# Patient Record
Sex: FEMALE | Race: BLACK OR AFRICAN AMERICAN | Employment: UNEMPLOYED | URBAN - METROPOLITAN AREA
[De-identification: names, ages, dates, MRNs, and addresses within clinical notes are randomized per-mention and may not be internally consistent; named-entity substitution may affect disease eponyms.]

---

## 2017-04-11 ENCOUNTER — OFFICE VISIT (OUTPATIENT)
Dept: FAMILY MEDICINE CLINIC | Age: 52
End: 2017-04-11

## 2017-04-11 VITALS
WEIGHT: 160 LBS | OXYGEN SATURATION: 98 % | TEMPERATURE: 96.3 F | HEART RATE: 81 BPM | DIASTOLIC BLOOD PRESSURE: 70 MMHG | SYSTOLIC BLOOD PRESSURE: 129 MMHG | HEIGHT: 61 IN | BODY MASS INDEX: 30.21 KG/M2 | RESPIRATION RATE: 18 BRPM

## 2017-04-11 DIAGNOSIS — R30.9 URINARY PAIN: ICD-10-CM

## 2017-04-11 DIAGNOSIS — M25.512 CHRONIC LEFT SHOULDER PAIN: Primary | ICD-10-CM

## 2017-04-11 DIAGNOSIS — G89.29 CHRONIC LEFT SHOULDER PAIN: Primary | ICD-10-CM

## 2017-04-11 DIAGNOSIS — F29 PSYCHOSIS, UNSPECIFIED PSYCHOSIS TYPE (HCC): Chronic | ICD-10-CM

## 2017-04-11 DIAGNOSIS — F31.12 BIPOLAR 1 DISORDER WITH MODERATE MANIA (HCC): Chronic | ICD-10-CM

## 2017-04-11 LAB
BILIRUB UR QL STRIP: NEGATIVE
GLUCOSE UR-MCNC: NEGATIVE MG/DL
KETONES P FAST UR STRIP-MCNC: NEGATIVE MG/DL
PH UR STRIP: 7 [PH] (ref 4.6–8)
PROT UR QL STRIP: NEGATIVE MG/DL
SP GR UR STRIP: 1 (ref 1–1.03)
UA UROBILINOGEN AMB POC: NORMAL (ref 0.2–1)
URINALYSIS CLARITY POC: CLEAR
URINALYSIS COLOR POC: NORMAL
URINE BLOOD POC: NORMAL
URINE LEUKOCYTES POC: NEGATIVE
URINE NITRITES POC: NEGATIVE

## 2017-04-11 RX ORDER — IBUPROFEN 600 MG/1
TABLET ORAL
COMMUNITY
Start: 2017-03-23 | End: 2017-10-16 | Stop reason: ALTCHOICE

## 2017-04-11 RX ORDER — DICLOFENAC SODIUM 75 MG/1
TABLET, DELAYED RELEASE ORAL
Qty: 30 TAB | Refills: 2 | Status: SHIPPED | OUTPATIENT
Start: 2017-04-11 | End: 2017-10-16 | Stop reason: ALTCHOICE

## 2017-04-11 NOTE — PROGRESS NOTES
Chief Complaint   Patient presents with    Shoulder Pain    Elbow Pain    Bladder Infection    Allergic Reaction     Left shoulder & right elbow hurting, wants ref to ortho . Having reaction to artificial sweetener. Tongue swelling & hurting. Having burning when passes urine.

## 2017-04-11 NOTE — PROGRESS NOTES
HISTORY OF PRESENT ILLNESS  Brinda Vasquez is a 47 y.o. female here today for ortho referral for left shoulder pain that she has had for several years after a fall, no numbness or tingling but she does have pain and decreased ROM. Also having some urinary discomfort and wants to check UA. Continues to follow with psychiatry and feels well on her medications. Recently switched her toothpaste and it had an artificial sweetener in it and she reports allergic reaction to it with change in her taste and tingling of her tongue. Her sxs have resolved and she has discontinued the toothpaste. Shoulder Pain    The history is provided by the patient. The incident occurred more than 1 week ago. There was no injury mechanism. Bladder Infection    The history is provided by the patient. This is a new problem. The current episode started more than 2 days ago. The problem occurs intermittently. Associated symptoms include frequency and urgency. Pertinent negatives include no hematuria. Review of Systems   Constitutional: Negative for malaise/fatigue. Respiratory: Negative. Cardiovascular: Negative. Genitourinary: Positive for frequency and urgency. Negative for dysuria and hematuria. Musculoskeletal:        See HPI   Psychiatric/Behavioral:        Pt reports stable on current meds       Physical Exam   Constitutional: She is oriented to person, place, and time. She appears well-developed and well-nourished. /70 (BP 1 Location: Left arm, BP Patient Position: Sitting)  Pulse 81  Temp 96.3 °F (35.7 °C) (Oral)   Resp 18  Ht 5' 1\" (1.549 m)  Wt 160 lb (72.6 kg)  LMP 02/07/2017  SpO2 98%  BMI 30.23 kg/m2     HENT:   Head: Normocephalic and atraumatic. Eyes: No scleral icterus. Neck: No thyromegaly present. Cardiovascular: Normal heart sounds. Pulmonary/Chest: Breath sounds normal.   Abdominal: Soft. There is no tenderness.    Musculoskeletal:   Left shoulder no TTP but greatly reduced ROM, radial pulse 2+ hand  strong   Lymphadenopathy:     She has no cervical adenopathy. Neurological: She is alert and oriented to person, place, and time. Psychiatric: She has a normal mood and affect. Nursing note and vitals reviewed. Patient Active Problem List   Diagnosis Code    Psychotic disorder F29    Urge incontinence of urine N39.41    Bipolar 1 disorder with moderate robyn ([facility]) F31.12    Environmental allergies Z91.09    Chronic back pain M54.9, G89.29     Past Medical History:   Diagnosis Date    Arthritis     joints    Bipolar 1 disorder with moderate robyn ([facility] [address] 3/17/2014    Chronic pain     back with stress    Contact dermatitis and other eczema, due to unspecified cause     Depression     Headache     Hyperlipidemia 8/22/2016    Other ill-defined conditions     sinus infection,hay fever    Other ill-defined conditions     scoliosis    Psychiatric disorder     bipolar    Psychotic disorder     Stool color black     Tennis elbow syndrome 5/4/2015    Trauma      Social History     Social History    Marital status:      Spouse name: N/A    Number of children: N/A    Years of education: N/A     Social History Main Topics    Smoking status: Never Smoker    Smokeless tobacco: Never Used    Alcohol use Yes      Comment: occasional    Drug use: No    Sexual activity: Yes     Partners: Male     Other Topics Concern    None     Social History Narrative    , 0 children, not working at present. On disability for bipolar illness.       Family History   Problem Relation Age of Onset   [facility] Arthritis-rheumatoid Mother    [facility] Migraines Mother     Psychiatric Disorder Mother     Bipolar Disorder Mother     Alcohol abuse Father     Lung Disease Father     Heart Disease Father     Stroke Father     High Cholesterol Father     Psychiatric Disorder Sister     Alcohol abuse Sister     Bipolar Disorder Sister     Stroke Brother     Cancer Maternal Aunt     Breast Cancer Maternal Aunt      pt jose d she was under 50    Bipolar Disorder Sister      Current Outpatient Prescriptions   Medication Sig    ibuprofen (MOTRIN) 600 mg tablet     diclofenac EC (VOLTAREN) 75 mg EC tablet 1 po bid prn pain, take with food    methocarbamol (ROBAXIN) 500 mg tablet TAKE 1-2 TABLETS BY MOUTH TWICE A DAY AS NEEDED FOR MUSCLE SPASMS.  azelastine (ASTELIN) 137 mcg (0.1 %) nasal spray USE 1 SPRAY IN EACH NOSTRIL TWICE A DAY AS DIRECTED    SEROQUEL  mg sr tablet     famotidine (PEPCID) 40 mg tablet Take 1 tablet before breakfast, 1 before dinner. You may take one extra dose for severe heartburn if needed.  triamcinolone (ARISTOCORT) 0.5 % topical cream Apply  to affected area two (2) times a day. use thin layer    albuterol (PROVENTIL HFA, VENTOLIN HFA) 90 mcg/actuation inhaler Take 2 Puffs by inhalation every six (6) hours as needed for Wheezing.  fexofenadine (ALLEGRA) 180 mg tablet Take  by mouth daily.  flurazepam (DALMANE) 30 mg capsule Take 30 mg by mouth nightly as needed.  carbamazepine (TEGRETOL) 200 mg tablet Take 200 mg by mouth two (2) times a day.  EPINEPHrine (EPIPEN) 0.3 mg/0.3 mL (1:1,000) injection 0.3 mL by IntraMUSCular route once as needed for up to 1 dose. Allergies   Allergen Reactions    Claritin-D 12 Hour [Loratadine-Pseudoephedrine] Palpitations     palpatations and ringing in the ear    Other Medication Anaphylaxis     Artificial sweeteners cause anaphylaxis    Pcn [Penicillins] Anaphylaxis    Sunscreen Contact Dermatitis     Burns and opens the skin    Ultram [Tramadol] Hives and Other (comments)     Hives and ringing of the ears    Benzoyl Peroxide Hives    Cephalexin Itching    Maltodextrin-Glycerin Shortness of Breath       ASSESSMENT and PLAN  In office UA WNL. Ortho information provided, cont to follow w psychiatry. Care plan reviewed and pt understands. After visit summary printed and reviewed with patient.     Patience Lose was seen today for shoulder pain, elbow pain and bladder infection.     Diagnoses and all orders for this visit:    Chronic left shoulder pain  -     diclofenac EC (VOLTAREN) 75 mg EC tablet; 1 po bid prn pain, take with food    Urinary pain  -     AMB POC URINALYSIS DIP STICK AUTO W/O MICRO    Bipolar 1 disorder with moderate robyn (HCC)    Psychosis, unspecified psychosis type    Other orders  -     Cancel: AMB POC URINALYSIS DIP STICK AUTO W/O MICRO

## 2017-04-11 NOTE — MR AVS SNAPSHOT
Visit Information Date & Time Provider Department Dept. Phone Encounter #  
 4/11/2017  7:45 AM Hoang Gibson MD [facility] [phone] [id number] Upcoming Health Maintenance Date Due INFLUENZA AGE 9 TO ADULT 8/1/2016 PAP AKA CERVICAL CYTOLOGY 8/27/2016 BREAST CANCER SCRN MAMMOGRAM 9/14/2018 DTaP/Tdap/Td series (2 - Td) 7/6/2022 COLONOSCOPY 9/30/2023 Allergies as of 4/11/2017  Review Complete On: 4/11/2017 By: Jose Panchal LPN Severity Noted Reaction Type Reactions Claritin-d 12 Hour [Loratadine-pseudoephedrine] High 03/31/2011   Side Effect Palpitations  
 palpatations and ringing in the ear Other Medication High 03/31/2011   Systemic Anaphylaxis Artificial sweeteners cause anaphylaxis Pcn [Penicillins] High 03/31/2011   Systemic Anaphylaxis Sunscreen High 03/31/2011   Topical Contact Dermatitis Burns and opens the skin Ultram [Tramadol] Medium 03/31/2011   Side Effect Hives, Other (comments) Hives and ringing of the ears Benzoyl Peroxide  01/11/2013    Hives Cephalexin  04/20/2015    Itching Maltodextrin-glycerin  02/20/2014   Systemic Shortness of Breath Current Immunizations  Reviewed on 11/12/2015 Name Date Influenza Vaccine (Quad) PF 11/12/2015 TDAP Vaccine 7/6/2012 Not reviewed this visit You Were Diagnosed With   
  
 Codes Comments Chronic left shoulder pain    -  Primary ICD-10-CM: M25.512, B10.21 ICD-9-CM: 719.41, 338.29 Urinary pain     ICD-10-CM: R30.9 ICD-9-CM: 788.1 Bipolar 1 disorder with moderate robyn (HCC)     ICD-10-CM: F31.12 
ICD-9-CM: 296.42 Psychosis, unspecified psychosis type     ICD-10-CM: F29 
ICD-9-CM: 298.9 Vitals BP Pulse Temp Resp Height(growth percentile) Weight(growth percentile) 129/70 (BP 1 Location: Left arm, BP Patient Position: Sitting) 81 96.3 °F (35.7 °C) (Oral) 18 5' 1\" (1.549 m) 160 lb (72.6 kg) LMP SpO2 BMI OB Status Smoking Status 02/07/2017 98% 30.23 kg/m2 Perimenopausal Never Smoker BMI and BSA Data Body Mass Index Body Surface Area  
 30.23 kg/m 2 1.77 m 2 Preferred Pharmacy Pharmacy Name Phone [address], [place] Jose Vazquez [phone] Your Updated Medication List  
  
   
This list is accurate as of: 4/11/17  9:14 AM.  Always use your most recent med list.  
  
  
  
  
 albuterol 90 mcg/actuation inhaler Commonly known as:  PROVENTIL HFA, VENTOLIN HFA, PROAIR HFA Take 2 Puffs by inhalation every six (6) hours as needed for Wheezing. ALLEGRA 180 mg tablet Generic drug:  fexofenadine Take  by mouth daily. azelastine 137 mcg (0.1 %) nasal spray Commonly known as:  ASTELIN  
USE 1 SPRAY IN EACH NOSTRIL TWICE A DAY AS DIRECTED  
  
 diclofenac EC 75 mg EC tablet Commonly known as:  VOLTAREN  
1 po bid prn pain, take with food EPINEPHrine 0.3 mg/0.3 mL injection Commonly known as:  EPIPEN  
0.3 mL by IntraMUSCular route once as needed for up to 1 dose.  
  
 famotidine 40 mg tablet Commonly known as:  PEPCID Take 1 tablet before breakfast, 1 before dinner. You may take one extra dose for severe heartburn if needed. flurazepam 30 mg capsule Commonly known as:  [facility] Take 30 mg by mouth nightly as needed. ibuprofen 600 mg tablet Commonly known as:  MOTRIN  
  
 methocarbamol 500 mg tablet Commonly known as:  ROBAXIN  
TAKE 1-2 TABLETS BY MOUTH TWICE A DAY AS NEEDED FOR MUSCLE SPASMS. SEROquel  mg sr tablet Generic drug:  QUEtiapine SR  
  
 TEGretol 200 mg tablet Generic drug:  carBAMazepine Take 200 mg by mouth two (2) times a day. triamcinolone 0.5 % topical cream  
Commonly known as:  ARISTOCORT Apply  to affected area two (2) times a day. use thin layer Prescriptions Sent to Pharmacy Refills diclofenac EC (VOLTAREN) 75 mg EC tablet 2 Si po bid prn pain, take with food Class: Normal  
 Pharmacy: [address] [facility] Cost Ph #: [phone] We Performed the Following AMB POC URINALYSIS DIP STICK AUTO W/O MICRO [66645 CPT(R)] Introducing [facility] & HE[facility]! Dear Fatou Aguilar: 
Thank you for requesting a [employer] account. Our records indicate that you already have an active Stream[employer] account. You can access your account anytime at [email or web address] Did you know that you can access your hospital and ER discharge instructions at any time in [employer]? You can also review all of your test results from your hospital stay or ER visit. Additional Information If you have questions, please visit the Frequently Asked Questions section of the Stream[employer] website at [email or web address] Remember, [employer] is NOT to be used for urgent needs. For medical emergencies, dial 911. Now available from your iPhone and Android! Please provide this summary of care documentation to your next provider. Your primary care clinician is listed as Gurinder Anderson. If you have any questions after today's visit, please call [phone].

## 2017-05-31 DIAGNOSIS — M54.9 CHRONIC BACK PAIN, UNSPECIFIED BACK LOCATION, UNSPECIFIED BACK PAIN LATERALITY: ICD-10-CM

## 2017-05-31 DIAGNOSIS — G89.29 CHRONIC BACK PAIN, UNSPECIFIED BACK LOCATION, UNSPECIFIED BACK PAIN LATERALITY: ICD-10-CM

## 2017-05-31 RX ORDER — METHOCARBAMOL 500 MG/1
TABLET, FILM COATED ORAL
Qty: 30 TAB | Refills: 0 | Status: SHIPPED | OUTPATIENT
Start: 2017-05-31 | End: 2017-10-16 | Stop reason: SDUPTHER

## 2017-08-09 RX ORDER — METHOCARBAMOL 500 MG/1
TABLET, FILM COATED ORAL
Qty: 30 TAB | Refills: 0 | Status: SHIPPED | OUTPATIENT
Start: 2017-08-09 | End: 2017-10-16 | Stop reason: ALTCHOICE

## 2017-08-09 NOTE — TELEPHONE ENCOUNTER
Spoke with patient and gave her Dr. Sonia Montero and Dr. Sarah Connelly number [phone], she will make an appointment to be able to continue to get medications.

## 2017-09-15 ENCOUNTER — HOSPITAL ENCOUNTER (OUTPATIENT)
Dept: MAMMOGRAPHY | Age: 52
Discharge: HOME | End: 2017-09-15
Attending: INTERNAL MEDICINE
Payer: MEDICARE

## 2017-09-15 DIAGNOSIS — Z12.31 VISIT FOR SCREENING MAMMOGRAM: ICD-10-CM

## 2017-09-15 PROCEDURE — 77067 SCR MAMMO BI INCL CAD: CPT

## 2017-10-16 ENCOUNTER — OFFICE VISIT (OUTPATIENT)
Dept: INTERNAL MEDICINE CLINIC | Age: 52
End: 2017-10-16

## 2017-10-16 VITALS
BODY MASS INDEX: 30.78 KG/M2 | RESPIRATION RATE: 18 BRPM | SYSTOLIC BLOOD PRESSURE: 127 MMHG | OXYGEN SATURATION: 99 % | WEIGHT: 163 LBS | HEIGHT: 61 IN | HEART RATE: 100 BPM | TEMPERATURE: 97.9 F | DIASTOLIC BLOOD PRESSURE: 77 MMHG

## 2017-10-16 DIAGNOSIS — E78.00 HIGH CHOLESTEROL: Primary | ICD-10-CM

## 2017-10-16 DIAGNOSIS — M54.9 CHRONIC BACK PAIN, UNSPECIFIED BACK LOCATION, UNSPECIFIED BACK PAIN LATERALITY: ICD-10-CM

## 2017-10-16 DIAGNOSIS — G89.29 CHRONIC BACK PAIN, UNSPECIFIED BACK LOCATION, UNSPECIFIED BACK PAIN LATERALITY: ICD-10-CM

## 2017-10-16 DIAGNOSIS — Z13.1 SCREENING FOR DIABETES MELLITUS: ICD-10-CM

## 2017-10-16 DIAGNOSIS — F31.12 BIPOLAR 1 DISORDER WITH MODERATE MANIA (HCC): ICD-10-CM

## 2017-10-16 RX ORDER — METHOCARBAMOL 500 MG/1
TABLET, FILM COATED ORAL
Qty: 30 TAB | Refills: 0 | Status: SHIPPED | OUTPATIENT
Start: 2017-10-16 | End: 2018-01-04 | Stop reason: SDUPTHER

## 2017-10-16 NOTE — PROGRESS NOTES
Ms. Lucia Moise is a new patient who is here to establish care. CC:  Establish Care (new patient)       HPI:    Hx of Bipolar disorder with panic attacks: followed Dr Farr on tegretol, flurazepam and Seroquel. Patient states her mood is stable currently. Denies suicidal thoughts. Bipolar is well controlled.      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), stretches helps and lifting weights  Takes robaxin PRN and requesting a refill    On allergy shots, azelastine and allegra with symptoms of allergies controlled   Dr Abhilash Sharif is allergist       Last period in February 2017 - since then no periods, has hot sweats  Last pap smear cannot recall exact date     Review of systems:  Constitutional: negative for fever, chills, weight loss, night sweats   Eyes : negative for vision changes, eye pain and discharge  Nose and Throat: negative for tinnitus, sore throat   Cardiovascular: negative for chest pain, palpitations and shortness of breath  Respiratory: negative for shortness of breath, cough and wheezing   Gastroinstestinal: negative for abdominal pain, nausea, vomiting, diarrhea, constipation, and blood in the stool  Musculoskeletal: see HPI  Genitourinary: negative for dysuria, nocturia, polyuria and hematuria   Neurologic: Negative for focal weakness, numbness or incoordination  Skin: negative for rash, pruritus  Hematologic: negative for easy bruising      Past Medical History:   Diagnosis Date    Arthritis     joints    Bipolar 1 disorder with moderate robyn ([facility] [address] 3/17/2014    Chronic pain     back with stress    Contact dermatitis and other eczema, due to unspecified cause     Depression     Headache(784.0)     Hyperlipidemia 8/22/2016    Other ill-defined conditions(799.89)     sinus infection,hay fever    Other ill-defined conditions(799.89)     scoliosis    Psychiatric disorder     bipolar    Psychotic disorder     Stool color black     Tennis elbow syndrome 5/4/2015    Trauma         Past Surgical History:   Procedure Laterality Date    HX HEENT      wisdom teeth extraction    HX LIPOMA RESECTION      right gluteal    FREDY BIOPSY BREAST STEREOTACTIC Left 2011    negative    NV DENTAL SURGERY PROCEDURE      hx of dental surgery- wisdom teeth extracted       Allergies   Allergen Reactions    Claritin-D 12 Hour [Loratadine-Pseudoephedrine] Palpitations     palpatations and ringing in the ear    Other Medication Anaphylaxis     Artificial sweeteners cause anaphylaxis    Pcn [Penicillins] Anaphylaxis    Sunscreen Contact Dermatitis     Burns and opens the skin    Ultram [Tramadol] Hives and Other (comments)     Hives and ringing of the ears    Benzoyl Peroxide Hives    Cephalexin Itching    Maltodextrin-Glycerin Shortness of Breath       Current Outpatient Prescriptions on File Prior to Visit   Medication Sig Dispense Refill    azelastine (ASTELIN) 137 mcg (0.1 %) nasal spray USE 1 SPRAY IN EACH NOSTRIL TWICE A DAY AS DIRECTED 1 Bottle 2    SEROQUEL  mg sr tablet       EPINEPHrine (EPIPEN) 0.3 mg/0.3 mL (1:1,000) injection 0.3 mL by IntraMUSCular route once as needed for up to 1 dose. 1 Each 1    triamcinolone (ARISTOCORT) 0.5 % topical cream Apply  to affected area two (2) times a day. use thin layer 15 g 1    albuterol (PROVENTIL HFA, VENTOLIN HFA) 90 mcg/actuation inhaler Take 2 Puffs by inhalation every six (6) hours as needed for Wheezing. 1 Inhaler 3    fexofenadine (ALLEGRA) 180 mg tablet Take  by mouth daily.  flurazepam (DALMANE) 30 mg capsule Take 30 mg by mouth nightly as needed.  carbamazepine (TEGRETOL) 200 mg tablet Take 200 mg by mouth two (2) times a day. No current facility-administered medications on file prior to visit. family history includes Alcohol abuse in her father and sister;  Arthritis-rheumatoid in her mother; Bipolar Disorder in her mother, sister, and sister; Breast Cancer in her maternal aunt; Cancer in her maternal aunt; Heart Disease in her father; High Cholesterol in her father; Lung Disease in her father; Lupus in her mother; Migraines in her mother; Psychiatric Disorder in her mother and sister; Stroke in her brother and father. Social History     Social History    Marital status:      Spouse name: N/A    Number of children: N/A    Years of education: N/A     Occupational History    Not on file. Social History Main Topics    Smoking status: Never Smoker    Smokeless tobacco: Never Used    Alcohol use Yes      Comment: occasional    Drug use: No    Sexual activity: Yes     Partners: Male     Other Topics Concern    Not on file     Social History Narrative    , 0 children, not working at present. On disability for bipolar illness. Visit Vitals    /77 (BP 1 Location: Right arm, BP Patient Position: Sitting)    Pulse 100    Temp 97.9 °F (36.6 °C) (Oral)    Resp 18    Ht 5' 1\" (1.549 m)    Wt 163 lb (73.9 kg)    SpO2 99%    BMI 30.8 kg/m2     General:  Well appearing female no acute distress  HEENT:   PERRL,normal conjunctiva. External ear and canals normal, TMs normal.  Hearing normal to voice. Nose without edema or discharge, normal septum. Lips, teeth, gums normal.  Oropharynx: no erythema, no exudates, no lesions, normal tongue. Neck:  Supple. Thyroid normal size, nontender, without nodules. No carotid bruit. No masses or lymphadenopathy  Respiratory: no respiratory distress,  no wheezing, no rhonchi, no rales. No chest wall tenderness. Cardiovascular:  RRR, normal S1S2, no murmur. Gastrointestinal: normal bowel sounds, soft, nontender, without masses. No hepatosplenomegaly. Extremities +2 pulses, no edema, normal sensation   Musculoskeletal:  Normal gait. Normal digits and nails. Normal strength and tone, no atrophy, and no abnormal movement. Skin:  No rash, no lesions, no ulcers.   Skin warm, normal turgor, without induration or nodules. Neuro:  A and OX4, fluent speech, cranial nerves normal 2-12. Sensation normal to light touch. DTR symmetrical  Psych:  Normal affect      Lab Results   Component Value Date/Time    WBC 3.7 08/22/2016 09:26 AM    HGB 11.9 08/22/2016 09:26 AM    HCT 36.4 08/22/2016 09:26 AM    PLATELET 618 39/12/0850 09:26 AM    MCV 93 08/22/2016 09:26 AM     Lab Results   Component Value Date/Time    Sodium 140 08/22/2016 09:26 AM    Potassium 4.0 08/22/2016 09:26 AM    Chloride 97 08/22/2016 09:26 AM    CO2 23 08/22/2016 09:26 AM    Glucose 90 08/22/2016 09:26 AM    BUN 7 08/22/2016 09:26 AM    Creatinine 0.61 08/22/2016 09:26 AM    BUN/Creatinine ratio 11 08/22/2016 09:26 AM    GFR est  08/22/2016 09:26 AM    GFR est non- 08/22/2016 09:26 AM    Calcium 9.6 08/22/2016 09:26 AM     Lab Results   Component Value Date/Time    Cholesterol, total 197 08/22/2016 09:26 AM    HDL Cholesterol 80 08/22/2016 09:26 AM    LDL, calculated 106 08/22/2016 09:26 AM    VLDL, calculated 11 08/22/2016 09:26 AM    Triglyceride 57 08/22/2016 09:26 AM     Lab Results   Component Value Date/Time    TSH 0.813 11/12/2015 03:52 PM     Lab Results   Component Value Date/Time    Hemoglobin A1c 5.4 02/25/2014 12:10 PM    Hemoglobin A1c (POC) 5.4 04/20/2015 10:00 AM     Lab Results   Component Value Date/Time    Vitamin D 25-Hydroxy 12.7 11/10/2011 09:25 AM    VITAMIN D, 25-HYDROXY 69.0 11/12/2015 03:52 PM                   Assessment and Plan:     1. Chronic back pain, unspecified back location, unspecified back pain laterality  - uses muscle relaxant PRN   - methocarbamol (ROBAXIN) 500 mg tablet; TAKE 1-2 TABLETS BY MOUTH TWICE A DAY AS NEEDED FOR MUSCLE SPASMS. Indications: Muscle Spasm  Dispense: 30 Tab; Refill: 0    2. High cholesterol  - controlled with diet / has decreased fatty food intake   - CBC WITH AUTOMATED DIFF  - METABOLIC PANEL, COMPREHENSIVE  - LIPID PANEL    3.  Bipolar 1 disorder with moderate robyn (HCC)  - mood is stable on multiple psych meds  - followed by Dr Jennifer Medellin   - Papo Mitchell, COMPREHENSIVE  - LIPID PANEL  Patient will return for fasting labs     Follow-up Disposition:  Return in about 6 months (around 4/16/2018) for pap smear .      Dagoberto Gomez MD

## 2017-10-16 NOTE — MR AVS SNAPSHOT
Visit Information Date & Time Provider Department Dept. Phone Encounter #  
 10/16/2017 10:45 AM Castillo, [address] [phone] [id number] Follow-up Instructions Return in about 6 months (around 4/16/2018) for pap smear . Upcoming Health Maintenance Date Due  
 PAP AKA CERVICAL CYTOLOGY 8/27/2016 BREAST CANCER SCRN MAMMOGRAM 9/15/2019 DTaP/Tdap/Td series (2 - Td) 7/6/2022 COLONOSCOPY 9/30/2023 Allergies as of 10/16/2017  Review Complete On: 10/16/2017 By: Ayush Tobar Severity Noted Reaction Type Reactions Claritin-d 12 Hour [Loratadine-pseudoephedrine] High 03/31/2011   Side Effect Palpitations  
 palpatations and ringing in the ear Other Medication High 03/31/2011   Systemic Anaphylaxis Artificial sweeteners cause anaphylaxis Pcn [Penicillins] High 03/31/2011   Systemic Anaphylaxis Sunscreen High 03/31/2011   Topical Contact Dermatitis Burns and opens the skin Ultram [Tramadol] Medium 03/31/2011   Side Effect Hives, Other (comments) Hives and ringing of the ears Benzoyl Peroxide  01/11/2013    Hives Cephalexin  04/20/2015    Itching Maltodextrin-glycerin  02/20/2014   Systemic Shortness of Breath Current Immunizations  Reviewed on 11/12/2015 Name Date Influenza Vaccine (Quad) PF 11/12/2015 TDAP Vaccine 7/6/2012 Not reviewed this visit You Were Diagnosed With   
  
 Codes Comments High cholesterol    -  Primary ICD-10-CM: E78.00 ICD-9-CM: 272.0 Chronic back pain, unspecified back location, unspecified back pain laterality     ICD-10-CM: M54.9, G89.29 ICD-9-CM: 724.5, 338.29 Bipolar 1 disorder with moderate robyn (HCC)     ICD-10-CM: F31.12 
ICD-9-CM: 296.42 Screening for diabetes mellitus     ICD-10-CM: Z13.1 ICD-9-CM: V77.1 Vitals BP Pulse Temp Resp Height(growth percentile) Weight(growth percentile) 127/77 (BP 1 Location: Right arm, BP Patient Position: Sitting) 100 97.9 °F (36.6 °C) (Oral) 18 5' 1\" (1.549 m) 163 lb (73.9 kg) SpO2 BMI OB Status Smoking Status 99% 30.8 kg/m2 Perimenopausal Never Smoker BMI and BSA Data Body Mass Index Body Surface Area  
 30.8 kg/m 2 1.78 m 2 Preferred Pharmacy Pharmacy Name Phone [address], [address] [place] [address] [phone] Your Updated Medication List  
  
   
This list is accurate as of: 10/16/17 11:36 AM.  Always use your most recent med list.  
  
  
  
  
 albuterol 90 mcg/actuation inhaler Commonly known as:  PROVENTIL HFA, VENTOLIN HFA, PROAIR HFA Take 2 Puffs by inhalation every six (6) hours as needed for Wheezing. ALLEGRA 180 mg tablet Generic drug:  fexofenadine Take  by mouth daily. azelastine 137 mcg (0.1 %) nasal spray Commonly known as:  ASTELIN  
USE 1 SPRAY IN EACH NOSTRIL TWICE A DAY AS DIRECTED  
  
 EPINEPHrine 0.3 mg/0.3 mL injection Commonly known as:  EPIPEN  
0.3 mL by IntraMUSCular route once as needed for up to 1 dose. flurazepam 30 mg capsule Commonly known as:  [facility] Take 30 mg by mouth nightly as needed. methocarbamol 500 mg tablet Commonly known as:  ROBAXIN  
TAKE 1-2 TABLETS BY MOUTH TWICE A DAY AS NEEDED FOR MUSCLE SPASMS. Indications: Muscle Spasm SEROquel  mg sr tablet Generic drug:  QUEtiapine SR  
  
 TEGretol 200 mg tablet Generic drug:  carBAMazepine Take 200 mg by mouth two (2) times a day. triamcinolone 0.5 % topical cream  
Commonly known as:  ARISTOCORT Apply  to affected area two (2) times a day. use thin layer Prescriptions Sent to Pharmacy Refills  
 methocarbamol (ROBAXIN) 500 mg tablet 0 Sig: TAKE 1-2 TABLETS BY MOUTH TWICE A DAY AS NEEDED FOR MUSCLE SPASMS. Indications: Muscle Spasm  Class: Normal  
 Pharmacy: [id number]  #: [phone] We Performed the Following CBC WITH AUTOMATED DIFF [81920 CPT(R)] LIPID PANEL [12898 CPT(R)] METABOLIC PANEL, COMPREHENSIVE [46083 CPT(R)] Follow-up Instructions Return in about 6 months (around 4/16/2018) for pap smear . Patient Instructions Schedule pap smear Return for fasting cholesterol labs Introducing \[facility] [place] [facility]\"" & HEALTH SERVICES! Dear Pavan Pitts: 
Thank you for requesting a [employer] account. Our records indicate that you already have an active [employer] account. You can access your account anytime at [email or web address] Did you know that you can access your hospital and ER discharge instructions at any time in [employer]? You can also review all of your test results from your hospital stay or ER visit. Additional Information If you have questions, please visit the Frequently Asked Questions section of the [employer] website at [email or web address] Remember, [employer] is NOT to be used for urgent needs. For medical emergencies, dial 911. Now available from your iPhone and Android! Please provide this summary of care documentation to your next provider. Your primary care clinician is listed as WALLY Krueger. If you have any questions after today's visit, please call [phone].

## 2017-10-16 NOTE — PROGRESS NOTES
Chief Complaint   Patient presents with   [facility] Establish Care     new patient     1. Have you been to the ER, urgent care clinic since your last visit? Hospitalized since your last visit? No    2. Have you seen or consulted any other health care providers outside of the [address] [place] [address] since your last visit? Include any pap smears or colon screening.  No

## 2017-10-25 RX ORDER — AZELASTINE 1 MG/ML
SPRAY, METERED NASAL
Qty: 30 BOTTLE | Refills: 2 | Status: ON HOLD | OUTPATIENT
Start: 2017-10-25 | End: 2020-07-29

## 2017-12-30 ENCOUNTER — HOSPITAL ENCOUNTER (EMERGENCY)
Age: 52
Discharge: HOME | End: 2017-12-30
Attending: EMERGENCY MEDICINE
Payer: MEDICARE

## 2017-12-30 VITALS
DIASTOLIC BLOOD PRESSURE: 79 MMHG | WEIGHT: 159.83 LBS | HEIGHT: 61 IN | OXYGEN SATURATION: 98 % | BODY MASS INDEX: 30.18 KG/M2 | RESPIRATION RATE: 25 BRPM | HEART RATE: 95 BPM | SYSTOLIC BLOOD PRESSURE: 133 MMHG | TEMPERATURE: 97.9 F

## 2017-12-30 DIAGNOSIS — T78.40XA ALLERGIC REACTION, INITIAL ENCOUNTER: Primary | ICD-10-CM

## 2017-12-30 PROCEDURE — 96375 TX/PRO/DX INJ NEW DRUG ADDON: CPT

## 2017-12-30 PROCEDURE — 96376 TX/PRO/DX INJ SAME DRUG ADON: CPT

## 2017-12-30 PROCEDURE — 96374 THER/PROPH/DIAG INJ IV PUSH: CPT

## 2017-12-30 PROCEDURE — 74011000250 HC RX REV CODE- 250: Performed by: EMERGENCY MEDICINE

## 2017-12-30 PROCEDURE — 74011250636 HC RX REV CODE- 250/636: Performed by: EMERGENCY MEDICINE

## 2017-12-30 PROCEDURE — 99284 EMERGENCY DEPT VISIT MOD MDM: CPT

## 2017-12-30 RX ORDER — ONDANSETRON 2 MG/ML
4 INJECTION INTRAMUSCULAR; INTRAVENOUS
Status: COMPLETED | OUTPATIENT
Start: 2017-12-30 | End: 2017-12-30

## 2017-12-30 RX ORDER — DIPHENHYDRAMINE HYDROCHLORIDE 50 MG/ML
25 INJECTION, SOLUTION INTRAMUSCULAR; INTRAVENOUS
Status: COMPLETED | OUTPATIENT
Start: 2017-12-30 | End: 2017-12-30

## 2017-12-30 RX ORDER — FAMOTIDINE 10 MG/ML
20 INJECTION INTRAVENOUS
Status: COMPLETED | OUTPATIENT
Start: 2017-12-30 | End: 2017-12-30

## 2017-12-30 RX ORDER — PREDNISONE 10 MG/1
TABLET ORAL
Qty: 48 TAB | Refills: 0 | Status: SHIPPED | OUTPATIENT
Start: 2017-12-30 | End: 2018-01-07

## 2017-12-30 RX ORDER — MONTELUKAST SODIUM 10 MG/1
10 TABLET ORAL
Status: ON HOLD | COMMUNITY
End: 2022-05-09

## 2017-12-30 RX ORDER — DEXAMETHASONE SODIUM PHOSPHATE 100 MG/10ML
10 INJECTION INTRAMUSCULAR; INTRAVENOUS
Status: COMPLETED | OUTPATIENT
Start: 2017-12-30 | End: 2017-12-30

## 2017-12-30 RX ADMIN — FAMOTIDINE 20 MG: 10 INJECTION, SOLUTION INTRAVENOUS at 09:51

## 2017-12-30 RX ADMIN — DIPHENHYDRAMINE HYDROCHLORIDE 25 MG: 50 INJECTION, SOLUTION INTRAMUSCULAR; INTRAVENOUS at 09:51

## 2017-12-30 RX ADMIN — DEXAMETHASONE SODIUM PHOSPHATE 10 MG: 10 INJECTION INTRAMUSCULAR; INTRAVENOUS at 10:02

## 2017-12-30 RX ADMIN — ONDANSETRON 4 MG: 2 INJECTION INTRAMUSCULAR; INTRAVENOUS at 10:02

## 2017-12-30 RX ADMIN — DIPHENHYDRAMINE HYDROCHLORIDE 25 MG: 50 INJECTION, SOLUTION INTRAMUSCULAR; INTRAVENOUS at 11:23

## 2017-12-30 NOTE — ED TRIAGE NOTES
\"I'm having an allergic reaction to some oils. I put two drops of 'digestive jossue' Doterra oil in my soup last night. \"  Anice, peppermint, sydney, jonatan, coriander, tarragon, and fennel are main ingredients of oil.

## 2017-12-30 NOTE — ED NOTES
Assumed care from triage. Patient comes to the ED complaining of chest tightness and nausea that started this morning. Patient has been taking essential oils and accidentally poured some in her food last night. Patient started breaking out into hives and became itchy. Patient took Benadryl, Singulair, Allegra, and some hot tea. Patient states that she is allergic to artificial sweeteners and some natural sugars.

## 2017-12-30 NOTE — DISCHARGE INSTRUCTIONS

## 2017-12-30 NOTE — ED NOTES
Dr. Gissell Dennis reviewed discharge instructions with the patient. The patient verbalized understanding. All questions and concerns were addressed. The patient declined a wheelchair and is discharged ambulatory in the care of family members with instructions and prescriptions in hand. Pt is alert and oriented x 4. Respirations are clear and unlabored.

## 2017-12-30 NOTE — ED PROVIDER NOTES
EMERGENCY DEPARTMENT HISTORY AND PHYSICAL EXAM      Date: 12/30/2017  Patient Name: Mohinder Cuevas    History of Presenting Illness     Chief Complaint   Patient presents with    Allergic Reaction       History Provided By: Patient    HPI: Mohinder Cuevas, 47 y.o. female with PMHx significant for HLD, bipolar 1 disorder, and alleriges, presents ambulatory to the ED with cc of a diffuse, pruritic rash (described as hives) with onset last night. Pt states symptoms were gradual in onset and reports associated nausea and chest tightness which began this morning. She states that she has an extensive hx of allergies including allergies to artificial sweeteners, noting she recently began use of essential oils 1.5 weeks ago. She states that last night she used one called \"On Guard\" which contains a blend of orange peel, cloves, chamomile, eucalyptus, and rosemary, which she put in her food. She states that later that night she began to break out in \"hives\" and experienced diffuse body pruritis for which she believes the oil may have an unlisted artificial sweetener. She states that this morning the chest tightness and nausea began, prompting ED visit. She reports use of Benadryl, Singulair, Allegra, and hot honey tea without relief of symptoms other than noting the rash has improved. She is followed by Dr. Beena Grady (allergist). She denies any recent medication changes. She denies any CP, SOB, vomiting, diarrhea. PCP: Ayush Lees MD    There are no other complaints, changes, or physical findings at this time. Current Outpatient Prescriptions   Medication Sig Dispense Refill    montelukast (SINGULAIR) 10 mg tablet Take 10 mg by mouth daily.       predniSONE (STERAPRED DS) 10 mg dose pack Take as directed 48 Tab 0    azelastine (ASTELIN) 137 mcg (0.1 %) nasal spray USE 1 SPRAY IN EACH NOSTRIL TWICE A DAY AS DIRECTED 30 Bottle 2    methocarbamol (ROBAXIN) 500 mg tablet TAKE 1-2 TABLETS BY MOUTH TWICE A DAY AS NEEDED FOR MUSCLE SPASMS. Indications: Muscle Spasm 30 Tab 0    SEROQUEL  mg sr tablet       EPINEPHrine (EPIPEN) 0.3 mg/0.3 mL (1:1,000) injection 0.3 mL by IntraMUSCular route once as needed for up to 1 dose. 1 Each 1    triamcinolone (ARISTOCORT) 0.5 % topical cream Apply  to affected area two (2) times a day. use thin layer 15 g 1    albuterol (PROVENTIL HFA, VENTOLIN HFA) 90 mcg/actuation inhaler Take 2 Puffs by inhalation every six (6) hours as needed for Wheezing. 1 Inhaler 3    fexofenadine (ALLEGRA) 180 mg tablet Take  by mouth daily.  flurazepam (DALMANE) 30 mg capsule Take 30 mg by mouth nightly as needed.  carbamazepine (TEGRETOL) 200 mg tablet Take 200 mg by mouth two (2) times a day.          Past History     Past Medical History:  Past Medical History:   Diagnosis Date    Arthritis     joints    Bipolar 1 disorder with moderate robyn ([facility] [address] 3/17/2014    Chronic pain     back with stress    Contact dermatitis and other eczema, due to unspecified cause     Depression     Headache(784.0)     Hyperlipidemia 8/22/2016    Other ill-defined conditions(799.89)     sinus infection,hay fever    Other ill-defined conditions(799.89)     scoliosis    Psychiatric disorder     bipolar    Psychotic disorder     Stool color black     Tennis elbow syndrome 5/4/2015    Trauma        Past Surgical History:  Past Surgical History:   Procedure Laterality Date    HX HEENT      wisdom teeth extraction    HX LIPOMA RESECTION      right gluteal    FREDY BIOPSY BREAST STEREOTACTIC Left 2011    negative    [place] DENTAL SURGERY PROCEDURE      hx of dental surgery- wisdom teeth extracted       Family History:  Family History   Problem Relation Age of Onset   [address] [facility] [address] Arthritis-rheumatoid Mother    [facility] Migraines Mother     Psychiatric Disorder Mother     Bipolar Disorder Mother     Lupus Mother     Alcohol abuse Father     Lung Disease Father     Heart Disease Father     Stroke Father     High Cholesterol Father     Psychiatric Disorder Sister     Alcohol abuse Sister     Bipolar Disorder Sister     Stroke Brother     Cancer Maternal Aunt     Breast Cancer Maternal Aunt      pt thpillos she was under 48    Bipolar Disorder Sister        Social History:  Social History   Substance Use Topics    Smoking status: Never Smoker    Smokeless tobacco: Never Used    Alcohol use Yes      Comment: occasional       Allergies: Allergies   Allergen Reactions    Claritin-D 12 Hour [Loratadine-Pseudoephedrine] Palpitations     palpatations and ringing in the ear    Other Medication Anaphylaxis     Artificial sweeteners cause anaphylaxis    Pcn [Penicillins] Anaphylaxis    Sunscreen Contact Dermatitis     Burns and opens the skin    Ultram [Tramadol] Hives and Other (comments)     Hives and ringing of the ears    Benzoyl Peroxide Hives    Cephalexin Itching    Maltodextrin-Glycerin Shortness of Breath         Review of Systems   Review of Systems   Constitutional: Negative for activity change, chills and fever. HENT: Negative for congestion and sore throat. Eyes: Negative for pain and redness. Respiratory: Positive for chest tightness. Negative for cough and shortness of breath. Cardiovascular: Negative for chest pain and palpitations. Gastrointestinal: Positive for nausea. Negative for abdominal pain, diarrhea and vomiting. Genitourinary: Negative for dysuria, frequency and urgency. Musculoskeletal: Negative for back pain and neck pain. Skin: Positive for rash (pruritic). Neurological: Negative for syncope, light-headedness and headaches. Psychiatric/Behavioral: Negative for confusion. All other systems reviewed and are negative. Physical Exam   Physical Exam   Constitutional: She is oriented to person, place, and time. She appears well-developed and well-nourished. No distress. HENT:   Head: Normocephalic.    Nose: Nose normal.   Mouth/Throat: Oropharynx is clear and moist. No oropharyngeal exudate. Eyes: Conjunctivae are normal. Pupils are equal, round, and reactive to light. No scleral icterus. Neck: Normal range of motion. Neck supple. No JVD present. No tracheal deviation present. No thyromegaly present. Cardiovascular: Normal rate, regular rhythm and intact distal pulses. Exam reveals no gallop and no friction rub. No murmur heard. Pulmonary/Chest: Effort normal and breath sounds normal. No stridor. No respiratory distress. She has no wheezes. She has no rales. Abdominal: Soft. Bowel sounds are normal. She exhibits no distension. There is no tenderness. There is no rebound and no guarding. Musculoskeletal: Normal range of motion. She exhibits no edema. Lymphadenopathy:     She has no cervical adenopathy. Neurological: She is alert and oriented to person, place, and time. No cranial nerve deficit. She exhibits normal muscle tone. Coordination normal.   Skin: Skin is warm and dry. No rash noted. She is not diaphoretic. No erythema. No evidence of any rashes or hives. Psychiatric: She has a normal mood and affect. Her behavior is normal.   Nursing note and vitals reviewed. Medical Decision Making   I am the first provider for this patient. I reviewed the vital signs, available nursing notes, past medical history, past surgical history, family history and social history. Vital Signs-Reviewed the patient's vital signs. Patient Vitals for the past 12 hrs:   Temp Pulse Resp BP SpO2   12/30/17 1130 - 95 25 133/79 98 %   12/30/17 1030 - 86 21 (!) 135/91 90 %   12/30/17 0945 - 81 12 130/78 100 %   12/30/17 0925 97.9 °F (36.6 °C) 98 18 156/89 100 %       Pulse Oximetry Analysis - 100% on RA    Cardiac Monitor:   Rate: 89 bpm  Rhythm: Normal Sinus Rhythm     Records Reviewed: Nursing Notes and Old Medical Records    Provider Notes (Medical Decision Making):   DDx: Anxiety, allergic reaction    ED Course:   Initial assessment performed.  The patients presenting problems have been discussed, and they are in agreement with the care plan formulated and outlined with them. I have encouraged them to ask questions as they arise throughout their visit. 11:37 AM  I have reviewed discharge instructions with the patient and explained medications that she is being discharged with. The patient verbalized understanding and agrees with plan. She endorses that she is feeling better at time of discharge. She denies any SOB, further itching, or rash. Disposition:  Discharge Note:  11:37 AM  The patient has been re-evaluated and is ready for discharge. Reviewed available results with patient. Counseled patient on diagnosis and care plan. Patient has expressed understanding, and all questions have been answered. Patient agrees with plan and agrees to follow up as recommended, or return to the ED if their symptoms worsen. Discharge instructions have been provided and explained to the patient, along with reasons to return to the ED. PLAN:  1. Current Discharge Medication List      START taking these medications    Details   predniSONE (STERAPRED DS) 10 mg dose pack Take as directed  Qty: 48 Tab, Refills: 0           2. Follow-up Information     Follow up With Details Comments [id number] Research MD Tiff Schedule an appointment as soon as possible for a visit in 2 days  [address] [place] [address]  [phone]      [facility] EMERGENCY DEPT Go in 1 day  [address] [place] [address] Drive  [address] [facility]  [phone]        Return to ED if worse     Diagnosis     Clinical Impression:   1. Allergic reaction, initial encounter        Attestations: This note is prepared by Petra Antony, acting as Scribe for Geremias Mazariegos MD.    Geremias Mazariegos MD: The scribe's documentation has been prepared under my direction and personally reviewed by me in its entirety.  I confirm that the note above accurately reflects all work, treatment, procedures, and medical decision making performed by me.

## 2018-01-02 ENCOUNTER — TELEPHONE (OUTPATIENT)
Dept: INTERNAL MEDICINE CLINIC | Age: 53
End: 2018-01-02

## 2018-01-02 NOTE — TELEPHONE ENCOUNTER
----- Message from [address] sent at 1/2/2018 11:41 AM EST -----  Regarding: Dr. Duane Farr: [phone]  Jair kuo dc from the ED and needs a f.u. appt 1/2/2018 to  monitor abnormal blood pressure readings.        Message copied/pasted from [facility]

## 2018-01-03 NOTE — TELEPHONE ENCOUNTER
Called patient. Two patient identifiers verified. Patient states she started using essential oils about a month ago. Patient is allergic to artificial sugar. No family hx. Patient states she was using the oil topically and ingesting which contains artificial sugars. 15-20 days after starting patient was itching all over and had hives. Patient went to ED for allergic reaction after taking oral benadryl and singulair and no improvement. 25 mg of benadryl IV x2, Pepcid, and discharged with tapered prednisone x 12 days. /80 Saturday at ED. Advised BP normal, but needed to be seen for f/u since still itching. Scheduled with Dr. Mc Canela on 1/4/18 at [phone] AM.  Advised patient if symptoms worsen, should return to ED. Patient verbalized understanding and states no further questions at this time.

## 2018-01-04 ENCOUNTER — OFFICE VISIT (OUTPATIENT)
Dept: INTERNAL MEDICINE CLINIC | Age: 53
End: 2018-01-04

## 2018-01-04 ENCOUNTER — HOSPITAL ENCOUNTER (OUTPATIENT)
Dept: LAB | Age: 53
Discharge: HOME | End: 2018-01-04
Payer: MEDICARE

## 2018-01-04 VITALS
TEMPERATURE: 97.8 F | SYSTOLIC BLOOD PRESSURE: 136 MMHG | HEART RATE: 110 BPM | WEIGHT: 163 LBS | RESPIRATION RATE: 18 BRPM | HEIGHT: 61 IN | BODY MASS INDEX: 30.78 KG/M2 | OXYGEN SATURATION: 99 % | DIASTOLIC BLOOD PRESSURE: 85 MMHG

## 2018-01-04 DIAGNOSIS — M54.9 CHRONIC BACK PAIN, UNSPECIFIED BACK LOCATION, UNSPECIFIED BACK PAIN LATERALITY: ICD-10-CM

## 2018-01-04 DIAGNOSIS — R30.0 DYSURIA: ICD-10-CM

## 2018-01-04 DIAGNOSIS — L20.82 FLEXURAL ECZEMA: Primary | ICD-10-CM

## 2018-01-04 DIAGNOSIS — F31.9 BIPOLAR 1 DISORDER (HCC): ICD-10-CM

## 2018-01-04 DIAGNOSIS — G89.29 CHRONIC BACK PAIN, UNSPECIFIED BACK LOCATION, UNSPECIFIED BACK PAIN LATERALITY: ICD-10-CM

## 2018-01-04 LAB
BILIRUB UR QL STRIP: NEGATIVE
GLUCOSE UR-MCNC: NEGATIVE MG/DL
KETONES P FAST UR STRIP-MCNC: NEGATIVE MG/DL
PH UR STRIP: 6.5 [PH] (ref 4.6–8)
PROT UR QL STRIP: NEGATIVE
SP GR UR STRIP: 1.01 (ref 1–1.03)
UA UROBILINOGEN AMB POC: NORMAL (ref 0.2–1)
URINALYSIS CLARITY POC: CLEAR
URINALYSIS COLOR POC: YELLOW
URINE BLOOD POC: NORMAL
URINE LEUKOCYTES POC: NEGATIVE
URINE NITRITES POC: NEGATIVE

## 2018-01-04 PROCEDURE — 80156 ASSAY CARBAMAZEPINE TOTAL: CPT

## 2018-01-04 PROCEDURE — 80061 LIPID PANEL: CPT

## 2018-01-04 PROCEDURE — 85025 COMPLETE CBC W/AUTO DIFF WBC: CPT

## 2018-01-04 PROCEDURE — 80053 COMPREHEN METABOLIC PANEL: CPT

## 2018-01-04 PROCEDURE — 36415 COLL VENOUS BLD VENIPUNCTURE: CPT

## 2018-01-04 RX ORDER — METHOCARBAMOL 500 MG/1
TABLET, FILM COATED ORAL
Qty: 30 TAB | Refills: 0 | Status: SHIPPED | OUTPATIENT
Start: 2018-01-04 | End: 2018-04-25 | Stop reason: SDUPTHER

## 2018-01-04 RX ORDER — TRIAMCINOLONE ACETONIDE 5 MG/G
CREAM TOPICAL 2 TIMES DAILY
Qty: 15 G | Refills: 1 | Status: SHIPPED | OUTPATIENT
Start: 2018-01-04 | End: 2018-08-28

## 2018-01-04 NOTE — PROGRESS NOTES
CC: Hospital Follow Up (Allergic Reaction)      HPI:    She is a 47 y.o. female with a hx of bipolar disorder and allergic rhinitis  who presents for evaluation of   Seen in the ER on 12/30 for allergic reaction. Patient noted starting on essential oils used in her body for 1.4 weeks and then tried to ingest it 12/29 and woke up with hives with scratchy throat - took allegra without improvement and went to ER given decadron, benadryl and pepcic with improvement. She was given Prednisone to take at home and took prednisone the next day. Now patient doing better. This is second allergic reaction this month. Had a reaction after eating roseann's -having two sips of sprite from a fountain drink. Has an epi pen - did not use it     Having a flair up of eczema    Complains of postnasal drip - advised to use steroid nasal spray     Thinks may have a UTI - had some burning with pee but no increased frequency    Pressured speech and tangential - discussed with patient that I suspect steroids are leading to manic state, also she stopped taking tegretol two days ago  \" wants her level checked\" scared she may have toxic levels.  Her psychiatrist advised her to get all labs with PCP   Denies depressive thoughts    Requesting refill for muscle relaxants/ takes it PRN for back pain/muscle spasms  ROS:  10 systems reviewed and negative other than HPI  Past Medical History:   Diagnosis Date    Arthritis     joints    Bipolar 1 disorder with moderate robyn ([facility] [address] 3/17/2014    Chronic pain     back with stress    Contact dermatitis and other eczema, due to unspecified cause     Depression     Headache(784.0)     Hyperlipidemia 8/22/2016    Other ill-defined conditions(799.89)     sinus infection,hay fever    Other ill-defined conditions(799.89)     scoliosis    Psychiatric disorder     bipolar    Psychotic disorder     Stool color black     Tennis elbow syndrome 5/4/2015    Trauma        Current Outpatient Prescriptions on File Prior to Visit   Medication Sig Dispense Refill    montelukast (SINGULAIR) 10 mg tablet Take 10 mg by mouth daily.  predniSONE (STERAPRED DS) 10 mg dose pack Take as directed 48 Tab 0    azelastine (ASTELIN) 137 mcg (0.1 %) nasal spray USE 1 SPRAY IN EACH NOSTRIL TWICE A DAY AS DIRECTED 30 Bottle 2    SEROQUEL  mg sr tablet       EPINEPHrine (EPIPEN) 0.3 mg/0.3 mL (1:1,000) injection 0.3 mL by IntraMUSCular route once as needed for up to 1 dose. 1 Each 1    albuterol (PROVENTIL HFA, VENTOLIN HFA) 90 mcg/actuation inhaler Take 2 Puffs by inhalation every six (6) hours as needed for Wheezing. 1 Inhaler 3    fexofenadine (ALLEGRA) 180 mg tablet Take  by mouth daily.  flurazepam (DALMANE) 30 mg capsule Take 30 mg by mouth nightly as needed.  carbamazepine (TEGRETOL) 200 mg tablet Take 200 mg by mouth two (2) times a day. No current facility-administered medications on file prior to visit.         Past Surgical History:   Procedure Laterality Date    HX HEENT      wisdom teeth extraction    HX LIPOMA RESECTION      right gluteal    FREDY BIOPSY BREAST STEREOTACTIC Left 2011    negative    FL DENTAL SURGERY PROCEDURE      hx of dental surgery- wisdom teeth extracted       Family History   Problem Relation Age of Onset   Baeza Arthritis-rheumatoid Mother    Baeza Migraines Mother     Psychiatric Disorder Mother     Bipolar Disorder Mother     Lupus Mother     Alcohol abuse Father     Lung Disease Father     Heart Disease Father     Stroke Father     High Cholesterol Father     Psychiatric Disorder Sister     Alcohol abuse Sister     Bipolar Disorder Sister     Stroke Brother     Cancer Maternal Aunt     Breast Cancer Maternal Aunt      pt thniks she was under 48    Bipolar Disorder Sister      Reviewed and no changes     Social History     Social History    Marital status:      Spouse name: N/A    Number of children: N/A    Years of education: N/A Occupational History    Not on file. Social History Main Topics    Smoking status: Never Smoker    Smokeless tobacco: Never Used    Alcohol use Yes      Comment: occasional    Drug use: No    Sexual activity: Yes     Partners: Male     Other Topics Concern    Not on file     Social History Narrative    , 0 children, not working at present. On disability for bipolar illness.              Visit Vitals    /85 (BP 1 Location: Right arm, BP Patient Position: Sitting)    Pulse (!) 110    Temp 97.8 °F (36.6 °C) (Oral)    Resp 18    Ht 5' 1\" (1.549 m)    Wt 163 lb (73.9 kg)    SpO2 99%    BMI 30.8 kg/m2       Physical Examination:   General - Well appearing female  HEENT - PERRL, TM no erythema/opacification, normal nasal turbinates, oropharynx no erythema or exudate, MMM  Neck - supple, no bruits, no TMG, no LAD  Pulm - clear to auscultation bilaterally  Cardio - RRR, normal S1 S2, no murmur gallops or rubs  Abd - soft, nontender, no masses, no HSM  Extrem - no edema, +2 distal pulses  Eczema of hands - dry skin \" feels like sandpaper\" on dorsal hands B/L  Psych - pressured speech, not depressed    Lab Results   Component Value Date/Time    WBC 3.7 08/22/2016 09:26 AM    HGB 11.9 08/22/2016 09:26 AM    HCT 36.4 08/22/2016 09:26 AM    PLATELET 648 80/36/4769 09:26 AM    MCV 93 08/22/2016 09:26 AM     Lab Results   Component Value Date/Time    Sodium 140 08/22/2016 09:26 AM    Potassium 4.0 08/22/2016 09:26 AM    Chloride 97 08/22/2016 09:26 AM    CO2 23 08/22/2016 09:26 AM    Glucose 90 08/22/2016 09:26 AM    BUN 7 08/22/2016 09:26 AM    Creatinine 0.61 08/22/2016 09:26 AM    BUN/Creatinine ratio 11 08/22/2016 09:26 AM    GFR est  08/22/2016 09:26 AM    GFR est non- 08/22/2016 09:26 AM    Calcium 9.6 08/22/2016 09:26 AM     Lab Results   Component Value Date/Time    Cholesterol, total 197 08/22/2016 09:26 AM    HDL Cholesterol 80 08/22/2016 09:26 AM    LDL, calculated 106 08/22/2016 09:26 AM    VLDL, calculated 11 08/22/2016 09:26 AM    Triglyceride 57 08/22/2016 09:26 AM     Lab Results   Component Value Date/Time    TSH 0.813 11/12/2015 03:52 PM     No results found for: PSA, PSA2, PSAR1, Norpacheco Waldemar, PSAR3, [id number], [id number], PSALT  Lab Results   Component Value Date/Time    Hemoglobin A1c 5.4 02/25/2014 12:10 PM    Hemoglobin A1c (POC) 5.4 04/20/2015 10:00 AM     Lab Results   Component Value Date/Time    Vitamin D 25-Hydroxy 12.7 11/10/2011 09:25 AM    VITAMIN D, 25-HYDROXY 69.0 11/12/2015 03:52 PM       Lab Results   Component Value Date/Time    ALT (SGPT) 12 08/22/2016 09:26 AM    AST (SGOT) 15 08/22/2016 09:26 AM    Alk. phosphatase 72 08/22/2016 09:26 AM    Bilirubin, total <0.2 08/22/2016 09:26 AM           Assessment/Plan:    46 yo woman with a hx of bipolar disorder and multiple allergies presenting to follow up after allergic reaction   Allergic reaction: known allergies to artifical sweetners and ingested \" natural oils that had sweetener\"  - advised to avoid all artifical sweeteners, should do food prep at home  - has epi pen   - currently allergic symptoms resolved- advised to stop prednisone as patient appears to be manic ( pressured speech)      Chronic back pain, unspecified back location, unspecified back pain laterality  - methocarbamol (ROBAXIN) 500 mg tablet; TAKE 1-2 TABLETS BY MOUTH TWICE A DAY AS NEEDED FOR MUSCLE SPASMS. Indications: Muscle Spasm  Dispense: 30 Tab; Refill: 0     Flexural eczema of hands  - keep hands moisturized  - triamcinolone (ARISTOCORT) 0.5 % topical cream; Apply  to affected area two (2) times a day.  use thin layer  Dispense: 15 g; Refill: 1    Dysuria  - AMB POC URINALYSIS DIP STICK AUTO W/O MICRO - normal no evidence of UTI  - discussed she may try OTC monistat for possible yeast infection      Bipolar 1 disorder ([facility] [address]): appears manic today / non suicidal   Advised to stop prednisone and resume tegretol   - patient will follow up with psychiatrist this month  - on dalmane, and seroquel   - CARBAMAZEPINE    Needs to have pap smear done - advised to schedule that         Follow-up Disposition:  Return if symptoms worsen or fail to improve.     Bard Sharron MD

## 2018-01-04 NOTE — MR AVS SNAPSHOT
Visit Information Date & Time Provider Department Dept. Phone Encounter #  
 1/4/2018 10:45 AM Wan Portillo, [address] [place] [address],[address] [phone] [id number] Follow-up Instructions Return if symptoms worsen or fail to improve. Your Appointments 4/23/2018 10:00 AM  
ROUTINE CARE with Wan Portillo MD  
[facility] [address]) Appt Note: PAP and fasting labs [address] [id number]  
[address] [facility] [address] [facility] Upcoming Health Maintenance Date Due  
 PAP AKA CERVICAL CYTOLOGY 8/27/2016 BREAST CANCER SCRN MAMMOGRAM 9/15/2019 DTaP/Tdap/Td series (2 - Td) 7/6/2022 COLONOSCOPY 9/30/2023 Allergies as of 1/4/2018  Review Complete On: 1/4/2018 By: Kimber Vuong Severity Noted Reaction Type Reactions Other Food  01/04/2018    Hives Artifical sweetners Claritin-d 12 Hour [Loratadine-pseudoephedrine] High 03/31/2011   Side Effect Palpitations  
 palpatations and ringing in the ear Other Medication High 03/31/2011   Systemic Anaphylaxis Artificial sweeteners cause anaphylaxis Pcn [Penicillins] High 03/31/2011   Systemic Anaphylaxis Sunscreen High 03/31/2011   Topical Contact Dermatitis Burns and opens the skin Ultram [Tramadol] Medium 03/31/2011   Side Effect Hives, Other (comments) Hives and ringing of the ears Benzoyl Peroxide  01/11/2013    Hives Cephalexin  04/20/2015    Itching Maltodextrin-glycerin  02/20/2014   Systemic Shortness of Breath Current Immunizations  Reviewed on 11/12/2015 Name Date Influenza Vaccine (Quad) PF 11/12/2015 TDAP Vaccine 7/6/2012 Not reviewed this visit You Were Diagnosed With   
  
 Codes Comments Flexural eczema    -  Primary ICD-10-CM: P44.74 ICD-9-CM: 691.8  Chronic back pain, unspecified back location, unspecified back pain laterality     ICD-10-CM: M54.9, G89.29 ICD-9-CM: 724.5, 338.29 Dysuria     ICD-10-CM: R30.0 ICD-9-CM: 788.1 Bipolar 1 disorder (HCC)     ICD-10-CM: F31.9 ICD-9-CM: 296.7 Vitals BP Pulse Temp Resp Height(growth percentile) Weight(growth percentile) 136/85 (BP 1 Location: Right arm, BP Patient Position: Sitting) (!) 110 97.8 °F (36.6 °C) (Oral) 18 5' 1\" (1.549 m) 163 lb (73.9 kg) SpO2 BMI OB Status Smoking Status 99% 30.8 kg/m2 Perimenopausal Never Smoker BMI and BSA Data Body Mass Index Body Surface Area  
 30.8 kg/m 2 1.78 m 2 Preferred Pharmacy Pharmacy Name Phone [id number] [place] [phone] Your Updated Medication List  
  
   
This list is accurate as of: 1/4/18 11:20 AM.  Always use your most recent med list.  
  
  
  
  
 albuterol 90 mcg/actuation inhaler Commonly known as:  PROVENTIL HFA, VENTOLIN HFA, PROAIR HFA Take 2 Puffs by inhalation every six (6) hours as needed for Wheezing. ALLEGRA 180 mg tablet Generic drug:  fexofenadine Take  by mouth daily. azelastine 137 mcg (0.1 %) nasal spray Commonly known as:  ASTELIN  
USE 1 SPRAY IN EACH NOSTRIL TWICE A DAY AS DIRECTED  
  
 EPINEPHrine 0.3 mg/0.3 mL injection Commonly known as:  EPIPEN  
0.3 mL by IntraMUSCular route once as needed for up to 1 dose. flurazepam 30 mg capsule Commonly known as:  [facility] Take 30 mg by mouth nightly as needed. methocarbamol 500 mg tablet Commonly known as:  ROBAXIN  
TAKE 1-2 TABLETS BY MOUTH TWICE A DAY AS NEEDED FOR MUSCLE SPASMS. Indications: Muscle Spasm  
  
 predniSONE 10 mg dose pack Commonly known as:  STERAPRED DS Take as directed SEROquel  mg sr tablet Generic drug:  QUEtiapine SR  
  
 SINGULAIR 10 mg tablet Generic drug:  montelukast  
Take 10 mg by mouth daily. TEGretol 200 mg tablet Generic drug:  carBAMazepine Take 200 mg by mouth two (2) times a day. triamcinolone 0.5 % topical cream  
Commonly known as:  ARISTOCORT Apply  to affected area two (2) times a day. use thin layer Prescriptions Sent to Pharmacy Refills  
 triamcinolone (ARISTOCORT) 0.5 % topical cream 1 Sig: Apply  to affected area two (2) times a day. use thin layer Class: Normal  
 Pharmacy: [address] [place] [address] Ph #: [phone] Route: Topical  
 methocarbamol (ROBAXIN) 500 mg tablet 0 Sig: TAKE 1-2 TABLETS BY MOUTH TWICE A DAY AS NEEDED FOR MUSCLE SPASMS. Indications: Muscle Spasm Class: Normal  
 Pharmacy: [address] [place] [address] Ph #: [phone] We Performed the Following AMB POC URINALYSIS DIP STICK AUTO W/O MICRO [86131 CPT(R)] CARBAMAZEPINE [id number] CPT(R)] Follow-up Instructions Return if symptoms worsen or fail to improve. Patient Instructions Allergies: Care Instructions Your Care Instructions Allergies occur when your body's defense system (immune system) overreacts to certain substances. The immune system treats a harmless substance as if it were a harmful germ or virus. Many things can cause this overreaction, including pollens, medicine, food, dust, animal dander, and mold. Allergies can be mild or severe. Mild allergies can be managed with home treatment. But medicine may be needed to prevent problems. Managing your allergies is an important part of staying healthy. Your doctor may suggest that you have allergy testing to help find out what is causing your allergies. When you know what things trigger your symptoms, you can avoid them. This can prevent allergy symptoms and other health problems.  
For severe allergies that cause reactions that affect your whole body (anaphylactic reactions), your doctor may prescribe a shot of epinephrine to carry with you in case you have a severe reaction. Learn how to give yourself the shot and keep it with you at all times. Make sure it is not . Follow-up care is a key part of your treatment and safety. Be sure to make and go to all appointments, and call your doctor if you are having problems. It's also a good idea to know your test results and keep a list of the medicines you take. How can you care for yourself at home? · If you have been told by your doctor that dust or dust mites are causing your allergy, decrease the dust around your bed: 
[facility] sheets, pillowcases, and other bedding in hot water every week. ¨ Use dust-proof covers for pillows, duvets, and mattresses. Avoid plastic covers because they tear easily and do not \"breathe. \" Wash as instructed on the label. ¨ Do not use any blankets and pillows that you do not need. ¨ Use blankets that you can wash in your washing machine. ¨ Consider removing drapes and carpets, which attract and hold dust, from your bedroom. · If you are allergic to house dust and mites, do not use home humidifiers. Your doctor can suggest ways you can control dust and mites. · Look for signs of cockroaches. Cockroaches cause allergic reactions. Use cockroach baits to get rid of them. Then, clean your home well. Cockroaches like areas where grocery bags, newspapers, empty bottles, or cardboard boxes are stored. Do not keep these inside your home, and keep trash and food containers sealed. Seal off any spots where cockroaches might enter your home. · If you are allergic to mold, get rid of furniture, rugs, and drapes that smell musty. Check for mold in the bathroom. · If you are allergic to outdoor pollen or mold spores, use air-conditioning. Change or clean all filters every month. Keep windows closed. · If you are allergic to pollen, stay inside when pollen counts are high.  Use a vacuum  with a HEPA filter or a double-thickness filter at least two times each week. · Stay inside when air pollution is bad. Avoid paint fumes, perfumes, and other strong odors. · Avoid conditions that make your allergies worse. Stay away from smoke. Do not smoke or let anyone else smoke in your house. Do not use fireplaces or wood-burning stoves. · If you are allergic to your pets, change the air filter in your furnace every month. Use high-efficiency filters. · If you are allergic to pet dander, keep pets outside or out of your bedroom. Old carpet and cloth furniture can hold a lot of animal dander. You may need to replace them. When should you call for help? Give an epinephrine shot if: 
? · You think you are having a severe allergic reaction. ? · You have symptoms in more than one body area, such as mild nausea and an itchy mouth. ? After giving an epinephrine shot call 911, even if you feel better. ?Call 911 if: 
? · You have symptoms of a severe allergic reaction. These may include: 
¨ Sudden raised, red areas (hives) all over your body. ¨ Swelling of the throat, mouth, lips, or tongue. ¨ Trouble breathing. ¨ Passing out (losing consciousness). Or you may feel very lightheaded or suddenly feel weak, confused, or restless. ? · You have been given an epinephrine shot, even if you feel better. ?Call your doctor now or seek immediate medical care if: 
? · You have symptoms of an allergic reaction, such as: ¨ A rash or hives (raised, red areas on the skin). ¨ Itching. ¨ Swelling. ¨ Belly pain, nausea, or vomiting. ? Watch closely for changes in your health, and be sure to contact your doctor if: 
? · You do not get better as expected. Where can you learn more? Go to http://radha-my.info/. Enter B210 in the search box to learn more about \"Allergies: Care Instructions. \" Current as of: September 29, 2016 Content Version: 11.4 © 1395-5676 [employer]wise[employer].  Care instructions adapted under license by [employer] (which disclaims liability or warranty for this information). If you have questions about a medical condition or this instruction, always ask your healthcare professional. [address] any warranty or liability for your use of this information. Schedule pap smear Introducing [facility]! Dear Sahil Radhapuma: 
Thank you for requesting a [facility] account. Our records indicate that you already have an active [facility] account. You can access your account anytime at [email or web address]. [email or web address] Did you know that you can access your hospital and ER discharge instructions at any time in [facility]? You can also review all of your test results from your hospital stay or ER visit. Additional Information If you have questions, please visit the Frequently Asked Questions section of the [facility] website at [email or web address] Remember, [facility] is NOT to be used for urgent needs. For medical emergencies, dial 911. Now available from your iPhone and Android! Please provide this summary of care documentation to your next provider. Your primary care clinician is listed as WALLY Krueger. If you have any questions after today's visit, please call [phone].

## 2018-01-04 NOTE — PATIENT INSTRUCTIONS
Allergies: Care Instructions  Your Care Instructions    Allergies occur when your body's defense system (immune system) overreacts to certain substances. The immune system treats a harmless substance as if it were a harmful germ or virus. Many things can cause this overreaction, including pollens, medicine, food, dust, animal dander, and mold. Allergies can be mild or severe. Mild allergies can be managed with home treatment. But medicine may be needed to prevent problems. Managing your allergies is an important part of staying healthy. Your doctor may suggest that you have allergy testing to help find out what is causing your allergies. When you know what things trigger your symptoms, you can avoid them. This can prevent allergy symptoms and other health problems. For severe allergies that cause reactions that affect your whole body (anaphylactic reactions), your doctor may prescribe a shot of epinephrine to carry with you in case you have a severe reaction. Learn how to give yourself the shot and keep it with you at all times. Make sure it is not . Follow-up care is a key part of your treatment and safety. Be sure to make and go to all appointments, and call your doctor if you are having problems. It's also a good idea to know your test results and keep a list of the medicines you take. How can you care for yourself at home? · If you have been told by your doctor that dust or dust mites are causing your allergy, decrease the dust around your bed:  [facility] sheets, pillowcases, and other bedding in hot water every week. ¨ Use dust-proof covers for pillows, duvets, and mattresses. Avoid plastic covers because they tear easily and do not \"breathe. \" Wash as instructed on the label. ¨ Do not use any blankets and pillows that you do not need. ¨ Use blankets that you can wash in your washing machine. ¨ Consider removing drapes and carpets, which attract and hold dust, from your bedroom.   · If you are allergic to house dust and mites, do not use home humidifiers. Your doctor can suggest ways you can control dust and mites. · Look for signs of cockroaches. Cockroaches cause allergic reactions. Use cockroach baits to get rid of them. Then, clean your home well. Cockroaches like areas where grocery bags, newspapers, empty bottles, or cardboard boxes are stored. Do not keep these inside your home, and keep trash and food containers sealed. Seal off any spots where cockroaches might enter your home. · If you are allergic to mold, get rid of furniture, rugs, and drapes that smell musty. Check for mold in the bathroom. · If you are allergic to outdoor pollen or mold spores, use air-conditioning. Change or clean all filters every month. Keep windows closed. · If you are allergic to pollen, stay inside when pollen counts are high. Use a vacuum  with a HEPA filter or a double-thickness filter at least two times each week. · Stay inside when air pollution is bad. Avoid paint fumes, perfumes, and other strong odors. · Avoid conditions that make your allergies worse. Stay away from smoke. Do not smoke or let anyone else smoke in your house. Do not use fireplaces or wood-burning stoves. · If you are allergic to your pets, change the air filter in your furnace every month. Use high-efficiency filters. · If you are allergic to pet dander, keep pets outside or out of your bedroom. Old carpet and cloth furniture can hold a lot of animal dander. You may need to replace them. When should you call for help? Give an epinephrine shot if:  ? · You think you are having a severe allergic reaction. ? · You have symptoms in more than one body area, such as mild nausea and an itchy mouth. ? After giving an epinephrine shot call 911, even if you feel better. ?Call 911 if:  ? · You have symptoms of a severe allergic reaction. These may include:  ¨ Sudden raised, red areas (hives) all over your body.   ¨ Swelling of the throat, mouth, lips, or tongue. ¨ Trouble breathing. ¨ Passing out (losing consciousness). Or you may feel very lightheaded or suddenly feel weak, confused, or restless. ? · You have been given an epinephrine shot, even if you feel better. ?Call your doctor now or seek immediate medical care if:  ? · You have symptoms of an allergic reaction, such as:  ¨ A rash or hives (raised, red areas on the skin). ¨ Itching. ¨ Swelling. ¨ Belly pain, nausea, or vomiting. ? Watch closely for changes in your health, and be sure to contact your doctor if:  ? · You do not get better as expected. Where can you learn more? Go to http://radha-my.info/. Enter P010 in the search box to learn more about \"Allergies: Care Instructions. \"  Current as of: September 29, 2016  Content Version: 11.4  © 8163-1941 [employer]. Care instructions adapted under license by [employer] (which disclaims liability or warranty for this information). If you have questions about a medical condition or this instruction, always ask your healthcare professional. [place] [address] any warranty or liability for your use of this information.     Schedule pap smear

## 2018-01-04 NOTE — PROGRESS NOTES
Chief Complaint   Patient presents with   [facility] Follow Up     Allergic Reaction     1. Have you been to the ER, urgent care clinic since your last visit? Hospitalized since your last visit? Yes When: 12/30/17 [facility] Reason for visit: Allergic Reaction    2. Have you seen or consulted any other health care providers outside of the [address] [place] [address] since your last visit? Include any pap smears or colon screening.  No

## 2018-01-05 LAB
ALBUMIN SERPL-MCNC: 4.7 G/DL (ref 3.5–5.5)
ALBUMIN/GLOB SERPL: 1.3 {RATIO} (ref 1.2–2.2)
ALP SERPL-CCNC: 84 IU/L (ref 39–117)
ALT SERPL-CCNC: 16 IU/L (ref 0–32)
AST SERPL-CCNC: 13 IU/L (ref 0–40)
BASOPHILS # BLD AUTO: 0 X10E3/UL (ref 0–0.2)
BASOPHILS NFR BLD AUTO: 0 %
BILIRUB SERPL-MCNC: <0.2 MG/DL (ref 0–1.2)
BUN SERPL-MCNC: 7 MG/DL (ref 6–24)
BUN/CREAT SERPL: 10 (ref 9–23)
CALCIUM SERPL-MCNC: 9.9 MG/DL (ref 8.7–10.2)
CARBAMAZEPINE SERPL-MCNC: 1.4 UG/ML (ref 4–12)
CHLORIDE SERPL-SCNC: 97 MMOL/L (ref 96–106)
CHOLEST SERPL-MCNC: 237 MG/DL (ref 100–199)
CO2 SERPL-SCNC: 28 MMOL/L (ref 18–29)
CREAT SERPL-MCNC: 0.73 MG/DL (ref 0.57–1)
EOSINOPHIL # BLD AUTO: 0 X10E3/UL (ref 0–0.4)
EOSINOPHIL NFR BLD AUTO: 0 %
ERYTHROCYTE [DISTWIDTH] IN BLOOD BY AUTOMATED COUNT: 13.6 % (ref 12.3–15.4)
GLOBULIN SER CALC-MCNC: 3.7 G/DL (ref 1.5–4.5)
GLUCOSE SERPL-MCNC: 86 MG/DL (ref 65–99)
HCT VFR BLD AUTO: 38.6 % (ref 34–46.6)
HDLC SERPL-MCNC: 102 MG/DL
HGB BLD-MCNC: 12.9 G/DL (ref 11.1–15.9)
IMM GRANULOCYTES # BLD: 0.1 X10E3/UL (ref 0–0.1)
IMM GRANULOCYTES NFR BLD: 1 %
LDLC SERPL CALC-MCNC: 116 MG/DL (ref 0–99)
LYMPHOCYTES # BLD AUTO: 2 X10E3/UL (ref 0.7–3.1)
LYMPHOCYTES NFR BLD AUTO: 22 %
MCH RBC QN AUTO: 30.9 PG (ref 26.6–33)
MCHC RBC AUTO-ENTMCNC: 33.4 G/DL (ref 31.5–35.7)
MCV RBC AUTO: 93 FL (ref 79–97)
MONOCYTES # BLD AUTO: 1.1 X10E3/UL (ref 0.1–0.9)
MONOCYTES NFR BLD AUTO: 12 %
NEUTROPHILS # BLD AUTO: 5.7 X10E3/UL (ref 1.4–7)
NEUTROPHILS NFR BLD AUTO: 65 %
PLATELET # BLD AUTO: 367 X10E3/UL (ref 150–379)
POTASSIUM SERPL-SCNC: 3.7 MMOL/L (ref 3.5–5.2)
PROT SERPL-MCNC: 8.4 G/DL (ref 6–8.5)
RBC # BLD AUTO: 4.17 X10E6/UL (ref 3.77–5.28)
SODIUM SERPL-SCNC: 141 MMOL/L (ref 134–144)
TRIGL SERPL-MCNC: 95 MG/DL (ref 0–149)
VLDLC SERPL CALC-MCNC: 19 MG/DL (ref 5–40)
WBC # BLD AUTO: 8.8 X10E3/UL (ref 3.4–10.8)

## 2018-01-05 NOTE — PROGRESS NOTES
Carbamazepine level is low because you stopped medication 2 days prior to testing therefore level needs to be done when you are on the medication - discuss with psychiatrist to have this tested

## 2018-01-07 ENCOUNTER — APPOINTMENT (OUTPATIENT)
Dept: GENERAL RADIOLOGY | Age: 53
End: 2018-01-07
Attending: EMERGENCY MEDICINE
Payer: MEDICARE

## 2018-01-07 ENCOUNTER — HOSPITAL ENCOUNTER (EMERGENCY)
Age: 53
Discharge: HOME | End: 2018-01-07
Attending: EMERGENCY MEDICINE | Admitting: EMERGENCY MEDICINE
Payer: MEDICARE

## 2018-01-07 VITALS
WEIGHT: 160.27 LBS | SYSTOLIC BLOOD PRESSURE: 136 MMHG | TEMPERATURE: 98.3 F | DIASTOLIC BLOOD PRESSURE: 89 MMHG | HEART RATE: 89 BPM | RESPIRATION RATE: 18 BRPM | OXYGEN SATURATION: 100 % | HEIGHT: 61 IN | BODY MASS INDEX: 30.26 KG/M2

## 2018-01-07 DIAGNOSIS — J06.9 ACUTE URI: ICD-10-CM

## 2018-01-07 DIAGNOSIS — L25.9 CONTACT DERMATITIS, UNSPECIFIED CONTACT DERMATITIS TYPE, UNSPECIFIED TRIGGER: ICD-10-CM

## 2018-01-07 DIAGNOSIS — T78.40XA ALLERGIC REACTION, INITIAL ENCOUNTER: Primary | ICD-10-CM

## 2018-01-07 LAB
ALBUMIN SERPL-MCNC: 4 G/DL (ref 3.5–5)
ALBUMIN/GLOB SERPL: 1 {RATIO} (ref 1.1–2.2)
ALP SERPL-CCNC: 81 U/L (ref 45–117)
ALT SERPL-CCNC: 23 U/L (ref 12–78)
AMPHET UR QL SCN: NEGATIVE
ANION GAP SERPL CALC-SCNC: 5 MMOL/L (ref 5–15)
APAP SERPL-MCNC: <2 UG/ML (ref 10–30)
APPEARANCE UR: CLEAR
AST SERPL-CCNC: 13 U/L (ref 15–37)
BACTERIA URNS QL MICRO: NEGATIVE /HPF
BARBITURATES UR QL SCN: NEGATIVE
BASOPHILS # BLD: 0 K/UL (ref 0–0.1)
BASOPHILS NFR BLD: 0 % (ref 0–1)
BENZODIAZ UR QL: POSITIVE
BILIRUB SERPL-MCNC: 0.2 MG/DL (ref 0.2–1)
BILIRUB UR QL: NEGATIVE
BUN SERPL-MCNC: 8 MG/DL (ref 6–20)
BUN/CREAT SERPL: 13 (ref 12–20)
CALCIUM SERPL-MCNC: 8.9 MG/DL (ref 8.5–10.1)
CANNABINOIDS UR QL SCN: NEGATIVE
CHLORIDE SERPL-SCNC: 100 MMOL/L (ref 97–108)
CO2 SERPL-SCNC: 30 MMOL/L (ref 21–32)
COCAINE UR QL SCN: NEGATIVE
COLOR UR: NORMAL
CREAT SERPL-MCNC: 0.63 MG/DL (ref 0.55–1.02)
DEPRECATED S PYO AG THROAT QL EIA: NEGATIVE
DRUG SCRN COMMENT,DRGCM: ABNORMAL
EOSINOPHIL # BLD: 0.1 K/UL (ref 0–0.4)
EOSINOPHIL NFR BLD: 1 % (ref 0–7)
EPITH CASTS URNS QL MICRO: NORMAL /LPF
ERYTHROCYTE [DISTWIDTH] IN BLOOD BY AUTOMATED COUNT: 12.9 % (ref 11.5–14.5)
ETHANOL SERPL-MCNC: <10 MG/DL
GLOBULIN SER CALC-MCNC: 4 G/DL (ref 2–4)
GLUCOSE SERPL-MCNC: 99 MG/DL (ref 65–100)
GLUCOSE UR STRIP.AUTO-MCNC: NEGATIVE MG/DL
HCT VFR BLD AUTO: 34.2 % (ref 35–47)
HGB BLD-MCNC: 11.2 G/DL (ref 11.5–16)
HGB UR QL STRIP: NEGATIVE
HYALINE CASTS URNS QL MICRO: NORMAL /LPF (ref 0–5)
KETONES UR QL STRIP.AUTO: NEGATIVE MG/DL
LEUKOCYTE ESTERASE UR QL STRIP.AUTO: NEGATIVE
LYMPHOCYTES # BLD: 2.3 K/UL (ref 0.8–3.5)
LYMPHOCYTES NFR BLD: 31 % (ref 12–49)
MCH RBC QN AUTO: 30.2 PG (ref 26–34)
MCHC RBC AUTO-ENTMCNC: 32.7 G/DL (ref 30–36.5)
MCV RBC AUTO: 92.2 FL (ref 80–99)
METHADONE UR QL: NEGATIVE
MONOCYTES # BLD: 0.6 K/UL (ref 0–1)
MONOCYTES NFR BLD: 8 % (ref 5–13)
NEUTS SEG # BLD: 4.5 K/UL (ref 1.8–8)
NEUTS SEG NFR BLD: 60 % (ref 32–75)
NITRITE UR QL STRIP.AUTO: NEGATIVE
OPIATES UR QL: NEGATIVE
PCP UR QL: NEGATIVE
PH UR STRIP: 7.5 [PH] (ref 5–8)
PLATELET # BLD AUTO: 297 K/UL (ref 150–400)
POTASSIUM SERPL-SCNC: 4.2 MMOL/L (ref 3.5–5.1)
PROT SERPL-MCNC: 8 G/DL (ref 6.4–8.2)
PROT UR STRIP-MCNC: NEGATIVE MG/DL
RBC # BLD AUTO: 3.71 M/UL (ref 3.8–5.2)
RBC #/AREA URNS HPF: NORMAL /HPF (ref 0–5)
SALICYLATES SERPL-MCNC: <1.7 MG/DL (ref 2.8–20)
SODIUM SERPL-SCNC: 135 MMOL/L (ref 136–145)
SP GR UR REFRACTOMETRY: 1.01 (ref 1–1.03)
UA: UC IF INDICATED,UAUC: NORMAL
UROBILINOGEN UR QL STRIP.AUTO: 0.2 EU/DL (ref 0.2–1)
WBC # BLD AUTO: 7.5 K/UL (ref 3.6–11)
WBC URNS QL MICRO: NORMAL /HPF (ref 0–4)

## 2018-01-07 PROCEDURE — 74011000250 HC RX REV CODE- 250: Performed by: EMERGENCY MEDICINE

## 2018-01-07 PROCEDURE — 80053 COMPREHEN METABOLIC PANEL: CPT | Performed by: EMERGENCY MEDICINE

## 2018-01-07 PROCEDURE — 36415 COLL VENOUS BLD VENIPUNCTURE: CPT | Performed by: EMERGENCY MEDICINE

## 2018-01-07 PROCEDURE — 81001 URINALYSIS AUTO W/SCOPE: CPT | Performed by: EMERGENCY MEDICINE

## 2018-01-07 PROCEDURE — 80307 DRUG TEST PRSMV CHEM ANLYZR: CPT | Performed by: EMERGENCY MEDICINE

## 2018-01-07 PROCEDURE — 99283 EMERGENCY DEPT VISIT LOW MDM: CPT

## 2018-01-07 PROCEDURE — 74011250637 HC RX REV CODE- 250/637: Performed by: EMERGENCY MEDICINE

## 2018-01-07 PROCEDURE — 85025 COMPLETE CBC W/AUTO DIFF WBC: CPT | Performed by: EMERGENCY MEDICINE

## 2018-01-07 PROCEDURE — 96374 THER/PROPH/DIAG INJ IV PUSH: CPT

## 2018-01-07 PROCEDURE — 71045 X-RAY EXAM CHEST 1 VIEW: CPT

## 2018-01-07 PROCEDURE — 87880 STREP A ASSAY W/OPTIC: CPT | Performed by: EMERGENCY MEDICINE

## 2018-01-07 PROCEDURE — 96375 TX/PRO/DX INJ NEW DRUG ADDON: CPT

## 2018-01-07 PROCEDURE — 74011250636 HC RX REV CODE- 250/636: Performed by: EMERGENCY MEDICINE

## 2018-01-07 PROCEDURE — 87070 CULTURE OTHR SPECIMN AEROBIC: CPT | Performed by: EMERGENCY MEDICINE

## 2018-01-07 RX ORDER — HYDROXYZINE 25 MG/1
25 TABLET, FILM COATED ORAL
Status: COMPLETED | OUTPATIENT
Start: 2018-01-07 | End: 2018-01-07

## 2018-01-07 RX ORDER — HYDROXYZINE 50 MG/1
50 TABLET, FILM COATED ORAL
Qty: 20 TAB | Refills: 0 | Status: SHIPPED | OUTPATIENT
Start: 2018-01-07 | End: 2018-01-17

## 2018-01-07 RX ORDER — GLUCOSAMINE SULFATE 1500 MG
400 POWDER IN PACKET (EA) ORAL DAILY
COMMUNITY
End: 2018-06-18 | Stop reason: DRUGHIGH

## 2018-01-07 RX ORDER — FAMOTIDINE 40 MG/1
40 TABLET, FILM COATED ORAL
COMMUNITY
End: 2020-01-17

## 2018-01-07 RX ORDER — DICLOFENAC SODIUM 75 MG/1
75 TABLET, DELAYED RELEASE ORAL
COMMUNITY
End: 2018-11-07 | Stop reason: SDUPTHER

## 2018-01-07 RX ORDER — ALBUTEROL SULFATE 90 UG/1
2 AEROSOL, METERED RESPIRATORY (INHALATION)
Qty: 1 INHALER | Refills: 0 | Status: SHIPPED | OUTPATIENT
Start: 2018-01-07 | End: 2020-01-10

## 2018-01-07 RX ORDER — ALPRAZOLAM 0.25 MG/1
0.25 TABLET ORAL
Status: COMPLETED | OUTPATIENT
Start: 2018-01-07 | End: 2018-01-07

## 2018-01-07 RX ORDER — DIPHENHYDRAMINE HYDROCHLORIDE 50 MG/ML
25 INJECTION, SOLUTION INTRAMUSCULAR; INTRAVENOUS
Status: COMPLETED | OUTPATIENT
Start: 2018-01-07 | End: 2018-01-07

## 2018-01-07 RX ADMIN — DIPHENHYDRAMINE HYDROCHLORIDE 25 MG: 50 INJECTION, SOLUTION INTRAMUSCULAR; INTRAVENOUS at 20:01

## 2018-01-07 RX ADMIN — ALPRAZOLAM 0.25 MG: 0.25 TABLET ORAL at 20:00

## 2018-01-07 RX ADMIN — HYDROXYZINE HYDROCHLORIDE 25 MG: 25 TABLET ORAL at 20:00

## 2018-01-07 RX ADMIN — FAMOTIDINE 20 MG: 10 INJECTION, SOLUTION INTRAVENOUS at 20:01

## 2018-01-07 NOTE — ED PROVIDER NOTES
EMERGENCY DEPARTMENT HISTORY AND PHYSICAL EXAM      Date: 1/7/2018  Patient Name: Darlyn Martinez    History of Presenting Illness     Chief Complaint   Patient presents with    Allergic Reaction     patient states she is allergic to artifical sweetner; states she was seen in the ER on 1/2 and discharged on steriods. PCP instructed patient to stop taking steriods r/t robyn secondary to bipolar. c/o itchiness, productive cough, dry mouth. Denies SI/HI/hallucination       History Provided By: Patient    HPI: Darlyn Martinez, 47 y.o. female with PMHx significant for bipolar, arthritis, hyperlipidemia and depression, presents ambulatory to the ED with cc of constant, mild, allergic reaction x a few weeks. Pt notes associated generalized body itching, blurry vision, and scratchy, sore throat. Pt states she was evaluated in the ED on 12/30/17 for similar symptoms. She reports being discharged with prednisone, which her PCP advised her to stopped taking secondary to her psychiatric history. She states she takes Allegra, Singulair, and Benadryl daily but notes she did not take Benadryl last night. Pt denies using her epi pen today. Pt reports her allergic reaction started at the beginning of December 2017. She states that she was attempting to lose weight for her sisters upcoming wedding, so she was drinking a fit tea everyday. She reports onset of symptoms after a few days of drinking the tea. She states that she then realized she was allergic to artifical sweeteners. Pt also presents with an intermittent, mild to moderate productive cough with yellow sputum. She denies any alleviating or exacerbating factors. Pt states she is not driving home today. Pt denies smoking or alcohol use. Pt specifically denies any CP. PCP: MD Castillo    There are no other complaints, changes, or physical findings at this time.     Current Outpatient Prescriptions   Medication Sig Dispense Refill    diclofenac EC (VOLTAREN) 75 mg EC tablet Take 75 mg by mouth.  famotidine (PEPCID) 40 mg tablet Take 40 mg by mouth daily.  cholecalciferol (VITAMIN D3) 1,000 unit cap Take 400 Units by mouth daily.  hydrOXYzine HCl (ATARAX) 50 mg tablet Take 1 Tab by mouth every six (6) hours as needed for Itching for up to 10 days. 20 Tab 0    diphenhydrAMINE hcl (BENADRYL) 2 % topical gel Apply  to affected area every six (6) hours as needed for Allergies. 1 Tube 0    albuterol (PROVENTIL HFA, VENTOLIN HFA, PROAIR HFA) 90 mcg/actuation inhaler Take 2 Puffs by inhalation every four (4) hours as needed for Wheezing. 1 Inhaler 0    triamcinolone (ARISTOCORT) 0.5 % topical cream Apply  to affected area two (2) times a day. use thin layer 15 g 1    methocarbamol (ROBAXIN) 500 mg tablet TAKE 1-2 TABLETS BY MOUTH TWICE A DAY AS NEEDED FOR MUSCLE SPASMS. Indications: Muscle Spasm 30 Tab 0    montelukast (SINGULAIR) 10 mg tablet Take 10 mg by mouth daily.  azelastine (ASTELIN) 137 mcg (0.1 %) nasal spray USE 1 SPRAY IN EACH NOSTRIL TWICE A DAY AS DIRECTED 30 Bottle 2    SEROQUEL  mg sr tablet       EPINEPHrine (EPIPEN) 0.3 mg/0.3 mL (1:1,000) injection 0.3 mL by IntraMUSCular route once as needed for up to 1 dose. 1 Each 1    fexofenadine (ALLEGRA) 180 mg tablet Take  by mouth daily.  carbamazepine (TEGRETOL) 200 mg tablet Take 200 mg by mouth two (2) times a day.  flurazepam (DALMANE) 30 mg capsule Take 30 mg by mouth nightly as needed.          Past History     Past Medical History:  Past Medical History:   Diagnosis Date    Arthritis     joints    Bipolar 1 disorder with moderate robyn ([facility] [address] 3/17/2014    Chronic pain     back with stress    Contact dermatitis and other eczema, due to unspecified cause     Depression     Headache(784.0)     Hyperlipidemia 8/22/2016    Other ill-defined conditions(799.89)     sinus infection,hay fever    Other ill-defined conditions(799.89)     scoliosis    Psychiatric disorder     bipolar    Psychotic disorder     Stool color black     Tennis elbow syndrome 5/4/2015    Trauma        Past Surgical History:  Past Surgical History:   Procedure Laterality Date    HX HEENT      wisdom teeth extraction    HX LIPOMA RESECTION      right gluteal    FREDY BIOPSY BREAST STEREOTACTIC Left 2011    negative    CO DENTAL SURGERY PROCEDURE      hx of dental surgery- wisdom teeth extracted       Family History:  Family History   Problem Relation Age of Onset   [facility] Arthritis-rheumatoid Mother     Migraines Mother     Psychiatric Disorder Mother     Bipolar Disorder Mother     Lupus Mother     Alcohol abuse Father     Lung Disease Father     Heart Disease Father     Stroke Father     High Cholesterol Father     Psychiatric Disorder Sister     Alcohol abuse Sister     Bipolar Disorder Sister     Stroke Brother     Cancer Maternal Aunt     Breast Cancer Maternal Aunt      pt thniks she was under 48    Bipolar Disorder Sister        Social History:  Social History   Substance Use Topics    Smoking status: Never Smoker    Smokeless tobacco: Never Used    Alcohol use Yes      Comment: occasional       Allergies: Allergies   Allergen Reactions    Other Food Hives     Artifical sweetners    Claritin-D 12 Hour [Loratadine-Pseudoephedrine] Palpitations     palpatations and ringing in the ear    Other Medication Anaphylaxis     Artificial sweeteners cause anaphylaxis    Pcn [Penicillins] Anaphylaxis    Sunscreen Contact Dermatitis     Burns and opens the skin    Ultram [Tramadol] Hives and Other (comments)     Hives and ringing of the ears    Benzoyl Peroxide Hives    Cephalexin Itching    Maltodextrin-Glycerin Shortness of Breath         Review of Systems   Review of Systems   Constitutional: Negative for fever. HENT: Positive for sore throat. Eyes: Positive for visual disturbance. Respiratory: Positive for cough. Cardiovascular: Negative for chest pain. Gastrointestinal: Negative for abdominal pain. Endocrine: Negative for heat intolerance. Genitourinary: Negative. Musculoskeletal: Negative for back pain. Skin:        (+) generalized body itching   Allergic/Immunologic: Negative for immunocompromised state. Neurological: Negative for dizziness. Hematological: Does not bruise/bleed easily. Psychiatric/Behavioral: Negative. All other systems reviewed and are negative. Physical Exam   Physical Exam   Constitutional: She is oriented to person, place, and time. She appears well-developed and well-nourished. Anxious   HENT:   Head: Normocephalic and atraumatic. Eyes: EOM are normal. Pupils are equal, round, and reactive to light. Neck: Normal range of motion. Neck supple. Cardiovascular: Normal rate, regular rhythm and normal heart sounds. Pulmonary/Chest: Effort normal and breath sounds normal. No respiratory distress. Abdominal: Soft. Bowel sounds are normal. She exhibits no mass. There is no tenderness. Musculoskeletal: Normal range of motion. She exhibits no edema. Neurological: She is alert and oriented to person, place, and time. Coordination normal.   Skin: Skin is warm and dry. No rash noted. Psychiatric: She has a normal mood and affect. Her behavior is normal.   Nursing note and vitals reviewed.     Diagnostic Study Results     Labs -     Recent Results (from the past 12 hour(s))   METABOLIC PANEL, COMPREHENSIVE    Collection Time: 01/07/18  5:12 PM   Result Value Ref Range    Sodium 135 (L) 136 - 145 mmol/L    Potassium 4.2 3.5 - 5.1 mmol/L    Chloride 100 97 - 108 mmol/L    CO2 30 21 - 32 mmol/L    Anion gap 5 5 - 15 mmol/L    Glucose 99 65 - 100 mg/dL    BUN 8 6 - 20 MG/DL    Creatinine 0.63 0.55 - 1.02 MG/DL    BUN/Creatinine ratio 13 12 - 20      GFR est AA >60 >60 ml/min/1.73m2    GFR est non-AA >60 >60 ml/min/1.73m2    Calcium 8.9 8.5 - 10.1 MG/DL    Bilirubin, total 0.2 0.2 - 1.0 MG/DL    ALT (SGPT) 23 12 - 78 U/L    AST (SGOT) 13 (L) 15 - 37 U/L    Alk. phosphatase 81 45 - 117 U/L    Protein, total 8.0 6.4 - 8.2 g/dL    Albumin 4.0 3.5 - 5.0 g/dL    Globulin 4.0 2.0 - 4.0 g/dL    A-G Ratio 1.0 (L) 1.1 - 2.2     CBC WITH AUTOMATED DIFF    Collection Time: 01/07/18  5:12 PM   Result Value Ref Range    WBC 7.5 3.6 - 11.0 K/uL    RBC 3.71 (L) 3.80 - 5.20 M/uL    HGB 11.2 (L) 11.5 - 16.0 g/dL    HCT 34.2 (L) 35.0 - 47.0 %    MCV 92.2 80.0 - 99.0 FL    MCH 30.2 26.0 - 34.0 PG    MCHC 32.7 30.0 - 36.5 g/dL    RDW 12.9 11.5 - 14.5 %    PLATELET 665 591 - 405 K/uL    NEUTROPHILS 60 32 - 75 %    LYMPHOCYTES 31 12 - 49 %    MONOCYTES 8 5 - 13 %    EOSINOPHILS 1 0 - 7 %    BASOPHILS 0 0 - 1 %    ABS. NEUTROPHILS 4.5 1.8 - 8.0 K/UL    ABS. LYMPHOCYTES 2.3 0.8 - 3.5 K/UL    ABS. MONOCYTES 0.6 0.0 - 1.0 K/UL    ABS. EOSINOPHILS 0.1 0.0 - 0.4 K/UL    ABS.  BASOPHILS 0.0 0.0 - 0.1 K/UL   ACETAMINOPHEN    Collection Time: 01/07/18  5:12 PM   Result Value Ref Range    Acetaminophen level <2 (L) 10 - 30 ug/mL   SALICYLATE    Collection Time: 01/07/18  5:12 PM   Result Value Ref Range    Salicylate level <1.2 (L) 2.8 - 20.0 MG/DL   ETHYL ALCOHOL    Collection Time: 01/07/18  5:12 PM   Result Value Ref Range    ALCOHOL(ETHYL),SERUM <10 <10 MG/DL   URINALYSIS W/ REFLEX CULTURE    Collection Time: 01/07/18  7:47 PM   Result Value Ref Range    Color YELLOW/STRAW      Appearance CLEAR CLEAR      Specific gravity 1.007 1.003 - 1.030      pH (UA) 7.5 5.0 - 8.0      Protein NEGATIVE  NEG mg/dL    Glucose NEGATIVE  NEG mg/dL    Ketone NEGATIVE  NEG mg/dL    Bilirubin NEGATIVE  NEG      Blood NEGATIVE  NEG      Urobilinogen 0.2 0.2 - 1.0 EU/dL    Nitrites NEGATIVE  NEG      Leukocyte Esterase NEGATIVE  NEG      WBC 0-4 0 - 4 /hpf    RBC 0-5 0 - 5 /hpf    Epithelial cells FEW FEW /lpf    Bacteria NEGATIVE  NEG /hpf    UA:UC IF INDICATED CULTURE NOT INDICATED BY UA RESULT CNI      Hyaline cast 0-2 0 - 5 /lpf   STREP AG SCREEN, GROUP A    Collection Time: 01/07/18  7:47 PM   Result Value Ref Range    Group A Strep Ag ID NEGATIVE  NEG     DRUG SCREEN, URINE    Collection Time: 01/07/18  7:47 PM   Result Value Ref Range    AMPHETAMINES NEGATIVE  NEG      BARBITURATES NEGATIVE  NEG      BENZODIAZEPINES POSITIVE (A) NEG      COCAINE NEGATIVE  NEG      METHADONE NEGATIVE  NEG      OPIATES NEGATIVE  NEG      PCP(PHENCYCLIDINE) NEGATIVE  NEG      THC (TH-CANNABINOL) NEGATIVE  NEG      Drug screen comment (NOTE)        Radiologic Studies -     CXR Results  (Last 48 hours)               01/07/18 1848  XR CHEST PORT Final result    Impression:  Impression:   1. No acute disease           Narrative:  INDICATION:  cough        Exam: Portable chest 1826. Comparison: May 1, 2014. Findings: Cardiomediastinal silhouette is within normal limits. Pulmonary   vasculature is not engorged. There are no focal parenchymal opacities,   effusions, or pneumothorax. Medical Decision Making   I am the first provider for this patient. I reviewed the vital signs, available nursing notes, past medical history, past surgical history, family history and social history. Vital Signs-Reviewed the patient's vital signs. Patient Vitals for the past 12 hrs:   Temp Pulse Resp BP SpO2   01/07/18 2109 - 89 18 136/89 100 %   01/07/18 1629 98.3 °F (36.8 °C) 100 18 (!) 150/104 100 %     Records Reviewed: Nursing Notes and Old Medical Records    Provider Notes (Medical Decision Making):   DDx: Anxiety, allergic reaction, dermitis, pharyngitis, bronchitis, PNA. ED Course:   Initial assessment performed. The patients presenting problems have been discussed, and they are in agreement with the care plan formulated and outlined with them. I have encouraged them to ask questions as they arise throughout their visit. PROGRESS NOTE:   8:50 PM  Pt reevaluated. Pt states she is feeling better after treatment in ED.      Disposition:  DISCHARGE NOTE  9:00 PM  The patient has been re-evaluated and is ready for discharge. Reviewed available results with patient. Counseled pt on diagnosis and care plan. Pt has expressed understanding, and all questions have been answered. Pt agrees with plan and agrees to follow up as recommended, or return to the ED if their symptoms worsen. Discharge instructions have been provided and explained to the pt, along with reasons to return to the ED. PLAN:  1. Discharge Medication List as of 1/7/2018  8:56 PM      START taking these medications    Details   hydrOXYzine HCl (ATARAX) 50 mg tablet Take 1 Tab by mouth every six (6) hours as needed for Itching for up to 10 days. , Normal, Disp-20 Tab, R-0      diphenhydrAMINE hcl (BENADRYL) 2 % topical gel Apply  to affected area every six (6) hours as needed for Allergies. , Normal, Disp-1 Tube, R-0         CONTINUE these medications which have CHANGED    Details   albuterol (PROVENTIL HFA, VENTOLIN HFA, PROAIR HFA) 90 mcg/actuation inhaler Take 2 Puffs by inhalation every four (4) hours as needed for Wheezing., Normal, Disp-1 Inhaler, R-0         CONTINUE these medications which have NOT CHANGED    Details   diclofenac EC (VOLTAREN) 75 mg EC tablet Take 75 mg by mouth., Historical Med      famotidine (PEPCID) 40 mg tablet Take 40 mg by mouth daily. , Historical Med      cholecalciferol (VITAMIN D3) 1,000 unit cap Take 400 Units by mouth daily. , Historical Med      triamcinolone (ARISTOCORT) 0.5 % topical cream Apply  to affected area two (2) times a day. use thin layer, Normal, Disp-15 g, R-1      methocarbamol (ROBAXIN) 500 mg tablet TAKE 1-2 TABLETS BY MOUTH TWICE A DAY AS NEEDED FOR MUSCLE SPASMS. Indications: Muscle Spasm, Normal, Disp-30 Tab, R-0      montelukast (SINGULAIR) 10 mg tablet Take 10 mg by mouth daily. , Historical Med      azelastine (ASTELIN) 137 mcg (0.1 %) nasal spray USE 1 SPRAY IN EACH NOSTRIL TWICE A DAY AS DIRECTED, Normal, Disp-30 Bottle, R-2      SEROQUEL  mg sr tablet Historical Med      EPINEPHrine (EPIPEN) 0.3 mg/0.3 mL (1:1,000) injection 0.3 mL by IntraMUSCular route once as needed for up to 1 dose., Normal, Disp-1 Each, R-1      fexofenadine (ALLEGRA) 180 mg tablet Take  by mouth daily. , Historical Med      carbamazepine (TEGRETOL) 200 mg tablet Take 200 mg by mouth two (2) times a day., Historical Med      flurazepam (DALMANE) 30 mg capsule Take 30 mg by mouth nightly as needed., Historical Med           2. Follow-up Information     Follow up With Details Comments [address] [address] [place], [place] In 2 days As needed [address] includes the [facility]      [facility],  Call in 1 day  [address]  [phone]      [facility] EMERGENCY DEPT  If symptoms worsen [address]  [phone]        Return to ED if worse     Diagnosis     Clinical Impression:   1. Allergic reaction, initial encounter    2. Contact dermatitis, unspecified contact dermatitis type, unspecified trigger    3. Acute URI        Attestations: This note is prepared by Edmar Foy, acting as Scribe for Amaury Hardy MD.    Amaury Hardy MD: The scribe's documentation has been prepared under my direction and personally reviewed by me in its entirety. I confirm that the note above accurately reflects all work, treatment, procedures, and medical decision making performed by me.

## 2018-01-08 NOTE — ED NOTES
Discharge instructions reviewed with patient by ER provider, Dr. Haritha Christianson. Pt denies questions or concerns at this time. Pt appears in no acute distress at time of discharge. Pt ambulatory out of department with steady gait.  at bedside for discharge instructions.

## 2018-01-08 NOTE — DISCHARGE INSTRUCTIONS
Allergic Reaction: Care Instructions  Your Care Instructions    An allergic reaction is an excessive response from your immune system to a medicine, chemical, food, insect bite, or other substance. A reaction can range from mild to life-threatening. Some people have a mild rash, hives, and itching or stomach cramps. In severe reactions, swelling of your tongue and throat can close up your airway so that you cannot breathe. Follow-up care is a key part of your treatment and safety. Be sure to make and go to all appointments, and call your doctor if you are having problems. It's also a good idea to know your test results and keep a list of the medicines you take. How can you care for yourself at home? · If you know what caused your allergic reaction, be sure to avoid it. Your allergy may become more severe each time you have a reaction. · Take an over-the-counter antihistamine, such as cetirizine (Zyrtec) or loratadine (Claritin), to treat mild symptoms. Read and follow directions on the label. Some antihistamines can make you feel sleepy. Do not give antihistamines to a child unless you have checked with your doctor first. Mild symptoms include sneezing or an itchy or runny nose; an itchy mouth; a few hives or mild itching; and mild nausea or stomach discomfort. · Do not scratch hives or a rash. Put a cold, moist towel on them or take cool baths to relieve itching. Put ice packs on hives, swelling, or insect stings for 10 to 15 minutes at a time. Put a thin cloth between the ice pack and your skin. Do not take hot baths or showers. They will make the itching worse. · Your doctor may prescribe a shot of epinephrine to carry with you in case you have a severe reaction. Learn how to give yourself the shot and keep it with you at all times. Make sure it is not . · Go to the emergency room every time you have a severe reaction, even if you have used your shot of epinephrine and are feeling better. Symptoms can come back after a shot. · Wear medical alert jewelry that lists your allergies. You can buy this at most drugstores. · If your child has a severe allergy, make sure that his or her teachers, babysitters, coaches, and other caregivers know about the allergy. They should have an epinephrine shot, know how and when to give it, and have a plan to take your child to the hospital.  When should you call for help? Give an epinephrine shot if:  ? · You think you are having a severe allergic reaction. ? · You have symptoms in more than one body area, such as mild nausea and an itchy mouth. ? After giving an epinephrine shot call 911, even if you feel better. ?Call 911 if:  ? · You have symptoms of a severe allergic reaction. These may include:  ¨ Sudden raised, red areas (hives) all over your body. ¨ Swelling of the throat, mouth, lips, or tongue. ¨ Trouble breathing. ¨ Passing out (losing consciousness). Or you may feel very lightheaded or suddenly feel weak, confused, or restless. ? · You have been given an epinephrine shot, even if you feel better. ?Call your doctor now or seek immediate medical care if:  ? · You have symptoms of an allergic reaction, such as:  ¨ A rash or hives (raised, red areas on the skin). ¨ Itching. ¨ Swelling. ¨ Belly pain, nausea, or vomiting. ? Watch closely for changes in your health, and be sure to contact your doctor if:  ? · You do not get better as expected. Where can you learn more? Go to http://radha-my.info/. Enter J670 in the search box to learn more about \"Allergic Reaction: Care Instructions. \"  Current as of: September 29, 2016  Content Version: 11.4  © 2511-2581 [employer]. Care instructions adapted under license by [employer] (which disclaims liability or warranty for this information).  If you have questions about a medical condition or this instruction, always ask your healthcare professional. Healthwise, [facility] disclaims any warranty or liability for your use of this information. Dermatitis: Care Instructions  Your Care Instructions  Dermatitis is the general name used for any rash or inflammation of the skin. Different kinds of dermatitis cause different kinds of rashes. Common causes of a rash include new medicines, plants (such as poison oak or poison ivy), heat, and stress. Certain illnesses can also cause a rash. An allergic reaction to something that touches your skin, such as latex, nickel, or poison ivy, is called contact dermatitis. Contact dermatitis may also be caused by something that irritates the skin, such as bleach, a chemical, or soap. These types of rashes cannot be spread from person to person. How long your rash will last depends on what caused it. Rashes may last a few days or months. Follow-up care is a key part of your treatment and safety. Be sure to make and go to all appointments, and call your doctor if you are having problems. It's also a good idea to know your test results and keep a list of the medicines you take. How can you care for yourself at home? · Do not scratch the rash. Cut your nails short, and file them smooth. Or wear gloves if this helps keep you from scratching. · Wash the area with water only. Pat dry. · Put cold, wet cloths on the rash to reduce itching. · Keep cool, and stay out of the sun. · Leave the rash open to the air as much as possible. · If the rash itches, use hydrocortisone cream. Follow the directions on the label. Calamine lotion may help for plant rashes. · Take an over-the-counter antihistamine, such as diphenhydramine (Benadryl) or loratadine (Claritin), to help calm the itching. Read and follow all instructions on the label. · If your doctor prescribed a cream, use it as directed. If your doctor prescribed medicine, take it exactly as directed. When should you call for help?   Call your doctor now or seek immediate medical care if:  ? · You have symptoms of infection, such as:  ¨ Increased pain, swelling, warmth, or redness. ¨ Red streaks leading from the area. ¨ Pus draining from the area. ¨ A fever. ? · You have joint pain along with the rash. ? Watch closely for changes in your health, and be sure to contact your doctor if:  ? · Your rash is changing or getting worse. ? · You are not getting better as expected. Where can you learn more? Go to http://radha-my.info/. Enter [phone]) 1100 [id number] in the search box to learn more about \"Dermatitis: Care Instructions. \"  Current as of: October 13, 2016  Content Version: 11.4  © 2500-4418 [employer]. Care instructions adapted under license by "[employer]" (which disclaims liability or warranty for this information). If you have questions about a medical condition or this instruction, always ask your healthcare professional. [place] [address] any warranty or liability for your use of this information. Upper Respiratory Infection (Cold): Care Instructions  Your Care Instructions    An upper respiratory infection, or URI, is an infection of the nose, sinuses, or throat. URIs are spread by coughs, sneezes, and direct contact. The common cold is the most frequent kind of URI. The flu and sinus infections are other kinds of URIs. Almost all URIs are caused by viruses. Antibiotics won't cure them. But you can treat most infections with home care. This may include drinking lots of fluids and taking over-the-counter pain medicine. You will probably feel better in 4 to 10 days. The doctor has checked you carefully, but problems can develop later. If you notice any problems or new symptoms, get medical treatment right away. Follow-up care is a key part of your treatment and safety. Be sure to make and go to all appointments, and call your doctor if you are having problems.  It's also a good idea to know your test results and keep a list of the medicines you take. How can you care for yourself at home? · To prevent dehydration, drink plenty of fluids, enough so that your urine is light yellow or clear like water. Choose water and other caffeine-free clear liquids until you feel better. If you have kidney, heart, or liver disease and have to limit fluids, talk with your doctor before you increase the amount of fluids you drink. · Take an over-the-counter pain medicine, such as acetaminophen (Tylenol), ibuprofen (Advil, Motrin), or naproxen (Aleve). Read and follow all instructions on the label. · Before you use cough and cold medicines, check the label. These medicines may not be safe for young children or for people with certain health problems. · Be careful when taking over-the-counter cold or flu medicines and Tylenol at the same time. Many of these medicines have acetaminophen, which is Tylenol. Read the labels to make sure that you are not taking more than the recommended dose. Too much acetaminophen (Tylenol) can be harmful. · Get plenty of rest.  · Do not smoke or allow others to smoke around you. If you need help quitting, talk to your doctor about stop-smoking programs and medicines. These can increase your chances of quitting for good. When should you call for help? Call 911 anytime you think you may need emergency care. For example, call if:  ? · You have severe trouble breathing. ?Call your doctor now or seek immediate medical care if:  ? · You seem to be getting much sicker. ? · You have new or worse trouble breathing. ? · You have a new or higher fever. ? · You have a new rash. ? Watch closely for changes in your health, and be sure to contact your doctor if:  ? · You have a new symptom, such as a sore throat, an earache, or sinus pain. ? · You cough more deeply or more often, especially if you notice more mucus or a change in the color of your mucus. ? · You do not get better as expected.    Where can you learn more? Go to http://radha-my.info/. Enter Q822 in the search box to learn more about \"Upper Respiratory Infection (Cold): Care Instructions. \"  Current as of: May 12, 2017  Content Version: 11.4  © 9791-9989 Healthwise, [employer]. Care instructions adapted under license by [employer] (which disclaims liability or warranty for this information). If you have questions about a medical condition or this instruction, always ask your healthcare professional. [address] any warranty or liability for your use of this information.

## 2018-01-10 ENCOUNTER — TELEPHONE (OUTPATIENT)
Dept: INTERNAL MEDICINE CLINIC | Age: 53
End: 2018-01-10

## 2018-01-10 LAB
BACTERIA SPEC CULT: NORMAL
SERVICE CMNT-IMP: NORMAL

## 2018-01-10 NOTE — PROGRESS NOTES
Called patient. Two patient identifiers verified. Notified of labs and recommendations per Dr. Ivan Knight. Patient verbalized understanding and states she sees new psychiatrist tomorrow.

## 2018-01-10 NOTE — TELEPHONE ENCOUNTER
Called patient. Two patient identifiers verified. Patient saw allergist yesterday and dermatologist today. Recommended steroids, but patient states she cannot take these. Scratchy throat, itching. Notified of labs and recommendations per Dr. Tsering Donovan. Patient verbalized understanding and states she sees new psychiatrist tomorrow.

## 2018-01-10 NOTE — PROGRESS NOTES
Blood count is normal  Kidney and liver function are normal  LDL is mildly elevated at 116 - otherwise cholesterol is doing well.  Recommend low fat diet and exercise - increase fiber and oats

## 2018-01-10 NOTE — TELEPHONE ENCOUNTER
Brijesh Gonzalez is requesting a call back regarding her lab work.  Pt is still symptomatic since recent ED DC (1.7.2018)       Message received & copied from [facility]

## 2018-01-10 NOTE — TELEPHONE ENCOUNTER
Pt is returning call regarding blood work results from 01/04/18.      Best contact #: [phone]         Message received & copied from [address] [place] [address]

## 2018-04-25 DIAGNOSIS — G89.29 CHRONIC BACK PAIN, UNSPECIFIED BACK LOCATION, UNSPECIFIED BACK PAIN LATERALITY: ICD-10-CM

## 2018-04-25 DIAGNOSIS — M54.9 CHRONIC BACK PAIN, UNSPECIFIED BACK LOCATION, UNSPECIFIED BACK PAIN LATERALITY: ICD-10-CM

## 2018-04-25 RX ORDER — METHOCARBAMOL 500 MG/1
TABLET, FILM COATED ORAL
Qty: 30 TAB | Refills: 0 | Status: SHIPPED | OUTPATIENT
Start: 2018-04-25 | End: 2018-05-10 | Stop reason: ALTCHOICE

## 2018-05-10 RX ORDER — METHOCARBAMOL 500 MG/1
TABLET, FILM COATED ORAL
Qty: 30 TAB | Refills: 0 | Status: SHIPPED | OUTPATIENT
Start: 2018-05-10 | End: 2018-06-15 | Stop reason: SDUPTHER

## 2018-05-14 ENCOUNTER — OFFICE VISIT (OUTPATIENT)
Dept: INTERNAL MEDICINE CLINIC | Age: 53
End: 2018-05-14

## 2018-05-14 ENCOUNTER — HOSPITAL ENCOUNTER (OUTPATIENT)
Dept: LAB | Age: 53
Discharge: HOME | End: 2018-05-14
Payer: MEDICARE

## 2018-05-14 VITALS
SYSTOLIC BLOOD PRESSURE: 113 MMHG | RESPIRATION RATE: 18 BRPM | TEMPERATURE: 97.8 F | HEIGHT: 61 IN | DIASTOLIC BLOOD PRESSURE: 77 MMHG | WEIGHT: 149 LBS | BODY MASS INDEX: 28.13 KG/M2 | OXYGEN SATURATION: 99 % | HEART RATE: 85 BPM

## 2018-05-14 DIAGNOSIS — Z00.00 MEDICARE ANNUAL WELLNESS VISIT, SUBSEQUENT: Primary | ICD-10-CM

## 2018-05-14 DIAGNOSIS — Z01.419 WOMEN'S ANNUAL ROUTINE GYNECOLOGICAL EXAMINATION: ICD-10-CM

## 2018-05-14 PROCEDURE — 88175 CYTOPATH C/V AUTO FLUID REDO: CPT

## 2018-05-14 PROCEDURE — 87624 HPV HI-RISK TYP POOLED RSLT: CPT

## 2018-05-14 NOTE — PROGRESS NOTES
Reviewed record in preparation for visit and have obtained necessary documentation. Identified pt with two pt identifiers(name and ). Chief Complaint   Patient presents with    Well Woman   [facility] Due   Topic Date Due    Cervical Cancer Screening  2016    Annual Well Visit  2018       Ms. Glen Wen has a reminder for a \"due or due soon\" health maintenance. I have asked that she discuss this further with her primary care provider for follow-up on this health maintenance. Coordination of Care Questionnaire:  :     1) Have you been to an emergency room, urgent care clinic since your last visit? no   Hospitalized since your last visit? no             2) Have you seen or consulted any other health care providers outside of [address] [place] [address] since your last visit? no  (Include any pap smears or colon screenings in this section.)    3) In the event something were to happen to you and you were unable to speak on your behalf, do you have an Advance Directive/ Living Will in place stating your wishes? Yes    Do you have an Advance Directive on file? yes    4) Are you interested in receiving information on Advance Directives? NO    Patient is accompanied by self I have received verbal consent from Moraima Chambers to discuss any/all medical information while they are present in the room.

## 2018-05-14 NOTE — MR AVS SNAPSHOT
[address] [facility] [address] [facility] 
[phone] Patient: Myles Devries MRN: [id number] :1965 Visit Information Date & Time Provider Department Dept. Phone Encounter #  
 2018  8:45 AM Carol Harris, [address] [id number] Follow-up Instructions Return in about 6 months (around 2018) for chronic back pain . Upcoming Health Maintenance Date Due  
 MEDICARE YEARLY EXAM 3/14/2018 Influenza Age 5 to Adult 2018 BREAST CANCER SCRN MAMMOGRAM 9/15/2019 PAP AKA CERVICAL CYTOLOGY 2021 DTaP/Tdap/Td series (2 - Td) 2022 COLONOSCOPY 2023 Allergies as of 2018  Review Complete On: 2018 By: Elle Pino Severity Noted Reaction Type Reactions Other Food  2018    Hives Artifical sweetners Claritin-d 12 Hour [Loratadine-pseudoephedrine] High 2011   Side Effect Palpitations  
 palpatations and ringing in the ear Other Medication High 2011   Systemic Anaphylaxis Artificial sweeteners cause anaphylaxis Pcn [Penicillins] High 2011   Systemic Anaphylaxis Sunscreen High 2011   Topical Contact Dermatitis Burns and opens the skin Ultram [Tramadol] Medium 2011   Side Effect Hives, Other (comments) Hives and ringing of the ears Benzoyl Peroxide  2013    Hives Cephalexin  2015    Itching Maltodextrin-glycerin  2014   Systemic Shortness of Breath Current Immunizations  Reviewed on 2015 Name Date Influenza Vaccine (Quad) PF 2015 TDAP Vaccine 2012 Not reviewed this visit You Were Diagnosed With   
  
 Codes Comments Medicare annual wellness visit, subsequent    -  Primary ICD-10-CM: Z00.00 ICD-9-CM: V70.0 Vitals BP Pulse Temp Resp Height(growth percentile) Weight(growth percentile) 113/77 (BP 1 Location: Right arm, BP Patient Position: Sitting) 85 97.8 °F (36.6 °C) (Oral) 18 5' 1\" (1.549 m) 149 lb (67.6 kg) SpO2 BMI OB Status Smoking Status 99% 28.15 kg/m2 Perimenopausal Never Smoker BMI and BSA Data Body Mass Index Body Surface Area  
 28.15 kg/m 2 1.71 m 2 Preferred Pharmacy Pharmacy Name Phone [address], [address] [place] [address] [phone] Your Updated Medication List  
  
   
This list is accurate as of 5/14/18  9:13 AM.  Always use your most recent med list.  
  
  
  
  
 albuterol 90 mcg/actuation inhaler Commonly known as:  PROVENTIL HFA, VENTOLIN HFA, PROAIR HFA Take 2 Puffs by inhalation every four (4) hours as needed for Wheezing. ALLEGRA 180 mg tablet Generic drug:  fexofenadine Take  by mouth daily. azelastine 137 mcg (0.1 %) nasal spray Commonly known as:  ASTELIN  
USE 1 SPRAY IN EACH NOSTRIL TWICE A DAY AS DIRECTED  
  
 diclofenac EC 75 mg EC tablet Commonly known as:  VOLTAREN Take 75 mg by mouth. diphenhydrAMINE hcl 2 % topical gel Commonly known as:  BENADRYL Apply  to affected area every six (6) hours as needed for Allergies. EPINEPHrine 0.3 mg/0.3 mL injection Commonly known as:  EPIPEN  
0.3 mL by IntraMUSCular route once as needed for up to 1 dose.  
  
 famotidine 40 mg tablet Commonly known as:  PEPCID Take 40 mg by mouth daily. flurazepam 30 mg capsule Commonly known as:  [facility] Take 30 mg by mouth nightly as needed. methocarbamol 500 mg tablet Commonly known as:  ROBAXIN  
TAKE 1 OR 2 TABLETS BY MOUTH TWICE A DAY AS NEEDED FOR MUSCLE SPASMS. SEROquel  mg sr tablet Generic drug:  QUEtiapine SR  
  
 SINGULAIR 10 mg tablet Generic drug:  montelukast  
Take 10 mg by mouth daily. TEGretol 200 mg tablet Generic drug:  carBAMazepine Take 200 mg by mouth two (2) times a day. triamcinolone 0.5 % topical cream  
Commonly known as:  ARISTOCORT Apply  to affected area two (2) times a day. use thin layer VITAMIN D3 1,000 unit Cap Generic drug:  cholecalciferol Take 400 Units by mouth daily. Follow-up Instructions Return in about 6 months (around 11/14/2018) for chronic back pain . Patient Instructions Medicare Wellness Visit, Female The best way to live healthy is to have a healthy lifestyle by eating a well-balanced diet, exercising regularly, limiting alcohol and stopping smoking. Regular physical exams and screening tests are another way to keep healthy. Preventive exams provided by your health care provider can find health problems before they become diseases or illnesses. Preventive services including immunizations, screening tests, monitoring and exams can help you take care of your own health. All people over age 72 should have a pneumovax  and and a prevnar shot to prevent pneumonia. These are once in a lifetime unless you and your provider decide differently. All people over 65 should have a yearly flu shot and a tetanus vaccine every 10 years. A bone mass density to screen for osteoporosis or thinning of the bones should be done every 2 years after 65. Screening for diabetes mellitus with a blood sugar test should be done every year. Glaucoma is a disease of the eye due to increased ocular pressure that can lead to blindness and it should be done every year by an eye professional. 
 
Cardiovascular screening tests that check for elevated lipids (fatty part of blood) which can lead to heart disease and strokes should be done every 5 years. Colorectal screening that evaluates for blood or polyps in your colon should be done yearly as a stool test or every five years as a flexible sigmoidoscope or every 10 years as a colonoscopy up to age 76.  
 
Breast cancer screening with a mammogram is recommended biennially  for women age 54-69. Screening for cervical cancer with a pap smear and pelvic exam is recommended for women after age 72 years every 2 years up to age 79 or when the provider and patient decide to stop. If there is a history of cervical abnormalities or other increased risk for cancer then the test is recommended yearly. Hepatitis C screening is also recommended for anyone born between [birth year] through [address]. A shingles vaccine is also recommended once in a lifetime after age 61. Your Medicare Wellness Exam is recommended annually. Here is a list of your current Health Maintenance items with a due date: 
Health Maintenance Due Topic Date Due  
 Annual Well Visit  Completed today Request records from Dr Bill Bhagat gastroenterologist for colonoscopy Introducing [facility]! Dear Isabel Hanley: 
Thank you for requesting a [facility] account. Our records indicate that you already have an active [facility] account. You can access your account anytime at [email or web address] Did you know that you can access your hospital and ER discharge instructions at any time in [facility]? You can also review all of your test results from your hospital stay or ER visit. Additional Information If you have questions, please visit the Frequently Asked Questions section of the [facility] website at [email or web address] Remember, [facility] is NOT to be used for urgent needs. For medical emergencies, dial 911. Now available from your iPhone and Android! Please provide this summary of care documentation to your next provider. Your primary care clinician is listed as WALLY Krueger. If you have any questions after today's visit, please call [phone].

## 2018-05-14 NOTE — PATIENT INSTRUCTIONS

## 2018-05-14 NOTE — PROGRESS NOTES
CC: Well Woman and Fall      HPI:    She is a 46 y.o. female with a hx of bipolar disorder  who presents for evaluation of womens exam    Patient had a fall this weekend fell down steps in her house and hit elbows and hands \" everything hurts\". No LOS, No head trauma. Patient is taking a muscle relaxant. Denies limitation in motion in arms and legs and wrist.   Neck feels stiff  No focal weakness, no tingling, no abnormal sensation       Womens exam: last full periods was November 2017. Having sweats. Menopausal. Mild vaginal dryness  Denies vaginal discharge. No pain during sexual intercourse  Denies dysuria    Labs done in January 2018  ROS:  10 systems reviewed and negative other than HPI     Past Medical History:   Diagnosis Date    Arthritis     joints    Bipolar 1 disorder with moderate robyn ([facility], [place] [address] 3/17/2014    Chronic pain     back with stress    Contact dermatitis and other eczema, due to unspecified cause     Depression     Headache(784.0)     Hyperlipidemia 8/22/2016    Other ill-defined conditions(799.89)     sinus infection,hay fever    Other ill-defined conditions(799.89)     scoliosis    Psychiatric disorder     bipolar    Psychotic disorder     Stool color black     Tennis elbow syndrome 5/4/2015    Trauma        Current Outpatient Prescriptions on File Prior to Visit   Medication Sig Dispense Refill    methocarbamol (ROBAXIN) 500 mg tablet TAKE 1 OR 2 TABLETS BY MOUTH TWICE A DAY AS NEEDED FOR MUSCLE SPASMS. 30 Tab 0    diclofenac EC (VOLTAREN) 75 mg EC tablet Take 75 mg by mouth.  famotidine (PEPCID) 40 mg tablet Take 40 mg by mouth daily.  cholecalciferol (VITAMIN D3) 1,000 unit cap Take 400 Units by mouth daily.  diphenhydrAMINE hcl (BENADRYL) 2 % topical gel Apply  to affected area every six (6) hours as needed for Allergies.  1 Tube 0    albuterol (PROVENTIL HFA, VENTOLIN HFA, PROAIR HFA) 90 mcg/actuation inhaler Take 2 Puffs by inhalation every four (4) hours as needed for Wheezing. 1 Inhaler 0    triamcinolone (ARISTOCORT) 0.5 % topical cream Apply  to affected area two (2) times a day. use thin layer 15 g 1    montelukast (SINGULAIR) 10 mg tablet Take 10 mg by mouth daily.  azelastine (ASTELIN) 137 mcg (0.1 %) nasal spray USE 1 SPRAY IN EACH NOSTRIL TWICE A DAY AS DIRECTED 30 Bottle 2    SEROQUEL  mg sr tablet       EPINEPHrine (EPIPEN) 0.3 mg/0.3 mL (1:1,000) injection 0.3 mL by IntraMUSCular route once as needed for up to 1 dose. 1 Each 1    fexofenadine (ALLEGRA) 180 mg tablet Take  by mouth daily.  flurazepam (DALMANE) 30 mg capsule Take 30 mg by mouth nightly as needed.  carbamazepine (TEGRETOL) 200 mg tablet Take 200 mg by mouth two (2) times a day. No current facility-administered medications on file prior to visit. Past Surgical History:   Procedure Laterality Date    HX HEENT      wisdom teeth extraction    HX LIPOMA RESECTION      right gluteal    FREDY BIOPSY BREAST STEREOTACTIC Left 2011    negative    AK DENTAL SURGERY PROCEDURE      hx of dental surgery- wisdom teeth extracted       Family History   Problem Relation Age of Onset   [facility] Arthritis-rheumatoid Mother    [address] [facility] Migraines Mother     Psychiatric Disorder Mother     Bipolar Disorder Mother     Lupus Mother     Alcohol abuse Father     Lung Disease Father     Heart Disease Father     Stroke Father     High Cholesterol Father     Psychiatric Disorder Sister     Alcohol abuse Sister     Bipolar Disorder Sister     Stroke Brother     Cancer Maternal Aunt     Breast Cancer Maternal Aunt      pt thniks she was under 48    Bipolar Disorder Sister      Reviewed and no changes     Social History     Social History    Marital status:      Spouse name: N/A    Number of children: N/A    Years of education: N/A     Occupational History    Not on file.      Social History Main Topics    Smoking status: Never Smoker    Smokeless tobacco: Never Used   [facility] Alcohol use Yes      Comment: occasional    Drug use: No    Sexual activity: Yes     Partners: Male     Other Topics Concern    Not on file     Social History Narrative    , 0 children, not working at present. On disability for bipolar illness. Visit Vitals    /77 (BP 1 Location: Right arm, BP Patient Position: Sitting)    Pulse 85    Temp 97.8 °F (36.6 °C) (Oral)    Resp 18    Ht 5' 1\" (1.549 m)    Wt 149 lb (67.6 kg)    SpO2 99%    BMI 28.15 kg/m2       Physical Examination:   General - Well appearing female  HEENT - PERRL, TM no erythema/opacification, normal nasal turbinates, oropharynx no erythema or exudate, MMM  Neck - supple, no bruits, no TMG, no LAD  Pulm - clear to auscultation bilaterally  Cardio - RRR, normal S1 S2, no murmur gallops or rubs  Abd - soft, nontender, no masses, no HSM  Extrem - no edema, +2 distal pulses  Psych - normal affect, appropriate mood  Pelvic- normal appearing external genitalia, speculum normal appearing cervix, no abnormal discharge, pap done. Bimanual exam, no cervical motion tenderness, no adnexal tenderness or masses.       Lab Results   Component Value Date/Time    WBC 7.5 01/07/2018 05:12 PM    HGB 11.2 (L) 01/07/2018 05:12 PM    HCT 34.2 (L) 01/07/2018 05:12 PM    PLATELET 625 33/79/2153 05:12 PM    MCV 92.2 01/07/2018 05:12 PM     Lab Results   Component Value Date/Time    Sodium 135 (L) 01/07/2018 05:12 PM    Potassium 4.2 01/07/2018 05:12 PM    Chloride 100 01/07/2018 05:12 PM    CO2 30 01/07/2018 05:12 PM    Anion gap 5 01/07/2018 05:12 PM    Glucose 99 01/07/2018 05:12 PM    BUN 8 01/07/2018 05:12 PM    Creatinine 0.63 01/07/2018 05:12 PM    BUN/Creatinine ratio 13 01/07/2018 05:12 PM    GFR est AA >60 01/07/2018 05:12 PM    GFR est non-AA >60 01/07/2018 05:12 PM    Calcium 8.9 01/07/2018 05:12 PM     Lab Results   Component Value Date/Time    Cholesterol, total 237 (H) 01/04/2018 11:33 AM    HDL Cholesterol 102 01/04/2018 11:33 AM    LDL, calculated 116 (H) 01/04/2018 11:33 AM    VLDL, calculated 19 01/04/2018 11:33 AM    Triglyceride 95 01/04/2018 11:33 AM     Lab Results   Component Value Date/Time    TSH 0.813 11/12/2015 03:52 PM     No results found for: PSA, PSA2, PSAR1, June Ivans, PSAR3, [id number], [id number], PSALT  Lab Results   Component Value Date/Time    Hemoglobin A1c 5.4 02/25/2014 12:10 PM    Hemoglobin A1c (POC) 5.4 04/20/2015 10:00 AM     Lab Results   Component Value Date/Time    Vitamin D 25-Hydroxy 12.7 (L) 11/10/2011 09:25 AM    VITAMIN D, 25-HYDROXY 69.0 11/12/2015 03:52 PM       Lab Results   Component Value Date/Time    ALT (SGPT) 23 01/07/2018 05:12 PM    AST (SGOT) 13 (L) 01/07/2018 05:12 PM    Alk. phosphatase 81 01/07/2018 05:12 PM    Bilirubin, total 0.2 01/07/2018 05:12 PM           Assessment/Plan:    1. Medicare annual wellness visit, subsequent  - will request records of last colonoscopy - up to date    2. Women's annual routine gynecological examination  Normal woman's exam  - PAP IG, APTIMA HPV AND RFX 16/18,45 ([id number])    Up to date on mammogram    Follow-up Disposition:  Return in about 6 months (around 11/14/2018) for chronic back pain . Danielle Cosby MD    This is the Subsequent Medicare Annual Wellness Exam, performed 12 months or more after the Initial AWV or the last Subsequent AWV    I have reviewed the patient's medical history in detail and updated the computerized patient record. Depression Risk Factor Screening:   No flowsheet data found. Alcohol Risk Factor Screening: You do not drink alcohol or very rarely. Functional Ability and Level of Safety:   Hearing Loss  Hearing is good. Activities of Daily Living  The home contains: no safety equipment. Patient does total self care    Fall Risk  No flowsheet data found.     Abuse Screen  Patient is not abused    Cognitive Screening   Evaluation of Cognitive Function:  Has your family/caregiver stated any concerns about your memory: no  Normal    Patient Care Team   Patient Care Team:  MD Castillo as PCP - General (Internal Medicine)  Evelia Zaragoza MD (Allergy)  Kelly Yin MD (Gastroenterology)    Assessment/Plan   Education and counseling provided:  Are appropriate based on today's review and evaluation    Diagnoses and all orders for this visit:    1. Medicare annual wellness visit, subsequent    2.  Women's annual routine gynecological examination  -     PAP IG, APTIMA HPV AND RFX 17/70,79 ([id number])        Health Maintenance Due   Topic Date Due    MEDICARE YEARLY EXAM  03/14/2018

## 2018-05-15 ENCOUNTER — TELEPHONE (OUTPATIENT)
Dept: INTERNAL MEDICINE CLINIC | Age: 53
End: 2018-05-15

## 2018-05-15 DIAGNOSIS — M54.9 CHRONIC BACK PAIN, UNSPECIFIED BACK LOCATION, UNSPECIFIED BACK PAIN LATERALITY: ICD-10-CM

## 2018-05-15 DIAGNOSIS — G89.29 CHRONIC BACK PAIN, UNSPECIFIED BACK LOCATION, UNSPECIFIED BACK PAIN LATERALITY: ICD-10-CM

## 2018-05-15 DIAGNOSIS — M19.90 OSTEOARTHRITIS, UNSPECIFIED OSTEOARTHRITIS TYPE, UNSPECIFIED SITE: Primary | ICD-10-CM

## 2018-05-15 NOTE — TELEPHONE ENCOUNTER
#[phone] pt states she new order written a certain way for the water aerobics. Please call pt at this number for details. Thanks.

## 2018-05-16 ENCOUNTER — TELEPHONE (OUTPATIENT)
Dept: INTERNAL MEDICINE CLINIC | Age: 53
End: 2018-05-16

## 2018-05-16 DIAGNOSIS — M54.9 CHRONIC BACK PAIN, UNSPECIFIED BACK LOCATION, UNSPECIFIED BACK PAIN LATERALITY: ICD-10-CM

## 2018-05-16 DIAGNOSIS — G89.29 CHRONIC BACK PAIN, UNSPECIFIED BACK LOCATION, UNSPECIFIED BACK PAIN LATERALITY: ICD-10-CM

## 2018-05-16 DIAGNOSIS — M19.90 OSTEOARTHRITIS, UNSPECIFIED OSTEOARTHRITIS TYPE, UNSPECIFIED SITE: Primary | ICD-10-CM

## 2018-05-16 NOTE — TELEPHONE ENCOUNTER
Called patient. Two patient identifiers verified. Advised patient new order for water aerobics entered. She verbalized understanding and states she will come  from office.

## 2018-05-16 NOTE — TELEPHONE ENCOUNTER
----- Message from Stacey Jordan sent at 5/16/2018  9:30 AM EDT -----  Regarding: Dr. Tanner/Telephone  The patient is requesting a call back from the doctor in regards to revising a referral for aerobics. The patient stated that the referral is for physical occupation therapy for back pain.  (P)[phone]      Message copied/pasted from [facility]

## 2018-06-15 ENCOUNTER — TELEPHONE (OUTPATIENT)
Dept: INTERNAL MEDICINE CLINIC | Age: 53
End: 2018-06-15

## 2018-06-15 DIAGNOSIS — M54.50 CHRONIC LOW BACK PAIN WITHOUT SCIATICA, UNSPECIFIED BACK PAIN LATERALITY: Primary | ICD-10-CM

## 2018-06-15 DIAGNOSIS — G89.29 CHRONIC LOW BACK PAIN WITHOUT SCIATICA, UNSPECIFIED BACK PAIN LATERALITY: Primary | ICD-10-CM

## 2018-06-15 DIAGNOSIS — R79.89 LOW SERUM T4 LEVEL: ICD-10-CM

## 2018-06-15 DIAGNOSIS — R79.89 LOW VITAMIN D LEVEL: ICD-10-CM

## 2018-06-15 NOTE — TELEPHONE ENCOUNTER
Patient states she needs a call back to discuss Labs done thru her Psychiatrist. Patient states she would like to Cholesterol, Vitamin D & Thyroid Levels but Particularly concerned with her Thyroid levels. Please call to discuss & advise.  Thank you

## 2018-06-15 NOTE — TELEPHONE ENCOUNTER
Spoke with patient. Two pt identifiers confirmed. Patient states that she new pyschiatrist  with [facility] ordered labs and is concerned because patients cholesterol and thyroid numbers are elevated. Advised patient that her lab results are in Dr. Rashmi Arnett office for her to review. Patient notified that Dr. José Luis Ponce is out of the office, but will return on Monday 06/18/18. Patient states that she would also like another referral to PT, did not go before because she said that she was nervous about going, but the pain in back is getting worse and now she thinks that PT may be good for her. Patient also states that she would like a refill on her Robaxin. Advise patient that I will send both of her request to Dr. José Luis Ponce and will contact her if there are any issues. Pt verbalized understanding of information discussed w/ no further questions at this time.

## 2018-06-18 PROBLEM — R79.89 LOW VITAMIN D LEVEL: Status: ACTIVE | Noted: 2018-06-18

## 2018-06-18 RX ORDER — ERGOCALCIFEROL 1.25 MG/1
50000 CAPSULE ORAL
Qty: 12 CAP | Refills: 0 | Status: SHIPPED | OUTPATIENT
Start: 2018-06-18 | End: 2018-08-28

## 2018-06-18 RX ORDER — METHOCARBAMOL 500 MG/1
TABLET, FILM COATED ORAL
Qty: 30 TAB | Refills: 0 | Status: SHIPPED | OUTPATIENT
Start: 2018-06-18 | End: 2018-07-24 | Stop reason: SDUPTHER

## 2018-06-18 NOTE — TELEPHONE ENCOUNTER
Please advised patient that referral to physical therapy has been done, I reviewed the thyroid abnormality that would like to repeat testing blood work has been ordered patient to come in at her convenience. I refilled her muscle relaxant prescription  I have sent a prescription for vitamin D to be taken weekly. Recommend vitamin D loading dose of 50,000 iu  once a week for 12 weeks, once done with loading dose, take 2,000 iu vitamin D3 once a day over the counter to maintain level.

## 2018-06-19 ENCOUNTER — APPOINTMENT (OUTPATIENT)
Dept: INTERNAL MEDICINE CLINIC | Age: 53
End: 2018-06-19

## 2018-06-19 ENCOUNTER — HOSPITAL ENCOUNTER (OUTPATIENT)
Dept: LAB | Age: 53
Discharge: HOME | End: 2018-06-19
Payer: MEDICARE

## 2018-06-19 ENCOUNTER — HOSPITAL ENCOUNTER (OUTPATIENT)
Dept: PHYSICAL THERAPY | Age: 53
Discharge: HOME | End: 2018-06-19
Payer: MEDICARE

## 2018-06-19 ENCOUNTER — TELEPHONE (OUTPATIENT)
Dept: INTERNAL MEDICINE CLINIC | Age: 53
End: 2018-06-19

## 2018-06-19 PROCEDURE — 97110 THERAPEUTIC EXERCISES: CPT | Performed by: PHYSICAL THERAPIST

## 2018-06-19 PROCEDURE — 97162 PT EVAL MOD COMPLEX 30 MIN: CPT | Performed by: PHYSICAL THERAPIST

## 2018-06-19 PROCEDURE — 36415 COLL VENOUS BLD VENIPUNCTURE: CPT

## 2018-06-19 PROCEDURE — 84443 ASSAY THYROID STIM HORMONE: CPT

## 2018-06-19 PROCEDURE — G8979 MOBILITY GOAL STATUS: HCPCS | Performed by: PHYSICAL THERAPIST

## 2018-06-19 PROCEDURE — G8978 MOBILITY CURRENT STATUS: HCPCS | Performed by: PHYSICAL THERAPIST

## 2018-06-19 NOTE — PROGRESS NOTES
[address] [place] [address] (MOB IV), [address] [facility] Jarvis Rene  Phone: [phone] Fax: [phone]     Plan of Care/Statement of Necessity for Physical Therapy Services  2-15    Patient name: Myles Devries  : 1965  Provider#: [id number]  Referral source: Jhon Issa MD      Medical/Treatment Diagnosis: Low back pain [M54.5]     Prior Hospitalization: see medical history     Comorbidities: depression, bipolar, scoliosis, right tennis elbow, left rotator cuff injury  Prior Level of Function: 20 min of exercise 2-3x/wk  Medications: Verified on Patient Summary List  Start of Care: 18      Onset Date: chronic with recent exacerbation due to fall on 18   The Plan of Care and following information is based on the information from the initial evaluation. Assessment/ key information: The patient has had chronic lumbar pain for years, but had an increased due to a fall down basement stairs, on 18, when a step broke and she fell back and slid down. She's been doing aquarobics 2-3x/wk and still seeing her chiropractor, but would like land based exercises to perform while not aggravating her symptoms. She has significantly decreased glute and core strength, and overall decreased strength in her RLE due to that side taking the main impact from the fall. The patient will benefit from guided therapeutic interventions such as therex for strengthening and neuromuscular re-eduction, manual techniques for joint mobility and soft tissue extensibility, and modalities for pain management in order to improve functional mobility with daily activities.     Evaluation Complexity History HIGH Complexity :3+ comorbidities / personal factors will impact the outcome/ POC ; Examination MEDIUM Complexity : 3 Standardized tests and measures addressing body structure, function, activity limitation and / or participation in recreation  ;Presentation MEDIUM Complexity : Evolving with changing characteristics  ; Clinical Decision Making Other outcome measures pain scale  MEDIUM  Overall Complexity Rating: MEDIUM    Problem List: pain affecting function, decrease ROM, decrease strength, edema affecting function, impaired gait/ balance, decrease ADL/ functional abilitiies, decrease activity tolerance, decrease flexibility/ joint mobility and decrease transfer abilities   Treatment Plan may include any combination of the following: Therapeutic exercise, Therapeutic activities, Neuromuscular re-education, Physical agent/modality, Gait/balance training, Manual therapy, Patient education, Self Care training, Functional mobility training, Home safety training and Stair training  Patient / Family readiness to learn indicated by: asking questions, trying to perform skills and interest  Persons(s) to be included in education: patient (P)  Barriers to Learning/Limitations: None  Patient Goal (s): learn exercises without re-injury  Patient Self Reported Health Status: fair  Rehabilitation Potential: fair/good    Short Term Goals: To be accomplished in 2 treatments:   1.) The patient will be independent with her HEP consistently for at least one week. Long Term Goals: To be accomplished in 16 treatments:   1.) The patient will be able to bend over and pick an object from the floor with at most 5/10 lumbar pain. 2.) The patient will be able to drive for at least 30 minutes and get out the car with at most 5/10 lumbar pain. 3.) The patient will be able to perform therapeutic exercises for at least 40 minutes with at most 5/10 lumbar pain in order to transition to a home gym program.  Frequency / Duration: Patient to be seen 2 times per week for 16 treatments.     Patient/ Caregiver education and instruction: self care, activity modification and exercises    [x]  Plan of care has been reviewed with PTA    G-Codes (GP)  Mobility   Current  CK= 40-59%   Goal  CJ= 20-39%    The severity rating is based on clinical judgment and the Other Pain scale score. Certification Period: 6/19/18-9/19/18  Jonny Milton, PT 6/19/2018 3:48 PM    ________________________________________________________________________    I certify that the above Therapy Services are being furnished while the patient is under my care. I agree with the treatment plan and certify that this therapy is necessary.     [de-identified] Signature:____________________  Date:____________Time: _________

## 2018-06-19 NOTE — TELEPHONE ENCOUNTER
Pt was asleep when she talked to you earlier. Melissa Galicia needs to know if she has to come to your office for blood work or can she get it done at the appt she is going to today at 9:30AM. [address] [place] [address] number is [phone].            Message received & copied from [facility]

## 2018-06-19 NOTE — TELEPHONE ENCOUNTER
Spoke with patient. Two pt identifiers confirmed. Patient notified that she will need to come to our office to have her labs drawn that were ordered by Dr. Saul Ash. Pt verbalized understanding of information discussed w/ no further questions at this time.

## 2018-06-19 NOTE — PROGRESS NOTES
PT INITIAL EVALUATION NOTE - [facility] 2-15    Patient Name: Koko Garcia  Date:2018  : 1965  [x]  Patient  Verified  Payor: Nahed Briseno / Plan: [place] / Product Type: Medicare /    In time: 1:18pm  Out time: 2:05pm  Total Treatment Time (min): 47  Total Timed Codes (min): 25  1:1 Treatment Time ( only): 52   Visit #: 1     Treatment Area: Low back pain [M54.5]    SUBJECTIVE  Pain Level (0-10 scale): 7/10 (6-10/10)  Any medication changes, allergies to medications, adverse drug reactions, diagnosis change, or new procedure performed?: [] No    [x] Yes (see summary sheet for update)  Subjective: The patient reports that she fell down basement steps on 18 at around 11:40am after she returned from her chiropractor. The step broke in half and she fell straight down on her back and slid down. She's had right tennis elbow, left rotator cuff, left SI joint pain, chronic back pain, chronic neck pain, etc. She wants to know what she can do on the land without hurting herself, as she also likes to do water aerobics (2-3x/wk). She has done a lot of rehab since the s and will do anything that's given to her. Her lowest pain of her back prior to the injury can get below a 5/10. She likes to garden but can't since the injury, she used to be able to walk for 15-20min on a treadmill prior to her injury, and repetitive sitting/standing and household chores (wiping and cleaning). Her neck will sometimes lock up. She does own a TENS unit which will help her neck and back. OBJECTIVE    Posture: Forward head, scapular protraction bilaterally  Other Observations:  Pt. Has decreased lumbar lordosis  Gait and Functional Mobility:  WFL  Palpation: tender to palpate bilateral lumbar paraspinals        Lumbar AROM:          R  L    Flexion    50%, p! Extension   Limited, p!       Side Bending   Limited bilaterally with pain contralaterally      Rotation   Very limited bilaterally with p! LOWER QUARTER   MUSCLE STRENGTH  KEY       R  L  0 - No Contraction  L1, L2 Psoas  3+  4  1 - Trace   L3 Quads  3+  4  2 - Poor   L4 Tib Ant  3+  3+  3 - Fair    L5 EHL  nt  nt  4 - Good   S1 Peroneals  3+  4  5 - Normal   S2 Hams  4-  4    Flexibility: tight hip flexors  Mobility Assessment: hypomobile lumbar      MMT:               HIP Abd: 3-/5 on right; 3+/5 on left  Neurological: Reflexes / Sensations: WFL  Special Tests:    H.S. SLR: neg    25 min Therapeutic Exercise:  [x] See flow sheet :   Rationale: increase ROM, increase strength and improve coordination to improve the patients ability to perform daily activities.           With   [x] TE   [] TA   [] neuro   [] other: Patient Education: [x] Review HEP    [x] Progressed/Changed HEP based on:   [x] positioning   [x] body mechanics   [] transfers   [] heat/ice application    [] other:      Other Objective/Functional Measures: FOTO= no intake taken    30s STS: 6x  5x STS: 25s    Pain Level (0-10 scale) post treatment: 8    ASSESSMENT/Changes in Function:     [x]  See Plan of Care      Clara Chau, HAYLIE 6/19/2018  1:17 PM

## 2018-06-19 NOTE — TELEPHONE ENCOUNTER
Spoke with patient. Two pt identifiers confirmed. Patient notified per Dr. José Luis Ponce that a referral to physical therapy has been done. Patient notified that Dr. José Luis Ponce has reviewed the thyroid abnormality that would like to repeat testing blood work has been ordered patient to come in at her convenience. Patient notified that Dr. José Luis Ponce has refilled her muscle relaxant prescription. Patient notified that Dr. José Luis Ponce recommends vitamin D loading dose of 50,000 iu  once a week for 12 weeks, once done with loading dose, take 2,000 iu vitamin D3 once a day over the counter to maintain level. Patient notified that Dr. José Luis Ponce has sent a prescription for vitamin D to her pharmacy. Pt verbalized understanding of information discussed w/ no further questions at this time.

## 2018-06-20 LAB
T4 FREE SERPL-MCNC: 0.7 NG/DL (ref 0.82–1.77)
TSH SERPL DL<=0.005 MIU/L-ACNC: 0.98 UIU/ML (ref 0.45–4.5)

## 2018-06-21 ENCOUNTER — HOSPITAL ENCOUNTER (OUTPATIENT)
Dept: PHYSICAL THERAPY | Age: 53
Discharge: HOME | End: 2018-06-21
Payer: MEDICARE

## 2018-06-21 PROCEDURE — 97110 THERAPEUTIC EXERCISES: CPT | Performed by: PHYSICAL THERAPIST

## 2018-06-21 NOTE — PROGRESS NOTES
PT DAILY TREATMENT NOTE - [facility] 2-15    Patient Name: Florencio Sheriff  Date:2018  : 1965  [x]  Patient  Verified  Payor: [place] MEDICARE / Plan: [place] MEDICARE PART A & B / Product Type: Medicare /    In time: 9:10am  Out time: 9:58am  Total Treatment Time (min): 48  Total Timed Codes (min): 40  1:1 Treatment Time ( only): 40   Visit #: 2     Treatment Area: Low back pain [M54.5]    SUBJECTIVE  Pain Level (0-10 scale): 0  Any medication changes, allergies to medications, adverse drug reactions, diagnosis change, or new procedure performed?: [x] No    [] Yes (see summary sheet for update)  Subjective functional status/changes:   [] No changes reported  The patient reports that she slept with two pillows in a 90/90 position and woke up without any pain. She is diligent with her HEP. OBJECTIVE    40 min Therapeutic Exercise:  [x] See flow sheet :   Rationale: increase ROM, increase strength and improve coordination to improve the patients ability to perform daily activities. With   [x] TE   [] TA   [] neuro   [] other: Patient Education: [x] Review HEP    [x] Progressed/Changed HEP based on:   [x] positioning   [x] body mechanics   [] transfers   [] heat/ice application    [] other:      Other Objective/Functional Measures: Pt. Had only slight discomfort with bridges today     Pain Level (0-10 scale) post treatment: 4    ASSESSMENT/Changes in Function:     The patient is progressing with increased resistance today as tolerated. Patient will continue to benefit from skilled PT services to modify and progress therapeutic interventions, address functional mobility deficits, address ROM deficits, address strength deficits, analyze and address soft tissue restrictions, analyze and cue movement patterns, analyze and modify body mechanics/ergonomics, assess and modify postural abnormalities, address imbalance/dizziness and instruct in home and community integration to attain remaining goals.      [x]  See Plan of Care  []  See progress note/recertification  []  See Discharge Summary         Progress towards goals / Updated goals: The patient is progressing towards goals.     PLAN  [x]  Upgrade activities as tolerated     [x]  Continue plan of care  [x]  Update interventions per flow sheet       []  Discharge due to:_  []  Other:_      Radu Stewart, PT 6/21/2018  8:32 AM

## 2018-06-25 ENCOUNTER — HOSPITAL ENCOUNTER (OUTPATIENT)
Dept: PHYSICAL THERAPY | Age: 53
Discharge: HOME | End: 2018-06-25
Payer: MEDICARE

## 2018-06-25 ENCOUNTER — APPOINTMENT (OUTPATIENT)
Dept: INTERNAL MEDICINE CLINIC | Age: 53
End: 2018-06-25

## 2018-06-25 ENCOUNTER — TELEPHONE (OUTPATIENT)
Dept: INTERNAL MEDICINE CLINIC | Age: 53
End: 2018-06-25

## 2018-06-25 ENCOUNTER — HOSPITAL ENCOUNTER (OUTPATIENT)
Dept: LAB | Age: 53
Discharge: HOME | End: 2018-06-25
Payer: MEDICARE

## 2018-06-25 PROCEDURE — 36415 COLL VENOUS BLD VENIPUNCTURE: CPT

## 2018-06-25 PROCEDURE — 97110 THERAPEUTIC EXERCISES: CPT

## 2018-06-25 PROCEDURE — 84305 ASSAY OF SOMATOMEDIN: CPT

## 2018-06-25 PROCEDURE — 83002 ASSAY OF GONADOTROPIN (LH): CPT

## 2018-06-25 PROCEDURE — 84146 ASSAY OF PROLACTIN: CPT

## 2018-06-25 NOTE — PROGRESS NOTES
PT DAILY TREATMENT NOTE - [facility] 2-15    Patient Name: Cheryl Berry  Date:2018  : 1965  [x]  Patient  Verified  Payor: [place] MED[place] / Plan: [place] MEDICARE PART A & B / Product Type: Medicare /    In time:235  Out time:345  Total Treatment Time (min): 70  Total Timed Codes (min): 70  1:1 Treatment Time ([address] only): 54   Visit #: 3     Treatment Area: Low back pain [M54.5]    SUBJECTIVE  Pain Level (0-10 scale): 1/10  Any medication changes, allergies to medications, adverse drug reactions, diagnosis change, or new procedure performed?: [x] No    [] Yes (see summary sheet for update)  Subjective functional status/changes:   [] No changes reported  Patient reports she was so fatigued after last visit she was unable to participate in her swimming class. OBJECTIVE    70 min Therapeutic Exercise:  [x] See flow sheet :   Rationale: increase ROM and increase strength to improve the patients ability to perform ADLs and reduce pain levels    With   [x] TE   [] TA   [] neuro   [] other: Patient Education: [x] Review HEP    [] Progressed/Changed HEP based on:   [x] positioning   [x] body mechanics   [] transfers   [] heat/ice application    [] other:      Other Objective/Functional Measures: None noted     Pain Level (0-10 scale) post treatment: 0/10    ASSESSMENT/Changes in Function:   Patient needs multiple rest breaks during hip abduction exercises due to fatigue. Will require minimal verbal cues but is able to understand technique quickly. Tolerated all new and progressed interventions well. Patient will continue to benefit from skilled PT services to modify and progress therapeutic interventions, address functional mobility deficits, address ROM deficits, address strength deficits, analyze and address soft tissue restrictions and analyze and cue movement patterns to attain remaining goals.      [x]  See Plan of Care  []  See progress note/recertification  []  See Discharge Summary         Progress towards goals / Updated goals:  Patient is progressing towards goals, will continue to strengthen the hip stabilizers in order to reduce stress applied to low back.     PLAN  [x]  Upgrade activities as tolerated     [x]  Continue plan of care  [x]  Update interventions per flow sheet       []  Discharge due to:_  []  Other:_      Ever Fuentes 6/25/2018  3:12 PM

## 2018-06-25 NOTE — TELEPHONE ENCOUNTER
Patient has hypothyroidism, I am ordering further labs and would like to refer patient to endocrinologist Terri Morelos.     Labs to be done at her earliest convenience

## 2018-06-25 NOTE — TELEPHONE ENCOUNTER
Spoke with patient. Two pt identifiers confirmed. Patient notified that her lab results showed that she has hypothyroidism. Patient advised that Dr. Lance Samano has ordered additional labs and is referring her to see Dr. Joss Green, endocrinologist.  Patient given contact information for Dr. Joss Green and advised that I will also mail her the information. Pt verbalized understanding of information discussed w/ no further questions at this time.

## 2018-06-26 LAB
FSH SERPL-ACNC: 60 MIU/ML
IGF-I SERPL-MCNC: 193 NG/ML (ref 53–190)
LH SERPL-ACNC: 29.7 MIU/ML
PROLACTIN SERPL-MCNC: 6.3 NG/ML (ref 4.8–23.3)

## 2018-06-27 ENCOUNTER — HOSPITAL ENCOUNTER (OUTPATIENT)
Dept: PHYSICAL THERAPY | Age: 53
Discharge: HOME | End: 2018-06-27
Payer: MEDICARE

## 2018-06-27 PROCEDURE — 97110 THERAPEUTIC EXERCISES: CPT

## 2018-06-27 NOTE — PROGRESS NOTES
PT DAILY TREATMENT NOTE - [facility] 2-15    Patient Name: Cheri Jackson  Date:2018  : 1965  [x]  Patient  Verified  Payor: Celina Linker / Plan: [place] MEDICARE PART A & B / Product Type: Medicare /    In time:203  Out time:300  Total Treatment Time (min): 57  Total Timed Codes (min): 57  1:1 Treatment Time ( [address] only): 50   Visit #: 4     Treatment Area: Low back pain [M54.5]    SUBJECTIVE  Pain Level (0-10 scale): 3/10  Any medication changes, allergies to medications, adverse drug reactions, diagnosis change, or new procedure performed?: [x] No    [] Yes (see summary sheet for update)  Subjective functional status/changes:   [] No changes reported  Patient reports she has been running around going to doctors appointments the past 2 days and hasn't been able to do her HEP. OBJECTIVE    57 min Therapeutic Exercise:  [x] See flow sheet :   Rationale: increase ROM and increase strength to improve the patients ability to perform ADLs and reduce pain levels          With   [x] TE   [] TA   [] neuro   [] other: Patient Education: [x] Review HEP    [] Progressed/Changed HEP based on:   [] positioning   [x] body mechanics   [] transfers   [] heat/ice application    [] other:      Other Objective/Functional Measures: None noted     Pain Level (0-10 scale) post treatment: 2/10     ASSESSMENT/Changes in Function:   Pain in the low back with bridges. Able to correct compensation patterns. Patient experienced slight LOB while performing monster walks. Limited R lumbar rotation. Will continue to progress therex as tolerated. Patient will continue to benefit from skilled PT services to modify and progress therapeutic interventions, address functional mobility deficits, address ROM deficits, address strength deficits, analyze and address soft tissue restrictions, analyze and cue movement patterns and analyze and modify body mechanics/ergonomics to attain remaining goals.      [x]  See Plan of Care  []  See progress note/recertification  []  See Discharge Summary         Progress towards goals / Updated goals:  Patient is progressing towards goals, will continue to improve hip stabilizers in order to reduce pain levels.     PLAN  [x]  Upgrade activities as tolerated     [x]  Continue plan of care  [x]  Update interventions per flow sheet       []  Discharge due to:_  []  Other:_      Daniel Mon 6/27/2018  2:30 PM

## 2018-07-02 NOTE — TELEPHONE ENCOUNTER
Hormones secreted by pituitary are in normal range.  Patient should keep appointment with Dr Easton Garcia endocrinologist

## 2018-07-03 ENCOUNTER — HOSPITAL ENCOUNTER (OUTPATIENT)
Dept: PHYSICAL THERAPY | Age: 53
Discharge: HOME | End: 2018-07-03
Payer: MEDICARE

## 2018-07-03 PROCEDURE — 97110 THERAPEUTIC EXERCISES: CPT

## 2018-07-03 NOTE — PROGRESS NOTES
PT DAILY TREATMENT NOTE - [facility] 2-15    Patient Name: Bev Gallardo  Date:7/3/2018  : 1965  [x]  Patient  Verified  Payor: Dwayne Castillo / Plan: [place] MEDICARE PART A & B / Product Type: Medicare /    In time:302  Out time:400  Total Treatment Time (min): 58  Total Timed Codes (min): 58  1:1 Treatment Time ([address] only): 40  Visit #: 5     Treatment Area: Low back pain [M54.5]    SUBJECTIVE  Pain Level (0-10 scale): 5/10  Any medication changes, allergies to medications, adverse drug reactions, diagnosis change, or new procedure performed?: [x] No    [] Yes (see summary sheet for update)  Subjective functional status/changes:   [] No changes reported  Patient reports she is going on a road trip to [address] [place] [address] and is worried about her back. She has been compliant with her HEP. OBJECTIVE    58 min Therapeutic Exercise:  [x] See flow sheet :   Rationale: increase ROM and increase strength to improve the patients ability to perform ADLs and reduce pain levels    With   [x] TE   [] TA   [] neuro   [] other: Patient Education: [x] Review HEP    [] Progressed/Changed HEP based on:   [] positioning   [] body mechanics   [] transfers   [] heat/ice application    [] other:      Other Objective/Functional Measures: None noted     Pain Level (0-10 scale) post treatment: 4-5/10    ASSESSMENT/Changes in Function:   Patient continues to experience slight pain with bridges. Continues to fatigue quickly with hip ABD exercises. Will continue to progress therex as tolerated  Patient will continue to benefit from skilled PT services to modify and progress therapeutic interventions, address functional mobility deficits, address ROM deficits, address strength deficits, analyze and address soft tissue restrictions, analyze and cue movement patterns and analyze and modify body mechanics/ergonomics to attain remaining goals.      [x]  See Plan of Care  []  See progress note/recertification  []  See Discharge Summary         Progress towards goals / Updated goals:  Patient is progressing towards goals, will continue to strengthen the hip stabilizers in order to reduce pain levels    PLAN  [x]  Upgrade activities as tolerated     [x]  Continue plan of care  [x]  Update interventions per flow sheet       []  Discharge due to:_  []  Other:_      Ron Nuñez 7/3/2018  3:18 PM

## 2018-07-10 ENCOUNTER — HOSPITAL ENCOUNTER (OUTPATIENT)
Dept: PHYSICAL THERAPY | Age: 53
Discharge: HOME | End: 2018-07-10
Payer: MEDICARE

## 2018-07-10 PROCEDURE — 97110 THERAPEUTIC EXERCISES: CPT | Performed by: PHYSICAL THERAPIST

## 2018-07-10 NOTE — PROGRESS NOTES
PT DAILY TREATMENT NOTE - [facility] 2-15    Patient Name: Reid Both  Date:7/10/2018  : 1965  [x]  Patient  Verified  Payor: Joshua Perish / Plan: [place] MEDICARE PART A & B / Product Type: Medicare /    In time: 8:45am  Out time: 9:23am  Total Treatment Time (min): 38  Total Timed Codes (min): 25  1:1 Treatment Time ( [address] only): 25   Visit #: 6     Treatment Area: Low back pain [M54.5]    SUBJECTIVE  Pain Level (0-10 scale): 5  Any medication changes, allergies to medications, adverse drug reactions, diagnosis change, or new procedure performed?: [x] No    [] Yes (see summary sheet for update)  Subjective functional status/changes:   [] No changes reported  The patient reports that she was in [place] and was hurting, didn't have pain meds, but did stretches in the car which helped. OBJECTIVE    25 min Therapeutic Exercise:  [x] See flow sheet :   Rationale: increase ROM, increase strength and improve coordination to improve the patients ability to perform daily activities. With   [x] TE   [] TA   [] neuro   [] other: Patient Education: [x] Review HEP    [x] Progressed/Changed HEP based on:   [x] positioning   [x] body mechanics   [] transfers   [] heat/ice application    [] other:      Other Objective/Functional Measures: Pt. Tolerated increased resistance well     Pain Level (0-10 scale) post treatment: 5    ASSESSMENT/Changes in Function:     The patient progressed with tolerance to therex today with increased resistance. Patient will continue to benefit from skilled PT services to modify and progress therapeutic interventions, address functional mobility deficits, address ROM deficits, address strength deficits, analyze and address soft tissue restrictions, analyze and cue movement patterns, analyze and modify body mechanics/ergonomics, assess and modify postural abnormalities, address imbalance/dizziness and instruct in home and community integration to attain remaining goals.      [x]  See Plan of Care  []  See progress note/recertification  []  See Discharge Summary         Progress towards goals / Updated goals: The patient is progressing towards goals.     PLAN  [x]  Upgrade activities as tolerated     [x]  Continue plan of care  [x]  Update interventions per flow sheet       []  Discharge due to:_  []  Other:_      Mary Kate Ruby, HAYLIE 7/10/2018  7:38 AM

## 2018-07-12 ENCOUNTER — HOSPITAL ENCOUNTER (OUTPATIENT)
Dept: PHYSICAL THERAPY | Age: 53
Discharge: HOME | End: 2018-07-12
Payer: MEDICARE

## 2018-07-12 PROCEDURE — 97110 THERAPEUTIC EXERCISES: CPT

## 2018-07-12 NOTE — PROGRESS NOTES
PT DAILY TREATMENT NOTE - [facility] 2-15    Patient Name: Veronika Rivera  Date:2018  : 1965  [x]  Patient  Verified  Payor: Eduar Stover / Plan: [place] MEDICARE PART A & B / Product Type: Medicare /    In time:529  Out time:620  Total Treatment Time (min): 51  Total Timed Codes (min): 51  1:1 Treatment Time ([address] only): 46   Visit #: 7     Treatment Area: Low back pain [M54.5]    SUBJECTIVE  Pain Level (0-10 scale): 7/10  Any medication changes, allergies to medications, adverse drug reactions, diagnosis change, or new procedure performed?: [x] No    [] Yes (see summary sheet for update)  Subjective functional status/changes:   [] No changes reported  Patient reports she was in swimming class earlier today and is sore from that. OBJECTIVE    51 min Therapeutic Exercise:  [x] See flow sheet :   Rationale: increase ROM and increase strength to improve the patients ability to perform ADLs and reduce pain levels    With   [x] TE   [] TA   [] neuro   [] other: Patient Education: [x] Review HEP    [x] Progressed/Changed HEP based on:   [] positioning   [] body mechanics   [] transfers   [] heat/ice application    [] other:      Other Objective/Functional Measures:  None noted     Pain Level (0-10 scale) post treatment: 7/10    ASSESSMENT/Changes in Function:   Patient is limited in lumbar ROM due to soreness and stiffness. Requires multiple rest breaks with abduction therex due to fatigue. Tolerated all progressed therex well. Patient will continue to benefit from skilled PT services to modify and progress therapeutic interventions, address functional mobility deficits, address ROM deficits, address strength deficits, analyze and address soft tissue restrictions, analyze and cue movement patterns and analyze and modify body mechanics/ergonomics to attain remaining goals.      [x]  See Plan of Care  []  See progress note/recertification  []  See Discharge Summary         Progress towards goals / Updated goals:  Patient is progressing towards goals, will continue to strengthen the hip stabilizers in order to reduce stress applied the the low back.     PLAN  [x]  Upgrade activities as tolerated     [x]  Continue plan of care  [x]  Update interventions per flow sheet       []  Discharge due to:_  []  Other:_      Carol Clause 7/12/2018  5:37 PM

## 2018-07-16 ENCOUNTER — APPOINTMENT (OUTPATIENT)
Dept: PHYSICAL THERAPY | Age: 53
End: 2018-07-16
Payer: MEDICARE

## 2018-07-16 PROCEDURE — 96374 THER/PROPH/DIAG INJ IV PUSH: CPT

## 2018-07-16 PROCEDURE — 96375 TX/PRO/DX INJ NEW DRUG ADDON: CPT

## 2018-07-16 PROCEDURE — 96361 HYDRATE IV INFUSION ADD-ON: CPT

## 2018-07-16 PROCEDURE — 99285 EMERGENCY DEPT VISIT HI MDM: CPT

## 2018-07-16 PROCEDURE — 96376 TX/PRO/DX INJ SAME DRUG ADON: CPT

## 2018-07-17 ENCOUNTER — HOSPITAL ENCOUNTER (EMERGENCY)
Age: 53
Discharge: HOME | End: 2018-07-17
Attending: EMERGENCY MEDICINE | Admitting: EMERGENCY MEDICINE
Payer: MEDICARE

## 2018-07-17 VITALS
SYSTOLIC BLOOD PRESSURE: 111 MMHG | BODY MASS INDEX: 26.05 KG/M2 | DIASTOLIC BLOOD PRESSURE: 69 MMHG | HEART RATE: 99 BPM | WEIGHT: 147 LBS | HEIGHT: 63 IN | OXYGEN SATURATION: 100 % | TEMPERATURE: 97.6 F | RESPIRATION RATE: 17 BRPM

## 2018-07-17 DIAGNOSIS — F31.10 BIPOLAR AFFECTIVE DISORDER, CURRENT EPISODE MANIC, CURRENT EPISODE SEVERITY UNSPECIFIED (HCC): ICD-10-CM

## 2018-07-17 DIAGNOSIS — E87.1 HYPONATREMIA: ICD-10-CM

## 2018-07-17 DIAGNOSIS — E87.6 HYPOKALEMIA: ICD-10-CM

## 2018-07-17 DIAGNOSIS — F41.1 ANXIETY STATE: Primary | ICD-10-CM

## 2018-07-17 LAB
ALBUMIN SERPL-MCNC: 3.8 G/DL (ref 3.5–5)
ALBUMIN/GLOB SERPL: 1 {RATIO} (ref 1.1–2.2)
ALP SERPL-CCNC: 85 U/L (ref 45–117)
ALT SERPL-CCNC: 17 U/L (ref 12–78)
AMPHET UR QL SCN: NEGATIVE
ANION GAP BLD CALC-SCNC: 16 MMOL/L (ref 10–20)
ANION GAP SERPL CALC-SCNC: 10 MMOL/L (ref 5–15)
ANION GAP SERPL CALC-SCNC: 7 MMOL/L (ref 5–15)
APAP SERPL-MCNC: <2 UG/ML (ref 10–30)
APPEARANCE UR: CLEAR
AST SERPL-CCNC: 17 U/L (ref 15–37)
BACTERIA URNS QL MICRO: NEGATIVE /HPF
BARBITURATES UR QL SCN: NEGATIVE
BASOPHILS # BLD: 0 K/UL (ref 0–0.1)
BASOPHILS NFR BLD: 1 % (ref 0–1)
BENZODIAZ UR QL: NEGATIVE
BILIRUB SERPL-MCNC: 0.2 MG/DL (ref 0.2–1)
BILIRUB UR QL: NEGATIVE
BUN BLD-MCNC: <3 MG/DL (ref 9–20)
BUN SERPL-MCNC: 4 MG/DL (ref 6–20)
BUN SERPL-MCNC: 4 MG/DL (ref 6–20)
BUN/CREAT SERPL: 6 (ref 12–20)
BUN/CREAT SERPL: 9 (ref 12–20)
CA-I BLD-MCNC: 1.03 MMOL/L (ref 1.12–1.32)
CALCIUM SERPL-MCNC: 7.4 MG/DL (ref 8.5–10.1)
CALCIUM SERPL-MCNC: 8.3 MG/DL (ref 8.5–10.1)
CANNABINOIDS UR QL SCN: NEGATIVE
CARBAMAZEPINE SERPL-MCNC: 6.4 UG/ML (ref 4–12)
CHLORIDE BLD-SCNC: 91 MMOL/L (ref 98–107)
CHLORIDE SERPL-SCNC: 100 MMOL/L (ref 97–108)
CHLORIDE SERPL-SCNC: 91 MMOL/L (ref 97–108)
CO2 BLD-SCNC: 24 MMOL/L (ref 21–32)
CO2 SERPL-SCNC: 23 MMOL/L (ref 21–32)
CO2 SERPL-SCNC: 25 MMOL/L (ref 21–32)
COCAINE UR QL SCN: NEGATIVE
COLOR UR: ABNORMAL
CREAT BLD-MCNC: 0.4 MG/DL (ref 0.6–1.3)
CREAT SERPL-MCNC: 0.43 MG/DL (ref 0.55–1.02)
CREAT SERPL-MCNC: 0.64 MG/DL (ref 0.55–1.02)
DIFFERENTIAL METHOD BLD: ABNORMAL
DRUG SCRN COMMENT,DRGCM: NORMAL
EOSINOPHIL # BLD: 0 K/UL (ref 0–0.4)
EOSINOPHIL NFR BLD: 0 % (ref 0–7)
EPITH CASTS URNS QL MICRO: ABNORMAL /LPF
ERYTHROCYTE [DISTWIDTH] IN BLOOD BY AUTOMATED COUNT: 11.9 % (ref 11.5–14.5)
ETHANOL SERPL-MCNC: <10 MG/DL
GLOBULIN SER CALC-MCNC: 3.7 G/DL (ref 2–4)
GLUCOSE BLD-MCNC: 119 MG/DL (ref 65–100)
GLUCOSE SERPL-MCNC: 120 MG/DL (ref 65–100)
GLUCOSE SERPL-MCNC: 132 MG/DL (ref 65–100)
GLUCOSE UR STRIP.AUTO-MCNC: NEGATIVE MG/DL
HCT VFR BLD AUTO: 30.3 % (ref 35–47)
HCT VFR BLD CALC: 34 % (ref 35–47)
HGB BLD-MCNC: 10.3 G/DL (ref 11.5–16)
HGB UR QL STRIP: ABNORMAL
IMM GRANULOCYTES # BLD: 0 K/UL (ref 0–0.04)
IMM GRANULOCYTES NFR BLD AUTO: 0 % (ref 0–0.5)
KETONES UR QL STRIP.AUTO: NEGATIVE MG/DL
LEUKOCYTE ESTERASE UR QL STRIP.AUTO: NEGATIVE
LYMPHOCYTES # BLD: 1.4 K/UL (ref 0.8–3.5)
LYMPHOCYTES NFR BLD: 23 % (ref 12–49)
MCH RBC QN AUTO: 31.1 PG (ref 26–34)
MCHC RBC AUTO-ENTMCNC: 34 G/DL (ref 30–36.5)
MCV RBC AUTO: 91.5 FL (ref 80–99)
METHADONE UR QL: NEGATIVE
MONOCYTES # BLD: 0.6 K/UL (ref 0–1)
MONOCYTES NFR BLD: 10 % (ref 5–13)
NEUTS SEG # BLD: 4 K/UL (ref 1.8–8)
NEUTS SEG NFR BLD: 66 % (ref 32–75)
NITRITE UR QL STRIP.AUTO: NEGATIVE
NRBC # BLD: 0 K/UL (ref 0–0.01)
NRBC BLD-RTO: 0 PER 100 WBC
OPIATES UR QL: NEGATIVE
PCP UR QL: NEGATIVE
PH UR STRIP: 7 [PH] (ref 5–8)
PLATELET # BLD AUTO: 242 K/UL (ref 150–400)
PMV BLD AUTO: 9.1 FL (ref 8.9–12.9)
POTASSIUM BLD-SCNC: 3.7 MMOL/L (ref 3.5–5.1)
POTASSIUM SERPL-SCNC: 3.1 MMOL/L (ref 3.5–5.1)
POTASSIUM SERPL-SCNC: 3.3 MMOL/L (ref 3.5–5.1)
PROT SERPL-MCNC: 7.5 G/DL (ref 6.4–8.2)
PROT UR STRIP-MCNC: NEGATIVE MG/DL
RBC # BLD AUTO: 3.31 M/UL (ref 3.8–5.2)
RBC #/AREA URNS HPF: ABNORMAL /HPF (ref 0–5)
SALICYLATES SERPL-MCNC: <1.7 MG/DL (ref 2.8–20)
SERVICE CMNT-IMP: ABNORMAL
SODIUM BLD-SCNC: 126 MMOL/L (ref 136–145)
SODIUM SERPL-SCNC: 126 MMOL/L (ref 136–145)
SODIUM SERPL-SCNC: 130 MMOL/L (ref 136–145)
SP GR UR REFRACTOMETRY: <1.005 (ref 1–1.03)
TSH SERPL DL<=0.05 MIU/L-ACNC: 2.05 UIU/ML (ref 0.36–3.74)
UA: UC IF INDICATED,UAUC: ABNORMAL
UROBILINOGEN UR QL STRIP.AUTO: 0.2 EU/DL (ref 0.2–1)
WBC # BLD AUTO: 6 K/UL (ref 3.6–11)
WBC URNS QL MICRO: ABNORMAL /HPF (ref 0–4)

## 2018-07-17 PROCEDURE — 80156 ASSAY CARBAMAZEPINE TOTAL: CPT | Performed by: EMERGENCY MEDICINE

## 2018-07-17 PROCEDURE — 84443 ASSAY THYROID STIM HORMONE: CPT | Performed by: EMERGENCY MEDICINE

## 2018-07-17 PROCEDURE — 36415 COLL VENOUS BLD VENIPUNCTURE: CPT | Performed by: EMERGENCY MEDICINE

## 2018-07-17 PROCEDURE — 80047 BASIC METABLC PNL IONIZED CA: CPT

## 2018-07-17 PROCEDURE — 80307 DRUG TEST PRSMV CHEM ANLYZR: CPT | Performed by: EMERGENCY MEDICINE

## 2018-07-17 PROCEDURE — 80053 COMPREHEN METABOLIC PANEL: CPT | Performed by: EMERGENCY MEDICINE

## 2018-07-17 PROCEDURE — 74011250636 HC RX REV CODE- 250/636: Performed by: EMERGENCY MEDICINE

## 2018-07-17 PROCEDURE — 74011250637 HC RX REV CODE- 250/637: Performed by: EMERGENCY MEDICINE

## 2018-07-17 PROCEDURE — 85025 COMPLETE CBC W/AUTO DIFF WBC: CPT | Performed by: EMERGENCY MEDICINE

## 2018-07-17 PROCEDURE — 81001 URINALYSIS AUTO W/SCOPE: CPT | Performed by: EMERGENCY MEDICINE

## 2018-07-17 RX ORDER — LORAZEPAM 2 MG/ML
1 INJECTION INTRAMUSCULAR
Status: COMPLETED | OUTPATIENT
Start: 2018-07-17 | End: 2018-07-17

## 2018-07-17 RX ORDER — ONDANSETRON 2 MG/ML
4 INJECTION INTRAMUSCULAR; INTRAVENOUS
Status: COMPLETED | OUTPATIENT
Start: 2018-07-17 | End: 2018-07-17

## 2018-07-17 RX ORDER — FLURAZEPAM HYDROCHLORIDE 30 MG/1
30 CAPSULE ORAL
Qty: 5 CAP | Refills: 0 | Status: SHIPPED | OUTPATIENT
Start: 2018-07-17 | End: 2020-01-10

## 2018-07-17 RX ORDER — SODIUM CHLORIDE 0.9 % (FLUSH) 0.9 %
5-10 SYRINGE (ML) INJECTION EVERY 8 HOURS
Status: DISCONTINUED | OUTPATIENT
Start: 2018-07-17 | End: 2018-07-17 | Stop reason: HOSPADM

## 2018-07-17 RX ORDER — ONDANSETRON 2 MG/ML
INJECTION INTRAMUSCULAR; INTRAVENOUS
Status: DISCONTINUED
Start: 2018-07-17 | End: 2018-07-17 | Stop reason: HOSPADM

## 2018-07-17 RX ORDER — POTASSIUM CHLORIDE 750 MG/1
40 TABLET, FILM COATED, EXTENDED RELEASE ORAL
Status: COMPLETED | OUTPATIENT
Start: 2018-07-17 | End: 2018-07-17

## 2018-07-17 RX ORDER — SODIUM CHLORIDE 0.9 % (FLUSH) 0.9 %
5-10 SYRINGE (ML) INJECTION AS NEEDED
Status: DISCONTINUED | OUTPATIENT
Start: 2018-07-17 | End: 2018-07-17 | Stop reason: HOSPADM

## 2018-07-17 RX ORDER — POTASSIUM CHLORIDE 750 MG/1
10 TABLET, FILM COATED, EXTENDED RELEASE ORAL DAILY
Qty: 7 TAB | Refills: 0 | Status: SHIPPED | OUTPATIENT
Start: 2018-07-17 | End: 2018-08-28

## 2018-07-17 RX ADMIN — ONDANSETRON 4 MG: 2 INJECTION, SOLUTION INTRAMUSCULAR; INTRAVENOUS at 01:13

## 2018-07-17 RX ADMIN — SODIUM CHLORIDE 1000 ML: 900 INJECTION, SOLUTION INTRAVENOUS at 00:58

## 2018-07-17 RX ADMIN — POTASSIUM CHLORIDE 40 MEQ: 750 TABLET, EXTENDED RELEASE ORAL at 07:06

## 2018-07-17 RX ADMIN — LORAZEPAM 1 MG: 2 INJECTION INTRAMUSCULAR; INTRAVENOUS at 06:38

## 2018-07-17 RX ADMIN — LORAZEPAM 1 MG: 2 INJECTION INTRAMUSCULAR; INTRAVENOUS at 00:59

## 2018-07-17 RX ADMIN — SODIUM CHLORIDE 1000 ML: 900 INJECTION, SOLUTION INTRAVENOUS at 05:13

## 2018-07-17 NOTE — ED TRIAGE NOTES
Initially pt refusing to answer triage questions for RN. Pt flailing arms to staff and aggressive with staff. Pt states, \"I've broken out of handcuffs and I'm aggressive with people. \" Pt pretends to pass out, and is easily arousable by ammonia capsule. Pt denies SI/HI; however states \"I am done and don't want to be here. I want to be a blank slate and shut down\" Pt reports ETOH use this evening. Pt reports taking additional Seroquel (800 mg PO) tonight. Pt reports hx of bipolar disorder, and is acting in manic behavior and excessively talking to herself about nonsensical material. SAD score 6. Charge RN, [id number] notified of need for 1:1 with ED tech. Pt taken to ED room 15 via WC.

## 2018-07-17 NOTE — ED NOTES
Pt states she no longer wants to wear a diaper and use the bedside commode. Commode made available for patient.

## 2018-07-17 NOTE — ED NOTES
S/L removed from lt A/C. Pt discharged home with written and verbal instructions given by Dr. Corby Moreno.

## 2018-07-17 NOTE — ED NOTES
Pt sleeping but easily aroused and states she feels a lot better. Pt no longer appears manic but still has fleeting thoughts.
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
## 2018-07-17 NOTE — DISCHARGE INSTRUCTIONS
Hyponatremia: Care Instructions  Your Care Instructions    Hyponatremia (say \"ux-pf-anb-TREE-daron-uh\") means that you don't have enough sodium in your blood. It can cause nausea, vomiting, and headaches. Or you may not feel hungry. In serious cases, it can cause seizures, a coma, or even death. Hyponatremia is not a disease. It is a problem caused by something else, such as medicines or exercising for a long time in hot weather. You can get hyponatremia if you lose a lot of fluids and then you drink a lot of water or other liquids that don't have much sodium. You can also get it if you have kidney, liver, heart, or other health problems. Treatment is focused on getting your sodium levels back to normal.  Follow-up care is a key part of your treatment and safety. Be sure to make and go to all appointments, and call your doctor if you are having problems. It's also a good idea to know your test results and keep a list of the medicines you take. How can you care for yourself at home? · If your doctor recommends it, drink fluids that have sodium. Sports drinks are a good choice. Or you can eat salty foods. · If your doctor recommends it, limit the amount of water you drink. And limit fluids that are mostly water. These include tea, coffee, and juice. · Take your medicines exactly as prescribed. Call your doctor if you have any problems with your medicine. · Get your sodium levels tested when your doctor tells you to. When should you call for help? Call 911 anytime you think you may need emergency care.  For example, call if:    · You have a seizure.     · You passed out (lost consciousness).    Call your doctor now or seek immediate medical care if:    · You are confused or it is hard to focus.     · You have little or no appetite.     · You feel sick to your stomach or you vomit.     · You have a headache.     · You have mood changes.     · You feel more tired than usual.    Watch closely for changes in your health, and be sure to contact your doctor if:    · You do not get better as expected. Where can you learn more? Go to http://radha-my.info/. Enter G534 in the search box to learn more about \"Hyponatremia: Care Instructions. \"  Current as of: October 9, 2017  Content Version: 11.7  © 3294-6653 [employer]. Care instructions adapted under license by [employer] (which disclaims liability or warranty for this information). If you have questions about a medical condition or this instruction, always ask your healthcare professional. [address] any warranty or liability for your use of this information. Hypokalemia: Care Instructions  Your Care Instructions    Hypokalemia (say \"re-bp-mze-CHRIS-daron-uh\") is a low level of potassium. The heart, muscles, kidneys, and nervous system all need potassium to work well. This problem has many different causes. Kidney problems, diet, and medicines like diuretics and laxatives can cause it. So can vomiting or diarrhea. In some cases, cancer is the cause. Your doctor may do tests to find the cause of your low potassium levels. You may need medicines to bring your potassium levels back to normal. You may also need regular blood tests to check your potassium. If you have very low potassium, you may need intravenous (IV) medicines. You also may need tests to check the electrical activity of your heart. Heart problems caused by low potassium levels can be very serious. Follow-up care is a key part of your treatment and safety. Be sure to make and go to all appointments, and call your doctor if you are having problems. It's also a good idea to know your test results and keep a list of the medicines you take. How can you care for yourself at home? · If your doctor recommends it, eat foods that have a lot of potassium. These include fresh fruits, juices, and vegetables.  They also include nuts, beans, and milk. · Be safe with medicines. If your doctor prescribes medicines or potassium supplements, take them exactly as directed. Call your doctor if you have any problems with your medicines. · Get your potassium levels tested as often as your doctor tells you. When should you call for help? Call 911 anytime you think you may need emergency care. For example, call if:    · You feel like your heart is missing beats. Heart problems caused by low potassium can cause death.     · You passed out (lost consciousness).     · You have a seizure.    Call your doctor now or seek immediate medical care if:    · You feel weak or unusually tired.     · You have severe arm or leg cramps.     · You have tingling or numbness.     · You feel sick to your stomach, or you vomit.     · You have belly cramps.     · You feel bloated or constipated.     · You have to urinate a lot.     · You feel very thirsty most of the time.     · You are dizzy or lightheaded, or you feel like you may faint.     · You feel depressed, or you lose touch with reality.    Watch closely for changes in your health, and be sure to contact your doctor if:    · You do not get better as expected. Where can you learn more? Go to http://radha-my.info/. Enter G358 in the search box to learn more about \"Hypokalemia: Care Instructions. \"  Current as of: May 12, 2017  Content Version: 11.7  © 2833-9691 [employer]. Care instructions adapted under license by [employer] (which disclaims liability or warranty for this information). If you have questions about a medical condition or this instruction, always ask your healthcare professional. [address] any warranty or liability for your use of this information. Anxiety Disorder: Care Instructions  Your Care Instructions    Anxiety is a normal reaction to stress.  Difficult situations can cause you to have symptoms such as sweaty palms and a nervous feeling. In an anxiety disorder, the symptoms are far more severe. Constant worry, muscle tension, trouble sleeping, nausea and diarrhea, and other symptoms can make normal daily activities difficult or impossible. These symptoms may occur for no reason, and they can affect your work, school, or social life. Medicines, counseling, and self-care can all help. Follow-up care is a key part of your treatment and safety. Be sure to make and go to all appointments, and call your doctor if you are having problems. It's also a good idea to know your test results and keep a list of the medicines you take. How can you care for yourself at home? · Take medicines exactly as directed. Call your doctor if you think you are having a problem with your medicine. · Go to your counseling sessions and follow-up appointments. · Recognize and accept your anxiety. Then, when you are in a situation that makes you anxious, say to yourself, \"This is not an emergency. I feel uncomfortable, but I am not in danger. I can keep going even if I feel anxious. \"  · Be kind to your body:  ¨ Relieve tension with exercise or a massage. ¨ Get enough rest.  ¨ Avoid alcohol, caffeine, nicotine, and illegal drugs. They can increase your anxiety level and cause sleep problems. ¨ Learn and do relaxation techniques. See below for more about these techniques. · Engage your mind. Get out and do something you enjoy. Go to a funny movie, or take a walk or hike. Plan your day. Having too much or too little to do can make you anxious. · Keep a record of your symptoms. Discuss your fears with a good friend or family member, or join a support group for people with similar problems. Talking to others sometimes relieves stress. · Get involved in social groups, or volunteer to help others. Being alone sometimes makes things seem worse than they are. · Get at least 30 minutes of exercise on most days of the week to relieve stress.  Walking is a good choice. You also may want to do other activities, such as running, swimming, cycling, or playing tennis or team sports. Relaxation techniques  Do relaxation exercises 10 to 20 minutes a day. You can play soothing, relaxing music while you do them, if you wish. · Tell others in your house that you are going to do your relaxation exercises. Ask them not to disturb you. · Find a comfortable place, away from all distractions and noise. · Lie down on your back, or sit with your back straight. · Focus on your breathing. Make it slow and steady. · Breathe in through your nose. Breathe out through either your nose or mouth. · Breathe deeply, filling up the area between your navel and your rib cage. Breathe so that your belly goes up and down. · Do not hold your breath. · Breathe like this for 5 to 10 minutes. Notice the feeling of calmness throughout your whole body. As you continue to breathe slowly and deeply, relax by doing the following for another 5 to 10 minutes:  · Tighten and relax each muscle group in your body. You can begin at your toes and work your way up to your head. · Imagine your muscle groups relaxing and becoming heavy. · Empty your mind of all thoughts. · Let yourself relax more and more deeply. · Become aware of the state of calmness that surrounds you. · When your relaxation time is over, you can bring yourself back to alertness by moving your fingers and toes and then your hands and feet and then stretching and moving your entire body. Sometimes people fall asleep during relaxation, but they usually wake up shortly afterward. · Always give yourself time to return to full alertness before you drive a car or do anything that might cause an accident if you are not fully alert. Never play a relaxation tape while you drive a car. When should you call for help? Call 911 anytime you think you may need emergency care.  For example, call if:    · You feel you cannot stop from hurting yourself or someone else.   Kehinde Garcia the numbers for these national suicide hotlines: 9-676-856-TALK (3-755.258.8162) and 4-022-GIYXHQX (1-833.272.8260). If you or someone you know talks about suicide or feeling hopeless, get help right away.   Watch closely for changes in your health, and be sure to contact your doctor if:    · You have anxiety or fear that affects your life.     · You have symptoms of anxiety that are new or different from those you had before. Where can you learn more? Go to http://radha-my.info/. Enter P754 in the search box to learn more about \"Anxiety Disorder: Care Instructions. \"  Current as of: December 7, 2017  Content Version: 11.7  © 0563-5591 [employer]. Care instructions adapted under license by [employer] (which disclaims liability or warranty for this information). If you have questions about a medical condition or this instruction, always ask your healthcare professional. [place] [address] any warranty or liability for your use of this information. Bipolar Disorder: Care Instructions  Your Care Instructions    Bipolar disorder is an illness that causes extreme mood changes, from times of very high energy (manic episodes) to times of depression. But many people with bipolar disorder show only the symptoms of depression. These moods may cause problems with your work, school, family life, friendships, and how well you function. This disease is also called manic-depression. There is no cure for bipolar disorder, but it can be helped with medicines. Counseling may also help. It is important to take your medicines exactly as prescribed, even when you feel well. You may need lifelong treatment. Follow-up care is a key part of your treatment and safety. Be sure to make and go to all appointments, and call your doctor if you are having problems.  It's also a good idea to know your test results and keep a list of the medicines you take. How can you care for yourself at home? · Be safe with medicines. Take your medicines exactly as prescribed. Do not stop or change a medicine without talking to your doctor first. Joan Whaley and your doctor may need to try different combinations of medicines to find what works for you. · Take your medicines on schedule to keep your moods even. When you feel good, you may think that you do not need your medicines. But it is important to keep taking them. · Go to your counseling sessions. Call and talk with your counselor if you can't go to a session or if you don't think the sessions are helping. Do not just stop going. · Get at least 30 minutes of activity on most days of the week. Walking is a good choice. You also may want to do other things, such as running, swimming, or cycling. · Get enough sleep. Keep your room dark and quiet. Try to go to bed at the same time every night. · Eat a healthy diet. This includes whole grains, dairy, fruits, vegetables, and protein. Eat foods from each of these groups. · Try to lower your stress. Manage your time, build a strong support system, and lead a healthy lifestyle. To lower your stress, try physical activity, slow deep breathing, or getting a massage. · Do not use alcohol or illegal drugs. · Learn the early signs of your mood changes. You can then take steps to help yourself feel better. · Ask for help from friends and family when you need it. You may need help with daily chores when you are depressed. When you are manic, you may need support to control your high energy levels. What should you do if someone in your family has bipolar disorder? · Learn about the disease and the signs that it is getting worse. · Remind your family member that you love him or her. · Make a plan with all family members about how to take care of your loved one when his or her symptoms are bad. · Talk about your fears and concerns and those of other family members.  Seek counseling if needed. · Do not focus attention only on the person who is in treatment. · Remind yourself that it will take time for changes to occur. · Do not blame yourself for the disease. · Know your legal rights and the legal rights of your family member. Support groups or counselors can help you with this information. · Take care of yourself. Keep up with your own interests, such as your career, hobbies, and friends. Use exercise, positive self-talk, deep breathing, and other relaxing exercises to help lower your stress. · Give yourself time to grieve. You may need to deal with emotions such as anger, fear, and frustration. After you work through your feelings, you will be better able to care for yourself and your family. · If you are having a hard time with your feelings or with your relationship with your family member, talk with a counselor. When should you call for help? Call 911 anytime you think you may need emergency care. For example, call if:    · You feel like hurting yourself or someone else.     · Someone who has bipolar disorder displays dangerous behavior, and you think the person might hurt himself or herself or someone else.   [facility] your doctor now or seek immediate medical care if:    · You hear voices.     · Someone you know has bipolar disorder and talks about suicide. Keep the numbers for these national suicide hotlines: 2-699-149-TALK (0-274-974-460.748.5691) and 7-764-NIZOHDF (4-647.923.9871). If a suicide threat seems real, with a specific plan and a way to carry it out, stay with the person, or ask someone you trust to stay with the person, until you can get help.     · Someone you know has bipolar disorder and:  ¨ Starts to give away possessions. ¨ Is using illegal drugs or drinking alcohol heavily. ¨ Talks or writes about death, including writing suicide notes or talking about guns, knives, or pills. ¨ Talks or writes about hurting someone else.   ¨ Starts to spend a lot of time alone.  ¨ Acts very aggressively or suddenly appears calm. ¨ Talks about beliefs that are not based in reality (delusions).    Watch closely for changes in your health, and be sure to contact your doctor if:    · You cannot go to your counseling sessions. Where can you learn more? Go to http://radha-my.info/. Enter K052 in the search box to learn more about \"Bipolar Disorder: Care Instructions. \"  Current as of: December 7, 2017  Content Version: 11.7  © 5772-0460 [employer]. Care instructions adapted under license by [employer] (which disclaims liability or warranty for this information). If you have questions about a medical condition or this instruction, always ask your healthcare professional. [address] any warranty or liability for your use of this information.
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
## 2018-07-23 ENCOUNTER — HOSPITAL ENCOUNTER (OUTPATIENT)
Dept: PHYSICAL THERAPY | Age: 53
Discharge: HOME | End: 2018-07-23
Payer: MEDICARE

## 2018-07-23 PROCEDURE — 97110 THERAPEUTIC EXERCISES: CPT

## 2018-07-23 NOTE — PROGRESS NOTES
PT DAILY TREATMENT NOTE - [facility] 2-15    Patient Name: Rodney Heart  Date:2018  : 1965  [x]  Patient  Verified  Payor: [place] MED[place] / Plan: [place] MEDICARE PART A & B / Product Type: Medicare /    In time:1229  Out time:123  Total Treatment Time (min): 52  Total Timed Codes (min): 52  1:1 Treatment Time ([address] only): 46   Visit #: 8     Treatment Area: Low back pain [M54.5]    SUBJECTIVE  Pain Level (0-10 scale): 8/10  Any medication changes, allergies to medications, adverse drug reactions, diagnosis change, or new procedure performed?: [x] No    [] Yes (see summary sheet for update)  Subjective functional status/changes:   [] No changes reported  Patient reports she went to the hospital where something happened to her back     OBJECTIVE    52 min Therapeutic Exercise:  [] See flow sheet :   Rationale: increase ROM and increase strength to improve the patients ability to perform ADLs and reduce pain levels. With   [x] TE   [] TA   [] neuro   [] other: Patient Education: [x] Review HEP    [] Progressed/Changed HEP based on:   [] positioning   [] body mechanics   [] transfers   [] heat/ice application    [] other:      Other Objective/Functional Measures: none noted     Pain Level (0-10 scale) post treatment:  8/10    ASSESSMENT/Changes in Function:   Slight pain in the sacrum while performing bridges. Limited lumbar ROM with right sided rotation. Tolerated all new and progressed therex well. Patient will continue to benefit from skilled PT services to modify and progress therapeutic interventions, address functional mobility deficits, address ROM deficits, address strength deficits, analyze and address soft tissue restrictions, analyze and cue movement patterns and analyze and modify body mechanics/ergonomics to attain remaining goals.      [x]  See Plan of Care  []  See progress note/recertification  []  See Discharge Summary         Progress towards goals / Updated goals:  Patient is progressing towards goals, will continue to strengthen the hip stabilizers in order to reduce stress applied to the low back.     PLAN  [x]  Upgrade activities as tolerated     [x]  Continue plan of care  [x]  Update interventions per flow sheet       []  Discharge due to:_  []  Other:_      Ty Chandler 7/23/2018  12:34 PM

## 2018-07-24 DIAGNOSIS — M54.50 CHRONIC LOW BACK PAIN WITHOUT SCIATICA, UNSPECIFIED BACK PAIN LATERALITY: ICD-10-CM

## 2018-07-24 DIAGNOSIS — G89.29 CHRONIC LOW BACK PAIN WITHOUT SCIATICA, UNSPECIFIED BACK PAIN LATERALITY: ICD-10-CM

## 2018-07-24 RX ORDER — METHOCARBAMOL 500 MG/1
TABLET, FILM COATED ORAL
Qty: 30 TAB | Refills: 0 | Status: SHIPPED | OUTPATIENT
Start: 2018-07-24 | End: 2018-08-31 | Stop reason: SDUPTHER

## 2018-07-26 ENCOUNTER — APPOINTMENT (OUTPATIENT)
Dept: PHYSICAL THERAPY | Age: 53
End: 2018-07-26
Payer: MEDICARE

## 2018-08-20 ENCOUNTER — TELEPHONE (OUTPATIENT)
Dept: INTERNAL MEDICINE CLINIC | Age: 53
End: 2018-08-20

## 2018-08-20 DIAGNOSIS — M54.9 OTHER CHRONIC BACK PAIN: Primary | ICD-10-CM

## 2018-08-20 DIAGNOSIS — G89.29 OTHER CHRONIC BACK PAIN: Primary | ICD-10-CM

## 2018-08-20 NOTE — TELEPHONE ENCOUNTER
#[phone] pt needs a script/order to go back to [facility] PT. Please call pt as she states she still wakes up with lower back pain.

## 2018-08-20 NOTE — TELEPHONE ENCOUNTER
MD Yamileth Smyth LPN        Caller: Unspecified (Today,  9:06 AM)                     Ok to do PT and if no improvement make appointment      Spoke with patient. Two pt identifiers confirmed. Patient notified that her order for PT is in the system, she should be able to call and schedule an appointment. Advised patient that I will also mail her a copy of the order for her records. Pt verbalized understanding of information discussed w/ no further questions at this time.

## 2018-08-24 ENCOUNTER — HOSPITAL ENCOUNTER (OUTPATIENT)
Dept: PHYSICAL THERAPY | Age: 53
Discharge: HOME | End: 2018-08-24
Payer: MEDICARE

## 2018-08-24 PROCEDURE — 97110 THERAPEUTIC EXERCISES: CPT | Performed by: PHYSICAL THERAPIST

## 2018-08-24 PROCEDURE — G8979 MOBILITY GOAL STATUS: HCPCS | Performed by: PHYSICAL THERAPIST

## 2018-08-24 PROCEDURE — G8978 MOBILITY CURRENT STATUS: HCPCS | Performed by: PHYSICAL THERAPIST

## 2018-08-24 PROCEDURE — 97162 PT EVAL MOD COMPLEX 30 MIN: CPT | Performed by: PHYSICAL THERAPIST

## 2018-08-24 NOTE — PROGRESS NOTES
[place] [place] [address] (MOB IV), [address] [facility]  [place],  [address]  Phone: [phone] Fax: [phone]     Plan of Care/Statement of Necessity for Physical Therapy Services  2-15    Patient name: Yamilex Ramos  : 1965  Provider#: [id number]  Referral source: Megan Dc MD      Medical/Treatment Diagnosis: Low back pain [M54.5]     Prior Hospitalization: see medical history     Comorbidities: depression, bipolar, scoliosis, right tennis elbow, left rotator cuff injury  Prior Level of Function: 20 min of exercise 2-3x/wk  Medications: Verified on Patient Summary List  Start of Care: 18      Onset Date: chronic with recent exacerbation due to fall on 18 and ER visit on 18   The Plan of Care and following information is based on the information from the initial evaluation. Assessment/ key information: The patient was treated one month ago for the same issues, and was going to be discharged due to plateauing with symptoms, but did not return for her last appointment and was subsequently discharged. Since then, she reports being \"roughed up\" at the ER on 18 and has had increased back pain since (ER note states the patient had a manic/psychotic episode, was aggressive, pretended to pass out and was easily woken up with smelling salts, then was admitted until manic episode subsided). However, she was initially seen months ago for chronic lumbar pain with an exacerbation due to a fall down basement stairs, on 18, when a step broke and she fell back and slid down. It is difficult to assess the patient subjectively, due to her medical history (bipolar/psychotic disorder), and it is uncertain how well she will improve during this episode of care. However, she has significantly decreased glute and core strength, and overall decreased strength in her RLE due to that side taking the main impact from the fall in May.  The patient will benefit from guided therapeutic interventions such as therex for strengthening and neuromuscular re-eduction, manual techniques for joint mobility and soft tissue extensibility, and modalities for pain management in order to improve functional mobility with daily activities. Evaluation Complexity History HIGH Complexity :3+ comorbidities / personal factors will impact the outcome/ POC ; Examination MEDIUM Complexity : 3 Standardized tests and measures addressing body structure, function, activity limitation and / or participation in recreation  ;Presentation MEDIUM Complexity : Evolving with changing characteristics  ; Clinical Decision Making MEDIUM Complexity : FOTO score of 26-74  Overall Complexity Rating: MEDIUM    Problem List: pain affecting function, decrease ROM, decrease strength, edema affecting function, impaired gait/ balance, decrease ADL/ functional abilitiies, decrease activity tolerance, decrease flexibility/ joint mobility and decrease transfer abilities   Treatment Plan may include any combination of the following: Therapeutic exercise, Therapeutic activities, Neuromuscular re-education, Physical agent/modality, Gait/balance training, Manual therapy, Patient education, Self Care training, Functional mobility training, Home safety training and Stair training  Patient / Family readiness to learn indicated by: asking questions, trying to perform skills and interest  Persons(s) to be included in education: patient (P)  Barriers to Learning/Limitations: None  Patient Goal (s): learn exercises without re-injury  Patient Self Reported Health Status: fair  Rehabilitation Potential: fair/good    Short Term Goals: To be accomplished in 2 treatments:   1.) The patient will be independent with her HEP consistently for at least one week. Long Term Goals: To be accomplished in 16 treatments:   1.) The patient will be able to sit or walk for at least 15 minutes without having to change position due to pain.    2.) The patient will improve her FOTO score from 49 to at least 54 to show improvement in functional mobility. 3.) The patient will be able to perform therapeutic exercises for at least 40 minutes with at most 6/10 lumbar pain in order to transition to a home gym program.  Frequency / Duration: Patient to be seen 2 times per week for 16 treatments. Patient/ Caregiver education and instruction: self care, activity modification and exercises    [x]  Plan of care has been reviewed with PTA    G-Codes (GP)  Mobility   Current  CK= 40-59%   Goal  CJ= 20-39%    The severity rating is based on clinical judgment and the FOTO Score score. Certification Period: 8/24/18-11/24/18  Jose Nam, PT 8/24/2018 12:55 PM    ________________________________________________________________________    I certify that the above Therapy Services are being furnished while the patient is under my care. I agree with the treatment plan and certify that this therapy is necessary.     [de-identified] Signature:____________________  Date:____________Time: _________

## 2018-08-24 NOTE — PROGRESS NOTES
PT INITIAL EVALUATION NOTE - [facility] 2-15    Patient Name: Georges Mis  Date:2018  : 1965  [x]  Patient  Verified  Payor: Samreen Matias / Plan: [place] MEDICARE PART A & B / Product Type: Medicare /    In time: 9:40am  Out time: 10:30am  Total Treatment Time (min): 50  Total Timed Codes (min): 50  1:1 Treatment Time ([facility] only): 50  Visit #: 1     Treatment Area: Low back pain [M54.5]    SUBJECTIVE  Pain Level (0-10 scale): 7/10 (6-10/10)  Any medication changes, allergies to medications, adverse drug reactions, diagnosis change, or new procedure performed?: [] No    [x] Yes (see summary sheet for update)  Subjective: The patient had been evaluated on 18 for this same issue, but did not return for her last sessions and was discharged. The following subjective report was from the previous evaluation: \"The patient reports that she fell down basement steps on 18 at around 11:40am after she returned from her chiropractor. The step broke in half and she fell straight down on her back and slid down. She's had right tennis elbow, left rotator cuff, left SI joint pain, chronic back pain, chronic neck pain, etc. She wants to know what she can do on the land without hurting herself, as she also likes to do water aerobics (2-3x/wk). She has done a lot of rehab since the 90s and will do anything that's given to her. Her lowest pain of her back prior to the injury can get below a 5/10. She likes to garden but can't since the injury, she used to be able to walk for 15-20min on a treadmill prior to her injury, and repetitive sitting/standing and household chores (wiping and cleaning). Her neck will sometimes lock up. She does own a TENS unit which will help her neck and back. \"     Since then, she went to the ER on 18 due to dehydration and increased anxiety, and says she was tossed around and felt worse afterwards (ER note stated that she was acting aggressively and manic, pretending to pass out, and was awoken easily with smelling salts; she was then admitted until her manic episode subsided). She reports that she was a 10/10 in her back after that and could barely walk. She has difficulty with carrying groceries and grocery shopping. She would like to be able to walk for 15 minutes and sit for 15 minutes without having to change positions due to discomfort. OBJECTIVE    Posture: Forward head, scapular protraction bilaterally  Other Observations:  Pt. Has decreased lumbar lordosis  Gait and Functional Mobility:  WFL  Palpation: tender to palpate bilateral lumbar paraspinals        Lumbar AROM:          R  L    Flexion    70%, p! Extension   Limited, p! Side Bending   Limited bilaterally with pain contralaterally      Rotation   Very limited bilaterally with p! LOWER QUARTER   MUSCLE STRENGTH  KEY       R  L  0 - No Contraction  L1, L2 Psoas  3+  4  1 - Trace   L3 Quads  4-  4  2 - Poor   L4 Tib Ant  4  4  3 - Fair    L5 EHL  nt  nt  4 - Good   S1 Peroneals  4  4  5 - Normal   S2 Hams  4-  4    Flexibility: tight hip flexors  Mobility Assessment: hypomobile lumbar      MMT:               HIP Abd: 3-/5 on right; 3+/5 on left  Neurological: Reflexes / Sensations: WFL  Special Tests:    H.S. SLR: neg    25 min Therapeutic Exercise:  [x] See flow sheet :   Rationale: increase ROM, increase strength and improve coordination to improve the patients ability to perform daily activities.           With   [x] TE   [] TA   [] neuro   [] other: Patient Education: [x] Review HEP    [x] Progressed/Changed HEP based on:   [x] positioning   [x] body mechanics   [] transfers   [] heat/ice application    [] other:      Other Objective/Functional Measures: FOTO= 49    30s STS: 7x (6x in June 2018)  5x STS: 22s (25s in June 2018  SLS: R=9s; L=11s    Pain Level (0-10 scale) post treatment: 8    ASSESSMENT/Changes in Function:     [x]  See Plan of [address] Ozuna, PT 8/24/2018  1:17 PM

## 2018-08-24 NOTE — PROGRESS NOTES
PT INITIAL EVALUATION NOTE - [facility] 2-15    Patient Name: Rocco Garcia  Date:2018  : 1965  [x]  Patient  Verified  Payor: Aubrey Esqueda / Plan: [place] MEDICARE PART A & B / Product Type: Medicare /    In time: 9:40am  Out time: 10:30am  Total Treatment Time (min): 50  Total Timed Codes (min): 50  1:1 Treatment Time ( only): 50  Visit #: 1     Treatment Area: Low back pain [M54.5]    SUBJECTIVE  Pain Level (0-10 scale): 7/10 (6-10/10)  Any medication changes, allergies to medications, adverse drug reactions, diagnosis change, or new procedure performed?: [] No    [x] Yes (see summary sheet for update)  Subjective: The patient had been evaluated on 18 for this same issue, but did not return for her last sessions and was discharged. The following subjective report was from the previous evaluation: \"The patient reports that she fell down basement steps on 18 at around 11:40am after she returned from her chiropractor. The step broke in half and she fell straight down on her back and slid down. She's had right tennis elbow, left rotator cuff, left SI joint pain, chronic back pain, chronic neck pain, etc. She wants to know what she can do on the land without hurting herself, as she also likes to do water aerobics (2-3x/wk). She has done a lot of rehab since the 90s and will do anything that's given to her. Her lowest pain of her back prior to the injury can get below a 5/10. She likes to garden but can't since the injury, she used to be able to walk for 15-20min on a treadmill prior to her injury, and repetitive sitting/standing and household chores (wiping and cleaning). Her neck will sometimes lock up. She does own a TENS unit which will help her neck and back. \"     Since then, she went to the ER on 18 due to dehydration and increased anxiety, and says she was tossed around and felt worse afterwards (ER note stated that she was acting aggressively and manic, pretending to pass out, and was awoken easily with smelling salts; she was then admitted until her manic episode subsided). She reports that she was a 10/10 in her back after that and could barely walk. She has difficulty with carrying groceries and grocery shopping. She would like to be able to walk for 15 minutes and sit for 15 minutes without having to change positions due to discomfort. OBJECTIVE    Posture: Forward head, scapular protraction bilaterally  Other Observations:  Pt. Has decreased lumbar lordosis  Gait and Functional Mobility:  WFL  Palpation: tender to palpate bilateral lumbar paraspinals        Lumbar AROM:          R  L    Flexion    70%, p! Extension   Limited, p! Side Bending   Limited bilaterally with pain contralaterally      Rotation   Very limited bilaterally with p! LOWER QUARTER   MUSCLE STRENGTH  KEY       R  L  0 - No Contraction  L1, L2 Psoas  3+  4  1 - Trace   L3 Quads  4-  4  2 - Poor   L4 Tib Ant  4  4  3 - Fair    L5 EHL  nt  nt  4 - Good   S1 Peroneals  4  4  5 - Normal   S2 Hams  4-  4    Flexibility: tight hip flexors  Mobility Assessment: hypomobile lumbar      MMT:               HIP Abd: 3-/5 on right; 3+/5 on left  Neurological: Reflexes / Sensations: WFL  Special Tests:    H.S. SLR: neg    25 min Therapeutic Exercise:  [x] See flow sheet :   Rationale: increase ROM, increase strength and improve coordination to improve the patients ability to perform daily activities.           With   [x] TE   [] TA   [] neuro   [] other: Patient Education: [x] Review HEP    [x] Progressed/Changed HEP based on:   [x] positioning   [x] body mechanics   [] transfers   [] heat/ice application    [] other:      Other Objective/Functional Measures: FOTO= 49    30s STS: 7x (6x in June 2018)  5x STS: 22s (25s in June 2018  SLS: R=9s; L=11s    Pain Level (0-10 scale) post treatment: 8    ASSESSMENT/Changes in Function:     [x]  See Plan of [address]una, PT 8/24/2018  1:17 PM

## 2018-08-24 NOTE — PROGRESS NOTES
[place] [place] [id number] ([place]B [place]), [address] [facility] [place]  Phone: [phone] Fax: [phone]     Plan of Care/Statement of Necessity for Physical Therapy Services  2-15    Patient name: Nata Anderson  : 1965  Provider#: [id number]  Referral source: Syeda Whitaker MD      Medical/Treatment Diagnosis: Low back pain [M54.5]     Prior Hospitalization: see medical history     Comorbidities: depression, bipolar, scoliosis, right tennis elbow, left rotator cuff injury  Prior Level of Function: 20 min of exercise 2-3x/wk  Medications: Verified on Patient Summary List  Start of Care: 18      Onset Date: chronic with recent exacerbation due to fall on 18 and ER visit on 18   The Plan of Care and following information is based on the information from the initial evaluation. Assessment/ key information: The patient was treated one month ago for the same issues, and was going to be discharged due to plateauing with symptoms, but did not return for her last appointment and was subsequently discharged. Since then, she reports being \"roughed up\" at the ER on 18 and has had increased back pain since (ER note states the patient had a manic/psychotic episode, was aggressive, pretended to pass out and was easily woken up with smelling salts, then was admitted until manic episode subsided). However, she was initially seen months ago for chronic lumbar pain with an exacerbation due to a fall down basement stairs, on 18, when a step broke and she fell back and slid down. It is difficult to assess the patient subjectively, due to her medical history (bipolar/psychotic disorder), and it is uncertain how well she will improve during this episode of care. However, she has significantly decreased glute and core strength, and overall decreased strength in her RLE due to that side taking the main impact from the fall in May.  The patient will benefit from guided therapeutic interventions such as therex for strengthening and neuromuscular re-eduction, manual techniques for joint mobility and soft tissue extensibility, and modalities for pain management in order to improve functional mobility with daily activities. Evaluation Complexity History HIGH Complexity :3+ comorbidities / personal factors will impact the outcome/ POC ; Examination MEDIUM Complexity : 3 Standardized tests and measures addressing body structure, function, activity limitation and / or participation in recreation  ;Presentation MEDIUM Complexity : Evolving with changing characteristics  ; Clinical Decision Making MEDIUM Complexity : FOTO score of 26-74  Overall Complexity Rating: MEDIUM    Problem List: pain affecting function, decrease ROM, decrease strength, edema affecting function, impaired gait/ balance, decrease ADL/ functional abilitiies, decrease activity tolerance, decrease flexibility/ joint mobility and decrease transfer abilities   Treatment Plan may include any combination of the following: Therapeutic exercise, Therapeutic activities, Neuromuscular re-education, Physical agent/modality, Gait/balance training, Manual therapy, Patient education, Self Care training, Functional mobility training, Home safety training and Stair training  Patient / Family readiness to learn indicated by: asking questions, trying to perform skills and interest  Persons(s) to be included in education: patient (P)  Barriers to Learning/Limitations: None  Patient Goal (s): learn exercises without re-injury  Patient Self Reported Health Status: fair  Rehabilitation Potential: fair/good    Short Term Goals: To be accomplished in 2 treatments:   1.) The patient will be independent with her HEP consistently for at least one week. Long Term Goals: To be accomplished in 16 treatments:   1.) The patient will be able to sit or walk for at least 15 minutes without having to change position due to pain.    2.) The patient will improve her FOTO score from 49 to at least 54 to show improvement in functional mobility. 3.) The patient will be able to perform therapeutic exercises for at least 40 minutes with at most 6/10 lumbar pain in order to transition to a home gym program.  Frequency / Duration: Patient to be seen 2 times per week for 16 treatments. Patient/ Caregiver education and instruction: self care, activity modification and exercises    [x]  Plan of care has been reviewed with PTA    G-Codes (GP)  Mobility   Current  CK= 40-59%   Goal  CJ= 20-39%    The severity rating is based on clinical judgment and the FOTO Score score. Certification Period: 8/24/18-11/24/18  Porfirio Ortega, PT 8/24/2018 12:55 PM    ________________________________________________________________________    I certify that the above Therapy Services are being furnished while the patient is under my care. I agree with the treatment plan and certify that this therapy is necessary.     [de-identified] Signature:____________________  Date:____________Time: _________

## 2018-08-27 ENCOUNTER — HOSPITAL ENCOUNTER (OUTPATIENT)
Dept: PHYSICAL THERAPY | Age: 53
Discharge: HOME | End: 2018-08-27
Payer: MEDICARE

## 2018-08-27 PROCEDURE — 97110 THERAPEUTIC EXERCISES: CPT | Performed by: PHYSICAL THERAPIST

## 2018-08-27 NOTE — PROGRESS NOTES
PT DAILY TREATMENT NOTE - [facility] -15    Patient Name: Luan Zimmer  Date:2018  : 1965  [x]  Patient  Verified  Payor: Naomi Naranjo / Plan: [place] MEDICARE PART A & B / Product Type: Medicare /    In time: 1:00pm  Out time: 2:03pm  Total Treatment Time (min): 63  Total Timed Codes (min): 39  1:1 Treatment Time ([address] only): 39  Visit #: 2     Treatment Area: Low back pain [M54.5]    SUBJECTIVE  Pain Level (0-10 scale): 6  Any medication changes, allergies to medications, adverse drug reactions, diagnosis change, or new procedure performed?: [x] No    [] Yes (see summary sheet for update)  Subjective functional status/changes:   [] No changes reported  The patient reports that she was hurting a lot this morning but did her exercises this morning. OBJECTIVE    39 min Therapeutic Exercise:  [x] See flow sheet :   Rationale: increase ROM, increase strength and improve coordination to improve the patients ability to perform daily activities. With   [x] TE   [] TA   [] neuro   [] other: Patient Education: [x] Review HEP    [x] Progressed/Changed HEP based on:   [x] positioning   [x] body mechanics   [] transfers   [] heat/ice application    [] other:      Other Objective/Functional Measures: Pt. Tolerated therex well     Pain Level (0-10 scale) post treatment: 5    ASSESSMENT/Changes in Function:     The patient is progressing with dynamic strengthening therex as tolerated. Patient will continue to benefit from skilled PT services to modify and progress therapeutic interventions, address functional mobility deficits, address ROM deficits, address strength deficits, analyze and address soft tissue restrictions, analyze and cue movement patterns, analyze and modify body mechanics/ergonomics, assess and modify postural abnormalities, address imbalance/dizziness and instruct in home and community integration to attain remaining goals.      [x]  See Plan of Care  []  See progress note/recertification  []  See Discharge Summary         Progress towards goals / Updated goals: The patient is progressing towards goals.     PLAN  [x]  Upgrade activities as tolerated     [x]  Continue plan of care  [x]  Update interventions per flow sheet       []  Discharge due to:_  []  Other:_      Yolis Latham, PT 8/27/2018  11:48 AM

## 2018-08-28 ENCOUNTER — OFFICE VISIT (OUTPATIENT)
Dept: ENDOCRINOLOGY | Age: 53
End: 2018-08-28

## 2018-08-28 VITALS
HEART RATE: 85 BPM | DIASTOLIC BLOOD PRESSURE: 75 MMHG | WEIGHT: 154.8 LBS | HEIGHT: 63 IN | SYSTOLIC BLOOD PRESSURE: 107 MMHG | BODY MASS INDEX: 27.43 KG/M2

## 2018-08-28 DIAGNOSIS — R79.89 ABNORMAL THYROID BLOOD TEST: Primary | ICD-10-CM

## 2018-08-28 RX ORDER — CARBAMAZEPINE 100 MG/1
200 TABLET, CHEWABLE ORAL 2 TIMES DAILY
COMMUNITY
Start: 2018-07-28 | End: 2018-08-28

## 2018-08-28 NOTE — LETTER
8/28/2018 2:30 PM 
 
Patient:  Terrell Hernandez YOB: 1965 Date of Visit: 8/28/2018 Dear MD Quirino Chong. [address] [address] [address] 
 : Thank you for referring Ms. David Hayes to me for evaluation/treatment. Below are the relevant portions of my assessment and plan of care. If you have questions, please do not hesitate to call me. I look forward to following Ms. Marlo Middleton along with you. Sincerely, Cara Seals MD

## 2018-08-28 NOTE — PROGRESS NOTES
Chief Complaint   Patient presents with    Thyroid Problem     PCP and Pharmacy verified   Records reviewed  History of Present Illness: Edie Lindquist is a 46 y.o. female who I was asked to see in consult by Dr. Emelina Beltrán for evaluation of hypothyroidism. In June 2018 she had a TSH of 0.865 with FT4 of 0.75, Dr. Michelle Jung tested her other pituitary functions, her FSH/LH were 60.0/29.7, IGF-1 193 and Prolactin of 6.3. Her repeat TSH in July was up to 2.05. Looking back at her prior thyroid labs, between 9877-4851 her TSH was ranging in the 0.5-1.8, her TT4 was in the 3.0-6.5 and her FT4 in the 0.77-1.09. Pt reports \"I have never had issues with my thyroid, but my sister gave me dandylion root, It caused me to have sore throat so I stopped drinking it\". Pt notes that when they tested her Carbamazepime level they checked her thyroid levels, which lead to the further testing. She denies issues of CP, SOB, palpitations, tremors, \"I am post-menopausal and I attributed my feeling hot to that\", she does note that her hand and feet can feel cold at times. She notes occasional diarrhea and constipation. She denies issues of dysphagia, dysphonia, chocking issues or hoarseness. Pt notes that she had a fall in May 2018 and was on pain medications for a time, but as she has been in PT she has stopped taking them as frequently. She notes that the PT/Rehab exercises have been very helpful. In July she because acutely dehydrated, which caused a manic episode and caused her \"to be off for a little while\"    Pt went through menopause in November 2017. No known family hx of thyroid issues.     Past Medical History:   Diagnosis Date    Arthritis     joints    Bipolar 1 disorder with moderate robyn ([facility] [address] 3/17/2014    Chronic pain     back with stress    Contact dermatitis and other eczema, due to unspecified cause     Depression     Headache(784.0)     Hyperlipidemia 8/22/2016    Other ill-defined conditions(799.89)     sinus infection,hay fever    Other ill-defined conditions(799.89)     scoliosis    Psychiatric disorder     bipolar    Psychotic disorder     Stool color black     Tennis elbow syndrome 5/4/2015    Trauma      Past Surgical History:   Procedure Laterality Date    HX HEENT      wisdom teeth extraction    HX LIPOMA RESECTION      right gluteal    FREDY BIOPSY BREAST STEREOTACTIC Left 2011    negative    RI DENTAL SURGERY PROCEDURE      hx of dental surgery- wisdom teeth extracted     Current Outpatient Prescriptions   Medication Sig    methocarbamol (ROBAXIN) 500 mg tablet TAKE 1 OR 2 TABLETS BY MOUTH TWICE A DAY AS NEEDED FOR MUSCLE SPASMS.  flurazepam (DALMANE) 30 mg capsule Take 1 Cap by mouth nightly as needed. Max Daily Amount: 30 mg.    diclofenac EC (VOLTAREN) 75 mg EC tablet Take 75 mg by mouth.  famotidine (PEPCID) 40 mg tablet Take 40 mg by mouth daily.  albuterol (PROVENTIL HFA, VENTOLIN HFA, PROAIR HFA) 90 mcg/actuation inhaler Take 2 Puffs by inhalation every four (4) hours as needed for Wheezing.  montelukast (SINGULAIR) 10 mg tablet Take 10 mg by mouth daily.  azelastine (ASTELIN) 137 mcg (0.1 %) nasal spray USE 1 SPRAY IN EACH NOSTRIL TWICE A DAY AS DIRECTED    SEROQUEL  mg sr tablet     EPINEPHrine (EPIPEN) 0.3 mg/0.3 mL (1:1,000) injection 0.3 mL by IntraMUSCular route once as needed for up to 1 dose.  fexofenadine (ALLEGRA) 180 mg tablet Take  by mouth daily.  carbamazepine (TEGRETOL) 200 mg tablet Take 200 mg by mouth two (2) times a day. No current facility-administered medications for this visit.       Allergies   Allergen Reactions    Other Food Hives     Artifical sweetners    Claritin-D 12 Hour [Loratadine-Pseudoephedrine] Palpitations     palpatations and ringing in the ear    Other Medication Anaphylaxis     Artificial sweeteners cause anaphylaxis    Pcn [Penicillins] Anaphylaxis    Sunscreen Contact Dermatitis     Burns and opens the skin    Ultram [Tramadol] Hives and Other (comments)     Hives and ringing of the ears    Benzoyl Peroxide Hives    Cephalexin Itching    Divalproex Itching    Maltodextrin-Glycerin Shortness of Breath     Family History   Problem Relation Age of Onset   [facility] Arthritis-rheumatoid Mother     Migraines Mother     Psychiatric Disorder Mother     Bipolar Disorder Mother     Lupus Mother     Alcohol abuse Father     Lung Disease Father     Heart Disease Father     Stroke Father     High Cholesterol Father     Psychiatric Disorder Sister     Alcohol abuse Sister     Bipolar Disorder Sister     Stroke Brother     Cancer Maternal Aunt     Breast Cancer Maternal Aunt      pt thniks she was under 48    Bipolar Disorder Sister      Social History     Social History    Marital status:      Spouse name: N/A    Number of children: N/A    Years of education: N/A     Occupational History    Not on file. Social History Main Topics    Smoking status: Never Smoker    Smokeless tobacco: Never Used    Alcohol use Yes      Comment: occasional    Drug use: No    Sexual activity: Yes     Partners: Male     Other Topics Concern    Not on file     Social History Narrative    , 0 children, not working at present. On disability for bipolar illness. Review of Systems:  - Constitutional Symptoms: no fevers, chills, weight loss  - Eyes: no blurry vision or double vision  - Cardiovascular: no chest pain or palpitations  - Respiratory: no cough or shortness of breath  - Gastrointestinal: no dysphagia or abdominal pain  - Musculoskeletal: no joint pains or weakness  - Integumentary: no rashes  - Neurological: no numbness, tingling, or headaches  - Psychiatric: no depression or anxiety  - Endocrine: no heat or cold intolerance, no polyuria or polydipsia    Physical Examination:  Blood pressure 107/75, pulse 85, height 5' 3\" (1.6 m), weight 154 lb 12.8 oz (70.2 kg).   - General: pleasant, no distress, good eye contact  - HEENT: no exopthalmos, no periorbital edema, no scleral/conjunctival injection, EOMI, no lid lag or stare  - Neck: supple, no thyromegaly, masses, lymph nodes, or carotid bruits, no supraclavicular or dorsocervical fat pads  - Cardiovascular: regular, normal rate, normal S1 and S2, no murmurs/rubs/gallops   - Respiratory: clear to auscultation bilaterally  - Gastrointestinal: soft, nontender, nondistended, no masses, no hepatosplenomegaly  - Musculoskeletal: no proximal muscle weakness in upper or lower extremities  - Integumentary: no acanthosis nigricans, no abdominal striae, no rashes, no edema  - Neurological: reflexes 2+ at biceps, no tremor  - Psychiatric: normal mood and affect    Data Reviewed:   Component      Latest Ref Rng & Units 7/17/2018 6/25/2018 6/25/2018 6/25/2018           1:04 AM  4:21 PM  4:21 PM  4:21 PM   TSH      0.36 - 3.74 uIU/mL 2.05      Luteinizing hormone      mIU/mL    29.7   FSH      mIU/mL    60.0   Prolactin      4.8 - 23.3 ng/mL   6.3    Insulin-Like Growth Factor I      53 - 190 ng/mL  193 (H)       Component      Latest Ref Rng & Units 6/19/2018          11:14 AM   TSH      0.36 - 3.74 uIU/mL 0.977   T4, Free      0.82 - 1.77 ng/dL 0.70 (L)       Assessment/Plan:   1. Abnormal thyroid blood test    1) Pt is clinically euthyroid, and her most recent thyroid lab abnormalities were drawn durring a time she has a fall and was on pain medications and during a bout of dehydration and a manic break which could cause abnormal thyroid labs, that may not reflect underlying thyroid disease. Dr. Petrona George wisely tested her pituitary function and her FSH/LH were appropreatly elevated for a post-menopausal woman, her Prolactin was normal and her IGF-1 was ok as well. This would argue against an underlying pituitary hypofunction. Since pt has been in relatively good health recently, I will check her TSH, FT4, TT3, and Thyroid Ab panel today.  We can decide on any needed treatments once we get these labs back. Pt voices understanding and agreement with the plan. RTC 3 months    Pt request I call her at [phone], she asked I leave a message if she does not answer. Follow-up Disposition:  Return in about 3 months (around 11/28/2018).     Copy sent to:  Dr. Dunaway Must

## 2018-08-29 LAB
T3 SERPL-MCNC: 72 NG/DL (ref 71–180)
T4 FREE SERPL-MCNC: 0.71 NG/DL (ref 0.82–1.77)
THYROGLOB AB SERPL-ACNC: <1 IU/ML (ref 0–0.9)
THYROPEROXIDASE AB SERPL-ACNC: 15 IU/ML (ref 0–34)
TSH SERPL DL<=0.005 MIU/L-ACNC: 0.76 UIU/ML (ref 0.45–4.5)

## 2018-08-30 ENCOUNTER — HOSPITAL ENCOUNTER (OUTPATIENT)
Dept: PHYSICAL THERAPY | Age: 53
Discharge: HOME | End: 2018-08-30
Payer: MEDICARE

## 2018-08-30 PROCEDURE — 97110 THERAPEUTIC EXERCISES: CPT | Performed by: PHYSICAL THERAPIST
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
## 2018-08-30 NOTE — PROGRESS NOTES
Her TSH is in a normal range and pt is clinically euthyroid. There is about 0.5-1% of the general population that has as their normal baseline a \"low T4\". Her T4 has been consistently low which suggests this could represent her normal baseline. For now I do not feel she needs any thyroid replacement.

## 2018-08-31 DIAGNOSIS — G89.29 CHRONIC LOW BACK PAIN WITHOUT SCIATICA, UNSPECIFIED BACK PAIN LATERALITY: ICD-10-CM

## 2018-08-31 DIAGNOSIS — M54.50 CHRONIC LOW BACK PAIN WITHOUT SCIATICA, UNSPECIFIED BACK PAIN LATERALITY: ICD-10-CM

## 2018-08-31 RX ORDER — METHOCARBAMOL 500 MG/1
TABLET, FILM COATED ORAL
Qty: 30 TAB | Refills: 0 | Status: SHIPPED | OUTPATIENT
Start: 2018-08-31 | End: 2018-11-07 | Stop reason: SDUPTHER

## 2018-09-04 ENCOUNTER — HOSPITAL ENCOUNTER (OUTPATIENT)
Dept: PHYSICAL THERAPY | Age: 53
Discharge: HOME | End: 2018-09-04
Payer: MEDICARE

## 2018-09-04 PROCEDURE — 97110 THERAPEUTIC EXERCISES: CPT

## 2018-09-04 NOTE — PROGRESS NOTES
PT DAILY TREATMENT NOTE - [facility] 2-15    Patient Name: Rodney Heart  Date:2018  : 1965  [x]  Patient  Verified  Payor: [place] MEDICARE / Plan: [place] MEDICARE PART A & B / Product Type: Medicare /    In time:1133  Out time:1239  Total Treatment Time (min): 66  Total Timed Codes (min): 66  1:1 Treatment Time ([facility] only): 42   Visit #: 4     Treatment Area: Low back pain [M54.5]    SUBJECTIVE  Pain Level (0-10 scale): 7/10  Any medication changes, allergies to medications, adverse drug reactions, diagnosis change, or new procedure performed?: [x] No    [] Yes (see summary sheet for update)  Subjective functional status/changes:   [] No changes reported  Patient reports she was compliant with her HEP all weekend. Pain primarily in her     OBJECTIVE    66 min Therapeutic Exercise:  [x] See flow sheet :   Rationale: increase ROM and increase strength to improve the patients ability to perform ADLs and reduce pain levels    With   [x] TE   [] TA   [] neuro   [] other: Patient Education: [x] Review HEP    [] Progressed/Changed HEP based on:   [] positioning   [] body mechanics   [] transfers   [] heat/ice application    [] other:      Other Objective/Functional Measures: none noted     Pain Level (0-10 scale) post treatment: 5/10    ASSESSMENT/Changes in Function:   Patient has difficulty with stabilizing the pelvis while performing quad hip hikes. Will require VC in order to reduce compensation patterns. Will continue to progress therex as tolerated. Patient will continue to benefit from skilled PT services to modify and progress therapeutic interventions, address functional mobility deficits, address ROM deficits, address strength deficits, analyze and address soft tissue restrictions, analyze and cue movement patterns and analyze and modify body mechanics/ergonomics to attain remaining goals.      [x]  See Plan of Care  []  See progress note/recertification  []  See Discharge Summary         Progress towards goals / Updated goals:  Patient is progressing towards goals, will continue to strengthen the hip stabilizers in order to reduce pain levels    PLAN  [x]  Upgrade activities as tolerated     [x]  Continue plan of care  [x]  Update interventions per flow sheet       []  Discharge due to:_  []  Other:Sharad Mejía 9/4/2018  11:55 AM

## 2018-09-06 ENCOUNTER — HOSPITAL ENCOUNTER (OUTPATIENT)
Dept: PHYSICAL THERAPY | Age: 53
Discharge: HOME | End: 2018-09-06
Payer: MEDICARE

## 2018-09-06 PROCEDURE — 97110 THERAPEUTIC EXERCISES: CPT | Performed by: PHYSICAL THERAPIST

## 2018-09-06 NOTE — PROGRESS NOTES
PT DAILY TREATMENT NOTE - [facility] 2-15    Patient Name: Rodney Heart  Date:2018  : 1965  [x]  Patient  Verified  Payor: [place] MEDICARE / Plan: VA MEDICARE PART A & B / Product Type: Medicare /    In time: 12:24pm  Out time: 1:05pm  Total Treatment Time (min): 41  Total Timed Codes (min): 41  1:1 Treatment Time ([address] only): 41   Visit #: 5     Treatment Area: Low back pain [M54.5]    SUBJECTIVE  Pain Level (0-10 scale): 7  Any medication changes, allergies to medications, adverse drug reactions, diagnosis change, or new procedure performed?: [x] No    [] Yes (see summary sheet for update)  Subjective functional status/changes:   [] No changes reported  The patient reports that she feels like she too much when she was here last time and is flared up. OBJECTIVE    41 min Therapeutic Exercise:  [x] See flow sheet :   Rationale: increase ROM, increase strength and improve coordination to improve the patients ability to perform daily activities. With   [x] TE   [] TA   [] neuro   [] other: Patient Education: [x] Review HEP    [x] Progressed/Changed HEP based on:   [x] positioning   [x] body mechanics   [] transfers   [] heat/ice application    [] other:      Other Objective/Functional Measures: Pt. Tolerated therex well     Pain Level (0-10 scale) post treatment: 7    ASSESSMENT/Changes in Function:     The patient is progressing with increased resistance and mobility as tolerated. Patient will continue to benefit from skilled PT services to modify and progress therapeutic interventions, address functional mobility deficits, address ROM deficits, address strength deficits, analyze and address soft tissue restrictions, analyze and cue movement patterns, analyze and modify body mechanics/ergonomics, assess and modify postural abnormalities, address imbalance/dizziness and instruct in home and community integration to attain remaining goals.      [x]  See Plan of Care  []  See progress note/recertification  []  See Discharge Summary         Progress towards goals / Updated goals: The patient is progressing towards goals.     PLAN  [x]  Upgrade activities as tolerated     [x]  Continue plan of care  [x]  Update interventions per flow sheet       []  Discharge due to:_  []  Other:_      Javad Lyles, PT 9/6/2018  7:23 AM

## 2018-09-10 ENCOUNTER — HOSPITAL ENCOUNTER (OUTPATIENT)
Dept: PHYSICAL THERAPY | Age: 53
Discharge: HOME | End: 2018-09-10
Payer: MEDICARE

## 2018-09-10 PROCEDURE — 97110 THERAPEUTIC EXERCISES: CPT | Performed by: PHYSICAL THERAPIST

## 2018-09-10 NOTE — PROGRESS NOTES
PT DAILY TREATMENT NOTE - [facility] 2-15    Patient Name: Christian Gonzalez  Date:9/10/2018  : 1965  [x]  Patient  Verified  Payor: [place] MEDICARE / Plan: VA MEDICARE PART A & B / Product Type: Medicare /    In time: 1:00pm  Out time:1:55pm  Total Treatment Time (min): 55  Total Timed Codes (min): 40  1:1 Treatment Time ( only): 40   Visit #: 6     Treatment Area: Low back pain [M54.5]    SUBJECTIVE  Pain Level (0-10 scale): 6  Any medication changes, allergies to medications, adverse drug reactions, diagnosis change, or new procedure performed?: [x] No    [] Yes (see summary sheet for update)  Subjective functional status/changes:   [] No changes reported  The patient reports that she tried the stair hip hikes at home but it irritated her back. OBJECTIVE    40 min Therapeutic Exercise:  [x] See flow sheet :   Rationale: increase ROM, increase strength and improve coordination to improve the patients ability to perform daily activities. With   [x] TE   [] TA   [] neuro   [] other: Patient Education: [x] Review HEP    [x] Progressed/Changed HEP based on:   [x] positioning   [x] body mechanics   [] transfers   [] heat/ice application    [] other:      Other Objective/Functional Measures: Pt. Tolerated therex     Pain Level (0-10 scale) post treatment: 6    ASSESSMENT/Changes in Function:   The patient progressed with tolerance to therex and increased resistance. Patient will continue to benefit from skilled PT services to modify and progress therapeutic interventions, address functional mobility deficits, address ROM deficits, address strength deficits, analyze and address soft tissue restrictions, analyze and cue movement patterns, analyze and modify body mechanics/ergonomics, assess and modify postural abnormalities, address imbalance/dizziness and instruct in home and community integration to attain remaining goals.      [x]  See Plan of Care  []  See progress note/recertification  []  See Discharge Summary         Progress towards goals / Updated goals: The patient is progressing towards goals.     PLAN  [x]  Upgrade activities as tolerated     [x]  Continue plan of care  [x]  Update interventions per flow sheet       []  Discharge due to:_  []  Other:_      Ambar Lin, HAYLIE 9/10/2018  9:33 AM

## 2018-09-12 ENCOUNTER — HOSPITAL ENCOUNTER (OUTPATIENT)
Dept: PHYSICAL THERAPY | Age: 53
Discharge: HOME | End: 2018-09-12
Payer: MEDICARE

## 2018-09-12 PROCEDURE — 97110 THERAPEUTIC EXERCISES: CPT | Performed by: PHYSICAL THERAPIST

## 2018-09-12 NOTE — PROGRESS NOTES
PT DAILY TREATMENT NOTE - [facility] 2-15    Patient Name: Tahmina Cornell  Date:2018  : 1965  [x]  Patient  Verified  Payor: [place] MEDICARE / Plan: VA MEDICARE PART A & B / Product Type: Medicare /    In time: 9:38am  Out time: 10:31am  Total Treatment Time (min): 53  Total Timed Codes (min): 53  1:1 Treatment Time ( only): 48   Visit #: 7     Treatment Area: Low back pain [M54.5]    SUBJECTIVE  Pain Level (0-10 scale): 6  Any medication changes, allergies to medications, adverse drug reactions, diagnosis change, or new procedure performed?: [x] No    [] Yes (see summary sheet for update)  Subjective functional status/changes:   [] No changes reported  The patient reports that she was slightly flared up yesterday but not too bad; had to use her TENS unit. OBJECTIVE    53 min Therapeutic Exercise:  [x] See flow sheet :   Rationale: increase ROM, increase strength and improve coordination to improve the patients ability to perform daily activities. With   [x] TE   [] TA   [] neuro   [] other: Patient Education: [x] Review HEP    [x] Progressed/Changed HEP based on:   [x] positioning   [x] body mechanics   [] transfers   [] heat/ice application    [] other:      Other Objective/Functional Measures: Pt. Had no increase in pain with therex. Pain Level (0-10 scale) post treatment: 6    ASSESSMENT/Changes in Function:     The patient is progressing with advanced therex as tolerated. Patient will continue to benefit from skilled PT services to modify and progress therapeutic interventions, address functional mobility deficits, address ROM deficits, address strength deficits, analyze and address soft tissue restrictions, analyze and cue movement patterns, analyze and modify body mechanics/ergonomics, assess and modify postural abnormalities, address imbalance/dizziness and instruct in home and community integration to attain remaining goals.      [x]  See Plan of Care  []  See progress note/recertification  []  See Discharge Summary         Progress towards goals / Updated goals: The patient is progressing towards goals.     PLAN  [x]  Upgrade activities as tolerated     [x]  Continue plan of care  [x]  Update interventions per flow sheet       []  Discharge due to:_  []  Other:_      Medhat Carlson, PT 9/12/2018  9:40 AM

## 2018-09-17 ENCOUNTER — HOSPITAL ENCOUNTER (OUTPATIENT)
Dept: MAMMOGRAPHY | Age: 53
Discharge: HOME | End: 2018-09-17
Attending: INTERNAL MEDICINE
Payer: MEDICARE

## 2018-09-17 DIAGNOSIS — Z12.39 BREAST SCREENING: ICD-10-CM

## 2018-09-17 PROCEDURE — 77067 SCR MAMMO BI INCL CAD: CPT

## 2018-09-18 ENCOUNTER — HOSPITAL ENCOUNTER (OUTPATIENT)
Dept: PHYSICAL THERAPY | Age: 53
Discharge: HOME | End: 2018-09-18
Payer: MEDICARE

## 2018-09-18 PROCEDURE — 97110 THERAPEUTIC EXERCISES: CPT

## 2018-09-18 NOTE — PROGRESS NOTES
PT DAILY TREATMENT NOTE - [facility] 2-15    Patient Name: Funmilayo Brennan  Date:2018  : 1965  [x]  Patient  Verified  Payor: [place] MEDICARE / Plan: [place] MEDICARE PART A & B / Product Type: Medicare /    In time:304  Out time:355  Total Treatment Time (min): 51  Total Timed Codes (min): 51  1:1 Treatment Time ([address] only): 34   Visit #: 8     Treatment Area: Low back pain [M54.5]    SUBJECTIVE  Pain Level (0-10 scale): 8/10  Any medication changes, allergies to medications, adverse drug reactions, diagnosis change, or new procedure performed?: [x] No    [] Yes (see summary sheet for update)  Subjective functional status/changes:   [] No changes reported  Patient reports her pain is random some days it is 10/10 others it doesn't hurt at all. OBJECTIVE    51 min Therapeutic Exercise:  [x] See flow sheet :   Rationale: increase ROM and increase strength to improve the patients ability to perform ADLs and reduce pain levels    With   [x] TE   [] TA   [] neuro   [] other: Patient Education: [x] Review HEP    [] Progressed/Changed HEP based on:   [] positioning   [] body mechanics   [] transfers   [] heat/ice application    [] other:      Other Objective/Functional Measures: none noted     Pain Level (0-10 scale) post treatment: 5/10    ASSESSMENT/Changes in Function:   Patient requires multiple rest breaks in order to properly perform therex. Difficulty stabilizing pelvis while performing inclined bird dogs. Will continue to progress therex as tolerated. Patient will continue to benefit from skilled PT services to modify and progress therapeutic interventions, address functional mobility deficits, address ROM deficits, address strength deficits, analyze and address soft tissue restrictions, analyze and cue movement patterns and analyze and modify body mechanics/ergonomics to attain remaining goals.      [x]  See Plan of Care  []  See progress note/recertification  []  See Discharge Summary         Progress towards goals / Updated goals:  Patient is progressing towards goals, will continue to strengthen the hip stabilizers in order to reduce stress applied to the low back.     PLAN  [x]  Upgrade activities as tolerated     [x]  Continue plan of care  [x]  Update interventions per flow sheet       []  Discharge due to:_  []  Other:_      Yanelis Ferguson 9/18/2018  3:22 PM

## 2018-09-25 ENCOUNTER — HOSPITAL ENCOUNTER (OUTPATIENT)
Dept: PHYSICAL THERAPY | Age: 53
Discharge: HOME | End: 2018-09-25
Payer: MEDICARE

## 2018-09-25 PROCEDURE — G8979 MOBILITY GOAL STATUS: HCPCS | Performed by: PHYSICAL THERAPIST

## 2018-09-25 PROCEDURE — G8980 MOBILITY D/C STATUS: HCPCS | Performed by: PHYSICAL THERAPIST

## 2018-09-25 PROCEDURE — 97110 THERAPEUTIC EXERCISES: CPT | Performed by: PHYSICAL THERAPIST

## 2018-09-25 NOTE — PROGRESS NOTES
PT DAILY TREATMENT NOTE - [facility] 2-15    Patient Name: Tahmina Cornell  Date:2018  : 1965  [x]  Patient  Verified  Payor: [place] MEDICARE / Plan: [place] MEDICARE PART A & B / Product Type: Medicare /    In time:10:00am  Out time:11:00am  Total Treatment Time (min): 60  Total Timed Codes (min): 60  1:1 Treatment Time ( only): 15   Visit #: 9     Treatment Area: Low back pain [M54.5]    SUBJECTIVE  Pain Level (0-10 scale): 6  Any medication changes, allergies to medications, adverse drug reactions, diagnosis change, or new procedure performed?: [x] No    [] Yes (see summary sheet for update)  Subjective functional status/changes:   [] No changes reported  The patient reports that she's doing much better overall and will keep doing her exercises. OBJECTIVE    60 min Therapeutic Exercise:  [x] See flow sheet :   Rationale: increase ROM, increase strength and improve coordination to improve the patients ability to perform daily activities. With   [x] TE   [] TA   [] neuro   [] other: Patient Education: [x] Review HEP    [x] Progressed/Changed HEP based on:   [x] positioning   [x] body mechanics   [] transfers   [] heat/ice application    [] other:      Other Objective/Functional Measures: FOTO= 57 (49 at eval); see other objective measures on D/C note     Pain Level (0-10 scale) post treatment: 7    ASSESSMENT/Changes in Function:     Patient has progressed well and MET goals. []  See Plan of Care  []  See progress note/recertification  [x]  See Discharge Summary         Progress towards goals / Updated goals: The patient has MET goals. PLAN  []  Upgrade activities as tolerated     []  Continue plan of care  []  Update interventions per flow sheet       [x]  Discharge due to: Pt. Has MET goals.   []  Other:_      Jud Perez, PT 2018  11:29 AM

## 2018-09-25 NOTE — ANCILLARY DISCHARGE INSTRUCTIONS
[facility]  [address] (MOB IV), [address] [place] [address]  [facility] Michelle Cassidy  Phone: [phone] Fax: [phone]    Discharge Summary 2-15    Patient name: Christian Gonzalez  : 1965  Provider#: [id number]  Referral source: Lamine Martin MD      Medical/Treatment Diagnosis: Low back pain [M54.5]     Prior Hospitalization: see medical history     Comorbidities: See Plan of Care  Prior Level of Function: See Plan of Care  Medications: Verified on Patient Summary List    Start of Care: 18      Onset Date:chronic with recent exacerbation due to fall on 18 and ER visit on 18                  Visits from Start of Care: 9     Missed Visits: 0  Reporting Period : 18 to 18    Assessment/Summary of care: The patient has progressed with the following objective measures for functional strength and stability for her chronic lumbar pain:    Today vs. (At eval):     30s STS: 27x; (7x)   5x STS: 5s; (22s)   SLS: R= >30s (9s); L=>30s (11s)    She has improved subjectively and objectively with pain tolerance with daily activities and has progressed with a dynamic HEP to continue independently. Short Term Goals: To be accomplished in 2 treatments:                        1.) The patient will be independent with her HEP consistently for at least one week. - MET  Long Term Goals: To be accomplished in 16 treatments:                        1.) The patient will be able to sit or walk for at least 15 minutes without having to change position due to pain. - MET                        2.) The patient will improve her FOTO score from 49 to at least 54 to show improvement in functional mobility.- MET (57 today)                        3.) The patient will be able to perform therapeutic exercises for at least 40 minutes with at most 6/10 lumbar pain in order to transition to a home gym program.- MET    G-Codes (GP)  Mobility    Goal  CJ= 20-39%  D/C  CJ= 20-39%    The severity rating is based on clinical judgment and the FOTO Score score.     RECOMMENDATIONS:  [x]Discontinue therapy: [x]Patient has reached or is progressing toward set goals     []Patient is non-compliant or has abdicated     []Due to lack of appreciable progress towards set goals     []Other  Franca Quintero, PT 9/25/2018 11:29 AM

## 2018-09-27 ENCOUNTER — APPOINTMENT (OUTPATIENT)
Dept: PHYSICAL THERAPY | Age: 53
End: 2018-09-27
Payer: MEDICARE

## 2018-11-01 ENCOUNTER — TELEPHONE (OUTPATIENT)
Dept: INTERNAL MEDICINE CLINIC | Age: 53
End: 2018-11-01

## 2018-11-01 NOTE — TELEPHONE ENCOUNTER
Patient is having problems with both feet it started with her left foot a few weeks ago. Please advise. Her number is [phone].        Message received & copied from [facility] after closing on 10/31/18

## 2018-11-07 ENCOUNTER — OFFICE VISIT (OUTPATIENT)
Dept: INTERNAL MEDICINE CLINIC | Age: 53
End: 2018-11-07

## 2018-11-07 VITALS
OXYGEN SATURATION: 100 % | TEMPERATURE: 97.4 F | HEART RATE: 90 BPM | SYSTOLIC BLOOD PRESSURE: 108 MMHG | HEIGHT: 63 IN | WEIGHT: 161.4 LBS | DIASTOLIC BLOOD PRESSURE: 74 MMHG | RESPIRATION RATE: 20 BRPM | BODY MASS INDEX: 28.6 KG/M2

## 2018-11-07 DIAGNOSIS — M25.521 BILATERAL ELBOW JOINT PAIN: ICD-10-CM

## 2018-11-07 DIAGNOSIS — M25.561 ACUTE PAIN OF RIGHT KNEE: ICD-10-CM

## 2018-11-07 DIAGNOSIS — G89.29 CHRONIC LOW BACK PAIN WITHOUT SCIATICA, UNSPECIFIED BACK PAIN LATERALITY: ICD-10-CM

## 2018-11-07 DIAGNOSIS — Z91.018 HX OF FOOD ALLERGY: ICD-10-CM

## 2018-11-07 DIAGNOSIS — M54.50 CHRONIC LOW BACK PAIN WITHOUT SCIATICA, UNSPECIFIED BACK PAIN LATERALITY: ICD-10-CM

## 2018-11-07 DIAGNOSIS — M54.2 NECK PAIN: Primary | ICD-10-CM

## 2018-11-07 DIAGNOSIS — M25.522 BILATERAL ELBOW JOINT PAIN: ICD-10-CM

## 2018-11-07 DIAGNOSIS — M79.672 LEFT FOOT PAIN: ICD-10-CM

## 2018-11-07 RX ORDER — METHOCARBAMOL 500 MG/1
TABLET, FILM COATED ORAL
Qty: 30 TAB | Refills: 1 | Status: SHIPPED | OUTPATIENT
Start: 2018-11-07 | End: 2019-03-15 | Stop reason: SDUPTHER

## 2018-11-07 RX ORDER — EPINEPHRINE 0.3 MG/.3ML
0.3 INJECTION SUBCUTANEOUS
Qty: 1 SYRINGE | Refills: 0 | Status: SHIPPED | OUTPATIENT
Start: 2018-11-07 | End: 2018-11-07

## 2018-11-07 RX ORDER — DICLOFENAC SODIUM 75 MG/1
75 TABLET, DELAYED RELEASE ORAL 2 TIMES DAILY
Qty: 30 TAB | Refills: 1 | Status: SHIPPED | OUTPATIENT
Start: 2018-11-07 | End: 2019-01-14 | Stop reason: SDUPTHER
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
## 2018-11-07 NOTE — PATIENT INSTRUCTIONS
RESUME THE DICLOFENAC 75MG TWICE A DAY FOR 5 DAYS, THEN REDUCE TO ONCE A DAY FOR 5 DAYS THEN STOP. RESUME THE METHOCARMOBAL (ROBAXIN) MUSCLE RELAXANT AT BEDTIME. USE HEAT +/- ICE TO THE NECK AND LOWER BACK. STRETCH DAILY. Body Mass Index: Care Instructions  Your Care Instructions    Body mass index (BMI) can help you see if your weight is raising your risk for health problems. It uses a formula to compare how much you weigh with how tall you are. · A BMI lower than 18.5 is considered underweight. · A BMI between 18.5 and 24.9 is considered healthy. · A BMI between 25 and 29.9 is considered overweight. A BMI of 30 or higher is considered obese. If your BMI is in the normal range, it means that you have a lower risk for weight-related health problems. If your BMI is in the overweight or obese range, you may be at increased risk for weight-related health problems, such as high blood pressure, heart disease, stroke, arthritis or joint pain, and diabetes. If your BMI is in the underweight range, you may be at increased risk for health problems such as fatigue, lower protection (immunity) against illness, muscle loss, bone loss, hair loss, and hormone problems. BMI is just one measure of your risk for weight-related health problems. You may be at higher risk for health problems if you are not active, you eat an unhealthy diet, or you drink too much alcohol or use tobacco products. Follow-up care is a key part of your treatment and safety. Be sure to make and go to all appointments, and call your doctor if you are having problems. It's also a good idea to know your test results and keep a list of the medicines you take. How can you care for yourself at home? · Practice healthy eating habits. This includes eating plenty of fruits, vegetables, whole grains, lean protein, and low-fat dairy. · If your doctor recommends it, get more exercise. Walking is a good choice.  Bit by bit, increase the amount you walk every day. Try for at least 30 minutes on most days of the week. · Do not smoke. Smoking can increase your risk for health problems. If you need help quitting, talk to your doctor about stop-smoking programs and medicines. These can increase your chances of quitting for good. · Limit alcohol to 2 drinks a day for men and 1 drink a day for women. Too much alcohol can cause health problems. If you have a BMI higher than 25  · Your doctor may do other tests to check your risk for weight-related health problems. This may include measuring the distance around your waist. A waist measurement of more than 40 inches in men or 35 inches in women can increase the risk of weight-related health problems. · Talk with your doctor about steps you can take to stay healthy or improve your health. You may need to make lifestyle changes to lose weight and stay healthy, such as changing your diet and getting regular exercise. If you have a BMI lower than 18.5  · Your doctor may do other tests to check your risk for health problems. · Talk with your doctor about steps you can take to stay healthy or improve your health. You may need to make lifestyle changes to gain or maintain weight and stay healthy, such as getting more healthy foods in your diet and doing exercises to build muscle. Where can you learn more? Go to http://radha-my.info/. Enter S176 in the search box to learn more about \"Body Mass Index: Care Instructions. \"  Current as of: October 13, 2016  Content Version: 11.4  © 5428-6800 [employer]. Care instructions adapted under license by [employer] (which disclaims liability or warranty for this information). If you have questions about a medical condition or this instruction, always ask your healthcare professional. [place] [address] any warranty or liability for your use of this information.

## 2018-11-07 NOTE — TELEPHONE ENCOUNTER
----- Message from Justin nAtony sent at 11/7/2018  9:08 AM EST -----  Regarding: Dr. Linsey Chavira returning a call to the nurse. Pt's best contact [phone].        Message copied/pasted from [place]
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
## 2018-11-21 ENCOUNTER — OFFICE VISIT (OUTPATIENT)
Dept: ENDOCRINOLOGY | Age: 53
End: 2018-11-21

## 2018-11-21 VITALS
DIASTOLIC BLOOD PRESSURE: 76 MMHG | SYSTOLIC BLOOD PRESSURE: 134 MMHG | HEIGHT: 63 IN | BODY MASS INDEX: 28.84 KG/M2 | WEIGHT: 162.8 LBS | HEART RATE: 86 BPM

## 2018-11-21 DIAGNOSIS — R79.89 ABNORMAL THYROID BLOOD TEST: Primary | ICD-10-CM

## 2018-11-21 DIAGNOSIS — E07.9 DISORDER OF THYROID: ICD-10-CM
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
## 2018-11-22 LAB
T3 SERPL-MCNC: 71 NG/DL (ref 71–180)
T4 FREE SERPL-MCNC: 0.83 NG/DL (ref 0.82–1.77)
TSH SERPL DL<=0.005 MIU/L-ACNC: 1.15 UIU/ML (ref 0.45–4.5)

## 2018-11-27 ENCOUNTER — HOSPITAL ENCOUNTER (OUTPATIENT)
Dept: PHYSICAL THERAPY | Age: 53
Discharge: HOME | End: 2018-11-27
Payer: MEDICARE

## 2018-11-27 PROCEDURE — 97110 THERAPEUTIC EXERCISES: CPT | Performed by: PHYSICAL THERAPIST

## 2018-11-27 PROCEDURE — G8979 MOBILITY GOAL STATUS: HCPCS | Performed by: PHYSICAL THERAPIST

## 2018-11-27 PROCEDURE — 97162 PT EVAL MOD COMPLEX 30 MIN: CPT | Performed by: PHYSICAL THERAPIST

## 2018-11-27 PROCEDURE — G8978 MOBILITY CURRENT STATUS: HCPCS | Performed by: PHYSICAL THERAPIST
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
## 2018-11-27 NOTE — PROGRESS NOTES
PT INITIAL EVALUATION NOTE - [facility] 2-15    Patient Name: Umm Harrell  Date:2018  : 1965  [x]  Patient  Verified  Payor: [place] MEDICARE / Plan: [place] MEDICARE PART A & B / Product Type: Medicare /    In time: 7:35am  Out time: 8:30am  Total Treatment Time (min): 55  Total Timed Codes (min): 25  1:1 Treatment Time ( only): 55   Visit #: 1     Treatment Area: Bilateral ankle joint pain [M25.571, M25.572]    SUBJECTIVE  Pain Level (0-10 scale): R=7 (7-10); L=8 (8-10)   Any medication changes, allergies to medications, adverse drug reactions, diagnosis change, or new procedure performed?: [] No    [x] Yes (see summary sheet for update)  Subjective: The patient reports that she's had bilateral foot/ankle pain for about 6 weeks. It first started on the left foot and then right, on the left it feels bad on the outside and on the right in feels worse on the inside. It hurts immediately in the morning when she puts her feet on the ground. She wears bilateral ASO braces when on her feet for a long time. OBJECTIVE/EXAMINATION  Posture:  Scapular protraction  Other Observations:  Pt. Compensated left sided weightbearing; overpronation bilaterally without shoes  Gait and Functional Mobility:  Lateral foot walking on the left with decreased DF bilaterally (left significantly worse than right); antalgic with short stance time on left   Palpation: bilateral peroneals (left worse than right), left ATF ligament, bilateral posterior tibilialis    A/PROM:   Right   Left   Flexion /DF  -6/10   -10/-2   Extension/PF  60/65   60/65      Joint Mobility Assessment: hypomobile talocrural joint    Flexibility: tight calves    LOWER QUARTER   MUSCLE STRENGTH  KEY       R  L  0 - No Contraction  Knee ext  4  4  1 - Trace            flex  4-  4-  2 - Poor   Hip ext   nt  nt  3 - Fair          flex   4-  4-  4 - Good         abd  3+  3-  5 - Normal         add  nt  nt      Ankle DF  4  3+, p!                 PF  4  4 INV  4, p!  4, p! EV  4, p!  4, p! Neurological: Reflexes / Sensations: WFL    25 min Therapeutic Exercise:  [x] See flow sheet :   Rationale: increase ROM, increase strength and improve coordination to improve the patients ability to perform daily activities.            With   [x] TE   [] TA   [] neuro   [] other: Patient Education: [x] Review HEP    [x] Progressed/Changed HEP based on:   [x] positioning   [x] body mechanics   [] transfers   [] heat/ice application    [] other:      Other Objective/Functional Measures: FOTO= 34    SLS: R=5s; L=2s    Pain Level (0-10 scale) post treatment: R/L= 7/7    ASSESSMENT/Changes in Function:     [x]  See Plan of [address] Ozuna, PT 11/27/2018  7:37 AM

## 2018-11-29 ENCOUNTER — HOSPITAL ENCOUNTER (OUTPATIENT)
Dept: PHYSICAL THERAPY | Age: 53
Discharge: HOME | End: 2018-11-29
Payer: MEDICARE

## 2018-11-29 PROCEDURE — 97110 THERAPEUTIC EXERCISES: CPT

## 2018-11-29 PROCEDURE — 97140 MANUAL THERAPY 1/> REGIONS: CPT

## 2018-11-29 NOTE — PROGRESS NOTES
PT DAILY TREATMENT NOTE - [facility] -15    Patient Name: Joaquin Fraction  Date:2018  : 1965  [x]  Patient  Verified  Payor: [employer] / Plan: [place] MEDICARE PART A & B / Product Type: Medicare /    In time:1203  Out time:120  Total Treatment Time (min): 77  Total Timed Codes (min): 67  1:1 Treatment Time ( [address] only): 67   Visit #: 2     Treatment Area: Bilateral ankle pain [M25.571, M25.572]  The Student Physical Therapist participated in the delivery of services under the direction of a licensed PT directing the service, making the skilled judgment, and who is responsible for the assessment and treatment. SUBJECTIVE  Pain Level (0-10 scale): 8/10 on bilateral ankles  Any medication changes, allergies to medications, adverse drug reactions, diagnosis change, or new procedure performed?: [x] No    [] Yes (see summary sheet for update)  Subjective functional status/changes:   [] No changes reported  Patient reports that she bought new shoes but she hasn't worn them outside the store yet. She is concerned because she used to be an 8 (same shoe brand) but now, she is a 9 and she thinks that maybe it is because her feet are swollen. She reports that she woke up sore/in discomfort the morning after she performed her HEP.      OBJECTIVE    Modality rationale: decrease inflammation and decrease pain to improve the patients ability to perform all activities at home and in the community   Type Additional Details   [] Estim: []Att   []Unatt    []TENS instruct                  []IFC  []Premod   []NMES                     []Other:  []w/US   []w/ice   []w/heat  Position:  Location:   []  Traction: [] Cervical       []Lumbar                       [] Prone          []Supine                       []Intermittent   []Continuous Lbs:  [] before manual  [] after manual  []w/heat   []  Ultrasound: []Continuous   [] Pulsed                       at: []1MHz   []3MHz Location:  W/cm2:   [] Paraffin         Location:   []w/heat   [x] Ice (10 min)    []  Heat  []  Ice massage Position: Supine  Location: R ankle   []  Laser  []  Other: Position:  Location:   []  Vasopneumatic Device Pressure:       [] lo [] med [] hi   Temperature:      [x] Skin assessment post-treatment:  [x]intact []redness- no adverse reaction    []redness - adverse reaction:     59 min Therapeutic Exercise:  [x] See flow sheet :   Rationale: increase ROM, increase strength, improve coordination and improve balance to improve the patients ability to perform all activities      8 min Manual Therapy: Posterior glide of R talus, metatarsal mobs, distraction of talocrural joint   Rationale: decrease pain, increase ROM and increase tissue extensibility to improve the patients ability to perform all exercises/activities            With   [] TE   [] TA   [] neuro   [] other: Patient Education: [x] Review HEP    [] Progressed/Changed HEP based on:   [] positioning   [] body mechanics   [] transfers   [] heat/ice application    [] other:      Other Objective/Functional Measures:    Special Test:     Anterior Drawer Test: -    Talar Tilt: -     Inversion Stress Test: intermittently +     Pain Level (0-10 scale) post treatment: 9/10 on R ankle, 8/10 on L ankle    ASSESSMENT/Changes in Function:   Patient progressed with therex today. She brought in her new shoes and wore them throughout therapy. She had increased R ankle pain with bosu step ups and pull down step ups and so manual therapy was implemented and she responded well to that. She became a little emotional about not being able to do the last two exercises without pain. Will continue to progress therex as tolerated. Patient will continue to benefit from skilled PT services to modify and progress therapeutic interventions, address functional mobility deficits, address ROM deficits, address strength deficits, analyze and cue movement patterns and assess and modify postural abnormalities to attain remaining goals.      []  See Plan of Care  []  See progress note/recertification  []  See Discharge Summary         Progress towards goals / Updated goals:  Patient is progressing towards goals.  Will continue to address LE strengthening and balance in order to improve function    PLAN  [x]  Upgrade activities as tolerated     [x]  Continue plan of care  [x]  Update interventions per flow sheet       []  Discharge due to:_  []  Other:_      Anita Slice 11/29/2018  12:17 PM

## 2018-12-04 ENCOUNTER — HOSPITAL ENCOUNTER (OUTPATIENT)
Dept: PHYSICAL THERAPY | Age: 53
Discharge: HOME | End: 2018-12-04
Payer: MEDICARE

## 2018-12-04 PROCEDURE — 97110 THERAPEUTIC EXERCISES: CPT | Performed by: PHYSICAL THERAPIST

## 2018-12-04 PROCEDURE — 97112 NEUROMUSCULAR REEDUCATION: CPT | Performed by: PHYSICAL THERAPIST

## 2018-12-04 PROCEDURE — 97140 MANUAL THERAPY 1/> REGIONS: CPT | Performed by: PHYSICAL THERAPIST

## 2018-12-04 NOTE — PROGRESS NOTES
PT DAILY TREATMENT NOTE - [facility] 2-15    Patient Name: Zahida Sher  Date:2018  : 1965  [x]  Patient  Verified  Payor: [place] MEDICARE / Plan: VA MEDICARE PART A & B / Product Type: Medicare /    In time: 2:14pm  Out time: 3:33pm  Total Treatment Time (min): 79  Total Timed Codes (min): 31  1:1 Treatment Time ( only): 31   Visit #: 3     Treatment Area: Bilateral ankle pain [M25.571, M25.572]    SUBJECTIVE  Pain Level (0-10 scale): R/L:   Any medication changes, allergies to medications, adverse drug reactions, diagnosis change, or new procedure performed?: [x] No    [] Yes (see summary sheet for update)  Subjective functional status/changes:   [] No changes reported  The patient reports that she's gradually getting better. OBJECTIVE    Modality rationale: decrease edema, decrease inflammation and decrease pain to improve the patients ability to perform daily activities. Type Additional Details   [] Estim: []Att   []Unatt    []TENS instruct                  []IFC  []Premod   []NMES                     []Other:  []w/US   []w/ice   []w/heat  Position:  Location:   []  Traction: [] Cervical       []Lumbar                       [] Prone          []Supine                       []Intermittent   []Continuous Lbs:  [] before manual  [] after manual  []w/heat   []  Ultrasound: []Continuous   [] Pulsed                       at: []1MHz   []3MHz Location:  W/cm2:   [] Paraffin         Location:   []w/heat   [x]  Ice 10    []  Heat  []  Ice massage Position: supine  Location: right ankle   []  Laser  []  Other: Position:  Location:   []  Vasopneumatic Device Pressure:       [] lo [] med [] hi   Temperature:      [x] Skin assessment post-treatment:  [x]intact []redness- no adverse reaction    []redness - adverse reaction:     21 min Therapeutic Exercise:  [x] See flow sheet :   Rationale: increase ROM, increase strength and improve coordination to improve the patients ability to perform daily activities. 10 min Manual Therapy: Bilateral: Grade 3/4 A/P talocrural mobs; metatarsal mobs; calcaneal distraction; contract/relax into DF    Rationale: decrease pain, increase ROM and increase tissue extensibility to improve the patients ability to perform daily activities. With   [x] TE   [] TA   [] neuro   [] other: Patient Education: [x] Review HEP    [x] Progressed/Changed HEP based on:   [x] positioning   [x] body mechanics   [] transfers   [] heat/ice application    [] other:      Other Objective/Functional Measures: Pt. Tolerated increased resistance well     Pain Level (0-10 scale) post treatment: R/L: 7/7    ASSESSMENT/Changes in Function:   The patient progressed with tolerance to increased resistance and stability therex. Patient will continue to benefit from skilled PT services to modify and progress therapeutic interventions, address functional mobility deficits, address ROM deficits, address strength deficits, analyze and address soft tissue restrictions, analyze and cue movement patterns, analyze and modify body mechanics/ergonomics, assess and modify postural abnormalities, address imbalance/dizziness and instruct in home and community integration to attain remaining goals. [x]  See Plan of Care  []  See progress note/recertification  []  See Discharge Summary         Progress towards goals / Updated goals: The patient is progressing towards goals.      PLAN  [x]  Upgrade activities as tolerated     []  Continue plan of care  [x]  Update interventions per flow sheet       []  Discharge due to:_  []  Other:_      Dang Monique, PT 12/4/2018  12:19 PM

## 2018-12-06 ENCOUNTER — HOSPITAL ENCOUNTER (OUTPATIENT)
Dept: PHYSICAL THERAPY | Age: 53
Discharge: HOME | End: 2018-12-06
Payer: MEDICARE

## 2018-12-06 PROCEDURE — 97140 MANUAL THERAPY 1/> REGIONS: CPT | Performed by: PHYSICAL THERAPIST

## 2018-12-06 PROCEDURE — 97110 THERAPEUTIC EXERCISES: CPT | Performed by: PHYSICAL THERAPIST

## 2018-12-06 PROCEDURE — 97112 NEUROMUSCULAR REEDUCATION: CPT | Performed by: PHYSICAL THERAPIST

## 2018-12-06 NOTE — PROGRESS NOTES
PT DAILY TREATMENT NOTE - [facility] 2-15    Patient Name: Lou Lyon  Date:2018  : 1965  [x]  Patient  Verified  Payor: [place] MEDICARE / Plan: [place] MEDICARE PART A & B / Product Type: Medicare /    In time: 9:27am  Out time: 10:24am  Total Treatment Time (min): 57  Total Timed Codes (min): 55  1:1 Treatment Time ( only): 55   Visit #: 4     Treatment Area: Bilateral ankle pain [M25.571, M25.572]    SUBJECTIVE  Pain Level (0-10 scale): R/L= 6/6  Any medication changes, allergies to medications, adverse drug reactions, diagnosis change, or new procedure performed?: [x] No    [] Yes (see summary sheet for update)  Subjective functional status/changes:   [] No changes reported  The patient reports that she was a little achy yesterday. OBJECTIVE    30 min Therapeutic Exercise:  [x] See flow sheet :   Rationale: increase ROM, increase strength and improve coordination to improve the patients ability to perform daily activities. 15 min Neuromuscular Re-education:  [x]  See flow sheet :   Rationale: improve coordination, improve balance and increase proprioception  to improve the patients ability to perform daily activities. 10 min Manual Therapy: Bilateral: Grade 3/4 A/P talocrural mobs; metatarsal mobs; calcaneal distraction; contract/relax into DF    Rationale: decrease pain, increase ROM and increase tissue extensibility to improve the patients ability to perform daily activities. With   [x] TE   [] TA   [] neuro   [] other: Patient Education: [x] Review HEP    [x] Progressed/Changed HEP based on:   [x] positioning   [x] body mechanics   [] transfers   [] heat/ice application    [] other:      Other Objective/Functional Measures: Pt. Tolerated therex well     Pain Level (0-10 scale) post treatment: R/L: /6    ASSESSMENT/Changes in Function:   The patient progressed with significantly improved stability and strength as tolerated.   Patient will continue to benefit from skilled PT services to modify and progress therapeutic interventions, address functional mobility deficits, address ROM deficits, address strength deficits, analyze and address soft tissue restrictions, analyze and cue movement patterns, analyze and modify body mechanics/ergonomics, assess and modify postural abnormalities, address imbalance/dizziness and instruct in home and community integration to attain remaining goals. [x]  See Plan of Care  []  See progress note/recertification  []  See Discharge Summary         Progress towards goals / Updated goals: The patient is progressing towards goals.      PLAN  [x]  Upgrade activities as tolerated     [x]  Continue plan of care  [x]  Update interventions per flow sheet       []  Discharge due to:_  []  Other:_      Leanna Angulo, PT 12/6/2018  9:40 AM

## 2018-12-11 ENCOUNTER — HOSPITAL ENCOUNTER (OUTPATIENT)
Dept: PHYSICAL THERAPY | Age: 53
Discharge: HOME | End: 2018-12-11
Payer: MEDICARE

## 2018-12-11 PROCEDURE — 97112 NEUROMUSCULAR REEDUCATION: CPT | Performed by: PHYSICAL THERAPIST

## 2018-12-11 PROCEDURE — 97110 THERAPEUTIC EXERCISES: CPT | Performed by: PHYSICAL THERAPIST

## 2018-12-11 PROCEDURE — 97140 MANUAL THERAPY 1/> REGIONS: CPT | Performed by: PHYSICAL THERAPIST

## 2018-12-11 NOTE — PROGRESS NOTES
PT DAILY TREATMENT NOTE - [facility] 2-15    Patient Name: Gabriel Fay  Date:2018  : 1965  [x]  Patient  Verified  Payor: [place] MEDICARE / Plan: VA MEDICARE PART A & B / Product Type: Medicare /    In time: 11:57am  Out time: 12:53pm  Total Treatment Time (min): 56  Total Timed Codes (min): 38  1:1 Treatment Time ([facility] only): 38   Visit #: 5     Treatment Area: Bilateral ankle pain [M25.571, M25.572]    SUBJECTIVE  Pain Level (0-10 scale): R= 6; L= 0  Any medication changes, allergies to medications, adverse drug reactions, diagnosis change, or new procedure performed?: [x] No    [] Yes (see summary sheet for update)  Subjective functional status/changes:   [] No changes reported  The patient reports that her left is doing much better, but her right is bothering her today. OBJECTIVE    15 min Therapeutic Exercise:  [x] See flow sheet :   Rationale: increase ROM, increase strength and improve coordination to improve the patients ability to perform daily activities. 13 min Neuromuscular Re-education:  [x]  See flow sheet :   Rationale: improve coordination, improve balance and increase proprioception  to improve the patients ability to perform daily activities. 10 min Manual Therapy: Right only: Grade 3/4 A/P talocrural mobs; metatarsal mobs; calcaneal distraction; contract/relax into DF     Rationale: decrease pain, increase ROM and increase tissue extensibility to improve the patients ability to perform daily activities. With   [x] TE   [] TA   [] neuro   [] other: Patient Education: [x] Review HEP    [x] Progressed/Changed HEP based on:   [x] positioning   [x] body mechanics   [] transfers   [] heat/ice application    [] other:      Other Objective/Functional Measures: Pt. Had improved balance and stability today     Pain Level (0-10 scale) post treatment: R/L: 6/0    ASSESSMENT/Changes in Function:   The patient is progress well with improved strength and overall stability. Patient will continue to benefit from skilled PT services to modify and progress therapeutic interventions, address functional mobility deficits, address ROM deficits, address strength deficits, analyze and address soft tissue restrictions, analyze and cue movement patterns, analyze and modify body mechanics/ergonomics, assess and modify postural abnormalities, address imbalance/dizziness and instruct in home and community integration to attain remaining goals. [x]  See Plan of Care  []  See progress note/recertification  []  See Discharge Summary         Progress towards goals / Updated goals: The patient is progressing towards goals.      PLAN  [x]  Upgrade activities as tolerated     [x]  Continue plan of care  [x]  Update interventions per flow sheet       []  Discharge due to:_  []  Other:_      oDntae Mtz PT 12/11/2018  10:45 AM

## 2018-12-13 ENCOUNTER — HOSPITAL ENCOUNTER (OUTPATIENT)
Dept: PHYSICAL THERAPY | Age: 53
Discharge: HOME | End: 2018-12-13
Payer: MEDICARE

## 2018-12-13 PROCEDURE — 97110 THERAPEUTIC EXERCISES: CPT | Performed by: PHYSICAL THERAPIST

## 2018-12-13 PROCEDURE — 97112 NEUROMUSCULAR REEDUCATION: CPT | Performed by: PHYSICAL THERAPIST

## 2018-12-13 PROCEDURE — 97140 MANUAL THERAPY 1/> REGIONS: CPT | Performed by: PHYSICAL THERAPIST

## 2018-12-13 NOTE — PROGRESS NOTES
PT DAILY TREATMENT NOTE - [facility] 2-15    Patient Name: Shashank Randall  Date:2018  : 1965  [x]  Patient  Verified  Payor: [place] MEDICARE / Plan: VA MEDICARE PART A & B / Product Type: Medicare /    In time: 10:12am  Out time: 10:52am  Total Treatment Time (min): 50  Total Timed Codes (min): 50  1:1 Treatment Time ([address] only): 50   Visit #: 6     Treatment Area: Bilateral ankle pain [M25.571, M25.572]    SUBJECTIVE  Pain Level (0-10 scale): R/L: 6/0  Any medication changes, allergies to medications, adverse drug reactions, diagnosis change, or new procedure performed?: [x] No    [] Yes (see summary sheet for update)  Subjective functional status/changes:   [] No changes reported  The patient reports that her left is still feeling good, but the right still hurts. OBJECTIVE    20 min Therapeutic Exercise:  [x] See flow sheet :   Rationale: increase ROM, increase strength and improve coordination to improve the patients ability to perform daily activities. 20 min Neuromuscular Re-education:  [x]  See flow sheet :   Rationale: improve coordination, improve balance and increase proprioception  to improve the patients ability to perform daily activities. 10 min Manual Therapy: Right only: Grade 3/4 A/P talocrural mobs; metatarsal mobs; calcaneal distraction; contract/relax into DF; manual resisted DF/PF/INV/EV     Rationale: decrease pain, increase ROM and increase tissue extensibility to improve the patients ability to perform daily activities.            With   [x] TE   [] TA   [] neuro   [] other: Patient Education: [x] Review HEP    [x] Progressed/Changed HEP based on:   [x] positioning   [x] body mechanics   [] transfers   [] heat/ice application    [] other:      Other Objective/Functional Measures: Pt. Had improved stability today     Pain Level (0-10 scale) post treatment: R/L= 7/0    ASSESSMENT/Changes in Function:   The patient progressed with strengthening and stability exercises as tolerated. Patient will continue to benefit from skilled PT services to modify and progress therapeutic interventions, address functional mobility deficits, address ROM deficits, address strength deficits, analyze and address soft tissue restrictions, analyze and cue movement patterns, analyze and modify body mechanics/ergonomics, assess and modify postural abnormalities, address imbalance/dizziness and instruct in home and community integration to attain remaining goals. [x]  See Plan of Care  []  See progress note/recertification  []  See Discharge Summary         Progress towards goals / Updated goals: The patient is progressing towards goals.      PLAN  [x]  Upgrade activities as tolerated     [x]  Continue plan of care  [x]  Update interventions per flow sheet       []  Discharge due to:_  []  Other:_      Bertha Mondragon, PT 12/13/2018  9:12 AM

## 2018-12-18 ENCOUNTER — HOSPITAL ENCOUNTER (OUTPATIENT)
Dept: PHYSICAL THERAPY | Age: 53
Discharge: HOME | End: 2018-12-18
Payer: MEDICARE

## 2018-12-18 PROCEDURE — 97140 MANUAL THERAPY 1/> REGIONS: CPT | Performed by: PHYSICAL THERAPIST

## 2018-12-18 PROCEDURE — 97112 NEUROMUSCULAR REEDUCATION: CPT | Performed by: PHYSICAL THERAPIST

## 2018-12-18 PROCEDURE — 97110 THERAPEUTIC EXERCISES: CPT | Performed by: PHYSICAL THERAPIST

## 2018-12-18 NOTE — PROGRESS NOTES
PT DAILY TREATMENT NOTE - [facility] 2-15    Patient Name: Jean Deleon  Date:2018  : 1965  [x]  Patient  Verified  Payor: [place] MEDICARE / Plan: VA MEDICARE PART A & B / Product Type: Medicare /    In time: 9:42am  Out time: 10:38am  Total Treatment Time (min): 56  Total Timed Codes (min): 40  1:1 Treatment Time ([facility] only): 40   Visit #: 7     Treatment Area: Bilateral ankle pain [M25.571, M25.572]    SUBJECTIVE  Pain Level (0-10 scale): R/L: 6/2  Any medication changes, allergies to medications, adverse drug reactions, diagnosis change, or new procedure performed?: [x] No    [] Yes (see summary sheet for update)  Subjective functional status/changes:   [] No changes reported  The patient reports that she tried on boots and it irritated her left foot. OBJECTIVE     15 min Therapeutic Exercise:  [x] See flow sheet :   Rationale: increase ROM, increase strength and improve coordination to improve the patients ability to perform daily activities. 15 min Neuromuscular Re-education:  [x]  See flow sheet :   Rationale: improve coordination, improve balance and increase proprioception  to improve the patients ability to perform daily activities. 10 min Manual Therapy: right only Grade 3/4 A/P talocrural mobs; metatarsal mobs; calcaneal distraction    Rationale: decrease pain, increase ROM and increase tissue extensibility to improve the patients ability to perform daily activities. With   [x] TE   [] TA   [] neuro   [] other: Patient Education: [x] Review HEP    [x] Progressed/Changed HEP based on:   [x] positioning   [x] body mechanics   [] transfers   [] heat/ice application    [] other:      Other Objective/Functional Measures: Pt. Had improved balance today     Pain Level (0-10 scale) post treatment: R/L: 5/2    ASSESSMENT/Changes in Function:   The patient progressed with strengthening and stability today as tolerated.     Patient will continue to benefit from skilled PT services to modify and progress therapeutic interventions, address functional mobility deficits, address ROM deficits, address strength deficits, analyze and address soft tissue restrictions, analyze and cue movement patterns, analyze and modify body mechanics/ergonomics, assess and modify postural abnormalities, address imbalance/dizziness and instruct in home and community integration to attain remaining goals. [x]  See Plan of Care  []  See progress note/recertification  []  See Discharge Summary         Progress towards goals / Updated goals: The patient is progressing towards goals.      PLAN  [x]  Upgrade activities as tolerated     [x]  Continue plan of care  [x]  Update interventions per flow sheet       []  Discharge due to:_  []  Other:_      Gunnar Gaytan, PT 12/18/2018  8:45 AM

## 2018-12-20 ENCOUNTER — HOSPITAL ENCOUNTER (OUTPATIENT)
Dept: PHYSICAL THERAPY | Age: 53
Discharge: HOME | End: 2018-12-20
Payer: MEDICARE

## 2018-12-20 PROCEDURE — 97140 MANUAL THERAPY 1/> REGIONS: CPT | Performed by: PHYSICAL THERAPIST

## 2018-12-20 PROCEDURE — 97112 NEUROMUSCULAR REEDUCATION: CPT | Performed by: PHYSICAL THERAPIST

## 2018-12-20 PROCEDURE — 97110 THERAPEUTIC EXERCISES: CPT | Performed by: PHYSICAL THERAPIST

## 2018-12-20 NOTE — PROGRESS NOTES
PT DAILY TREATMENT NOTE - [facility] 2-15    Patient Name: Ashlee Abbott  Date:2018  : 1965  [x]  Patient  Verified  [place]or[place] MED[place] / Plan: [place] MEDICARE PART A & B / Product Type: Medicare /    In time: 8:47am  Out time: 9:42am  Total Treatment Time (min): 55  Total Timed Codes (min): 55  1:1 Treatment Time ([address] only): 55   Visit #: 8     Treatment Area: Bilateral ankle pain [M25.571, M25.572]    SUBJECTIVE  Pain Level (0-10 scale): R/L: 6/2  Any medication changes, allergies to medications, adverse drug reactions, diagnosis change, or new procedure performed?: [x] No    [] Yes (see summary sheet for update)  Subjective functional status/changes:   [] No changes reported  The patient reports that she was rushing to get here so wasn't thinking about her ankles hurting until now. OBJECTIVE    30 min Therapeutic Exercise:  [x] See flow sheet :   Rationale: increase ROM, increase strength and improve coordination to improve the patients ability to perform daily activities. 15 min Neuromuscular Re-education:  [x]  See flow sheet :   Rationale: improve coordination, improve balance and increase proprioception  to improve the patients ability to perform daily activities. 10 min Manual Therapy:  Right only: Grade 3/4 A/P talocrural mobs; Contract/relax into DF; metatarsal mobs/calcaneal mobs and distraction    Rationale: decrease pain, increase ROM, increase tissue extensibility and decrease edema  to improve the patients ability to perform daily activities. With   [x] TE   [] TA   [] neuro   [] other: Patient Education: [x] Review HEP    [x] Progressed/Changed HEP based on:   [x] positioning   [x] body mechanics   [] transfers   [] heat/ice application    [] other:      Other Objective/Functional Measures: Pt.  Tolerated therex well      Pain Level (0-10 scale) post treatment: R/L: 5/0    ASSESSMENT/Changes in Function:   The patient progressed with improved stability overall but continues to have right lateral ankle discomfort. Patient will continue to benefit from skilled PT services to modify and progress therapeutic interventions, address functional mobility deficits, address ROM deficits, address strength deficits, analyze and address soft tissue restrictions, analyze and cue movement patterns, analyze and modify body mechanics/ergonomics, assess and modify postural abnormalities, address imbalance/dizziness and instruct in home and community integration to attain remaining goals. [x]  See Plan of Care  []  See progress note/recertification  []  See Discharge Summary         Progress towards goals / Updated goals: The patient is progressing towards goals.      PLAN  [x]  Upgrade activities as tolerated     [x]  Continue plan of care  [x]  Update interventions per flow sheet       []  Discharge due to:_  []  Other:_      Светлана Brown PT 12/20/2018  8:37 AM

## 2018-12-21 ENCOUNTER — APPOINTMENT (OUTPATIENT)
Dept: PHYSICAL THERAPY | Age: 53
End: 2018-12-21
Payer: MEDICARE

## 2019-01-03 ENCOUNTER — HOSPITAL ENCOUNTER (OUTPATIENT)
Dept: PHYSICAL THERAPY | Age: 54
Discharge: HOME | End: 2019-01-03
Payer: MEDICARE

## 2019-01-03 PROCEDURE — G8980 MOBILITY D/C STATUS: HCPCS | Performed by: PHYSICAL THERAPIST

## 2019-01-03 PROCEDURE — 97110 THERAPEUTIC EXERCISES: CPT | Performed by: PHYSICAL THERAPIST

## 2019-01-03 PROCEDURE — G8979 MOBILITY GOAL STATUS: HCPCS | Performed by: PHYSICAL THERAPIST

## 2019-01-03 PROCEDURE — 97112 NEUROMUSCULAR REEDUCATION: CPT | Performed by: PHYSICAL THERAPIST

## 2019-01-03 NOTE — ANCILLARY DISCHARGE INSTRUCTIONS
[facility] Physical Therapy  [address] ([address]), [address] [facility] Michelle Rene  Phone: [phone] Fax: [phone]    Discharge Summary 2-15    Patient name: Janette Fraser  : 1965  Provider#: [id number]  Referral source: Allyn Lyman MD      Medical/Treatment Diagnosis: Bilateral ankle pain [M25.571, M25.572]     Prior Hospitalization: see medical history     Comorbidities: See Plan of Care  Prior Level of Function: See Plan of Care  Medications: Verified on Patient Summary List    Start of Care: 18      Onset Date: Approximately 10/16/18   Visits from Start of Care: 9     Missed Visits: 0  Reporting Period : 18 to 1/3/19    Assessment/Summary of care: The patient has progressed significantly with improved ankle A/PROM DF: Right= 6/12 (-6/10 at eval); Left= 10/15 (-10/-2 at eval). She has also improved her single leg stance stability: R= 23s (5s at eval); L= 40s (2s at eval). She initially had an average of 7-8/10 pain bilaterally, but now has 3/10 on the right and 2/10 on the left at the end of her session. She has improved footwear with better arch support and a dynamic HEP to continue to utilize to maintain improvements made thus far. Short Term Goals: To be accomplished in 2 treatments:                         7.) The patient will be independent with their HEP consistently for at least one week. - MET  Long Term Goals: To be accomplished in 16 treatments:                         3.) The patient will have an average of 5/10 pain after a 45 minute therapy session.- MET                         7.) The patient will improve her SLS to at least 7s bilaterally to show improvement in stability, strength, and mobility.- MET                          6.) The patient will improve their FOTO score from 34 to at least 40 to show improvements in functional mobility.- MET (70 today)      G-Codes (GP)  Mobility    Goal  CK= 40-59%  D/C  CJ= 20-39%    The severity rating is based on clinical judgment and the FOTO Score score.     RECOMMENDATIONS:  [x]Discontinue therapy: [x]Patient has reached or is progressing toward set goals     []Patient is non-compliant or has abdicated     []Due to lack of appreciable progress towards set goals     []Other  Arthur Tolbert, PT 1/3/2019 12:54 PM

## 2019-01-03 NOTE — PROGRESS NOTES
PT DAILY TREATMENT NOTE - [facility] 2-15    Patient Name: Ed Bennett  Date:1/3/2019  : 1965  [x]  Patient  Verified  Payor: Chad Shields / Plan: [place] MEDICARE PART A & B / Product Type: Medicare /    In time: 12:36pm  Out time: 1:10pm  Total Treatment Time (min): 34  Total Timed Codes (min): 34  1:1 Treatment Time ( only): 30   Visit #: 9     Treatment Area: Bilateral ankle pain [M25.571, M25.572]    SUBJECTIVE  Pain Level (0-10 scale): R/L: 4/2  Any medication changes, allergies to medications, adverse drug reactions, diagnosis change, or new procedure performed?: [x] No    [] Yes (see summary sheet for update)  Subjective functional status/changes:   [] No changes reported  The patient reports that she was on her feet for 10 hours during her [place] trip, and it flared up to 8/10 but she worked it out and it feels better. OBJECTIVE    24 min Therapeutic Exercise:  [x] See flow sheet :   Rationale: increase ROM, increase strength and improve coordination to improve the patients ability to perform daily activities. 10 min Neuromuscular Re-education:  [x]  See flow sheet :   Rationale: improve coordination, improve balance and increase proprioception  to improve the patients ability to perform daily activities. With   [x] TE   [] TA   [] neuro   [] other: Patient Education: [x] Review HEP    [x] Progressed/Changed HEP based on:   [x] positioning   [x] body mechanics   [] transfers   [] heat/ice application    [] other:      Other Objective/Functional Measures: FOTO= 70 (34 at eval); Ankle A/PROM: Right: DF= 6/12 (-6/10 at eval); Left: DF= 10/15 (-10/-2 at eval); SLS: R= 23s (5s at eval); L= 40s (2s at eval)     Pain Level (0-10 scale) post treatment: R/L: 3/2    ASSESSMENT/Changes in Function:   The patient progressed very well with improved strength, stability, and tolerance to activity overall and will be discharged today.       []  See Plan of Care  []  See progress note/recertification  [x] See Discharge Summary         Progress towards goals / Updated goals: The patient is progressing towards goals. PLAN  [x]  Upgrade activities as tolerated     []  Continue plan of care  []  Update interventions per flow sheet       [x]  Discharge due to: Pt. Has MET goals.    []  Other:Sharad Lacy, PT 1/3/2019  11:53 AM

## 2019-01-14 DIAGNOSIS — M54.50 CHRONIC LOW BACK PAIN WITHOUT SCIATICA, UNSPECIFIED BACK PAIN LATERALITY: ICD-10-CM

## 2019-01-14 DIAGNOSIS — M54.2 NECK PAIN: ICD-10-CM

## 2019-01-14 DIAGNOSIS — M25.521 BILATERAL ELBOW JOINT PAIN: ICD-10-CM

## 2019-01-14 DIAGNOSIS — G89.29 CHRONIC LOW BACK PAIN WITHOUT SCIATICA, UNSPECIFIED BACK PAIN LATERALITY: ICD-10-CM

## 2019-01-14 DIAGNOSIS — M25.522 BILATERAL ELBOW JOINT PAIN: ICD-10-CM

## 2019-01-14 DIAGNOSIS — M79.672 LEFT FOOT PAIN: ICD-10-CM

## 2019-01-14 DIAGNOSIS — M25.561 ACUTE PAIN OF RIGHT KNEE: ICD-10-CM

## 2019-01-14 RX ORDER — DICLOFENAC SODIUM 75 MG/1
TABLET, DELAYED RELEASE ORAL
Qty: 30 TAB | Refills: 0 | Status: SHIPPED | OUTPATIENT
Start: 2019-01-14 | End: 2019-09-17

## 2019-03-15 DIAGNOSIS — M54.50 CHRONIC LOW BACK PAIN WITHOUT SCIATICA, UNSPECIFIED BACK PAIN LATERALITY: ICD-10-CM

## 2019-03-15 DIAGNOSIS — G89.29 CHRONIC LOW BACK PAIN WITHOUT SCIATICA, UNSPECIFIED BACK PAIN LATERALITY: ICD-10-CM

## 2019-03-15 RX ORDER — METHOCARBAMOL 500 MG/1
TABLET, FILM COATED ORAL
Qty: 30 TAB | Refills: 0 | Status: SHIPPED | OUTPATIENT
Start: 2019-03-15 | End: 2019-04-22 | Stop reason: SDUPTHER

## 2019-04-22 DIAGNOSIS — M54.50 CHRONIC LOW BACK PAIN WITHOUT SCIATICA, UNSPECIFIED BACK PAIN LATERALITY: ICD-10-CM

## 2019-04-22 DIAGNOSIS — G89.29 CHRONIC LOW BACK PAIN WITHOUT SCIATICA, UNSPECIFIED BACK PAIN LATERALITY: ICD-10-CM

## 2019-04-23 DIAGNOSIS — M54.50 CHRONIC LOW BACK PAIN WITHOUT SCIATICA, UNSPECIFIED BACK PAIN LATERALITY: ICD-10-CM

## 2019-04-23 DIAGNOSIS — G89.29 CHRONIC LOW BACK PAIN WITHOUT SCIATICA, UNSPECIFIED BACK PAIN LATERALITY: ICD-10-CM

## 2019-04-23 RX ORDER — METHOCARBAMOL 500 MG/1
TABLET, FILM COATED ORAL
Qty: 30 TAB | Refills: 0 | OUTPATIENT
Start: 2019-04-23

## 2019-04-24 RX ORDER — METHOCARBAMOL 500 MG/1
TABLET, FILM COATED ORAL
Qty: 30 TAB | Refills: 0 | Status: SHIPPED | OUTPATIENT
Start: 2019-04-24 | End: 2019-04-25 | Stop reason: SDUPTHER

## 2019-04-24 NOTE — TELEPHONE ENCOUNTER
Spoke with patient. Two pt identifiers confirmed. Patient advised that she will need to be seen in the office before Dr. Michelle Hough will refill her robaxin. Patient offered an appointment on 04/25/19 with Dr. Michelle Hough. Patient accepted. Pt verbalized understanding of information discussed w/ no further questions at this time.    Patient advised if anything changes or if unable to keep this appointment to call the office

## 2019-04-25 ENCOUNTER — OFFICE VISIT (OUTPATIENT)
Dept: INTERNAL MEDICINE CLINIC | Age: 54
End: 2019-04-25

## 2019-04-25 ENCOUNTER — HOSPITAL ENCOUNTER (OUTPATIENT)
Dept: LAB | Age: 54
Discharge: HOME | End: 2019-04-25
Payer: MEDICARE

## 2019-04-25 VITALS
OXYGEN SATURATION: 99 % | TEMPERATURE: 97.8 F | RESPIRATION RATE: 18 BRPM | HEIGHT: 63 IN | WEIGHT: 170 LBS | BODY MASS INDEX: 30.12 KG/M2 | DIASTOLIC BLOOD PRESSURE: 79 MMHG | SYSTOLIC BLOOD PRESSURE: 134 MMHG | HEART RATE: 70 BPM

## 2019-04-25 DIAGNOSIS — G89.29 CHRONIC LOW BACK PAIN WITHOUT SCIATICA, UNSPECIFIED BACK PAIN LATERALITY: ICD-10-CM

## 2019-04-25 DIAGNOSIS — E87.1 HYPONATREMIA: ICD-10-CM

## 2019-04-25 DIAGNOSIS — Z13.1 SCREENING FOR DIABETES MELLITUS: ICD-10-CM

## 2019-04-25 DIAGNOSIS — Z13.220 SCREENING FOR CHOLESTEROL LEVEL: ICD-10-CM

## 2019-04-25 DIAGNOSIS — M54.50 CHRONIC LOW BACK PAIN WITHOUT SCIATICA, UNSPECIFIED BACK PAIN LATERALITY: ICD-10-CM

## 2019-04-25 DIAGNOSIS — E03.8 SUBCLINICAL HYPOTHYROIDISM: Primary | ICD-10-CM

## 2019-04-25 PROCEDURE — 85025 COMPLETE CBC W/AUTO DIFF WBC: CPT

## 2019-04-25 PROCEDURE — 36415 COLL VENOUS BLD VENIPUNCTURE: CPT

## 2019-04-25 PROCEDURE — 80061 LIPID PANEL: CPT

## 2019-04-25 PROCEDURE — 80048 BASIC METABOLIC PNL TOTAL CA: CPT

## 2019-04-25 PROCEDURE — 84443 ASSAY THYROID STIM HORMONE: CPT

## 2019-04-25 RX ORDER — METHOCARBAMOL 500 MG/1
500 TABLET, FILM COATED ORAL
Qty: 30 TAB | Refills: 5 | Status: SHIPPED | OUTPATIENT
Start: 2019-04-25 | End: 2019-06-17 | Stop reason: SDUPTHER

## 2019-04-25 NOTE — PROGRESS NOTES
Reviewed record in preparation for visit and have obtained necessary documentation. Identified pt with two pt identifiers(name and ). Chief Complaint   Patient presents with    Back Pain    Medication Refill       Health Maintenance Due   Topic Date Due    Shingles Vaccine (1 of 2) 2014    Annual Well Visit  05/15/2019       Ms. Radha Burch has a reminder for a \"due or due soon\" health maintenance. I have asked that she discuss this further with her primary care provider for follow-up on this health maintenance. Coordination of Care Questionnaire:  :     1) Have you been to an emergency room, urgent care clinic since your last visit? no   Hospitalized since your last visit? no             2) Have you seen or consulted any other health care providers outside of [address] [place] [address] since your last visit? no  (Include any pap smears or colon screenings in this section.)    3) In the event something were to happen to you and you were unable to speak on your behalf, do you have an Advance Directive/ Living Will in place stating your wishes? NO    Do you have an Advance Directive on file? yes    4) Are you interested in receiving information on Advance Directives? NO    Patient is accompanied by self I have received verbal consent from Sil Billings to discuss any/all medical information while they are present in the room.
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
## 2019-04-26 LAB
BASOPHILS # BLD AUTO: 0 X10E3/UL (ref 0–0.2)
BASOPHILS NFR BLD AUTO: 0 %
BUN SERPL-MCNC: 10 MG/DL (ref 6–24)
BUN/CREAT SERPL: 14 (ref 9–23)
CALCIUM SERPL-MCNC: 9.6 MG/DL (ref 8.7–10.2)
CHLORIDE SERPL-SCNC: 94 MMOL/L (ref 96–106)
CHOLEST SERPL-MCNC: 209 MG/DL (ref 100–199)
CO2 SERPL-SCNC: 23 MMOL/L (ref 20–29)
CREAT SERPL-MCNC: 0.72 MG/DL (ref 0.57–1)
EOSINOPHIL # BLD AUTO: 0.1 X10E3/UL (ref 0–0.4)
EOSINOPHIL NFR BLD AUTO: 1 %
ERYTHROCYTE [DISTWIDTH] IN BLOOD BY AUTOMATED COUNT: 13.2 % (ref 12.3–15.4)
GLUCOSE SERPL-MCNC: 85 MG/DL (ref 65–99)
HCT VFR BLD AUTO: 34.3 % (ref 34–46.6)
HDLC SERPL-MCNC: 86 MG/DL
HGB BLD-MCNC: 11.4 G/DL (ref 11.1–15.9)
IMM GRANULOCYTES # BLD AUTO: 0 X10E3/UL (ref 0–0.1)
IMM GRANULOCYTES NFR BLD AUTO: 0 %
LDLC SERPL CALC-MCNC: 113 MG/DL (ref 0–99)
LYMPHOCYTES # BLD AUTO: 1.8 X10E3/UL (ref 0.7–3.1)
LYMPHOCYTES NFR BLD AUTO: 40 %
MCH RBC QN AUTO: 30.9 PG (ref 26.6–33)
MCHC RBC AUTO-ENTMCNC: 33.2 G/DL (ref 31.5–35.7)
MCV RBC AUTO: 93 FL (ref 79–97)
MONOCYTES # BLD AUTO: 0.3 X10E3/UL (ref 0.1–0.9)
MONOCYTES NFR BLD AUTO: 7 %
NEUTROPHILS # BLD AUTO: 2.4 X10E3/UL (ref 1.4–7)
NEUTROPHILS NFR BLD AUTO: 52 %
PLATELET # BLD AUTO: 278 X10E3/UL (ref 150–379)
POTASSIUM SERPL-SCNC: 4.7 MMOL/L (ref 3.5–5.2)
RBC # BLD AUTO: 3.69 X10E6/UL (ref 3.77–5.28)
SODIUM SERPL-SCNC: 135 MMOL/L (ref 134–144)
T4 FREE SERPL-MCNC: 0.85 NG/DL (ref 0.82–1.77)
TRIGL SERPL-MCNC: 52 MG/DL (ref 0–149)
TSH SERPL DL<=0.005 MIU/L-ACNC: 0.74 UIU/ML (ref 0.45–4.5)
VLDLC SERPL CALC-MCNC: 10 MG/DL (ref 5–40)
WBC # BLD AUTO: 4.6 X10E3/UL (ref 3.4–10.8)

## 2019-05-13 NOTE — PROGRESS NOTES
Cholesterol is at goal for age  Thyroid is at goal   Normal kidney function   Blood count is now normal - anemia resolved

## 2019-06-11 ENCOUNTER — TELEPHONE (OUTPATIENT)
Dept: INTERNAL MEDICINE CLINIC | Age: 54
End: 2019-06-11

## 2019-06-11 NOTE — TELEPHONE ENCOUNTER
Spoke with patient. Two pt identifiers confirmed. Patient advised that she needs to be seen to have the insect bites evaluated. Patient advised that we do not have any availability today. Patient advised to go to the ED or Urgent Care. Pt verbalized understanding of information discussed w/ no further questions at this time.

## 2019-06-11 NOTE — TELEPHONE ENCOUNTER
#[phone] pt was bit by a spider and has a spot about the size of fifty cent piece raised. Pt went to pharmacy last night, but they were closed. Pt needs a call back right away.

## 2019-06-13 ENCOUNTER — TELEPHONE (OUTPATIENT)
Dept: INTERNAL MEDICINE CLINIC | Age: 54
End: 2019-06-13

## 2019-06-13 NOTE — TELEPHONE ENCOUNTER
Spoke with patient. Two pt identifiers confirmed. Patient states that she has been using steroid cream that she was prescribed for some insect bites on her arm. Patient states that since she started using the cream, she has noticed a change to a mole that is on her arm. Patient states that she is scheduled to see Dr. Jonathan Richards tomorrow and wants to know if she should keep that appointment or if she is ok to wait until she sees Dr. Heber Luu again. Patient advised that she should keep the appointment with . Pt verbalized understanding of information discussed w/ no further questions at this time.

## 2019-06-13 NOTE — TELEPHONE ENCOUNTER
Pt is requesting a call back in regards to issues that she is having. however was unable to verify password on profile.  Best contact [phone]       Message received & copied from [facility]

## 2019-06-17 ENCOUNTER — OFFICE VISIT (OUTPATIENT)
Dept: INTERNAL MEDICINE CLINIC | Age: 54
End: 2019-06-17

## 2019-06-17 VITALS
DIASTOLIC BLOOD PRESSURE: 79 MMHG | WEIGHT: 165.2 LBS | HEIGHT: 63 IN | TEMPERATURE: 99.1 F | RESPIRATION RATE: 16 BRPM | OXYGEN SATURATION: 100 % | BODY MASS INDEX: 29.27 KG/M2 | HEART RATE: 82 BPM | SYSTOLIC BLOOD PRESSURE: 120 MMHG

## 2019-06-17 DIAGNOSIS — L98.9 SKIN LESION OF CHEEK: Primary | ICD-10-CM

## 2019-06-17 DIAGNOSIS — M54.50 CHRONIC LOW BACK PAIN WITHOUT SCIATICA, UNSPECIFIED BACK PAIN LATERALITY: ICD-10-CM

## 2019-06-17 DIAGNOSIS — G89.29 CHRONIC LOW BACK PAIN WITHOUT SCIATICA, UNSPECIFIED BACK PAIN LATERALITY: ICD-10-CM

## 2019-06-17 RX ORDER — METHOCARBAMOL 500 MG/1
500 TABLET, FILM COATED ORAL
Qty: 30 TAB | Refills: 5 | Status: SHIPPED | OUTPATIENT
Start: 2019-06-17 | End: 2019-09-30 | Stop reason: SDUPTHER

## 2019-06-17 RX ORDER — TRIAMCINOLONE ACETONIDE 1 MG/G
OINTMENT TOPICAL
Refills: 1 | COMMUNITY
Start: 2019-06-11 | End: 2020-01-30

## 2019-06-17 NOTE — PROGRESS NOTES
1. Have you been to the ER, urgent care clinic since your last visit? Hospitalized since your last visit?no  2. Have you seen or consulted any other health care providers outside of the [address] [place] [address] since your last visit? Include any pap smears or colon screening.  Psychiatric doctor Dr Kenia Willis , chiroprator

## 2019-06-17 NOTE — PROGRESS NOTES
SUBJECTIVE  Ms. Radha Torres presents today acutely for     Chief Complaint   Patient presents with    Skin Problem     pt concerned that she got a bite on her face; pt has a mole on her face and had bites around her mole; pt states that he has notice a change in her mole; pt was prescribed cream that did help and been using using benadryl cream     She had multiple bug bites. \"I took a nap from 4-6 and awoke with all these whelps. \"  She then washed everything and has had no bites since then. She went to the [facility] clinic and was given benadryl cream.  These have largely faded and are no longer itching. Also, she has a \"mole\" on her left cheek. \"It had been smooth and then became rough. \" Small 1 mm raised lesion. OBJECTIVE  Visit Vitals  /79 (BP 1 Location: Right arm, BP Patient Position: Sitting)   Pulse 82   Temp 99.1 °F (37.3 °C) (Oral)   Resp 16   Ht 5' 3\" (1.6 m)   Wt 165 lb 3.2 oz (74.9 kg)   SpO2 100%   BMI 29.26 kg/m²     Gen: Pleasant 53 y.o.  female in NAD.   SKIN: Warm. Dry. Small raised lesion L cheek, rough texture. ASSESSMENT / PLAN    ICD-10-CM ICD-9-CM    1. Skin lesion of cheek L98.9 709.9 REFERRAL TO DERMATOLOGY     I have reviewed with the patient details of the assessment and plan and all questions were answered. Relevant patient education was performed. Follow-up and Dispositions    · Return if symptoms worsen or fail to improve. Note: Also per request we are refilling Robaxin for chronic pain.

## 2019-07-01 ENCOUNTER — TELEPHONE (OUTPATIENT)
Dept: INTERNAL MEDICINE CLINIC | Age: 54
End: 2019-07-01

## 2019-07-01 NOTE — TELEPHONE ENCOUNTER
Patient states she's calling to advise that Dermatology referral list given is outdated & may need to update as most of the doctors on the list are not at the location on the list or are retired. Just wanted to advise our practice that we may want to update.  Thank you

## 2019-07-19 ENCOUNTER — TELEPHONE (OUTPATIENT)
Dept: INTERNAL MEDICINE CLINIC | Age: 54
End: 2019-07-19

## 2019-07-19 NOTE — TELEPHONE ENCOUNTER
----- Message from Tanda Krabbe sent at 7/19/2019  3:04 PM EDT -----  Regarding: Dr. Jd Bhandari first and last name:      Reason for call:  Pain in Left foot    Callback required yes/no and why:  Yes, to let her know what she needs to do. Best contact number(s):  [phone]    Details to clarify the request:  Pt states she is still experiencing a lot of pain in her left foot so she does not know if she needs to be referred back to an orthopedic doctor or if Dr. Miko Avalos would like for her to come in.     Agnes Briseno

## 2019-07-22 NOTE — TELEPHONE ENCOUNTER
Mose Shoulders, MD Collins Councilman, LPN   Caller: Unspecified (3 days ago,  3:17 PM)             If this related in to fall in 2018  And tendinitis would be best to see [facility]. Does patient need a referral?     Spoke with patient. Two pt identifiers confirmed. Patient states that she does think that her foot pain is related to her fall in 2018. Advised patient that Dr. Eryn Britt would like for the patient to be seen by [facility]. Patient provided with contact information. Pt verbalized understanding of information discussed w/ no further questions at this time.

## 2019-07-27 ENCOUNTER — TELEPHONE (OUTPATIENT)
Dept: INTERNAL MEDICINE CLINIC | Age: 54
End: 2019-07-27

## 2019-07-27 NOTE — TELEPHONE ENCOUNTER
----- Message from Eber Rizo sent at 7/26/2019  4:54 PM EDT -----  Regarding: Dr. Renny Arreola   Pt is missing a clothing steering silver vintage blue rectangular stone pen (long and narrow small) that you put around you neck, she was in the office today about 30 mintues ago. If the pen is found please call.  Pt's Best Contact Number:(R) [phone]

## 2019-09-17 ENCOUNTER — APPOINTMENT (OUTPATIENT)
Dept: GENERAL RADIOLOGY | Age: 54
End: 2019-09-17
Attending: EMERGENCY MEDICINE
Payer: MEDICARE

## 2019-09-17 ENCOUNTER — HOSPITAL ENCOUNTER (EMERGENCY)
Age: 54
Discharge: HOME | End: 2019-09-17
Attending: EMERGENCY MEDICINE
Payer: MEDICARE

## 2019-09-17 VITALS
RESPIRATION RATE: 18 BRPM | HEIGHT: 60 IN | DIASTOLIC BLOOD PRESSURE: 82 MMHG | BODY MASS INDEX: 31.8 KG/M2 | TEMPERATURE: 97.7 F | HEART RATE: 89 BPM | WEIGHT: 162 LBS | OXYGEN SATURATION: 100 % | SYSTOLIC BLOOD PRESSURE: 122 MMHG

## 2019-09-17 DIAGNOSIS — S39.012A BACK STRAIN, INITIAL ENCOUNTER: Primary | ICD-10-CM

## 2019-09-17 PROCEDURE — 72100 X-RAY EXAM L-S SPINE 2/3 VWS: CPT

## 2019-09-17 PROCEDURE — 99283 EMERGENCY DEPT VISIT LOW MDM: CPT

## 2019-09-17 PROCEDURE — 74011250636 HC RX REV CODE- 250/636: Performed by: PHYSICIAN ASSISTANT

## 2019-09-17 PROCEDURE — 96372 THER/PROPH/DIAG INJ SC/IM: CPT

## 2019-09-17 RX ORDER — PREDNISONE 20 MG/1
40 TABLET ORAL ONCE
Qty: 1 TAB | Refills: 0 | Status: SHIPPED | OUTPATIENT
Start: 2019-09-17 | End: 2019-09-17

## 2019-09-17 RX ORDER — IBUPROFEN 800 MG/1
800 TABLET ORAL
Qty: 20 TAB | Refills: 0 | Status: SHIPPED | OUTPATIENT
Start: 2019-09-17 | End: 2019-09-24

## 2019-09-17 RX ORDER — KETOROLAC TROMETHAMINE 30 MG/ML
30 INJECTION, SOLUTION INTRAMUSCULAR; INTRAVENOUS
Status: COMPLETED | OUTPATIENT
Start: 2019-09-17 | End: 2019-09-17

## 2019-09-17 RX ADMIN — KETOROLAC TROMETHAMINE 30 MG: 30 INJECTION, SOLUTION INTRAMUSCULAR at 16:52
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
## 2019-09-17 NOTE — DISCHARGE INSTRUCTIONS

## 2019-09-18 ENCOUNTER — TELEPHONE (OUTPATIENT)
Dept: INTERNAL MEDICINE CLINIC | Age: 54
End: 2019-09-18
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
## 2019-09-30 ENCOUNTER — OFFICE VISIT (OUTPATIENT)
Dept: INTERNAL MEDICINE CLINIC | Age: 54
End: 2019-09-30

## 2019-09-30 VITALS
TEMPERATURE: 98.1 F | OXYGEN SATURATION: 99 % | SYSTOLIC BLOOD PRESSURE: 133 MMHG | BODY MASS INDEX: 31.8 KG/M2 | WEIGHT: 162 LBS | HEART RATE: 85 BPM | HEIGHT: 60 IN | RESPIRATION RATE: 18 BRPM | DIASTOLIC BLOOD PRESSURE: 81 MMHG

## 2019-09-30 DIAGNOSIS — G89.29 CHRONIC LOW BACK PAIN WITHOUT SCIATICA, UNSPECIFIED BACK PAIN LATERALITY: Primary | ICD-10-CM
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
## 2019-09-30 DIAGNOSIS — E87.1 HYPONATREMIA: ICD-10-CM

## 2019-09-30 DIAGNOSIS — M54.50 CHRONIC LOW BACK PAIN WITHOUT SCIATICA, UNSPECIFIED BACK PAIN LATERALITY: Primary | ICD-10-CM

## 2019-09-30 RX ORDER — QUETIAPINE 300 MG/1
200 TABLET, EXTENDED RELEASE ORAL 2 TIMES DAILY
Status: ON HOLD | COMMUNITY
End: 2020-01-21

## 2019-09-30 RX ORDER — IBUPROFEN 800 MG/1
800 TABLET ORAL EVERY 12 HOURS
Qty: 60 TAB | Refills: 2 | Status: SHIPPED | OUTPATIENT
Start: 2019-09-30 | End: 2020-01-10 | Stop reason: DRUGHIGH

## 2019-09-30 RX ORDER — METHOCARBAMOL 500 MG/1
500 TABLET, FILM COATED ORAL
Qty: 60 TAB | Refills: 2 | Status: SHIPPED | OUTPATIENT
Start: 2019-09-30 | End: 2020-08-06
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
## 2019-09-30 NOTE — TELEPHONE ENCOUNTER
Spoke with patient. Two pt identifiers confirmed. Patient offered an appointment with Dr. Robson Sheppard today 09/30/19. Appointment accepted. Patient advised if anything changes or if unable to keep this appointment to call the office  Pt verbalized understanding of information discussed w/ no further questions at this time.
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
## 2019-10-01 ENCOUNTER — HOSPITAL ENCOUNTER (OUTPATIENT)
Dept: PHYSICAL THERAPY | Age: 54
End: 2019-10-01

## 2019-10-04 ENCOUNTER — HOSPITAL ENCOUNTER (OUTPATIENT)
Dept: PHYSICAL THERAPY | Age: 54
Discharge: HOME | End: 2019-10-04
Payer: COMMERCIAL

## 2019-10-04 PROCEDURE — 97162 PT EVAL MOD COMPLEX 30 MIN: CPT | Performed by: PHYSICAL THERAPIST

## 2019-10-04 PROCEDURE — 97110 THERAPEUTIC EXERCISES: CPT | Performed by: PHYSICAL THERAPIST
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
## 2019-10-07 ENCOUNTER — HOSPITAL ENCOUNTER (OUTPATIENT)
Dept: PHYSICAL THERAPY | Age: 54
Discharge: HOME | End: 2019-10-07
Payer: COMMERCIAL

## 2019-10-07 PROCEDURE — 97110 THERAPEUTIC EXERCISES: CPT | Performed by: PHYSICAL THERAPIST
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
## 2019-10-09 ENCOUNTER — HOSPITAL ENCOUNTER (OUTPATIENT)
Dept: PHYSICAL THERAPY | Age: 54
Discharge: HOME | End: 2019-10-09
Payer: COMMERCIAL

## 2019-10-09 PROCEDURE — 97110 THERAPEUTIC EXERCISES: CPT | Performed by: PHYSICAL THERAPIST

## 2019-10-09 NOTE — PROGRESS NOTES
PT DAILY TREATMENT NOTE - [facility] 2-15    Patient Name: Jihan Guerrero  Date:10/29/2019  : 1965  [x]  Patient  Verified  Payor: [place] MEDICARE / Plan: VA MEDICARE PART A & B / Product Type: Medicare /    In time:10:02am  Out time: 10:51am  Total Treatment Time (min): 49  Total Timed Codes (min): 49  1:1 Treatment Time ([facility] only): 26   Visit #:  3    Treatment Area: Low back pain [M54.5]    SUBJECTIVE  Pain Level (0-10 scale): 7  Any medication changes, allergies to medications, adverse drug reactions, diagnosis change, or new procedure performed?: [x] No    [] Yes (see summary sheet for update)  Subjective functional status/changes:   [] No changes reported  The patient reports that she didn't feel too bad after last time, but did her exercises again that day and twice yesterday. OBJECTIVE    49 min Therapeutic Exercise:  [x] See flow sheet :   Rationale: increase ROM, increase strength and improve coordination to improve the patients ability to perform daily activities. With   [x] TE   [] TA   [] neuro   [] other: Patient Education: [x] Review HEP    [x] Progressed/Changed HEP based on:   [x] positioning   [x] body mechanics   [] transfers   [] heat/ice application    [] other:      Other Objective/Functional Measures: Pt. Tolerated therex well     Pain Level (0-10 scale) post treatment: 8    ASSESSMENT/Changes in Function:   The patient progressed with tolerance to therex, but was educated on taking it easy with her exercises, not needing to do them again the same day as PT.   Patient will continue to benefit from skilled PT services to modify and progress therapeutic interventions, address functional mobility deficits, address ROM deficits, address strength deficits, analyze and address soft tissue restrictions, analyze and cue movement patterns, analyze and modify body mechanics/ergonomics, assess and modify postural abnormalities, address imbalance/dizziness and instruct in home and community integration to attain remaining goals. [x]  See Plan of Care  []  See progress note/recertification  []  See Discharge Summary         Progress towards goals / Updated goals: The patient is progressing towards goals.      PLAN  [x]  Upgrade activities as tolerated     [x]  Continue plan of care  [x]  Update interventions per flow sheet       []  Discharge due to:_  []  Other:_      Jose Rose, PT 10/9/2019

## 2019-10-15 ENCOUNTER — HOSPITAL ENCOUNTER (OUTPATIENT)
Dept: MAMMOGRAPHY | Age: 54
Discharge: HOME | End: 2019-10-15
Attending: INTERNAL MEDICINE
Payer: MEDICARE

## 2019-10-15 DIAGNOSIS — Z12.39 BREAST SCREENING: ICD-10-CM

## 2019-10-15 PROCEDURE — 77067 SCR MAMMO BI INCL CAD: CPT

## 2019-10-16 ENCOUNTER — HOSPITAL ENCOUNTER (OUTPATIENT)
Dept: PHYSICAL THERAPY | Age: 54
Discharge: HOME | End: 2019-10-16
Payer: COMMERCIAL

## 2019-10-16 PROCEDURE — 97110 THERAPEUTIC EXERCISES: CPT

## 2019-10-16 NOTE — PROGRESS NOTES
PT DAILY TREATMENT NOTE 2-15 Patient Name: Dat Voss 
Date:10/16/2019 : 1965 [x]  Patient  Verified Payor: [place] MEDICARE / Plan: [facility] [place] y / Product Type: Medicare / In time:1001  Out time:1115 Total Treatment Time (min): 74 Visit #:  4 Treatment Area: Low back pain [M54.5] SUBJECTIVE Pain Level (0-10 scale): 8/10 Any medication changes, allergies to medications, adverse drug reactions, diagnosis change, or new procedure performed?: [x] No    [] Yes (see summary sheet for update) Subjective functional status/changes:   [] No changes reported Patient reports she went to go get  Imaging done for her breat cancer check up, she reports she had to move in al different directions and flared up her back. OBJECTIVE 74 min Therapeutic Exercise:  [x] See flow sheet :  
Rationale: increase ROM and increase strength to improve the patients ability to perform ADLs and reduce pain levels With 
 [] TE 
 [] TA 
 [] neuro 
 [] other: Patient Education: [x] Review HEP [] Progressed/Changed HEP based on:  
[] positioning   [] body mechanics   [] transfers   [] heat/ice application   
[] other:   
 
Other Objective/Functional Measures: none noted Pain Level (0-10 scale) post treatment: 8/10 ASSESSMENT/Changes in Function:  
Patient demonstrates a good understanding of proper form and technique. Fair glute control. Will continue to progress as tolerated. Patient will continue to benefit from skilled PT services to modify and progress therapeutic interventions, address functional mobility deficits, address ROM deficits, address strength deficits, analyze and address soft tissue restrictions, analyze and cue movement patterns, analyze and modify body mechanics/ergonomics and assess and modify postural abnormalities to attain remaining goals. [x]  See Plan of Care 
[]  See progress note/recertification 
[]  See Discharge Summary Progress towards goals / Updated goals: 
Patient is progressing towards goals. PLAN [x]  Upgrade activities as tolerated     [x]  Continue plan of care [x]  Update interventions per flow sheet      
[]  Discharge due to:_ 
[]  Other:_ Geraldo Mir 10/16/2019

## 2019-10-18 ENCOUNTER — HOSPITAL ENCOUNTER (OUTPATIENT)
Dept: PHYSICAL THERAPY | Age: 54
Discharge: HOME | End: 2019-10-18
Payer: COMMERCIAL

## 2019-10-18 PROCEDURE — 97110 THERAPEUTIC EXERCISES: CPT

## 2019-10-18 NOTE — PROGRESS NOTES
PT DAILY TREATMENT NOTE - [facility] 2-15    Patient Name: Beronica Chahal  Date:10/30/2019  : 1965  [x]  Patient  Verified  Payor: [place] MED[place] / Plan: [place] MEDICARE PART A & B / Product Type: Medicare /    In time:1100  Out time:1158  Total Treatment Time (min): 58  Total Timed Codes (min): 58  1:1 Treatment Time ( only): 19   Visit #:  5    Treatment Area: Low back pain [M54.5]    SUBJECTIVE  Pain Level (0-10 scale): 8/10  Any medication changes, allergies to medications, adverse drug reactions, diagnosis change, or new procedure performed?: [x] No    [] Yes (see summary sheet for update)  Subjective functional status/changes:   [] No changes reported  Patient reports she is still in pain from the imaging. OBJECTIVE    58 min Therapeutic Exercise:  [x] See flow sheet :   Rationale: increase ROM and increase strength to improve the patients ability to perform ADLs and reduce pain levels          With   [] TE   [] TA   [] neuro   [] other: Patient Education: [x] Review HEP    [] Progressed/Changed HEP based on:   [] positioning   [] body mechanics   [] transfers   [] heat/ice application    [] other:      Other Objective/Functional Measures: none noted     Pain Level (0-10 scale) post treatment: 8/10    ASSESSMENT/Changes in Function:   Patient demonstrates a good understanding of proper form and technique with all therex. Will continue to fatigue quickly when performing glute strengthening therex. Will continue to progress as tolerated. Patient will continue to benefit from skilled PT services to modify and progress therapeutic interventions, address functional mobility deficits, address ROM deficits, address strength deficits, analyze and address soft tissue restrictions, analyze and cue movement patterns, analyze and modify body mechanics/ergonomics and assess and modify postural abnormalities to attain remaining goals.      [x]  See Plan of Care  []  See progress note/recertification  []  See Discharge Summary         Progress towards goals / Updated goals:  Patient is progressing towards goals.     PLAN  [x]  Upgrade activities as tolerated     [x]  Continue plan of care  [x]  Update interventions per flow sheet       []  Discharge due to:_  []  Other:_      Vanessa Rogers 10/18/2019

## 2019-10-22 ENCOUNTER — HOSPITAL ENCOUNTER (OUTPATIENT)
Dept: PHYSICAL THERAPY | Age: 54
Discharge: HOME | End: 2019-10-22
Payer: COMMERCIAL

## 2019-10-22 PROCEDURE — 97110 THERAPEUTIC EXERCISES: CPT

## 2019-10-22 NOTE — PROGRESS NOTES
PPT DAILY TREATMENT NOTE - [facility] 2-15    Patient Name: Sarah Mora  Date:10/22/2019  : 1965  [x]  Patient  Verified  Payor: [place] MEDICARE / Plan: [place] MEDICARE PART A & B / Product Type: Medicare /    In time:1036  Out time:1139  Total Treatment Time (min): 63  Total Timed Codes (min): 63  1:1 Treatment Time ( [address] only): 41   Visit #:  6    Treatment Area: Low back pain [M54.5]    SUBJECTIVE  Pain Level (0-10 scale): 7/10  Any medication changes, allergies to medications, adverse drug reactions, diagnosis change, or new procedure performed?: [x] No    [] Yes (see summary sheet for update)  Subjective functional status/changes:   [] No changes reported  Patient reports she went to the  office yesterday and needed to stay seated for over two hours. Afterwards she felt quite stiff and achy leading into today. OBJECTIVE    63 min Therapeutic Exercise:  [x] See flow sheet :   Rationale: increase ROM and increase strength to improve the patients ability to perform ADLs and reduce pain levels          With   [] TE   [] TA   [] neuro   [] other: Patient Education: [x] Review HEP    [] Progressed/Changed HEP based on:   [] positioning   [] body mechanics   [] transfers   [] heat/ice application    [] other:      Other Objective/Functional Measures: none noted     Pain Level (0-10 scale) post treatment: 7/10    ASSESSMENT/Changes in Function:   Reduced rest breaks with all glute strengthening therex, will fatigue quickly. Demonstrates a good understanding of proper form and techniques with all therex. Much greater restrictions presented on the L side compared to the R. Will continue to progress as tolerated.   Patient will continue to benefit from skilled PT services to modify and progress therapeutic interventions, address functional mobility deficits, address ROM deficits, address strength deficits, analyze and address soft tissue restrictions, analyze and cue movement patterns, analyze and modify body mechanics/ergonomics and assess and modify postural abnormalities to attain remaining goals. [x]  See Plan of Care  []  See progress note/recertification  []  See Discharge Summary         Progress towards goals / Updated goals:  Patient is progressing towards goals.      PLAN  [x]  Upgrade activities as tolerated     [x]  Continue plan of care  [x]  Update interventions per flow sheet       []  Discharge due to:_  []  Other:_      Conception Marcia 10/22/2019

## 2019-10-25 ENCOUNTER — HOSPITAL ENCOUNTER (OUTPATIENT)
Dept: PHYSICAL THERAPY | Age: 54
Discharge: HOME | End: 2019-10-25
Payer: COMMERCIAL

## 2019-10-25 PROCEDURE — 97110 THERAPEUTIC EXERCISES: CPT | Performed by: PHYSICAL THERAPIST

## 2019-10-25 NOTE — PROGRESS NOTES
PT DAILY TREATMENT NOTE - [facility] 2-15    Patient Name: Steff Pulliam  Date:10/29/2019  : 1965  [x]  Patient  Verified  Payor: [place] MEDICARE / Plan: [place] MEDICARE PART A & B / Product Type: Medicare /    In time: 11:00am  Out time: 12:05pm  Total Treatment Time (min): 55  Total Timed Codes (min): 55  1:1 Treatment Time ([address] only): 37   Visit #: 7    Treatment Area: Low back pain [M54.5]    SUBJECTIVE  Pain Level (0-10 scale): 6  Any medication changes, allergies to medications, adverse drug reactions, diagnosis change, or new procedure performed?: [x] No    [] Yes (see summary sheet for update)  Subjective functional status/changes:   [] No changes reported  The patient reports that today is a good day. OBJECTIVE    65 min Therapeutic Exercise:  [x] See flow sheet :   Rationale: increase ROM and increase strength to improve the patients ability to perform ADLs and reduce pain levels          With   [x] TE   [] TA   [] neuro   [] other: Patient Education: [x] Review HEP    [x] Progressed/Changed HEP based on:   [x] positioning   [x] body mechanics   [] transfers   [] heat/ice application    [] other:      Other Objective/Functional Measures: none noted     Pain Level (0-10 scale) post treatment: 6    ASSESSMENT/Changes in Function:   The patient progressed with increased resistance as tolerated. Patient will continue to benefit from skilled PT services to modify and progress therapeutic interventions, address functional mobility deficits, address ROM deficits, address strength deficits, analyze and address soft tissue restrictions, analyze and cue movement patterns, analyze and modify body mechanics/ergonomics and assess and modify postural abnormalities to attain remaining goals. [x]  See Plan of Care  []  See progress note/recertification  []  See Discharge Summary         Progress towards goals / Updated goals:  Patient is progressing towards goals.      PLAN  [x]  Upgrade activities as tolerated     [x] Continue plan of care  [x]  Update interventions per flow sheet       []  Discharge due to:_  []  Other:_      Alyssa Saunders, PT 10/25/2019

## 2019-10-29 ENCOUNTER — HOSPITAL ENCOUNTER (OUTPATIENT)
Dept: PHYSICAL THERAPY | Age: 54
Discharge: HOME | End: 2019-10-29
Payer: COMMERCIAL

## 2019-10-29 PROCEDURE — 97110 THERAPEUTIC EXERCISES: CPT | Performed by: PHYSICAL THERAPIST

## 2019-10-29 NOTE — PROGRESS NOTES
PT DAILY TREATMENT NOTE 2-15    Patient Name: Sarah Mora  Date:10/29/2019  : 1965  [x]  Patient  Verified  Payor: Rubin Bloch / Plan: [place] MEDICARE PART A & B / Product Type: Medicare /    In time: 9:30am  Out time: 10:35am  Total Treatment Time (min): 65  Total Timed Codes (min): 65  1:1 Treatment Time ([address] only): 54   Visit #:  8  Treatment Area: Low back pain [M54.5]    SUBJECTIVE  Pain Level (0-10 scale): 6  Any medication changes, allergies to medications, adverse drug reactions, diagnosis change, or new procedure performed?: [x] No    [] Yes (see summary sheet for update)  Subjective functional status/changes:   [] No changes reported  The patient reports that she's doing okay, just felt tight over the weekend. OBJECTIVE    65 min Therapeutic Exercise:  [x] See flow sheet :   Rationale: increase ROM and increase strength to improve the patients ability to perform ADLs and reduce pain levels          With   [x] TE   [] TA   [] neuro   [] other: Patient Education: [x] Review HEP    [x] Progressed/Changed HEP based on:   [x] positioning   [x] body mechanics   [] transfers   [] heat/ice application    [] other:      Other Objective/Functional Measures: none noted     Pain Level (0-10 scale) post treatment: 7    ASSESSMENT/Changes in Function:   The patient progressed with therex as tolerated. Patient will continue to benefit from skilled PT services to modify and progress therapeutic interventions, address functional mobility deficits, address ROM deficits, address strength deficits, analyze and address soft tissue restrictions, analyze and cue movement patterns, analyze and modify body mechanics/ergonomics and assess and modify postural abnormalities to attain remaining goals. [x]  See Plan of Care  []  See progress note/recertification  []  See Discharge Summary         Progress towards goals / Updated goals:  Patient is progressing towards goals.      PLAN  [x]  Upgrade activities as tolerated [x]  Continue plan of care  [x]  Update interventions per flow sheet       []  Discharge due to:_  []  Other:_      Huber Gallardo, PT 10/29/2019

## 2019-10-30 NOTE — PROGRESS NOTES
PT DAILY TREATMENT NOTE - [facility] 2-15    Patient Name: Jihan Guerrero  Date:10/16/2019  : 1965  [x]  Patient  Verified  Payor: [place] MEDICARE / Plan: [place] MEDICARE PART A & B / Product Type: Medicare /    In time:1001  Out time:1115  Total Treatment Time (min): 74  Total Timed Codes (min): 74  1:1 Treatment Time ( only): 24   Visit #:  4    Treatment Area: Low back pain [M54.5]    SUBJECTIVE  Pain Level (0-10 scale): 8/10  Any medication changes, allergies to medications, adverse drug reactions, diagnosis change, or new procedure performed?: [x] No    [] Yes (see summary sheet for update)  Subjective functional status/changes:   [] No changes reported  Patient reports she went to go get  Imaging done for her breat cancer check up, she reports she had to move in al different directions and flared up her back. OBJECTIVE    74 min Therapeutic Exercise:  [x] See flow sheet :   Rationale: increase ROM and increase strength to improve the patients ability to perform ADLs and reduce pain levels    With   [] TE   [] TA   [] neuro   [] other: Patient Education: [x] Review HEP    [] Progressed/Changed HEP based on:   [] positioning   [] body mechanics   [] transfers   [] heat/ice application    [] other:      Other Objective/Functional Measures: none noted     Pain Level (0-10 scale) post treatment: 8/10    ASSESSMENT/Changes in Function:   Patient demonstrates a good understanding of proper form and technique. Fair glute control. Will continue to progress as tolerated. Patient will continue to benefit from skilled PT services to modify and progress therapeutic interventions, address functional mobility deficits, address ROM deficits, address strength deficits, analyze and address soft tissue restrictions, analyze and cue movement patterns, analyze and modify body mechanics/ergonomics and assess and modify postural abnormalities to attain remaining goals.      [x]  See Plan of Care  []  See progress note/recertification  []  See Discharge Summary         Progress towards goals / Updated goals:  Patient is progressing towards goals.      PLAN  [x]  Upgrade activities as tolerated     [x]  Continue plan of care  [x]  Update interventions per flow sheet       []  Discharge due to:_  []  Other:_      Claudell Soho 10/16/2019

## 2019-10-31 ENCOUNTER — HOSPITAL ENCOUNTER (OUTPATIENT)
Dept: PHYSICAL THERAPY | Age: 54
Discharge: HOME | End: 2019-10-31
Payer: COMMERCIAL

## 2019-10-31 PROCEDURE — 97110 THERAPEUTIC EXERCISES: CPT | Performed by: PHYSICAL THERAPIST

## 2019-10-31 NOTE — PROGRESS NOTES
PT DAILY TREATMENT NOTE 2-15    Patient Name: Radha Morel  Date:10/31/2019  : 1965  [x]  Patient  Verified  Payor: Lori Pedroza / Plan: [place] MEDICARE PART A & B / Product Type: Medicare /    In time: 1:00pm  Out time: 1:42pm  Total Treatment Time (min): 42  Total Timed Codes (min): 42  1:1 Treatment Time ([facility] only): 40   Visit #:  8  Treatment Area: Low back pain [M54.5]    SUBJECTIVE  Pain Level (0-10 scale): 6  Any medication changes, allergies to medications, adverse drug reactions, diagnosis change, or new procedure performed?: [x] No    [] Yes (see summary sheet for update)  Subjective functional status/changes:   [] No changes reported  The patient reports that she woke up at 3am and didn't go back to sleep. OBJECTIVE    42 min Therapeutic Exercise:  [x] See flow sheet :   Rationale: increase ROM and increase strength to improve the patients ability to perform ADLs and reduce pain levels          With   [x] TE   [] TA   [] neuro   [] other: Patient Education: [x] Review HEP    [x] Progressed/Changed HEP based on:   [x] positioning   [x] body mechanics   [] transfers   [] heat/ice application    [] other:      Other Objective/Functional Measures: none noted     Pain Level (0-10 scale) post treatment: 0    ASSESSMENT/Changes in Function:   The patient progressed with increased resistance as tolerated. Patient will continue to benefit from skilled PT services to modify and progress therapeutic interventions, address functional mobility deficits, address ROM deficits, address strength deficits, analyze and address soft tissue restrictions, analyze and cue movement patterns, analyze and modify body mechanics/ergonomics and assess and modify postural abnormalities to attain remaining goals. [x]  See Plan of Care  []  See progress note/recertification  []  See Discharge Summary         Progress towards goals / Updated goals:  Patient is progressing towards goals.      PLAN  [x]  Upgrade activities as tolerated     [x]  Continue plan of care  [x]  Update interventions per flow sheet       []  Discharge due to:_  []  Other:_      Jules Dalton, PT 10/31/2019

## 2019-11-05 ENCOUNTER — HOSPITAL ENCOUNTER (OUTPATIENT)
Dept: PHYSICAL THERAPY | Age: 54
Discharge: HOME | End: 2019-11-05
Payer: COMMERCIAL

## 2019-11-05 PROCEDURE — 97110 THERAPEUTIC EXERCISES: CPT | Performed by: PHYSICAL THERAPIST
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
## 2019-11-07 ENCOUNTER — HOSPITAL ENCOUNTER (OUTPATIENT)
Dept: PHYSICAL THERAPY | Age: 54
Discharge: HOME | End: 2019-11-07
Payer: COMMERCIAL

## 2019-11-07 PROCEDURE — 97110 THERAPEUTIC EXERCISES: CPT

## 2019-11-07 NOTE — PROGRESS NOTES
PT DAILY TREATMENT NOTE - [facility] 2-15    Patient Name: Blaire Lam  Date:2019  : 1965  [x]  Patient  Verified  Payor: [place] MEDICARE / Plan: [place] MEDICARE PART A & B / Product Type: Medicare /    In time:1041  Out time:1038  Total Treatment Time (min): 57  Total Timed Codes (min): 57  1:1 Treatment Time ( only): 33   Visit #:  11    Treatment Area: Low back pain [M54.5]    SUBJECTIVE  Pain Level (0-10 scale): 5/10  Any medication changes, allergies to medications, adverse drug reactions, diagnosis change, or new procedure performed?: [x] No    [] Yes (see summary sheet for update)  Subjective functional status/changes:   [] No changes reported  Patient reports her PCP prescribed her a higher dosage of medication which has allowed her to get a full night sleep, limiting her anxiety levels. OBJECTIVE    57 min Therapeutic Exercise:  [x] See flow sheet :   Rationale: increase ROM and increase strength to improve the patients ability to perform ADLs and reduce pain levels    With   [] TE   [] TA   [] neuro   [] other: Patient Education: [x] Review HEP    [] Progressed/Changed HEP based on:   [] positioning   [] body mechanics   [] transfers   [] heat/ice application    [] other:      Other Objective/Functional Measures: none noted     Pain Level (0-10 scale) post treatment: 5/10    ASSESSMENT/Changes in Function:   Patient demonstrates a good understanding of all therex and tolerated them well. Patient will continue to benefit from skilled PT services to modify and progress therapeutic interventions, address functional mobility deficits, address ROM deficits, address strength deficits, analyze and address soft tissue restrictions, analyze and cue movement patterns, analyze and modify body mechanics/ergonomics and assess and modify postural abnormalities to attain remaining goals.      [x]  See Plan of Care  []  See progress note/recertification  []  See Discharge Summary         Progress towards goals / Updated goals:  Patient is progressing towards goals.      PLAN  [x]  Upgrade activities as tolerated     [x]  Continue plan of care  [x]  Update interventions per flow sheet       []  Discharge due to:_  []  Other:_      Alberto Party 11/7/2019

## 2019-11-14 ENCOUNTER — HOSPITAL ENCOUNTER (OUTPATIENT)
Dept: PHYSICAL THERAPY | Age: 54
Discharge: HOME | End: 2019-11-14
Payer: COMMERCIAL

## 2019-11-14 PROCEDURE — 97110 THERAPEUTIC EXERCISES: CPT | Performed by: PHYSICAL THERAPIST

## 2019-11-14 NOTE — PROGRESS NOTES
PT DAILY TREATMENT NOTE - [facility] 2-15    Patient Name: Arlene Kc  Date:2019  : 1965  [x]  Patient  Verified  Payor: [place] MEDICARE / Plan: VA MEDICARE PART A & B / Product Type: Medicare /    In time: 10:00am  Out time: 11:00am   Total Treatment Time (min): 60   Total Timed Codes (min): 60   1:1 Treatment Time ( only): 25   Visit #:  12    Treatment Area: Low back pain [M54.5]    SUBJECTIVE  Pain Level (0-10 scale): 5   Any medication changes, allergies to medications, adverse drug reactions, diagnosis change, or new procedure performed?: [x] No    [] Yes (see summary sheet for update)  Subjective functional status/changes:   [] No changes reported  The patient reports that she still hasn't had much sleep and is doing okay. OBJECTIVE    60  min Therapeutic Exercise:  [x] See flow sheet :   Rationale: increase ROM and increase strength to improve the patients ability to perform ADLs and reduce pain levels    With   [x] TE   [] TA   [] neuro   [] other: Patient Education: [x] Review HEP    [x] Progressed/Changed HEP based on:   [x] positioning   [] body mechanics   [] transfers   [] heat/ice application    [] other:      Other Objective/Functional Measures: none noted      Pain Level (0-10 scale) post treatment: 6    ASSESSMENT/Changes in Function:   The patient progressed with increased resistance and tolerance to therex. Patient will continue to benefit from skilled PT services to modify and progress therapeutic interventions, address functional mobility deficits, address ROM deficits, address strength deficits, analyze and address soft tissue restrictions, analyze and cue movement patterns, analyze and modify body mechanics/ergonomics and assess and modify postural abnormalities to attain remaining goals. [x]  See Plan of Care  []  See progress note/recertification  []  See Discharge Summary         Progress towards goals / Updated goals:  Patient is progressing towards goals. PLAN  [x]  Upgrade activities as tolerated     [x]  Continue plan of care  [x]  Update interventions per flow sheet       []  Discharge due to:_  []  Other:_      Brian Devine, PT 11/14/2019

## 2019-11-21 ENCOUNTER — APPOINTMENT (OUTPATIENT)
Dept: PHYSICAL THERAPY | Age: 54
End: 2019-11-21
Payer: COMMERCIAL

## 2019-11-26 ENCOUNTER — HOSPITAL ENCOUNTER (OUTPATIENT)
Dept: PHYSICAL THERAPY | Age: 54
Discharge: HOME | End: 2019-11-26
Payer: COMMERCIAL

## 2019-11-26 PROCEDURE — 97110 THERAPEUTIC EXERCISES: CPT | Performed by: PHYSICAL THERAPIST

## 2019-11-26 NOTE — PROGRESS NOTES
PT DAILY TREATMENT NOTE - [facility] 2-15    Patient Name: Trudy Phillips  Date:2019  : 1965  [x]  Patient  Verified  Payor: [place] MED[place]ARE / Plan: [place] MEDICARE PART A & B / Product Type: Medicare /    In time: 9:30am  Out time: 10:10am   Total Treatment Time (min): 40   Total Timed Codes (min): 40   1:1 Treatment Time (MC only): 40  Visit #:  13    Treatment Area: Low back pain [M54.5]    SUBJECTIVE  Pain Level (0-10 scale): 3   Any medication changes, allergies to medications, adverse drug reactions, diagnosis change, or new procedure performed?: [x] No    [] Yes (see summary sheet for update)  Subjective functional status/changes:   [] No changes reported  The patient reports that her pain has been doing better overall. OBJECTIVE    40  min Therapeutic Exercise:  [x] See flow sheet :   Rationale: increase ROM and increase strength to improve the patients ability to perform ADLs and reduce pain levels    With   [x] TE   [] TA   [] neuro   [] other: Patient Education: [x] Review HEP    [x] Progressed/Changed HEP based on:   [x] positioning   [] body mechanics   [] transfers   [] heat/ice application    [] other:      Other Objective/Functional Measures: FOTO= 65 (35 at eval)     Pain Level (0-10 scale) post treatment: 3    ASSESSMENT/Changes in Function:   The patient has progressed well and MET goals and will be discharged today. []  See Plan of Care  []  See progress note/recertification  [x]  See Discharge Summary         Progress towards goals / Updated goals:  Patient has MET most goals. PLAN  []  Upgrade activities as tolerated     []  Continue plan of care  []  Update interventions per flow sheet       [x]  Discharge due to: Pt. Has MET goals.    []  Other:_      Lindsey Hairston, PT 2019
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
## 2019-12-17 ENCOUNTER — HOSPITAL ENCOUNTER (EMERGENCY)
Age: 54
Discharge: HOME | End: 2019-12-17
Attending: EMERGENCY MEDICINE
Payer: MEDICARE

## 2019-12-17 VITALS
HEART RATE: 95 BPM | RESPIRATION RATE: 16 BRPM | OXYGEN SATURATION: 100 % | DIASTOLIC BLOOD PRESSURE: 88 MMHG | WEIGHT: 160 LBS | HEIGHT: 61 IN | TEMPERATURE: 99.7 F | BODY MASS INDEX: 30.21 KG/M2 | SYSTOLIC BLOOD PRESSURE: 131 MMHG

## 2019-12-17 DIAGNOSIS — T78.40XA ALLERGIC REACTION, INITIAL ENCOUNTER: Primary | ICD-10-CM

## 2019-12-17 PROCEDURE — 96374 THER/PROPH/DIAG INJ IV PUSH: CPT

## 2019-12-17 PROCEDURE — 74011250636 HC RX REV CODE- 250/636: Performed by: EMERGENCY MEDICINE

## 2019-12-17 PROCEDURE — 99284 EMERGENCY DEPT VISIT MOD MDM: CPT

## 2019-12-17 PROCEDURE — 96375 TX/PRO/DX INJ NEW DRUG ADDON: CPT

## 2019-12-17 RX ORDER — DEXAMETHASONE SODIUM PHOSPHATE 4 MG/ML
10 INJECTION, SOLUTION INTRA-ARTICULAR; INTRALESIONAL; INTRAMUSCULAR; INTRAVENOUS; SOFT TISSUE
Status: COMPLETED | OUTPATIENT
Start: 2019-12-17 | End: 2019-12-17

## 2019-12-17 RX ORDER — FAMOTIDINE 10 MG/ML
20 INJECTION INTRAVENOUS
Status: COMPLETED | OUTPATIENT
Start: 2019-12-17 | End: 2019-12-17

## 2019-12-17 RX ORDER — PREDNISONE 10 MG/1
60 TABLET ORAL
Qty: 27 TAB | Refills: 0 | OUTPATIENT
Start: 2019-12-17 | End: 2020-01-10

## 2019-12-17 RX ADMIN — DEXAMETHASONE SODIUM PHOSPHATE 10 MG: 4 INJECTION, SOLUTION INTRAMUSCULAR; INTRAVENOUS at 09:53

## 2019-12-17 RX ADMIN — FAMOTIDINE 20 MG: 10 INJECTION, SOLUTION INTRAVENOUS at 09:53

## 2019-12-17 RX ADMIN — SODIUM CHLORIDE 500 ML: 900 INJECTION, SOLUTION INTRAVENOUS at 09:53

## 2019-12-17 NOTE — ED PROVIDER NOTES
EMERGENCY DEPARTMENT HISTORY AND PHYSICAL EXAM      Date: 12/17/2019  Patient Name: Shana Fregoso    History of Presenting Illness     Chief Complaint   Patient presents with    Allergic Reaction       History Provided By: Patient    HPI: Shana Fregoso, 53 y.o. female presents to the ED with history of allergies to artificial sweeteners, here after self administering EpiPen after she felt itching on her tongue and the back of her throat after using melatonin Gummies that had artificial sugars in them. She says she starting to feel better except for a dry tongue and dry teeth and lips and denies any chest pain, difficulty breathing, any nausea or itchy rash. She does have a history of bipolar disorder for which she takes Seroquel and says she adjust her medication as needed depending on her symptoms but that this is not a new medication for her and denies any other medication changes. There are no other complaints, changes, or physical findings at this time. PCP: Sandra Riddle MD    No current facility-administered medications on file prior to encounter. Current Outpatient Medications on File Prior to Encounter   Medication Sig Dispense Refill    QUEtiapine SR (SEROQUEL XR) 300 mg sr tablet Take 300 mg by mouth nightly.  methocarbamol (ROBAXIN) 500 mg tablet Take 1 Tab by mouth daily as needed for Pain. 60 Tab 2    ibuprofen (MOTRIN) 800 mg tablet Take 1 Tab by mouth every twelve (12) hours. 60 Tab 2    triamcinolone acetonide (KENALOG) 0.1 % ointment APPLY TO AFFECTED AREA FOR 14 DAYS. DO NOT USE ON FACE  1    flurazepam (DALMANE) 30 mg capsule Take 1 Cap by mouth nightly as needed. Max Daily Amount: 30 mg. 5 Cap 0    famotidine (PEPCID) 40 mg tablet Take 40 mg by mouth daily.  albuterol (PROVENTIL HFA, VENTOLIN HFA, PROAIR HFA) 90 mcg/actuation inhaler Take 2 Puffs by inhalation every four (4) hours as needed for Wheezing.  1 Inhaler 0    montelukast (SINGULAIR) 10 mg tablet Take 10 mg by mouth daily.  azelastine (ASTELIN) 137 mcg (0.1 %) nasal spray USE 1 SPRAY IN EACH NOSTRIL TWICE A DAY AS DIRECTED 30 Bottle 2    fexofenadine (ALLEGRA) 180 mg tablet Take  by mouth daily.  carbamazepine (TEGRETOL) 200 mg tablet Take 200 mg by mouth two (2) times a day.          Past History     Past Medical History:  Past Medical History:   Diagnosis Date    Arthritis     joints    Bipolar 1 disorder with moderate robyn ([address] 3/17/2014    Chronic pain     back with stress    Contact dermatitis and other eczema, due to unspecified cause     Depression     Headache(784.0)     Hyperlipidemia 8/22/2016    Other ill-defined conditions(799.89)     sinus infection,hay fever    Other ill-defined conditions(799.89)     scoliosis    Psychiatric disorder     bipolar    Psychotic disorder (HCC)     Stool color black     Tennis elbow syndrome 5/4/2015    Trauma        Past Surgical History:  Past Surgical History:   Procedure Laterality Date    HX HEENT      wisdom teeth extraction    HX LIPOMA RESECTION      right gluteal    FREDY BIOPSY BREAST STEREOTACTIC Left 2011    negative    ND DENTAL SURGERY PROCEDURE      hx of dental surgery- wisdom teeth extracted       Family History:  Family History   Problem Relation Age of Onset   Henry Searing Arthritis-rheumatoid Mother     Migraines Mother     Psychiatric Disorder Mother     Bipolar Disorder Mother     Lupus Mother     Alcohol abuse Father     Lung Disease Father     Heart Disease Father     Stroke Father     High Cholesterol Father     Psychiatric Disorder Sister     Alcohol abuse Sister     Bipolar Disorder Sister     Stroke Brother     Cancer Maternal Aunt     Breast Cancer Maternal Aunt         pt jose d she was under 48    Bipolar Disorder Sister        Social History:  Social History     Tobacco Use    Smoking status: Never Smoker    Smokeless tobacco: Never Used   Substance Use Topics    Alcohol use: Yes     Comment: occasional    Drug use: No       Allergies: Allergies   Allergen Reactions    Other Food Hives     Artifical sweetners    Claritin-D 12 Hour [Loratadine-Pseudoephedrine] Palpitations     palpatations and ringing in the ear    Other Medication Anaphylaxis     Artificial sweeteners cause anaphylaxis    Pcn [Penicillins] Anaphylaxis    Sunscreen Contact Dermatitis     Burns and opens the skin    Ultram [Tramadol] Hives and Other (comments)     Hives and ringing of the ears    Benzoyl Peroxide Hives    Cephalexin Itching    Divalproex Itching    Maltodextrin-Glycerin Shortness of Breath         Review of Systems   Review of Systems   Constitutional: Negative for activity change, appetite change, chills, fever and unexpected weight change. HENT: Negative for congestion, dental problem, drooling, ear discharge, ear pain, facial swelling, hearing loss, mouth sores, nosebleeds, postnasal drip, rhinorrhea, sinus pressure, sinus pain, sneezing, sore throat, tinnitus, trouble swallowing and voice change. Eyes: Negative for visual disturbance. Respiratory: Negative for chest tightness and shortness of breath. Cardiovascular: Negative for chest pain. Gastrointestinal: Negative for abdominal distention and abdominal pain. Genitourinary: Negative for difficulty urinating. Skin: Negative for rash. Neurological: Negative for dizziness and syncope. Hematological: Negative for adenopathy. All other systems reviewed and are negative. Physical Exam   Physical Exam   Vital signs and nursing notes reviewed    CONSTITUTIONAL: Alert, in no apparent distress; well-developed; well-nourished. Lightly anxious disposition hEAD:  Normocephalic, atraumatic  EYES: PERRL; EOM's intact. ENTM: Nose: no rhinorrhea; Throat: no erythema or exudate, dry mucous membranes, no swollen lips tongue or uvula which is midline. Neck:  Supple. trachea is midline. No stridor auscultated over the neck.   RESP: Chest clear, equal breath sounds. - W/R/R  CV: S1 and S2 WNL; No murmurs, gallops or rubs. 2+ radial and DP pulses bilaterally. GI: non-distended, normal bowel sounds, abdomen soft and non-tender. No masses or organomegaly. : No costo-vertebral angle tenderness. BACK:  Non-tender, normal appearance  UPPER EXT:  Normal inspection. no joint or soft tissue swelling  LOWER EXT: No edema, no calf tenderness. NEURO: Alert and oriented x3, 5/5 strength and light touch sensation intact in bilateral upper and lower extremities. SKIN: No rashes; Warm and dry, no urticaria present. PSYCH: Normal mood, normal affect    Diagnostic Study Results     Labs -   No results found for this or any previous visit (from the past 12 hour(s)). Radiologic Studies -   No orders to display     CT Results  (Last 48 hours)    None        CXR Results  (Last 48 hours)    None          Medical Decision Making   I am the first provider for this patient. I reviewed the vital signs, available nursing notes, past medical history, past surgical history, family history and social history. Vital Signs-Reviewed the patient's vital signs. Patient Vitals for the past 12 hrs:   Temp Pulse Resp BP SpO2   12/17/19 1144 -- 95 16 131/88 --   12/17/19 1015 -- 97 15 127/79 100 %   12/17/19 1004 -- -- -- 133/71 --   12/17/19 0945 -- (!) 103 19 131/90 100 %   12/17/19 0930 99.7 °F (37.6 °C) (!) 102 18 122/75 100 %       . Records Reviewed: Nursing Notes and Old Medical Records    Provider Notes (Medical Decision Making):   42-year-old female, improved after initial epi self administration, followed by dexamethasone and famotidine here, patient also use Benadryl at home. Will send with steroid taper during patient's time in the emergency department she continued to improve and feels well. Patient says she has EpiPen prescription at home and does not need a refill today. ED Course:   Initial assessment performed.  The patients presenting problems have been discussed, and they are in agreement with the care plan formulated and outlined with them. I have encouraged them to ask questions as they arise throughout their visit. 12:29 PM  Patient symptoms much improved, talking in full sentences, no respiratory distress, no stridor, mucous membranes moisturized. Plan for discharge home with , prednisone taper. Disposition:  Discharge    Discharge Note:  12:30 PM  The pt is ready for discharge. The pt's signs, symptoms, diagnosis, and discharge instructions have been discussed and pt has conveyed their understanding. The pt is to follow up as recommended or return to ER should their symptoms worsen. Plan has been discussed and pt is in agreement. PLAN:  1. Discharge Medication List as of 12/17/2019 12:33 PM        2. Follow-up Information     Follow up With Specialties Details Why MD [facility] In 2 days Please follow-up with your primary care doctor in the next 2 days for reevaluation of your symptoms. [address] [place] [address]  [phone]      [facility] EMERGENCY DEPT Emergency Medicine  If symptoms worsen including new chest pain, new shortness of breath, if you feel like your throat is closing up or other new concerning symptoms. [address] [place] [address]  [phone]        Return to ED if worse     Diagnosis     Clinical Impression:   1. Allergic reaction, initial encounter        Attestations:    Yoan Johns MD    Please note that this dictation was completed with [employer], the computer voice recognition software. Quite often unanticipated grammatical, syntax, homophones, and other interpretive errors are inadvertently transcribed by the computer software. Please disregard these errors. Please excuse any errors that have escaped final proofreading. Thank you.

## 2019-12-30 ENCOUNTER — HOSPITAL ENCOUNTER (OUTPATIENT)
Dept: LAB | Age: 54
Discharge: HOME | End: 2019-12-30
Payer: MEDICARE

## 2019-12-30 ENCOUNTER — OFFICE VISIT (OUTPATIENT)
Dept: INTERNAL MEDICINE CLINIC | Age: 54
End: 2019-12-30

## 2019-12-30 VITALS
WEIGHT: 164 LBS | BODY MASS INDEX: 30.96 KG/M2 | TEMPERATURE: 97.8 F | HEIGHT: 61 IN | RESPIRATION RATE: 18 BRPM | OXYGEN SATURATION: 99 % | DIASTOLIC BLOOD PRESSURE: 74 MMHG | HEART RATE: 96 BPM | SYSTOLIC BLOOD PRESSURE: 126 MMHG

## 2019-12-30 DIAGNOSIS — Z23 ENCOUNTER FOR IMMUNIZATION: ICD-10-CM

## 2019-12-30 DIAGNOSIS — Z13.220 SCREENING FOR CHOLESTEROL LEVEL: ICD-10-CM

## 2019-12-30 DIAGNOSIS — Z00.00 MEDICARE ANNUAL WELLNESS VISIT, SUBSEQUENT: ICD-10-CM

## 2019-12-30 DIAGNOSIS — Z13.1 SCREENING FOR DIABETES MELLITUS: Primary | ICD-10-CM

## 2019-12-30 DIAGNOSIS — R73.09 ELEVATED RANDOM BLOOD GLUCOSE LEVEL: ICD-10-CM

## 2019-12-30 DIAGNOSIS — E78.00 HIGH CHOLESTEROL: ICD-10-CM

## 2019-12-30 DIAGNOSIS — F31.10 BIPOLAR AFFECTIVE DISORDER, CURRENT EPISODE MANIC, CURRENT EPISODE SEVERITY UNSPECIFIED (HCC): ICD-10-CM

## 2019-12-30 PROCEDURE — 85025 COMPLETE CBC W/AUTO DIFF WBC: CPT

## 2019-12-30 PROCEDURE — 83036 HEMOGLOBIN GLYCOSYLATED A1C: CPT

## 2019-12-30 PROCEDURE — 80053 COMPREHEN METABOLIC PANEL: CPT

## 2019-12-30 PROCEDURE — 36415 COLL VENOUS BLD VENIPUNCTURE: CPT

## 2019-12-30 PROCEDURE — 80061 LIPID PANEL: CPT

## 2019-12-30 NOTE — PROGRESS NOTES
Reviewed record in preparation for visit and have obtained necessary documentation. Identified pt with two pt identifiers(name and ). Chief Complaint   Patient presents with   [address] Maintenance Due   Topic Date Due    Shingles Vaccine (1 of 2) 2014    Annual Well Visit  05/15/2019    Flu Vaccine  2019       Ms. Shaq Hopkins has a reminder for a \"due or due soon\" health maintenance. I have asked that she discuss this further with her primary care provider for follow-up on this health maintenance. Coordination of Care Questionnaire:  :     1) Have you been to an emergency room, urgent care clinic since your last visit? yes   Hospitalized since your last visit? no             2) Have you seen or consulted any other health care providers outside of [address] [place] [address] since your last visit? no  (Include any pap smears or colon screenings in this section.)    3) In the event something were to happen to you and you were unable to speak on your behalf, do you have an Advance Directive/ Living Will in place stating your wishes? NO    Do you have an Advance Directive on file? no    4) Are you interested in receiving information on Advance Directives? NO    Patient is accompanied by self I have received verbal consent from Willy Closs to discuss any/all medical information while they are present in the room.

## 2019-12-30 NOTE — PROGRESS NOTES
This is the Subsequent Medicare Annual Wellness Exam, performed 12 months or more after the Initial AWV or the last Subsequent AWV    I have reviewed the patient's medical history in detail and updated the computerized patient record. In the interval her dose of seroquel was increased by her psychiatrist and she is concerned with elevated cholesterol and high glucose. Her psychiatrist is Lu Dial mood is stable      History     Patient Active Problem List   Diagnosis Code    Psychotic disorder ([facility] [address] F29    Urge incontinence of urine N39.41    Bipolar 1 disorder with moderate robyn (HCC) F31.12    Environmental allergies Z91.09    Chronic back pain M54.9, G89.29    Low vitamin D level R79.89    BMI 28.0-28.9,adult Z68.28     Past Medical History:   Diagnosis Date    Arthritis     joints    Bipolar 1 disorder with moderate robyn ([facility] [address] 3/17/2014    Chronic pain     back with stress    Contact dermatitis and other eczema, due to unspecified cause     Depression     Headache(784.0)     Hyperlipidemia 8/22/2016    Other ill-defined conditions(799.89)     sinus infection,hay fever    Other ill-defined conditions(799.89)     scoliosis    Psychiatric disorder     bipolar    Psychotic disorder ([facility] [address]     Stool color black     Tennis elbow syndrome 5/4/2015    Trauma       Past Surgical History:   Procedure Laterality Date    HX HEENT      wisdom teeth extraction    HX LIPOMA RESECTION      right gluteal    FREDY BIOPSY BREAST STEREOTACTIC Left 2011    negative    RI DENTAL SURGERY PROCEDURE      hx of dental surgery- wisdom teeth extracted     Current Outpatient Medications   Medication Sig Dispense Refill    varicella-zoster recombinant, PF, (SHINGRIX, PF,) 50 mcg/0.5 mL susr injection 0.5mL by IntraMUSCular route once now and then repeat in 2-6 months 0.5 mL 1    predniSONE (DELTASONE) 10 mg tablet Take 60 mg by mouth daily (with breakfast).  Day 1 and 2: 60mg; Day 3: 50mg; Day 4:40mg; Day 5: 30 mg; Day 6: 20mg; Day 7: 10mg 27 Tab 0    QUEtiapine SR (SEROQUEL XR) 300 mg sr tablet Take 300 mg by mouth nightly.  methocarbamol (ROBAXIN) 500 mg tablet Take 1 Tab by mouth daily as needed for Pain. 60 Tab 2    ibuprofen (MOTRIN) 800 mg tablet Take 1 Tab by mouth every twelve (12) hours. 60 Tab 2    triamcinolone acetonide (KENALOG) 0.1 % ointment APPLY TO AFFECTED AREA FOR 14 DAYS. DO NOT USE ON FACE  1    flurazepam (DALMANE) 30 mg capsule Take 1 Cap by mouth nightly as needed. Max Daily Amount: 30 mg. 5 Cap 0    famotidine (PEPCID) 40 mg tablet Take 40 mg by mouth daily.  albuterol (PROVENTIL HFA, VENTOLIN HFA, PROAIR HFA) 90 mcg/actuation inhaler Take 2 Puffs by inhalation every four (4) hours as needed for Wheezing. 1 Inhaler 0    montelukast (SINGULAIR) 10 mg tablet Take 10 mg by mouth daily.  azelastine (ASTELIN) 137 mcg (0.1 %) nasal spray USE 1 SPRAY IN EACH NOSTRIL TWICE A DAY AS DIRECTED 30 Bottle 2    fexofenadine (ALLEGRA) 180 mg tablet Take  by mouth daily.  carbamazepine (TEGRETOL) 200 mg tablet Take 200 mg by mouth two (2) times a day.        Allergies   Allergen Reactions    Other Food Hives     Artifical sweetners    Claritin-D 12 Hour [Loratadine-Pseudoephedrine] Palpitations     palpatations and ringing in the ear    Other Medication Anaphylaxis     Artificial sweeteners cause anaphylaxis    Pcn [Penicillins] Anaphylaxis    Sunscreen Contact Dermatitis     Burns and opens the skin    Ultram [Tramadol] Hives and Other (comments)     Hives and ringing of the ears    Benzoyl Peroxide Hives    Cephalexin Itching    Divalproex Itching    Maltodextrin-Glycerin Shortness of Breath       Family History   Problem Relation Age of Onset   Fernando Hoose Arthritis-rheumatoid Mother    Fernando Hoose Migraines Mother     Psychiatric Disorder Mother     Bipolar Disorder Mother     Lupus Mother     Alcohol abuse Father     Lung Disease Father     Heart Disease Father     Stroke Father     High Cholesterol Father     Psychiatric Disorder Sister     Alcohol abuse Sister     Bipolar Disorder Sister     Stroke Brother     Cancer Maternal Aunt     Breast Cancer Maternal Aunt         pt thniks she was under 48    Bipolar Disorder Sister      Social History     Tobacco Use    Smoking status: Never Smoker    Smokeless tobacco: Never Used   Substance Use Topics    Alcohol use: Yes     Comment: occasional       Depression Risk Factor Screening:   No flowsheet data found. Alcohol Risk Factor Screening:   Do you average 1 drink per night or more than 7 drinks a week:  No    On any one occasion in the past three months have you have had more than 3 drinks containing alcohol:  No      Functional Ability and Level of Safety:   Hearing: Hearing is good. Activities of Daily Living: The home contains: no safety equipment. Patient does total self care    Ambulation: with no difficulty    Fall Risk:  No flowsheet data found. Abuse Screen:  Patient is not abused    Cognitive Screening   Has your family/caregiver stated any concerns about your memory: no  Cognitive Screening: Normal - Verbal Fluency Test    Patient Care Team   Patient Care Team:  Lissy Stevenson MD as PCP - General (Internal Medicine)  Lissy Stevenson MD as PCP - [facility] Empaneled Provider  Gladys Ramos MD (Allergy)  Abdon Barnhart MD (Gastroenterology)    Assessment/Plan   Education and counseling provided:  Are appropriate based on today's review and evaluation    Diagnoses and all orders for this visit:    1. Screening for diabetes mellitus    2. Bipolar affective disorder, : followed by psychiatrist and mood appears stable, she is on seroquel, flurazepam and tegretol   -  METABOLIC PANEL, COMPREHENSIVE  -     LIPID PANEL  -     CBC WITH AUTOMATED DIFF    2. Screening for cholesterol level  -     LIPID PANEL    3.  High cholesterol  -     LIPID PANEL    4. Elevated random blood glucose level  - HEMOGLOBIN A1C W/O EAG    5. Medicare annual wellness visit, subsequent    6.  Encounter for immunization  -     ADMIN INFLUENZA VIRUS VAC  -     INFLUENZA VIRUS VAC QUAD,SPLIT,PRESV FREE SYRINGE IM    Other orders  -     varicella-zoster recombinant, PF, (SHINGRIX, PF,) 50 mcg/0.5 mL susr injection; 0.5mL by IntraMUSCular route once now and then repeat in 2-6 months        Health Maintenance Due   Topic Date Due    Shingrix Vaccine Age 50> (1 of 2) 02/08/2014    MEDICARE YEARLY EXAM  05/15/2019    Influenza Age 9 to Adult  08/01/2019

## 2019-12-31 LAB
ALBUMIN SERPL-MCNC: 4.5 G/DL (ref 3.5–5.5)
ALBUMIN/GLOB SERPL: 1.7 {RATIO} (ref 1.2–2.2)
ALP SERPL-CCNC: 79 IU/L (ref 39–117)
ALT SERPL-CCNC: 9 IU/L (ref 0–32)
AST SERPL-CCNC: 15 IU/L (ref 0–40)
BASOPHILS # BLD AUTO: 0 X10E3/UL (ref 0–0.2)
BASOPHILS NFR BLD AUTO: 1 %
BILIRUB SERPL-MCNC: <0.2 MG/DL (ref 0–1.2)
BUN SERPL-MCNC: 10 MG/DL (ref 6–24)
BUN/CREAT SERPL: 13 (ref 9–23)
CALCIUM SERPL-MCNC: 9.6 MG/DL (ref 8.7–10.2)
CHLORIDE SERPL-SCNC: 99 MMOL/L (ref 96–106)
CHOLEST SERPL-MCNC: 219 MG/DL (ref 100–199)
CO2 SERPL-SCNC: 26 MMOL/L (ref 20–29)
CREAT SERPL-MCNC: 0.8 MG/DL (ref 0.57–1)
EOSINOPHIL # BLD AUTO: 0.1 X10E3/UL (ref 0–0.4)
EOSINOPHIL NFR BLD AUTO: 1 %
ERYTHROCYTE [DISTWIDTH] IN BLOOD BY AUTOMATED COUNT: 12.5 % (ref 12.3–15.4)
GLOBULIN SER CALC-MCNC: 2.6 G/DL (ref 1.5–4.5)
GLUCOSE SERPL-MCNC: 93 MG/DL (ref 65–99)
HBA1C MFR BLD: 5.4 % (ref 4.8–5.6)
HCT VFR BLD AUTO: 32.7 % (ref 34–46.6)
HDLC SERPL-MCNC: 84 MG/DL
HGB BLD-MCNC: 11 G/DL (ref 11.1–15.9)
IMM GRANULOCYTES # BLD AUTO: 0 X10E3/UL (ref 0–0.1)
IMM GRANULOCYTES NFR BLD AUTO: 0 %
LDLC SERPL CALC-MCNC: 124 MG/DL (ref 0–99)
LYMPHOCYTES # BLD AUTO: 1.8 X10E3/UL (ref 0.7–3.1)
LYMPHOCYTES NFR BLD AUTO: 35 %
MCH RBC QN AUTO: 31.9 PG (ref 26.6–33)
MCHC RBC AUTO-ENTMCNC: 33.6 G/DL (ref 31.5–35.7)
MCV RBC AUTO: 95 FL (ref 79–97)
MONOCYTES # BLD AUTO: 0.5 X10E3/UL (ref 0.1–0.9)
MONOCYTES NFR BLD AUTO: 9 %
NEUTROPHILS # BLD AUTO: 2.8 X10E3/UL (ref 1.4–7)
NEUTROPHILS NFR BLD AUTO: 54 %
PLATELET # BLD AUTO: 275 X10E3/UL (ref 150–450)
POTASSIUM SERPL-SCNC: 4.5 MMOL/L (ref 3.5–5.2)
PROT SERPL-MCNC: 7.1 G/DL (ref 6–8.5)
RBC # BLD AUTO: 3.45 X10E6/UL (ref 3.77–5.28)
SODIUM SERPL-SCNC: 139 MMOL/L (ref 134–144)
TRIGL SERPL-MCNC: 55 MG/DL (ref 0–149)
VLDLC SERPL CALC-MCNC: 11 MG/DL (ref 5–40)
WBC # BLD AUTO: 5.2 X10E3/UL (ref 3.4–10.8)

## 2020-01-01 NOTE — PROGRESS NOTES
Normal kidney and liver function   Cholesterol is slightly elevated LDL bad cholesterol - work on low fat diet   No diabetes

## 2020-01-02 NOTE — PROGRESS NOTES
Called, spoke to pt  Received two pt identifiers  Pt informed per Dr. Clarence Ham normal kidney and liver function   Pt informed per Dr. Clarence Ham cholesterol is slightly elevated LDL bad cholesterol - work on low fat diet. Pt informed per Dr. Clarence Ham No diabetes. Pt states when she went  To the Er for an allergic reaction and used her Epi pen prior. Pt was given a new prescription for an epi pen as well. Pt verbalizes understanding of the instructions and has no further questions at this time.

## 2020-01-10 ENCOUNTER — TELEPHONE (OUTPATIENT)
Dept: INTERNAL MEDICINE CLINIC | Age: 55
End: 2020-01-10

## 2020-01-10 ENCOUNTER — HOSPITAL ENCOUNTER (INPATIENT)
Age: 55
LOS: 1 days | Discharge: HOME | DRG: 641 | End: 2020-01-11
Attending: EMERGENCY MEDICINE | Admitting: INTERNAL MEDICINE
Payer: MEDICARE

## 2020-01-10 ENCOUNTER — HOSPITAL ENCOUNTER (EMERGENCY)
Age: 55
Discharge: HOME | DRG: 641 | End: 2020-01-10
Attending: EMERGENCY MEDICINE
Payer: MEDICARE

## 2020-01-10 VITALS
DIASTOLIC BLOOD PRESSURE: 65 MMHG | OXYGEN SATURATION: 100 % | HEIGHT: 61 IN | SYSTOLIC BLOOD PRESSURE: 111 MMHG | TEMPERATURE: 97.5 F | BODY MASS INDEX: 30.59 KG/M2 | RESPIRATION RATE: 18 BRPM | WEIGHT: 162.04 LBS | HEART RATE: 80 BPM

## 2020-01-10 DIAGNOSIS — K21.9 GASTROESOPHAGEAL REFLUX DISEASE WITHOUT ESOPHAGITIS: ICD-10-CM

## 2020-01-10 DIAGNOSIS — T78.40XA ALLERGIC REACTION, INITIAL ENCOUNTER: Primary | ICD-10-CM

## 2020-01-10 DIAGNOSIS — R11.0 NAUSEA WITHOUT VOMITING: ICD-10-CM

## 2020-01-10 DIAGNOSIS — F30.9 MANIA (HCC): ICD-10-CM

## 2020-01-10 DIAGNOSIS — G47.09 OTHER INSOMNIA: ICD-10-CM

## 2020-01-10 DIAGNOSIS — E87.1 HYPONATREMIA: Primary | ICD-10-CM

## 2020-01-10 LAB
AMPHET UR QL SCN: NEGATIVE
ANION GAP SERPL CALC-SCNC: 7 MMOL/L (ref 5–15)
APPEARANCE UR: CLEAR
APPEARANCE UR: CLEAR
BACTERIA URNS QL MICRO: NEGATIVE /HPF
BARBITURATES UR QL SCN: NEGATIVE
BASOPHILS # BLD: 0 K/UL (ref 0–0.1)
BASOPHILS NFR BLD: 0 % (ref 0–1)
BENZODIAZ UR QL: NEGATIVE
BILIRUB UR QL: NEGATIVE
BILIRUB UR QL: NEGATIVE
BUN SERPL-MCNC: 5 MG/DL (ref 6–20)
BUN/CREAT SERPL: 8 (ref 12–20)
CALCIUM SERPL-MCNC: 9.6 MG/DL (ref 8.5–10.1)
CANNABINOIDS UR QL SCN: NEGATIVE
CHLORIDE SERPL-SCNC: 91 MMOL/L (ref 97–108)
CO2 SERPL-SCNC: 26 MMOL/L (ref 21–32)
COCAINE UR QL SCN: NEGATIVE
COLOR UR: ABNORMAL
COLOR UR: NORMAL
CREAT SERPL-MCNC: 0.65 MG/DL (ref 0.55–1.02)
DIFFERENTIAL METHOD BLD: ABNORMAL
DRUG SCRN COMMENT,DRGCM: NORMAL
EOSINOPHIL # BLD: 0 K/UL (ref 0–0.4)
EOSINOPHIL NFR BLD: 0 % (ref 0–7)
EPITH CASTS URNS QL MICRO: ABNORMAL /LPF
ERYTHROCYTE [DISTWIDTH] IN BLOOD BY AUTOMATED COUNT: 11.1 % (ref 11.5–14.5)
GLUCOSE BLD STRIP.AUTO-MCNC: 136 MG/DL (ref 65–100)
GLUCOSE SERPL-MCNC: 129 MG/DL (ref 65–100)
GLUCOSE UR STRIP.AUTO-MCNC: NEGATIVE MG/DL
GLUCOSE UR STRIP.AUTO-MCNC: NEGATIVE MG/DL
HCT VFR BLD AUTO: 33.3 % (ref 35–47)
HGB BLD-MCNC: 11.5 G/DL (ref 11.5–16)
HGB UR QL STRIP: NEGATIVE
HGB UR QL STRIP: NEGATIVE
IMM GRANULOCYTES # BLD AUTO: 0 K/UL (ref 0–0.04)
IMM GRANULOCYTES NFR BLD AUTO: 0 % (ref 0–0.5)
KETONES UR QL STRIP.AUTO: 15 MG/DL
KETONES UR QL STRIP.AUTO: NEGATIVE MG/DL
LEUKOCYTE ESTERASE UR QL STRIP.AUTO: NEGATIVE
LEUKOCYTE ESTERASE UR QL STRIP.AUTO: NEGATIVE
LYMPHOCYTES # BLD: 0.5 K/UL (ref 0.8–3.5)
LYMPHOCYTES NFR BLD: 10 % (ref 12–49)
MCH RBC QN AUTO: 31.3 PG (ref 26–34)
MCHC RBC AUTO-ENTMCNC: 34.5 G/DL (ref 30–36.5)
MCV RBC AUTO: 90.7 FL (ref 80–99)
METHADONE UR QL: NEGATIVE
MONOCYTES # BLD: 0.1 K/UL (ref 0–1)
MONOCYTES NFR BLD: 1 % (ref 5–13)
NEUTS SEG # BLD: 4.7 K/UL (ref 1.8–8)
NEUTS SEG NFR BLD: 89 % (ref 32–75)
NITRITE UR QL STRIP.AUTO: NEGATIVE
NITRITE UR QL STRIP.AUTO: NEGATIVE
NRBC # BLD: 0 K/UL (ref 0–0.01)
NRBC BLD-RTO: 0 PER 100 WBC
OPIATES UR QL: NEGATIVE
PCP UR QL: NEGATIVE
PH UR STRIP: 7 [PH] (ref 5–8)
PH UR STRIP: 7 [PH] (ref 5–8)
PLATELET # BLD AUTO: 263 K/UL (ref 150–400)
PMV BLD AUTO: 9.2 FL (ref 8.9–12.9)
POTASSIUM SERPL-SCNC: 3.9 MMOL/L (ref 3.5–5.1)
PROT UR STRIP-MCNC: NEGATIVE MG/DL
PROT UR STRIP-MCNC: NEGATIVE MG/DL
RBC # BLD AUTO: 3.67 M/UL (ref 3.8–5.2)
RBC #/AREA URNS HPF: ABNORMAL /HPF (ref 0–5)
RBC MORPH BLD: ABNORMAL
SERVICE CMNT-IMP: ABNORMAL
SODIUM SERPL-SCNC: 124 MMOL/L (ref 136–145)
SODIUM SERPL-SCNC: 129 MMOL/L (ref 136–145)
SP GR UR REFRACTOMETRY: <1.005 (ref 1–1.03)
SP GR UR REFRACTOMETRY: <1.005 (ref 1–1.03)
TSH SERPL DL<=0.05 MIU/L-ACNC: 0.86 UIU/ML (ref 0.36–3.74)
UA: UC IF INDICATED,UAUC: ABNORMAL
UROBILINOGEN UR QL STRIP.AUTO: 0.2 EU/DL (ref 0.2–1)
UROBILINOGEN UR QL STRIP.AUTO: 0.2 EU/DL (ref 0.2–1)
WBC # BLD AUTO: 5.3 K/UL (ref 3.6–11)
WBC URNS QL MICRO: ABNORMAL /HPF (ref 0–4)

## 2020-01-10 PROCEDURE — 93005 ELECTROCARDIOGRAM TRACING: CPT

## 2020-01-10 PROCEDURE — 85025 COMPLETE CBC W/AUTO DIFF WBC: CPT

## 2020-01-10 PROCEDURE — 36415 COLL VENOUS BLD VENIPUNCTURE: CPT

## 2020-01-10 PROCEDURE — A9270 NON-COVERED ITEM OR SERVICE: HCPCS | Performed by: EMERGENCY MEDICINE

## 2020-01-10 PROCEDURE — 74011250637 HC RX REV CODE- 250/637: Performed by: INTERNAL MEDICINE

## 2020-01-10 PROCEDURE — 74011636637 HC RX REV CODE- 636/637: Performed by: EMERGENCY MEDICINE

## 2020-01-10 PROCEDURE — 74011250636 HC RX REV CODE- 250/636: Performed by: EMERGENCY MEDICINE

## 2020-01-10 PROCEDURE — 81001 URINALYSIS AUTO W/SCOPE: CPT

## 2020-01-10 PROCEDURE — 83930 ASSAY OF BLOOD OSMOLALITY: CPT

## 2020-01-10 PROCEDURE — 82962 GLUCOSE BLOOD TEST: CPT

## 2020-01-10 PROCEDURE — 74011250636 HC RX REV CODE- 250/636: Performed by: INTERNAL MEDICINE

## 2020-01-10 PROCEDURE — 80048 BASIC METABOLIC PNL TOTAL CA: CPT

## 2020-01-10 PROCEDURE — 96372 THER/PROPH/DIAG INJ SC/IM: CPT

## 2020-01-10 PROCEDURE — 74011250637 HC RX REV CODE- 250/637: Performed by: EMERGENCY MEDICINE

## 2020-01-10 PROCEDURE — 84295 ASSAY OF SERUM SODIUM: CPT

## 2020-01-10 PROCEDURE — 65660000000 HC RM CCU STEPDOWN

## 2020-01-10 PROCEDURE — 81003 URINALYSIS AUTO W/O SCOPE: CPT

## 2020-01-10 PROCEDURE — 99284 EMERGENCY DEPT VISIT MOD MDM: CPT

## 2020-01-10 PROCEDURE — 99285 EMERGENCY DEPT VISIT HI MDM: CPT

## 2020-01-10 PROCEDURE — 74011000250 HC RX REV CODE- 250: Performed by: EMERGENCY MEDICINE

## 2020-01-10 PROCEDURE — 84443 ASSAY THYROID STIM HORMONE: CPT

## 2020-01-10 PROCEDURE — 80307 DRUG TEST PRSMV CHEM ANLYZR: CPT

## 2020-01-10 RX ORDER — HEPARIN SODIUM 5000 [USP'U]/ML
5000 INJECTION, SOLUTION INTRAVENOUS; SUBCUTANEOUS EVERY 8 HOURS
Status: DISCONTINUED | OUTPATIENT
Start: 2020-01-10 | End: 2020-01-11 | Stop reason: HOSPADM

## 2020-01-10 RX ORDER — ACETAMINOPHEN 325 MG/1
650 TABLET ORAL
Status: DISCONTINUED | OUTPATIENT
Start: 2020-01-10 | End: 2020-01-11 | Stop reason: HOSPADM

## 2020-01-10 RX ORDER — ONDANSETRON 2 MG/ML
4 INJECTION INTRAMUSCULAR; INTRAVENOUS
Status: DISCONTINUED | OUTPATIENT
Start: 2020-01-10 | End: 2020-01-11 | Stop reason: HOSPADM

## 2020-01-10 RX ORDER — IBUPROFEN 800 MG/1
800 TABLET ORAL
COMMUNITY
End: 2020-08-06

## 2020-01-10 RX ORDER — METHOCARBAMOL 500 MG/1
500 TABLET, FILM COATED ORAL
Status: DISCONTINUED | OUTPATIENT
Start: 2020-01-10 | End: 2020-01-11 | Stop reason: HOSPADM

## 2020-01-10 RX ORDER — TEMAZEPAM 15 MG/1
30 CAPSULE ORAL
Status: DISCONTINUED | OUTPATIENT
Start: 2020-01-10 | End: 2020-01-11 | Stop reason: HOSPADM

## 2020-01-10 RX ORDER — SODIUM CHLORIDE 0.9 % (FLUSH) 0.9 %
5-40 SYRINGE (ML) INJECTION AS NEEDED
Status: DISCONTINUED | OUTPATIENT
Start: 2020-01-10 | End: 2020-01-11 | Stop reason: HOSPADM

## 2020-01-10 RX ORDER — FAMOTIDINE 20 MG/1
20 TABLET, FILM COATED ORAL
Status: COMPLETED | OUTPATIENT
Start: 2020-01-10 | End: 2020-01-10

## 2020-01-10 RX ORDER — FAMOTIDINE 20 MG/1
40 TABLET, FILM COATED ORAL
Status: DISCONTINUED | OUTPATIENT
Start: 2020-01-10 | End: 2020-01-11 | Stop reason: HOSPADM

## 2020-01-10 RX ORDER — PREDNISONE 20 MG/1
20 TABLET ORAL DAILY
Qty: 3 TAB | Refills: 0 | Status: SHIPPED | OUTPATIENT
Start: 2020-01-11 | End: 2020-01-11

## 2020-01-10 RX ORDER — AZELASTINE 1 MG/ML
1 SPRAY, METERED NASAL 2 TIMES DAILY
Status: DISCONTINUED | OUTPATIENT
Start: 2020-01-10 | End: 2020-01-11 | Stop reason: HOSPADM

## 2020-01-10 RX ORDER — PROMETHAZINE HYDROCHLORIDE 25 MG/ML
12.5 INJECTION, SOLUTION INTRAMUSCULAR; INTRAVENOUS
Status: DISCONTINUED | OUTPATIENT
Start: 2020-01-10 | End: 2020-01-10 | Stop reason: CLARIF

## 2020-01-10 RX ORDER — HYDROXYZINE 25 MG/1
25 TABLET, FILM COATED ORAL
Status: COMPLETED | OUTPATIENT
Start: 2020-01-10 | End: 2020-01-10

## 2020-01-10 RX ORDER — SODIUM CHLORIDE 0.9 % (FLUSH) 0.9 %
5-40 SYRINGE (ML) INJECTION EVERY 8 HOURS
Status: DISCONTINUED | OUTPATIENT
Start: 2020-01-10 | End: 2020-01-11 | Stop reason: HOSPADM

## 2020-01-10 RX ORDER — ONDANSETRON 4 MG/1
4 TABLET, ORALLY DISINTEGRATING ORAL
Status: COMPLETED | OUTPATIENT
Start: 2020-01-10 | End: 2020-01-10

## 2020-01-10 RX ORDER — ZIPRASIDONE MESYLATE 20 MG/ML
10 INJECTION, POWDER, LYOPHILIZED, FOR SOLUTION INTRAMUSCULAR
Status: DISCONTINUED | OUTPATIENT
Start: 2020-01-10 | End: 2020-01-10

## 2020-01-10 RX ORDER — QUETIAPINE 300 MG/1
600 TABLET, FILM COATED, EXTENDED RELEASE ORAL
Status: DISCONTINUED | OUTPATIENT
Start: 2020-01-10 | End: 2020-01-11 | Stop reason: HOSPADM

## 2020-01-10 RX ORDER — TEMAZEPAM 15 MG/1
CAPSULE ORAL
Status: DISPENSED
Start: 2020-01-10 | End: 2020-01-11

## 2020-01-10 RX ORDER — PROMETHAZINE HYDROCHLORIDE 12.5 MG/1
12.5 TABLET ORAL
Qty: 10 TAB | Refills: 0 | Status: SHIPPED | OUTPATIENT
Start: 2020-01-10 | End: 2020-01-30

## 2020-01-10 RX ORDER — CARBAMAZEPINE 200 MG/1
200 TABLET ORAL 2 TIMES DAILY
Status: DISCONTINUED | OUTPATIENT
Start: 2020-01-10 | End: 2020-01-11 | Stop reason: HOSPADM

## 2020-01-10 RX ORDER — HEPARIN SODIUM 5000 [USP'U]/ML
INJECTION, SOLUTION INTRAVENOUS; SUBCUTANEOUS
Status: DISPENSED
Start: 2020-01-10 | End: 2020-01-11

## 2020-01-10 RX ORDER — PROCHLORPERAZINE EDISYLATE 5 MG/ML
5 INJECTION INTRAMUSCULAR; INTRAVENOUS
Status: COMPLETED | OUTPATIENT
Start: 2020-01-10 | End: 2020-01-10

## 2020-01-10 RX ORDER — SODIUM CHLORIDE 9 MG/ML
50 INJECTION, SOLUTION INTRAVENOUS CONTINUOUS
Status: DISCONTINUED | OUTPATIENT
Start: 2020-01-10 | End: 2020-01-11

## 2020-01-10 RX ORDER — PREDNISONE 20 MG/1
60 TABLET ORAL
Status: COMPLETED | OUTPATIENT
Start: 2020-01-10 | End: 2020-01-10

## 2020-01-10 RX ORDER — TRIAMCINOLONE ACETONIDE 1 MG/G
OINTMENT TOPICAL
Status: DISCONTINUED | OUTPATIENT
Start: 2020-01-10 | End: 2020-01-11 | Stop reason: HOSPADM

## 2020-01-10 RX ORDER — MONTELUKAST SODIUM 10 MG/1
10 TABLET ORAL
Status: DISCONTINUED | OUTPATIENT
Start: 2020-01-10 | End: 2020-01-11 | Stop reason: HOSPADM

## 2020-01-10 RX ORDER — LORATADINE 10 MG/1
10 TABLET ORAL DAILY
Status: DISCONTINUED | OUTPATIENT
Start: 2020-01-11 | End: 2020-01-11 | Stop reason: HOSPADM

## 2020-01-10 RX ORDER — TEMAZEPAM 30 MG/1
30 CAPSULE ORAL
COMMUNITY
End: 2020-01-30

## 2020-01-10 RX ORDER — HYDROXYZINE 25 MG/1
25 TABLET, FILM COATED ORAL
Qty: 20 TAB | Refills: 0 | Status: SHIPPED | OUTPATIENT
Start: 2020-01-10 | End: 2020-01-30

## 2020-01-10 RX ADMIN — WATER 10 MG: 1 INJECTION INTRAMUSCULAR; INTRAVENOUS; SUBCUTANEOUS at 17:28

## 2020-01-10 RX ADMIN — HYDROXYZINE HYDROCHLORIDE 25 MG: 25 TABLET, FILM COATED ORAL at 07:19

## 2020-01-10 RX ADMIN — FAMOTIDINE 20 MG: 20 TABLET, FILM COATED ORAL at 07:19

## 2020-01-10 RX ADMIN — TEMAZEPAM 30 MG: 15 CAPSULE ORAL at 23:03

## 2020-01-10 RX ADMIN — Medication 10 ML: at 21:14

## 2020-01-10 RX ADMIN — PREDNISONE 60 MG: 20 TABLET ORAL at 07:19

## 2020-01-10 RX ADMIN — PROCHLORPERAZINE EDISYLATE 5 MG: 5 INJECTION INTRAMUSCULAR; INTRAVENOUS at 08:37

## 2020-01-10 RX ADMIN — CARBAMAZEPINE 200 MG: 200 TABLET ORAL at 23:03

## 2020-01-10 RX ADMIN — ONDANSETRON 4 MG: 4 TABLET, ORALLY DISINTEGRATING ORAL at 07:48

## 2020-01-10 RX ADMIN — AZELASTINE HYDROCHLORIDE 1 SPRAY: 137 SPRAY, METERED NASAL at 23:04

## 2020-01-10 RX ADMIN — SODIUM CHLORIDE 75 ML/HR: 900 INJECTION, SOLUTION INTRAVENOUS at 19:13

## 2020-01-10 RX ADMIN — HEPARIN SODIUM 5000 UNITS: 5000 INJECTION INTRAVENOUS; SUBCUTANEOUS at 23:06

## 2020-01-10 NOTE — DISCHARGE INSTRUCTIONS
Patient Education        Allergic Reaction: Care Instructions  Your Care Instructions    An allergic reaction is an excessive response from your immune system to a medicine, chemical, food, insect bite, or other substance. A reaction can range from mild to life-threatening. Some people have a mild rash, hives, and itching or stomach cramps. In severe reactions, swelling of your tongue and throat can close up your airway so that you cannot breathe. Follow-up care is a key part of your treatment and safety. Be sure to make and go to all appointments, and call your doctor if you are having problems. It's also a good idea to know your test results and keep a list of the medicines you take. How can you care for yourself at home? · If you know what caused your allergic reaction, be sure to avoid it. Your allergy may become more severe each time you have a reaction. · Take an over-the-counter antihistamine, such as cetirizine (Zyrtec) or loratadine (Claritin), to treat mild symptoms. Read and follow directions on the label. Some antihistamines can make you feel sleepy. Do not give antihistamines to a child unless you have checked with your doctor first. Mild symptoms include sneezing or an itchy or runny nose; an itchy mouth; a few hives or mild itching; and mild nausea or stomach discomfort. · Do not scratch hives or a rash. Put a cold, moist towel on them or take cool baths to relieve itching. Put ice packs on hives, swelling, or insect stings for 10 to 15 minutes at a time. Put a thin cloth between the ice pack and your skin. Do not take hot baths or showers. They will make the itching worse. · Your doctor may prescribe a shot of epinephrine to carry with you in case you have a severe reaction. Learn how to give yourself the shot and keep it with you at all times. Make sure it is not .   · Go to the emergency room every time you have a severe reaction, even if you have used your shot of epinephrine and are feeling better. Symptoms can come back after a shot. · Wear medical alert jewelry that lists your allergies. You can buy this at most drugstores. · If your child has a severe allergy, make sure that his or her teachers, babysitters, coaches, and other caregivers know about the allergy. They should have an epinephrine shot, know how and when to give it, and have a plan to take your child to the hospital.  When should you call for help? Give an epinephrine shot if:    · You think you are having a severe allergic reaction.     · You have symptoms in more than one body area, such as mild nausea and an itchy mouth.    After giving an epinephrine shot call 911, even if you feel better.   Call 911 if:    · You have symptoms of a severe allergic reaction. These may include:  ? Sudden raised, red areas (hives) all over your body. ? Swelling of the throat, mouth, lips, or tongue. ? Trouble breathing. ? Passing out (losing consciousness). Or you may feel very lightheaded or suddenly feel weak, confused, or restless.     · You have been given an epinephrine shot, even if you feel better.    Call your doctor now or seek immediate medical care if:    · You have symptoms of an allergic reaction, such as:  ? A rash or hives (raised, red areas on the skin). ? Itching. ? Swelling. ? Belly pain, nausea, or vomiting.    Watch closely for changes in your health, and be sure to contact your doctor if:    · You do not get better as expected. Where can you learn more? Go to http://radha-my.info/. Enter U352 in the search box to learn more about \"Allergic Reaction: Care Instructions. \"  Current as of: April 7, 2019  Content Version: 12.2  © 2657-4863 [employer]. Care instructions adapted under license by [employer] (which disclaims liability or warranty for this information).  If you have questions about a medical condition or this instruction, always ask your healthcare professional. [address] any warranty or liability for your use of this information. Patient Education        Gastroesophageal Reflux Disease (GERD): Care Instructions  Your Care Instructions    Gastroesophageal reflux disease (GERD) is the backward flow of stomach acid into the esophagus. The esophagus is the tube that leads from your throat to your stomach. A one-way valve prevents the stomach acid from moving up into this tube. When you have GERD, this valve does not close tightly enough. If you have mild GERD symptoms including heartburn, you may be able to control the problem with antacids or over-the-counter medicine. Changing your diet, losing weight, and making other lifestyle changes can also help reduce symptoms. Follow-up care is a key part of your treatment and safety. Be sure to make and go to all appointments, and call your doctor if you are having problems. It's also a good idea to know your test results and keep a list of the medicines you take. How can you care for yourself at home? · Take your medicines exactly as prescribed. Call your doctor if you think you are having a problem with your medicine. · Your doctor may recommend over-the-counter medicine. For mild or occasional indigestion, antacids, such as Tums, Gaviscon, Mylanta, or Maalox, may help. Your doctor also may recommend over-the-counter acid reducers, such as Pepcid AC, Tagamet HB, Zantac 75, or Prilosec. Read and follow all instructions on the label. If you use these medicines often, talk with your doctor. · Change your eating habits. ? It's best to eat several small meals instead of two or three large meals. ? After you eat, wait 2 to 3 hours before you lie down. ? Chocolate, mint, and alcohol can make GERD worse. ? Spicy foods, foods that have a lot of acid (like tomatoes and oranges), and coffee can make GERD symptoms worse in some people.  If your symptoms are worse after you eat a certain food, you may want to stop eating that food to see if your symptoms get better. · Do not smoke or chew tobacco. Smoking can make GERD worse. If you need help quitting, talk to your doctor about stop-smoking programs and medicines. These can increase your chances of quitting for good. · If you have GERD symptoms at night, raise the head of your bed 6 to 8 inches by putting the frame on blocks or placing a foam wedge under the head of your mattress. (Adding extra pillows does not work.)  · Do not wear tight clothing around your middle. · Lose weight if you need to. Losing just 5 to 10 pounds can help. When should you call for help? Call your doctor now or seek immediate medical care if:    · You have new or different belly pain.     · Your stools are black and tarlike or have streaks of blood.    Watch closely for changes in your health, and be sure to contact your doctor if:    · Your symptoms have not improved after 2 days.     · Food seems to catch in your throat or chest.   Where can you learn more? Go to http://radha-my.info/. Enter G219 in the search box to learn more about \"Gastroesophageal Reflux Disease (GERD): Care Instructions. \"  Current as of: November 7, 2018  Content Version: 12.2  © 1264-5129 [employer]. Care instructions adapted under license by [employer] (which disclaims liability or warranty for this information). If you have questions about a medical condition or this instruction, always ask your healthcare professional. [place] [address] any warranty or liability for your use of this information. Patient Education        Nausea and Vomiting: Care Instructions  Your Care Instructions    When you are nauseated, you may feel weak and sweaty and notice a lot of saliva in your mouth. Nausea often leads to vomiting.  Most of the time you do not need to worry about nausea and vomiting, but they can be signs of other illnesses. Two common causes of nausea and vomiting are stomach flu and food poisoning. Nausea and vomiting from viral stomach flu will usually start to improve within 24 hours. Nausea and vomiting from food poisoning may last from 12 to 48 hours. The doctor has checked you carefully, but problems can develop later. If you notice any problems or new symptoms, get medical treatment right away. Follow-up care is a key part of your treatment and safety. Be sure to make and go to all appointments, and call your doctor if you are having problems. It's also a good idea to know your test results and keep a list of the medicines you take. How can you care for yourself at home? · To prevent dehydration, drink plenty of fluids, enough so that your urine is light yellow or clear like water. Choose water and other caffeine-free clear liquids until you feel better. If you have kidney, heart, or liver disease and have to limit fluids, talk with your doctor before you increase the amount of fluids you drink. · Rest in bed until you feel better. · When you are able to eat, try clear soups, mild foods, and liquids until all symptoms are gone for 12 to 48 hours. Other good choices include dry toast, crackers, cooked cereal, and gelatin dessert, such as Jell-O. When should you call for help? Call 911 anytime you think you may need emergency care. For example, call if:    · You passed out (lost consciousness).    Call your doctor now or seek immediate medical care if:    · You have symptoms of dehydration, such as:  ? Dry eyes and a dry mouth. ? Passing only a little dark urine. ?  Feeling thirstier than usual.     · You have new or worsening belly pain.     · You have a new or higher fever.     · You vomit blood or what looks like coffee grounds.    Watch closely for changes in your health, and be sure to contact your doctor if:    · You have ongoing nausea and vomiting.     · Your vomiting is getting worse.     · Your vomiting lasts longer than 2 days.     · You are not getting better as expected. Where can you learn more? Go to http://radha-my.info/. Enter 25 [id number] in the search box to learn more about \"Nausea and Vomiting: Care Instructions. \"  Current as of: June 26, 2019  Content Version: 12.2  © 0859-0121 [employer].. Care instructions adapted under license by "[employer] (which disclaims liability or warranty for this information). If you have questions about a medical condition or this instruction, always ask your healthcare professional. [address] any warranty or liability for your use of this information.

## 2020-01-10 NOTE — TELEPHONE ENCOUNTER
Patient just left ER and she keeps having dry/pasty mouth and she's itching. She wants to follow up with Dr. Kirk Tinsley. She will be calling the ER to update them.

## 2020-01-10 NOTE — ED NOTES
Patient aware of fluid restriction order at this time. Cardiac monitoring in place. AxOx4. Follows commands. Denies pain.

## 2020-01-10 NOTE — ED PROVIDER NOTES
EMERGENCY DEPARTMENT HISTORY AND PHYSICAL EXAM      Date: 1/10/2020  Patient Name: Ursula Peacock    History of Presenting Illness     Chief Complaint   Patient presents with    Allergic Reaction     Pt ambulatory to triage with c/o intermittent \"tongue being dry\" after having chili the other night and then having product containing soy yesterday evening; has been treating with intermittent benadryl; pt itching on arms, legs, mouth dryness; pt able to maintain secretions, speak in full sentences; denies SOB       History Provided By: Patient    HPI: Ursula Peacock, 53 y.o. female presents to the ED with cc of allergic reaction. The patient states that she made cabbage with Polish sausage yesterday. She did not know that the product contains soy, and started to have dry mouth and itchiness. She has taken multiple dosages of Benadryl for her symptoms, with no relief of symptoms. She denies shortness of breath, chest pain or actual rash. She has had pruritus involving her arms and legs. He also believes that her reflux has been exacerbated. There are no other complaints, changes, or physical findings at this time. PCP: Cheryl Hitchcock MD    No current facility-administered medications on file prior to encounter. Current Outpatient Medications on File Prior to Encounter   Medication Sig Dispense Refill    varicella-zoster recombinant, PF, (SHINGRIX, PF,) 50 mcg/0.5 mL susr injection 0.5mL by IntraMUSCular route once now and then repeat in 2-6 months 0.5 mL 1    predniSONE (DELTASONE) 10 mg tablet Take 60 mg by mouth daily (with breakfast). Day 1 and 2: 60mg; Day 3: 50mg; Day 4:40mg; Day 5: 30 mg; Day 6: 20mg; Day 7: 10mg 27 Tab 0    QUEtiapine SR (SEROQUEL XR) 300 mg sr tablet Take 300 mg by mouth nightly.  methocarbamol (ROBAXIN) 500 mg tablet Take 1 Tab by mouth daily as needed for Pain. 60 Tab 2    ibuprofen (MOTRIN) 800 mg tablet Take 1 Tab by mouth every twelve (12) hours.  60 Tab 2    triamcinolone acetonide (KENALOG) 0.1 % ointment APPLY TO AFFECTED AREA FOR 14 DAYS. DO NOT USE ON FACE  1    flurazepam (DALMANE) 30 mg capsule Take 1 Cap by mouth nightly as needed. Max Daily Amount: 30 mg. 5 Cap 0    famotidine (PEPCID) 40 mg tablet Take 40 mg by mouth daily.  albuterol (PROVENTIL HFA, VENTOLIN HFA, PROAIR HFA) 90 mcg/actuation inhaler Take 2 Puffs by inhalation every four (4) hours as needed for Wheezing. 1 Inhaler 0    montelukast (SINGULAIR) 10 mg tablet Take 10 mg by mouth daily.  azelastine (ASTELIN) 137 mcg (0.1 %) nasal spray USE 1 SPRAY IN EACH NOSTRIL TWICE A DAY AS DIRECTED 30 Bottle 2    fexofenadine (ALLEGRA) 180 mg tablet Take  by mouth daily.  carbamazepine (TEGRETOL) 200 mg tablet Take 200 mg by mouth two (2) times a day.          Past History     Past Medical History:  Past Medical History:   Diagnosis Date    Arthritis     joints    Bipolar 1 disorder with moderate robyn ([facility] [address] 3/17/2014    Chronic pain     back with stress    Contact dermatitis and other eczema, due to unspecified cause     Depression     Headache(784.0)     Hyperlipidemia 8/22/2016    Other ill-defined conditions(799.89)     sinus infection,hay fever    Other ill-defined conditions(799.89)     scoliosis    Psychiatric disorder     bipolar    Psychotic disorder (HCC)     Stool color black     Tennis elbow syndrome 5/4/2015    Trauma        Past Surgical History:  Past Surgical History:   Procedure Laterality Date    HX HEENT      wisdom teeth extraction    HX LIPOMA RESECTION      right gluteal    FREDY BIOPSY BREAST STEREOTACTIC Left 2011    negative    RI DENTAL SURGERY PROCEDURE      hx of dental surgery- wisdom teeth extracted       Family History:  Family History   Problem Relation Age of Onset   [place] Arthritis-rheumatoid Mother    [place] Migraines Mother     Psychiatric Disorder Mother     Bipolar Disorder Mother     Lupus Mother     Alcohol abuse Father     Lung Disease Father     Heart Disease Father     Stroke Father     High Cholesterol Father     Psychiatric Disorder Sister     Alcohol abuse Sister     Bipolar Disorder Sister     Stroke Brother     Cancer Maternal Aunt     Breast Cancer Maternal Aunt         pt jose d she was under 48    Bipolar Disorder Sister        Social History:  Social History     Tobacco Use    Smoking status: Never Smoker    Smokeless tobacco: Never Used   Substance Use Topics    Alcohol use: Yes     Comment: occasional    Drug use: No       Allergies: Allergies   Allergen Reactions    Other Food Hives     Artifical sweetners    Claritin-D 12 Hour [Loratadine-Pseudoephedrine] Palpitations     palpatations and ringing in the ear    Other Medication Anaphylaxis     Artificial sweeteners cause anaphylaxis    Pcn [Penicillins] Anaphylaxis    Sunscreen Contact Dermatitis     Burns and opens the skin    Ultram [Tramadol] Hives and Other (comments)     Hives and ringing of the ears    Benzoyl Peroxide Hives    Cephalexin Itching    Divalproex Itching    Maltodextrin-Glycerin Shortness of Breath         Review of Systems   Review of Systems   Constitutional: Negative for chills and fever. HENT: Negative for congestion. Eyes: Negative. Respiratory: Negative for shortness of breath. Cardiovascular: Negative for chest pain. Gastrointestinal: Negative for abdominal pain. Endocrine: Negative for heat intolerance. Genitourinary: Negative. Musculoskeletal: Negative for back pain. Skin: Negative for rash. Allergic/Immunologic: Negative for immunocompromised state. Neurological: Negative for dizziness. Hematological: Does not bruise/bleed easily. Psychiatric/Behavioral: Negative. All other systems reviewed and are negative. Physical Exam   Physical Exam  Vitals signs and nursing note reviewed. Constitutional:       General: She is not in acute distress. Appearance: She is well-developed.    HENT: Head: Normocephalic. Mouth/Throat:      Pharynx: No posterior oropharyngeal erythema. Neck:      Musculoskeletal: Normal range of motion and neck supple. Cardiovascular:      Rate and Rhythm: Regular rhythm. Tachycardia present. Heart sounds: Normal heart sounds. Pulmonary:      Effort: Pulmonary effort is normal.      Breath sounds: Normal breath sounds. Abdominal:      General: Bowel sounds are normal.      Palpations: Abdomen is soft. Tenderness: There is no tenderness. Musculoskeletal: Normal range of motion. Skin:     General: Skin is warm and dry. Findings: No rash. Neurological:      General: No focal deficit present. Mental Status: She is alert and oriented to person, place, and time. Psychiatric:         Mood and Affect: Mood normal.         Behavior: Behavior normal.         Diagnostic Study Results     Labs -   No results found for this or any previous visit (from the past 12 hour(s)). Radiologic Studies -   No orders to display     CT Results  (Last 48 hours)    None        CXR Results  (Last 48 hours)    None          Medical Decision Making   I am the first provider for this patient. I reviewed the vital signs, available nursing notes, past medical history, past surgical history, family history and social history. Vital Signs-Reviewed the patient's vital signs. Patient Vitals for the past 12 hrs:   Temp Pulse Resp BP SpO2   01/10/20 0627 97.5 °F (36.4 °C) (!) 102 18 (!) 156/91 100 %         Records Reviewed: Nursing Notes, Old Medical Records, Previous Radiology Studies and Previous Laboratory Studies    Provider Notes (Medical Decision Making): Allergic reaction, urticaria    ED Course:   Initial assessment performed. The patients presenting problems have been discussed, and they are in agreement with the care plan formulated and outlined with them. I have encouraged them to ask questions as they arise throughout their visit.     Progress note:    The patient became nauseated after taking the medications. She received a dose of Zofran and a shot of Phenergan and is feeling better. She is advised to follow-up and return to ER if worse           Critical Care Time:   0    Disposition:  home    PLAN:  1. Current Discharge Medication List        2. Follow-up Information    None       Return to ED if worse     Diagnosis     Clinical Impression:   1. Allergic reaction, initial encounter    2. Nausea without vomiting    3. Gastroesophageal reflux disease without esophagitis        Attestations:    Chong Rojas MD    Please note that this dictation was completed with [employer], the computer voice recognition software. Quite often unanticipated grammatical, syntax, homophones, and other interpretive errors are inadvertently transcribed by the computer software. Please disregard these errors. Please excuse any errors that have escaped final proofreading. Thank you.

## 2020-01-10 NOTE — ED TRIAGE NOTES
Assumed care of patient. Patient ambulatory to ED room 07. Placed on cardiac monitoring. Patient reports allergic reaction evaluation this morning in ED with discharge home on PO medications. Patient reports symptoms have continued and requesting evaluation for throat itching. AxOx4. Room air with no signs or symptoms of acute distress. Patient reports dry mouth, dry throat, and throat itching at this time. Noted to be tachypneic. Reports taking PO Benadryl every four hours since discharge this morning. Denies pain.
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
## 2020-01-11 ENCOUNTER — APPOINTMENT (OUTPATIENT)
Dept: GENERAL RADIOLOGY | Age: 55
DRG: 641 | End: 2020-01-11
Attending: INTERNAL MEDICINE
Payer: MEDICARE

## 2020-01-11 VITALS
BODY MASS INDEX: 30.59 KG/M2 | WEIGHT: 162.04 LBS | SYSTOLIC BLOOD PRESSURE: 107 MMHG | TEMPERATURE: 98.2 F | DIASTOLIC BLOOD PRESSURE: 74 MMHG | HEART RATE: 87 BPM | OXYGEN SATURATION: 97 % | RESPIRATION RATE: 18 BRPM | HEIGHT: 61 IN

## 2020-01-11 LAB
ALBUMIN SERPL-MCNC: 3.4 G/DL (ref 3.5–5)
ALBUMIN/GLOB SERPL: 0.9 {RATIO} (ref 1.1–2.2)
ALP SERPL-CCNC: 71 U/L (ref 45–117)
ALT SERPL-CCNC: 21 U/L (ref 12–78)
ANION GAP SERPL CALC-SCNC: 8 MMOL/L (ref 5–15)
AST SERPL-CCNC: 20 U/L (ref 15–37)
ATRIAL RATE: 98 BPM
BASOPHILS # BLD: 0 K/UL (ref 0–0.1)
BASOPHILS NFR BLD: 0 % (ref 0–1)
BILIRUB SERPL-MCNC: 0.3 MG/DL (ref 0.2–1)
BUN SERPL-MCNC: 6 MG/DL (ref 6–20)
BUN/CREAT SERPL: 9 (ref 12–20)
CALCIUM SERPL-MCNC: 8.8 MG/DL (ref 8.5–10.1)
CALCULATED P AXIS, ECG09: 75 DEGREES
CALCULATED R AXIS, ECG10: 69 DEGREES
CALCULATED T AXIS, ECG11: 35 DEGREES
CHLORIDE SERPL-SCNC: 100 MMOL/L (ref 97–108)
CO2 SERPL-SCNC: 26 MMOL/L (ref 21–32)
CORTIS SERPL-MCNC: 1.3 UG/DL
CREAT SERPL-MCNC: 0.64 MG/DL (ref 0.55–1.02)
DIAGNOSIS, 93000: NORMAL
DIFFERENTIAL METHOD BLD: ABNORMAL
EOSINOPHIL # BLD: 0 K/UL (ref 0–0.4)
EOSINOPHIL NFR BLD: 0 % (ref 0–7)
ERYTHROCYTE [DISTWIDTH] IN BLOOD BY AUTOMATED COUNT: 11.3 % (ref 11.5–14.5)
GLOBULIN SER CALC-MCNC: 3.8 G/DL (ref 2–4)
GLUCOSE SERPL-MCNC: 90 MG/DL (ref 65–100)
HCT VFR BLD AUTO: 29.8 % (ref 35–47)
HGB BLD-MCNC: 10 G/DL (ref 11.5–16)
IMM GRANULOCYTES # BLD AUTO: 0 K/UL (ref 0–0.04)
IMM GRANULOCYTES NFR BLD AUTO: 0 % (ref 0–0.5)
LYMPHOCYTES # BLD: 2.2 K/UL (ref 0.8–3.5)
LYMPHOCYTES NFR BLD: 38 % (ref 12–49)
MCH RBC QN AUTO: 31 PG (ref 26–34)
MCHC RBC AUTO-ENTMCNC: 33.6 G/DL (ref 30–36.5)
MCV RBC AUTO: 92.3 FL (ref 80–99)
MONOCYTES # BLD: 0.7 K/UL (ref 0–1)
MONOCYTES NFR BLD: 12 % (ref 5–13)
NEUTS SEG # BLD: 2.9 K/UL (ref 1.8–8)
NEUTS SEG NFR BLD: 50 % (ref 32–75)
NRBC # BLD: 0 K/UL (ref 0–0.01)
NRBC BLD-RTO: 0 PER 100 WBC
OSMOLALITY SERPL: 266 MOSM/KG H2O
P-R INTERVAL, ECG05: 140 MS
PLATELET # BLD AUTO: 257 K/UL (ref 150–400)
PMV BLD AUTO: 8.9 FL (ref 8.9–12.9)
POTASSIUM SERPL-SCNC: 3.9 MMOL/L (ref 3.5–5.1)
PROT SERPL-MCNC: 7.2 G/DL (ref 6.4–8.2)
Q-T INTERVAL, ECG07: 354 MS
QRS DURATION, ECG06: 84 MS
QTC CALCULATION (BEZET), ECG08: 451 MS
RBC # BLD AUTO: 3.23 M/UL (ref 3.8–5.2)
SODIUM SERPL-SCNC: 134 MMOL/L (ref 136–145)
VENTRICULAR RATE, ECG03: 98 BPM
WBC # BLD AUTO: 5.9 K/UL (ref 3.6–11)

## 2020-01-11 PROCEDURE — 74011250636 HC RX REV CODE- 250/636: Performed by: INTERNAL MEDICINE

## 2020-01-11 PROCEDURE — 82533 TOTAL CORTISOL: CPT

## 2020-01-11 PROCEDURE — 80053 COMPREHEN METABOLIC PANEL: CPT

## 2020-01-11 PROCEDURE — 85025 COMPLETE CBC W/AUTO DIFF WBC: CPT

## 2020-01-11 PROCEDURE — 74011250637 HC RX REV CODE- 250/637: Performed by: INTERNAL MEDICINE

## 2020-01-11 PROCEDURE — 71045 X-RAY EXAM CHEST 1 VIEW: CPT

## 2020-01-11 RX ADMIN — HEPARIN SODIUM 5000 UNITS: 5000 INJECTION INTRAVENOUS; SUBCUTANEOUS at 07:00

## 2020-01-11 RX ADMIN — SODIUM CHLORIDE 50 ML/HR: 900 INJECTION, SOLUTION INTRAVENOUS at 09:20

## 2020-01-11 RX ADMIN — QUETIAPINE FUMARATE 600 MG: 300 TABLET, EXTENDED RELEASE ORAL at 00:25

## 2020-01-11 NOTE — PROGRESS NOTES
Pharmacy Clarification of Prior to Admission Medication Regimen     The patient was interviewed regarding clarification of the prior to admission medication regimen.  was present in room and obtained permission from patient to discuss drug regimen with visitor(s) present. Patient was questioned regarding use of any other inhalers, topical products, over the counter medications, herbal medications, vitamin products or ophthalmic/nasal/otic medication use. Information Obtained From: Patient, RX Query    Pertinent Pharmacy Findings:  Patient stated she also has an EPI pen PRN, but has not used it as of 1/10/20. hydrOXYzine HCl (ATARAX) 25 mg tablet, predniSONE (DELTASONE) 20 mg tablet, promethazine (PHENERGAN) 12.5 mg tablet: Patient was prescribed these agents today, 1/10/20, and patient has not started therapy as of 1/10/20. PTA medication list was corrected to the following:     Prior to Admission Medications   Prescriptions Last Dose Informant Taking? QUEtiapine SR (SEROQUEL XR) 300 mg sr tablet 1/9/2020 at Unknown time Self Yes   Sig: Take 600 mg by mouth nightly. azelastine (ASTELIN) 137 mcg (0.1 %) nasal spray 1/9/2020 at Unknown time Self Yes   Sig: USE 1 SPRAY IN EACH NOSTRIL TWICE A DAY AS DIRECTED   carbamazepine (TEGRETOL) 200 mg tablet 1/10/2020 at Unknown time Self Yes   Sig: Take 200 mg by mouth two (2) times a day. famotidine (PEPCID) 40 mg tablet 12/30/2019 at Unknown time Self Yes   Sig: Take 40 mg by mouth daily as needed (heartburn). fexofenadine (ALLEGRA) 180 mg tablet 1/10/2020 at Unknown time Self Yes   Sig: Take 180 mg by mouth daily. hydrOXYzine HCl (ATARAX) 25 mg tablet Not Taking at Unknown time Self No   Sig: Take 1 Tab by mouth every six (6) hours as needed for Itching for up to 10 days. ibuprofen (MOTRIN) 800 mg tablet 1/3/2020 at Unknown time Self Yes   Sig: Take 800 mg by mouth every twelve (12) hours as needed for Pain.    methocarbamol (ROBAXIN) 500 mg tablet 1/8/2020 at Unknown time Self Yes   Sig: Take 1 Tab by mouth daily as needed for Pain.   montelukast (SINGULAIR) 10 mg tablet 12/30/2019 at Unknown time Self Yes   Sig: Take 10 mg by mouth daily as needed. Prior to allergy shots   predniSONE (DELTASONE) 20 mg tablet Not Taking at Unknown time Self No   Sig: Take 20 mg by mouth daily for 3 days. With Breakfast   promethazine (PHENERGAN) 12.5 mg tablet Not Taking at Unknown time Self No   Sig: Take 1 Tab by mouth every six (6) hours as needed for Nausea. temazepam (RESTORIL) 30 mg capsule 1/9/2020 at Unknown time Self Yes   Sig: Take 30 mg by mouth nightly. triamcinolone acetonide (KENALOG) 0.1 % ointment 1/10/2020 at Unknown time Self Yes   Sig: Apply  to affected area daily as needed for Skin Irritation or Itching.       Facility-Administered Medications: None          Thank you,  Stella Brock [facility]  Medication History Pharmacy Technician

## 2020-01-11 NOTE — ROUTINE PROCESS
TRANSFER - OUT REPORT:    Verbal report given to Raleigh Saez RN on Félix Grier  being transferred to telemetry (unit) for routine progression of care       Report consisted of patients Situation, Background, Assessment and   Recommendations(SBAR). Information from the following report(s) SBAR, ED Summary, Intake/Output, MAR and Recent Results was reviewed with the receiving nurse. Lines:   Peripheral IV 01/10/20 Right Antecubital (Active)   Site Assessment Clean, dry, & intact 1/10/2020  4:46 PM   Phlebitis Assessment 0 1/10/2020  4:46 PM   Infiltration Assessment 0 1/10/2020  4:46 PM   Dressing Status Clean, dry, & intact 1/10/2020  4:46 PM   Dressing Type Tape;Transparent 1/10/2020  4:46 PM   Hub Color/Line Status Pink;Flushed;Patent 1/10/2020  4:46 PM   Action Taken Blood drawn 1/10/2020  4:46 PM        Opportunity for questions and clarification was provided.       Patient transported with:   [employer]

## 2020-01-11 NOTE — ACP (ADVANCE CARE PLANNING)
.                                           Advance Care Planning Note      NAME: Gaviota Garcia   :  1965   MRN:  [id number]     Date/Time:  1/10/2020 7:23 PM    Active Diagnoses:  Hospital Problems  Date Reviewed: 2019          Codes Class Noted POA    Hyponatremia ICD-10-CM: E87.1  ICD-9-CM: 276.1  1/10/2020 Unknown              These active diagnoses are of sufficient risk that focused discussion on advance care planning is indicated in order to allow the patient to thoughtfully consider personal goals of care, and if situations arise that prevent the ability to personally give input, to ensure appropriate representation of their personal desires for different levels and aggressiveness of care. Discussion:   Code status addressed and wants to be a Full Code. Patient wants central line and vasopressors if needed. Patient would also want a feeding tube, if needed, for nutritional support. Patient  would like to assign    as the surrogate decision maker. Persons present and participating in discussion: Edna Valladares MD, nurse      Time Spent:   Total time spent face-to-face in education and discussion:   20 minutes.          Arcelia Fregoso MD   Hospitalist

## 2020-01-11 NOTE — H&P
Hospitalist Admission Note    NAME: Georges Navarro   :  1965   MRN:  [id number]     Date/Time:  1/10/2020 7:23 PM    Patient PCP: Christopher Brown MD  ______________________________________________________________________  Given the patient's current clinical presentation, I have a high level of concern for decompensation if discharged from the emergency department. Complex decision making was performed, which includes reviewing the patient's available past medical records, laboratory results, and x-ray films. My assessment of this patient's clinical condition and my plan of care is as follows. Assessment / Plan:      Acute on chronic Hyponatremia  Limit water intake  correct na over the 1-2 24 hours  Gently IVF  Recheck na/tsh in 6hours  check ua/xrays     Chronic low back pain- naproxen 800mg BID  - methocarbamol (ROBAXIN) 500 mg tablet  Taking BID /tylenol     Bipolar 1 disorder ([facility] [address]): appears manic today / non suicidal   Follows with  Dr Tai Casanova MD psychiatrist.  tegretol and seroquel     Insomnia cont w Restoril      Chronically low T4: euthyroid on exam/   tsh in am    Code Status: Full code  Surrogate Decision Maker:Hsuband     DVT Prophylaxis: heparin   GI Prophylaxis: not indicated    Baseline:Ambulatory       Subjective:   CHIEF COMPLAINT: allergic reaction -    HISTORY OF PRESENT ILLNESS:     Mykel Schaffer is a 53 y.o. black female with a history of depression chronic pain bipolar hyperlipidemia who presented to the emergency room complaining of itching and insomnia. Also complaining that she is unable to sleep . Patient complaining that she feels itchy all over she feels  Thirsty all the time, no chest pain no difficulty swallowing no shortness of breath no fever no chills patient declared that she drinks about 3 L of fluid every day to help her with her symptoms.   Per notes and records patient does have a history of hyponatremia patient denies any rashes any new medications no changes in diet no changes of lotions or detergents. When I examined the patient patient had no complaints here itching and was seeming improvement. Patient has no suicidal ideation or homicidal ideation. Of note during the work-up done in the emergency room patient was noted to have a sodium of 124 her last sodium 1 3/1/1939. Patient will be admitted for further treatment of her hyponatremia  We were asked to admit for work up and evaluation of the above problems.      Past Medical History:   Diagnosis Date    Arthritis     joints    Bipolar 1 disorder with moderate robyn ([address] 3/17/2014    Chronic pain     back with stress    Contact dermatitis and other eczema, due to unspecified cause     Depression     Headache(784.0)     Hyperlipidemia 8/22/2016    Other ill-defined conditions(799.89)     sinus infection,hay fever    Other ill-defined conditions(799.89)     scoliosis    Psychiatric disorder     bipolar    Psychotic disorder (HCC)     Stool color black     Tennis elbow syndrome 5/4/2015    Trauma         Past Surgical History:   Procedure Laterality Date    HX HEENT      wisdom teeth extraction    HX LIPOMA RESECTION      right gluteal    FREDY BIOPSY BREAST STEREOTACTIC Left 2011    negative    MN DENTAL SURGERY PROCEDURE      hx of dental surgery- wisdom teeth extracted       Social History     Tobacco Use    Smoking status: Never Smoker    Smokeless tobacco: Never Used   Substance Use Topics    Alcohol use: Yes     Comment: occasional        Family History   Problem Relation Age of Onset   [facility] Arthritis-rheumatoid Mother     Migraines Mother     Psychiatric Disorder Mother     Bipolar Disorder Mother     Lupus Mother     Alcohol abuse Father     Lung Disease Father     Heart Disease Father     Stroke Father     High Cholesterol Father     Psychiatric Disorder Sister     Alcohol abuse Sister     Bipolar Disorder Sister     Stroke Brother     Cancer Maternal Aunt     Breast Cancer Maternal Aunt         pt jose d she was under 48    Bipolar Disorder Sister      Allergies   Allergen Reactions    Other Food Hives     Artifical sweetners    Claritin-D 12 Hour [Loratadine-Pseudoephedrine] Palpitations     palpatations and ringing in the ear    Other Medication Anaphylaxis     Artificial sweeteners cause anaphylaxis    Pcn [Penicillins] Anaphylaxis    Sunscreen Contact Dermatitis     Burns and opens the skin    Ultram [Tramadol] Hives and Other (comments)     Hives and ringing of the ears    Benzoyl Peroxide Hives    Cephalexin Itching    Divalproex Itching    Maltodextrin-Glycerin Shortness of Breath        Prior to Admission medications    Medication Sig Start Date End Date Taking? Authorizing Provider   ibuprofen (MOTRIN) 800 mg tablet Take 800 mg by mouth every twelve (12) hours as needed for Pain. Yes Provider, Historical   temazepam (RESTORIL) 30 mg capsule Take 30 mg by mouth nightly. Yes Provider, Historical   QUEtiapine SR (SEROQUEL XR) 300 mg sr tablet Take 600 mg by mouth nightly. Yes Provider, Historical   methocarbamol (ROBAXIN) 500 mg tablet Take 1 Tab by mouth daily as needed for Pain. 9/30/19  Yes Juana Davis MD   triamcinolone acetonide (KENALOG) 0.1 % ointment Apply  to affected area daily as needed for Skin Irritation or Itching. 6/11/19  Yes Provider, Historical   famotidine (PEPCID) 40 mg tablet Take 40 mg by mouth daily as needed (heartburn). Yes Other, MD Caroline   montelukast (SINGULAIR) 10 mg tablet Take 10 mg by mouth daily as needed. Prior to allergy shots   Yes Other, MD Caroline   azelastine (ASTELIN) 137 mcg (0.1 %) nasal spray USE 1 SPRAY IN EACH NOSTRIL TWICE A DAY AS DIRECTED 10/25/17  Yes Maeve Traylor MD   fexofenadine (ALLEGRA) 180 mg tablet Take 180 mg by mouth daily. Yes Provider, Historical   carbamazepine (TEGRETOL) 200 mg tablet Take 200 mg by mouth two (2) times a day.  3/31/11  Yes Provider, Historical   promethazine (PHENERGAN) 12.5 mg tablet Take 1 Tab by mouth every six (6) hours as needed for Nausea. 1/10/20   Randee Garcia MD   hydrOXYzine HCl (ATARAX) 25 mg tablet Take 1 Tab by mouth every six (6) hours as needed for Itching for up to 10 days. 1/10/20 1/20/20  Randee Garcia MD   predniSONE (DELTASONE) 20 mg tablet Take 20 mg by mouth daily for 3 days. With Breakfast 1/11/20 1/14/20  Randee Garcia MD       REVIEW OF SYSTEMS:     I am not able to complete the review of systems because:    The patient is intubated and sedated    The patient has altered mental status due to his acute medical problems    The patient has baseline aphasia from prior stroke(s)    The patient has baseline dementia and is not reliable historian    The patient is in acute medical distress and unable to provide information           Total of 12 systems reviewed as follows:       POSITIVE= underlined text  Negative = text not underlined  General:  fever, chills, sweats, generalized weakness, weight loss/gain,  polypdpsia     loss of appetite   Eyes:    blurred vision, eye pain, loss of vision, double vision  ENT:    rhinorrhea, pharyngitis   Respiratory:   cough, sputum production, SOB, BURKS, wheezing, pleuritic pain   Cardiology:   chest pain, palpitations, orthopnea, PND, edema, syncope   Gastrointestinal:  abdominal pain , N/V, diarrhea, dysphagia, constipation, bleeding   Genitourinary:  frequency, urgency, dysuria, hematuria, incontinence   Muskuloskeletal :  arthralgia, myalgia, back pain  Hematology:  easy bruising, nose or gum bleeding, lymphadenopathy   Dermatological: rash, ulceration, pruritis, color change / jaundice  Endocrine:   hot flashes or polydipsia   Neurological:  headache, dizziness, confusion, focal weakness, paresthesia,     Speech difficulties, memory loss, gait difficulty  Psychological: Feelings of anxiety, depression, agitation    Objective:   VITALS:    Visit Vitals  /81   Pulse (!) 108   Temp 97.9 °F (36.6 °C)   Resp 20   Ht 5' 1\" (1.549 m)   Wt 73.5 kg (162 lb 0.6 oz)   SpO2 96%   BMI 30.62 kg/m²       PHYSICAL EXAM:    General:    Alert,anxious  cooperative, no distress, appears stated age. HEENT: Atraumatic, anicteric sclerae, pink conjunctivae     No oral ulcers, mucosa moist, throat clear, dentition fair  Neck:  Supple, symmetrical,  thyroid: non tender  Lungs:   Clear to auscultation bilaterally. No Wheezing or Rhonchi. No rales. Chest wall:  No tenderness  No Accessory muscle use. Heart:   Tachy x  rhythm,  No  murmur   No edema  Abdomen:   Soft, non-tender. Not distended. Bowel sounds normal  Extremities: No cyanosis. No clubbing,      Skin turgor normal, Capillary refill normal, Radial dial pulse 2+  Skin:     Not pale. Not Jaundiced  No rashes   Psych:  Fair insight. Not depressed. ++anxious no  agitated. Neurologic: EOMs intact. No facial asymmetry. No aphasia or slurred speech. Symmetrical strength, Sensation grossly intact.  Alert and oriented X 4.     _______________________________________________________________________  Care Plan discussed with:    Comments   Patient x    Family      RN x    Care Manager                    Consultant:  x    _______________________________________________________________________  Expected  Disposition:   Home with Family x   HH/PT/OT/RN    SNF/LTC    ELIESER    ________________________________________________________________________  TOTAL TIME:  61  Minutes    Critical Care Provided     Minutes non procedure based      Comments    x Reviewed previous records   >50% of visit spent in counseling and coordination of care x Discussion with patient and/or family and questions answered       ________________________________________________________________________  Signed: Douglas Murillo MD    Procedures: see electronic medical records for all procedures/Xrays and details which were not copied into this note but were reviewed prior to creation of Plan. LAB DATA REVIEWED:    Recent Results (from the past 24 hour(s))   URINALYSIS W/ RFLX MICROSCOPIC    Collection Time: 01/10/20  4:18 PM   Result Value Ref Range    Color YELLOW/STRAW      Appearance CLEAR CLEAR      Specific gravity <1.005 1.003 - 1.030    pH (UA) 7.0 5.0 - 8.0      Protein NEGATIVE  NEG mg/dL    Glucose NEGATIVE  NEG mg/dL    Ketone NEGATIVE  NEG mg/dL    Bilirubin NEGATIVE  NEG      Blood NEGATIVE  NEG      Urobilinogen 0.2 0.2 - 1.0 EU/dL    Nitrites NEGATIVE  NEG      Leukocyte Esterase NEGATIVE  NEG     CBC WITH AUTOMATED DIFF    Collection Time: 01/10/20  4:18 PM   Result Value Ref Range    WBC 5.3 3.6 - 11.0 K/uL    RBC 3.67 (L) 3.80 - 5.20 M/uL    HGB 11.5 11.5 - 16.0 g/dL    HCT 33.3 (L) 35.0 - 47.0 %    MCV 90.7 80.0 - 99.0 FL    MCH 31.3 26.0 - 34.0 PG    MCHC 34.5 30.0 - 36.5 g/dL    RDW 11.1 (L) 11.5 - 14.5 %    PLATELET 911 752 - 935 K/uL    MPV 9.2 8.9 - 12.9 FL    NRBC 0.0 0  WBC    ABSOLUTE NRBC 0.00 0.00 - 0.01 K/uL    NEUTROPHILS 89 (H) 32 - 75 %    LYMPHOCYTES 10 (L) 12 - 49 %    MONOCYTES 1 (L) 5 - 13 %    EOSINOPHILS 0 0 - 7 %    BASOPHILS 0 0 - 1 %    IMMATURE GRANULOCYTES 0 0.0 - 0.5 %    ABS. NEUTROPHILS 4.7 1.8 - 8.0 K/UL    ABS. LYMPHOCYTES 0.5 (L) 0.8 - 3.5 K/UL    ABS. MONOCYTES 0.1 0.0 - 1.0 K/UL    ABS. EOSINOPHILS 0.0 0.0 - 0.4 K/UL    ABS. BASOPHILS 0.0 0.0 - 0.1 K/UL    ABS. IMM.  GRANS. 0.0 0.00 - 0.04 K/UL    DF MANUAL      RBC COMMENTS NORMOCYTIC, NORMOCHROMIC     METABOLIC PANEL, BASIC    Collection Time: 01/10/20  4:18 PM   Result Value Ref Range    Sodium 124 (L) 136 - 145 mmol/L    Potassium 3.9 3.5 - 5.1 mmol/L    Chloride 91 (L) 97 - 108 mmol/L    CO2 26 21 - 32 mmol/L    Anion gap 7 5 - 15 mmol/L    Glucose 129 (H) 65 - 100 mg/dL    BUN 5 (L) 6 - 20 MG/DL    Creatinine 0.65 0.55 - 1.02 MG/DL    BUN/Creatinine ratio 8 (L) 12 - 20      GFR est AA >60 >60 ml/min/1.73m2    GFR est non-AA >60 >60 ml/min/1.73m2    Calcium 9.6 8.5 - 10.1 MG/DL   GLUCOSE, POC    Collection Time: 01/10/20  4:22 PM   Result Value Ref Range    Glucose (POC) 136 (H) 65 - 100 mg/dL    Performed by Miesha Joiner (PCT)    EKG, 12 LEAD, INITIAL    Collection Time: 01/10/20  4:29 PM   Result Value Ref Range    Ventricular Rate 98 BPM    Atrial Rate 98 BPM    P-R Interval 140 ms    QRS Duration 84 ms    Q-T Interval 354 ms    QTC Calculation (Bezet) 451 ms    Calculated P Axis 75 degrees    Calculated R Axis 69 degrees    Calculated T Axis 35 degrees    Diagnosis       Normal sinus rhythm  Biatrial enlargement  When compared with ECG of 29-OCT-2005 19:36,  Previous ECG has undetermined rhythm, needs review

## 2020-01-11 NOTE — PHYSICIAN ADVISORY
Letter of Status Determination:   Recommend hospitalization status upgraded from   INPATIENT  to INPATIENTStatus     Pt Name:  Cheri Jackson   MR#   [address] [facility] # [id number] /  [id number]  Payor: Celina Up / Plan: [facility] / Product Type: Medicare /    [facility]#  [id number]   [address] [place] [address]  @ [facility]   Hospitalization date  1/10/2020  3:36 PM   Current Attending Physician  Sonia Sow MD   Principal diagnosis  Hyponatremia [E87.1]     Clinicals  53 y.o. y.o  female hospitalized with above diagnosis   notes, labs, consult notes. Acute on chronic Hyponatremia   na 124 on admission , had gentle IV hydration . Serum osm 266  BMP q8h . Repeat showed  this am   No symptoms   Pt is atble for discharge . She was advised to limit her amount of water intake as she reports some potomania      [place] (MCG) criteria   Does  NOT apply    STATUS DETERMINATION  This patient is at high risk of adverse events and deterioration based on documented clinical data, comorbid conditions and current acute care course. Ms. Cheri Jackson is expected to meet Inpatient Admission status criteria in accordance with CMS regulation Section 43 .3. Specifically, due to medical necessity the patient's stay is expected to exceed Two Midnights. It is our recommendation that this patient's hospitalization status should be upgraded from  INPATIENT to INPATIENT status. The final decision of the patient's hospitalization status depends on the attending physician's judgment. Additional comments     Payor: Celina Up / Plan: [place] MEDICARE PART A & B / Product Type: Medicare /           Tereza Badillo [address]  Elijah Rodriguez  T: [phone]    [email or web address]

## 2020-01-11 NOTE — PROGRESS NOTES
Bedside shift change report given to Tai Cruz RN (oncoming nurse) by Duong Kumari RN (offgoing nurse).  Report included the following information SBAR, Kardex, MAR, Recent Results and Cardiac Rhythm NSR,ST.

## 2020-01-11 NOTE — DISCHARGE INSTRUCTIONS
DISCHARGE DIAGNOSIS:  Acute on chronic Hyponatremia    Chronic low back pain-   Bipolar 1 disorder ([facility] [address]):    Insomnia    Chronically low T4:    MEDICATIONS:  · It is important that you take the medication exactly as they are prescribed. · Keep your medication in the bottles provided by the pharmacist and keep a list of the medication names, dosages, and times to be taken in your wallet. · Do not take other medications without consulting your doctor. Pain Management: per above medications    What to do at Home    Recommended diet:  Regular Diet    Recommended activity: Activity as tolerated    If you have questions regarding the hospital related prescriptions or hospital related issues please call [address] [place] [address] at . You can always direct your questions to your primary care doctor if you are unable to reach your hospital physician; your PCP works as an extension of your hospital doctor just like your hospital doctor is an extension of your PCP for your time at the hospital [facility]). If you experience any of the following symptoms then please call your primary care physician or return to the emergency room if you cannot get hold of your doctor:  Fever, chills, nausea, vomiting, diarrhea, change in mentation, falling, bleeding, shortness of breath  Patient Education        Hyponatremia: Care Instructions  Your Care Instructions    Hyponatremia (say \"zh-ry-kvg-TREE-daron-uh\") means that you don't have enough sodium in your blood. It can cause nausea, vomiting, and headaches. Or you may not feel hungry. In serious cases, it can cause seizures, a coma, or even death. Hyponatremia is not a disease. It is a problem caused by something else, such as medicines or exercising for a long time in hot weather. You can get hyponatremia if you lose a lot of fluids and then you drink a lot of water or other liquids that don't have much sodium.  You can also get it if you have kidney, liver, heart, or other health problems. Treatment is focused on getting your sodium levels back to normal.  Follow-up care is a key part of your treatment and safety. Be sure to make and go to all appointments, and call your doctor if you are having problems. It's also a good idea to know your test results and keep a list of the medicines you take. How can you care for yourself at home? · If your doctor recommends it, drink fluids that have sodium. Sports drinks are a good choice. Or you can eat salty foods. · If your doctor recommends it, limit the amount of water you drink. And limit fluids that are mostly water. These include tea, coffee, and juice. · Take your medicines exactly as prescribed. Call your doctor if you have any problems with your medicine. · Get your sodium levels tested when your doctor tells you to. When should you call for help? Call 911 anytime you think you may need emergency care. For example, call if:    · You have a seizure.     · You passed out (lost consciousness).    Call your doctor now or seek immediate medical care if:    · You are confused or it is hard to focus.     · You have little or no appetite.     · You feel sick to your stomach or you vomit.     · You have a headache.     · You have mood changes.     · You feel more tired than usual.    Watch closely for changes in your health, and be sure to contact your doctor if:    · You do not get better as expected. Where can you learn more? Go to http://radha-my.info/. Enter E261 in the search box to learn more about \"Hyponatremia: Care Instructions. \"  Current as of: March 28, 2019  Content Version: 12.2  © 8240-8830 [employer]. Care instructions adapted under license by [employer] (which disclaims liability or warranty for this information).  If you have questions about a medical condition or this instruction, always ask your healthcare professional. Rubén Babb any warranty or liability for your use of this information.

## 2020-01-11 NOTE — PROGRESS NOTES
TRANSFER - IN REPORT:    Verbal report received from Hal Reilly RN(name) on Félix Grier  being received from ED(unit) for routine progression of care      Report consisted of patients Situation, Background, Assessment and   Recommendations(SBAR). Information from the following report(s) SBAR, Kardex, MAR, Recent Results and Cardiac Rhythm ST was reviewed with the receiving nurse. Opportunity for questions and clarification was provided. Assessment completed upon patients arrival to unit and care assumed.

## 2020-01-11 NOTE — PROGRESS NOTES
[facility]  [address]  [place] [address]  Phone: [phone]  Fax: [phone]                  Kamila Dobbs   2017 8:20 AM   Office Visit    Description:  Female : 1950   Provider:  Isabella Aguilar PA-C   Department:  [facility]           Reason for Visit     Facial Swelling           Diagnoses this Visit        Comments    Angioedema, initial encounter    -  Primary            To Do List           Goals (5 Years of Data)     None      [facility]     [facility] On Call Nurse Care Line -  Assistance  Registered nurses in the [facility] On Call Center provide clinical advisement, health education, appointment booking, and other advisory services.  Call for this free service at [phone].             Medications           Message regarding Medications     Verify the changes and/or additions to your medication regime listed below are the same as discussed with your clinician today.  If any of these changes or additions are incorrect, please notify your healthcare provider.        These medications were administered today        Dose Freq    methylPREDNISolone sod suc(PF) 125 mg/2 mL injection 125 mg 125 mg Once    Sig: Inject 125 mg into the muscle once.    Class: Normal    Route: Intramuscular    Cosign for Ordering: Required by Marcela Rodriguez MD           Verify that the below list of medications is an accurate representation of the medications you are currently taking.  If none reported, the list may be blank. If incorrect, please contact your healthcare provider. Carry this list with you in case of emergency.           Current Medications     amlodipine (NORVASC) 10 MG tablet Take 1 tablet (10 mg total) by mouth once daily.    atorvastatin (LIPITOR) 40 MG tablet TAKE 1 TABLET (40 MG TOTAL) BY MOUTH ONCE DAILY.    blood-glucose meter kit 2 (two) times daily. Use as instructed     cetirizine (ZYRTEC) 10 mg Cap Take 1 tablet by mouth as  I have reviewed discharge instructions with the patient. The patient verbalized understanding. Removed PIV     Patient left via wheelchair with all her belongings. Ruy Sink "needed.     cycloSPORINE (RESTASIS) 0.05 % ophthalmic emulsion Place 0.05 mLs (1 drop total) into both eyes 2 (two) times daily.    estradiol valerate (DELESTROGEN) 20 mg/mL injection INJECT INTO MUSCLE FOR 1 MONTH AS DIRECTED    FLUZONE HIGH-DOSE 2016-17, PF, 180 mcg/0.5 mL Syrg     hydrochlorothiazide (HYDRODIURIL) 25 MG tablet TAKE 1 TABLET (25 MG TOTAL) BY MOUTH ONCE DAILY.    levothyroxine (SYNTHROID) 50 MCG tablet Take 1 tablet (50 mcg total) by mouth once daily.    lisinopril (PRINIVIL,ZESTRIL) 40 MG tablet TAKE 1 TABLET (40 MG TOTAL) BY MOUTH ONCE DAILY.    magnesium hydroxide (BOB CHEWS) 311 MG Chew Take 311 mg by mouth as needed.     metformin (GLUCOPHAGE) 500 MG tablet TAKE 1 TABLET (500 MG TOTAL) BY MOUTH DAILY WITH BREAKFAST.    metoprolol succinate (TOPROL-XL) 25 MG 24 hr tablet TAKE 1 TABLET (25 MG TOTAL) BY MOUTH ONCE DAILY.    MULTIVIT,CA,MIN/D3/HERBAL #161 (ESTROVEN PM ORAL) Take by mouth once daily.     omeprazole (PRILOSEC) 20 MG capsule TAKE 1 CAPSULE (20 MG TOTAL) BY MOUTH ONCE DAILY.    ranitidine (ZANTAC) 300 MG tablet Take 1 tablet (300 mg total) by mouth every evening.           Clinical Reference Information           Vital Signs - Last Recorded  Most recent update: 1/25/2017  8:41 AM by Federico Nicholas MA    BP Pulse Temp Resp Ht Wt    (!) 140/70 (BP Location: Right arm, Patient Position: Sitting, BP Method: Manual) 68 97.5 °F (36.4 °C) (Oral) 16 5' 5" (1.651 m) 84.1 kg (185 lb 6.5 oz)    LMP SpO2 BMI          (LMP Unknown) 99% 30.85 kg/m2        Blood Pressure          Most Recent Value    BP  (!)  140/70      Allergies as of 1/25/2017     Codeine      Immunizations Administered on Date of Encounter - 1/25/2017     None      "

## 2020-01-11 NOTE — ED NOTES
Report called to Rommel Green RN. All comments and concerns addressed. NS fluids infusing per order. Patient on fluid restriction. Denies pain or concerns. GCS 15. AxOx4. Follows commands. Receiving RN and patient aware additional urine sample is needed. Awaiting transport.

## 2020-01-11 NOTE — DISCHARGE SUMMARY
Hospitalist Discharge Summary     Patient ID:  Mariana Merlos  [id number]  53 y.o.  1965  1/10/2020    PCP on record: Yue Garza MD    Admit date: 1/10/2020  Discharge date and time: 1/11/2020    DISCHARGE DIAGNOSIS:  Acute on chronic Hyponatremia    Chronic low back pain-   Bipolar 1 disorder ([address]):    Insomnia    Chronically low T4:    CONSULTATIONS:  IP CONSULT TO HOSPITALIST    Excerpted HPI from H&P of Rosa Gutierrez MD:  Gray Goldstein is a 53 y.o. black female with a history of depression chronic pain bipolar hyperlipidemia who presented to the emergency room complaining of itching and insomnia. Also complaining that she is unable to sleep . Patient complaining that she feels itchy all over she feels  Thirsty all the time, no chest pain no difficulty swallowing no shortness of breath no fever no chills patient declared that she drinks about 3 L of fluid every day to help her with her symptoms. Per notes and records patient does have a history of hyponatremia patient denies any rashes any new medications no changes in diet no changes of lotions or detergents. When I examined the patient patient had no complaints here itching and was seeming improvement. Patient has no suicidal ideation or homicidal ideation. Of note during the work-up done in the emergency room patient was noted to have a sodium of 124 her last sodium 1 3/1/1939. Patient will be admitted for further treatment of her hyponatremia  We were asked to admit for work up and evaluation of the above problems. ______________________________________________________________________  DISCHARGE SUMMARY/HOSPITAL COURSE:  for full details see H&P, daily progress notes, labs, consult notes. Acute on chronic Hyponatremia   na 124 on admission , had gentle IV hydration . Serum osm 266  BMP q8h . Repeat showed  this am   No symptoms   Pt is atble for discharge .  She was advised to limit her amount of water intake as she reports some potomania   Repeat BMP by PCP as OP in 2-3days      Chronic low back pain- naproxen 800mg BID  - methocarbamol (ROBAXIN) 500 mg tablet  Taking BID /tylenol      Bipolar 1 disorder ([address]): appears manic today / non suicidal   Follows with  Dr Tatiana Ren MD psychiatrist.  tegretol and seroquel     Insomnia cont w Restoril      Chronically low T4: TSH wnl         _______________________________________________________________________  Patient seen and examined by me on discharge day. Pertinent Findings:  Gen:    Not in distress  Chest: Clear lungs  CVS:   Regular rhythm. No edema  Abd:  Soft, not distended, not tender  Neuro:  Alert, orientedx4  _______________________________________________________________________  DISCHARGE MEDICATIONS:   Current Discharge Medication List      CONTINUE these medications which have NOT CHANGED    Details   ibuprofen (MOTRIN) 800 mg tablet Take 800 mg by mouth every twelve (12) hours as needed for Pain.      temazepam (RESTORIL) 30 mg capsule Take 30 mg by mouth nightly. QUEtiapine SR (SEROQUEL XR) 300 mg sr tablet Take 600 mg by mouth nightly. methocarbamol (ROBAXIN) 500 mg tablet Take 1 Tab by mouth daily as needed for Pain. Qty: 60 Tab, Refills: 2    Associated Diagnoses: Chronic low back pain without sciatica, unspecified back pain laterality      triamcinolone acetonide (KENALOG) 0.1 % ointment Apply  to affected area daily as needed for Skin Irritation or Itching. Refills: 1      famotidine (PEPCID) 40 mg tablet Take 40 mg by mouth daily as needed (heartburn). montelukast (SINGULAIR) 10 mg tablet Take 10 mg by mouth daily as needed. Prior to allergy shots      azelastine (ASTELIN) 137 mcg (0.1 %) nasal spray USE 1 SPRAY IN EACH NOSTRIL TWICE A DAY AS DIRECTED  Qty: 30 Bottle, Refills: 2      fexofenadine (ALLEGRA) 180 mg tablet Take 180 mg by mouth daily.       carbamazepine (TEGRETOL) 200 mg tablet Take 200 mg by mouth two (2) times a day.      promethazine (PHENERGAN) 12.5 mg tablet Take 1 Tab by mouth every six (6) hours as needed for Nausea. Qty: 10 Tab, Refills: 0      hydrOXYzine HCl (ATARAX) 25 mg tablet Take 1 Tab by mouth every six (6) hours as needed for Itching for up to 10 days. Qty: 20 Tab, Refills: 0         STOP taking these medications       predniSONE (DELTASONE) 20 mg tablet Comments:   Reason for Stopping:                 Patient Follow Up Instructions: Activity: Activity as tolerated  Diet: Regular Diet  Wound Care: None needed    Follow-up with PCP  in 7 days.   Follow-up tests/labs : repeat BMP in 2-3days   Follow-up Information     Follow up With Specialties Details Why Contact Kylah Garza MD Internal Medicine Schedule an appointment as soon as possible for a visit in 1 week  [address] [place] [address]  [phone]          ________________________________________________________________    Risk of deterioration: Low    Condition at Discharge:  Stable  __________________________________________________________________    Disposition  Home with family, no needs    ____________________________________________________________________    Code Status: Full Code  ___________________________________________________________________      Total time in minutes spent coordinating this discharge (includes going over instructions, follow-up, prescriptions, and preparing report for sign off to her PCP) :  35 minutes    Signed:  Christina Neal MD

## 2020-01-13 ENCOUNTER — PATIENT OUTREACH (OUTPATIENT)
Dept: INTERNAL MEDICINE CLINIC | Age: 55
End: 2020-01-13

## 2020-01-14 ENCOUNTER — PATIENT OUTREACH (OUTPATIENT)
Dept: INTERNAL MEDICINE CLINIC | Age: 55
End: 2020-01-14

## 2020-01-17 ENCOUNTER — PATIENT OUTREACH (OUTPATIENT)
Dept: INTERNAL MEDICINE CLINIC | Age: 55
End: 2020-01-17

## 2020-01-17 ENCOUNTER — HOSPITAL ENCOUNTER (EMERGENCY)
Age: 55
Discharge: HOME | End: 2020-01-17
Attending: EMERGENCY MEDICINE
Payer: MEDICARE

## 2020-01-17 VITALS
OXYGEN SATURATION: 100 % | TEMPERATURE: 97.8 F | BODY MASS INDEX: 30.18 KG/M2 | SYSTOLIC BLOOD PRESSURE: 143 MMHG | HEIGHT: 61 IN | RESPIRATION RATE: 18 BRPM | DIASTOLIC BLOOD PRESSURE: 85 MMHG | HEART RATE: 100 BPM | WEIGHT: 159.83 LBS

## 2020-01-17 DIAGNOSIS — F41.1 ANXIETY STATE: Primary | ICD-10-CM

## 2020-01-17 LAB
ALBUMIN SERPL-MCNC: 4 G/DL (ref 3.5–5)
ALBUMIN/GLOB SERPL: 1 {RATIO} (ref 1.1–2.2)
ALP SERPL-CCNC: 74 U/L (ref 45–117)
ALT SERPL-CCNC: 28 U/L (ref 12–78)
ANION GAP SERPL CALC-SCNC: 7 MMOL/L (ref 5–15)
APAP SERPL-MCNC: <2 UG/ML (ref 10–30)
AST SERPL-CCNC: 19 U/L (ref 15–37)
BASOPHILS # BLD: 0 K/UL (ref 0–0.1)
BASOPHILS NFR BLD: 0 % (ref 0–1)
BILIRUB SERPL-MCNC: 0.3 MG/DL (ref 0.2–1)
BUN SERPL-MCNC: 7 MG/DL (ref 6–20)
BUN/CREAT SERPL: 8 (ref 12–20)
CALCIUM SERPL-MCNC: 9 MG/DL (ref 8.5–10.1)
CHLORIDE SERPL-SCNC: 99 MMOL/L (ref 97–108)
CO2 SERPL-SCNC: 24 MMOL/L (ref 21–32)
CREAT SERPL-MCNC: 0.85 MG/DL (ref 0.55–1.02)
DIFFERENTIAL METHOD BLD: ABNORMAL
EOSINOPHIL # BLD: 0 K/UL (ref 0–0.4)
EOSINOPHIL NFR BLD: 0 % (ref 0–7)
ERYTHROCYTE [DISTWIDTH] IN BLOOD BY AUTOMATED COUNT: 11.6 % (ref 11.5–14.5)
ETHANOL SERPL-MCNC: <10 MG/DL
GLOBULIN SER CALC-MCNC: 4.1 G/DL (ref 2–4)
GLUCOSE SERPL-MCNC: 148 MG/DL (ref 65–100)
HCT VFR BLD AUTO: 32.5 % (ref 35–47)
HGB BLD-MCNC: 11 G/DL (ref 11.5–16)
IMM GRANULOCYTES # BLD AUTO: 0 K/UL (ref 0–0.04)
IMM GRANULOCYTES NFR BLD AUTO: 1 % (ref 0–0.5)
LYMPHOCYTES # BLD: 0.9 K/UL (ref 0.8–3.5)
LYMPHOCYTES NFR BLD: 13 % (ref 12–49)
MCH RBC QN AUTO: 31.4 PG (ref 26–34)
MCHC RBC AUTO-ENTMCNC: 33.8 G/DL (ref 30–36.5)
MCV RBC AUTO: 92.9 FL (ref 80–99)
MONOCYTES # BLD: 0.5 K/UL (ref 0–1)
MONOCYTES NFR BLD: 8 % (ref 5–13)
NEUTS SEG # BLD: 5.4 K/UL (ref 1.8–8)
NEUTS SEG NFR BLD: 78 % (ref 32–75)
NRBC # BLD: 0 K/UL (ref 0–0.01)
NRBC BLD-RTO: 0 PER 100 WBC
PLATELET # BLD AUTO: 311 K/UL (ref 150–400)
PMV BLD AUTO: 9.1 FL (ref 8.9–12.9)
POTASSIUM SERPL-SCNC: 4 MMOL/L (ref 3.5–5.1)
PROT SERPL-MCNC: 8.1 G/DL (ref 6.4–8.2)
RBC # BLD AUTO: 3.5 M/UL (ref 3.8–5.2)
SALICYLATES SERPL-MCNC: <1.7 MG/DL (ref 2.8–20)
SODIUM SERPL-SCNC: 130 MMOL/L (ref 136–145)
WBC # BLD AUTO: 6.9 K/UL (ref 3.6–11)

## 2020-01-17 PROCEDURE — 80053 COMPREHEN METABOLIC PANEL: CPT

## 2020-01-17 PROCEDURE — 99284 EMERGENCY DEPT VISIT MOD MDM: CPT

## 2020-01-17 PROCEDURE — 90791 PSYCH DIAGNOSTIC EVALUATION: CPT

## 2020-01-17 PROCEDURE — 96374 THER/PROPH/DIAG INJ IV PUSH: CPT

## 2020-01-17 PROCEDURE — 85025 COMPLETE CBC W/AUTO DIFF WBC: CPT

## 2020-01-17 PROCEDURE — 74011250636 HC RX REV CODE- 250/636: Performed by: EMERGENCY MEDICINE

## 2020-01-17 PROCEDURE — 36415 COLL VENOUS BLD VENIPUNCTURE: CPT

## 2020-01-17 PROCEDURE — 80307 DRUG TEST PRSMV CHEM ANLYZR: CPT

## 2020-01-17 RX ORDER — OLANZAPINE 5 MG/1
5 TABLET ORAL
COMMUNITY
End: 2020-01-30

## 2020-01-17 RX ORDER — LORAZEPAM 2 MG/ML
1 INJECTION INTRAMUSCULAR
Status: COMPLETED | OUTPATIENT
Start: 2020-01-17 | End: 2020-01-17

## 2020-01-17 RX ORDER — ASPIRIN 325 MG
50000 TABLET, DELAYED RELEASE (ENTERIC COATED) ORAL
Status: ON HOLD | COMMUNITY
End: 2022-05-09

## 2020-01-17 RX ADMIN — LORAZEPAM 1 MG: 2 INJECTION INTRAMUSCULAR; INTRAVENOUS at 08:45
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
## 2020-01-17 NOTE — DISCHARGE INSTRUCTIONS

## 2020-01-17 NOTE — ED NOTES
During initial psych evaluation, pt was so sleepy she would not wake up.  provided pertinent information, but [facility] felt like she was unable to do an adequate evaluation. Just now, pt's  rang out. Pt is now awake and states that between the olanzepine and lorazepam, she now feels \"like normal\" and wants to go home. Dr Rosa Dumont was informed. Dr Rosa Dumont wants repeat psych evaluation prior to disposition. Pt was informed and was agreeable. Dr Rosa Dumont is awaiting return phone call from [facility] to arrange the evaluation.

## 2020-01-17 NOTE — ED PROVIDER NOTES
EMERGENCY DEPARTMENT HISTORY AND PHYSICAL EXAM      Date: 1/17/2020  Patient Name: Qi Cota    History of Presenting Illness     Chief Complaint   Patient presents with   [address] Problem     Patient reports hx of Bipolar and states \"I need to calm down\"; denies SI/HI. She is requesting admission to psych unit       History Provided By: Patient    HPI: Qi Cota, 53 y.o. female presents to the ED with cc of anxiety. Patient has a history of anxiety and bipolar disorder, and states that she went to her psychiatrist, Dr. Rut Winchester, yesterday. Her Seroquel dose was decreased to 200 mg twice a day, olanzapine 5 mg nightly was added, as well as a change in dosing of her Tegretol to 200 mg in the morning and 300 mg in the evening. Dr. Rut Winchester asked the patient if she wanted to be admitted for her anxiety, and the patient said no; she would try the medication to see if it would help. Today, she is not feeling any better and she is requesting admission for anxiety. The patient denies suicidal ideation, but states she is not sure whether she would want to hurt someone else. There are no other complaints, changes, or physical findings at this time. PCP: Josiah Calero MD    No current facility-administered medications on file prior to encounter. Current Outpatient Medications on File Prior to Encounter   Medication Sig Dispense Refill    cholecalciferol (VITAMIN D3) (50,000 UNITS /1250 MCG) capsule Take 50,000 Units by mouth every seven (7) days.  OLANZapine (ZYPREXA) 5 mg tablet Take 5 mg by mouth nightly.  promethazine (PHENERGAN) 12.5 mg tablet Take 1 Tab by mouth every six (6) hours as needed for Nausea. 10 Tab 0    hydrOXYzine HCl (ATARAX) 25 mg tablet Take 1 Tab by mouth every six (6) hours as needed for Itching for up to 10 days. 20 Tab 0    ibuprofen (MOTRIN) 800 mg tablet Take 800 mg by mouth every twelve (12) hours as needed for Pain.       temazepam (RESTORIL) 30 mg capsule Take 30 mg by mouth nightly.  QUEtiapine SR (SEROQUEL XR) 300 mg sr tablet Take 200 mg by mouth two (2) times a day.  methocarbamol (ROBAXIN) 500 mg tablet Take 1 Tab by mouth daily as needed for Pain. 60 Tab 2    triamcinolone acetonide (KENALOG) 0.1 % ointment Apply  to affected area daily as needed for Skin Irritation or Itching. 1    montelukast (SINGULAIR) 10 mg tablet Take 10 mg by mouth daily as needed. Prior to allergy shots      azelastine (ASTELIN) 137 mcg (0.1 %) nasal spray USE 1 SPRAY IN EACH NOSTRIL TWICE A DAY AS DIRECTED 30 Bottle 2    fexofenadine (ALLEGRA) 180 mg tablet Take 180 mg by mouth daily.  carbamazepine (TEGRETOL) 200 mg tablet Take 200 mg by mouth two (2) times a day.  [DISCONTINUED] famotidine (PEPCID) 40 mg tablet Take 40 mg by mouth daily as needed (heartburn).          Past History     Past Medical History:  Past Medical History:   Diagnosis Date    Arthritis     joints    Bipolar 1 disorder with moderate robyn ([address] 3/17/2014    Chronic pain     back with stress    Contact dermatitis and other eczema, due to unspecified cause     Depression     Headache(784.0)     Hyperlipidemia 8/22/2016    Other ill-defined conditions(799.89)     sinus infection,hay fever    Other ill-defined conditions(799.89)     scoliosis    Psychiatric disorder     bipolar    Psychotic disorder (HCC)     Stool color black     Tennis elbow syndrome 5/4/2015    Trauma        Past Surgical History:  Past Surgical History:   Procedure Laterality Date    HX HEENT      wisdom teeth extraction    HX LIPOMA RESECTION      right gluteal    FREDY BIOPSY BREAST STEREOTACTIC Left 2011    negative    SC DENTAL SURGERY PROCEDURE      hx of dental surgery- wisdom teeth extracted       Family History:  Family History   Problem Relation Age of Onset   Baeza Arthritis-rheumatoid Mother     Migraines Mother     Psychiatric Disorder Mother     Bipolar Disorder Mother     Lupus Mother  Alcohol abuse Father     Lung Disease Father     Heart Disease Father     Stroke Father     High Cholesterol Father     Psychiatric Disorder Sister     Alcohol abuse Sister     Bipolar Disorder Sister     Stroke Brother     Cancer Maternal Aunt     Breast Cancer Maternal Aunt         pt jose d she was under 48    Bipolar Disorder Sister        Social History:  Social History     Tobacco Use    Smoking status: Never Smoker    Smokeless tobacco: Never Used   Substance Use Topics    Alcohol use: Yes     Comment: occasional    Drug use: No       Allergies: Allergies   Allergen Reactions    Other Food Hives     Artifical sweetners    Claritin-D 12 Hour [Loratadine-Pseudoephedrine] Palpitations     palpatations and ringing in the ear    Other Medication Anaphylaxis     Artificial sweeteners cause anaphylaxis    Pcn [Penicillins] Anaphylaxis    Sunscreen Contact Dermatitis     Burns and opens the skin    Ultram [Tramadol] Hives and Other (comments)     Hives and ringing of the ears    Benzoyl Peroxide Hives    Cephalexin Itching    Divalproex Itching    Maltodextrin-Glycerin Shortness of Breath         Review of Systems   Review of Systems   Constitutional: Negative for fever. HENT: Negative for congestion. Eyes: Negative. Respiratory: Negative for shortness of breath. Cardiovascular: Negative for chest pain. Gastrointestinal: Negative for abdominal pain. Endocrine: Negative for heat intolerance. Genitourinary: Negative for dysuria. Musculoskeletal: Negative for back pain. Skin: Negative. Negative for rash. Allergic/Immunologic: Negative for immunocompromised state. Neurological: Negative for dizziness. Hematological: Does not bruise/bleed easily. Psychiatric/Behavioral: The patient is nervous/anxious. All other systems reviewed and are negative. Physical Exam   Physical Exam  Vitals signs and nursing note reviewed.    Constitutional:       General: She is not in acute distress. Appearance: She is well-developed. HENT:      Head: Normocephalic. Eyes:      Extraocular Movements: Extraocular movements intact. Pupils: Pupils are equal, round, and reactive to light. Neck:      Musculoskeletal: Normal range of motion and neck supple. Cardiovascular:      Rate and Rhythm: Normal rate and regular rhythm. Heart sounds: Normal heart sounds. Pulmonary:      Effort: Pulmonary effort is normal.      Breath sounds: Normal breath sounds. Abdominal:      General: Bowel sounds are normal.      Palpations: Abdomen is soft. Tenderness: There is no tenderness. Musculoskeletal: Normal range of motion. Skin:     General: Skin is warm and dry. Neurological:      General: No focal deficit present. Mental Status: She is alert and oriented to person, place, and time. Psychiatric:         Mood and Affect: Mood normal.         Behavior: Behavior normal.      Comments: Patient repeats history of her recent visit several times. She became tremulous,  during my second contact with her in the room         Diagnostic Study Results     Labs -     Recent Results (from the past 12 hour(s))   CBC WITH AUTOMATED DIFF    Collection Time: 01/17/20  8:40 AM   Result Value Ref Range    WBC 6.9 3.6 - 11.0 K/uL    RBC 3.50 (L) 3.80 - 5.20 M/uL    HGB 11.0 (L) 11.5 - 16.0 g/dL    HCT 32.5 (L) 35.0 - 47.0 %    MCV 92.9 80.0 - 99.0 FL    MCH 31.4 26.0 - 34.0 PG    MCHC 33.8 30.0 - 36.5 g/dL    RDW 11.6 11.5 - 14.5 %    PLATELET 195 220 - 257 K/uL    MPV 9.1 8.9 - 12.9 FL    NRBC 0.0 0  WBC    ABSOLUTE NRBC 0.00 0.00 - 0.01 K/uL    NEUTROPHILS 78 (H) 32 - 75 %    LYMPHOCYTES 13 12 - 49 %    MONOCYTES 8 5 - 13 %    EOSINOPHILS 0 0 - 7 %    BASOPHILS 0 0 - 1 %    IMMATURE GRANULOCYTES 1 (H) 0.0 - 0.5 %    ABS. NEUTROPHILS 5.4 1.8 - 8.0 K/UL    ABS. LYMPHOCYTES 0.9 0.8 - 3.5 K/UL    ABS. MONOCYTES 0.5 0.0 - 1.0 K/UL    ABS. EOSINOPHILS 0.0 0.0 - 0.4 K/UL    ABS. BASOPHILS 0.0 0.0 - 0.1 K/UL    ABS. IMM. GRANS. 0.0 0.00 - 0.04 K/UL    DF AUTOMATED     METABOLIC PANEL, COMPREHENSIVE    Collection Time: 01/17/20  8:40 AM   Result Value Ref Range    Sodium 130 (L) 136 - 145 mmol/L    Potassium 4.0 3.5 - 5.1 mmol/L    Chloride 99 97 - 108 mmol/L    CO2 24 21 - 32 mmol/L    Anion gap 7 5 - 15 mmol/L    Glucose 148 (H) 65 - 100 mg/dL    BUN 7 6 - 20 MG/DL    Creatinine 0.85 0.55 - 1.02 MG/DL    BUN/Creatinine ratio 8 (L) 12 - 20      GFR est AA >60 >60 ml/min/1.73m2    GFR est non-AA >60 >60 ml/min/1.73m2    Calcium 9.0 8.5 - 10.1 MG/DL    Bilirubin, total 0.3 0.2 - 1.0 MG/DL    ALT (SGPT) 28 12 - 78 U/L    AST (SGOT) 19 15 - 37 U/L    Alk. phosphatase 74 45 - 117 U/L    Protein, total 8.1 6.4 - 8.2 g/dL    Albumin 4.0 3.5 - 5.0 g/dL    Globulin 4.1 (H) 2.0 - 4.0 g/dL    A-G Ratio 1.0 (L) 1.1 - 2.2     ETHYL ALCOHOL    Collection Time: 01/17/20  8:40 AM   Result Value Ref Range    ALCOHOL(ETHYL),SERUM <02 <25 MG/DL   SALICYLATE    Collection Time: 01/17/20  8:40 AM   Result Value Ref Range    Salicylate level <1.8 (L) 2.8 - 20.0 MG/DL   ACETAMINOPHEN    Collection Time: 01/17/20  8:40 AM   Result Value Ref Range    Acetaminophen level <2 (L) 10 - 30 ug/mL       Radiologic Studies -   No orders to display     CT Results  (Last 48 hours)    None        CXR Results  (Last 48 hours)    None          Medical Decision Making   I am the first provider for this patient. I reviewed the vital signs, available nursing notes, past medical history, past surgical history, family history and social history. Vital Signs-Reviewed the patient's vital signs. Patient Vitals for the past 12 hrs:   Temp Pulse Resp BP SpO2   01/17/20 0812 -- -- -- -- 100 %   01/17/20 0755 97.8 °F (36.6 °C) 100 18 149/87 100 %         Records Reviewed: Nursing Notes, Old Medical Records, Previous Radiology Studies and Previous Laboratory Studies    Provider Notes (Medical Decision Making):    Anxiety, panic attack, bipolar disorder,    ED Course:   Initial assessment performed. The patients presenting problems have been discussed, and they are in agreement with the care plan formulated and outlined with them. I have encouraged them to ask questions as they arise throughout their visit. Consult note: The patient has been evaluated by Terrence albrecht. The patient is not suicidal or homicidal.  Earl Dobson was able to walk with the patient's outpatient counseling office. The patient is to follow-up and return to ER if worse             Critical Care Time:   0    Disposition:  home    PLAN:  1. Discharge Medication List as of 1/17/2020 11:20 AM        2. Follow-up Information     Follow up With Specialties Details Why Contact Info    Stacya Ashley Croft MD Internal Medicine In 3 days As needed [address] [facility]  [phone]      See your psychiatrist  In 3 days As needed     [facility] EM[facility] Emergency Medicine  If symptoms worsen [address]  [address] [facility]  [phone]        Return to ED if worse     Diagnosis     Clinical Impression:   1. Anxiety state        Attestations:    Mike Sorto MD    Please note that this dictation was completed with [employer], the computer voice recognition software. Quite often unanticipated grammatical, syntax, homophones, and other interpretive errors are inadvertently transcribed by the computer software. Please disregard these errors. Please excuse any errors that have escaped final proofreading. Thank you.

## 2020-01-17 NOTE — ED NOTES
Dr Aleks Calderon reviewed discharge instructions with the patient. The patient verbalized understanding. All questions and concerns were addressed. The patient declined a wheelchair and is discharged ambulatory in the care of family members with instructions and prescriptions in hand. Pt is alert and oriented x 4. Respirations are clear and unlabored.
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
## 2020-01-20 ENCOUNTER — HOSPITAL ENCOUNTER (OUTPATIENT)
Age: 55
Setting detail: OBSERVATION
Discharge: HOME | End: 2020-01-20
Attending: EMERGENCY MEDICINE | Admitting: HOSPITALIST
Payer: MEDICARE

## 2020-01-20 ENCOUNTER — HOSPITAL ENCOUNTER (INPATIENT)
Age: 55
LOS: 10 days | Discharge: HOME | DRG: 885 | End: 2020-01-30
Attending: PSYCHIATRY & NEUROLOGY | Admitting: PSYCHIATRY & NEUROLOGY
Payer: MEDICARE

## 2020-01-20 VITALS
TEMPERATURE: 97.9 F | RESPIRATION RATE: 16 BRPM | DIASTOLIC BLOOD PRESSURE: 84 MMHG | SYSTOLIC BLOOD PRESSURE: 143 MMHG | OXYGEN SATURATION: 100 % | HEART RATE: 101 BPM

## 2020-01-20 DIAGNOSIS — E87.1 HYPONATREMIA: Primary | ICD-10-CM

## 2020-01-20 DIAGNOSIS — F30.9 MANIA (HCC): ICD-10-CM

## 2020-01-20 LAB
ALBUMIN SERPL-MCNC: 4.4 G/DL (ref 3.5–5)
ALBUMIN/GLOB SERPL: 1 {RATIO} (ref 1.1–2.2)
ALP SERPL-CCNC: 80 U/L (ref 45–117)
ALT SERPL-CCNC: 24 U/L (ref 12–78)
AMPHET UR QL SCN: NEGATIVE
ANION GAP SERPL CALC-SCNC: 5 MMOL/L (ref 5–15)
APPEARANCE UR: CLEAR
AST SERPL-CCNC: 20 U/L (ref 15–37)
BACTERIA URNS QL MICRO: NEGATIVE /HPF
BARBITURATES UR QL SCN: NEGATIVE
BASOPHILS # BLD: 0 K/UL (ref 0–0.1)
BASOPHILS NFR BLD: 0 % (ref 0–1)
BENZODIAZ UR QL: NEGATIVE
BILIRUB SERPL-MCNC: 0.4 MG/DL (ref 0.2–1)
BILIRUB UR QL: NEGATIVE
BUN SERPL-MCNC: 7 MG/DL (ref 6–20)
BUN/CREAT SERPL: 11 (ref 12–20)
CALCIUM SERPL-MCNC: 9.8 MG/DL (ref 8.5–10.1)
CANNABINOIDS UR QL SCN: NEGATIVE
CHLORIDE SERPL-SCNC: 96 MMOL/L (ref 97–108)
CO2 SERPL-SCNC: 26 MMOL/L (ref 21–32)
COCAINE UR QL SCN: NEGATIVE
COLOR UR: ABNORMAL
CORTIS SERPL-MCNC: 20.8 UG/DL
CREAT SERPL-MCNC: 0.64 MG/DL (ref 0.55–1.02)
DIFFERENTIAL METHOD BLD: NORMAL
DRUG SCRN COMMENT,DRGCM: NORMAL
EOSINOPHIL # BLD: 0 K/UL (ref 0–0.4)
EOSINOPHIL NFR BLD: 0 % (ref 0–7)
EPITH CASTS URNS QL MICRO: ABNORMAL /LPF
ERYTHROCYTE [DISTWIDTH] IN BLOOD BY AUTOMATED COUNT: 11.7 % (ref 11.5–14.5)
ETHANOL SERPL-MCNC: <10 MG/DL
GLOBULIN SER CALC-MCNC: 4.5 G/DL (ref 2–4)
GLUCOSE SERPL-MCNC: 110 MG/DL (ref 65–100)
GLUCOSE UR STRIP.AUTO-MCNC: NEGATIVE MG/DL
HCT VFR BLD AUTO: 36.7 % (ref 35–47)
HGB BLD-MCNC: 12.5 G/DL (ref 11.5–16)
HGB UR QL STRIP: ABNORMAL
IMM GRANULOCYTES # BLD AUTO: 0 K/UL (ref 0–0.04)
IMM GRANULOCYTES NFR BLD AUTO: 0 % (ref 0–0.5)
KETONES UR QL STRIP.AUTO: 15 MG/DL
LEUKOCYTE ESTERASE UR QL STRIP.AUTO: NEGATIVE
LYMPHOCYTES # BLD: 1 K/UL (ref 0.8–3.5)
LYMPHOCYTES NFR BLD: 16 % (ref 12–49)
MCH RBC QN AUTO: 31.6 PG (ref 26–34)
MCHC RBC AUTO-ENTMCNC: 34.1 G/DL (ref 30–36.5)
MCV RBC AUTO: 92.7 FL (ref 80–99)
METHADONE UR QL: NEGATIVE
MONOCYTES # BLD: 0.6 K/UL (ref 0–1)
MONOCYTES NFR BLD: 9 % (ref 5–13)
NEUTS SEG # BLD: 4.4 K/UL (ref 1.8–8)
NEUTS SEG NFR BLD: 75 % (ref 32–75)
NITRITE UR QL STRIP.AUTO: NEGATIVE
NRBC # BLD: 0 K/UL (ref 0–0.01)
NRBC BLD-RTO: 0 PER 100 WBC
OPIATES UR QL: NEGATIVE
OSMOLALITY UR: 202 MOSM/KG H2O
PCP UR QL: NEGATIVE
PH UR STRIP: 6.5 [PH] (ref 5–8)
PLATELET # BLD AUTO: 288 K/UL (ref 150–400)
PMV BLD AUTO: 9.3 FL (ref 8.9–12.9)
POTASSIUM SERPL-SCNC: 4 MMOL/L (ref 3.5–5.1)
PROT SERPL-MCNC: 8.9 G/DL (ref 6.4–8.2)
PROT UR STRIP-MCNC: NEGATIVE MG/DL
RBC # BLD AUTO: 3.96 M/UL (ref 3.8–5.2)
RBC #/AREA URNS HPF: ABNORMAL /HPF (ref 0–5)
SODIUM SERPL-SCNC: 127 MMOL/L (ref 136–145)
SODIUM UR-SCNC: 31 MMOL/L
SP GR UR REFRACTOMETRY: 1.01 (ref 1–1.03)
UR CULT HOLD, URHOLD: NORMAL
URATE SERPL-MCNC: 2 MG/DL (ref 2.6–6)
UROBILINOGEN UR QL STRIP.AUTO: 0.2 EU/DL (ref 0.2–1)
WBC # BLD AUTO: 5.9 K/UL (ref 3.6–11)
WBC URNS QL MICRO: ABNORMAL /HPF (ref 0–4)

## 2020-01-20 PROCEDURE — 99284 EMERGENCY DEPT VISIT MOD MDM: CPT

## 2020-01-20 PROCEDURE — 84300 ASSAY OF URINE SODIUM: CPT

## 2020-01-20 PROCEDURE — 80307 DRUG TEST PRSMV CHEM ANLYZR: CPT

## 2020-01-20 PROCEDURE — 80053 COMPREHEN METABOLIC PANEL: CPT

## 2020-01-20 PROCEDURE — 74011250636 HC RX REV CODE- 250/636: Performed by: NURSE PRACTITIONER

## 2020-01-20 PROCEDURE — 83935 ASSAY OF URINE OSMOLALITY: CPT

## 2020-01-20 PROCEDURE — 85025 COMPLETE CBC W/AUTO DIFF WBC: CPT

## 2020-01-20 PROCEDURE — 84550 ASSAY OF BLOOD/URIC ACID: CPT

## 2020-01-20 PROCEDURE — 65220000003 HC RM SEMIPRIVATE PSYCH

## 2020-01-20 PROCEDURE — 99218 HC RM OBSERVATION: CPT

## 2020-01-20 PROCEDURE — 81001 URINALYSIS AUTO W/SCOPE: CPT

## 2020-01-20 PROCEDURE — 82533 TOTAL CORTISOL: CPT

## 2020-01-20 PROCEDURE — 36415 COLL VENOUS BLD VENIPUNCTURE: CPT

## 2020-01-20 PROCEDURE — 74011250637 HC RX REV CODE- 250/637: Performed by: NURSE PRACTITIONER

## 2020-01-20 RX ORDER — LORAZEPAM 2 MG/ML
1 INJECTION INTRAMUSCULAR
Status: DISCONTINUED | OUTPATIENT
Start: 2020-01-20 | End: 2020-01-30 | Stop reason: HOSPADM

## 2020-01-20 RX ORDER — BENZTROPINE MESYLATE 1 MG/1
1 TABLET ORAL
Status: DISCONTINUED | OUTPATIENT
Start: 2020-01-20 | End: 2020-01-30 | Stop reason: HOSPADM

## 2020-01-20 RX ORDER — ADHESIVE BANDAGE
30 BANDAGE TOPICAL DAILY PRN
Status: DISCONTINUED | OUTPATIENT
Start: 2020-01-20 | End: 2020-01-30 | Stop reason: HOSPADM

## 2020-01-20 RX ORDER — ACETAMINOPHEN 325 MG/1
650 TABLET ORAL
Status: DISCONTINUED | OUTPATIENT
Start: 2020-01-20 | End: 2020-01-30 | Stop reason: HOSPADM

## 2020-01-20 RX ORDER — TRAZODONE HYDROCHLORIDE 50 MG/1
50 TABLET ORAL
Status: DISCONTINUED | OUTPATIENT
Start: 2020-01-20 | End: 2020-01-30 | Stop reason: HOSPADM

## 2020-01-20 RX ORDER — DIPHENHYDRAMINE HYDROCHLORIDE 50 MG/ML
50 INJECTION, SOLUTION INTRAMUSCULAR; INTRAVENOUS
Status: DISCONTINUED | OUTPATIENT
Start: 2020-01-20 | End: 2020-01-30 | Stop reason: HOSPADM

## 2020-01-20 RX ORDER — HALOPERIDOL 5 MG/ML
5 INJECTION INTRAMUSCULAR
Status: DISCONTINUED | OUTPATIENT
Start: 2020-01-20 | End: 2020-01-30 | Stop reason: HOSPADM

## 2020-01-20 RX ORDER — SODIUM CHLORIDE 0.9 % (FLUSH) 0.9 %
5-40 SYRINGE (ML) INJECTION EVERY 8 HOURS
Status: DISCONTINUED | OUTPATIENT
Start: 2020-01-20 | End: 2020-01-20 | Stop reason: HOSPADM

## 2020-01-20 RX ORDER — SODIUM CHLORIDE 0.9 % (FLUSH) 0.9 %
5-40 SYRINGE (ML) INJECTION AS NEEDED
Status: DISCONTINUED | OUTPATIENT
Start: 2020-01-20 | End: 2020-01-20 | Stop reason: HOSPADM

## 2020-01-20 RX ORDER — OLANZAPINE 5 MG/1
5 TABLET ORAL
Status: DISCONTINUED | OUTPATIENT
Start: 2020-01-20 | End: 2020-01-30 | Stop reason: HOSPADM

## 2020-01-20 RX ORDER — HYDROXYZINE 25 MG/1
50 TABLET, FILM COATED ORAL
Status: DISCONTINUED | OUTPATIENT
Start: 2020-01-20 | End: 2020-01-30 | Stop reason: HOSPADM

## 2020-01-20 RX ADMIN — HALOPERIDOL LACTATE 5 MG: 5 INJECTION, SOLUTION INTRAMUSCULAR at 20:24

## 2020-01-20 RX ADMIN — OLANZAPINE 5 MG: 5 TABLET, FILM COATED ORAL at 17:37

## 2020-01-20 RX ADMIN — HYDROXYZINE HYDROCHLORIDE 50 MG: 25 TABLET, FILM COATED ORAL at 17:37

## 2020-01-20 RX ADMIN — LORAZEPAM 1 MG: 2 INJECTION INTRAMUSCULAR; INTRAVENOUS at 20:25

## 2020-01-20 RX ADMIN — DIPHENHYDRAMINE HYDROCHLORIDE 50 MG: 50 INJECTION, SOLUTION INTRAMUSCULAR; INTRAVENOUS at 20:25

## 2020-01-20 NOTE — CONSULTS
PSYCHIATRY CONSULT NOTE:    REASON FOR CONSULT: ED hold      HISTORY OF PRESENTING COMPLAINT:  Mariana Merlos is a 53 y.o. BLACK OR  female who is currently in the ED at [facility]. She is tangential and disorganized. Hyperverbal and difficult to redirect. She is not aggressive or violent. She does have [address] PAST PSYCHIATRIC HISTORY and SUBSTANCE ABUSE HISTORY:  Bipolar I Disorder      PAST MEDICAL HISTORY:  Please see H&P for details. Past Medical History:   Diagnosis Date    Arthritis     joints    Bipolar 1 disorder with moderate robyn ([address] 3/17/2014    Chronic pain     back with stress    Contact dermatitis and other eczema, due to unspecified cause     Depression     Headache(784.0)     Hyperlipidemia 8/22/2016    Other ill-defined conditions(799.89)     sinus infection,hay fever    Other ill-defined conditions(799.89)     scoliosis    Psychiatric disorder     bipolar    Psychotic disorder (HCC)     Stool color black     Tennis elbow syndrome 5/4/2015    Trauma            Lab Results   Component Value Date/Time    WBC 5.9 01/20/2020 10:49 AM    HGB 12.5 01/20/2020 10:49 AM    Hematocrit (POC) 34 (L) 07/17/2018 03:33 AM    HCT 36.7 01/20/2020 10:49 AM    PLATELET 760 88/53/1872 10:49 AM    MCV 92.7 01/20/2020 10:49 AM      Lab Results   Component Value Date/Time    Sodium 127 (L) 01/20/2020 10:49 AM    Potassium 4.0 01/20/2020 10:49 AM    Chloride 96 (L) 01/20/2020 10:49 AM    CO2 26 01/20/2020 10:49 AM    Anion gap 5 01/20/2020 10:49 AM    Glucose 110 (H) 01/20/2020 10:49 AM    BUN 7 01/20/2020 10:49 AM    Creatinine 0.64 01/20/2020 10:49 AM    BUN/Creatinine ratio 11 (L) 01/20/2020 10:49 AM    GFR est AA >60 01/20/2020 10:49 AM    GFR est non-AA >60 01/20/2020 10:49 AM    Calcium 9.8 01/20/2020 10:49 AM    Bilirubin, total 0.4 01/20/2020 10:49 AM    AST (SGOT) 20 01/20/2020 10:49 AM    Alk.  phosphatase 80 01/20/2020 10:49 AM    Protein, total 8.9 (H) 01/20/2020 10:49 AM    Albumin 4.4 01/20/2020 10:49 AM    Globulin 4.5 (H) 01/20/2020 10:49 AM    A-G Ratio 1.0 (L) 01/20/2020 10:49 AM    ALT (SGPT) 24 01/20/2020 10:49 AM          PSYCHOSOCIAL HISTORY:  See H&P      MENTAL STATUS EXAM:    General appearance:  well  groomed, psychomotor activity is wnl  Eye contact: fair  Speech: hyperverbal  Affect : full range  Mood: \"fine \"  Thought Process: tangential, loose associations  Perception: AH, unclear content and  VH of her mother. Thought Content: Denies SI or Plan  Insight: limited  Judgement: poor  Cognition: Intact grossly. ASSESSMENT AND PLAN:  Veronika Rivera meets criteria for a diagnosis of Bipolar I Disorder, current episode manic. Current plan is for patient to be admitted to Inpatient Psychiatry at [facility].    Thank you for the consultation.     Josefa Jackson, ANNEP-BC  January 20, 2020
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
## 2020-01-20 NOTE — BH NOTES
1634-Patient arrived via ems on a stretcher. Patient is a voluntary transfer from [facility]. Patient is cooperative and talks excessively loud. Patient got off the stretcher said she had to uriniate was advised we would take her to the bathroom and patient urinated on herself. Patient then sat on the floor and needed verbal redirection. Patient denies SI and states she is HI towards her  but she is like that all the time. Patient states she is hearing voices. Patient skin assessed and unremarkable. Patient was washed up and changed into a gown. Patient given her code and taken to her room. Per report patient is Bi-Polar and been off her meds. Patient resting in her room. Patient has a steady gait but did come in with a cane due to scoliosis.
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
## 2020-01-20 NOTE — PROGRESS NOTES
CM notified of needed transportation assistance for transport to [facility]. CM arranged transport with [facility]. CM to place PCS Form, facesheet, and ED visit summary on chart for transport. EMTALA completed by [facility].     Pily Vee RN, BSN  Care Management Department

## 2020-01-20 NOTE — ED TRIAGE NOTES
reports wife is going through [employer] episode. \"  Pt seen multiple times this month for issues. Unsure if she is compliant with medications. Last seen 1/17/2020 at [facility] for anxiety. Pt rambling in triage, waving cane at the walls and saying she is stressed.

## 2020-01-20 NOTE — ED NOTES
TRANSFER - OUT REPORT:    Verbal report given to Marta Vargas RN (name) on Veronika Rivera  being transferred to [address] [place] [address], Psych unit, -1 (unit) for routine progression of care       Report consisted of patients Situation, Background, Assessment and   Recommendations(SBAR). Information from the following report(s) SBAR, ED Summary and MAR was reviewed with the receiving nurse. Lines:       Opportunity for questions and clarification was provided.       Patient transported with:   [employer]

## 2020-01-20 NOTE — GROUP NOTE
DANIEL  GROUP DOCUMENTATION INDIVIDUAL Group Therapy Note Date: 1/20/2020 Group Start Time: 1500 Group End Time: 0995 Group Topic: Recreational/Music Therapy [facility] [place] [address] ACUTE BEHAV [facility], [address] GROUP DOCUMENTATION GROUP Group Therapy Note Attendees: 2 Attendance: Did not attend Patient's Goal: Interventions/techniques: 
Belen Dye
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
## 2020-01-20 NOTE — ED NOTES
Patient refused to allow last set of vitals. [facility] taking patient to [facility].

## 2020-01-20 NOTE — PROGRESS NOTES
Date of previous inpatient admission/ ED visit? Last IP admission was 01/10/2020-01/11/2020 for hyponatremia. Pt RRAT score is 10- LOW. Pt has had 6 ED visits in the past 12 months. What brought the patient back to ED? Mental Health Problem    Did patient decline recommended services during last admission/ ED visit (if yes, what)? No    Has patient seen a provider since their last inpatient admission/ED visit (if yes, when)? Pt had a recent ER visit to [facility] on 01/17/2020 for anxiety. CM Interventions:  From previous inpatient admission/ED visit: No CM needs noted at discharge. From current inpatient admission/ED visit: Pt being evaluated in the ED at this time. Disposition TBD/subject to change pending care recommendations. Cm will assist with disposition needs as they arise. Care Management Interventions  PCP Verified by CM: Yes  Palliative Care Criteria Met (RRAT>21 & CHF Dx)?: No  Mode of Transport at Discharge: Other (see comment)(Spouse, Jacqui Kristian)  Transition of Care Consult (CM Consult):  Other(Readmission Assessment)  MyChart Signup: No(MyChart Active)  Discharge Durable Medical Equipment: No  Physical Therapy Consult: No  Occupational Therapy Consult: No  Speech Therapy Consult: No  Current Support Network: Lives with Spouse, Own Home  Confirm Follow Up Transport: Family  The Patient and/or Patient Representative was Provided with a Choice of Provider and Agrees with the Discharge Plan?: No  Freedom of Choice List was Provided with Basic Dialogue that Supports the Patient's Individualized Plan of Care/Goals, Treatment Preferences and Shares the Quality Data Associated with the Providers?: No  [place] Resource Information Provided?: No  Discharge Location  Discharge Placement: Home(Disposition TBD/subject to change pending care recommendations)    Joe Montez RN, BSN  Care Management Department
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
## 2020-01-20 NOTE — BSMART NOTE
Patient admitted to [facility] by Dr. Trisha Nguyen to room 310 bed #1. Report to be called to [phone]. Nurse/Aylin notified and provided information.

## 2020-01-21 PROBLEM — F31.9 BIPOLAR 1 DISORDER (HCC): Status: ACTIVE | Noted: 2020-01-21

## 2020-01-21 PROCEDURE — 65220000003 HC RM SEMIPRIVATE PSYCH

## 2020-01-21 PROCEDURE — 74011250637 HC RX REV CODE- 250/637: Performed by: NURSE PRACTITIONER

## 2020-01-21 RX ORDER — CARBAMAZEPINE 200 MG/1
300 TABLET ORAL EVERY EVENING
COMMUNITY
End: 2020-02-02

## 2020-01-21 RX ORDER — QUETIAPINE FUMARATE 200 MG/1
200 TABLET, FILM COATED ORAL 2 TIMES DAILY
COMMUNITY
End: 2020-01-30

## 2020-01-21 RX ORDER — CARBAMAZEPINE 200 MG/1
200 TABLET ORAL DAILY
Status: ON HOLD | COMMUNITY
End: 2020-02-11 | Stop reason: SDUPTHER

## 2020-01-21 RX ADMIN — TRAZODONE HYDROCHLORIDE 50 MG: 50 TABLET ORAL at 22:11

## 2020-01-21 RX ADMIN — ACETAMINOPHEN 650 MG: 325 TABLET ORAL at 12:10

## 2020-01-21 RX ADMIN — OLANZAPINE 5 MG: 5 TABLET, FILM COATED ORAL at 12:10

## 2020-01-21 RX ADMIN — OLANZAPINE 5 MG: 5 TABLET, FILM COATED ORAL at 22:11

## 2020-01-21 RX ADMIN — HYDROXYZINE HYDROCHLORIDE 50 MG: 25 TABLET, FILM COATED ORAL at 12:10

## 2020-01-21 NOTE — BH NOTES
GROUP THERAPY PROGRESS NOTE    Patient is participating in coping skills group. Group time: 50 minutes    Personal goal for participation: Learn coping skills to reduce negative emotions    Goal orientation: Personal    Group therapy participation: active    Therapeutic interventions reviewed and discussed: Group discussion on why being bored can be a problem and the consequences of boredom that patient have experienced. Patient discussed issues they have had with being bored and ways they have tried to combat boredom. This lead to discussion of coping skills for negative emotions and ways to feel better that each patient has tried or that they have heard of. Discussion of activities that help with boredom, challenges, potential solutions and plans. Impression of participation: Mai Cooper was present in group and shared that she enjoys walking around at [employer] and [address]. She reports that she enjoys crafts like knitting and crocheting as well.     Cyrus Galloway LPC LSATP CSAC

## 2020-01-21 NOTE — GROUP NOTE
DANIEL  GROUP DOCUMENTATION INDIVIDUAL Group Therapy Note Date: 1/21/2020 Group Start Time: 1100 Group End Time: 1200 Group Topic: Topic Group [facility] ACUTE BEHAV [place], [address] GROUP DOCUMENTATION GROUP Group Therapy Note Attendees: 4 Attendance: Attended Patient's Goal:  To participate in self esteem booster Interventions/techniques: Supported-handout Follows Directions: Followed directions Interactions: Interacted appropriately Mental Status: Calm Behavior/appearance: Attentive and Cooperative Goals Achieved: Able to engage in interactions, Able to listen to others and Discussed coping Additional Notes:   
 
Tien Huffman

## 2020-01-21 NOTE — PROGRESS NOTES
[facility] Admission Pharmacy Medication Reconciliation     Recommendations/Findings:   1) Reports changes to her outpatient regimen occurred a few days prior to this admission. Per [facility] records, carbamazepine dose was changed, quetiapine dose was changed and olanzapine was added. 2) Patient reported she cannot take formulary alternative for fexofenadine. Requested patient to have her  bring in fexofenadine if she would like to take it while hospitalized. 3) Reports receiving allergy shots once a month - does not recall when her last shot was. 4) Confirmed preferred outpatient pharmacy: [facility] in [place]    The following changes were made to the PTA medication list:   Additions:   None  Modifications:   Cholecalciferol frequency changed to every Monday  Carbamazepine dose  Quetiapine dosage form and dose  Temazepam frequency changed from QHS to QHS PRN (although patient reports having trouble sleeping  Deletions:   None    Total Time Spent: 30 minutes    Information obtained from: [facility] ([phone]), patient interview, medication history obtained from pharmacy technician at [facility] 1/10/20    Patient allergies: Allergies as of 01/20/2020 - Review Complete 01/20/2020   Allergen Reaction Noted    Other food Hives 01/04/2018    Claritin-d 12 hour [loratadine-pseudoephedrine] Palpitations 03/31/2011    Other medication Anaphylaxis 03/31/2011    Pcn [penicillins] Anaphylaxis 03/31/2011    Sunscreen Contact Dermatitis 03/31/2011    Ultram [tramadol] Hives and Other (comments) 03/31/2011    Benzoyl peroxide Hives 01/11/2013    Cephalexin Itching 04/20/2015    Divalproex Itching 08/28/2018    Maltodextrin-glycerin Shortness of Breath 02/20/2014       Prior to Admission Medications   Prescriptions Last Dose Informant Patient Reported? Taking? OLANZapine (ZYPREXA) 5 mg tablet 1/13/2020 at Unknown time Other Yes Yes   Sig: Take 5 mg by mouth nightly.  Indications: robyn associated with bipolar disorder   OTHER,NON-FORMULARY,   Yes Yes   Sig: Allergy shots every month - does not recall date of last shot   QUEtiapine (SEROQUEL) 200 mg tablet  Other Yes Yes   Sig: Take 200 mg by mouth two (2) times a day. Indications: bipolar disorder   azelastine (ASTELIN) 137 mcg (0.1 %) nasal spray 1/13/2020 at Unknown time Self No Yes   Sig: USE 1 SPRAY IN EACH NOSTRIL TWICE A DAY AS DIRECTED   carBAMazepine (TEGRETOL) 200 mg tablet  Other Yes Yes   Sig: Take 200 mg by mouth daily. Indications: robyn associated with bipolar disorder   carBAMazepine (TEGRETOL) 200 mg tablet  Other Yes Yes   Sig: Take 300 mg by mouth every evening. Indications: robyn associated with bipolar disorder   cholecalciferol (VITAMIN D3) (50,000 UNITS /1250 MCG) capsule 1/20/2020 at Unknown time Self Yes Yes   Sig: Take 50,000 Units by mouth every Monday. fexofenadine (ALLEGRA) 180 mg tablet 1/13/2020 at Unknown time Self Yes Yes   Sig: Take 180 mg by mouth daily. hydrOXYzine HCl (ATARAX) 25 mg tablet 1/14/2020 at Unknown time Self No Yes   Sig: Take 1 Tab by mouth every six (6) hours as needed for Itching for up to 10 days. ibuprofen (MOTRIN) 800 mg tablet 1/13/2020 at Unknown time Self Yes Yes   Sig: Take 800 mg by mouth every twelve (12) hours as needed for Pain. methocarbamol (ROBAXIN) 500 mg tablet 1/13/2020 at Unknown time Self No Yes   Sig: Take 1 Tab by mouth daily as needed for Pain.   montelukast (SINGULAIR) 10 mg tablet 1/13/2020 at Unknown time Self Yes Yes   Sig: Take 10 mg by mouth daily as needed. Prior to allergy shots   promethazine (PHENERGAN) 12.5 mg tablet 1/13/2020 at Unknown time Self No Yes   Sig: Take 1 Tab by mouth every six (6) hours as needed for Nausea.    temazepam (RESTORIL) 30 mg capsule 1/13/2020 at Unknown time Self Yes Yes   Sig: Take 30 mg by mouth nightly as needed for Sleep.   triamcinolone acetonide (KENALOG) 0.1 % ointment 1/13/2020 at Unknown time Self Yes Yes   Sig: Apply  to affected area daily as needed for Skin Irritation or Itching.       Facility-Administered Medications: None       Thank you,  TAMMI Arreaga  Desk: [phone])  Pharmacy: [phone] ([id number])
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
## 2020-01-21 NOTE — PROGRESS NOTES
Problem: Psychosis  Goal: *STG: Decreased hallucinations  Outcome: Not Progressing Towards Goal  Goal: *STG: Decreased delusional thinking  Outcome: Progressing Towards Goal  Goal: *STG: Remains safe in hospital  Outcome: Progressing Towards Goal  Goal: *STG: Seeks staff when feelings of self harm or harm towards others arise  Outcome: Progressing Towards Goal  Goal: *STG: Patient will verbalize areas in need of boundary recognition and limit setting  Outcome: Not Progressing Towards Goal  Goal: *STG: Patient will develop strategies to regulate emotions and corresponding behaviors  Outcome: Not Progressing Towards Goal  Goal: *STG: Accept constructive criticism without injury or isolation  Outcome: Not Progressing Towards Goal  Goal: *STG: Patient will verbalize issues that get in their way of progress (i.e.: anger, fear etc.)  Outcome: Progressing Towards Goal  Goal: *STG/LTG: Complies with medication therapy  Outcome: Progressing Towards Goal  Goal: *STG/LTG: Demonstrates improved thought patterns as evidenced by logical and coherent speech  Outcome: Progressing Towards Goal  Goal: *STG/LTG: Demonstrates improved social functioning by responding appropriately to staff  Outcome: Progressing Towards Goal  Goal: Interventions  Outcome: Progressing Towards Goal       0718 This writer received report from the outgoing nurse. 0730 Pt in hallway ambulating. Pt denied SI, HI, and AVH. This writer noted the pt to be responding to internal stimuli. Pt is alert and oriented x 3. Pt presents with a cooperative attitude, labile affect, and anxious mood. 1210 Pt complained of 3/10 back pain. This writer provided the pt with 650 mg of tylenol for pain. Pt responding to internal stimuli and complaining of anxiety. This writer provided the pt with prn 50 mg of atarax and 5 mg zyprexa. 1310 Pt stated that she was feeling better. Prn tylenol, atarax, and zyprexa effective. 1527 Pt actively participating in group.  Pt interacting well with staff and peers. 26 Pt's  dropped off the following medications: fexofenadine 180 mg, and methocarbamol 500 mg. Pt's  brought in a sports bra for the pt.     1748 Pt visiting with family.
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
## 2020-01-21 NOTE — PROGRESS NOTES
BEHAVIORAL HEALTH RESTRAINT/SECLUSION CONTINUED USE REASSESSMENT        1. Current behavior/mental status: Agitated, Confused, Disoriented and Thrashing    2. Response to treatment: same behavoir     3. Continued use can be justified by the following: Inability to agree to control destructive behavior and Inability or unwillingness to agree to cooperate with less restrictive interventions    4. Criteria for release: Substantial reduction in level of agitation/anxiety as indicated in reduction in motor over activity, ability to focus, maintain attention and decrease in hostility and willing to agree to least restrictive alternatives that include, keeping hands off of people    5. Treatment plan revisions to assist the patient in regaining control: Continue problem solving discussions with staff and Medication evaluation    6. The patient does not verbalize understanding of the reason for restraint/seclusion and behavior needed to be discontinued.       MD/RN Signature__________________________________ Date_______________

## 2020-01-21 NOTE — PROGRESS NOTES
Behavioral Restraint Face-to-Face Evaluation  (must be completed within one hour of initiation of restraints)      Evaluate immediate situation:  Pt is in the middle of the floor on the unit peeing in the floor. Pt is entagonizing other patients and yelling and screaming. Pt placed in seclusion for her safety     Reaction to intervention: pt tolerated seclusion, pt still continues to bang on the wall and  [place] while in seclusion    Medical Condition/Assessment: stable    Behavioral Condition/Assessment: aggitated, destructive, yelling    The patients review of systems, history, medications, and recent labs were reviewed at this time.      Continue/Discontinue restraints at this time: Continue
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
## 2020-01-21 NOTE — PROGRESS NOTES
Behavioral Restraint Face-to-Face Evaluation  (must be completed within one hour of initiation of restraints)      Evaluate immediate situation:  Pt continues to be confused and agitated. Yelling and confused. Reaction to intervention: Pt tolerates seclusion. Pt continues to bang on the window and yell at the staff     Medical Condition/Assessment: stable    Behavioral Condition/Assessment: aggitated, aggressive, confused     The patients review of systems, history, medications, and recent labs were reviewed at this time.      Continue/Discontinue restraints at this time: Continue

## 2020-01-21 NOTE — BH NOTES
[employer] conducted TDO this evening. The ending result of hearing patient voluntary.

## 2020-01-21 NOTE — BH NOTES
GROUP THERAPY PROGRESS NOTE    Patient is participating in Substance abuse group. Group time: 45 minutes    Personal goal for participation: To understand addiction, criteria for diagnosis, and identify triggers and coping skills. Goal orientation: Personal    Group therapy participation: active    Therapeutic interventions reviewed and discussed: Group discussion of substance use, abuse, and dependence and the DSM 5 criteria for a substance use disorder. Patients were able to self-rate themselves based on the 11 criteria for a substance use disorder and explore their own level of addiction for cigarettes, alcohol, heroin, and other substances. Group discussed how they feel when they are unable to use and ways substance use has hindered their lives. Triggers for use and coping skills to avoid use or manage symptoms until craving subsides were discussed. Impression of participation: Cherylene Crooks shared that she has a lot of stress but does not struggle with a substance use disorder. She reports having similar symptoms to those that use but that she will only use socially or occasionally. She was alert and oriented and asked appropriate questions.     Dang Jose [facility] LSATP [facility]
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
## 2020-01-21 NOTE — GROUP NOTE
DANIEL  GROUP DOCUMENTATION INDIVIDUAL Group Therapy Note Date: 1/21/2020 Group Start Time: 1500 Group End Time: 6037 Group Topic: Recreational/Music Therapy [facility] 3 ACUTE BEHAV [facility] [place], [address] GROUP DOCUMENTATION GROUP Group Therapy Note Attendees: 5 Attendance: Attended Patient's Goal:  To concentrate on selected task Interventions/techniques: Supported-crafts,games,music Follows Directions: Followed directions Interactions: Interacted appropriately Mental Status: Calm Behavior/appearance: attentive-needed prompting Goals Achieved: Able to engage in interactions and Able to listen to others-completed selected task Additional Notes:   
 
Tom Patel

## 2020-01-21 NOTE — PROGRESS NOTES
BEHAVIORAL HEALTH RESTRAINT/SECLUSION PROGRESS NOTE TERMINATION OF RESTRAINT/SECLUSION    1. Current mental status/behavior: Calm and Cooperative    2. Criteria for release met: No longer verbalizing threats of harm to self and others and Acute signs and symptoms necessitating restraint/seclusion have decreased substantially to the level where patient can safely function in least restrictive environment.     3. Additional interventions to prevent recurrence of dangerous behaviors include the following in addition to the debriefing process by the team.         RN Signature__________________________________ Date_______________      MD Signature__________________________________ Date_______________

## 2020-01-22 LAB
ANION GAP SERPL CALC-SCNC: 10 MMOL/L (ref 5–15)
BUN SERPL-MCNC: 6 MG/DL (ref 6–20)
BUN/CREAT SERPL: 8 (ref 12–20)
CALCIUM SERPL-MCNC: 9.2 MG/DL (ref 8.5–10.1)
CHLORIDE SERPL-SCNC: 101 MMOL/L (ref 97–108)
CO2 SERPL-SCNC: 27 MMOL/L (ref 21–32)
CREAT SERPL-MCNC: 0.73 MG/DL (ref 0.55–1.02)
GLUCOSE SERPL-MCNC: 118 MG/DL (ref 65–100)
MAGNESIUM SERPL-MCNC: 1.9 MG/DL (ref 1.6–2.4)
PHOSPHATE SERPL-MCNC: 3 MG/DL (ref 2.6–4.7)
POTASSIUM SERPL-SCNC: 3.7 MMOL/L (ref 3.5–5.1)
SODIUM SERPL-SCNC: 138 MMOL/L (ref 136–145)

## 2020-01-22 PROCEDURE — 74011250637 HC RX REV CODE- 250/637: Performed by: PSYCHIATRY & NEUROLOGY

## 2020-01-22 PROCEDURE — 80048 BASIC METABOLIC PNL TOTAL CA: CPT

## 2020-01-22 PROCEDURE — 74011250636 HC RX REV CODE- 250/636: Performed by: NURSE PRACTITIONER

## 2020-01-22 PROCEDURE — 74011250637 HC RX REV CODE- 250/637: Performed by: NURSE PRACTITIONER

## 2020-01-22 PROCEDURE — 83735 ASSAY OF MAGNESIUM: CPT

## 2020-01-22 PROCEDURE — 84100 ASSAY OF PHOSPHORUS: CPT

## 2020-01-22 PROCEDURE — 65220000003 HC RM SEMIPRIVATE PSYCH

## 2020-01-22 PROCEDURE — 36415 COLL VENOUS BLD VENIPUNCTURE: CPT

## 2020-01-22 RX ORDER — QUETIAPINE FUMARATE 200 MG/1
200 TABLET, FILM COATED ORAL 2 TIMES DAILY
Status: DISCONTINUED | OUTPATIENT
Start: 2020-01-22 | End: 2020-01-28

## 2020-01-22 RX ORDER — TRIAMCINOLONE ACETONIDE 1 MG/G
OINTMENT TOPICAL
Status: DISCONTINUED | OUTPATIENT
Start: 2020-01-22 | End: 2020-01-30 | Stop reason: HOSPADM

## 2020-01-22 RX ORDER — OLANZAPINE 5 MG/1
5 TABLET ORAL
Status: DISCONTINUED | OUTPATIENT
Start: 2020-01-22 | End: 2020-01-28

## 2020-01-22 RX ORDER — DIPHENHYDRAMINE HYDROCHLORIDE 50 MG/ML
50 INJECTION, SOLUTION INTRAMUSCULAR; INTRAVENOUS ONCE
Status: COMPLETED | OUTPATIENT
Start: 2020-01-22 | End: 2020-01-22

## 2020-01-22 RX ORDER — LORAZEPAM 2 MG/ML
2 INJECTION INTRAMUSCULAR ONCE
Status: COMPLETED | OUTPATIENT
Start: 2020-01-22 | End: 2020-01-22

## 2020-01-22 RX ORDER — HALOPERIDOL 5 MG/ML
5 INJECTION INTRAMUSCULAR ONCE
Status: COMPLETED | OUTPATIENT
Start: 2020-01-22 | End: 2020-01-22

## 2020-01-22 RX ORDER — CARBAMAZEPINE 200 MG/1
300 TABLET ORAL EVERY EVENING
Status: DISCONTINUED | OUTPATIENT
Start: 2020-01-22 | End: 2020-01-30 | Stop reason: HOSPADM

## 2020-01-22 RX ORDER — METHOCARBAMOL 500 MG/1
500 TABLET, FILM COATED ORAL
Status: DISCONTINUED | OUTPATIENT
Start: 2020-01-22 | End: 2020-01-30 | Stop reason: HOSPADM

## 2020-01-22 RX ORDER — ERGOCALCIFEROL 1.25 MG/1
50000 CAPSULE ORAL
Status: DISCONTINUED | OUTPATIENT
Start: 2020-01-27 | End: 2020-01-30 | Stop reason: HOSPADM

## 2020-01-22 RX ORDER — CARBAMAZEPINE 200 MG/1
200 TABLET ORAL DAILY
Status: DISCONTINUED | OUTPATIENT
Start: 2020-01-22 | End: 2020-01-30 | Stop reason: HOSPADM

## 2020-01-22 RX ADMIN — CARBAMAZEPINE 300 MG: 200 TABLET ORAL at 17:04

## 2020-01-22 RX ADMIN — DIPHENHYDRAMINE HYDROCHLORIDE 50 MG: 50 INJECTION, SOLUTION INTRAMUSCULAR; INTRAVENOUS at 20:17

## 2020-01-22 RX ADMIN — LORAZEPAM 2 MG: 2 INJECTION INTRAMUSCULAR; INTRAVENOUS at 20:17

## 2020-01-22 RX ADMIN — OLANZAPINE 5 MG: 5 TABLET, FILM COATED ORAL at 22:32

## 2020-01-22 RX ADMIN — QUETIAPINE FUMARATE 200 MG: 200 TABLET ORAL at 12:34

## 2020-01-22 RX ADMIN — CARBAMAZEPINE 200 MG: 200 TABLET ORAL at 12:34

## 2020-01-22 RX ADMIN — OLANZAPINE 5 MG: 5 TABLET, FILM COATED ORAL at 08:43

## 2020-01-22 RX ADMIN — HYDROXYZINE HYDROCHLORIDE 50 MG: 25 TABLET, FILM COATED ORAL at 08:43

## 2020-01-22 RX ADMIN — HALOPERIDOL LACTATE 5 MG: 5 INJECTION, SOLUTION INTRAMUSCULAR at 20:17

## 2020-01-22 RX ADMIN — QUETIAPINE FUMARATE 200 MG: 200 TABLET ORAL at 17:03

## 2020-01-22 RX ADMIN — ACETAMINOPHEN 650 MG: 325 TABLET ORAL at 02:08
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
## 2020-01-22 NOTE — PROGRESS NOTES
Problem: Psychosis  Goal: *STG: Decreased hallucinations  Outcome: Progressing Towards Goal     Problem: Psychosis  Goal: *STG: Decreased delusional thinking  Outcome: Progressing Towards Goal     Problem: Psychosis  Goal: *STG: Remains safe in hospital  Outcome: Progressing Towards Goal

## 2020-01-22 NOTE — GROUP NOTE
DANIEL  GROUP DOCUMENTATION INDIVIDUAL Group Therapy Note Date: 1/22/2020 Group Start Time: 1400 Group End Time: 1500 Group Topic: Recreational/Music Therapy [facility] 3 ACUTE BEHAV [facility] [place], [address] GROUP DOCUMENTATION GROUP Group Therapy Note Attendees: 4 Attendance: Attended Patient's Goal:  To concentrate on selected task Interventions/techniques: Supported-crafts,games,music Follows Directions: Did not follow directions Interactions: Did not interact appropriately Mental Status: Anxious and Delusions Behavior/appearance: Needed prompting Goals Achieved: Additional Notes:  Pt is labile, disorganized thoughts,intrusive-required redirection Baylee Costa

## 2020-01-22 NOTE — PROGRESS NOTES
Problem: Psychosis  Goal: *STG: Decreased hallucinations  Outcome: Progressing Towards Goal  Goal: *STG: Remains safe in hospital  Outcome: Progressing Towards Goal

## 2020-01-22 NOTE — PROGRESS NOTES
Laboratory monitoring for mood stabilizer and antipsychotics:    Recommended baseline monitoring has been completed based on this patient's current medication regimen. The patient is currently taking the following medication(s):   Current Facility-Administered Medications   Medication Dose Route Frequency    carBAMazepine (TEGretol) tablet 200 mg  200 mg Oral DAILY    carBAMazepine (TEGretol) tablet 300 mg  300 mg Oral QPM    OLANZapine (ZyPREXA) tablet 5 mg  5 mg Oral QHS    QUEtiapine (SEROquel) tablet 200 mg  200 mg Oral BID    [START ON 1/27/2020] ergocalciferol capsule 50,000 Units  50,000 Units Oral every Monday       Height, Weight, BMI Estimation  Estimated body mass index is 30.23 kg/m² as calculated from the following:    Height as of this encounter: 151.1 cm (59.5\"). Weight as of this encounter: 69 kg (152 lb 3.2 oz). Renal Function, Hepatic Function and Chemistry  Estimated Creatinine Clearance: 78.9 mL/min (based on SCr of 0.73 mg/dL). Lab Results   Component Value Date/Time    Sodium 138 01/22/2020 06:10 AM    Potassium 3.7 01/22/2020 06:10 AM    Chloride 101 01/22/2020 06:10 AM    CO2 27 01/22/2020 06:10 AM    Anion gap 10 01/22/2020 06:10 AM    Glucose 118 (H) 01/22/2020 06:10 AM    BUN 6 01/22/2020 06:10 AM    Creatinine 0.73 01/22/2020 06:10 AM    BUN/Creatinine ratio 8 (L) 01/22/2020 06:10 AM    GFR est AA >60 01/22/2020 06:10 AM    GFR est non-AA >60 01/22/2020 06:10 AM    Calcium 9.2 01/22/2020 06:10 AM    ALT (SGPT) 24 01/20/2020 10:49 AM    AST (SGOT) 20 01/20/2020 10:49 AM    Alk.  phosphatase 80 01/20/2020 10:49 AM    Protein, total 8.9 (H) 01/20/2020 10:49 AM    Albumin 4.4 01/20/2020 10:49 AM    Globulin 4.5 (H) 01/20/2020 10:49 AM    A-G Ratio 1.0 (L) 01/20/2020 10:49 AM    Bilirubin, total 0.4 01/20/2020 10:49 AM       Lab Results   Component Value Date/Time    Glucose 118 (H) 01/22/2020 06:10 AM    Glucose (POC) 136 (H) 01/10/2020 04:22 PM       Lab Results   Component Value Date/Time    Hemoglobin A1c 5.4 12/30/2019 02:09 PM       Hematology  Lab Results   Component Value Date/Time    WBC 5.9 01/20/2020 10:49 AM    HGB 12.5 01/20/2020 10:49 AM    Hematocrit (POC) 34 (L) 07/17/2018 03:33 AM    HCT 36.7 01/20/2020 10:49 AM    PLATELET 315 85/46/0323 10:49 AM    MCV 92.7 01/20/2020 10:49 AM       Lipids  Lab Results   Component Value Date/Time    Cholesterol, total 219 (H) 12/30/2019 02:09 PM    HDL Cholesterol 84 12/30/2019 02:09 PM    LDL, calculated 124 (H) 12/30/2019 02:09 PM    Triglyceride 55 12/30/2019 02:09 PM       Thyroid Function    Lab Results   Component Value Date/Time    TSH 0.86 01/10/2020 08:00 PM    T3 Uptake 29 11/12/2015 03:52 PM    T4, Free 0.85 04/25/2019 04:09 PM    T4, Total 3.0 (L) 11/12/2015 03:52 PM     Vitals  Visit Vitals  /78 (BP 1 Location: Right arm, BP Patient Position: Sitting)   Pulse 88   Temp 98.2 °F (36.8 °C)   Resp 16   Ht 151.1 cm (59.5\")   Wt 69 kg (152 lb 3.2 oz)   SpO2 99%   BMI 30.23 kg/m²       Pregnancy Test  Lab Results   Component Value Date/Time    Pregnancy test,urine (POC) NEGATIVE  12/08/2011 08:00 AM       Akin Negron, PharmD, BCPS  [phone] (pharmacy)

## 2020-01-22 NOTE — BH NOTES
Behavioral Health Interdisciplinary Rounds     Patient Name: Gaviota Garcia  Age: 53 y.o. Room/Bed:  308/01  Primary Diagnosis: Bipolar 1 disorder (HCC)   Admission Status: Voluntary     Readmission within 30 days: no  Power of  in place:Unknown  Patient requires a blocked bed: no            Reason for blocked bed: not required at this time  Order for blocked bed obtained: no       Sleep hours:1.5  Hours off and on throughout the night     Participation in Care/Groups:  Yes    Medication Compliant?: Yes  PRNS (last 24 hours): Sleep Aid    Restraints (last 24 hours):  yes  Substance Abuse:  no    24 hour chart check complete: yes     Patient goal(s) for today: Continue taking medications as prescribed; engage in unit activities  Treatment team focus/goals: Start Tegretol, Zyprexa, and Seroquel  Progress note: Patient much more elevated today than yesterday. Emotionally labile and poor insight.     LOS:  2  Expected LOS: TBD    Financial concerns/prescription coverage: [employer] Medicare  Family contact: 1/21 SW spoke to   Family requesting physician contact today: No  Discharge plan: Return home  Access to weapons: No  Outpatient provider(s): [address] [place] [address] Dashawn Mays NP, and Juan Jose Sans)  Patient's preferred phone number for follow up call: [phone]    Participating treatment team members: Parker Gonzalez MSW; Dr. Brit Verde MD

## 2020-01-22 NOTE — BH NOTES
1/22/2020  0615    Patient cooperative with lab draw for Magnesium, Phosphorus and Metabolic Panel. Blood sent to lab.   No incident reported

## 2020-01-22 NOTE — BH NOTES
GROUP THERAPY PROGRESS NOTE    Patient is participating in Discharge Group. Group time: 40 minutes    Personal goal for participation: Process feelings related to discharge and communication issues with friends/family. Goal orientation: Personal    Group therapy participation: disruptive    Therapeutic interventions reviewed and discussed: Group discussion was focused on discharge plans and anxiety related to this. Group members discussed what they planned to do once discharge and discharge plans. Discussion also related to support and communication issues that arise. Impression of participation: Patient was present for most of group but was disruptive at times. She initially was quiet and would participate but then she got up and started crying and was delusional thinking that she was seeing things in the ceiling and floors and that she wrote words on the walls in the 1980's despite the walls being freshly painted she could see the words and thought the words currently on the walls were her own thoughts appearing.     Radha Hilario LPC [facility]ATP [facility]

## 2020-01-22 NOTE — BH NOTES
GROUP THERAPY PROGRESS NOTE    Patient did not participate in Specialty group.      Maryjane Viveros LPC [facility]
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
## 2020-01-22 NOTE — GROUP NOTE
DANIEL  GROUP DOCUMENTATION INDIVIDUAL Group Therapy Note Date: 1/22/2020 Group Start Time: 1000 Group End Time: 1100 Group Topic: Topic Group [facility] 3 ACUTE BEHAV [facility], [address] [facility] GROUP DOCUMENTATION GROUP Group Therapy Note Attendees: 3 Attendance: Did not attend Patient's Goal: Interventions/techniques: 
Bud Piper

## 2020-01-22 NOTE — BH NOTES
GROUP THERAPY PROGRESS NOTE    Patient did not participate in Substance abuse group.      Mickey Cherry LPC LSATP [facility]
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
## 2020-01-23 LAB
ALBUMIN SERPL-MCNC: 4.1 G/DL (ref 3.5–5)
ALBUMIN/GLOB SERPL: 1.1 {RATIO} (ref 1.1–2.2)
ALP SERPL-CCNC: 76 U/L (ref 45–117)
ALT SERPL-CCNC: 29 U/L (ref 12–78)
ANION GAP SERPL CALC-SCNC: 9 MMOL/L (ref 5–15)
AST SERPL-CCNC: 21 U/L (ref 15–37)
BILIRUB SERPL-MCNC: 0.2 MG/DL (ref 0.2–1)
BUN SERPL-MCNC: 9 MG/DL (ref 6–20)
BUN/CREAT SERPL: 10 (ref 12–20)
CALCIUM SERPL-MCNC: 9.3 MG/DL (ref 8.5–10.1)
CHLORIDE SERPL-SCNC: 102 MMOL/L (ref 97–108)
CO2 SERPL-SCNC: 29 MMOL/L (ref 21–32)
CREAT SERPL-MCNC: 0.94 MG/DL (ref 0.55–1.02)
GLOBULIN SER CALC-MCNC: 3.9 G/DL (ref 2–4)
GLUCOSE SERPL-MCNC: 91 MG/DL (ref 65–100)
POTASSIUM SERPL-SCNC: 3.6 MMOL/L (ref 3.5–5.1)
PROT SERPL-MCNC: 8 G/DL (ref 6.4–8.2)
SODIUM SERPL-SCNC: 140 MMOL/L (ref 136–145)

## 2020-01-23 PROCEDURE — 65220000003 HC RM SEMIPRIVATE PSYCH

## 2020-01-23 PROCEDURE — 36415 COLL VENOUS BLD VENIPUNCTURE: CPT

## 2020-01-23 PROCEDURE — 74011250637 HC RX REV CODE- 250/637: Performed by: PSYCHIATRY & NEUROLOGY

## 2020-01-23 PROCEDURE — 74011250636 HC RX REV CODE- 250/636: Performed by: NURSE PRACTITIONER

## 2020-01-23 PROCEDURE — 80053 COMPREHEN METABOLIC PANEL: CPT

## 2020-01-23 PROCEDURE — 74011250637 HC RX REV CODE- 250/637: Performed by: NURSE PRACTITIONER

## 2020-01-23 RX ADMIN — OLANZAPINE 5 MG: 5 TABLET, FILM COATED ORAL at 23:46

## 2020-01-23 RX ADMIN — DIPHENHYDRAMINE HYDROCHLORIDE 50 MG: 50 INJECTION, SOLUTION INTRAMUSCULAR; INTRAVENOUS at 16:29

## 2020-01-23 RX ADMIN — CARBAMAZEPINE 200 MG: 200 TABLET ORAL at 08:56

## 2020-01-23 RX ADMIN — HYDROXYZINE HYDROCHLORIDE 50 MG: 25 TABLET, FILM COATED ORAL at 11:23

## 2020-01-23 RX ADMIN — QUETIAPINE FUMARATE 200 MG: 200 TABLET ORAL at 08:57

## 2020-01-23 RX ADMIN — HALOPERIDOL LACTATE 5 MG: 5 INJECTION, SOLUTION INTRAMUSCULAR at 16:29

## 2020-01-23 RX ADMIN — CARBAMAZEPINE 300 MG: 200 TABLET ORAL at 18:30

## 2020-01-23 RX ADMIN — LORAZEPAM 1 MG: 2 INJECTION INTRAMUSCULAR; INTRAVENOUS at 16:30

## 2020-01-23 RX ADMIN — QUETIAPINE FUMARATE 200 MG: 200 TABLET ORAL at 16:21

## 2020-01-23 RX ADMIN — OLANZAPINE 5 MG: 5 TABLET, FILM COATED ORAL at 11:54

## 2020-01-23 NOTE — PROGRESS NOTES
Problem: Psychosis  Goal: *STG: Decreased delusional thinking  Outcome: Not Progressing Towards Goal

## 2020-01-23 NOTE — BH NOTES
GROUP THERAPY PROGRESS NOTE    Patient did not participate in Coping Skills group.      Maryjane Viveros LPC LSATP [facility]
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
## 2020-01-23 NOTE — GROUP NOTE
DANIEL  GROUP DOCUMENTATION INDIVIDUAL Group Therapy Note Date: 1/23/2020 Group Start Time: 1000 Group End Time: 1100 Group Topic: Topic Group [facility] 3 ACUTE BEHAV [facility], [address] GROUP DOCUMENTATION GROUP Group Therapy Note Attendees: 5 Attendance: Attended Patient's Goal:  To develop a personal plan for success Interventions/techniques: Supported-positive coping strategies/goals Follows Directions: Followed directions Interactions: Did not interact appropriately Mental Status: Anxious and Worried Behavior/appearance: Needed prompting Goals Achieved: Able to engage in interactions Additional Notes:  Intrusive at times,easily redirected Yoni Huang

## 2020-01-23 NOTE — BH NOTES
GROUP THERAPY PROGRESS NOTE    Patient did not participate in Substance abuse group.      Bijan Schrader [facility]

## 2020-01-23 NOTE — GROUP NOTE
IP  GROUP DOCUMENTATION INDIVIDUAL                                                                          Group Therapy Note    Date: 1/23/2020    Group Start Time: 4243  Group End Time: 4652  Group Topic: Nursing    [facility] 3 ACUTE BEHAV TH    Clemente Brito RN     Nick Freeport    Group Therapy Note    Attendees: 5           Attendance: Did not attend      Samy Rose RN

## 2020-01-23 NOTE — PROGRESS NOTES
Problem: Psychosis  Goal: *STG/LTG: Complies with medication therapy  Outcome: Progressing Towards Goal    Pt has been medication compliant. She was briefed and educated on her scheduled meds. All questions asked were answered.

## 2020-01-24 PROCEDURE — 74011250637 HC RX REV CODE- 250/637: Performed by: PSYCHIATRY & NEUROLOGY

## 2020-01-24 PROCEDURE — 74011250637 HC RX REV CODE- 250/637: Performed by: NURSE PRACTITIONER

## 2020-01-24 PROCEDURE — 65220000003 HC RM SEMIPRIVATE PSYCH

## 2020-01-24 RX ADMIN — QUETIAPINE FUMARATE 200 MG: 200 TABLET ORAL at 17:54

## 2020-01-24 RX ADMIN — CARBAMAZEPINE 200 MG: 200 TABLET ORAL at 08:20

## 2020-01-24 RX ADMIN — ACETAMINOPHEN 650 MG: 325 TABLET ORAL at 21:52

## 2020-01-24 RX ADMIN — QUETIAPINE FUMARATE 200 MG: 200 TABLET ORAL at 08:19

## 2020-01-24 RX ADMIN — ACETAMINOPHEN 650 MG: 325 TABLET ORAL at 09:01

## 2020-01-24 RX ADMIN — CARBAMAZEPINE 300 MG: 200 TABLET ORAL at 17:53

## 2020-01-24 RX ADMIN — METHOCARBAMOL TABLETS 500 MG: 500 TABLET, COATED ORAL at 06:59

## 2020-01-24 RX ADMIN — MAGNESIUM HYDROXIDE 30 ML: 400 SUSPENSION ORAL at 09:01

## 2020-01-24 RX ADMIN — OLANZAPINE 5 MG: 5 TABLET, FILM COATED ORAL at 21:14

## 2020-01-24 NOTE — BH NOTES
Patient in bed sleeping but refused vital signs to be taken.    2346 Patient got up and took 2200 po zyprexa  Slept 9.5 hours

## 2020-01-24 NOTE — BH NOTES
Pt has pressured speech at times and is irritable. Pt denies all psych symptoms but takes her meds. Pt is tearful at times; offered PRN meds but she declined. Pt attends meals with her peers.

## 2020-01-24 NOTE — PROGRESS NOTES
Problem: Psychosis  Goal: *STG: Decreased hallucinations  Outcome: Progressing Towards Goal  Goal: *STG: Remains safe in hospital  Outcome: Progressing Towards Goal  Goal: *STG: Seeks staff when feelings of self harm or harm towards others arise  Outcome: Progressing Towards Goal  Goal: *STG/LTG: Complies with medication therapy  Outcome: Progressing Towards Goal  Goal: *STG/LTG: Demonstrates improved thought patterns as evidenced by logical and coherent speech  Outcome: Progressing Towards Goal  Goal: *STG/LTG: Demonstrates improved social functioning by responding appropriately to staff  Outcome: Progressing Towards Goal     Problem: Psychosis  Goal: *STG: Decreased delusional thinking  Outcome: Not Progressing Towards Goal  Goal: *STG: Patient will verbalize areas in need of boundary recognition and limit setting  Outcome: Not Progressing Towards Goal  Goal: *STG: Patient will develop strategies to regulate emotions and corresponding behaviors  Outcome: Not Progressing Towards Goal

## 2020-01-24 NOTE — INTERDISCIPLINARY ROUNDS
Patient goal(s) for today: Continue taking medications as prescribed; engage in unit activities; follow staff directions  Treatment team focus/goals: Continue medication regimen  Progress note: Patient continues to be elevated and intrusive. She lacks insight and exhibits poor judgement. Asking to leave AMA.     LOS:  4  Expected LOS: TBD    Financial concerns/prescription coverage: [employer] Medicare  Family contact: 1/21 SW spoke to   Family requesting physician contact today: No  Discharge plan: Return home  Access to weapons: No  Outpatient provider(s): [address] [place] [address] Delmarpuma Reynoso, NP, and [place])  Patient's preferred phone number for follow up [phone]  Participating treatment team members: Mya Watters, MERCED; Dr. Sanjuanita Miller MD; Abelardo Bassett, PharmD
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
## 2020-01-25 PROCEDURE — 65220000003 HC RM SEMIPRIVATE PSYCH

## 2020-01-25 PROCEDURE — 74011250637 HC RX REV CODE- 250/637: Performed by: NURSE PRACTITIONER

## 2020-01-25 PROCEDURE — 74011250636 HC RX REV CODE- 250/636: Performed by: NURSE PRACTITIONER

## 2020-01-25 PROCEDURE — 74011250637 HC RX REV CODE- 250/637: Performed by: PSYCHIATRY & NEUROLOGY

## 2020-01-25 RX ORDER — IBUPROFEN 400 MG/1
400 TABLET ORAL
Status: DISCONTINUED | OUTPATIENT
Start: 2020-01-25 | End: 2020-01-27

## 2020-01-25 RX ADMIN — MAGNESIUM HYDROXIDE 30 ML: 400 SUSPENSION ORAL at 08:25

## 2020-01-25 RX ADMIN — OLANZAPINE 5 MG: 5 TABLET, FILM COATED ORAL at 22:18

## 2020-01-25 RX ADMIN — LORAZEPAM 1 MG: 2 INJECTION INTRAMUSCULAR; INTRAVENOUS at 12:46

## 2020-01-25 RX ADMIN — HALOPERIDOL LACTATE 5 MG: 5 INJECTION, SOLUTION INTRAMUSCULAR at 12:46

## 2020-01-25 RX ADMIN — IBUPROFEN 400 MG: 400 TABLET ORAL at 09:30

## 2020-01-25 RX ADMIN — QUETIAPINE FUMARATE 200 MG: 200 TABLET ORAL at 17:02

## 2020-01-25 RX ADMIN — QUETIAPINE FUMARATE 200 MG: 200 TABLET ORAL at 08:26

## 2020-01-25 RX ADMIN — METHOCARBAMOL TABLETS 500 MG: 500 TABLET, COATED ORAL at 22:18

## 2020-01-25 RX ADMIN — DIPHENHYDRAMINE HYDROCHLORIDE 50 MG: 50 INJECTION, SOLUTION INTRAMUSCULAR; INTRAVENOUS at 12:46

## 2020-01-25 RX ADMIN — ACETAMINOPHEN 650 MG: 325 TABLET ORAL at 22:18

## 2020-01-25 RX ADMIN — CARBAMAZEPINE 300 MG: 200 TABLET ORAL at 17:02

## 2020-01-25 RX ADMIN — CARBAMAZEPINE 200 MG: 200 TABLET ORAL at 08:26

## 2020-01-25 RX ADMIN — IBUPROFEN 400 MG: 400 TABLET ORAL at 17:33

## 2020-01-25 NOTE — BH NOTES
Pt given Motrin at 0930 for pain 8/10 in the legs and feet with good affect. Pt denies psych symptoms. Pt takes her meds. Pt attends meals but did not attend groups today.
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
## 2020-01-25 NOTE — BH NOTES
7p to 7a - Assumed care of patient and given report by off going nurse EDMUND Dsouza RN. Pt was in her room when I came on shift. She was calm, pleasant and cooperative when I came to introduce myself. She made good eye contact, was engaging and even remembered my name several times when I came to check on her or she was at the nurses desk requesting items. She denied everything and didn't appear to be responding to internal stimuli each time I encountered her. She did state she was having back pain later 5/10, was given prn tylenol and likes to alternate with ice and heat/so she was given packs and shown how to use them. She later tells me she felt good and she even \"fell asleep for a bit\" afterwards. She was compliant with medications. She was asking about an inhaler that she uses at times at home but I saw nothing in her medication list and advised she ask the doctor tomorrow. She had no altercations with any peers or staff tonight. She did have snack. Pt said she is looking forward to going back home. She remains on 15 min safety checks. Will continue to monitor and treat. Pt slept 4.75 hours.

## 2020-01-25 NOTE — PROGRESS NOTES
Problem: Psychosis  Goal: *STG: Decreased hallucinations  Outcome: Progressing Towards Goal  Goal: *STG: Decreased delusional thinking  Outcome: Progressing Towards Goal  Goal: *STG: Remains safe in hospital  Outcome: Progressing Towards Goal  Goal: *STG: Seeks staff when feelings of self harm or harm towards others arise  Outcome: Progressing Towards Goal  Goal: *STG: Patient will verbalize areas in need of boundary recognition and limit setting  Outcome: Progressing Towards Goal  Goal: *STG: Patient will develop strategies to regulate emotions and corresponding behaviors  Outcome: Progressing Towards Goal  Goal: *STG: Participates in individual and group therapy  Outcome: Progressing Towards Goal  Goal: *STG/LTG: Complies with medication therapy  Outcome: Progressing Towards Goal  Goal: *STG/LTG: Demonstrates improved thought patterns as evidenced by logical and coherent speech  Outcome: Progressing Towards Goal  Goal: *STG/LTG: Demonstrates improved social functioning by responding appropriately to staff  Outcome: Progressing Towards Goal
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
## 2020-01-25 NOTE — PROGRESS NOTES
Chief Complaint:  \"I am not well. \"  Length of Stay: 5 Days    Interval History:  Patient denies SI/HI/AVH but has bizarre behavior and thought blocking at times of assessment. Patient is calm and cooperative at this time. She endorses she is not well and states, \"I have pneumonia and I also can't walk. \" Patient standing during assessment and walking in room with no issues or distress. Endorses she slept okay last night. Past Medical History:  Past Medical History:   Diagnosis Date    Arthritis     joints    Bipolar 1 disorder with moderate robyn ([address] 3/17/2014    Chronic pain     back with stress    Contact dermatitis and other eczema, due to unspecified cause     Depression     Headache(784.0)     Hyperlipidemia 8/22/2016    Other ill-defined conditions(799.89)     sinus infection,hay fever    Other ill-defined conditions(799.89)     scoliosis    Psychiatric disorder     bipolar    Psychotic disorder (HCC)     Stool color black     Tennis elbow syndrome 5/4/2015    Trauma            Labs:  Lab Results   Component Value Date/Time    WBC 5.9 01/20/2020 10:49 AM    HGB 12.5 01/20/2020 10:49 AM    Hematocrit (POC) 34 (L) 07/17/2018 03:33 AM    HCT 36.7 01/20/2020 10:49 AM    PLATELET 243 42/71/3859 10:49 AM    MCV 92.7 01/20/2020 10:49 AM      Lab Results   Component Value Date/Time    Sodium 140 01/23/2020 02:58 PM    Potassium 3.6 01/23/2020 02:58 PM    Chloride 102 01/23/2020 02:58 PM    CO2 29 01/23/2020 02:58 PM    Anion gap 9 01/23/2020 02:58 PM    Glucose 91 01/23/2020 02:58 PM    BUN 9 01/23/2020 02:58 PM    Creatinine 0.94 01/23/2020 02:58 PM    BUN/Creatinine ratio 10 (L) 01/23/2020 02:58 PM    GFR est AA >60 01/23/2020 02:58 PM    GFR est non-AA >60 01/23/2020 02:58 PM    Calcium 9.3 01/23/2020 02:58 PM    Bilirubin, total 0.2 01/23/2020 02:58 PM    AST (SGOT) 21 01/23/2020 02:58 PM    Alk.  phosphatase 76 01/23/2020 02:58 PM    Protein, total 8.0 01/23/2020 02:58 PM    Albumin 4.1 01/23/2020 02:58 PM    Globulin 3.9 01/23/2020 02:58 PM    A-G Ratio 1.1 01/23/2020 02:58 PM    ALT (SGPT) 29 01/23/2020 02:58 PM      Vitals:    01/24/20 0749 01/24/20 2003 01/24/20 2020 01/25/20 0711   BP: 127/83 136/71  146/70   Pulse: (!) 105 (!) 108 99 81   Resp: 18 18  18   Temp: 97.7 °F (36.5 °C) 98.4 °F (36.9 °C)  98 °F (36.7 °C)   SpO2: 100% 100%  99%   Weight:       Height:             Current Facility-Administered Medications   Medication Dose Route Frequency Provider Last Rate Last Dose    ibuprofen (MOTRIN) tablet 400 mg  400 mg Oral Q8H PRN Janice Morocho NP   400 mg at 01/25/20 0930    carBAMazepine (TEGretol) tablet 200 mg  200 mg Oral DAILY Alin Heart MD   200 mg at 01/25/20 2739    carBAMazepine (TEGretol) tablet 300 mg  300 mg Oral QPM Alin Heart MD   300 mg at 01/24/20 1753    methocarbamol (ROBAXIN) tablet 500 mg  500 mg Oral DAILY PRN Alin Heart MD   500 mg at 01/24/20 9173    OLANZapine (ZyPREXA) tablet 5 mg  5 mg Oral QHS Alin Heart MD   5 mg at 01/24/20 2114    QUEtiapine (SEROquel) tablet 200 mg  200 mg Oral BID Alin Heart MD   200 mg at 01/25/20 1168    triamcinolone acetonide (KENALOG) 0.1 % ointment   Topical DAILY PRN Alin Heart MD        [START ON 1/27/2020] ergocalciferol capsule 50,000 Units  50,000 Units Oral every Monday Alin Heart MD        OLANZapine (ZyPREXA) tablet 5 mg  5 mg Oral Q6H PRN Viky Tyler NP   5 mg at 01/23/20 1154    haloperidol lactate (HALDOL) injection 5 mg  5 mg IntraMUSCular Q6H PRN Viky Tyler NP   5 mg at 01/23/20 1629    benztropine (COGENTIN) tablet 1 mg  1 mg Oral BID PRN Adrianne Tyler NP        diphenhydrAMINE (BENADRYL) injection 50 mg  50 mg IntraMUSCular BID PRN Viky Tyler, NP   50 mg at 01/23/20 1629    hydroxyzine HCL (ATARAX) tablet 50 mg  50 mg Oral TID PRN Adrianne Tyler, NP   50 mg at 01/23/20 1123    LORazepam (ATIVAN) injection 1 mg  1 mg IntraMUSCular Q4H PRN Viky Tyler, NP   1 mg at 01/23/20 1630    traZODone (DESYREL) tablet 50 mg  50 mg Oral QHS PRN Viky Tyler, NP   50 mg at 01/21/20 2211    acetaminophen (TYLENOL) tablet 650 mg  650 mg Oral Q4H PRN Viky Tyler, NP   650 mg at 01/24/20 2152    magnesium hydroxide (MILK OF MAGNESIA) 400 mg/5 mL oral suspension 30 mL  30 mL Oral DAILY PRN Viky Tyler, NP   30 mL at 01/25/20 0825         Mental Status Exam:  Eye contact: Fair  Psychomotor activity: calm  Speech is spontaneous  Mood is full range  Affect: blunted  Perception:Denies HI/AVH  Suicidal ideation: Denies SI or plan  Cognition is grossly intact         Physical Exam:  Body habitus:  Musculoskeletal system:   Tremor -neg  Cog wheeling-neg      Assessment and Plan:  Elisha Gilmore meets criteria for a diagnosis of Bipolar 1 Disorder. Continue the medication regimen as prescribed  Disposition planning to continue. I certify that this patients inpatient psychiatric hospital services furnished since the previous certification were, and continue to be, required for treatment that could reasonably be expected to improve the patient's condition, or for diagnostic study, and that the patient continues to need, on a daily basis, active treatment furnished directly by or requiring the supervision of inpatient psychiatric facility personnel. In addition, the hospital records show that services furnished were intensive treatment services, admission or related services, or equivalent services.

## 2020-01-25 NOTE — PROGRESS NOTES
Problem: Psychosis  Goal: *STG: Decreased hallucinations  Outcome: Progressing Towards Goal  Goal: *STG: Decreased delusional thinking  Outcome: Progressing Towards Goal  Goal: *STG: Remains safe in hospital  Outcome: Progressing Towards Goal  Goal: *STG/LTG: Complies with medication therapy  Outcome: Progressing Towards Goal

## 2020-01-26 PROCEDURE — 65220000003 HC RM SEMIPRIVATE PSYCH

## 2020-01-26 PROCEDURE — 74011250637 HC RX REV CODE- 250/637: Performed by: NURSE PRACTITIONER

## 2020-01-26 PROCEDURE — 74011250637 HC RX REV CODE- 250/637: Performed by: PSYCHIATRY & NEUROLOGY

## 2020-01-26 RX ADMIN — QUETIAPINE FUMARATE 200 MG: 200 TABLET ORAL at 16:36

## 2020-01-26 RX ADMIN — HYDROXYZINE HYDROCHLORIDE 50 MG: 25 TABLET, FILM COATED ORAL at 02:01

## 2020-01-26 RX ADMIN — ACETAMINOPHEN 650 MG: 325 TABLET ORAL at 12:50

## 2020-01-26 RX ADMIN — QUETIAPINE FUMARATE 200 MG: 200 TABLET ORAL at 08:00

## 2020-01-26 RX ADMIN — CARBAMAZEPINE 200 MG: 200 TABLET ORAL at 08:00

## 2020-01-26 RX ADMIN — CARBAMAZEPINE 300 MG: 200 TABLET ORAL at 17:17

## 2020-01-26 RX ADMIN — TRAZODONE HYDROCHLORIDE 50 MG: 50 TABLET ORAL at 02:01

## 2020-01-26 NOTE — PROGRESS NOTES
Problem: Psychosis  Goal: *STG: Remains safe in hospital  Outcome: Progressing Towards Goal  Goal: *STG: Seeks staff when feelings of self harm or harm towards others arise  Outcome: Progressing Towards Goal    Pt currently denies SI/HI. She advises staff when she feels angry towards others. Pt receives reassurance, redirection, and therapeutic listening from staff.
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
## 2020-01-26 NOTE — BH NOTES
0730 - Pt report was received from off going shift's RN.    0800 - Pt assessment was performed. Pt appears to be disorganized. Thoughts are loose in association. Affect is flat. Pt is intrusive. 0900 - Pt was med and meal compliant. 1252 - Pt complained of bilateral ankle pain. Numeric 10/10. Pt received prn tylenol for pain. Pt was encouraged to stay off or her feet and get some rest. Pt has been pacing or standing all morning. RN will continue to monitor pt's symptoms. 5 - Pt is currently having a visit with . 1900 - Pt has been intrusive but responds to redirection. Pt thoughts are disorganized and pt can be repetitive at times. Pt becomes easily agitated from the behaviors of other patients. As the shift progressed, pt's behavior was more appropriate. During that time, pt was more pleasant during her interactions and the visit with  went well. Pt was meal and med compliant. Prn medication received was effective. Pt currently denies SI/HI and denies hallucinations. Staff will continue to monitor pt's safety and offer support as needed.

## 2020-01-26 NOTE — PROGRESS NOTES
Chief Complaint:  \"I am a lot better today\"  Length of Stay: 6 Days    Interval History:  Patient denies SI/HI/AVH but has bizarre behavior and thought blocking at times of assessment. Patient is calm and cooperative at this time. Patient became combative last night with another peer and received IM injections. Today is she walking around on milieu and responding internally to herself. She states, \"I had a dream last night with all of you guys in it, but I forgot what it was about. \"    Past Medical History:  Past Medical History:   Diagnosis Date    Arthritis     joints    Bipolar 1 disorder with moderate robyn ([facility] [address] 3/17/2014    Chronic pain     back with stress    Contact dermatitis and other eczema, due to unspecified cause     Depression     Headache(784.0)     Hyperlipidemia 8/22/2016    Other ill-defined conditions(799.89)     sinus infection,hay fever    Other ill-defined conditions(799.89)     scoliosis    Psychiatric disorder     bipolar    Psychotic disorder (HCC)     Stool color black     Tennis elbow syndrome 5/4/2015    Trauma            Labs:  Lab Results   Component Value Date/Time    WBC 5.9 01/20/2020 10:49 AM    HGB 12.5 01/20/2020 10:49 AM    Hematocrit (POC) 34 (L) 07/17/2018 03:33 AM    HCT 36.7 01/20/2020 10:49 AM    PLATELET 701 67/71/4290 10:49 AM    MCV 92.7 01/20/2020 10:49 AM      Lab Results   Component Value Date/Time    Sodium 140 01/23/2020 02:58 PM    Potassium 3.6 01/23/2020 02:58 PM    Chloride 102 01/23/2020 02:58 PM    CO2 29 01/23/2020 02:58 PM    Anion gap 9 01/23/2020 02:58 PM    Glucose 91 01/23/2020 02:58 PM    BUN 9 01/23/2020 02:58 PM    Creatinine 0.94 01/23/2020 02:58 PM    BUN/Creatinine ratio 10 (L) 01/23/2020 02:58 PM    GFR est AA >60 01/23/2020 02:58 PM    GFR est non-AA >60 01/23/2020 02:58 PM    Calcium 9.3 01/23/2020 02:58 PM    Bilirubin, total 0.2 01/23/2020 02:58 PM    AST (SGOT) 21 01/23/2020 02:58 PM    Alk.  phosphatase 76 01/23/2020 02:58 PM Protein, total 8.0 01/23/2020 02:58 PM    Albumin 4.1 01/23/2020 02:58 PM    Globulin 3.9 01/23/2020 02:58 PM    A-G Ratio 1.1 01/23/2020 02:58 PM    ALT (SGPT) 29 01/23/2020 02:58 PM      Vitals:    01/24/20 2020 01/25/20 0711 01/25/20 2205 01/26/20 0729   BP:  146/70 143/76 156/78   Pulse: 99 81 88 88   Resp:  18 17 18   Temp:  98 °F (36.7 °C) 97.6 °F (36.4 °C) 97.7 °F (36.5 °C)   SpO2:  99% 100% 100%   Weight:       Height:             Current Facility-Administered Medications   Medication Dose Route Frequency Provider Last Rate Last Dose    ibuprofen (MOTRIN) tablet 400 mg  400 mg Oral Q8H PRN Edmar Berger NP   400 mg at 01/25/20 1733    carBAMazepine (TEGretol) tablet 200 mg  200 mg Oral DAILY Joann Magana MD   200 mg at 01/26/20 0800    carBAMazepine (TEGretol) tablet 300 mg  300 mg Oral QPM Joann Magana MD   300 mg at 01/25/20 1702    methocarbamol (ROBAXIN) tablet 500 mg  500 mg Oral DAILY PRN Joann Magana MD   500 mg at 01/25/20 2218    OLANZapine (ZyPREXA) tablet 5 mg  5 mg Oral QHS Joann Magana MD   5 mg at 01/25/20 2218    QUEtiapine (SEROquel) tablet 200 mg  200 mg Oral BID Russell APPIAH MD   200 mg at 01/26/20 0800    triamcinolone acetonide (KENALOG) 0.1 % ointment   Topical DAILY PRN Joann Magana MD        [START ON 1/27/2020] ergocalciferol capsule 50,000 Units  50,000 Units Oral every Monday Joann Magana MD        OLANZapine (ZyPREXA) tablet 5 mg  5 mg Oral Q6H PRN Viky Tyler, NP   5 mg at 01/23/20 1154    haloperidol lactate (HALDOL) injection 5 mg  5 mg IntraMUSCular Q6H PRN Viky Tyler, NP   5 mg at 01/25/20 1246    benztropine (COGENTIN) tablet 1 mg  1 mg Oral BID PRN Ruddy Tyler NP        diphenhydrAMINE (BENADRYL) injection 50 mg  50 mg IntraMUSCular BID PRN Viky Tyler NP   50 mg at 01/25/20 1246    hydroxyzine HCL (ATARAX) tablet 50 mg  50 mg Oral TID PRN Angle Tyler D, NP   50 mg at 01/26/20 0201    LORazepam (ATIVAN) injection 1 mg  1 mg IntraMUSCular Q4H PRN Viky Tyler, NP   1 mg at 01/25/20 1246    traZODone (DESYREL) tablet 50 mg  50 mg Oral QHS PRN Viky Tyler, NP   50 mg at 01/26/20 0201    acetaminophen (TYLENOL) tablet 650 mg  650 mg Oral Q4H PRN Viky Tyler, NP   650 mg at 01/25/20 2218    magnesium hydroxide (MILK OF MAGNESIA) 400 mg/5 mL oral suspension 30 mL  30 mL Oral DAILY PRN Viky Tyler, NP   30 mL at 01/25/20 0825         Mental Status Exam:  Eye contact: Fair  Psychomotor activity: calm  Speech is spontaneous  Mood is full range  Affect: blunted  Perception:Denies HI/AVH  Suicidal ideation: Denies SI or plan  Cognition is grossly intact         Physical Exam:  Body habitus:  Musculoskeletal system:   Tremor -neg  Cog wheeling-neg      Assessment and Plan:  Tahmina Cornell meets criteria for a diagnosis of Bipolar 1 Disorder. Continue the medication regimen as prescribed  Disposition planning to continue. I certify that this patients inpatient psychiatric hospital services furnished since the previous certification were, and continue to be, required for treatment that could reasonably be expected to improve the patient's condition, or for diagnostic study, and that the patient continues to need, on a daily basis, active treatment furnished directly by or requiring the supervision of inpatient psychiatric facility personnel. In addition, the hospital records show that services furnished were intensive treatment services, admission or related services, or equivalent services.

## 2020-01-27 PROCEDURE — 74011250637 HC RX REV CODE- 250/637: Performed by: PSYCHIATRY & NEUROLOGY

## 2020-01-27 PROCEDURE — 74011250637 HC RX REV CODE- 250/637: Performed by: NURSE PRACTITIONER

## 2020-01-27 PROCEDURE — 74011250636 HC RX REV CODE- 250/636: Performed by: NURSE PRACTITIONER

## 2020-01-27 PROCEDURE — 65220000003 HC RM SEMIPRIVATE PSYCH

## 2020-01-27 RX ORDER — IBUPROFEN 400 MG/1
400 TABLET ORAL
Status: DISCONTINUED | OUTPATIENT
Start: 2020-01-27 | End: 2020-01-30 | Stop reason: HOSPADM

## 2020-01-27 RX ADMIN — DIPHENHYDRAMINE HYDROCHLORIDE 50 MG: 50 INJECTION, SOLUTION INTRAMUSCULAR; INTRAVENOUS at 14:44

## 2020-01-27 RX ADMIN — OLANZAPINE 5 MG: 5 TABLET, FILM COATED ORAL at 08:32

## 2020-01-27 RX ADMIN — TRAZODONE HYDROCHLORIDE 50 MG: 50 TABLET ORAL at 21:13

## 2020-01-27 RX ADMIN — HALOPERIDOL LACTATE 5 MG: 5 INJECTION, SOLUTION INTRAMUSCULAR at 23:00

## 2020-01-27 RX ADMIN — ERGOCALCIFEROL 50000 UNITS: 1.25 CAPSULE ORAL at 08:14

## 2020-01-27 RX ADMIN — OLANZAPINE 5 MG: 5 TABLET, FILM COATED ORAL at 21:13

## 2020-01-27 RX ADMIN — CARBAMAZEPINE 300 MG: 200 TABLET ORAL at 17:11

## 2020-01-27 RX ADMIN — DIPHENHYDRAMINE HYDROCHLORIDE 50 MG: 50 INJECTION, SOLUTION INTRAMUSCULAR; INTRAVENOUS at 23:00

## 2020-01-27 RX ADMIN — CARBAMAZEPINE 200 MG: 200 TABLET ORAL at 08:14

## 2020-01-27 RX ADMIN — QUETIAPINE FUMARATE 200 MG: 200 TABLET ORAL at 08:14

## 2020-01-27 RX ADMIN — HYDROXYZINE HYDROCHLORIDE 50 MG: 25 TABLET, FILM COATED ORAL at 08:32

## 2020-01-27 RX ADMIN — HALOPERIDOL LACTATE 5 MG: 5 INJECTION, SOLUTION INTRAMUSCULAR at 14:44

## 2020-01-27 RX ADMIN — QUETIAPINE FUMARATE 200 MG: 200 TABLET ORAL at 17:09

## 2020-01-27 NOTE — INTERDISCIPLINARY ROUNDS
Patient goal(s) for today: Continue taking medications as prescribed; engage in unit activities  Treatment team focus/goals: Continue medication regimen  Progress note: Patient continues to slow down and becoming better oriented. She reports feeling, \"up and down\" and is observed being intrusive at times. Her insight appears improved, but judgement is still poor.   Denies SI/HI/AVH.    LOS:  7  Expected LOS: TBD    Financial concerns/prescription coverage: [employer] Medicare  Family contact: 1/21 SW spoke to   Family requesting physician contact today: No  Discharge plan: Return home  Access to weapons: No  Outpatient provider(s): [address] [place], [place] [address]puma Morgan NP, and Jenna Freitas  Patient's preferred phone number for follow up call: [phone]    Participating treatment team members: Nathan Man MSW; Dr. Olena Dudley MD; Cecelia Carson, AngelaD

## 2020-01-27 NOTE — BH NOTES
GROUP THERAPY PROGRESS NOTE    Patient is participating in Discharge Group. Group time: 45 minutes    Personal goal for participation: Process feelings related to discharge and communication issues with friends/family. Goal orientation: Personal    Group therapy participation: passive    Therapeutic interventions reviewed and discussed: Group discussion was focused on discharge plans and anxiety related to this. Group members discussed what they planned to do once discharge and discharge plans. Discussion also related to support and communication issues that arise. Impression of participation: Tania Dickson was present in group but was unable to sit still. She was placing in and out of group and did not speak or share with the group.     Hayley Rodríguez LPC [facility]

## 2020-01-27 NOTE — PROGRESS NOTES
Diet as tolerated. Pt noted to be meal compliant. Suggest vit D3 2000 IUs/day. Hx notable for vit D deficiency, hyponatremia (resolved).   Ht: 4'11.5\"  Wt: 152 lb 3.2 oz  BMI: 30.23 kg/(m^2) c/w obesity grade I  Est energy needs: 1695 kcal, 60 g protein 1800 mL fluids  Pt will consume > 75% of meals at follow up 7-10 days  LOS

## 2020-01-27 NOTE — BH NOTES
GROUP THERAPY PROGRESS NOTE    Patient is participating in Process group. Group time: 45 minutes    Personal goal for participation: To try new coping skills    Goal orientation: Personal    Group therapy participation: passive    Therapeutic interventions reviewed and discussed: Group participating in a guided imagery activity designed for depression. Group members were asked to try to relax and follow along with the CD. Discussion of how each person felt during and after exercise. Impression of participation: Homero Yin was present at times during group. She was unable and unwilling to sit down and continued to move in and out of the group room. She appeared to be responding to internal stimuli at times. She was quiet and not disruptive to others so she was allowed to continue and would occassionally stop and listen to the CD.     Mitzi Brown LPC LSATP [facility]

## 2020-01-27 NOTE — GROUP NOTE
DANIEL  GROUP DOCUMENTATION INDIVIDUAL Group Therapy Note Date: 1/27/2020 Group Start Time: 1100 Group End Time: 1200 Group Topic: Topic Group [facility], [address] GROUP DOCUMENTATION GROUP Group Therapy Note Attendees: 5 Attendance: Attended Patient's Goal:  To participate in coping skills memory game Interventions/techniques: Supported-positive coping strategies a-z Follows Directions: Followed directions Interactions: Interacted appropriately Mental Status: fidgety-has difficulty sitting still Behavior/appearance: Cooperative and Needed prompting Goals Achieved: Able to engage in interactions, Able to listen to others and Discussed coping Additional Notes:   
 
Nery Gardiner

## 2020-01-27 NOTE — BH NOTES
Patient without complaint this shift. She refused bedtime medication and refused a.m. blood draw. She has been intrusive with staff and peers but has been redirectable. She did not sleep well. Patient slept approx 4 hours.

## 2020-01-27 NOTE — GROUP NOTE
DANIEL  GROUP DOCUMENTATION INDIVIDUAL Group Therapy Note Date: 1/27/2020 Group Start Time: 1500 Group End Time: 3380 Group Topic: Recreational/Music Therapy [address] 3 ACUTE BEHAV [facility], [address] [facility] GROUP DOCUMENTATION GROUP Group Therapy Note Attendees: 5 Attendance: Did not attend Patient's Goal: Interventions/techniques Hobert Border
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
## 2020-01-28 PROCEDURE — 65220000001 HC RM PRIVATE PSYCH

## 2020-01-28 PROCEDURE — 74011250637 HC RX REV CODE- 250/637: Performed by: NURSE PRACTITIONER

## 2020-01-28 PROCEDURE — 74011250637 HC RX REV CODE- 250/637: Performed by: PSYCHIATRY & NEUROLOGY

## 2020-01-28 RX ADMIN — OLANZAPINE 15 MG: 5 TABLET, FILM COATED ORAL at 21:47

## 2020-01-28 RX ADMIN — CARBAMAZEPINE 200 MG: 200 TABLET ORAL at 08:00

## 2020-01-28 RX ADMIN — TRAZODONE HYDROCHLORIDE 50 MG: 50 TABLET ORAL at 21:48

## 2020-01-28 RX ADMIN — CARBAMAZEPINE 300 MG: 200 TABLET ORAL at 17:20

## 2020-01-28 RX ADMIN — QUETIAPINE FUMARATE 200 MG: 200 TABLET ORAL at 09:05
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
## 2020-01-28 NOTE — BH NOTES
1700 Harpal Carvajal care of patient from [facility], [address]  Patient found to be pacing the hallways. Mumbling, disorganized thought processes. Attempting to exit the unit by pushing on the doors. Needs much redirection. Asked this writer \"where in this place do I find my hearts desire? \". Patient presents with confusion. No behaviors noted this evening but as stated, does need constant redirection    2113  Patient compliant with scheduled Zyprexa and did agree to take Trazodone for sleep    2300  Patient continues to remain awake, much disorganized thoughts. Nonsensical conversations. Unable to \"find my hearts desire\". Speaking of animals, then of mountains, then of the bible. With much encouragement, after prayer and \"praying about it\" even more, patient agreed to take Benadryl and Haldol IM for this writer. Medication given without incident. Will continue to monitor. 0000 patient resting quietly at this time. Does appear to be sleeping at this time. Respirations are even and non labored. Will continue to monitor per patient plan of care    [phone]  Patient resting quietly at this time. Was awake for approximately 1/2 hour but did stay in bed. Will continue to monitor    0330  Patient up on unit. Pacing rossi    0930  Patient continues to remain up on unit. Pacing rossi    3255  Patient continues to pace the rossi.   She has slept a total of one hour
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
## 2020-01-28 NOTE — BH NOTES
GROUP THERAPY PROGRESS NOTE    Patient is participating in Coping Skills group. Group time: 50 minutes    Personal goal for participation: To discussion communication issues/problems and ways to communicate more clearly. Goal orientation: Personal    Group therapy participation: minimal    Therapeutic interventions reviewed and discussed: Group discussion was focused issues that arise due to communication issues. Patients shared common issues including double messages, assumptions, hinting, unable to admit mistakes, and defensive responses. Patient discussed ways to communicate more clearly through the use of I statements. Impression of participation: Hans Santillan was present but did not sit down and did not participate in treatment. She appeared confused and distracted in her thoughts during the group and would quietly wander out and in as needed.     Jose Johnston LPC LSATP CSAC

## 2020-01-28 NOTE — PROGRESS NOTES
Problem: Psychosis  Goal: *STG: Patient will verbalize areas in need of boundary recognition and limit setting  Outcome: Not Progressing Towards Goal  Goal: *STG: Participates in individual and group therapy  Outcome: Not Progressing Towards Goal  Goal: *STG: Patient will verbalize issues that get in their way of progress (i.e.: anger, fear etc.)  Outcome: Not Progressing Towards Goal

## 2020-01-28 NOTE — PROGRESS NOTES
Problem: Psychosis  Goal: *STG: Decreased hallucinations  1/27/2020 2026 by John Gaines RN  Outcome: Progressing Towards Goal  1/27/2020 2023 by John Gaines RN  Outcome: Progressing Towards Goal     Problem: Psychosis  Goal: *STG: Decreased delusional thinking  1/27/2020 2026 by John Gaines RN  Outcome: Progressing Towards Goal  1/27/2020 2023 by John Gaines RN  Outcome: Progressing Towards Goal     Problem: Psychosis  Goal: *STG: Remains safe in hospital  1/27/2020 2026 by John Gaines RN  Outcome: Progressing Towards Goal  1/27/2020 2023 by John Gaines RN  Outcome: Progressing Towards Goal     Problem: Psychosis  Goal: *STG: Seeks staff when feelings of self harm or harm towards others arise  1/27/2020 2026 by John Gaines RN  Outcome: Progressing Towards Goal  1/27/2020 2023 by John Gaines RN  Outcome: Progressing Towards Goal

## 2020-01-28 NOTE — BH NOTES
Report received from Darwin Mike RN. Patient in hallway pacing, slept only 1 hour during the night shift. 0800  Patient compliant with morning medication, took several minutes to take. 1109  Patient continues to pace the rossi. Continuing to encourage patient to go to room to rest.  Patient calm and cooperative. 1738  Patient remained calm and cooperative throughout shift, continued to pace the rossi during the entire shift. Compliant with evening medications. No issues or concerns.

## 2020-01-28 NOTE — BH NOTES
Behavioral Health Interdisciplinary Rounds     Patient Name: Pablo Morales  Age: 53 y.o. Room/Bed:  308/01  Primary Diagnosis: Bipolar 1 disorder ([facility] [address]   Admission Status: Voluntary     Readmission within 30 days: no  Power of  in place: no  Patient requires a blocked bed: yes            Reason for blocked bed: not redirectable, intrusive  Order for blocked bed obtained: yes       Sleep hours: 1  Participation in Care/Groups:  no  Medication Compliant?: Yes  PRNS (last 24 hours): Antipsychotic (IM), Antianxiety and Sleep Aid    Restraints (last 24 hours):  no  Substance Abuse:  no    24 hour chart check complete: yes      Patient goal(s) for today: Continue taking medications as prescribed; engage in unit activities; follow staff direction  Treatment team focus/goals: Increase Zyprexa; D/C Seroquel  Progress note: Patient continues to present as slowed down from admission, but still bizarre and tangential.  She is intrusive at times and appears disoriented and confused at times. Convinced she needs to solve \"riddles\" in order to be discharged.     LOS:  8  Expected LOS: TBD    Financial concerns/prescription coverage: [employer] Medicare  Family contact: 1/21 SW spoke to   Family requesting physician contact today: No  Discharge plan: Return home  Access to weapons: No  Outpatient provider(s): [address] [place], [place] [address]puma Brand NP, and Lewis Pride)  Patient's preferred phone number for follow up call: [phone]    Participating treatment team members: Nava Marie MSW; Dr. Jose Barnett MD; Brigette Alaniz, AngelaD

## 2020-01-28 NOTE — PROGRESS NOTES
Problem: Psychosis  Goal: *STG: Decreased hallucinations  Outcome: Progressing Towards Goal     Problem: Psychosis  Goal: *STG: Decreased delusional thinking  Outcome: Progressing Towards Goal     Problem: Psychosis  Goal: *STG: Remains safe in hospital  Outcome: Progressing Towards Goal     Problem: Psychosis  Goal: *STG: Seeks staff when feelings of self harm or harm towards others arise  Outcome: Progressing Towards Goal

## 2020-01-28 NOTE — GROUP NOTE
DANIEL  GROUP DOCUMENTATION INDIVIDUAL Group Therapy Note Date: 1/28/2020 Group Start Time: 1100 Group End Time: 1200 Group Topic: T[facility] Group [facility], [address] GROUP DOCUMENTATION GROUP Group Therapy Note Attendees: 5 Attendance: Attended Patient's Goal:  To participate in mental health journey game Interventions/techniques: Supported-choices in recovery Follows Directions: Followed directions Interactions: Disorganized interaction Mental Status: Anxious and Flat Behavior/appearance: Needed prompting Goals Achieved: Able to engage in interactions Additional Notes: Thought blocking-slow to respond -short attention span,left  early Yasmany Bryson

## 2020-01-28 NOTE — BH NOTES
GROUP THERAPY PROGRESS NOTE    Patient did not participate in Substance abuse group.      Padmaja Vegas LPC LSATP CSAC

## 2020-01-28 NOTE — GROUP NOTE
DANIEL  GROUP DOCUMENTATION INDIVIDUAL Group Therapy Note Date: 1/28/2020 Group Start Time: 1500 Group End Time: 1613 Group Topic: Recreational/Music Therapy [facility] 3 ACUTE BEHAV [facility], [address] [facility] GROUP DOCUMENTATION GROUP Group Therapy Note Attendees: 4 Attendance: Did not attend Patient's Goal: Interventions/techniquesEleni Sanders

## 2020-01-29 LAB
ANION GAP SERPL CALC-SCNC: 9 MMOL/L (ref 5–15)
BUN SERPL-MCNC: 9 MG/DL (ref 6–20)
BUN/CREAT SERPL: 11 (ref 12–20)
CALCIUM SERPL-MCNC: 9.1 MG/DL (ref 8.5–10.1)
CARBAMAZEPINE SERPL-MCNC: 12 UG/ML (ref 4–12)
CHLORIDE SERPL-SCNC: 97 MMOL/L (ref 97–108)
CO2 SERPL-SCNC: 27 MMOL/L (ref 21–32)
CREAT SERPL-MCNC: 0.81 MG/DL (ref 0.55–1.02)
GLUCOSE SERPL-MCNC: 88 MG/DL (ref 65–100)
POTASSIUM SERPL-SCNC: 4.2 MMOL/L (ref 3.5–5.1)
SODIUM SERPL-SCNC: 133 MMOL/L (ref 136–145)

## 2020-01-29 PROCEDURE — 74011250637 HC RX REV CODE- 250/637: Performed by: PSYCHIATRY & NEUROLOGY

## 2020-01-29 PROCEDURE — 80156 ASSAY CARBAMAZEPINE TOTAL: CPT

## 2020-01-29 PROCEDURE — 74011250637 HC RX REV CODE- 250/637: Performed by: NURSE PRACTITIONER

## 2020-01-29 PROCEDURE — 36415 COLL VENOUS BLD VENIPUNCTURE: CPT

## 2020-01-29 PROCEDURE — 65220000001 HC RM PRIVATE PSYCH

## 2020-01-29 PROCEDURE — 80048 BASIC METABOLIC PNL TOTAL CA: CPT

## 2020-01-29 RX ADMIN — CARBAMAZEPINE 300 MG: 200 TABLET ORAL at 17:07

## 2020-01-29 RX ADMIN — TRAZODONE HYDROCHLORIDE 50 MG: 50 TABLET ORAL at 22:48

## 2020-01-29 RX ADMIN — METHOCARBAMOL TABLETS 500 MG: 500 TABLET, COATED ORAL at 21:09

## 2020-01-29 RX ADMIN — ACETAMINOPHEN 650 MG: 325 TABLET ORAL at 21:09

## 2020-01-29 RX ADMIN — CARBAMAZEPINE 200 MG: 200 TABLET ORAL at 08:44

## 2020-01-29 RX ADMIN — OLANZAPINE 15 MG: 5 TABLET, FILM COATED ORAL at 21:09

## 2020-01-29 NOTE — BH NOTES
1930  Assumed care of patient from Jjjunaid Dodge, Leif 274 patient found to be pacing the halls, appearing extremely lethargic. Disorganized but attempting to make sense out of what she is trying to say. No agitation noted. No extreme anxiety noted. Patients blood pressure continues to remain elevated at 150/83. Will pass on to day shift to address with Psychiatrist to possibly have her seen by Hospitalist.  Pressure has been 154/111;155/96;150/83 over the past three days, respectively. Patient denies the presence of suicidal or homicidal ideation. Does deny the presence of auditory or visual hallucinations. Does contract for safety on the unit. Still dressed in the same clothing she was in for the last two days. Did agree to see her  this evening for a visit    2147  Patient took her night time medications with very little prompting and without incident. She also was given Trazodone PRN to aid her in sleep (patient slept one hour last evening). Will continue to monitor per patient plan of care    2300  Patient resting quietly in bed at this time. Does appear to be sleeping. Respirations are even and non labored. Will continue to monitor per patient plan of care    0100  Patient continues to rest quietly at this time    0123  Patient resting quietly    0225  Patient continues to rest quietly in bed. Does appear to be sleeping. Will continue to monitor per patient plan of care    6485   Patient resting quietly in bed at this time. Appears to be sleeping. 0530  Patient continues to rest quietly    0600  Patient has slept very well throughout this evening.   Patient has slept a total of 9 hours having only awakened for one brief ten minute time

## 2020-01-29 NOTE — GROUP NOTE
DANIEL  GROUP DOCUMENTATION INDIVIDUAL Group Therapy Note Date: 1/29/2020 Group Start Time: 1400 Group End Time: 1500 Group Topic: Recreational/Music Therapy [facility] [place] 3 ACUTE BEHAV [facility], [address] GROUP DOCUMENTATION GROUP Group Therapy Note Attendees: 6 Attendance: Attended Patient's Goal:  To concentrate on selected task Interventions/techniques: Supported-crafts,games,music Follows Directions: Followed directions Interactions: Interacted appropriately Mental Status: Flat Behavior/appearance: Cooperative and Needed prompting Goals Achieved: Able to listen to others Additional Notes:  Pt left session early. Sowmya Rubio

## 2020-01-29 NOTE — INTERDISCIPLINARY ROUNDS
Behavioral Health Interdisciplinary Rounds     Patient Name: Betty Lazar  Age: 53 y.o. Room/Bed:  307/01  [facility]: [facility]   Admission Status: Voluntary     Readmission within 30 days: no  Power of  in place: no  Patient requires a blocked bed: yes            Reason for blocked bed: Not redirectable, intrusive  Order for blocked bed obtained: yes       Sleep hours: 9  Participation in Care/Groups:  yes  Medication Compliant?: Yes  PRNS (last 24 hours): Sleep Aid    Restraints (last 24 hours):  no  Substance Abuse:  no    24 hour chart check complete: yes     Patient goal(s) for today: Continue taking medications as prescribed; engage in unit activites  Treatment team focus/goals: Continue medication regimen; schedule follow-up  Progress note: Patient presents as calm and composed. She reports getting good sleep last night and feels more like herself. She presented with normal rate of speech.     LOS:  9  Expected LOS: 10    Financial concerns/prescription coverage: [employer] Medicare  Family contact: 1/29 SW spoke to   Family requesting physician contact today: No  Discharge plan: Return home  Access to weapons: No  Outpatient provider(s): [address] [place], [place] [address]puma Campo NP, and Arielle Villatoro  Patient's preferred phone number for follow up call: [phone]    Participating treatment team members: Natalie Rendon MSW; Dr. Nicole Mendoza MD

## 2020-01-29 NOTE — BH NOTES
Behavioral Health Interdisciplinary Rounds Patient Name: Elisha Gilmore  Age: 53 y.o. Room/Bed:  307/01 [facility]: [facility] Admission Status: Voluntary Readmission within 30 days: no 
Power of  in place: no 
Patient requires a blocked bed: yes Reason for blocked bed: Not redirectable, instrusive*** Order for blocked bed obtained: yes Sleep hours: *** Participation in Care/Groups:  yes Medication Compliant?: Yes PRNS (last 24 hours): Sleep Aid Restraints (last 24 hours):  no 
Substance Abuse:  no   
24 hour chart check complete: yes

## 2020-01-29 NOTE — GROUP NOTE
DANIEL  GROUP DOCUMENTATION INDIVIDUAL Group Therapy Note Date: 1/29/2020 Group Start Time: 1100 Group End Time: 1200 Group Topic: Activity Therapy [facility] 3 ACUTE BEHAV [facility], [facility] GROUP DOCUMENTATION GROUP Group Therapy Note Attendees: 5 Attendance: Attended Patient's Goal:  To participate in planned activity Interventions/techniques: Supported-relaxation Follows Directions: Followed directions Interactions: Disorganized interaction Mental Status: Anxious and Flat Behavior/appearance: Needed prompting Goals Achieved: Able to engage in interactions Additional Notes:  Pt arrived late. Yoni Huang

## 2020-01-29 NOTE — GROUP NOTE
DANIEL  GROUP DOCUMENTATION INDIVIDUAL Group Therapy Note Date: 1/29/2020 Group Start Time: 1000 Group End Time: 1100 Group Topic: Topic Group [facility] 3 ACUTE BEHAV [facility], [address] GROUP DOCUMENTATION GROUP Group Therapy Note Attendees: 5 Attendance: Attended Patient's Goal:  To participate in chair exercise routine Interventions/techniques: Supported-benefits of exercise Follows Directions:  
 
Interactions: Disorganized interaction Mental Status: Anxious and Flat Behavior/appearance: Needed prompting Goals Achieved: Able to engage in interactions Additional Notes:  Preoccupied -left session early Maco Mendoza

## 2020-01-29 NOTE — PROGRESS NOTES
Laboratory Monitoring for Carbamazepine    This patient is currently prescribed the following medication(s):   Current Facility-Administered Medications   Medication Dose Route Frequency    OLANZapine (ZyPREXA) tablet 15 mg  15 mg Oral QHS    carBAMazepine (TEGretol) tablet 200 mg  200 mg Oral DAILY    carBAMazepine (TEGretol) tablet 300 mg  300 mg Oral QPM    ergocalciferol capsule 50,000 Units  50,000 Units Oral every Monday       The following labs have been completed for monitoring of carbamazepine:    Carbamazepine Serum Concentration  Lab Results   Component Value Date/Time    Carbamazepine 12.0 01/29/2020 07:53 AM         Renal Function, Hepatic Function and Chemistry  Lab Results   Component Value Date/Time    Sodium 133 (L) 01/29/2020 07:53 AM    Potassium 4.2 01/29/2020 07:53 AM    Chloride 97 01/29/2020 07:53 AM    CO2 27 01/29/2020 07:53 AM    Anion gap 9 01/29/2020 07:53 AM    BUN 9 01/29/2020 07:53 AM    Creatinine 0.81 01/29/2020 07:53 AM    BUN/Creatinine ratio 11 (L) 01/29/2020 07:53 AM    Bilirubin, total 0.2 01/23/2020 02:58 PM    Protein, total 8.0 01/23/2020 02:58 PM    Albumin 4.1 01/23/2020 02:58 PM    Globulin 3.9 01/23/2020 02:58 PM    A-G Ratio 1.1 01/23/2020 02:58 PM    ALT (SGPT) 29 01/23/2020 02:58 PM    Alk. phosphatase 76 01/23/2020 02:58 PM       Hematology  Lab Results   Component Value Date/Time    WBC 5.9 01/20/2020 10:49 AM    RBC 3.96 01/20/2020 10:49 AM    HGB 12.5 01/20/2020 10:49 AM    HCT 36.7 01/20/2020 10:49 AM    MCV 92.7 01/20/2020 10:49 AM    MCH 31.6 01/20/2020 10:49 AM    MCHC 34.1 01/20/2020 10:49 AM    RDW 11.7 01/20/2020 10:49 AM    PLATELET 661 10/03/6982 10:49 AM       Assessment/Plan:  Carbamazepine level drawn on 1/29 @ 7:53 AM is therapeutic at 12 mcg/mL (range 4-12 mcg/mL). Level was obtained approximately 14.5 hours post-dose and is reflective of steady-state concentration.   Recommend to continue current dose as level will likely decrease due to carbamazepine auto-induction. Please note that sodium level is slightly below range at 133. Carbamazepine has the potential to cause hyponatremia.      Ernestina Traore, PharmD, BCPS  [phone]

## 2020-01-29 NOTE — PROGRESS NOTES
Problem: Psychosis  Goal: *STG: Decreased hallucinations  Outcome: Progressing Towards Goal  Goal: *STG: Decreased delusional thinking  Outcome: Progressing Towards Goal  Goal: *STG: Remains safe in hospital  Outcome: Progressing Towards Goal

## 2020-01-29 NOTE — BH NOTES
0700-Report  Received from [facility] RN.  0800-1000-Patient is alert and orientated to self with some confusion, she denies SI/HI at this time, she is compliant with scheduled medication, appetites its good, labs were drawn this shift and sent to the lab, patient tolerated with minimal discomfort  100-1200-Patient in no acute distress, ambulating in the halls , she went to group for a short time then back to  The hallway and her room, no safety issue noted  [id number] Patient resting quietly in her room. No complaints at this time. [id number] Patient briefly spoke to staff recalling a past pleasant story in her life. Patient resting quietly in room reading.   [id number]- Patent has been calm and cooperative throughout the shift, she has been compliant with all scheduled medications, she has interacted in an appropriate manner with staff and peers, she had no behavior or safety issues this shift, appetite is good   Staff will continue to monitor

## 2020-01-30 VITALS
DIASTOLIC BLOOD PRESSURE: 88 MMHG | TEMPERATURE: 98.3 F | SYSTOLIC BLOOD PRESSURE: 116 MMHG | RESPIRATION RATE: 18 BRPM | OXYGEN SATURATION: 100 % | HEIGHT: 60 IN | HEART RATE: 80 BPM | BODY MASS INDEX: 29.88 KG/M2 | WEIGHT: 152.2 LBS

## 2020-01-30 PROCEDURE — 74011250637 HC RX REV CODE- 250/637: Performed by: NURSE PRACTITIONER

## 2020-01-30 PROCEDURE — 74011250637 HC RX REV CODE- 250/637: Performed by: PSYCHIATRY & NEUROLOGY

## 2020-01-30 RX ORDER — OLANZAPINE 15 MG/1
15 TABLET ORAL
Qty: 90 TAB | Refills: 0 | Status: SHIPPED | OUTPATIENT
Start: 2020-01-30 | End: 2020-02-11

## 2020-01-30 RX ORDER — HYDROXYZINE 50 MG/1
50 TABLET, FILM COATED ORAL
Qty: 90 TAB | Refills: 0 | Status: ON HOLD | OUTPATIENT
Start: 2020-01-30 | End: 2020-02-11

## 2020-01-30 RX ORDER — TRIAMCINOLONE ACETONIDE 1 MG/G
OINTMENT TOPICAL
Qty: 30 G | Refills: 0 | Status: SHIPPED | OUTPATIENT
Start: 2020-01-30 | End: 2020-02-02

## 2020-01-30 RX ADMIN — CARBAMAZEPINE 300 MG: 200 TABLET ORAL at 18:06

## 2020-01-30 RX ADMIN — MAGNESIUM HYDROXIDE 30 ML: 400 SUSPENSION ORAL at 08:59

## 2020-01-30 RX ADMIN — CARBAMAZEPINE 200 MG: 200 TABLET ORAL at 08:54

## 2020-01-30 RX ADMIN — IBUPROFEN 400 MG: 400 TABLET ORAL at 08:59

## 2020-01-30 NOTE — PROGRESS NOTES
Problem: Psychosis  Goal: *STG: Decreased hallucinations  Outcome: Progressing Towards Goal  Goal: *STG: Decreased delusional thinking  Outcome: Progressing Towards Goal  Goal: *STG: Remains safe in hospital  Outcome: Progressing Towards Goal  Goal: *STG: Seeks staff when feelings of self harm or harm towards others arise  Outcome: Progressing Towards Goal  Goal: *STG: Patient will verbalize areas in need of boundary recognition and limit setting  Outcome: Progressing Towards Goal  Goal: *STG: Patient will develop strategies to regulate emotions and corresponding behaviors  Outcome: Progressing Towards Goal  Goal: *STG: Accept constructive criticism without injury or isolation  Outcome: Progressing Towards Goal  Goal: *STG: Participates in individual and group therapy  Outcome: Progressing Towards Goal  Goal: *STG/LTG: Complies with medication therapy  Outcome: Progressing Towards Goal  Goal: *STG/LTG: Demonstrates improved thought patterns as evidenced by logical and coherent speech  Outcome: Progressing Towards Goal  Goal: *STG/LTG: Demonstrates improved social functioning by responding appropriately to staff  Outcome: Progressing Towards Goal

## 2020-01-30 NOTE — GROUP NOTE
DANIEL  GROUP DOCUMENTATION INDIVIDUAL Group Therapy Note Date: 1/30/2020 Group Start Time: 1400 Group End Time: 1500 Group Topic: Recreational/Music Therapy [facility] [place] [place] - [employer] [address] ACUTE [address] [facility], [address] GROUP DOCUMENTATION GROUP Group Therapy Note Attendees: 6 Attendance: Attended Patient's Goal:  To concentrate on selected task Interventions/techniques: Supported-crafts,games,music Follows Directions: Followed directions Interactions: Interacted appropriately Mental Status: Calm Behavior/appearance: Attentive and Cooperative Goals Achieved: Able to engage in interactions and Able to listen to others-completed selected task Additional Notes:   
 
Rhonda Oscar

## 2020-01-30 NOTE — BH NOTES
GROUP THERAPY PROGRESS NOTE    Patient is participating in Coping Skills group. Group time: 45 minutes    Personal goal for participation: To discussion stressful situations and ways to cope. Goal orientation: Personal    Group therapy participation: active    Therapeutic interventions reviewed and discussed: Group discussion was focused issues that arise during the day and in normal life that can cause anxiety but also things that can increase anxiety to the point that it is a problem and ways to reduce stress through daily activities. Impression of participation: Ale Sutherland shared that she is feeling great and is happy to go home. She reports learning a lot while admitted and that it was longer than previous admissions so she is hoping to use her skills to cope without things becoming a problem in the future.     Linette Diego [facility]

## 2020-01-30 NOTE — BH NOTES
GROUP THERAPY PROGRESS NOTE    Patient is participating in Substance abuse group. Group time: 45 minutes    Personal goal for participation: To understand addiction and relapse and identify triggers that increase urges and relapse. Goal orientation: Personal    Group therapy participation: active    Therapeutic interventions reviewed and discussed: Group discussion of substance use, abuse, and dependence and the urge cycle. Patients were able to explore their own urges to use various substances including cigarettes, alcohol, heroin, cocaine, and others. Group discussed how they feel when they are unable to use and ways substance use has hindered their lives. Triggers for use and coping skills to avoid use or manage symptoms until craving subsides were discussed. Those without addiction issues used the urges handout to write down and draw pictures of things that make them anxious, angry, or sad. Impression of participation: Amanda Bill shared that she has a phobia of bridges and a concern about fear of dying. She reports she tends to feel more negative around 3pm. She reports she will get a snack or sleep and then feel better.     Melanie Ball LPC LSATP CSAC

## 2020-01-30 NOTE — DISCHARGE INSTRUCTIONS
DISCHARGE SUMMARY    Rashmi Krishnan  : 1965  MRN: [id number]    The patient Nicolasa Kirkpatrick exhibits the ability to control behavior in a less restrictive environment. Patient's level of functioning is improving. No assaultive/destructive behavior has been observed for the past 24 hours. No suicidal/homicidal threat or behavior has been observed for the past 24 hours. There is no evidence of serious medication side effects. Patient has not been in physical or protective restraints for at least the past 24 hours. If weapons involved, how are they secured? No weapons involved. Is patient aware of and in agreement with discharge plan? Yes    Arrangements for medication:  Prescriptions sent to [facility]. Copy of discharge instructions to provider?:  [address] for transportation home:   to   . Barrie Farooq If I feel I am at risk of hurting myself or others, I will call the crisis office and speak with a crisis worker who will assist me during my crisis. Barrie Farooq .[facility]  [phone]  [facility] [phone]  [facility]  [phone]  [facility]  [phone]    Keep all follow up appointments as scheduled, continue to take prescribed medications per physician instructions. Mental health crisis number:  [phone] or your local mental health crisis line number at [phone].

## 2020-01-30 NOTE — BH NOTES
Patients nurse called & reported that the patients ride was not going to be able to pick her up today. Patient was provided a Lyft home. @18:03. .. Lyft was cancelled patients sister in law is here to  the patient & take her home. The sister in law reports that the patients  was on his way when his truck broke down. He then called her to come get her while he waits on [employer].
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
## 2020-01-30 NOTE — BH NOTES
Pt is pleasant and cooperative with staff and peers. Pt is med compliant and denies all psych symptoms. Pt given PRN MOM for constipation and Ibuprofen for pain in her feet. No acute distress. Continue to monitor and provide support when needed.

## 2020-01-30 NOTE — GROUP NOTE
DANIEL  GROUP DOCUMENTATION INDIVIDUAL Group Therapy Note Date: 1/30/2020 Group Start Time: 1000 Group End Time: 1100 Group Topic: Topic Group [address] ACUTE BE[address] [facility], [address] GROUP DOCUMENTATION GROUP Group Therapy Note Attendees: 7 Attendance: Attended Patient's Goal:  To learn about resilience Interventions/techniques: Supported-handout Follows Directions: Followed directions Interactions: Interacted appropriately Mental Status: Calm Behavior/appearance: Attentive, Cooperative and Needed prompting Goals Achieved: Able to engage in interactions, Able to listen to others, Able to give feedback to another, Able to self-disclose and Discussed coping Additional Notes:   
 
Meryl Severance
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
## 2020-01-30 NOTE — BH NOTES
Patient in room calm and cooperative denies SI/HI/AVH at this time.   2109 PRN PO robaxin 500 mg and 650 mg tylenol for 10/10 ankle pain  Slept 7 hours

## 2020-02-02 ENCOUNTER — HOSPITAL ENCOUNTER (INPATIENT)
Age: 55
LOS: 1 days | Discharge: ANOTHER HOSPITAL | DRG: 885 | End: 2020-02-02
Attending: EMERGENCY MEDICINE | Admitting: PSYCHIATRY & NEUROLOGY
Payer: MEDICARE

## 2020-02-02 ENCOUNTER — HOSPITAL ENCOUNTER (INPATIENT)
Age: 55
LOS: 9 days | Discharge: HOME | DRG: 885 | End: 2020-02-11
Attending: PSYCHIATRY & NEUROLOGY | Admitting: PSYCHIATRY & NEUROLOGY
Payer: MEDICARE

## 2020-02-02 VITALS
OXYGEN SATURATION: 100 % | WEIGHT: 159.5 LBS | DIASTOLIC BLOOD PRESSURE: 73 MMHG | BODY MASS INDEX: 31.31 KG/M2 | RESPIRATION RATE: 16 BRPM | HEIGHT: 60 IN | TEMPERATURE: 98.1 F | HEART RATE: 83 BPM | SYSTOLIC BLOOD PRESSURE: 136 MMHG

## 2020-02-02 DIAGNOSIS — F25.0 SCHIZOAFFECTIVE DISORDER, BIPOLAR TYPE (HCC): Primary | ICD-10-CM

## 2020-02-02 PROBLEM — F31.9 BIPOLAR DISORDER (HCC): Status: ACTIVE | Noted: 2020-02-02

## 2020-02-02 LAB
ALBUMIN SERPL-MCNC: 4.6 G/DL (ref 3.5–5)
ALBUMIN/GLOB SERPL: 1.1 {RATIO} (ref 1.1–2.2)
ALP SERPL-CCNC: 81 U/L (ref 45–117)
ALT SERPL-CCNC: 31 U/L (ref 12–78)
AMPHET UR QL SCN: NEGATIVE
ANION GAP SERPL CALC-SCNC: 11 MMOL/L (ref 5–15)
APPEARANCE UR: CLEAR
AST SERPL-CCNC: 20 U/L (ref 15–37)
BACTERIA URNS QL MICRO: NEGATIVE /HPF
BARBITURATES UR QL SCN: NEGATIVE
BASOPHILS # BLD: 0 K/UL (ref 0–0.1)
BASOPHILS NFR BLD: 0 % (ref 0–1)
BENZODIAZ UR QL: POSITIVE
BILIRUB SERPL-MCNC: 0.4 MG/DL (ref 0.2–1)
BILIRUB UR QL: NEGATIVE
BUN SERPL-MCNC: 5 MG/DL (ref 6–20)
BUN/CREAT SERPL: 7 (ref 12–20)
CALCIUM SERPL-MCNC: 9.9 MG/DL (ref 8.5–10.1)
CANNABINOIDS UR QL SCN: NEGATIVE
CARBAMAZEPINE SERPL-MCNC: 10.9 UG/ML (ref 4–12)
CHLORIDE SERPL-SCNC: 104 MMOL/L (ref 97–108)
CO2 SERPL-SCNC: 26 MMOL/L (ref 21–32)
COCAINE UR QL SCN: NEGATIVE
COLOR UR: ABNORMAL
CREAT SERPL-MCNC: 0.71 MG/DL (ref 0.55–1.02)
DIFFERENTIAL METHOD BLD: NORMAL
DRUG SCRN COMMENT,DRGCM: ABNORMAL
EOSINOPHIL # BLD: 0 K/UL (ref 0–0.4)
EOSINOPHIL NFR BLD: 0 % (ref 0–7)
EPITH CASTS URNS QL MICRO: NORMAL /LPF
ERYTHROCYTE [DISTWIDTH] IN BLOOD BY AUTOMATED COUNT: 12.1 % (ref 11.5–14.5)
ETHANOL SERPL-MCNC: <10 MG/DL
GLOBULIN SER CALC-MCNC: 4.3 G/DL (ref 2–4)
GLUCOSE SERPL-MCNC: 108 MG/DL (ref 65–100)
GLUCOSE UR STRIP.AUTO-MCNC: NEGATIVE MG/DL
HCT VFR BLD AUTO: 39.8 % (ref 35–47)
HGB BLD-MCNC: 12.9 G/DL (ref 11.5–16)
HGB UR QL STRIP: NEGATIVE
IMM GRANULOCYTES # BLD AUTO: 0 K/UL (ref 0–0.04)
IMM GRANULOCYTES NFR BLD AUTO: 0 % (ref 0–0.5)
KETONES UR QL STRIP.AUTO: ABNORMAL MG/DL
LEUKOCYTE ESTERASE UR QL STRIP.AUTO: ABNORMAL
LYMPHOCYTES # BLD: 1.6 K/UL (ref 0.8–3.5)
LYMPHOCYTES NFR BLD: 22 % (ref 12–49)
MCH RBC QN AUTO: 31.5 PG (ref 26–34)
MCHC RBC AUTO-ENTMCNC: 32.4 G/DL (ref 30–36.5)
MCV RBC AUTO: 97.3 FL (ref 80–99)
METHADONE UR QL: NEGATIVE
MONOCYTES # BLD: 0.5 K/UL (ref 0–1)
MONOCYTES NFR BLD: 7 % (ref 5–13)
NEUTS SEG # BLD: 5 K/UL (ref 1.8–8)
NEUTS SEG NFR BLD: 71 % (ref 32–75)
NITRITE UR QL STRIP.AUTO: NEGATIVE
NRBC # BLD: 0 K/UL (ref 0–0.01)
NRBC BLD-RTO: 0 PER 100 WBC
OPIATES UR QL: NEGATIVE
PCP UR QL: NEGATIVE
PH UR STRIP: 5.5 [PH] (ref 5–8)
PLATELET # BLD AUTO: 291 K/UL (ref 150–400)
PMV BLD AUTO: 9.6 FL (ref 8.9–12.9)
POTASSIUM SERPL-SCNC: 3.5 MMOL/L (ref 3.5–5.1)
PROT SERPL-MCNC: 8.9 G/DL (ref 6.4–8.2)
PROT UR STRIP-MCNC: ABNORMAL MG/DL
RBC # BLD AUTO: 4.09 M/UL (ref 3.8–5.2)
RBC #/AREA URNS HPF: NORMAL /HPF (ref 0–5)
SODIUM SERPL-SCNC: 141 MMOL/L (ref 136–145)
SP GR UR REFRACTOMETRY: 1.02 (ref 1–1.03)
UROBILINOGEN UR QL STRIP.AUTO: 0.2 EU/DL (ref 0.2–1)
WBC # BLD AUTO: 7.3 K/UL (ref 3.6–11)
WBC URNS QL MICRO: NORMAL /HPF (ref 0–4)

## 2020-02-02 PROCEDURE — 74011250637 HC RX REV CODE- 250/637: Performed by: NURSE PRACTITIONER

## 2020-02-02 PROCEDURE — 80156 ASSAY CARBAMAZEPINE TOTAL: CPT

## 2020-02-02 PROCEDURE — 85025 COMPLETE CBC W/AUTO DIFF WBC: CPT

## 2020-02-02 PROCEDURE — 81001 URINALYSIS AUTO W/SCOPE: CPT

## 2020-02-02 PROCEDURE — 74011250636 HC RX REV CODE- 250/636: Performed by: NURSE PRACTITIONER

## 2020-02-02 PROCEDURE — 36415 COLL VENOUS BLD VENIPUNCTURE: CPT

## 2020-02-02 PROCEDURE — 65220000003 HC RM SEMIPRIVATE PSYCH

## 2020-02-02 PROCEDURE — 80307 DRUG TEST PRSMV CHEM ANLYZR: CPT

## 2020-02-02 PROCEDURE — 80053 COMPREHEN METABOLIC PANEL: CPT

## 2020-02-02 PROCEDURE — 65270000029 HC RM PRIVATE

## 2020-02-02 RX ORDER — LORAZEPAM 2 MG/ML
2 INJECTION INTRAMUSCULAR
Status: DISCONTINUED | OUTPATIENT
Start: 2020-02-02 | End: 2020-02-11 | Stop reason: HOSPADM

## 2020-02-02 RX ORDER — ADHESIVE BANDAGE
30 BANDAGE TOPICAL DAILY PRN
Status: DISCONTINUED | OUTPATIENT
Start: 2020-02-02 | End: 2020-02-11 | Stop reason: HOSPADM

## 2020-02-02 RX ORDER — OLANZAPINE 5 MG/1
5 TABLET ORAL
Status: DISCONTINUED | OUTPATIENT
Start: 2020-02-02 | End: 2020-02-11 | Stop reason: HOSPADM

## 2020-02-02 RX ORDER — TRAZODONE HYDROCHLORIDE 50 MG/1
50 TABLET ORAL
Status: DISCONTINUED | OUTPATIENT
Start: 2020-02-02 | End: 2020-02-05

## 2020-02-02 RX ORDER — QUETIAPINE 200 MG/1
400 TABLET, FILM COATED, EXTENDED RELEASE ORAL
COMMUNITY
End: 2020-02-02

## 2020-02-02 RX ORDER — TEMAZEPAM 30 MG/1
30 CAPSULE ORAL
COMMUNITY
End: 2020-02-02

## 2020-02-02 RX ORDER — HYDROXYZINE 50 MG/1
50 TABLET, FILM COATED ORAL
Status: DISCONTINUED | OUTPATIENT
Start: 2020-02-02 | End: 2020-02-11 | Stop reason: HOSPADM

## 2020-02-02 RX ORDER — PROMETHAZINE HYDROCHLORIDE 12.5 MG/1
12.5 TABLET ORAL
COMMUNITY
End: 2020-02-02

## 2020-02-02 RX ORDER — ACETAMINOPHEN 325 MG/1
650 TABLET ORAL
Status: DISCONTINUED | OUTPATIENT
Start: 2020-02-02 | End: 2020-02-11 | Stop reason: HOSPADM

## 2020-02-02 RX ORDER — HALOPERIDOL 5 MG/ML
5 INJECTION INTRAMUSCULAR
Status: DISCONTINUED | OUTPATIENT
Start: 2020-02-02 | End: 2020-02-11 | Stop reason: HOSPADM

## 2020-02-02 RX ORDER — BENZTROPINE MESYLATE 1 MG/1
1 TABLET ORAL
Status: DISCONTINUED | OUTPATIENT
Start: 2020-02-02 | End: 2020-02-08

## 2020-02-02 RX ORDER — DIPHENHYDRAMINE HYDROCHLORIDE 50 MG/ML
50 INJECTION, SOLUTION INTRAMUSCULAR; INTRAVENOUS
Status: DISCONTINUED | OUTPATIENT
Start: 2020-02-02 | End: 2020-02-11 | Stop reason: HOSPADM

## 2020-02-02 RX ADMIN — DIPHENHYDRAMINE HYDROCHLORIDE 50 MG: 50 INJECTION, SOLUTION INTRAMUSCULAR; INTRAVENOUS at 16:56

## 2020-02-02 RX ADMIN — LORAZEPAM 2 MG: 2 INJECTION INTRAMUSCULAR; INTRAVENOUS at 16:55

## 2020-02-02 RX ADMIN — HYDROXYZINE HYDROCHLORIDE 50 MG: 50 TABLET, FILM COATED ORAL at 18:36

## 2020-02-02 RX ADMIN — HALOPERIDOL LACTATE 5 MG: 5 INJECTION INTRAMUSCULAR at 16:55

## 2020-02-02 RX ADMIN — OLANZAPINE 5 MG: 5 TABLET, FILM COATED ORAL at 18:36

## 2020-02-02 NOTE — ED NOTES
Patient arrives accompanied by  and sister in law for mental health evaluation. Family states she was discharged from the third floor on the 30th but is still psychotic.  states she has been compliant with medications. She exhibits flight of ideas, disorganized thoughts, and pressured speech. She denies SI/HI. She relays she has a hx of hearing voices but denies hearing voices today.

## 2020-02-02 NOTE — BH NOTES
Patient admitted voluntarily to Acute  Psychiatry, under the services of . Patient currently denies suicidal ideation. Patient currently denies homicidal ideation.   Patient admits with grossly psychotic symptoms, and severe agitation and is unable to participate in much of her assessment

## 2020-02-02 NOTE — BSMART NOTE
Comprehensive Assessment Form Part 1      Section I - Disposition    Axis I - Bipolar with psychotic features  Axis II - Deferred  Axis III -    Past Medical History:   Diagnosis Date    Arthritis     joints    Bipolar 1 disorder with moderate robyn ([address] 3/17/2014    Chronic pain     back with stress    Contact dermatitis and other eczema, due to unspecified cause     Depression     Headache(784.0)     Hyperlipidemia 8/22/2016    Other ill-defined conditions(799.89)     sinus infection,hay fever    Other ill-defined conditions(799.89)     scoliosis    Psychiatric disorder     bipolar    Psychotic disorder ([address]     Stool color black     Tennis elbow syndrome 5/4/2015    Trauma        Axis IV - Did not sleep Friday night  [place] V - 40      The Medical Doctor to Psychiatrist conference was not completed. The Medical Doctor is in agreement with Psychiatrist disposition because of (reason) seeking behavioral health admission. The plan is behavioral health admission, [address]. The on-call Psychiatrist consulted was Dr. Vivien Lorenzo.  The admitting Psychiatrist will be Dr. Vivien Lorenzo.  The admitting Diagnosis is Bipolar  . The Payor source is Medicare. Section II - Integrated Summary  Summary:  Pt oriented person, place, and time. Pt presented to ED with spouse and her sister in law with disorganized thoughts, pressured speech, and racing thoughts. Pt keeps saying \"stop\" to this writer and  said she is trying to get the voices or thoughts to stop. Pt spouse states she has been like this since being discharged and states pt did not sleep Friday night but then did sleep Saturday from about 5pm- 3am this morning. Pt states 'yes I want to be admitted'. Pt keeps referred to this writer as strawberry and blue (eyes) and keeps reading BSMART on badge. Pt out of bed and wondering around the room and writing on the board. Pt just released from [address] [place] [address] after 10 day stay([id number], 2020). Pt has been hospitalized several times at [facility], [facility], [address], and [facility]. Pt and spouse have been  for 28 years and stated all this started about 15 years ago. Sister-in-law asking which place is best, seeming frustrated with getting patient stabilized. The patient has demonstrated mental capacity to provide informed consent. This writer did call [address] [place] [address] to see if patient has history of TDO and was told no. Pt denies SI and HI. The information is given by the patient and spouse and sister-in-law. The Chief Complaint is Bipolar with psychotic features. The Precipitant Factors are continued disorganized thoughts, pressured speech, and not sleeping. Previous Hospitalizations: [facility], [facility], and [facility]. The patient has not previously been in restraints. Current Psychiatrist and/or  is [address] [place] [address] for services. Her current provider is Callie Huffman and Edwardo Morales NP. Lethality Assessment:    The potential for suicide has not been noted or reported . The potential for homicide is not noted. The patient has not been a perpetrator of sexual or physical abuse. There are not pending charges. The patient is not felt to be at risk for self harm or harm to others. The attending nurse was advised to remove potentially harmful or dangerous items from the patient's room  and that security has not been notified. Section III - Psychosocial  The patient's overall mood and attitude is pleasant but anxious. Feelings of helplessness and hopelessness are not observed. Generalized anxiety is observed by walking and talking. Panic is not observed. Phobias are not observed. Obsessive compulsive tendencies are not observed. Section IV - Mental Status Exam  The patient's appearance shows no evidence of impairment. The patient's behavior is manic  and is restless. The patient is oriented to time, place, and person.   The patient's speech shows no evidence of impairment. The patient's mood is anxious. The range of affect is flat. The patient's thought content demonstrates no evidence of impairment. The thought process shows no evidence of impairment. The patient's perception shows no evidence of impairment. The patient's memory shows no evidence of impairment. The patient's appetite shows no evidence of impairment. The patient's sleep shows no evidence of impairment. The patient's insight shows no evidence of impairment. The patient's judgement is psychologically impaired. Section V - Substance Abuse  The patient is not using substances. The patient has experienced the following withdrawal symptoms: N/A. Section VI - Living Arrangements  The patient is . The spouses approximate age is 54 and appears to be in [facility] The patient lives with a spouse. The patient has no children. The patient does not plan to return home upon discharge. The patient does not have legal issues pending. The patient's source of income comes from social security. Episcopal and cultural practices have not been voiced at this time. The patient's greatest support comes from spouse and this person will not be involved with the treatment. The patient has not been in an event described as horrible or outside the realm of ordinary life experience either currently or in the past.  The patient has not been a victim of sexual/physical abuse. Section VII - Other Areas of Clinical Concern  The highest grade achieved is high school plus dental assitant with the overall quality of school experience being described as good. The patient is currently disabled and speaks [place] as a primary language. The patient has no communication impairments affecting communication. The patient's preference for learning can be described as: can read and write adequately.   The patient's hearing is normal.  The patient's vision is impaired and wears glasses or contacts.       Roger Jackson LMSW  Supervisee in Social Work

## 2020-02-02 NOTE — CONSULTS
Hospitalist History and Physical  Raven Bunch MD  Answering service: [phone] OR 36 from in house phone        Date of Service:  2020  NAME:  Ursula Peacock  :  1965  MRN:  [id number]  Primary Care Provider: Cheryl Hitchcock MD    Chief Complaint: Medical management    History of Present Illness:     Ursula Peacock is a 53 y.o. female with extensive psychiatric history of bipolar 1 disorder, depression and anxiety and psychosis who presents with uncontrolled symptoms of psychosis. Patient is acutely agitated, unable to participate in history taking process. No family available on the unit. Data mostly obtained from the nursing staff, extensive chart review. Patient is a 50-year-old female who was recently discharged from [facility] on 2020 after 10-day long stay for acute psychosis. At discharge, patient was still having symptoms and has not been able to sleep at home, and has been progressively anxious and agitated and has been talking nonstop since the hospital discharge. Her  was very concerned and brought her to the ER at [facility] from where patient was noted to have flight of ideas, visual hallucinations but no suicidal or homicidal ideation. The blood work at the [facility] was grossly unremarkable. Patient was then transferred from [facility] to [facility] acute psych unit. I examined the patient by the bedside, patient is awake, alert, aggressive and does not want to participate in history taking process and is uncooperative. As per nursing staff, she is agitated and has just medicated for her psychiatric issues but otherwise she is hemodynamically stable, afebrile, has no ongoing medical concerns. Patient is unable to participate in any further history taking process. Chart reviewed at length. Review of Systems:  Pertinent positives noted in HPI.   Unable to complete ROS due to patient's acute psychosis. Past Medical and surgical history:   Past Medical History:   Diagnosis Date    Arthritis     joints    Bipolar 1 disorder with moderate robyn ([facility] [address] 3/17/2014    Chronic pain     back with stress    Contact dermatitis and other eczema, due to unspecified cause     Depression     Headache(784.0)     Hyperlipidemia 8/22/2016    Other ill-defined conditions(799.89)     sinus infection,hay fever    Other ill-defined conditions(799.89)     scoliosis    Psychiatric disorder     bipolar    Psychotic disorder (HCC)     Stool color black     Tennis elbow syndrome 5/4/2015    Trauma       Past Surgical History:   Procedure Laterality Date    HX HEENT      wisdom teeth extraction    HX LIPOMA RESECTION      right gluteal    FREDY BIOPSY BREAST STEREOTACTIC Left 2011    negative    HI DENTAL SURGERY PROCEDURE      hx of dental surgery- wisdom teeth extracted       Home medications:  Prior to Admission medications    Medication Sig Start Date End Date Taking? Authorizing Provider   hydroxyzine HCL (ATARAX) 50 mg tablet Take 1 Tab by mouth three (3) times daily as needed for Anxiety for up to 10 days. Indications: anxious 1/30/20 2/9/20  Florentin Adhikari MD   OLANZapine (ZYPREXA) 15 mg tablet Take 1 Tab by mouth nightly. Indications: robyn associated with bipolar disorder 1/30/20   Florentin Adhikari MD   carBAMazepine (TEGRETOL) 200 mg tablet Take 200 mg by mouth daily. Indications: robyn associated with bipolar disorder    Provider, Historical   OTHER,NON-FORMULARY, Allergy shots every month - does not recall date of last shot    Provider, Historical   cholecalciferol (VITAMIN D3) (50,000 UNITS /1250 MCG) capsule Take 50,000 Units by mouth every Monday. Other, MD Caroline   ibuprofen (MOTRIN) 800 mg tablet Take 800 mg by mouth every twelve (12) hours as needed for Pain. Provider, Historical   methocarbamol (ROBAXIN) 500 mg tablet Take 1 Tab by mouth daily as needed for Pain. 9/30/19   Stacya Nicolette Chandler MD   montelukast (SINGULAIR) 10 mg tablet Take 10 mg by mouth daily as needed. Prior to allergy shots    Other, MD Caroline   azelastine (ASTELIN) 137 mcg (0.1 %) nasal spray USE 1 SPRAY IN EACH NOSTRIL TWICE A DAY AS DIRECTED 10/25/17   Yoon Bella MD   fexofenadine (ALLEGRA) 180 mg tablet Take 180 mg by mouth daily. Provider, Historical       Allergies: Allergies   Allergen Reactions    Other Food Hives     Artifical sweetners    Claritin-D 12 Hour [Loratadine-Pseudoephedrine] Palpitations     palpatations and ringing in the ear    Other Medication Anaphylaxis     Artificial sweeteners cause anaphylaxis    Pcn [Penicillins] Anaphylaxis    Sunscreen Contact Dermatitis     Burns and opens the skin    Ultram [Tramadol] Hives and Other (comments)     Hives and ringing of the ears    Benzoyl Peroxide Hives    Cephalexin Itching    Divalproex Itching    Maltodextrin-Glycerin Shortness of Breath       Family history:   Family History   Problem Relation Age of Onset   Luke Arthritis-rheumatoid Mother     Migraines Mother     Psychiatric Disorder Mother     Bipolar Disorder Mother     Lupus Mother     Alcohol abuse Father     Lung Disease Father     Heart Disease Father     Stroke Father     High Cholesterol Father     Psychiatric Disorder Sister     Alcohol abuse Sister     Bipolar Disorder Sister     Stroke Brother     Cancer Maternal Aunt     Breast Cancer Maternal Aunt         pt thniks she was under 48    Bipolar Disorder Sister         SOCIAL HISTORY:  Patient resides at Home. Patient ambulates with out device. Smoking history: reports that she has never smoked. She has never used smokeless tobacco.  Drug History:  reports no history of drug use. Alcohol history:  reports current alcohol use.     Objective:       Physical Exam:   Visit Vitals  /77   Pulse 98   Temp 98.6 °F (37 °C)   Resp 16   SpO2 100%     General appearance: alert, uncooperative, no distress, appears stated age  Head: Normocephalic, without obvious abnormality, atraumatic  Psych: Agitated awake  Neuro: Able to ambulate by herself, moves all extremities, no gait abnormality  Patient did not allow any further examination because of her psychosis. Laboratory and other diagnostic Data Review: All diagnostic labs and studies have been reviewed. Xr Chest Port    Result Date: 1/11/2020  INDICATION: Admission hyponatremia. COMPARISON: January 7, 2018 FINDINGS: AP portable imaging of the chest performed at 9:41 AM demonstrates a stable cardiomediastinal silhouette. The lungs are clear bilaterally. No significant osseous abnormalities are seen. IMPRESSION: No evidence of acute cardiopulmonary process. Patient Vitals for the past 12 hrs:   Temp Pulse Resp BP SpO2   02/02/20 1705 98.6 °F (37 °C) 98 16 126/77 100 %       Recent Labs     02/02/20  1112   WBC 7.3   HGB 12.9   HCT 39.8        Recent Labs     02/02/20  1112      K 3.5      CO2 26   BUN 5*   CREA 0.71   *   CA 9.9     Recent Labs     02/02/20  1112   SGOT 20   ALT 31   AP 81   TBILI 0.4   TP 8.9*   ALB 4.6   GLOB 4.3*     No results for input(s): INR, PTP, APTT, INREXT in the last 72 hours. No results for input(s): FE, TIBC, PSAT, FERR in the last 72 hours. Lab Results   Component Value Date/Time    Folate CANCELED 11/12/2015 03:52 PM      No results for input(s): PH, PCO2, PO2 in the last 72 hours. No results for input(s): CPK, CKNDX, TROIQ in the last 72 hours.     No lab exists for component: CPKMB  Lab Results   Component Value Date/Time    Cholesterol, total 219 (H) 12/30/2019 02:09 PM    HDL Cholesterol 84 12/30/2019 02:09 PM    LDL, calculated 124 (H) 12/30/2019 02:09 PM    Triglyceride 55 12/30/2019 02:09 PM     Lab Results   Component Value Date/Time    Glucose (POC) 136 (H) 01/10/2020 04:22 PM    Glucose (POC) 119 (H) 07/17/2018 03:33 AM     Lab Results   Component Value Date/Time    Color DARK YELLOW 02/02/2020 11:12 AM    Appearance CLEAR 02/02/2020 11:12 AM    Specific gravity 1.025 02/02/2020 11:12 AM    pH (UA) 5.5 02/02/2020 11:12 AM    Protein TRACE (A) 02/02/2020 11:12 AM    Glucose NEGATIVE  02/02/2020 11:12 AM    Ketone TRACE (A) 02/02/2020 11:12 AM    Bilirubin NEGATIVE  02/02/2020 11:12 AM    Urobilinogen 0.2 02/02/2020 11:12 AM    Nitrites NEGATIVE  02/02/2020 11:12 AM    Leukocyte Esterase SMALL (A) 02/02/2020 11:12 AM    Epithelial cells FEW 02/02/2020 11:12 AM    Bacteria NEGATIVE  02/02/2020 11:12 AM    WBC 0-4 02/02/2020 11:12 AM    RBC 5-10 02/02/2020 11:12 AM       Assessment:        My assessment of this patient's clinical condition and my plan of care is as follows. Active Problems:    Bipolar disorder ([address] (2/2/2020)        Plan:     -Acute psychosis with bipolar disorder, POA-worsening  Patient was recently discharged from [facility] for similar symptoms on 1/30/2020. As per family, patient has been persistently psychotic, not sleeping well, was aggressive. Further management as per psych. -Mild intermittent asthma-stable, continue Singulair, albuterol as needed.    -Arthritis, stable, use supportive care. -Hyperlipidemia, check FLP, may need statins. Code status: Full Code    Hospitalist signing off, please call if questions.       Signed By: Geovanna Marshall MD     02/02/20  5:27 PM        Patient's emergency contacts:  Extended Emergency Contact Information  Primary Emergency Contact: Cathy Ramos  Address: [address] [facility] [address] [facility] Phone: [phone]  Mobile Phone: [phone]  Relation: Spouse

## 2020-02-02 NOTE — ED NOTES
RASAURABH here to transport patient to [address] [place] [address]. Patient is calm, cooperative, and pleasant.

## 2020-02-02 NOTE — ED PROVIDER NOTES
EMERGENCY DEPARTMENT HISTORY AND PHYSICAL EXAM      Date: 2/2/2020  Patient Name: Nicolasa Kirkpatrick    History of Presenting Illness     Chief Complaint   Patient presents with   [address] Problem     pt brought by her  for mental health eveluation,she seen here with same x 4 days ago. History Provided By: Patient    HPI: Nicolasa Kirkpatrick, 53 y.o. female with PMHx significant for bipolar disorder, presents by POV to the ED with cc of has been complains that the patient needs a mental health evaluation due to bipolar disorder. Her  states she was admitted last week and discharged approximately 3 to 4 days ago however states that she has been the same if not worse even though her medications have been adjusted. Her  states she has not been asleep in the past 72 hours and has not stop talking since hospital discharge. Her  would like for her to continue with inpatient therapy at this time. Patient denies suicidal or homicidal ideation, however she does appear to have flighty ideas, and visual hallucinations. There are no other complaints, changes, or physical findings at this time. PCP: Paige Adhikari MD    No current facility-administered medications on file prior to encounter. Current Outpatient Medications on File Prior to Encounter   Medication Sig Dispense Refill    hydroxyzine HCL (ATARAX) 50 mg tablet Take 1 Tab by mouth three (3) times daily as needed for Anxiety for up to 10 days. Indications: anxious 90 Tab 0    OLANZapine (ZYPREXA) 15 mg tablet Take 1 Tab by mouth nightly. Indications: robyn associated with bipolar disorder 90 Tab 0    carBAMazepine (TEGRETOL) 200 mg tablet Take 200 mg by mouth daily. Indications: robyn associated with bipolar disorder      OTHER,NON-FORMULARY, Allergy shots every month - does not recall date of last shot      cholecalciferol (VITAMIN D3) (50,000 UNITS /1250 MCG) capsule Take 50,000 Units by mouth every Monday.       ibuprofen (MOTRIN) 800 mg tablet Take 800 mg by mouth every twelve (12) hours as needed for Pain.  methocarbamol (ROBAXIN) 500 mg tablet Take 1 Tab by mouth daily as needed for Pain. 60 Tab 2    montelukast (SINGULAIR) 10 mg tablet Take 10 mg by mouth daily as needed. Prior to allergy shots      azelastine (ASTELIN) 137 mcg (0.1 %) nasal spray USE 1 SPRAY IN EACH NOSTRIL TWICE A DAY AS DIRECTED 30 Bottle 2    fexofenadine (ALLEGRA) 180 mg tablet Take 180 mg by mouth daily.  [DISCONTINUED] triamcinolone acetonide (KENALOG) 0.1 % ointment Apply  to affected area daily as needed (rash). use thin layer  Indications: a type of allergy that causes red and itchy skin called atopic dermatitis 30 g 0    [DISCONTINUED] carBAMazepine (TEGRETOL) 200 mg tablet Take 300 mg by mouth every evening.  Indications: robyn associated with bipolar disorder         Past History     Past Medical History:  Past Medical History:   Diagnosis Date    Arthritis     joints    Bipolar 1 disorder with moderate robyn ([facility] [address] 3/17/2014    Chronic pain     back with stress    Contact dermatitis and other eczema, due to unspecified cause     Depression     Headache(784.0)     Hyperlipidemia 8/22/2016    Other ill-defined conditions(799.89)     sinus infection,hay fever    Other ill-defined conditions(799.89)     scoliosis    Psychiatric disorder     bipolar    Psychotic disorder (HCC)     Stool color black     Tennis elbow syndrome 5/4/2015    Trauma        Past Surgical History:  Past Surgical History:   Procedure Laterality Date    HX HEENT      wisdom teeth extraction    HX LIPOMA RESECTION      right gluteal    FREDY BIOPSY BREAST STEREOTACTIC Left 2011    negative    [place] DENTAL SURGERY PROCEDURE      hx of dental surgery- wisdom teeth extracted       Family History:  Family History   Problem Relation Age of Onset   Baeza Arthritis-rheumatoid Mother     Migraines Mother     Psychiatric Disorder Mother     Bipolar Disorder Mother     Lupus Mother     Alcohol abuse Father     Lung Disease Father     Heart Disease Father     Stroke Father     High Cholesterol Father     Psychiatric Disorder Sister     Alcohol abuse Sister     Bipolar Disorder Sister     Stroke Brother     Cancer Maternal Aunt     Breast Cancer Maternal Aunt         pt jose d she was under 48    Bipolar Disorder Sister        Social History:  Social History     Tobacco Use    Smoking status: Never Smoker    Smokeless tobacco: Never Used   Substance Use Topics    Alcohol use: Yes     Comment: occasional    Drug use: No       Allergies: Allergies   Allergen Reactions    Other Food Hives     Artifical sweetners    Claritin-D 12 Hour [Loratadine-Pseudoephedrine] Palpitations     palpatations and ringing in the ear    Other Medication Anaphylaxis     Artificial sweeteners cause anaphylaxis    Pcn [Penicillins] Anaphylaxis    Sunscreen Contact Dermatitis     Burns and opens the skin    Ultram [Tramadol] Hives and Other (comments)     Hives and ringing of the ears    Benzoyl Peroxide Hives    Cephalexin Itching    Divalproex Itching    Maltodextrin-Glycerin Shortness of Breath         Review of Systems   Review of Systems   Unable to perform ROS: Mental status change       Physical Exam   Physical Exam  Vitals signs and nursing note reviewed. Constitutional:       General: She is not in acute distress. Appearance: Normal appearance. She is well-developed and normal weight. She is not ill-appearing, toxic-appearing or diaphoretic. Comments: 53 y.o. female   HENT:      Head: Normocephalic and atraumatic. Nose: Nose normal.      Mouth/Throat:      Mouth: Mucous membranes are moist.      Pharynx: Oropharynx is clear. Eyes:      Conjunctiva/sclera: Conjunctivae normal.      Pupils: Pupils are equal, round, and reactive to light. Neck:      Musculoskeletal: Normal range of motion and neck supple.    Cardiovascular:      Rate and Rhythm: Normal rate and regular rhythm. Pulses: Normal pulses. Heart sounds: Normal heart sounds. No murmur. Pulmonary:      Effort: Pulmonary effort is normal. No respiratory distress. Breath sounds: Normal breath sounds. No wheezing. Abdominal:      General: Bowel sounds are normal.      Palpations: Abdomen is soft. Musculoskeletal: Normal range of motion. Skin:     General: Skin is warm and dry. Capillary Refill: Capillary refill takes less than 2 seconds. Neurological:      General: No focal deficit present. Mental Status: She is alert and oriented to person, place, and time. Psychiatric:         Attention and Perception: She perceives visual hallucinations. Mood and Affect: Mood is anxious. Affect is inappropriate. Speech: Speech is rapid and pressured. Behavior: Behavior is cooperative. Thought Content: Thought content is paranoid and delusional. Thought content does not include homicidal or suicidal ideation. Thought content does not include homicidal or suicidal plan. Cognition and Memory: Cognition is impaired. She exhibits impaired recent memory. Judgment: Judgment is inappropriate. Diagnostic Study Results     Labs -     Recent Results (from the past 12 hour(s))   CBC WITH AUTOMATED DIFF    Collection Time: 02/02/20 11:12 AM   Result Value Ref Range    WBC 7.3 3.6 - 11.0 K/uL    RBC 4.09 3.80 - 5.20 M/uL    HGB 12.9 11.5 - 16.0 g/dL    HCT 39.8 35.0 - 47.0 %    MCV 97.3 80.0 - 99.0 FL    MCH 31.5 26.0 - 34.0 PG    MCHC 32.4 30.0 - 36.5 g/dL    RDW 12.1 11.5 - 14.5 %    PLATELET 608 224 - 841 K/uL    MPV 9.6 8.9 - 12.9 FL    NRBC 0.0 0  WBC    ABSOLUTE NRBC 0.00 0.00 - 0.01 K/uL    NEUTROPHILS 71 32 - 75 %    LYMPHOCYTES 22 12 - 49 %    MONOCYTES 7 5 - 13 %    EOSINOPHILS 0 0 - 7 %    BASOPHILS 0 0 - 1 %    IMMATURE GRANULOCYTES 0 0.0 - 0.5 %    ABS. NEUTROPHILS 5.0 1.8 - 8.0 K/UL    ABS.  LYMPHOCYTES 1.6 0.8 - 3.5 K/UL    ABS. MONOCYTES 0.5 0.0 - 1.0 K/UL    ABS. EOSINOPHILS 0.0 0.0 - 0.4 K/UL    ABS. BASOPHILS 0.0 0.0 - 0.1 K/UL    ABS. IMM. GRANS. 0.0 0.00 - 0.04 K/UL    DF AUTOMATED     METABOLIC PANEL, COMPREHENSIVE    Collection Time: 02/02/20 11:12 AM   Result Value Ref Range    Sodium 141 136 - 145 mmol/L    Potassium 3.5 3.5 - 5.1 mmol/L    Chloride 104 97 - 108 mmol/L    CO2 26 21 - 32 mmol/L    Anion gap 11 5 - 15 mmol/L    Glucose 108 (H) 65 - 100 mg/dL    BUN 5 (L) 6 - 20 MG/DL    Creatinine 0.71 0.55 - 1.02 MG/DL    BUN/Creatinine ratio 7 (L) 12 - 20      GFR est AA >60 >60 ml/min/1.73m2    GFR est non-AA >60 >60 ml/min/1.73m2    Calcium 9.9 8.5 - 10.1 MG/DL    Bilirubin, total 0.4 0.2 - 1.0 MG/DL    ALT (SGPT) 31 12 - 78 U/L    AST (SGOT) 20 15 - 37 U/L    Alk.  phosphatase 81 45 - 117 U/L    Protein, total 8.9 (H) 6.4 - 8.2 g/dL    Albumin 4.6 3.5 - 5.0 g/dL    Globulin 4.3 (H) 2.0 - 4.0 g/dL    A-G Ratio 1.1 1.1 - 2.2     URINALYSIS W/ RFLX MICROSCOPIC    Collection Time: 02/02/20 11:12 AM   Result Value Ref Range    Color DARK YELLOW      Appearance CLEAR CLEAR      Specific gravity 1.025 1.003 - 1.030      pH (UA) 5.5 5.0 - 8.0      Protein TRACE (A) NEG mg/dL    Glucose NEGATIVE  NEG mg/dL    Ketone TRACE (A) NEG mg/dL    Bilirubin NEGATIVE  NEG      Blood NEGATIVE  NEG      Urobilinogen 0.2 0.2 - 1.0 EU/dL    Nitrites NEGATIVE  NEG      Leukocyte Esterase SMALL (A) NEG     DRUG SCREEN, URINE    Collection Time: 02/02/20 11:12 AM   Result Value Ref Range    AMPHETAMINES NEGATIVE  NEG      BARBITURATES NEGATIVE  NEG      BENZODIAZEPINES POSITIVE (A) NEG      COCAINE NEGATIVE  NEG      METHADONE NEGATIVE  NEG      OPIATES NEGATIVE  NEG      PCP(PHENCYCLIDINE) NEGATIVE  NEG      THC (TH-CANNABINOL) NEGATIVE  NEG      Drug screen comment (NOTE)    ETHYL ALCOHOL    Collection Time: 02/02/20 11:12 AM   Result Value Ref Range    ALCOHOL(ETHYL),SERUM <10 <10 MG/DL   URINE MICROSCOPIC ONLY    Collection Time: 02/02/20 11:12 AM   Result Value Ref Range    WBC 0-4 0 - 4 /hpf    RBC 5-10 0 - 5 /hpf    Epithelial cells FEW FEW /lpf    Bacteria NEGATIVE  NEG /hpf       Radiologic Studies -   No orders to display       Medical Decision Making   I am the first provider for this patient. I reviewed the vital signs, available nursing notes, past medical history, past surgical history, family history and social history. Vital Signs-Reviewed the patient's vital signs. Patient Vitals for the past 12 hrs:   Temp Pulse Resp BP SpO2   02/02/20 1025 97.8 °F (36.6 °C) 84 17 146/87 100 %       Records Reviewed: Nursing Notes, Old Medical Records, Previous Radiology Studies and Previous Laboratory Studies    Provider Notes (Medical Decision Making):   Differential diagnosis include exacerbation of bipolar disorder, other psychosis mechanism. ED Course:   Initial assessment performed. The patients presenting problems have been discussed, and they are in agreement with the care plan formulated and outlined with them. I have encouraged them to ask questions as they arise throughout their visit. 1130:   [facility] counselor and family members at bedside for further evaluation. We will continue to monitor and report medical clearance upon availability. 1230:   Patient is medically cleared and is able to be admitted to inpatient psych therapy at [facility] by [facility] request and after evaluation. 1500: After further consideration and evaluation, patient was transferred to [facility] inpatient psych facility for further care. Critical Care Time: None    Disposition:  Transfer to [facility]    PLAN:  1. Current Discharge Medication List        2. Follow-up Information    None       Return to ED if worse     Diagnosis     Clinical Impression:   1. Schizoaffective disorder, bipolar type [facility])          Please note that this dictation was completed with [employer], the [employer] voice recognition software.  Quite often unanticipated grammatical, syntax, homophones, and other interpretive errors are inadvertently transcribed by the computer software. Please disregards these errors. Please excuse any errors that have escaped final proofreading. This note will not be viewable in [address]

## 2020-02-02 NOTE — PROGRESS NOTES
Call places to Hospitalist answering service, requesting return call from MD, to notify MD of consult. Awaiting return call from MD.  [phone]  MD advised of consult. pmd

## 2020-02-02 NOTE — ED NOTES
Attempted to call report to third floor. Was informed the psychiatrist is denying the patient's admission. Spoke with Sarah Hernandez from [facility] who informed me she would be calling back with further guidance.
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
## 2020-02-02 NOTE — ED NOTES
TRANSFER - OUT REPORT:    Verbal report given to Adele Villanueva RN (name) on Areli Torres  being transferred to [facility] (unit) for routine progression of care       Report consisted of patients Situation, Background, Assessment and   Recommendations(SBAR). Information from the following report(s) SBAR, ED Summary, Procedure Summary, MAR and Recent Results was reviewed with the receiving nurse. Lines:       Opportunity for questions and clarification was provided.       Patient transported with:  CHAITANYA

## 2020-02-02 NOTE — BH NOTES
PRN Medication Documentation    Specific patient behavior that led to need for PRN medication: distracted, disorganized with flight of ideas, impulsive, pressured speech, requires redirection and distraction continuous  by multiple 3 staff, not able to follow directions, alert and oriented x 1 self only. Pt attempting to touch other inappropriate, danger to self and others impaired judgment    Staff interventions attempted prior to PRN being given: education, staff with pt, redirection, decrease stimuli, therapeutic communication   PRN medication given: IM 2 mg Ativan, IM 50 mg Benadryl, IM 5 mg Haldol   Patient response/effectiveness of PRN medication: pt is alert requires continuous education and redirection by staff.       1836- PRN Medication Documentation    Specific patient behavior that led to need for PRN medication: pt is restless, distracted with flight of ideas, speech soft, unable to sit or rest in bed, oriented to self, pt c/o feeling restless and racing thoughts   Staff interventions attempted prior to PRN being given: education, therapeutic communication, decrease stimuli, coping skills    PRN medication given: po 50 mg Atarax po 5 mg Zyprexa   Patient response/effectiveness of PRN medication: pt with staff alert redirectable pleasant

## 2020-02-03 ENCOUNTER — PATIENT OUTREACH (OUTPATIENT)
Dept: INTERNAL MEDICINE CLINIC | Age: 55
End: 2020-02-03

## 2020-02-03 LAB
ANION GAP SERPL CALC-SCNC: 4 MMOL/L (ref 5–15)
BASOPHILS # BLD: 0.1 K/UL (ref 0–0.1)
BASOPHILS NFR BLD: 1 % (ref 0–1)
BUN SERPL-MCNC: 5 MG/DL (ref 6–20)
BUN/CREAT SERPL: 6 (ref 12–20)
CALCIUM SERPL-MCNC: 9.8 MG/DL (ref 8.5–10.1)
CHLORIDE SERPL-SCNC: 103 MMOL/L (ref 97–108)
CHOLEST SERPL-MCNC: 216 MG/DL
CO2 SERPL-SCNC: 29 MMOL/L (ref 21–32)
CREAT SERPL-MCNC: 0.81 MG/DL (ref 0.55–1.02)
DIFFERENTIAL METHOD BLD: ABNORMAL
EOSINOPHIL # BLD: 0.1 K/UL (ref 0–0.4)
EOSINOPHIL NFR BLD: 2 % (ref 0–7)
ERYTHROCYTE [DISTWIDTH] IN BLOOD BY AUTOMATED COUNT: 12.3 % (ref 11.5–14.5)
GLUCOSE P FAST SERPL-MCNC: 107 MG/DL (ref 65–100)
GLUCOSE SERPL-MCNC: 107 MG/DL (ref 65–100)
HCT VFR BLD AUTO: 37 % (ref 35–47)
HDLC SERPL-MCNC: 99 MG/DL
HDLC SERPL: 2.2 {RATIO} (ref 0–5)
HGB BLD-MCNC: 11.8 G/DL (ref 11.5–16)
IMM GRANULOCYTES # BLD AUTO: 0 K/UL (ref 0–0.04)
IMM GRANULOCYTES NFR BLD AUTO: 0 % (ref 0–0.5)
LDLC SERPL CALC-MCNC: 106.2 MG/DL (ref 0–100)
LIPID PROFILE,FLP: ABNORMAL
LYMPHOCYTES # BLD: 1.7 K/UL (ref 0.8–3.5)
LYMPHOCYTES NFR BLD: 32 % (ref 12–49)
MCH RBC QN AUTO: 31.4 PG (ref 26–34)
MCHC RBC AUTO-ENTMCNC: 31.9 G/DL (ref 30–36.5)
MCV RBC AUTO: 98.4 FL (ref 80–99)
MONOCYTES # BLD: 0.5 K/UL (ref 0–1)
MONOCYTES NFR BLD: 10 % (ref 5–13)
NEUTS SEG # BLD: 3 K/UL (ref 1.8–8)
NEUTS SEG NFR BLD: 55 % (ref 32–75)
NRBC # BLD: 0 K/UL (ref 0–0.01)
NRBC BLD-RTO: 0 PER 100 WBC
PLATELET # BLD AUTO: 267 K/UL (ref 150–400)
PMV BLD AUTO: 9.5 FL (ref 8.9–12.9)
POTASSIUM SERPL-SCNC: 3.9 MMOL/L (ref 3.5–5.1)
RBC # BLD AUTO: 3.76 M/UL (ref 3.8–5.2)
RBC MORPH BLD: ABNORMAL
SODIUM SERPL-SCNC: 136 MMOL/L (ref 136–145)
TRIGL SERPL-MCNC: 54 MG/DL (ref ?–150)
VLDLC SERPL CALC-MCNC: 10.8 MG/DL
WBC # BLD AUTO: 5.4 K/UL (ref 3.6–11)

## 2020-02-03 PROCEDURE — 36415 COLL VENOUS BLD VENIPUNCTURE: CPT

## 2020-02-03 PROCEDURE — 74011250636 HC RX REV CODE- 250/636: Performed by: NURSE PRACTITIONER

## 2020-02-03 PROCEDURE — 65220000003 HC RM SEMIPRIVATE PSYCH

## 2020-02-03 PROCEDURE — 80048 BASIC METABOLIC PNL TOTAL CA: CPT

## 2020-02-03 PROCEDURE — 82947 ASSAY GLUCOSE BLOOD QUANT: CPT

## 2020-02-03 PROCEDURE — 74011250637 HC RX REV CODE- 250/637: Performed by: NURSE PRACTITIONER

## 2020-02-03 PROCEDURE — 80061 LIPID PANEL: CPT

## 2020-02-03 PROCEDURE — 85025 COMPLETE CBC W/AUTO DIFF WBC: CPT

## 2020-02-03 RX ORDER — CARBAMAZEPINE 200 MG/1
300 TABLET ORAL
COMMUNITY
End: 2020-02-11

## 2020-02-03 RX ADMIN — HALOPERIDOL LACTATE 5 MG: 5 INJECTION INTRAMUSCULAR at 16:21

## 2020-02-03 RX ADMIN — DIPHENHYDRAMINE HYDROCHLORIDE 50 MG: 50 INJECTION, SOLUTION INTRAMUSCULAR; INTRAVENOUS at 16:21

## 2020-02-03 RX ADMIN — LORAZEPAM 2 MG: 2 INJECTION INTRAMUSCULAR; INTRAVENOUS at 16:21

## 2020-02-03 RX ADMIN — TRAZODONE HYDROCHLORIDE 50 MG: 50 TABLET ORAL at 02:39

## 2020-02-03 RX ADMIN — ACETAMINOPHEN 650 MG: 325 TABLET ORAL at 16:13

## 2020-02-03 RX ADMIN — TRAZODONE HYDROCHLORIDE 50 MG: 50 TABLET ORAL at 20:41

## 2020-02-03 RX ADMIN — OLANZAPINE 5 MG: 5 TABLET, FILM COATED ORAL at 18:37

## 2020-02-03 RX ADMIN — HYDROXYZINE HYDROCHLORIDE 50 MG: 50 TABLET, FILM COATED ORAL at 18:37

## 2020-02-03 RX ADMIN — ACETAMINOPHEN 650 MG: 325 TABLET ORAL at 06:26

## 2020-02-03 RX ADMIN — HYDROXYZINE HYDROCHLORIDE 50 MG: 50 TABLET, FILM COATED ORAL at 21:50

## 2020-02-03 NOTE — PROGRESS NOTES
2300: Patient resting quietly in bed with eyes closed. No distress noted. Respirations are evn and unlabored. Staff will continue to monitor q15 throughout the shift. Problem: Falls - Risk of  Goal: *Absence of Falls  Description  Document [place] Fall Risk and appropriate interventions in the flowsheet.   Outcome: Progressing Towards Goal  Note: Fall Risk Interventions:            Medication Interventions: Teach patient to arise slowly              Blocked Bed Documentation:    Room number: 883  Type: Behavior  Rationale: Impulsive  Anticipated duration: TBD  Additional comments:

## 2020-02-03 NOTE — BH NOTES
GROUP THERAPY PROGRESS NOTE    Funmilayo Torrezher is participating in [place].      Group time: 10 minutes    Personal goal for participation: Jose Hernandez rules of the unit'    Goal orientation: personal    Group therapy participation: active    Therapeutic interventions reviewed and discussed:     Impression of participation:

## 2020-02-03 NOTE — PROGRESS NOTES
Problem: Altered Thought Process (Adult/Pediatric)  Goal: *STG: Participates in treatment plan  Outcome: Progressing Towards Goal  Goal: *STG: Seeks staff when feelings of anxiety and fear arise  Outcome: Progressing Towards Goal     Problem: Falls - Risk of  Goal: *Absence of Falls  Description  Document [place] Fall Risk and appropriate interventions in the flowsheet.   Outcome: Progressing Towards Goal  Note: Fall Risk Interventions:            Medication Interventions: Teach patient to arise slowly

## 2020-02-03 NOTE — PROGRESS NOTES
Blocked Bed Documentation:    Room number: 990  Type: Behavior  Rationale: Impulsive  Anticipated duration:EVALUATED EACH SHIFT  Additional comments:rapid loud speech/self reported poor sleep/demanding behavior states directive harsh request to others    Problem: Manic Behavior (Adult/Pediatric)  Goal: *STG: Maintains appropriate boundaries  Outcome: Not Progressing Towards Goal  Note:   Requires redirection in maintaining appropriate interactions and boundaries. Problem: Manic Behavior (Adult/Pediatric)  Goal: *SG: Demonstrates improvement in sleep pattern  Outcome: Not Progressing Towards Goal  Note:   Self reports \"I really didn`t sleep good. I HEARD THE DOOR OPEN A LOT\"I hear all the people talking above my head and all the noises in this place I think people are coming at me intentionally\"

## 2020-02-03 NOTE — INTERDISCIPLINARY ROUNDS
Behavioral Health Interdisciplinary Rounds     Patient Name: Nicolasa Kirkpatrick  Age: 53 y.o. Room/Bed:  734/  Primary Diagnosis: <principal problem not specified>   Admission Status: Voluntary     Readmission within 30 days: no  Power of  in place: no  Patient requires a blocked bed: yes          Reason for blocked bed: behavior    VTE Prophylaxis: No    Mobility needs/Fall risk: no  Flu Vaccine : no   Nutritional Plan: no  Consults:          Labs/Testing due today?: yes    Sleep hours:  6.75        Participation in Care/Groups:  New Admission  Medication Compliant?:   PRNS (last 24 hours): Antipsychotic (PO), Antipsychotic (IM) and Antianxiety    Restraints (last 24 hours):  no     CIWA (range last 24 hours):     COWS (range last 24 hours):      Alcohol screening (AUDIT) completed -   AUDIT Score: 2     If applicable, date SBIRT discussed in treatment team AND documented:   AUDIT Screen Score: AUDIT Score: 2    Tobacco - patient is a smoker: Have You Used Tobacco in the Past 30 Days: No  Illegal Drugs use: Have You Used Any Illegal Substances Over the Past 12 Months: No    24 hour chart check complete: yes     Patient goal(s) for today:   Treatment team focus/goals: Plan to assess for medications and discharge needs. Progress note : she was manic and very labile in treatment team.     LOS:  0  Expected LOS: TBD    Financial concerns/prescription coverage:  Medicare   Family contact:  David Serna [phone]       Family requesting physician contact today:    Discharge plan: She will return home .    Access to weapons :  no      Outpatient provider(s): [address] [place] [address]   Patient's preferred phone number for follow up call :     Participating treatment team members: Gladys Aaron RN

## 2020-02-03 NOTE — BH NOTES
Admission reviewed for medical necessity. Will follow with care [facility].

## 2020-02-03 NOTE — PROGRESS NOTES
Admission Medication Reconciliation:    Information obtained from:  [facility] discharge records - patient hospitalized 1/20/20-1/30/20  RxQuery data available¹:  YES    Comments/Recommendations: Updated PTA meds/reviewed patient's allergies. 1)  Medication reconciliation completed per discharge paperwork from [facility]. Per report from , the patient was taking her medications while home. 2)  Medication changes (since last review): Adjusted  - carbamazepine 200mg daily + 300mg QHS   ¹RxQuery pharmacy benefit data reflects medications filled and processed through the patient's insurance, however   this data does NOT capture whether the medication was picked up or is currently being taken by the patient. Allergies: Other food; Claritin-d 12 hour [loratadine-pseudoephedrine]; Other medication; Pcn [penicillins]; Sunscreen; Ultram [tramadol]; Benzoyl peroxide; Cephalexin; Divalproex; and Maltodextrin-glycerin    Significant PMH/Disease States:   Past Medical History:   Diagnosis Date    Aggressive outburst     Arthritis     joints    Bipolar 1 disorder with moderate robyn (HCC) 3/17/2014    Chronic pain     back with stress    Contact dermatitis and other eczema, due to unspecified cause     Depression     Headache(784.0)     Hyperlipidemia 8/22/2016    Other ill-defined conditions(799.89)     sinus infection,hay fever    Other ill-defined conditions(799.89)     scoliosis    Psychiatric disorder     bipolar    Psychotic disorder ([facility] [address]     Stool color black     Tennis elbow syndrome 5/4/2015    Trauma      Chief Complaint for this Admission:  No chief complaint on file. Prior to Admission Medications:   Prior to Admission Medications   Prescriptions Last Dose Informant Taking? OLANZapine (ZYPREXA) 15 mg tablet   No   Sig: Take 1 Tab by mouth nightly.  Indications: robyn associated with bipolar disorder   OTHER,NON-FORMULARY,   No   Sig: Allergy shots every month - does not recall date of last shot   azelastine (ASTELIN) 137 mcg (0.1 %) nasal spray  Self No   Sig: USE 1 SPRAY IN EACH NOSTRIL TWICE A DAY AS DIRECTED   carBAMazepine (TEGRETOL) 200 mg tablet  Other No   Sig: Take 200 mg by mouth daily. Indications: robyn associated with bipolar disorder   carBAMazepine (TEGRETOL) 200 mg tablet   Yes   Sig: Take 300 mg by mouth nightly. cholecalciferol (VITAMIN D3) (50,000 UNITS /1250 MCG) capsule  Self No   Sig: Take 50,000 Units by mouth every Monday. fexofenadine (ALLEGRA) 180 mg tablet  Self No   Sig: Take 180 mg by mouth daily. hydroxyzine HCL (ATARAX) 50 mg tablet   No   Sig: Take 1 Tab by mouth three (3) times daily as needed for Anxiety for up to 10 days. Indications: anxious   ibuprofen (MOTRIN) 800 mg tablet  Self No   Sig: Take 800 mg by mouth every twelve (12) hours as needed for Pain. methocarbamol (ROBAXIN) 500 mg tablet  Self No   Sig: Take 1 Tab by mouth daily as needed for Pain.   montelukast (SINGULAIR) 10 mg tablet  Self No   Sig: Take 10 mg by mouth daily as needed.  Prior to allergy shots      Facility-Administered Medications: None     RENETTA Magaña

## 2020-02-03 NOTE — PROGRESS NOTES
Pt received at shift change, up in bathroom. She greets staff by abruptly pushing out of the door. Grossly psychotic. Flight of ideas, speech rapid and pressured. Perseverating about her , pt states she is angry. Pt fixated that her feet \"must have bacteria in them. \" Education and redirection offered, pt accepts. Respirations even and unlabored, NAD noted. Staff to continue q15 minute checks for safety. @1609 Pt up at nurses station \"I'm not ok, I can't control my thoughts\" \"I can hear everything. . upstairs, downstairs, everywhere. .. I can't stand it! I can't focus! \" \"Being out here, there's so much going on, it's so loud! \" fists clenching, pt's body is rigid. RN offers pt medication and the pt says \"yes, please\" RN offers to meet pt in her room to decrease stimuli. @3530 Pt states she is in pain, that her feet hurt, \"it must be all this eczema, and the cracks in my feet, there is bacteria in my skin\" RN inspects skin, appears dry but no breaks in the skin are visible, moisturizer applied. Tylenol 650mg given. Pt paranoid, speech becoming more rapid, loud, jaw clenching \"I can hear your thoughts, I can read your lips, I hear everything!\"    @1621 Pt given IM Haldol 5mg, Ativan 2mg, and Benadryl 50mg  Pt willing to accept medication, readily exposed skin. @1700 Pt states to staff that her feet are feeling better. She is up ad srinivasan around the unit, calmer and more appropriate. Speech is still rapid. Pt is meal compliant, finished her meal tray. Presents to nurses station with a list of sweeteners that she states she cannot consume, she has written out all of the reactions she has had to each sweetener. Staff informs patient that this information has been relayed to nutritional services. @5450 Pt states that her  is coming to visit, she wants pants out of her belongings to wear. Pt then changes subject to having allergies and wanting her allergy medication.  Pants provided by staff, pt perseverating on , stating that he is the [place] I am in here, he just needs to do what I tell him to do. \" \"Everytime I come home the house is a mess, I'm up all night doing laundry and cleaning. \" Pt made aware that allergy medicine is not ordered and to discuss in treatment team.  Pt states \"everytime I get out of the hospital it's a new me, why don't he see that? \" Pt offered medication for agitation and anxiety. @2112 Pt up at nurses station, stating \"there are dried boogies all over the walls around my bed! I was touching them with my hands and didn't realize what they were. .. I worked up a sweat turning my bed around. .. is the window still open for that sleep medicine to work? \" Pt given some hand  to clean her hands, medication education offered. Pt asked for ear plugs, provided by staff. Pt then began speaking about dust and animal dander all over this hospital that she feels is disrupting her allergies. Pt reassured by staff that the environment is sanitized daily and surfaces are wiped down each shift. Pt also encouraged to ask in treatment team about getting her allergy medicine. @2147 Pt up at nurses station, restless, asking for \"another shot of benadryl\" stating that she is stuffy and it is keeping her awake. Continues talking about dander and dust mites on the unit. Reassurance and medication education provided. Pt given PO Atarax 50mg.       Problem: Altered Thought Process (Adult/Pediatric)  Goal: *STG: Remains safe in hospital  Outcome: Progressing Towards Goal  Goal: *STG: Complies with medication therapy  Outcome: Progressing Towards Goal

## 2020-02-03 NOTE — BH NOTES
PSYCHOSOCIAL ASSESSMENT  :Patient identifying info:  Radha Torres is a 53 y.o., female admitted 2020  4:33 PM     Presenting problem and precipitating factors: She was admitted voluntarily due to manic and hearing voices. Just released from [facility] after a 10 day stay. Mental status assessment:alert, oriented x's 3 -     Strengths: supportive  - established out patient treatment     Collateral information: Stu Arreguin -[phone]     Current psychiatric /substance abuse providers and contact info: [address] , DIONNE and Hilda Harrison     Previous psychiatric/substance abuse providers and response to treatment: she has had admissions to most of the [facility] hospital and was just discharge for [facility] -  to      Family history of mental illness or substance abuse: none noted       Substance abuse history:  UDS- positive for benzo's   Social History     Tobacco Use    Smoking status: Never Smoker    Smokeless tobacco: Never Used   Substance Use Topics    Alcohol use: Yes     Comment: occasional       History of biomedical complications associated with substance abuse :none noted     Patient's current acceptance of treatment or motivation for change:    Family constellation:      Is significant other involved?  Yes       Describe support system:    Describe living arrangements and home environment:lives at home with her      Health issues:   Hospital Problems  Date Reviewed: 2019          Codes Class Noted POA    Bipolar disorder [facility]) ICD-10-CM: F31.9  ICD-9-CM: 296.80  2020 Unknown              Trauma history: none noted this admission     Legal issues: no    History of  service: no    Financial status: SSDI     Scientologist/cultural factors: Jehovah Witness     Education/work history: college education     Have you been licensed as a health care professional (current or ): no    Leisure and recreation preferences: spending time with her friends     Describe coping skills:ineffectual     Candace Astorga  2/3/2020

## 2020-02-03 NOTE — BH NOTES
GROUP THERAPY PROGRESS NOTE    Félix Grier partially participated in an Acute Unit Process Group, with a focus on identifying feelings, planning for the rest of the day, and developing coping skills. Group time: 2284 - 2073    Personal goal for participation: To increase the capacity to improve ones mood and structure. Goal orientation: The patient will be able to identify their feelings, develop a plan for structuring their day, and practicing appropriate interaction with peers. Therapeutic interventions reviewed and discussed: The group members were asked to introduce themselves to each other and to see if they share their feelings and thoughts, and plans for the rest of their day. Impression of participation: With prompting, this patient actively participated in the group, but seemed to lose focus about residential into the session, when she decided to leave the group and head down the rossi towards her room. She was alert, generally oriented, talking rapidly, and denying any racing thoughts. She said she wanted to focus on \"coping skills\" for herself during this hospitalization, including her time later today. She did not specify what coping skills she had in mind. She expressed no current SI/HI and displayed no overt psychotic symptoms in this group, although this latter concern was not fully evaluated in this session. Her affect was mostly anxious, perhaps borderline manic, but cooperative in group while she attended. Her mood reflected her affect. Her thinking seemed slightly disjointed, as if she were having difficulty focusing on any one thing for very long. Her thinking was concrete enough to be easy to follow. She may need continued support from her treatment team to refine her aftercare needs and plans. This was the patient's first process group with the undersigned.

## 2020-02-03 NOTE — PROGRESS NOTES
Hospital Discharge Follow-Up      Date/Time:  2/3/2020 1:29 PM  Keiry Perales, CIERRA, RN, [facility], Ashkan Orozco [address] Transitions Nurse  [phone]    Patient was admitted to [facility] on 1/20/2020 and discharged on 1/30/2020 for Bipolar Disorder. The physician discharge summary was available at the time of outreach. Patient was contacted within 2 business days of discharge. Top Challenges reviewed with the provider   Admitted for Bipolar  - started on Atarax and Zyprexa  - readmitted to [place] [address] on 1/30,  brought patient back to ER reporting she had not slept or stopped talking since she return home   Advance Care Planning:   Does patient have an Advance Directive: on file        Method of communication with provider :chart routing    Was this a readmission? no   Patient stated reason for the readmission: n/a    Disease Specific:   N/A    Patients top risk factors for readmission:  ineffective coping, lack of knowledge about disease, medical condition, support system, utilization of services    Home Health [facility] at [facility] ordered at discharge: none    Medication(s):   New Medications at Discharge:   hydroxyzine HCL (ATARAX) 50 mg tablet Take 1 Tab by mouth three (3) times daily as needed for Anxiety for up to 10 days. Indications: anxious  Qty: 90 Tab, Refills: 0       OLANZapine (ZYPREXA) 15 mg tablet Take 1 Tab by mouth nightly. Indications: robyn associated with bipolar disorder  Qty: 90 Tab, Refills: 0       triamcinolone acetonide (KENALOG) 0.1 % ointment Apply  to affected area daily as needed (rash).  use thin layer  Indications: a type of allergy that causes red and itchy skin called atopic dermatitis  Qty: 30 g, Refills: 0       Changed Medications at Discharge: n/a  Discontinued Medications at Discharge:    QUEtiapine (SEROQUEL) 200 mg tablet Comments:   Reason for Stopping:            promethazine (PHENERGAN) 12.5 mg tablet Comments: Reason for Stopping:            temazepam (RESTORIL) 30 mg capsule Comments:   Reason for Stopping:          No current facility-administered medications for this visit. No current outpatient medications on file. Facility-Administered Medications Ordered in Other Visits   Medication Dose Route Frequency    OLANZapine (ZyPREXA) tablet 5 mg  5 mg Oral Q6H PRN    haloperidol lactate (HALDOL) injection 5 mg  5 mg IntraMUSCular Q6H PRN    benztropine (COGENTIN) tablet 1 mg  1 mg Oral BID PRN    diphenhydrAMINE (BENADRYL) injection 50 mg  50 mg IntraMUSCular BID PRN    hydroxyzine HCL (ATARAX) tablet 50 mg  50 mg Oral TID PRN    LORazepam (ATIVAN) injection 2 mg  2 mg IntraMUSCular Q4H PRN    traZODone (DESYREL) tablet 50 mg  50 mg Oral QHS PRN    acetaminophen (TYLENOL) tablet 650 mg  650 mg Oral Q4H PRN    magnesium hydroxide (MILK OF MAGNESIA) 400 mg/5 mL oral suspension 30 mL  30 mL Oral DAILY PRN       There are no discontinued medications. [facility] follow up appointment(s): No future appointments. 02/03/20  Patient was readmitted on 1/30/2020 before [facility] was able to make contact within 2 business days. [facility] team to continue to monitor patient and f/u post discharge.  AR

## 2020-02-04 PROCEDURE — 65220000003 HC RM SEMIPRIVATE PSYCH

## 2020-02-04 PROCEDURE — 74011250637 HC RX REV CODE- 250/637: Performed by: NURSE PRACTITIONER

## 2020-02-04 RX ORDER — CARBAMAZEPINE 200 MG/1
300 TABLET ORAL
Status: DISCONTINUED | OUTPATIENT
Start: 2020-02-04 | End: 2020-02-07

## 2020-02-04 RX ORDER — CARBAMAZEPINE 200 MG/1
200 TABLET ORAL DAILY
Status: DISCONTINUED | OUTPATIENT
Start: 2020-02-05 | End: 2020-02-11 | Stop reason: HOSPADM

## 2020-02-04 RX ORDER — OLANZAPINE 5 MG/1
15 TABLET ORAL
Status: DISCONTINUED | OUTPATIENT
Start: 2020-02-04 | End: 2020-02-05

## 2020-02-04 RX ADMIN — ACETAMINOPHEN 650 MG: 325 TABLET ORAL at 00:09

## 2020-02-04 RX ADMIN — HYDROXYZINE HYDROCHLORIDE 50 MG: 50 TABLET, FILM COATED ORAL at 13:16

## 2020-02-04 RX ADMIN — ACETAMINOPHEN 650 MG: 325 TABLET ORAL at 20:45

## 2020-02-04 RX ADMIN — OLANZAPINE 15 MG: 5 TABLET, FILM COATED ORAL at 20:50

## 2020-02-04 RX ADMIN — CARBAMAZEPINE 300 MG: 200 TABLET ORAL at 20:52

## 2020-02-04 RX ADMIN — ACETAMINOPHEN 650 MG: 325 TABLET ORAL at 15:43

## 2020-02-04 RX ADMIN — TRAZODONE HYDROCHLORIDE 50 MG: 50 TABLET ORAL at 20:45

## 2020-02-04 RX ADMIN — HYDROXYZINE HYDROCHLORIDE 50 MG: 50 TABLET, FILM COATED ORAL at 22:00

## 2020-02-04 NOTE — BH NOTES
GROUP THERAPY PROGRESS NOTE    Funmilayo Brennan did not participate in an Acute Unit Process Group, with a focus on identifying feelings, planning for the rest of the day, and developing coping skills.

## 2020-02-04 NOTE — BH NOTES
INITIAL PSYCHIATRIC INTERVIEW:      CHIEF COMPLAINT:\"im not sure. I don't know why I am here\"        HISTORY OF PRESENTING COMPLAINT:  Betty Lazar is a 53 y.o. BLACK OR  female who is currently admitted to the psychiatric floor at [address] [place] [address]: Wily Hart has had multiple previous hospitalization with her latest discharge from [facility] being Friday. She has a history of medication noncompliance. PAST MEDICAL HISTORY:  Please see H&P for details. Past Medical History:   Diagnosis Date    Aggressive outburst     Arthritis     joints    Bipolar 1 disorder with moderate robyn ([address] 3/17/2014    Chronic pain     back with stress    Contact dermatitis and other eczema, due to unspecified cause     Depression     Headache(784.0)     Hyperlipidemia 8/22/2016    Other ill-defined conditions(799.89)     sinus infection,hay fever    Other ill-defined conditions(799.89)     scoliosis    Psychiatric disorder     bipolar    Psychotic disorder [address] [address]     Stool color black     Tennis elbow syndrome 5/4/2015    Trauma        Prior to Admission medications    Medication Sig Start Date End Date Taking? Authorizing Provider   carBAMazepine (TEGRETOL) 200 mg tablet Take 300 mg by mouth nightly. Yes Provider, Historical   hydroxyzine HCL (ATARAX) 50 mg tablet Take 1 Tab by mouth three (3) times daily as needed for Anxiety for up to 10 days. Indications: anxious 1/30/20 2/9/20  Millie Cruz MD   OLANZapine (ZYPREXA) 15 mg tablet Take 1 Tab by mouth nightly. Indications: robyn associated with bipolar disorder 1/30/20   Millie Cruz MD   carBAMazepine (TEGRETOL) 200 mg tablet Take 200 mg by mouth daily.  Indications: robyn associated with bipolar disorder    Provider, Historical   OTHER,NON-FORMULARY, Allergy shots every month - does not recall date of last shot    Provider, Historical   cholecalciferol (VITAMIN D3) (50,000 UNITS /1250 MCG) capsule Take 50,000 Units by mouth every Monday. Caroline Burris MD   ibuprofen (MOTRIN) 800 mg tablet Take 800 mg by mouth every twelve (12) hours as needed for Pain. Provider, Historical   methocarbamol (ROBAXIN) 500 mg tablet Take 1 Tab by mouth daily as needed for Pain. 9/30/19   Juana Mckinley MD   montelukast (SINGULAIR) 10 mg tablet Take 10 mg by mouth daily as needed. Prior to allergy shots    Caroline Burris MD   azelastine (ASTELIN) 137 mcg (0.1 %) nasal spray USE 1 SPRAY IN EACH NOSTRIL TWICE A DAY AS DIRECTED 10/25/17   Juan Manuel Duran MD   fexofenadine (ALLEGRA) 180 mg tablet Take 180 mg by mouth daily. Provider, Historical         Lab Results   Component Value Date/Time    WBC 5.4 02/03/2020 05:40 AM    HGB 11.8 02/03/2020 05:40 AM    Hematocrit (POC) 34 (L) 07/17/2018 03:33 AM    HCT 37.0 02/03/2020 05:40 AM    PLATELET 175 79/30/3101 05:40 AM    MCV 98.4 02/03/2020 05:40 AM      Lab Results   Component Value Date/Time    Sodium 136 02/03/2020 05:40 AM    Potassium 3.9 02/03/2020 05:40 AM    Chloride 103 02/03/2020 05:40 AM    CO2 29 02/03/2020 05:40 AM    Anion gap 4 (L) 02/03/2020 05:40 AM    Glucose 107 (H) 02/03/2020 05:40 AM    Glucose 107 (H) 02/03/2020 05:40 AM    BUN 5 (L) 02/03/2020 05:40 AM    Creatinine 0.81 02/03/2020 05:40 AM    BUN/Creatinine ratio 6 (L) 02/03/2020 05:40 AM    GFR est AA >60 02/03/2020 05:40 AM    GFR est non-AA >60 02/03/2020 05:40 AM    Calcium 9.8 02/03/2020 05:40 AM    Bilirubin, total 0.4 02/02/2020 11:12 AM    AST (SGOT) 20 02/02/2020 11:12 AM    Alk.  phosphatase 81 02/02/2020 11:12 AM    Protein, total 8.9 (H) 02/02/2020 11:12 AM    Albumin 4.6 02/02/2020 11:12 AM    Globulin 4.3 (H) 02/02/2020 11:12 AM    A-G Ratio 1.1 02/02/2020 11:12 AM    ALT (SGPT) 31 02/02/2020 11:12 AM      Vitals:    02/03/20 0748 02/03/20 1159 02/03/20 1726 02/03/20 2010   BP: 97/59 120/85 116/73 129/72   Pulse: 92 92 90 82   Resp: 18 18 16 18   Temp: 98.3 °F (36.8 °C) 97.8 °F (36.6 °C) 99 °F (37.2 °C) 98.7 °F (37.1 °C)   SpO2: 100%  100% 97%         PSYCHOSOCIAL HISTORY: Wily Hart lives with her ,        MENTAL STATUS EXAM:  General appearance: disheveled and confused, psychomotor activity is hyper  Eye contact: appropriate  Speech: Spontaneous, rapid  Affect : Depressed  Mood: anxious  Thought Process:disorganized  Perception: Denies AH or VH. Thought Content:Denies SI  Insight: Partial  Judgement: Fair  Cognition: Intact grossly. ASSESSMENT AND PLAN:  Betty Lazar meets criteria for a diagnosis of   . Continue inpatient stay for the safety and optimum care of the patient   Routine labs to be ordered as needed. Psychotropic medications will be ordered and adjusted as needed. Patients status is TDO  Voluntary  Supportive, milieu and group therapy. Continue rest of the medications as needed. Strengths include ability to seek help and   Estimated length of stay is 5-7 days.

## 2020-02-04 NOTE — PROGRESS NOTES
Problem: Altered Thought Process (Adult/Pediatric)  Goal: *STG: Participates in treatment plan  Outcome: Progressing Towards Goal  Note:   Patient is participatory in treatment team. Feeling better and well rested. Complains about allergies. Mood and affect is bright. Denies SI/HI. Goal: *STG: Remains safe in hospital  Outcome: Progressing Towards Goal  Note:   Safe  Goal: *STG: Complies with medication therapy  Outcome: Progressing Towards Goal  Note:   Compliant  Goal: *STG: Attends activities and groups  Outcome: Progressing Towards Goal  Note:   Attends  Goal: Interventions  Outcome: Progressing Towards Goal     Problem: Falls - Risk of  Goal: *Absence of Falls  Description  Document Alison Ruth Fall Risk and appropriate interventions in the flowsheet. Outcome: Progressing Towards Goal  Note: Fall Risk Interventions:  Mobility Interventions: Assess mobility with egress test    Mentation Interventions: Adequate sleep, hydration, pain control    Medication Interventions: Teach patient to arise slowly    Elimination Interventions:  Toilet paper/wipes in reach

## 2020-02-04 NOTE — GROUP NOTE
DANIEL  GROUP DOCUMENTATION INDIVIDUAL Group Therapy Note Date: 2/4/2020 Group Start Time: 46 Group End Time: 9318 Group [facility] Kw[facility] Sort[facility]; Ritu Sanchez  GROUP DOCUMENTATION GROUP Group Therapy Note Attendees: 
 
  
 
Attendance: Attended Patient's Goal: Interventions/techniques: Informed Follows Directions: Followed directions Interactions: Interacted appropriately Mental Status: Anxious, Calm and Happy Behavior/appearance: Cooperative Goals Achieved: Able to engage in interactions and Able to listen to others Additional Notes:   
 
Crow Posada

## 2020-02-04 NOTE — PROGRESS NOTES
Laboratory Monitoring for Antipsychotics: This patient is currently prescribed the following medication(s):   Current Facility-Administered Medications   Medication Dose Route Frequency    [START ON 2/5/2020] carBAMazepine (TEGretol) tablet 200 mg  200 mg Oral DAILY    carBAMazepine (TEGretol) tablet 300 mg  300 mg Oral QHS    OLANZapine (ZyPREXA) tablet 15 mg  15 mg Oral QHS     The following labs have been completed for monitoring of antipsychotics and/or mood stabilizers:    Height, Weight, BMI Estimation  Estimated body mass index is 31.15 kg/m² as calculated from the following:    Height as of an earlier encounter on 2/2/20: 152.4 cm (60\"). Weight as of an earlier encounter on 2/2/20: 72.3 kg (159 lb 8 oz). Vital Signs/Blood Pressure  Visit Vitals  /75 (BP 1 Location: Left arm, BP Patient Position: Sitting)   Pulse 79   Temp 98.3 °F (36.8 °C)   Resp 16   SpO2 100%   Breastfeeding No     Renal Function, Hepatic Function and Chemistry  CrCl cannot be calculated (Unknown ideal weight.). Lab Results   Component Value Date/Time    Sodium 136 02/03/2020 05:40 AM    Potassium 3.9 02/03/2020 05:40 AM    Chloride 103 02/03/2020 05:40 AM    CO2 29 02/03/2020 05:40 AM    Anion gap 4 (L) 02/03/2020 05:40 AM    BUN 5 (L) 02/03/2020 05:40 AM    Creatinine 0.81 02/03/2020 05:40 AM    BUN/Creatinine ratio 6 (L) 02/03/2020 05:40 AM    Bilirubin, total 0.4 02/02/2020 11:12 AM    Protein, total 8.9 (H) 02/02/2020 11:12 AM    Albumin 4.6 02/02/2020 11:12 AM    Globulin 4.3 (H) 02/02/2020 11:12 AM    A-G Ratio 1.1 02/02/2020 11:12 AM    ALT (SGPT) 31 02/02/2020 11:12 AM    Alk.  phosphatase 81 02/02/2020 11:12 AM     Lab Results   Component Value Date/Time    Glucose 107 (H) 02/03/2020 05:40 AM    Glucose 107 (H) 02/03/2020 05:40 AM    Glucose (POC) 136 (H) 01/10/2020 04:22 PM     Lab Results   Component Value Date/Time    Hemoglobin A1c 5.4 12/30/2019 02:09 PM     Hematology  Lab Results   Component Value Date/Time    WBC 5.4 02/03/2020 05:40 AM    RBC 3.76 (L) 02/03/2020 05:40 AM    HGB 11.8 02/03/2020 05:40 AM    HCT 37.0 02/03/2020 05:40 AM    MCV 98.4 02/03/2020 05:40 AM    MCH 31.4 02/03/2020 05:40 AM    MCHC 31.9 02/03/2020 05:40 AM    RDW 12.3 02/03/2020 05:40 AM    PLATELET 774 99/99/1910 05:40 AM     Lipids  Lab Results   Component Value Date/Time    Cholesterol, total 216 (H) 02/03/2020 05:40 AM    HDL Cholesterol 99 02/03/2020 05:40 AM    LDL, calculated 106.2 (H) 02/03/2020 05:40 AM    Triglyceride 54 02/03/2020 05:40 AM    CHOL/HDL Ratio 2.2 02/03/2020 05:40 AM     Thyroid Function  Lab Results   Component Value Date/Time    TSH 0.86 01/10/2020 08:00 PM    T3 Uptake 29 11/12/2015 03:52 PM    T4, Free 0.85 04/25/2019 04:09 PM    T4, Total 3.0 (L) 11/12/2015 03:52 PM     Pregnancy Status  Lab Results   Component Value Date/Time    Pregnancy test,urine (POC) NEGATIVE  12/08/2011 08:00 AM     Assessment/Plan:  Recommended baseline laboratory monitoring has been completed based on this patient's current medication regimen. Follow-up metabolic monitoring labs should be completed in 3 months (quarterly for the first year of antipsychotic therapy).      Harsha Salinas, YOSVANYD

## 2020-02-04 NOTE — INTERDISCIPLINARY ROUNDS
Behavioral Health Interdisciplinary Rounds     Patient Name: Rl Tinsley  Age: 53 y.o. Room/Bed:  734/  Primary Diagnosis: <principal problem not specified>   Admission Status: Voluntary     Readmission within 30 days: no  Power of  in place: no  Patient requires a blocked bed: yes          Reason for blocked bed: Behavior    VTE Prophylaxis: No    Mobility needs/Fall risk: no  Flu Vaccine : no   Nutritional Plan: no  Consults:          Labs/Testing due today?: no    Sleep hours:  3      Participation in Care/Groups:  yes  Medication Compliant?: No scheduled meds  PRNS (last 24 hours): Antipsychotic (PO), Antipsychotic (IM), Antianxiety, Anticholinergic, Sleep Aid and Pain    Restraints (last 24 hours):  no     CIWA (range last 24 hours):     COWS (range last 24 hours):      Alcohol screening (AUDIT) completed -   AUDIT Score: 2     If applicable, date SBIRT discussed in treatment team AND documented:   AUDIT Screen Score: AUDIT Score: 2    Tobacco - patient is a smoker: Have You Used Tobacco in the Past 30 Days: No  Illegal Drugs use: Have You Used Any Illegal Substances Over the Past 12 Months: No    24 hour chart check complete    Patient goal(s) for today:  Treatment team focus/goals : Plan to continue to titrate her medications   Progress note : she has been less labile , complaint with treatment.   States she slept well last night     LOS:  2  Expected LOS: TBD     Financial concerns/prescription coverage: medicare   Family contact:   -      Family requesting physician contact today:   Discharge plan: she will return home   Access to weapons : no     Outpatient provider(s): [facility] - [phone]   Patient's preferred phone number for follow up call :     Participating treatment team members: Stefan Crain, [facility], DIONNE - Vinay Doan

## 2020-02-04 NOTE — BH NOTES
Chief Complaint:\"I'm having some allergic reactions\"    Length of Stay: 2 Days    Interval History:  Pavan Pitts reports feeling that she is well rested this morning. She feels some nasal allergies. Slightly hyperverbal with the treatment team. Mood is \"great. \" Denies medication side effects. Past Medical History:  Past Medical History:   Diagnosis Date    Aggressive outburst     Arthritis     joints    Bipolar 1 disorder with moderate robyn ([facility] [address] 3/17/2014    Chronic pain     back with stress    Contact dermatitis and other eczema, due to unspecified cause     Depression     Headache(784.0)     Hyperlipidemia 8/22/2016    Other ill-defined conditions(799.89)     sinus infection,hay fever    Other ill-defined conditions(799.89)     scoliosis    Psychiatric disorder     bipolar    Psychotic disorder (HCC)     Stool color black     Tennis elbow syndrome 5/4/2015    Trauma            Labs:  Lab Results   Component Value Date/Time    WBC 5.4 02/03/2020 05:40 AM    HGB 11.8 02/03/2020 05:40 AM    Hematocrit (POC) 34 (L) 07/17/2018 03:33 AM    HCT 37.0 02/03/2020 05:40 AM    PLATELET 129 01/48/6341 05:40 AM    MCV 98.4 02/03/2020 05:40 AM      Lab Results   Component Value Date/Time    Sodium 136 02/03/2020 05:40 AM    Potassium 3.9 02/03/2020 05:40 AM    Chloride 103 02/03/2020 05:40 AM    CO2 29 02/03/2020 05:40 AM    Anion gap 4 (L) 02/03/2020 05:40 AM    Glucose 107 (H) 02/03/2020 05:40 AM    Glucose 107 (H) 02/03/2020 05:40 AM    BUN 5 (L) 02/03/2020 05:40 AM    Creatinine 0.81 02/03/2020 05:40 AM    BUN/Creatinine ratio 6 (L) 02/03/2020 05:40 AM    GFR est AA >60 02/03/2020 05:40 AM    GFR est non-AA >60 02/03/2020 05:40 AM    Calcium 9.8 02/03/2020 05:40 AM    Bilirubin, total 0.4 02/02/2020 11:12 AM    AST (SGOT) 20 02/02/2020 11:12 AM    Alk.  phosphatase 81 02/02/2020 11:12 AM    Protein, total 8.9 (H) 02/02/2020 11:12 AM    Albumin 4.6 02/02/2020 11:12 AM    Globulin 4.3 (H) 02/02/2020 11:12 AM A-G Ratio 1.1 02/02/2020 11:12 AM    ALT (SGPT) 31 02/02/2020 11:12 AM      Vitals:    02/03/20 1159 02/03/20 1726 02/03/20 2010 02/04/20 0751   BP: 120/85 116/73 129/72 115/75   Pulse: 92 90 82 79   Resp: 18 16 18 16   Temp: 97.8 °F (36.6 °C) 99 °F (37.2 °C) 98.7 °F (37.1 °C) 98.3 °F (36.8 °C)   SpO2:  100% 97% 100%         Current Facility-Administered Medications   Medication Dose Route Frequency Provider Last Rate Last Dose    [START ON 2/5/2020] carBAMazepine (TEGretol) tablet 200 mg  200 mg Oral DAILY Mariela Hagen NP        carBAMazepine (TEGretol) tablet 300 mg  300 mg Oral QHS Mariela Hagen NP        OLANZapine (ZyPREXA) tablet 15 mg  15 mg Oral QHS Mariela Hagen NP        OLANZapine (ZyPREXA) tablet 5 mg  5 mg Oral Q6H PRN Kaleyn Donato, NP   5 mg at 02/03/20 1837    haloperidol lactate (HALDOL) injection 5 mg  5 mg IntraMUSCular Q6H PRN Kaelyn Donato NP   5 mg at 02/03/20 1621    benztropine (COGENTIN) tablet 1 mg  1 mg Oral BID PRN Kaelyn Donato NP        diphenhydrAMINE (BENADRYL) injection 50 mg  50 mg IntraMUSCular BID PRN Kaelyn Donato NP   50 mg at 02/03/20 1621    hydroxyzine HCL (ATARAX) tablet 50 mg  50 mg Oral TID PRN Kaelyn Donato, NP   50 mg at 02/03/20 2150    LORazepam (ATIVAN) injection 2 mg  2 mg IntraMUSCular Q4H PRN Kaelyn Donato, NP   2 mg at 02/03/20 1621    traZODone (DESYREL) tablet 50 mg  50 mg Oral QHS PRN Kaelyn Natestela, NP   50 mg at 02/03/20 2041    acetaminophen (TYLENOL) tablet 650 mg  650 mg Oral Q4H PRN Kaelyn Donato NP   650 mg at 02/04/20 0009    magnesium hydroxide (MILK OF MAGNESIA) 400 mg/5 mL oral suspension 30 mL  30 mL Oral DAILY PRN Kaelyn Donato NP             Mental Status Exam:  Eye contact: fair  Grooming: fair  Psychomotor activity: wnl  Speech is spontaneous  Mood is \"great \"  Affect:blunted  Perception: denies AH/VH  Suicidal ideation: denies   Cognition is grossly intact         Physical Exam:  Body habitus: well developed  Musculoskeletal system: normal gait  Tremor - neg  Cog wheeling - neg      Assessment and Plan:  Betty Lazar meets criteria for a diagnosis of Bipolar 1 Disorder. Continue the medication regimen as prescribed  Disposition planning to continue. I certify that this patients inpatient psychiatric hospital services furnished since the previous certification were, and continue to be, required for treatment that could reasonably be expected to improve the patient's condition, or for diagnostic study, and that the patient continues to need, on a daily basis, active treatment furnished directly by or requiring the supervision of inpatient psychiatric facility personnel. In addition, the hospital records show that services furnished were intensive treatment services, admission or related services, or equivalent services.

## 2020-02-04 NOTE — BH NOTES
PRN Medication Documentation    Specific patient behavior that led to need for PRN medication: B/L foot pain  Staff interventions attempted prior to PRN being given: none   PRN medication given: Tylenol  Patient response/effectiveness of PRN medication: will continue to monitor       Effective
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
## 2020-02-04 NOTE — PROGRESS NOTES
Pt calm and cooperative. Med compliant. Interacts appropriately with staff. Continue q15 minute safety checks. Encourage continued compliance.      Problem: Altered Thought Process (Adult/Pediatric)  Goal: *STG: Participates in treatment plan  Outcome: Progressing Towards Goal  Goal: *STG: Remains safe in hospital  Outcome: Progressing Towards Goal  Goal: *STG: Seeks staff when feelings of anxiety and fear arise  Outcome: Progressing Towards Goal

## 2020-02-04 NOTE — PROGRESS NOTES
Pt in bed resting quietly with no complaints of distress. Respirations equal. Will continue to monitor. Problem: Altered Thought Process (Adult/Pediatric)  Goal: *STG: Remains safe in hospital  Outcome: Progressing Towards Goal     Problem: Falls - Risk of  Goal: *Absence of Falls  Description  Document Clide Failing Fall Risk and appropriate interventions in the flowsheet. Outcome: Progressing Towards Goal  Note: Fall Risk Interventions:  Mobility Interventions: Assess mobility with egress test    Mentation Interventions: Adequate sleep, hydration, pain control    Medication Interventions: Teach patient to arise slowly    Elimination Interventions:  Toilet paper/wipes in reach         Blocked Bed Documentation:    Room number: 734  Type: Behavior  Rationale: Poor Self-Control  Anticipated duration: TBD in treatment team

## 2020-02-04 NOTE — BH NOTES
PRN Medication Documentation    Specific patient behavior that led to need for PRN medication:  \"I need something for anxiety, my mind is racing\"  Staff interventions attempted prior to PRN being given: discussed coping skills  PRN medication given: atarax 50 mg po  Patient response/effectiveness of PRN medication: will continue to monitor      1345 pt. Sitting on the floor by her room upset with the workman on the unit        1400 pt.asked to leave the hospital  [address] called and talked with pt.   And agreed to stay  In hospital

## 2020-02-05 PROCEDURE — 74011250637 HC RX REV CODE- 250/637: Performed by: NURSE PRACTITIONER

## 2020-02-05 PROCEDURE — 65220000003 HC RM SEMIPRIVATE PSYCH

## 2020-02-05 RX ORDER — TRAZODONE HYDROCHLORIDE 100 MG/1
100 TABLET ORAL
Status: DISCONTINUED | OUTPATIENT
Start: 2020-02-05 | End: 2020-02-11 | Stop reason: HOSPADM

## 2020-02-05 RX ORDER — OLANZAPINE 5 MG/1
20 TABLET ORAL
Status: DISCONTINUED | OUTPATIENT
Start: 2020-02-05 | End: 2020-02-11 | Stop reason: HOSPADM

## 2020-02-05 RX ADMIN — OLANZAPINE 5 MG: 5 TABLET, FILM COATED ORAL at 15:38

## 2020-02-05 RX ADMIN — CARBAMAZEPINE 200 MG: 200 TABLET ORAL at 09:02

## 2020-02-05 RX ADMIN — ACETAMINOPHEN 650 MG: 325 TABLET ORAL at 12:27

## 2020-02-05 RX ADMIN — OLANZAPINE 5 MG: 5 TABLET, FILM COATED ORAL at 01:46

## 2020-02-05 RX ADMIN — OLANZAPINE 20 MG: 5 TABLET, FILM COATED ORAL at 20:14

## 2020-02-05 RX ADMIN — TRAZODONE HYDROCHLORIDE 100 MG: 100 TABLET ORAL at 22:36

## 2020-02-05 RX ADMIN — ACETAMINOPHEN 650 MG: 325 TABLET ORAL at 17:30

## 2020-02-05 RX ADMIN — CARBAMAZEPINE 300 MG: 200 TABLET ORAL at 20:15

## 2020-02-05 RX ADMIN — ACETAMINOPHEN 650 MG: 325 TABLET ORAL at 00:54

## 2020-02-05 RX ADMIN — HYDROXYZINE HYDROCHLORIDE 50 MG: 50 TABLET, FILM COATED ORAL at 15:37
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
## 2020-02-05 NOTE — BH NOTES
GROUP THERAPY PROGRESS NOTE    Pablo Morales did not participate in an Acute Unit Process Group, with a focus on identifying feelings, planning for the rest of the day, and developing coping skills.

## 2020-02-05 NOTE — PROGRESS NOTES
Problem: Discharge Planning  Goal: *Discharge to safe environment  Outcome: Progressing Towards Goal  Goal: *Knowledge of medication management  Outcome: Progressing Towards Goal

## 2020-02-05 NOTE — INTERDISCIPLINARY ROUNDS
Behavioral Health Interdisciplinary Rounds     Patient Name: Georges Navarro  Age: 53 y.o. Room/Bed:  734/  Primary Diagnosis: <principal problem not specified>   Admission Status: Voluntary     Readmission within 30 days: no  Power of  in place: no  Patient requires a blocked bed: yes          Reason for blocked bed: behavior    VTE Prophylaxis: No    Mobility needs/Fall risk: no  Flu Vaccine : no   Nutritional Plan: no  Consults:          Labs/Testing due today?: no    Sleep hours:  0      Participation in Care/Groups:  yes  Medication Compliant?: Yes  PRNS (last 24 hours): Antianxiety, Sleep Aid and Pain    Restraints (last 24 hours):  no     CIWA (range last 24 hours):     COWS (range last 24 hours):      Alcohol screening (AUDIT) completed -   AUDIT Score: 2     If applicable, date SBIRT discussed in treatment team AND documented:   AUDIT Screen Score: AUDIT Score: 2    Tobacco - patient is a smoker: Have You Used Tobacco in the Past 30 Days: No  Illegal Drugs use: Have You Used Any Illegal Substances Over the Past 12 Months: No    24 hour chart check complete: yes     Patient goal(s) for today:   Treatment team focus/goals: Plan for a TDO  assessment. Progress note : She is requesting discharge again today. She did not sleep at all last night. Required prn medications , speech remains pressured and she requires direction from nursing staff.      LOS:  3  Expected LOS: TBD    Financial concerns/prescription coverage:    Family contact: De Sal - [phone]    Family requesting physician contact today:    Discharge plan: she will return home   Access to weapons :  No      Outpatient provider(s): [facility] , NP and Fazal Barker - [phone]      Patient's preferred phone number for follow up call :     Participating treatment team members: Georges Navarro,  Crescencio Gannon RN

## 2020-02-05 NOTE — PROGRESS NOTES
Pt cooperative, anxious at times. Thought process is loose. Med and meal compliant.     Problem: Altered Thought Process (Adult/Pediatric)  Goal: *STG: Participates in treatment plan  Outcome: Progressing Towards Goal  Goal: *STG: Remains safe in hospital  Outcome: Progressing Towards Goal  Goal: *STG: Seeks staff when feelings of anxiety and fear arise  Outcome: Progressing Towards Goal

## 2020-02-05 NOTE — BH NOTES
Chief Complaint:\"I'm having some allergic reactions\"    Length of Stay: 3 Days    Interval History:  Deborah Salazar did not sleep last night. She says this is because she drank hot tea yesterday. She is frustrated that she is in the hospital and blames her . Denies SI/HI. She is requesting discharge. [facility] will be contacted. Past Medical History:  Past Medical History:   Diagnosis Date    Aggressive outburst     Arthritis     joints    Bipolar 1 disorder with moderate robyn ([address] 3/17/2014    Chronic pain     back with stress    Contact dermatitis and other eczema, due to unspecified cause     Depression     Headache(784.0)     Hyperlipidemia 8/22/2016    Other ill-defined conditions(799.89)     sinus infection,hay fever    Other ill-defined conditions(799.89)     scoliosis    Psychiatric disorder     bipolar    Psychotic disorder (HCC)     Stool color black     Tennis elbow syndrome 5/4/2015    Trauma            Labs:  Lab Results   Component Value Date/Time    WBC 5.4 02/03/2020 05:40 AM    HGB 11.8 02/03/2020 05:40 AM    Hematocrit (POC) 34 (L) 07/17/2018 03:33 AM    HCT 37.0 02/03/2020 05:40 AM    PLATELET 523 37/89/3274 05:40 AM    MCV 98.4 02/03/2020 05:40 AM      Lab Results   Component Value Date/Time    Sodium 136 02/03/2020 05:40 AM    Potassium 3.9 02/03/2020 05:40 AM    Chloride 103 02/03/2020 05:40 AM    CO2 29 02/03/2020 05:40 AM    Anion gap 4 (L) 02/03/2020 05:40 AM    Glucose 107 (H) 02/03/2020 05:40 AM    Glucose 107 (H) 02/03/2020 05:40 AM    BUN 5 (L) 02/03/2020 05:40 AM    Creatinine 0.81 02/03/2020 05:40 AM    BUN/Creatinine ratio 6 (L) 02/03/2020 05:40 AM    GFR est AA >60 02/03/2020 05:40 AM    GFR est non-AA >60 02/03/2020 05:40 AM    Calcium 9.8 02/03/2020 05:40 AM    Bilirubin, total 0.4 02/02/2020 11:12 AM    AST (SGOT) 20 02/02/2020 11:12 AM    Alk.  phosphatase 81 02/02/2020 11:12 AM    Protein, total 8.9 (H) 02/02/2020 11:12 AM    Albumin 4.6 02/02/2020 11:12 AM Globulin 4.3 (H) 02/02/2020 11:12 AM    A-G Ratio 1.1 02/02/2020 11:12 AM    ALT (SGPT) 31 02/02/2020 11:12 AM      Vitals:    02/04/20 1211 02/04/20 1632 02/04/20 1940 02/05/20 0747   BP: 114/64 121/76 110/71 115/76   Pulse: 93 94 82 73   Resp: 16 18 18 16   Temp: 98.4 °F (36.9 °C) 98.7 °F (37.1 °C) 98.7 °F (37.1 °C) 98.3 °F (36.8 °C)   SpO2:  100% 99% 100%         Current Facility-Administered Medications   Medication Dose Route Frequency Provider Last Rate Last Dose    OLANZapine (ZyPREXA) tablet 20 mg  20 mg Oral QHS Mariela Hagen, NP        carBAMazepine (TEGretol) tablet 200 mg  200 mg Oral DAILY Mariela Hagen NP   200 mg at 02/05/20 0902    carBAMazepine (TEGretol) tablet 300 mg  300 mg Oral QHS Mariela Hagen NP   300 mg at 02/04/20 2052    OLANZapine (ZyPREXA) tablet 5 mg  5 mg Oral Q6H PRN Ulysses Barbone, NP   5 mg at 02/05/20 0146    haloperidol lactate (HALDOL) injection 5 mg  5 mg IntraMUSCular Q6H PRN Ulysses Barbone, NP   5 mg at 02/03/20 1621    benztropine (COGENTIN) tablet 1 mg  1 mg Oral BID PRN Ulysses Barbone, NP        diphenhydrAMINE (BENADRYL) injection 50 mg  50 mg IntraMUSCular BID PRN Ulysses Barbone, NP   50 mg at 02/03/20 1621    hydroxyzine HCL (ATARAX) tablet 50 mg  50 mg Oral TID PRN Ulysses Barbone, NP   50 mg at 02/04/20 2200    LORazepam (ATIVAN) injection 2 mg  2 mg IntraMUSCular Q4H PRN Ulysses Barbone, NP   2 mg at 02/03/20 1621    traZODone (DESYREL) tablet 50 mg  50 mg Oral QHS PRN Ulysses Barbone, NP   50 mg at 02/04/20 2045    acetaminophen (TYLENOL) tablet 650 mg  650 mg Oral Q4H PRN Ulysses Barbone, NP   650 mg at 02/05/20 0054    magnesium hydroxide (MILK OF MAGNESIA) 400 mg/5 mL oral suspension 30 mL  30 mL Oral DAILY PRN Ulysses Barbone, NP             Mental Status Exam:  Eye contact: fair  Grooming: fair  Psychomotor activity: wnl  Speech is spontaneous  Mood is \"great \"  Affect:blunted  Perception: denies AH/VH  Suicidal ideation: denies   Cognition is grossly intact         Physical Exam:  Body habitus: well developed  Musculoskeletal system: normal gait  Tremor - neg  Cog wheeling - neg      Assessment and Plan:  Felipe Jones meets criteria for a diagnosis of Bipolar 1 Disorder. Increase Zyprexa to 20mg QHS  Increase trazodone 100mg  Continue the medication regimen as prescribed  Disposition planning to continue. I certify that this patients inpatient psychiatric hospital services furnished since the previous certification were, and continue to be, required for treatment that could reasonably be expected to improve the patient's condition, or for diagnostic study, and that the patient continues to need, on a daily basis, active treatment furnished directly by or requiring the supervision of inpatient psychiatric facility personnel. In addition, the hospital records show that services furnished were intensive treatment services, admission or related services, or equivalent services.

## 2020-02-05 NOTE — PROGRESS NOTES
Problem: Altered Thought Process (Adult/Pediatric)  Goal: *STG: Remains safe in hospital  Outcome: Not Progressing Towards Goal  Note:   Has been intrusive with others and probability of safety concerns exist.At this time is excepting redirection. Problem: Altered Thought Process (Adult/Pediatric)  Goal: *STG: Complies with medication therapy  Note:   Has been medication compliant. Denies side effects. Problem: Manic Behavior (Adult/Pediatric)  Goal: *STG: Demonstrates improvement in thought process and speech pattern  Outcome: Not Progressing Towards Goal  Note:   Thoughts are somewhat disorganized. Speech is rapid. Has difficulty with prolonged concentration. Problem: Manic Behavior (Adult/Pediatric)  Goal: *STG: Maintains appropriate boundaries  Outcome: Not Progressing Towards Goal     Problem: Manic Behavior (Adult/Pediatric)  Goal: *STG: Demonstrates improvement in sleep pattern  Outcome: Not Progressing Towards Goal  Note:   Sleep is reported at 0 hours.

## 2020-02-05 NOTE — BH NOTES
GROUP THERAPY PROGRESS NOTE    Betty Lazar is participating in [place].      Group time: 20 minutes    Personal goal for participation: get some sleep    Goal orientation: community    Group therapy participation: active    Therapeutic interventions reviewed and discussed: yes    Impression of participation: engaged

## 2020-02-05 NOTE — BH NOTES
PRN Medication Documentation    Specific patient behavior that led to need for PRN medication: Pt appears agitated. Thought process loose, pt pacing room, rapid speech. Pt reports feeling anxious. Pt states she would like medication for her 'nerves'  Staff interventions attempted prior to PRN being given: reassurance, dim lighting  PRN medication given: Atarax PO and Zyprexa PO  Patient response/effectiveness of PRN medication: will monitor    1630- Pt resting quietly in room. 1700-PRN Medication Documentation    Specific patient behavior that led to need for PRN medication: Pt reporting constipation, requests med  Staff interventions attempted prior to PRN being given: encourage fluids  PRN medication given: milk of mag PO  Patient response/effectiveness of PRN medication: will monitor    PRN Medication Documentation    Specific patient behavior that led to need for PRN medication: Pt reporting back pain.    Staff interventions attempted prior to PRN being given: calm environment, pt resting in chair  PRN medication given: Tylenol PO  Patient response/effectiveness of PRN medication: will reasses

## 2020-02-05 NOTE — BH NOTES
1300 pt. Met in treatment team  Slept 0 hours remains loose  Anxious  requesting to leave   [address] notified  And in to see pt. Plan to TDO pt.

## 2020-02-05 NOTE — BH NOTES
PRN Medication Documentation    Specific patient behavior that led to need for PRN medication: co pain  Staff interventions attempted prior to PRN being given: pain rate scale 8/10  PRN medication given: tylenol 650 mg  Patient response/effectiveness of PRN medication: tbd

## 2020-02-05 NOTE — BH NOTES
PRN Medication Documentation    Specific patient behavior that led to need for PRN medication: generalized pain  Staff interventions attempted prior to PRN being given: none  PRN medication given: Tylenol  Patient response/effectiveness of PRN medication: will continue to monitor  Effective    0146  PRN Medication Documentation    Specific patient behavior that led to need for PRN medication: agitation  Staff interventions attempted prior to PRN being given: education  PRN medication given: Zyprexa  Patient response/effectiveness of PRN medication: will continue to monitor  Effective

## 2020-02-06 ENCOUNTER — PATIENT OUTREACH (OUTPATIENT)
Dept: INTERNAL MEDICINE CLINIC | Age: 55
End: 2020-02-06

## 2020-02-06 PROCEDURE — 74011250637 HC RX REV CODE- 250/637: Performed by: PSYCHIATRY & NEUROLOGY

## 2020-02-06 PROCEDURE — 74011250637 HC RX REV CODE- 250/637: Performed by: NURSE PRACTITIONER

## 2020-02-06 PROCEDURE — 65220000003 HC RM SEMIPRIVATE PSYCH

## 2020-02-06 RX ORDER — METHOCARBAMOL 500 MG/1
750 TABLET, FILM COATED ORAL
Status: DISCONTINUED | OUTPATIENT
Start: 2020-02-06 | End: 2020-02-11 | Stop reason: HOSPADM

## 2020-02-06 RX ADMIN — TRAZODONE HYDROCHLORIDE 100 MG: 100 TABLET ORAL at 23:32

## 2020-02-06 RX ADMIN — ACETAMINOPHEN 650 MG: 325 TABLET ORAL at 08:20

## 2020-02-06 RX ADMIN — METHOCARBAMOL TABLETS 750 MG: 500 TABLET, COATED ORAL at 20:49

## 2020-02-06 RX ADMIN — HYDROXYZINE HYDROCHLORIDE 50 MG: 50 TABLET, FILM COATED ORAL at 11:34

## 2020-02-06 RX ADMIN — CARBAMAZEPINE 200 MG: 200 TABLET ORAL at 08:19

## 2020-02-06 RX ADMIN — OLANZAPINE 20 MG: 5 TABLET, FILM COATED ORAL at 20:51

## 2020-02-06 RX ADMIN — CARBAMAZEPINE 300 MG: 200 TABLET ORAL at 20:50

## 2020-02-06 RX ADMIN — HYDROXYZINE HYDROCHLORIDE 50 MG: 50 TABLET, FILM COATED ORAL at 17:24

## 2020-02-06 RX ADMIN — ACETAMINOPHEN 650 MG: 325 TABLET ORAL at 14:29

## 2020-02-06 NOTE — PROGRESS NOTES
Patient has been fall free since arriving on the Acute BH unit. She is currently sleeping, no stress noted.

## 2020-02-06 NOTE — PROGRESS NOTES
Problem: Manic Behavior (Adult/Pediatric)  Goal: *STG: Participates in treatment plan  Outcome: Progressing Towards Goal  Note:   Pt has been visible on the unit. Med and meal compliant. Pt at times has a hard time focusing on topic being discussed recognized this, attempted to settle herself and concentrate on what RN was saying. Pt's technique was successful. Pt has been calm and cooperative. Staff will continue to monitor and encourage positive thoughts and coping skills. Problem: Falls - Risk of  Goal: *Absence of Falls  Description  Document Sandy Randolph Fall Risk and appropriate interventions in the flowsheet. Note:   Fall Risk Interventions:  Mobility Interventions: Assess mobility with egress test    Mentation Interventions: Adequate sleep, hydration, pain control    Medication Interventions: Teach patient to arise slowly    Elimination Interventions: Toilet paper/wipes in reach    History of Falls Interventions: Room close to nurse's station    Pt is free from falls.

## 2020-02-06 NOTE — BH NOTES
GROUP THERAPY PROGRESS NOTE    Nata Anderson participated in an Acute Unit Process Group, with a focus on identifying feelings, planning for the rest of the day, and developing coping skills. Group time: 5276 - 1568    Personal goal for participation: To increase the capacity to improve ones mood and structure. Goal orientation: The patient will be able to identify their feelings, develop a plan for structuring their day, and practicing appropriate interaction with peers. Therapeutic interventions reviewed and discussed: The group members were asked to introduce themselves to each other and to see if they share their feelings and thoughts, and plans for the rest of their day. Impression of participation: With prompting, this patient actively   participated in the group. She was alert, generally oriented, talkative, and said, \"i'm feeling better. ..got rest, good sleep. \"   The patient expressed no current SI/HI and displayed no overt psychotic symptoms in this group. The patient sat throughout the session and conversed with a peers in a clear and supportive fashion. It is only the second process group this patient has attended with the undersigned. Her affect anxious and mildly euphoric. Her mood reflected her affect. Unlike much of this week, she seemed to be able to manage the stress of attending this group. She may continue to need help refining her aftercare needs and plans, with the help of her treatment plan and her .

## 2020-02-06 NOTE — GROUP NOTE
DANIEL  GROUP DOCUMENTATION INDIVIDUAL                                                                          Group Therapy Note    Date: 2/6/2020    Group Start Time: 0945  Group End Time: 2567  Group Topic: Community Meeting    [address], [address] [place]; Obdulia Nino, [address] [facility] GROUP DOCUMENTATION GROUP    Group Therapy Note    Attendees:          Attendance: Attended    Patient's Goal:      Interventions/techniques: Informed    Follows Directions:  Followed directions    Interactions: Interacted appropriately    Mental Status: Calm    Behavior/appearance: Cooperative    Goals Achieved: Able to engage in interactions      Additional Notes:      Roberta Zendejas

## 2020-02-06 NOTE — BH NOTES
Chief Complaint:  I feel great. Length of Stay: 4 Days    Interval History:  Amaury Fernández is slightly better. She allowed permission to make contact with her  which was a change for now. She was committed in mental health court today and says she agrees with this. Slept better last night. Remains hypomanic, talking rapidly and can be difficult to interrupt. Says she slept \"great\" but nursing staff reports that she only slept 4.5 hours. Past Medical History:  Past Medical History:   Diagnosis Date    Aggressive outburst     Arthritis     joints    Bipolar 1 disorder with moderate robyn ([address] 3/17/2014    Chronic pain     back with stress    Contact dermatitis and other eczema, due to unspecified cause     Depression     Headache(784.0)     Hyperlipidemia 8/22/2016    Other ill-defined conditions(799.89)     sinus infection,hay fever    Other ill-defined conditions(799.89)     scoliosis    Psychiatric disorder     bipolar    Psychotic disorder (HCC)     Stool color black     Tennis elbow syndrome 5/4/2015    Trauma            Labs:  Lab Results   Component Value Date/Time    WBC 5.4 02/03/2020 05:40 AM    HGB 11.8 02/03/2020 05:40 AM    Hematocrit (POC) 34 (L) 07/17/2018 03:33 AM    HCT 37.0 02/03/2020 05:40 AM    PLATELET 311 04/42/0891 05:40 AM    MCV 98.4 02/03/2020 05:40 AM      Lab Results   Component Value Date/Time    Sodium 136 02/03/2020 05:40 AM    Potassium 3.9 02/03/2020 05:40 AM    Chloride 103 02/03/2020 05:40 AM    CO2 29 02/03/2020 05:40 AM    Anion gap 4 (L) 02/03/2020 05:40 AM    Glucose 107 (H) 02/03/2020 05:40 AM    Glucose 107 (H) 02/03/2020 05:40 AM    BUN 5 (L) 02/03/2020 05:40 AM    Creatinine 0.81 02/03/2020 05:40 AM    BUN/Creatinine ratio 6 (L) 02/03/2020 05:40 AM    GFR est AA >60 02/03/2020 05:40 AM    GFR est non-AA >60 02/03/2020 05:40 AM    Calcium 9.8 02/03/2020 05:40 AM    Bilirubin, total 0.4 02/02/2020 11:12 AM    AST (SGOT) 20 02/02/2020 11:12 AM    Alk. phosphatase 81 02/02/2020 11:12 AM    Protein, total 8.9 (H) 02/02/2020 11:12 AM    Albumin 4.6 02/02/2020 11:12 AM    Globulin 4.3 (H) 02/02/2020 11:12 AM    A-G Ratio 1.1 02/02/2020 11:12 AM    ALT (SGPT) 31 02/02/2020 11:12 AM      Vitals:    02/05/20 1458 02/05/20 1535 02/05/20 1900 02/06/20 0729   BP:  133/79 122/82 124/80   Pulse:  87 (!) 55 83   Resp:  14 18 16   Temp:  98.6 °F (37 °C) 98.5 °F (36.9 °C) 98.6 °F (37 °C)   SpO2:  99% 100% 100%   Weight: 72.1 kg (159 lb)            Current Facility-Administered Medications   Medication Dose Route Frequency Provider Last Rate Last Dose    OLANZapine (ZyPREXA) tablet 20 mg  20 mg Oral QHS Mariela Hagen, NP   20 mg at 02/05/20 2014    traZODone (DESYREL) tablet 100 mg  100 mg Oral QHS PRN Mariela Hagen NP   100 mg at 02/05/20 2236    carBAMazepine (TEGretol) tablet 200 mg  200 mg Oral DAILY Mariela Hagen NP   200 mg at 02/06/20 0819    carBAMazepine (TEGretol) tablet 300 mg  300 mg Oral QHS Mariela Hagen NP   300 mg at 02/05/20 2015    OLANZapine (ZyPREXA) tablet 5 mg  5 mg Oral Q6H PRN Curt Generous, NP   5 mg at 02/05/20 1538    haloperidol lactate (HALDOL) injection 5 mg  5 mg IntraMUSCular Q6H PRN Elias Generous, NP   5 mg at 02/03/20 1621    benztropine (COGENTIN) tablet 1 mg  1 mg Oral BID PRN Curt Generous, NP        diphenhydrAMINE (BENADRYL) injection 50 mg  50 mg IntraMUSCular BID PRN Curt Generous, NP   50 mg at 02/03/20 1621    hydroxyzine HCL (ATARAX) tablet 50 mg  50 mg Oral TID PRN Curt Generous, NP   50 mg at 02/06/20 1134    LORazepam (ATIVAN) injection 2 mg  2 mg IntraMUSCular Q4H PRN Curt Generous, NP   2 mg at 02/03/20 1621    acetaminophen (TYLENOL) tablet 650 mg  650 mg Oral Q4H PRN Curt Generous, NP   650 mg at 02/06/20 0820    magnesium hydroxide (MILK OF MAGNESIA) 400 mg/5 mL oral suspension 30 mL  30 mL Oral DAILY PRN Curt Generous, NP             Mental Status Exam:  Eye contact: fair  Grooming: fair  Psychomotor activity: increased  Speech is spontaneous, increased output  Mood is \"great\"  Affect: elated. Perception: denies AH/VH  Suicidal ideation: denies   Cognition is grossly intact         Physical Exam:  Body habitus: well developed  Musculoskeletal system: normal gait  Tremor - neg  Cog wheeling - neg      Assessment and Plan:  Veronika Rivera meets criteria for a diagnosis of Bipolar 1 Disorder. Check Tegretol level in AM.   Continue the medication regimen as prescribed  Disposition planning to continue. I certify that this patients inpatient psychiatric hospital services furnished since the previous certification were, and continue to be, required for treatment that could reasonably be expected to improve the patient's condition, or for diagnostic study, and that the patient continues to need, on a daily basis, active treatment furnished directly by or requiring the supervision of inpatient psychiatric facility personnel. In addition, the hospital records show that services furnished were intensive treatment services, admission or related services, or equivalent services.

## 2020-02-06 NOTE — BH NOTES
PRN Medication Documentation    Specific patient behavior that led to need for PRN medication: Pt walking in rossi, reports she can not sleep  Staff interventions attempted prior to PRN being given: pt encouraged to lay quietly in bed  PRN medication given: trazadone PO  Patient response/effectiveness of PRN medication: will monitor

## 2020-02-06 NOTE — INTERDISCIPLINARY ROUNDS
Behavioral Health Interdisciplinary Rounds     Patient Name: Marcia Kat  Age: 53 y.o. Room/Bed:  734/  Primary Diagnosis: <principal problem not specified>   Admission Status: Involuntary Commitment     Readmission within 30 days: no  Power of  in place: no  Patient requires a blocked bed: yes          Reason for blocked bed: unpredictable behavior    VTE Prophylaxis: No    Mobility needs/Fall risk: no  Flu Vaccine : no   Nutritional Plan: no  Consults:          Labs/Testing due today?: no    Sleep hours:  4.30      Participation in Care/Groups:  yes  Medication Compliant?: Yes  PRNS (last 24 hours): Antianxiety    Restraints (last 24 hours):  no     CIWA (range last 24 hours):     COWS (range last 24 hours):      Alcohol screening (AUDIT) completed -   AUDIT Score: 2     If applicable, date SBIRT discussed in treatment team AND documented:   AUDIT Screen Score: AUDIT Score: 2    Tobacco - patient is a smoker: Have You Used Tobacco in the Past 30 Days: No  Illegal Drugs use: Have You Used Any Illegal Substances Over the Past 12 Months: No    24 hour chart check complete: yes     Patient goal(s) for today:   Treatment team focus/goals: Plan to titrate her medications. Progress note : she was better today and has been pleasant and compliant with her treatment     LOS:  3  Expected LOS: TBD    Financial concerns/prescription coverage:  Medicare   Family contact:   - SHEILA spoke to him today - [phone]      Family requesting physician contact today:  no  Discharge plan: she will return home when ready for discharge   Access to weapons :  no       Outpatient provider(s): [address]   Patient's preferred phone number for follow up call :     Participating treatment team members: Raisa Kennedy Do, Dr.,  PharmD.

## 2020-02-06 NOTE — BH NOTES
PRN Medication Documentation     Specific patient behavior that led to need for PRN medication: Anxiety     Staff intervention attempted prior to administration: Verbal re-direction, reality reorienting, and therapeutic listening. PRN Medication given: Hydroxyzine 50 mg PO at 1724 hrs     Patient response/effectiveness to PRN: Relief in symptoms.

## 2020-02-06 NOTE — BH NOTES
Blocked Bed Documentation:    Room number: 734-2  Type: Behavior  Rationale: Poor Sleep Pattern  Anticipated duration: TBD  Additional comments:Pt is up and down -difficulty maintaining sleep

## 2020-02-07 LAB — CARBAMAZEPINE SERPL-MCNC: 14.2 UG/ML (ref 4–12)

## 2020-02-07 PROCEDURE — 74011250637 HC RX REV CODE- 250/637: Performed by: PSYCHIATRY & NEUROLOGY

## 2020-02-07 PROCEDURE — 74011250637 HC RX REV CODE- 250/637: Performed by: NURSE PRACTITIONER

## 2020-02-07 PROCEDURE — 65220000003 HC RM SEMIPRIVATE PSYCH

## 2020-02-07 PROCEDURE — 80156 ASSAY CARBAMAZEPINE TOTAL: CPT

## 2020-02-07 PROCEDURE — 36415 COLL VENOUS BLD VENIPUNCTURE: CPT

## 2020-02-07 PROCEDURE — 74011250636 HC RX REV CODE- 250/636: Performed by: NURSE PRACTITIONER

## 2020-02-07 RX ORDER — CARBAMAZEPINE 200 MG/1
200 TABLET ORAL
Status: DISCONTINUED | OUTPATIENT
Start: 2020-02-07 | End: 2020-02-11 | Stop reason: HOSPADM

## 2020-02-07 RX ORDER — ERGOCALCIFEROL 1.25 MG/1
50000 CAPSULE ORAL
Status: DISCONTINUED | OUTPATIENT
Start: 2020-02-07 | End: 2020-02-11 | Stop reason: HOSPADM

## 2020-02-07 RX ADMIN — CARBAMAZEPINE 200 MG: 200 TABLET ORAL at 08:38

## 2020-02-07 RX ADMIN — HYDROXYZINE HYDROCHLORIDE 50 MG: 50 TABLET, FILM COATED ORAL at 19:15

## 2020-02-07 RX ADMIN — TRAZODONE HYDROCHLORIDE 100 MG: 100 TABLET ORAL at 23:33

## 2020-02-07 RX ADMIN — HYDROXYZINE HYDROCHLORIDE 50 MG: 50 TABLET, FILM COATED ORAL at 13:54

## 2020-02-07 RX ADMIN — DIPHENHYDRAMINE HYDROCHLORIDE 50 MG: 50 INJECTION, SOLUTION INTRAMUSCULAR; INTRAVENOUS at 21:30

## 2020-02-07 RX ADMIN — HYDROXYZINE HYDROCHLORIDE 50 MG: 50 TABLET, FILM COATED ORAL at 05:04

## 2020-02-07 RX ADMIN — ERGOCALCIFEROL 50000 UNITS: 1.25 CAPSULE ORAL at 13:54

## 2020-02-07 RX ADMIN — CARBAMAZEPINE 200 MG: 200 TABLET ORAL at 21:19

## 2020-02-07 RX ADMIN — OLANZAPINE 20 MG: 5 TABLET, FILM COATED ORAL at 21:19

## 2020-02-07 NOTE — PROGRESS NOTES
Problem: Altered Thought Process (Adult/Pediatric)  Goal: *STG: Remains safe in hospital  Outcome: Progressing Towards Goal     Problem: Altered Thought Process (Adult/Pediatric)  Goal: *STG: Complies with medication therapy  Outcome: Progressing Towards Goal     Problem: Altered Thought Process (Adult/Pediatric)  Goal: *STG: Attends activities and groups  Outcome: Progressing Towards Goal     Problem: Altered Thought Process (Adult/Pediatric)  Goal: Interventions  Outcome: Progressing Towards Goal  Note:   Pt is disorganized with flight of ideas. Concentration is improving. Pt is focused on techniques to stay organized throughout the day. Participating in groups. Medication complaint.       Blocked Bed Documentation:    Room number: 084   Type: Behavior  Rationale: Intrusive  Anticipated duration: re evaluate q shift  Additional comments:sleep 4 hours last night, disruptive during evening, increased during night

## 2020-02-07 NOTE — BH NOTES
PRN Medication Documentation    Specific patient behavior that led to need for PRN medication: co anxiety  Staff interventions attempted prior to PRN being given: diversional activity  PRN medication given: atarax 50 mg po  Patient response/effectiveess of PRN medication: will continue to monitor

## 2020-02-07 NOTE — GROUP NOTE
[facility] GROUP DOCUMENTATION INDIVIDUAL Group Therapy Note Date: 2/6/2020 Group Start Time: 2015 Group End Time: 2045 Group Topic: Reflection/Relaxation [place]  DANIEL 
 
[facility] GROUP DOCUMENTATION GROUP Group Therapy Note Attendees:  
 
  
 
Attendance: Attended Patient's Goal:  Unit orientation and daily progress Interventions/techniques: Supported Follows Directions: Followed directions Interactions: Interacted appropriately Mental Status: Calm Behavior/appearance: Attentive and Cooperative Goals Achieved: Able to engage in interactions and Able to listen to others Additional Notes:  Seems in fair spirits. Comments and questions appropriate to group discussion. [facility] [address]

## 2020-02-07 NOTE — GROUP NOTE
DANILE  GROUP DOCUMENTATION INDIVIDUAL                                                                          Group Therapy Note    Date: 2/7/2020    Group Start Time: 0930  Group End Time: 1000  Group Topic: Community Meeting    [address], [address] [place]; [place], [address] [facility] GROUP DOCUMENTATION GROUP    Group Therapy Note    Attendees:          Attendance: Attended    Patient's Goal:      Interventions/techniques: Informed    Follows Directions:  Followed directions    Interactions: Interacted appropriately    Mental Status: Calm    Behavior/appearance: Cooperative    Goals Achieved: Able to listen to others      Additional Notes:      Katelyn Traylor

## 2020-02-07 NOTE — BH NOTES
Chief Complaint:  I think I am okay. Length of Stay: 5 Days    Interval History:  Deborah Salazar continues to exhibit manic symptoms. She gets worked up easily, talks rapidly and had to be given Vistaril to help her calm down. She has been disorganized in the milieu and was so during the interview. Her Tegretol level was high at 14.2. No S/S of toxicity are present. Talking less rapidly today. Past Medical History:  Past Medical History:   Diagnosis Date    Aggressive outburst     Arthritis     joints    Bipolar 1 disorder with moderate robyn ([address] 3/17/2014    Chronic pain     back with stress    Contact dermatitis and other eczema, due to unspecified cause     Depression     Headache(784.0)     Hyperlipidemia 8/22/2016    Other ill-defined conditions(799.89)     sinus infection,hay fever    Other ill-defined conditions(799.89)     scoliosis    Psychiatric disorder     bipolar    Psychotic disorder (HCC)     Stool color black     Tennis elbow syndrome 5/4/2015    Trauma            Labs:  Lab Results   Component Value Date/Time    WBC 5.4 02/03/2020 05:40 AM    HGB 11.8 02/03/2020 05:40 AM    Hematocrit (POC) 34 (L) 07/17/2018 03:33 AM    HCT 37.0 02/03/2020 05:40 AM    PLATELET 760 69/62/9742 05:40 AM    MCV 98.4 02/03/2020 05:40 AM      Lab Results   Component Value Date/Time    Sodium 136 02/03/2020 05:40 AM    Potassium 3.9 02/03/2020 05:40 AM    Chloride 103 02/03/2020 05:40 AM    CO2 29 02/03/2020 05:40 AM    Anion gap 4 (L) 02/03/2020 05:40 AM    Glucose 107 (H) 02/03/2020 05:40 AM    Glucose 107 (H) 02/03/2020 05:40 AM    BUN 5 (L) 02/03/2020 05:40 AM    Creatinine 0.81 02/03/2020 05:40 AM    BUN/Creatinine ratio 6 (L) 02/03/2020 05:40 AM    GFR est AA >60 02/03/2020 05:40 AM    GFR est non-AA >60 02/03/2020 05:40 AM    Calcium 9.8 02/03/2020 05:40 AM    Bilirubin, total 0.4 02/02/2020 11:12 AM    AST (SGOT) 20 02/02/2020 11:12 AM    Alk.  phosphatase 81 02/02/2020 11:12 AM    Protein, total 8.9 (H) 02/02/2020 11:12 AM    Albumin 4.6 02/02/2020 11:12 AM    Globulin 4.3 (H) 02/02/2020 11:12 AM    A-G Ratio 1.1 02/02/2020 11:12 AM    ALT (SGPT) 31 02/02/2020 11:12 AM      Vitals:    02/06/20 0729 02/06/20 1628 02/06/20 2043 02/07/20 0725   BP: 124/80 151/81 144/89 114/71   Pulse: 83 97 77 82   Resp: 16 18 16 16   Temp: 98.6 °F (37 °C) 98.9 °F (37.2 °C) 98.8 °F (37.1 °C) 99 °F (37.2 °C)   SpO2: 100% 99% 100% 98%   Weight:             Current Facility-Administered Medications   Medication Dose Route Frequency Provider Last Rate Last Dose    methocarbamol (ROBAXIN) tablet 750 mg  750 mg Oral QID PRN Chelita Fung MD   750 mg at 02/06/20 2049    OLANZapine (ZyPREXA) tablet 20 mg  20 mg Oral QHS Mariela Hagen, NP   20 mg at 02/06/20 2051    traZODone (DESYREL) tablet 100 mg  100 mg Oral QHS PRN Mariela Hagen, NP   100 mg at 02/06/20 2332    carBAMazepine (TEGretol) tablet 200 mg  200 mg Oral DAILY Mariela Hagen, NP   200 mg at 02/07/20 6618    carBAMazepine (TEGretol) tablet 300 mg  300 mg Oral QHS Mariela Hagen, NP   300 mg at 02/06/20 2050    OLANZapine (ZyPREXA) tablet 5 mg  5 mg Oral Q6H PRN Hulon Speaks, NP   5 mg at 02/05/20 1538    haloperidol lactate (HALDOL) injection 5 mg  5 mg IntraMUSCular Q6H PRN Hulon Speaks, NP   5 mg at 02/03/20 1621    benztropine (COGENTIN) tablet 1 mg  1 mg Oral BID PRN Hulon Speaks, NP        diphenhydrAMINE (BENADRYL) injection 50 mg  50 mg IntraMUSCular BID PRN Hulon Speaks, NP   50 mg at 02/03/20 1621    hydroxyzine HCL (ATARAX) tablet 50 mg  50 mg Oral TID PRN Hulon Speaks, NP   50 mg at 02/07/20 0504    LORazepam (ATIVAN) injection 2 mg  2 mg IntraMUSCular Q4H PRN Hulon Speaks, NP   2 mg at 02/03/20 1621    acetaminophen (TYLENOL) tablet 650 mg  650 mg Oral Q4H PRN Hulon Speaks, NP   650 mg at 02/06/20 1429    magnesium hydroxide (MILK OF MAGNESIA) 400 mg/5 mL oral suspension 30 mL  30 mL Oral DAILY PRN Hulon Speaks, NP Mental Status Exam:  Eye contact: fair  Grooming: fair  Psychomotor activity: increased  Speech is spontaneous, increased output  Mood is \"great\"  Affect: elated. Perception: denies AH/VH  Suicidal ideation: denies   Cognition is grossly intact         Physical Exam:  Body habitus: well developed  Musculoskeletal system: normal gait  Tremor - neg  Cog wheeling - neg      Assessment and Plan:  Bev Gallardo meets criteria for a diagnosis of Bipolar 1 Disorder. Tegretol level on Sunday. Continue the medication regimen as prescribed  Disposition planning to continue. I certify that this patients inpatient psychiatric hospital services furnished since the previous certification were, and continue to be, required for treatment that could reasonably be expected to improve the patient's condition, or for diagnostic study, and that the patient continues to need, on a daily basis, active treatment furnished directly by or requiring the supervision of inpatient psychiatric facility personnel. In addition, the hospital records show that services furnished were intensive treatment services, admission or related services, or equivalent services.

## 2020-02-07 NOTE — BH NOTES
GROUP THERAPY PROGRESS NOTE    Mariana Merlos is participating in Discharge Planning Group    Group time: 45 minutes    Personal goal for participation: Readiness for D/C     Goal orientation: Manging The Cycle of Anxiety     Group therapy participation: active    Therapeutic interventions reviewed and discussed: Yes    Impression of participation: Pt was very engaged & she  eagerly shared her issues related to anxiety. Pt acknowledged having flight of ideas and imparled memory all caused by un-treated anxiety. Pt  stated feeling overwhelmed is for  her a trigger for feeling anxious. Pt is aware of meds and coping skills that helps keep her calm.

## 2020-02-07 NOTE — BH NOTES
Blocked Bed Documentation:    Room number: 060  Type: Behavior  Rationale: Impulsive  Anticipated duration: TBD

## 2020-02-07 NOTE — PROGRESS NOTES
Pt received resting in bed quietly with equal respirations and no distress noted. Will continue to monitor q15 minute safety checks. Problem: Altered Thought Process (Adult/Pediatric)  Goal: *STG: Remains safe in hospital  Outcome: Progressing Towards Goal     Problem: Falls - Risk of  Goal: *Absence of Falls  Description  Document Kailee Sammi Fall Risk and appropriate interventions in the flowsheet. Outcome: Progressing Towards Goal  Note: Fall Risk Interventions:  Mobility Interventions: Assess mobility with egress test    Mentation Interventions: Adequate sleep, hydration, pain control    Medication Interventions: Teach patient to arise slowly    Elimination Interventions:  Toilet paper/wipes in reach    History of Falls Interventions: Room close to nurse's station       Blocked Bed Documentation:    Room number: 734  Type: Behavior  Rationale: Impulsive  Anticipated duration: TBD in treatment team

## 2020-02-07 NOTE — PROGRESS NOTES
Problem: Altered Thought Process (Adult/Pediatric)  Goal: *STG: Remains safe in hospital  Outcome: Progressing Towards Goal  Goal: *STG: Seeks staff when feelings of anxiety and fear arise  Outcome: Progressing Towards Goal  Goal: *STG: Complies with medication therapy  Outcome: Progressing Towards Goal   Pt is alert and oriented. Visible in milieu, interactive with peers. Able to approach staff with her wants and needs. Medication compliant. Will continue to monitor. PRN Medication Documentation     Specific patient behavior that led to need for PRN medication: Anxiety     Staff intervention attempted prior to administration: Verbal re-direction, reality reorienting, and therapeutic listening. PRN Medication given: Hydroxyzine 50 mg PO at 1915 hrs. Patient response/effectiveness to PRN: Relief in symptoms. PRN Medication Documentation     Specific patient behavior that led to need for PRN medication: Pt reports having allergic reaction to unknown food < may be some snack item, my tongue feels numb and itching >. She states she gets Benadryl if she has this kind of reaction otherwise gets panic attack, said < don't know if it is my mind or really allergy but I feel I am going to get panic attack >      Staff intervention attempted prior to administration: Verbal re-direction, reality reorienting, and therapeutic listening. On assessment no swelling or discoloration of the tongue noted, Pt was not noted in any distress. PRN Medication given: Benadryl 50 mg I/M at 2130 hrs. Patient response/effectiveness to PRN: Medication effective at 2150 hrs. Pt asleep with even respirations with no distress.

## 2020-02-07 NOTE — INTERDISCIPLINARY ROUNDS
Behavioral Health Interdisciplinary Rounds     Patient Name: Veronika Rivera  Age: 53 y.o. Room/Bed:  734/  Primary Diagnosis: <principal problem not specified>   Admission Status: Involuntary Commitment     Readmission within 30 days: no  Power of  in place: no  Patient requires a blocked bed: yes          Reason for blocked bed: behavior: poor self control, impulsive    VTE Prophylaxis: No    Mobility needs/Fall risk: no  Flu Vaccine : no   Nutritional Plan: no  Consults:          Labs/Testing due today?: yes    Sleep hours: 4        Participation in Care/Groups:  yes  Medication Compliant?: Yes  PRNS (last 24 hours): Antianxiety, Sleep Aid, Pain and muscle spasms    Restraints (last 24 hours):  no     CIWA (range last 24 hours):     COWS (range last 24 hours):      Alcohol screening (AUDIT) completed -   AUDIT Score: 2     If applicable, date SBIRT discussed in treatment team AND documented:   AUDIT Screen Score: AUDIT Score: 2    Tobacco - patient is a smoker: Have You Used Tobacco in the Past 30 Days: No  Illegal Drugs use: Have You Used Any Illegal Substances Over the Past 12 Months: No    24 hour chart check complete:     Patient goal(s) for today:   Treatment team focus/goals: Plan to reduce her Tegretol . Level is high. Progress note : she slept better last night. Complaint with her medications. LOS:  5  Expected LOS: TBD    Financial concerns/prescription coverage:medicare   Family contact:   - [phone]     Family requesting physician contact today:   Discharge plan: she will return home   Access to weapons : no      [facility] [id number] [facility]   Patient's preferred phone number for follow up call :     Participating treatment team members: Veronika Rivera, Pushpa Greene, PharmD.

## 2020-02-07 NOTE — BH NOTES
PRN Medication Documentation    Specific patient behavior that led to need for PRN medication: sleeplessness  Staff interventions attempted prior to PRN being given: none  PRN medication given: Trazodone  Patient response/effectiveness of PRN medication: will continue to monitor    Effective    PRN Medication Documentation    Specific patient behavior that led to need for PRN medication: anxiety  Staff interventions attempted prior to PRN being given: education  PRN medication given: Atarax  Patient response/effectiveness of PRN medication: will continue to monitor     Effective

## 2020-02-07 NOTE — PROGRESS NOTES
Problem: Manic Behavior (Adult/Pediatric)  Goal: *STG: Demonstrates improvement in thought process and speech pattern  Note:   Thought process is disorganized. Thought blocking is apparent. Perseverates on thoughts IE I NEED TO GET MY DAY SEQUENCE ORGANIZED. \"     Problem: Manic Behavior (Adult/Pediatric)  Goal: *STG: Demonstrates improvement in sleep pattern  Outcome: Not Progressing Towards Goal  Note:   SLEEP REPORTED AT 4.5 HOURS. Problem: Manic Behavior (Adult/Pediatric)  Goal: *STG/LTG: Complies with medication therapy  Outcome: Progressing Towards Goal  Note:   HAS BEEN MEDICATION COMPLIANT DENIES SIDE EFFECTS.

## 2020-02-07 NOTE — PROGRESS NOTES
Problem: Discharge Planning  Goal: *Discharge to safe environment  Outcome: Progressing Towards Goal  Goal: *Knowledge of medication management  Outcome: Progressing Towards Goal  Goal: *Knowledge of discharge instructions  Outcome: Progressing Towards Goal

## 2020-02-07 NOTE — PROGRESS NOTES
PRN Medication Documentation     Specific patient behavior that led to need for PRN medication: Back pain     Staff intervention attempted prior to administration:Education     PRN Medication given: Robaxin 750 mg PO     Patient response/effectiveness - Relief in symptoms     Problem: Altered Thought Process (Adult/Pediatric)  Goal: *STG: Seeks staff when feelings of anxiety and fear arise  Outcome: Progressing Towards Goal  Goal: *STG: Complies with medication therapy  Outcome: Progressing Towards Goal    Pt is alert and oriented. Visible in milieu, interactive with peers and cooperative with staff. Medication compliant. Received Hydroxyzine 50 mg PO for C/O anxiety, with good effect.

## 2020-02-07 NOTE — PROGRESS NOTES
Laboratory Monitoring for Carbamazepine    This patient is currently prescribed the following medication(s):   Current Facility-Administered Medications   Medication Dose Route Frequency    carBAMazepine (TEGretol) tablet 200 mg  200 mg Oral QHS    ergocalciferol capsule 50,000 Units  50,000 Units Oral Q7D    OLANZapine (ZyPREXA) tablet 20 mg  20 mg Oral QHS    carBAMazepine (TEGretol) tablet 200 mg  200 mg Oral DAILY     The following labs have been completed for monitoring of carbamazepine:    Carbamazepine Serum Concentration  Lab Results   Component Value Date/Time    Carbamazepine 14.2 (H) 02/07/2020 04:01 AM       Renal Function, Hepatic Function and Chemistry  Lab Results   Component Value Date/Time    Sodium 136 02/03/2020 05:40 AM    Potassium 3.9 02/03/2020 05:40 AM    Chloride 103 02/03/2020 05:40 AM    CO2 29 02/03/2020 05:40 AM    Anion gap 4 (L) 02/03/2020 05:40 AM    BUN 5 (L) 02/03/2020 05:40 AM    Creatinine 0.81 02/03/2020 05:40 AM    BUN/Creatinine ratio 6 (L) 02/03/2020 05:40 AM    Bilirubin, total 0.4 02/02/2020 11:12 AM    Protein, total 8.9 (H) 02/02/2020 11:12 AM    Albumin 4.6 02/02/2020 11:12 AM    Globulin 4.3 (H) 02/02/2020 11:12 AM    A-G Ratio 1.1 02/02/2020 11:12 AM    ALT (SGPT) 31 02/02/2020 11:12 AM    Alk. phosphatase 81 02/02/2020 11:12 AM       Hematology  Lab Results   Component Value Date/Time    WBC 5.4 02/03/2020 05:40 AM    RBC 3.76 (L) 02/03/2020 05:40 AM    HGB 11.8 02/03/2020 05:40 AM    HCT 37.0 02/03/2020 05:40 AM    MCV 98.4 02/03/2020 05:40 AM    MCH 31.4 02/03/2020 05:40 AM    MCHC 31.9 02/03/2020 05:40 AM    RDW 12.3 02/03/2020 05:40 AM    PLATELET 616 06/15/6167 05:40 AM     Assessment/Plan:  The carbamazepine level drawn on 2/7 @ 0401 was 14.2 mcg/mL. This is above therapeutic range (4-12mcg/mL). The dose was decreased to 200mg BID. Recommend follow-up level late next week.      Kelly Waterman, PharmD, BCPP, [facility], [facility]

## 2020-02-08 PROCEDURE — 74011250637 HC RX REV CODE- 250/637: Performed by: PSYCHIATRY & NEUROLOGY

## 2020-02-08 PROCEDURE — 74011250637 HC RX REV CODE- 250/637: Performed by: NURSE PRACTITIONER

## 2020-02-08 PROCEDURE — 65220000003 HC RM SEMIPRIVATE PSYCH

## 2020-02-08 RX ORDER — BENZTROPINE MESYLATE 1 MG/1
1 TABLET ORAL 2 TIMES DAILY
Status: DISCONTINUED | OUTPATIENT
Start: 2020-02-08 | End: 2020-02-11 | Stop reason: HOSPADM

## 2020-02-08 RX ADMIN — HYDROXYZINE HYDROCHLORIDE 50 MG: 50 TABLET, FILM COATED ORAL at 09:27

## 2020-02-08 RX ADMIN — OLANZAPINE 20 MG: 5 TABLET, FILM COATED ORAL at 21:06

## 2020-02-08 RX ADMIN — OLANZAPINE 5 MG: 5 TABLET, FILM COATED ORAL at 01:39

## 2020-02-08 RX ADMIN — BENZTROPINE MESYLATE 1 MG: 1 TABLET ORAL at 16:42

## 2020-02-08 RX ADMIN — BENZTROPINE MESYLATE 1 MG: 1 TABLET ORAL at 10:35

## 2020-02-08 RX ADMIN — CARBAMAZEPINE 200 MG: 200 TABLET ORAL at 09:27

## 2020-02-08 RX ADMIN — TRAZODONE HYDROCHLORIDE 100 MG: 100 TABLET ORAL at 21:07

## 2020-02-08 RX ADMIN — HYDROXYZINE HYDROCHLORIDE 50 MG: 50 TABLET, FILM COATED ORAL at 01:39

## 2020-02-08 RX ADMIN — CARBAMAZEPINE 200 MG: 200 TABLET ORAL at 21:06

## 2020-02-08 NOTE — INTERDISCIPLINARY ROUNDS
Behavioral Health Interdisciplinary Rounds     Patient Name: Elisha Gilmore  Age: 63 y.o. Room/Bed:  734/  Primary Diagnosis: <principal problem not specified>   Admission Status: Involuntary Commitment     Readmission within 30 days: no  Power of  in place: no  Patient requires a blocked bed: yes          Reason for blocked bed: behavior    VTE Prophylaxis: No    Mobility needs/Fall risk: no  Flu Vaccine : no   Nutritional Plan: no  Consults:          Labs/Testing due today?: no    Sleep hours:  1      Participation in Care/Groups:  yes  Medication Compliant?: Yes  PRNS (last 24 hours): Antianxiety, Anticholinergic and Sleep Aid    Restraints (last 24 hours):  no     CIWA (range last 24 hours):     COWS (range last 24 hours):      Alcohol screening (AUDIT) completed -   AUDIT Score: 2     If applicable, date SBIRT discussed in treatment team AND documented:   AUDIT Screen Score: AUDIT Score: 2      Document Brief Intervention (corresponds directly with the 5 A's, Ask, Advise, Assess, Assist, and Arrange): At- Risk Patients (Score 7-15 for women; 8-15 for men)  Discuss concern patient is drinking at unhealthy levels known to increase risk of alcohol-related health problems. Is Patient ready to commit to change? If No:   Encourage reflection   Discuss short term and long term health risks of consuming alcohol   Barriers to change   Reaffirm willingness to help / Educational materials provided  If Yes:   Set goal  [employer] provided    Harmful use or Dependence (Score 16 or greater)   Discuss short term and long term health risks of consuming alcohol   Recommendations   Negotiate drinking goal   Recommend addiction specialist/center   Arrange follow-up appointments.     Tobacco - patient is a smoker: Have You Used Tobacco in the Past 30 Days: No  Illegal Drugs use: Have You Used Any Illegal Substances Over the Past 12 Months: No    24 hour chart check complete: yes     Patient goal(s) for today:   Treatment team focus/goals:   Progress note     LOS:  6  Expected LOS:     Financial concerns/prescription coverage:    Family contact:       Family requesting physician contact today:    Discharge plan:   Access to weapons :         Outpatient provider(s):   Patient's preferred phone number for follow up call :     Participating treatment team members: Elisha Gilmore, * (assigned SW),

## 2020-02-08 NOTE — BH NOTES
2335 100 mg trazodone given to promote sleep. PRN Medication Documentation    Specific patient behavior that led to need for PRN medication: pt complained of racing thoughts preventing her from sleeping. Pt anxious about not being safe, even thought she states she knows she is safe. Staff interventions attempted prior to PRN being given: coping skill, deep breathing. PRN medication given: Zyprexa and atarax  Patient response/effectiveness of PRN medication: pt took medication willingly. Will continue to monitor.

## 2020-02-08 NOTE — PROGRESS NOTES
Problem: Falls - Risk of  Goal: *Absence of Falls  Description  Document Clide Failing Fall Risk and appropriate interventions in the flowsheet. Outcome: Progressing Towards Goal  Note: Fall Risk Interventions:  Mobility Interventions: Assess mobility with egress test    Mentation Interventions: Adequate sleep, hydration, pain control    Medication Interventions: Teach patient to arise slowly    Elimination Interventions:  Toilet paper/wipes in reach    History of Falls Interventions: Room close to nurse's station

## 2020-02-08 NOTE — BH NOTES
Chief Complaint:  I think I am okay. Length of Stay: 6 Days    Interval History:  Ale Sutherland reports feeling okay. She was given an IM of Benadryl and PO Zyprexa yesterday evening. States that she thought people were talking about her. She continues to talk excessively. Notes that she has been having \"a thick tongue\" which has happened periodically since being on Zyprexa. Moods are \"good\" today. Denies any SI or plan. Past Medical History:  Past Medical History:   Diagnosis Date    Aggressive outburst     Arthritis     joints    Bipolar 1 disorder with moderate robyn ([facility] [address] 3/17/2014    Chronic pain     back with stress    Contact dermatitis and other eczema, due to unspecified cause     Depression     Headache(784.0)     Hyperlipidemia 8/22/2016    Other ill-defined conditions(799.89)     sinus infection,hay fever    Other ill-defined conditions(799.89)     scoliosis    Psychiatric disorder     bipolar    Psychotic disorder (HCC)     Stool color black     Tennis elbow syndrome 5/4/2015    Trauma            Labs:  Lab Results   Component Value Date/Time    WBC 5.4 02/03/2020 05:40 AM    HGB 11.8 02/03/2020 05:40 AM    Hematocrit (POC) 34 (L) 07/17/2018 03:33 AM    HCT 37.0 02/03/2020 05:40 AM    PLATELET 729 62/90/3183 05:40 AM    MCV 98.4 02/03/2020 05:40 AM      Lab Results   Component Value Date/Time    Sodium 136 02/03/2020 05:40 AM    Potassium 3.9 02/03/2020 05:40 AM    Chloride 103 02/03/2020 05:40 AM    CO2 29 02/03/2020 05:40 AM    Anion gap 4 (L) 02/03/2020 05:40 AM    Glucose 107 (H) 02/03/2020 05:40 AM    Glucose 107 (H) 02/03/2020 05:40 AM    BUN 5 (L) 02/03/2020 05:40 AM    Creatinine 0.81 02/03/2020 05:40 AM    BUN/Creatinine ratio 6 (L) 02/03/2020 05:40 AM    GFR est AA >60 02/03/2020 05:40 AM    GFR est non-AA >60 02/03/2020 05:40 AM    Calcium 9.8 02/03/2020 05:40 AM    Bilirubin, total 0.4 02/02/2020 11:12 AM    AST (SGOT) 20 02/02/2020 11:12 AM    Alk.  phosphatase 81 02/02/2020 11:12 AM    Protein, total 8.9 (H) 02/02/2020 11:12 AM    Albumin 4.6 02/02/2020 11:12 AM    Globulin 4.3 (H) 02/02/2020 11:12 AM    A-G Ratio 1.1 02/02/2020 11:12 AM    ALT (SGPT) 31 02/02/2020 11:12 AM      Vitals:    02/07/20 0725 02/07/20 1622 02/07/20 2042 02/08/20 0730   BP: 114/71 128/85 154/88 160/81   Pulse: 82 94 85 84   Resp: 16 18 16 18   Temp: 99 °F (37.2 °C) 98.5 °F (36.9 °C)  98.2 °F (36.8 °C)   SpO2: 98% 100% 96% 99%   Weight:             Current Facility-Administered Medications   Medication Dose Route Frequency Provider Last Rate Last Dose    carBAMazepine (TEGretol) tablet 200 mg  200 mg Oral QHS Carlos Mccollum MD   200 mg at 02/07/20 2119    ergocalciferol capsule 50,000 Units  50,000 Units Oral Q7D Carlos Mccollum MD   50,000 Units at 02/07/20 1354    methocarbamol (ROBAXIN) tablet 750 mg  750 mg Oral QID PRN Carlos Mccollum MD   750 mg at 02/06/20 2049    OLANZapine (ZyPREXA) tablet 20 mg  20 mg Oral QHS Mariela Hagen NP   20 mg at 02/07/20 2119    traZODone (DESYREL) tablet 100 mg  100 mg Oral QHS PRN Mariela Hagen NP   100 mg at 02/07/20 2333    carBAMazepine (TEGretol) tablet 200 mg  200 mg Oral DAILY Mariela Hagen NP   200 mg at 02/08/20 0927    OLANZapine (ZyPREXA) tablet 5 mg  5 mg Oral Q6H PRN Ave Joelstrosalba, NP   5 mg at 02/08/20 0139    haloperidol lactate (HALDOL) injection 5 mg  5 mg IntraMUSCular Q6H PRN Ave Joelster, NP   5 mg at 02/03/20 1621    benztropine (COGENTIN) tablet 1 mg  1 mg Oral BID PRN Ave Estevez, NP        diphenhydrAMINE (BENADRYL) injection 50 mg  50 mg IntraMUSCular BID PRN Ave Songster, NP   50 mg at 02/07/20 2130    hydroxyzine HCL (ATARAX) tablet 50 mg  50 mg Oral TID PRN Ave Songster, NP   50 mg at 02/08/20 0927    LORazepam (ATIVAN) injection 2 mg  2 mg IntraMUSCular Q4H PRN Ave Songster, NP   2 mg at 02/03/20 1621    acetaminophen (TYLENOL) tablet 650 mg  650 mg Oral Q4H PRN Ave Songster, NP   650 mg at 02/06/20 1429    magnesium hydroxide (MILK OF MAGNESIA) 400 mg/5 mL oral suspension 30 mL  30 mL Oral DAILY PRN Judd Donald NP             Mental Status Exam:  Eye contact: fair  Grooming: fair  Psychomotor activity: increased  Speech is spontaneous, increased output  Mood is \"great\"  Affect: elated. Perception: denies AH/VH  Suicidal ideation: denies   Cognition is grossly intact         Physical Exam:  Body habitus: well developed  Musculoskeletal system: normal gait  Tremor - present in upper limbs. Cog wheeling - neg      Assessment and Plan:  Harsha Simpson meets criteria for a diagnosis of Bipolar 1 Disorder. Cogentin 1mg BID started. Continue the medication regimen as prescribed  Disposition planning to continue. I certify that this patients inpatient psychiatric hospital services furnished since the previous certification were, and continue to be, required for treatment that could reasonably be expected to improve the patient's condition, or for diagnostic study, and that the patient continues to need, on a daily basis, active treatment furnished directly by or requiring the supervision of inpatient psychiatric facility personnel. In addition, the hospital records show that services furnished were intensive treatment services, admission or related services, or equivalent services.

## 2020-02-08 NOTE — PROGRESS NOTES
Pt calm and cooperative. Disorganized thought process. Pt reports mild tingling feeling in tongue persists, given scheduled cogentin. Med and meal compliant. Continue q15 minute safety checks, encourage continued compliance.      Problem: Altered Thought Process (Adult/Pediatric)  Goal: *STG: Participates in treatment plan  Outcome: Progressing Towards Goal  Goal: *STG: Remains safe in hospital  Outcome: Progressing Towards Goal  Goal: *STG: Seeks staff when feelings of anxiety and fear arise  Outcome: Progressing Towards Goal

## 2020-02-08 NOTE — PROGRESS NOTES
Problem: Altered Thought Process (Adult/Pediatric)  Goal: *STG: Complies with medication therapy  Outcome: Progressing Towards Goal    Compliant with meals and medications. Pt denies SI. No acute distress noted. Will continue to monitor. 7037: PRN Medication Documentation  Specific patient behavior that led to need for PRN medication: Pt requested medication. Pt c/o \"anxiety. \"    Staff interventions attempted prior to PRN being given: distraction/listening  PRN medication given: atarax  Patient response/effectiveness of PRN medication: effective

## 2020-02-09 LAB — CARBAMAZEPINE SERPL-MCNC: 13.4 UG/ML (ref 4–12)

## 2020-02-09 PROCEDURE — 74011250637 HC RX REV CODE- 250/637: Performed by: PSYCHIATRY & NEUROLOGY

## 2020-02-09 PROCEDURE — 80156 ASSAY CARBAMAZEPINE TOTAL: CPT

## 2020-02-09 PROCEDURE — 74011250637 HC RX REV CODE- 250/637: Performed by: NURSE PRACTITIONER

## 2020-02-09 PROCEDURE — 65220000003 HC RM SEMIPRIVATE PSYCH

## 2020-02-09 PROCEDURE — 36415 COLL VENOUS BLD VENIPUNCTURE: CPT

## 2020-02-09 RX ADMIN — HYDROXYZINE HYDROCHLORIDE 50 MG: 50 TABLET, FILM COATED ORAL at 13:12

## 2020-02-09 RX ADMIN — CARBAMAZEPINE 200 MG: 200 TABLET ORAL at 08:51

## 2020-02-09 RX ADMIN — BENZTROPINE MESYLATE 1 MG: 1 TABLET ORAL at 08:51

## 2020-02-09 RX ADMIN — ACETAMINOPHEN 650 MG: 325 TABLET ORAL at 21:49

## 2020-02-09 RX ADMIN — HYDROXYZINE HYDROCHLORIDE 50 MG: 50 TABLET, FILM COATED ORAL at 23:50

## 2020-02-09 RX ADMIN — BENZTROPINE MESYLATE 1 MG: 1 TABLET ORAL at 18:36

## 2020-02-09 RX ADMIN — OLANZAPINE 20 MG: 5 TABLET, FILM COATED ORAL at 21:09

## 2020-02-09 RX ADMIN — TRAZODONE HYDROCHLORIDE 100 MG: 100 TABLET ORAL at 21:09

## 2020-02-09 RX ADMIN — CARBAMAZEPINE 200 MG: 200 TABLET ORAL at 21:09

## 2020-02-09 NOTE — PROGRESS NOTES
[facility] 60  Master Treatment Plan for Loving Mis    Date Treatment Plan Initiated: 2/9/2020  Treatment Plan Modalities:  Type of Modality Amount  (x minutes) Frequency (x/week) Duration (x days) Name of Responsible Staff   Community & wrap-up meetings to encourage peer interactions 15 7 1    CHANO TAYLOR   Group psychotherapy to assist in building coping skills and internal controls 60 7 1 Reji Renee   Therapeutic activity groups to build coping skills 60 7 1 Reji Renee   Psychoeducation in group setting to address:   Medication education   15 7 1 MELO Alonso RN   Coping skills      CHAPO NEGRO     Relaxation techniques      KALIN BUENROSTRO   Symptom management      sTAFF   Discharge planning   60 2 [address]   LEONCIO   61 1 1 volunteer   Recovery/AA/NA      volunteer   Physician medication management   15 7 1 DR Vanessa Melton   Family meeting/discharge planning   15 2 1 Vangie Rivers and Argie Dandy                                        Problem: Altered Thought Process (Adult/Pediatric) on coming monitoring  Goal: *STG: Remains safe in hospital  Outcome: Progressing Towards Goal  Note:   Pt denies any suicidal or homicidal thoughts. Contracts for safety. Remains on q 15 min safety checks. No agitation or aggression displayed. Problem: Altered Thought Process (Adult/Pediatric)  Goal: *STG: Complies with medication therapy  Outcome: Progressing Towards Goal  Note:   Medication compliant. Denies side effects. Reports \"I think they are working some. I try to think better each day and not get off track with my thinking. \"     Problem: Manic Behavior (Adult/Pediatric) 2/12/2020  Goal: *STG: Maintains appropriate boundaries  Outcome: Progressing Towards Goal  Note:   Has been less intrusive with others and more redirectable.      Problem: Manic Behavior (Adult/Pediatric) 2/ 12/20  Goal: *STG: Demonstrates improvement in sleep pattern  Outcome: Not Progressing Towards Goal  Note:   Sleep reported at 5 hours with poor quality.

## 2020-02-09 NOTE — BH NOTES
GROUP THERAPY PROGRESS NOTE    Tahmina Cornell is participating in [place]. Group time: 15 minutes    Personal goal for participation: patient express she wants to be able to sleep for at least 4 hours straight.     Goal orientation: community    Group therapy participation: active    Therapeutic interventions reviewed and discussed: yes    Impression of participation: engaged

## 2020-02-09 NOTE — GROUP NOTE
[facility] GROUP DOCUMENTATION INDIVIDUAL                                                                          Group Therapy Note    Date: 2/9/2020    Group Start Time: 0990  Group End Time: 1010  Group Topic: Community Meeting    [address] GERIATRIC [facility]    Jose Ndiaye     [facility] GROUP DOCUMENTATION GROUP    Group Therapy Note    Attendees: 4/9         Attendance: Attended    Patient's Goal:  Talk to doctor, she already took a shower, and clean room, complete small workouts    Interventions/techniques: Validated and Supported    Follows Directions:  Followed directions    Interactions: Interacted appropriately    Mental Status: Calm and Happy    Behavior/appearance: Attentive, Caretaking, Cooperative and Enthusiastic    Goals Achieved: Able to engage in interactions, Able to listen to others, Able to give feedback to another and Able to reflect/comment on own behavior      Additional Notes:  Willing to ask questions without prompting    Paula Tello

## 2020-02-09 NOTE — GROUP NOTE
DANIEL  GROUP DOCUMENTATION INDIVIDUAL                                                                          Group Therapy Note    Date: 2/9/2020    Group Start Time: 2696  Group End Time: 1000  Group Topic: [employer]    [address] [place] GROUP DOCUMENTATION GROUP    Group Therapy Note    Attendees:          Attendance: Attended    Patient's Goal:      Interventions/techniques: Informed    Follows Directions:  Followed directions    Interactions: Interacted appropriately    Mental Status: Calm    Behavior/appearance: Attentive    Goals Achieved: Able to engage in interactions      Additional Notes:      Inna Goodman

## 2020-02-09 NOTE — PROGRESS NOTES
Problem: Falls - Risk of  Goal: *Absence of Falls  Description  Document Kailee Chapa Fall Risk and appropriate interventions in the flowsheet. Outcome: Progressing Towards Goal  Note: Fall Risk Interventions:  Mobility Interventions: Assess mobility with egress test    Mentation Interventions: Adequate sleep, hydration, pain control    Medication Interventions: Teach patient to arise slowly    Elimination Interventions:  Toilet paper/wipes in reach    History of Falls Interventions: Room close to nurse's station

## 2020-02-09 NOTE — BH NOTES
PRN Medication Documentation    Specific patient behavior that led to need for PRN medication: CO FEELING ANXIOUS ABOUT PENDING VISITATION\ POSSIBILITY   Staff interventions attempted prior to PRN being given: ALTERNATIVE COPING  PRN medication given:  ATARAX 50MG PO  Patient response/effectiveness of PRN medication: CONTINUE TO EVALUATE

## 2020-02-09 NOTE — BH NOTES
Blocked Bed Documentation:    Room number: 734/02  Type: Behavior  Rationale: Intrusive, paranoid, and impulsive  Anticipated duration: hospital duration or Tx team decision

## 2020-02-09 NOTE — BH NOTES
Chief Complaint:  . I feel okay    Length of Stay: 7 Days    Interval History:  Yas Zhou continues to maintain improvement. Says she is sleeping well and her moods are better. Nursing staff note that she is still talking rapidly and can be overly active. Denies any SI or plan. No AH or VH. Past Medical History:  Past Medical History:   Diagnosis Date    Aggressive outburst     Arthritis     joints    Bipolar 1 disorder with moderate robyn ([facility] [address] 3/17/2014    Chronic pain     back with stress    Contact dermatitis and other eczema, due to unspecified cause     Depression     Headache(784.0)     Hyperlipidemia 8/22/2016    Other ill-defined conditions(799.89)     sinus infection,hay fever    Other ill-defined conditions(799.89)     scoliosis    Psychiatric disorder     bipolar    Psychotic disorder (HCC)     Stool color black     Tennis elbow syndrome 5/4/2015    Trauma            Labs:  Lab Results   Component Value Date/Time    WBC 5.4 02/03/2020 05:40 AM    HGB 11.8 02/03/2020 05:40 AM    Hematocrit (POC) 34 (L) 07/17/2018 03:33 AM    HCT 37.0 02/03/2020 05:40 AM    PLATELET 520 81/92/8389 05:40 AM    MCV 98.4 02/03/2020 05:40 AM      Lab Results   Component Value Date/Time    Sodium 136 02/03/2020 05:40 AM    Potassium 3.9 02/03/2020 05:40 AM    Chloride 103 02/03/2020 05:40 AM    CO2 29 02/03/2020 05:40 AM    Anion gap 4 (L) 02/03/2020 05:40 AM    Glucose 107 (H) 02/03/2020 05:40 AM    Glucose 107 (H) 02/03/2020 05:40 AM    BUN 5 (L) 02/03/2020 05:40 AM    Creatinine 0.81 02/03/2020 05:40 AM    BUN/Creatinine ratio 6 (L) 02/03/2020 05:40 AM    GFR est AA >60 02/03/2020 05:40 AM    GFR est non-AA >60 02/03/2020 05:40 AM    Calcium 9.8 02/03/2020 05:40 AM    Bilirubin, total 0.4 02/02/2020 11:12 AM    AST (SGOT) 20 02/02/2020 11:12 AM    Alk.  phosphatase 81 02/02/2020 11:12 AM    Protein, total 8.9 (H) 02/02/2020 11:12 AM    Albumin 4.6 02/02/2020 11:12 AM    Globulin 4.3 (H) 02/02/2020 11:12 AM A-G Ratio 1.1 02/02/2020 11:12 AM    ALT (SGPT) 31 02/02/2020 11:12 AM      Vitals:    02/08/20 1622 02/08/20 1948 02/09/20 0737 02/09/20 0810   BP: 112/68 137/68 113/63    Pulse: 85 99 81    Resp: 18 20 16    Temp: 99.4 °F (37.4 °C) 99 °F (37.2 °C) 97.5 °F (36.4 °C)    SpO2: 100% 100% 100%    Weight:    71.3 kg (157 lb 3.2 oz)         Current Facility-Administered Medications   Medication Dose Route Frequency Provider Last Rate Last Dose    benztropine (COGENTIN) tablet 1 mg  1 mg Oral BID Nicklas Hodgkin, MD   1 mg at 02/09/20 6280    carBAMazepine (TEGretol) tablet 200 mg  200 mg Oral QHS Nicklas Hodgkin, MD   200 mg at 02/08/20 2106    ergocalciferol capsule 50,000 Units  50,000 Units Oral Q7D Nicklas Hodgkin, MD   50,000 Units at 02/07/20 1354    methocarbamol (ROBAXIN) tablet 750 mg  750 mg Oral QID PRN Nicklas Hodgkin, MD   750 mg at 02/06/20 2049    OLANZapine (ZyPREXA) tablet 20 mg  20 mg Oral QHS Mariela Hagen NP   20 mg at 02/08/20 2106    traZODone (DESYREL) tablet 100 mg  100 mg Oral QHS PRN Mariela Hagen NP   100 mg at 02/08/20 2107    carBAMazepine (TEGretol) tablet 200 mg  200 mg Oral DAILY Mariela Hagen NP   200 mg at 02/09/20 0851    OLANZapine (ZyPREXA) tablet 5 mg  5 mg Oral Q6H PRN Arleene Reaper, NP   5 mg at 02/08/20 0139    haloperidol lactate (HALDOL) injection 5 mg  5 mg IntraMUSCular Q6H PRN Arleene Reaper, NP   5 mg at 02/03/20 1621    diphenhydrAMINE (BENADRYL) injection 50 mg  50 mg IntraMUSCular BID PRN Arleene Reaper, NP   50 mg at 02/07/20 2130    hydroxyzine HCL (ATARAX) tablet 50 mg  50 mg Oral TID PRN Arleene Reaper, NP   50 mg at 02/08/20 9976    LORazepam (ATIVAN) injection 2 mg  2 mg IntraMUSCular Q4H PRN Arleene Reaper, NP   2 mg at 02/03/20 1621    acetaminophen (TYLENOL) tablet 650 mg  650 mg Oral Q4H PRN Arleene Reaper, NP   650 mg at 02/06/20 1429    magnesium hydroxide (MILK OF MAGNESIA) 400 mg/5 mL oral suspension 30 mL  30 mL Oral DAILY PRN Jaime, Roberto Abdullahi NP             Mental Status Exam:  Eye contact: fair  Grooming: fair  Psychomotor activity: increased  Speech is spontaneous, increased output  Mood is \"great\"  Affect: elated. Perception: denies AH/VH  Suicidal ideation: denies   Cognition is grossly intact         Physical Exam:  Body habitus: well developed  Musculoskeletal system: normal gait  Tremor - present in upper limbs. Cog wheeling - neg      Assessment and Plan:  Luan Zimmer meets criteria for a diagnosis of Bipolar 1 Disorder. Continue the medication regimen as prescribed  Disposition planning to continue. I certify that this patients inpatient psychiatric hospital services furnished since the previous certification were, and continue to be, required for treatment that could reasonably be expected to improve the patient's condition, or for diagnostic study, and that the patient continues to need, on a daily basis, active treatment furnished directly by or requiring the supervision of inpatient psychiatric facility personnel. In addition, the hospital records show that services furnished were intensive treatment services, admission or related services, or equivalent services.

## 2020-02-09 NOTE — INTERDISCIPLINARY ROUNDS
Behavioral Health Interdisciplinary Rounds     Patient Name: Fco Franco  Age: 63 y.o. Room/Bed:  4/  Primary Diagnosis: <principal problem not specified>   Admission Status: Involuntary Commitment     Readmission within 30 days: no  Power of  in place: no  Patient requires a blocked bed: yes          Reason for blocked bed: behavior  Sleep hours: 5       Participation in Care/Groups:  yes  Medication Compliant?: Yes  PRNS (last 24 hours):  Antipsychotic (PO), Antianxiety and Sleep Aid    Restraints (last 24 hours):  no     Alcohol screening (AUDIT) completed -  AUDIT Score: 2  If applicable, date SBIRT discussed in treatment team AND documented:    Tobacco - patient is a smoker: Have You Used Tobacco in the Past 30 Days: No  Illegal Drugs use: Have You Used Any Illegal Substances Over the Past 12 Months: No    24 hour chart check complete: yes     Patient goal(s) for today:   Treatment team focus/goals:   Progress note   Family Contact information:   Patient's preferred phone number for follow up call :     LOS:  7  Expected LOS:     Participating treatment team members: Fco Franco, * (assigned SW),

## 2020-02-10 PROCEDURE — 74011250637 HC RX REV CODE- 250/637: Performed by: NURSE PRACTITIONER

## 2020-02-10 PROCEDURE — 74011250637 HC RX REV CODE- 250/637: Performed by: PSYCHIATRY & NEUROLOGY

## 2020-02-10 PROCEDURE — 65220000003 HC RM SEMIPRIVATE PSYCH

## 2020-02-10 RX ADMIN — BENZTROPINE MESYLATE 1 MG: 1 TABLET ORAL at 08:23

## 2020-02-10 RX ADMIN — TRAZODONE HYDROCHLORIDE 100 MG: 100 TABLET ORAL at 21:43

## 2020-02-10 RX ADMIN — OLANZAPINE 20 MG: 5 TABLET, FILM COATED ORAL at 21:43

## 2020-02-10 RX ADMIN — CARBAMAZEPINE 200 MG: 200 TABLET ORAL at 08:23

## 2020-02-10 RX ADMIN — BENZTROPINE MESYLATE 1 MG: 1 TABLET ORAL at 18:10

## 2020-02-10 RX ADMIN — CARBAMAZEPINE 200 MG: 200 TABLET ORAL at 21:43

## 2020-02-10 NOTE — GROUP NOTE
[facility] GROUP DOCUMENTATION INDIVIDUAL Group Therapy Note Date: 2/9/2020 Group Start Time: 2030 Group End Time: 2055 Group Topic: Reflection/Relaxation Geovanny Miranda Dearl Susan Jones 
 
[facility] GROUP DOCUMENTATION GROUP Attendance: Attended Patient's Goal: Interventions/techniques: Informed and Supported Follows Directions: Followed directions Interactions: Interacted appropriately Mental Status: Calm Behavior/appearance: Attentive and Cooperative Goals Achieved: Able to engage in interactions, Able to listen to others, Able to reflect/comment on own behavior and Increased hopefulness Additional Notes:   
 
Joshua Longoria

## 2020-02-10 NOTE — BH NOTES
GROUP THERAPY PROGRESS NOTE    Sophie Hairston III is participating in [place]. Group time: 30 minutes    Personal goal for participation: be assertive and follow rules. Goal orientation: personal    Group therapy participation: active    Therapeutic interventions reviewed and discussed: Writer listened attentively and explained the unit routine. Impression of participation: Patient participated actively.

## 2020-02-10 NOTE — BH NOTES
GROUP THERAPY PROGRESS NOTE    Nata Anderson participated in an afternoon Process Group on the Acute Unit with a focus identifying feelings, planning for the day, and learning more about DBT concepts on \"Emotion Regulation. \"    .  Group time: 9406 - 0467     Personal goal for participation: To increase the capacity to improve ones mood, set personal goals, and understand more about basic activities to help regulate emotions. Goal orientation: The patients will be able to identify their feelings and develop a plan for   structuring their day. They were also presented with a summary sheet on emotional regulation, in  regards to focusing on one goal per day, taking physical care of oneself, and recognizing and/or   building positive experiences. The didactic portion of the session focused on these three concepts:   1) defining and focusing on one goal per day;   2) taking care of ones physical maintenance and basic needs -  sleep, nutrition, and exercise; and   3) finding and building on positive experiences. Therapeutic interventions reviewed and discussed: The group members were asked to identify   an emotion they are having and/or let the group know what they want to focus on for the day as they  continue to make discharge plans. The group members reviewed three DBT suggestions regarding  emotional regulation, in regards to focusing on one goal per day, taking physical care of oneself,   and recognizing and/or building positive experiences. It was suggested that these three concepts   can be seen as headings for their list of coping skills. Impression of participation: This patient actively participated in the group. She said she was planning to be leaving the hospital within the next day or two depending upon her continued progress.   She was alert, generally oriented, and indicated that at times she has felt \"overly sensitive,\" and that her emotional response has sometimes exaggerated her situation. She went on to talk about \"riding out\" a panic attack and recognizing they are temporary, despite their emotional intensity. She expressed no current SI/HI and displayed no overt psychotic symptoms in this group. She said she wanted to make sure she was on the right medications before leaving the hospital. The patient appeared to be in the process of refining her aftercare needs and plans, while working with her treatment team on her upcoming discharge plans. Her affect was not as anxious or manic as she was in the first process group with the undersigned. Her mood reflected her slightly improved affect, along with her improved ability to communicate in a way that was easy to follow.

## 2020-02-10 NOTE — BH NOTES
2110 100 mg trazodone given to promote sleep. 2350 50 mg atarax given for anxiety and to promote sleep.

## 2020-02-10 NOTE — BH NOTES
TRANSFER - IN REPORT:     ICUVerbal report received from Emily Cerna RN on Félix Grier  being received from  for routine progression of care      Report consisted of patients Situation, Background, Assessment and   Recommendations(SBAR). Information from the following report(s) SBAR was reviewed with the receiving nurse. Opportunity for questions and clarification was provided. Assessment completed upon patients arrival to unit and care assumed.

## 2020-02-10 NOTE — PROGRESS NOTES
[facility] 60  Master Treatment Plan for Nicolasa Kirkpatrick    Date Treatment Plan Initiated: 02/10/2020    Treatment Plan Modalities:  Type of Modality Amount  (x minutes) Frequency (x/week) Duration (x days) Name of Responsible Staff   [address] [facility] meetings to encourage peer interactions Guy NAGY 16.     Group psychotherapy to assist in building coping skills and internal controls 60 7 1 Reji Renee   Therapeutic activity groups to build coping skills 60 7 1 Reji Renee   Psychoeducation in group setting to address:   Medication education   [facility]. RN   Coping skills         Relaxation techniques         Symptom management         Discharge planning   60 2 [address]   Spirituality    61 2 1 Chaplain Gina BRIGGSI   61 1 1 volunteer   Recovery/AA/NA      volunteer   Physician medication management   13 7 1 Dr. Tello  meeting/discharge planning   15 2 [address] [facility] and Paco Reyes                                  Goals will be met by 02/17/2020    Problem: Altered Thought Process (Adult/Pediatric)  Goal: *STG: Participates in treatment plan  Outcome: Progressing Towards Goal  Note:   Patient is participatory in treatment team. She is appropriate on the unit. Med and meal compliant. Continues to improve. Transfer to general ordered.   Goal: *STG: Remains safe in hospital  Outcome: Progressing Towards Goal  Note:   Safe  Goal: *STG: Complies with medication therapy  Outcome: Progressing Towards Goal  Note:   Compliant  Goal: *STG: Decreased delusional thinking  Outcome: Progressing Towards Goal  Note:   Not delusional  Goal: Interventions  Outcome: Progressing Towards Goal

## 2020-02-10 NOTE — PROGRESS NOTES
Problem: Falls - Risk of  Goal: *Absence of Falls  Description  Document [facility] Fall Risk and appropriate interventions in the flowsheet. Outcome: Progressing Towards Goal  Note: Fall Risk Interventions:  Mobility Interventions: Assess mobility with egress test    Mentation Interventions: Adequate sleep, hydration, pain control    Medication Interventions: Teach patient to arise slowly    Elimination Interventions:  Toilet paper/wipes in reach    History of Falls Interventions: Room close to nurse's station

## 2020-02-10 NOTE — PROGRESS NOTES
Problem: Altered Thought Process (Adult/Pediatric)  Goal: *STG: Remains safe in hospital  Outcome: Progressing Towards Goal  Denies suicidal, homicidal thoughts at this time. Goal: *STG: Seeks staff when feelings of anxiety and fear arise  Outcome: Progressing Towards Goal  Able to verbalize to staff anxiety, will request medication to decrease anxiety if needed  Goal: *STG: Complies with medication therapy  Outcome: Progressing Towards Goal  Compliant with medication regime  Goal: *STG: Decreased delusional thinking  Outcome: Progressing Towards Goal  Pt denies auditory/visual hallucinations     Problem: Falls - Risk of  Goal: *Absence of Falls  Description  Document [facility] Brunner Fall Risk and appropriate interventions in the flowsheet. Outcome: Progressing Towards Goal  Note: Fall Risk Interventions:  Mobility Interventions: Assess mobility with egress test    Mentation Interventions: Adequate sleep, hydration, pain control    Medication Interventions: Teach patient to arise slowly    Elimination Interventions:  Toilet paper/wipes in reach    History of Falls Interventions: Room close to nurse's station   Pt compliant with fall risk interventions with reminders

## 2020-02-10 NOTE — GROUP NOTE
DANIEL  GROUP DOCUMENTATION INDIVIDUAL                                                                          Group Therapy Note    Date: 2/10/2020    Group Start Time: 0930  Group End Time: 21   Group Topic: Process Group - Inpatient    [facility] 7W GEN [facility]    Savannah Matthew    IP [facility] GROUP DOCUMENTATION GROUP    Group Therapy Note    Attendees: 5         Attendance: Attended     Patient's Goal:  To better understand the values that help each individual live a healthy life. To better clarify where each priority can sometime cause conflict within the recovery process. Interventions/techniques: Challenged, Informed, Validated, Promoted peer support and Provide feedback    Follows Directions: Followed directions    Interactions: Interacted appropriately    Mental Status: Calm and Happy    Behavior/appearance: Attentive, Caretaking and Cooperative    Goals Achieved: Able to engage in interactions, Able to listen to others, Able to give feedback to another, Able to reflect/comment on own behavior and Able to self-disclose      Additional Notes:  Pt was talkative in group and actively engaged in group discussion. Pt had to be redirected a few times to stay on topic, but was able to self-disclose and to share openly with the group. Pt offered good feedback to peers.      Radha Mariano

## 2020-02-10 NOTE — BH NOTES
Chief Complaint:  . I feel okay today    Length of Stay: 8 Days    Interval History:  Amaury Fernández continues to maintain improvement. Her speech output has reduced and she is able to focus better. Slept well last night. Her  visited and she was pleasant and courteous with him. No SI or plan. No Ah or VH. Past Medical History:  Past Medical History:   Diagnosis Date    Aggressive outburst     Arthritis     joints    Bipolar 1 disorder with moderate robyn ([address] 3/17/2014    Chronic pain     back with stress    Contact dermatitis and other eczema, due to unspecified cause     Depression     Headache(784.0)     Hyperlipidemia 8/22/2016    Other ill-defined conditions(799.89)     sinus infection,hay fever    Other ill-defined conditions(799.89)     scoliosis    Psychiatric disorder     bipolar    Psychotic disorder (HCC)     Stool color black     Tennis elbow syndrome 5/4/2015    Trauma            Labs:  Lab Results   Component Value Date/Time    WBC 5.4 02/03/2020 05:40 AM    HGB 11.8 02/03/2020 05:40 AM    Hematocrit (POC) 34 (L) 07/17/2018 03:33 AM    HCT 37.0 02/03/2020 05:40 AM    PLATELET 390 92/69/7650 05:40 AM    MCV 98.4 02/03/2020 05:40 AM      Lab Results   Component Value Date/Time    Sodium 136 02/03/2020 05:40 AM    Potassium 3.9 02/03/2020 05:40 AM    Chloride 103 02/03/2020 05:40 AM    CO2 29 02/03/2020 05:40 AM    Anion gap 4 (L) 02/03/2020 05:40 AM    Glucose 107 (H) 02/03/2020 05:40 AM    Glucose 107 (H) 02/03/2020 05:40 AM    BUN 5 (L) 02/03/2020 05:40 AM    Creatinine 0.81 02/03/2020 05:40 AM    BUN/Creatinine ratio 6 (L) 02/03/2020 05:40 AM    GFR est AA >60 02/03/2020 05:40 AM    GFR est non-AA >60 02/03/2020 05:40 AM    Calcium 9.8 02/03/2020 05:40 AM    Bilirubin, total 0.4 02/02/2020 11:12 AM    AST (SGOT) 20 02/02/2020 11:12 AM    Alk.  phosphatase 81 02/02/2020 11:12 AM    Protein, total 8.9 (H) 02/02/2020 11:12 AM    Albumin 4.6 02/02/2020 11:12 AM    Globulin 4.3 (H) 02/02/2020 11:12 AM    A-G Ratio 1.1 02/02/2020 11:12 AM    ALT (SGPT) 31 02/02/2020 11:12 AM      Vitals:    02/09/20 0810 02/09/20 1522 02/09/20 2004 02/10/20 0752   BP:  138/77 128/81 107/69   Pulse:  79 89 85   Resp:  14 20 16   Temp:  98.4 °F (36.9 °C) 98.8 °F (37.1 °C) 98.4 °F (36.9 °C)   SpO2:  99% 100% 100%   Weight: 71.3 kg (157 lb 3.2 oz)            Current Facility-Administered Medications   Medication Dose Route Frequency Provider Last Rate Last Dose    benztropine (COGENTIN) tablet 1 mg  1 mg Oral BID Erin Giordano MD   1 mg at 02/10/20 3036    carBAMazepine (TEGretol) tablet 200 mg  200 mg Oral QHS Erin Giordano MD   200 mg at 02/09/20 2109    ergocalciferol capsule 50,000 Units  50,000 Units Oral Q7D Erin Giordano MD   50,000 Units at 02/07/20 1354    methocarbamol (ROBAXIN) tablet 750 mg  750 mg Oral QID PRN Erin Giordano MD   750 mg at 02/06/20 2049    OLANZapine (ZyPREXA) tablet 20 mg  20 mg Oral QHS Mariela Hagen, NP   20 mg at 02/09/20 2109    traZODone (DESYREL) tablet 100 mg  100 mg Oral QHS PRN Mariela Hagen, NP   100 mg at 02/09/20 2109    carBAMazepine (TEGretol) tablet 200 mg  200 mg Oral DAILY Mariela Hagen, NP   200 mg at 02/10/20 0823    OLANZapine (ZyPREXA) tablet 5 mg  5 mg Oral Q6H PRN Bambi Quevedo, NP   5 mg at 02/08/20 0139    haloperidol lactate (HALDOL) injection 5 mg  5 mg IntraMUSCular Q6H PRN Bambi Quevedo, NP   5 mg at 02/03/20 1621    diphenhydrAMINE (BENADRYL) injection 50 mg  50 mg IntraMUSCular BID PRN Bambi Quevedo, NP   50 mg at 02/07/20 2130    hydroxyzine HCL (ATARAX) tablet 50 mg  50 mg Oral TID PRN [facility], NP   50 mg at 02/09/20 2350    LORazepam (ATIVAN) injection 2 mg  2 mg IntraMUSCular Q4H PRN [facility], NP   2 mg at 02/03/20 1621    acetaminophen (TYLENOL) tablet 650 mg  650 mg Oral Q4H PRN [facility], NP   650 mg at 02/09/20 2149    magnesium hydroxide (MILK OF MAGNESIA) 400 mg/5 mL oral suspension 30 mL  30 mL Oral DAILY INDRA Baez NP             Mental Status Exam:  Eye contact: fair  Grooming: fair  Psychomotor activity: increased  Speech is spontaneous, increased output  Mood is \"great\"  Affect: elated. Perception: denies AH/VH  Suicidal ideation: denies   Cognition is grossly intact       Physical Exam:  Body habitus: well developed  Musculoskeletal system: normal gait  Tremor - present in upper limbs. Cog wheeling - neg      Assessment and Plan:  Mariana Merlos meets criteria for a diagnosis of Bipolar 1 Disorder. Continue the medication regimen as prescribed  Disposition planning to continue. I certify that this patients inpatient psychiatric hospital services furnished since the previous certification were, and continue to be, required for treatment that could reasonably be expected to improve the patient's condition, or for diagnostic study, and that the patient continues to need, on a daily basis, active treatment furnished directly by or requiring the supervision of inpatient psychiatric facility personnel. In addition, the hospital records show that services furnished were intensive treatment services, admission or related services, or equivalent services.

## 2020-02-10 NOTE — INTERDISCIPLINARY ROUNDS
Behavioral Health Interdisciplinary Rounds     Patient Name: Reid Draper  Age: 63 y.o. Room/Bed:  734/  Primary Diagnosis: <principal problem not specified>   Admission Status: Involuntary Commitment     Readmission within 30 days: no  Power of  in place: no  Patient requires a blocked bed: no          Reason for blocked bed:   Sleep hours: 6     Participation in Care/Groups:  yes  Medication Compliant?: Yes  PRNS (last 24 hours): Antianxiety, Sleep Aid and Pain    Restraints (last 24 hours):  no     Alcohol screening (AUDIT) completed -  AUDIT Score: 2  If applicable, date SBIRT discussed in treatment team AND documented:    Tobacco - patient is a smoker: Have You Used Tobacco in the Past 30 Days: No  Illegal Drugs use: Have You Used Any Illegal Substances Over the Past 12 Months: No    24 hour chart check complete: yes     Patient goal(s) for today:   Treatment team focus/goals: Plan to transfer to the general unit and plan to have her  come and see for baseline status. Progress note : she has been pleasant and compliant with her medications. Family Contact information:  - [phone]   Patient's preferred phone number for follow up call :      Follow up with Viry Jung - [phone]   LOS:  8  Expected LOS: TBD    Participating treatment team members: Orcely Baron,  Jose Kitchen

## 2020-02-11 VITALS
BODY MASS INDEX: 30.7 KG/M2 | OXYGEN SATURATION: 100 % | HEART RATE: 76 BPM | TEMPERATURE: 97.9 F | WEIGHT: 157.2 LBS | DIASTOLIC BLOOD PRESSURE: 72 MMHG | SYSTOLIC BLOOD PRESSURE: 105 MMHG | RESPIRATION RATE: 18 BRPM

## 2020-02-11 LAB — CARBAMAZEPINE SERPL-MCNC: 9.7 UG/ML (ref 4–12)

## 2020-02-11 PROCEDURE — 74011250637 HC RX REV CODE- 250/637: Performed by: NURSE PRACTITIONER

## 2020-02-11 PROCEDURE — 36415 COLL VENOUS BLD VENIPUNCTURE: CPT

## 2020-02-11 PROCEDURE — 74011250637 HC RX REV CODE- 250/637: Performed by: PSYCHIATRY & NEUROLOGY

## 2020-02-11 PROCEDURE — 80156 ASSAY CARBAMAZEPINE TOTAL: CPT

## 2020-02-11 RX ORDER — TRAZODONE HYDROCHLORIDE 100 MG/1
100 TABLET ORAL
Qty: 30 TAB | Refills: 0 | Status: SHIPPED | OUTPATIENT
Start: 2020-02-11 | End: 2020-08-06

## 2020-02-11 RX ORDER — OLANZAPINE 20 MG/1
20 TABLET ORAL
Qty: 30 TAB | Refills: 0 | Status: SHIPPED | OUTPATIENT
Start: 2020-02-11 | End: 2020-08-06

## 2020-02-11 RX ORDER — HYDROXYZINE 50 MG/1
50 TABLET, FILM COATED ORAL
Qty: 90 TAB | Refills: 0 | Status: SHIPPED | OUTPATIENT
Start: 2020-02-11 | End: 2020-02-21

## 2020-02-11 RX ORDER — BENZTROPINE MESYLATE 1 MG/1
1 TABLET ORAL 2 TIMES DAILY
Qty: 60 TAB | Refills: 0 | Status: ON HOLD | OUTPATIENT
Start: 2020-02-11 | End: 2020-07-29

## 2020-02-11 RX ORDER — CARBAMAZEPINE 200 MG/1
200 TABLET ORAL 2 TIMES DAILY
Qty: 60 TAB | Refills: 0 | Status: SHIPPED | OUTPATIENT
Start: 2020-02-11 | End: 2020-08-06

## 2020-02-11 RX ADMIN — CARBAMAZEPINE 200 MG: 200 TABLET ORAL at 08:47

## 2020-02-11 RX ADMIN — BENZTROPINE MESYLATE 1 MG: 1 TABLET ORAL at 08:47

## 2020-02-11 NOTE — PROGRESS NOTES
Problem: Altered Thought Process (Adult/Pediatric)  Goal: *STG: Participates in treatment plan  Outcome: Progressing Towards Goal  Note:   Out on unit engaged. Mood and affect brighter and full range. Thoughts organized, logical and goal directed. No delusional statements. Daily goal is to d/c home.  Staff focus is on d/c planning and medicaiton educaiton  Goal: *STG: Demonstrates ability to understand and use improved judgment in daily activities and relationships  Outcome: Progressing Towards Goal

## 2020-02-11 NOTE — DISCHARGE SUMMARY
Some parts of the discharge summary are from the initial Psychiatric interview that was done on admission by the admitting psychiatrist.     Date of Admission: 2/2/2020    Date of Discharge: 2/11/2020     TYPE OF DISCHARGE:   REGULAR -  YES    AMA  RELEASED BY THE TDO Derrick Kapoor 53:    Patient was admitted to the inpatient psychiatry unit for acute psychiatric stabilization in regards to symptomatology as described in the HPI above and placed on Q15 minute checks and withdrawal precautions. While on the unit Félix Grier was involved in individual, group, occupational and milieu therapy. She was started back on her usual medication regimen as well as PRN medications including hydroxyzine and trazodone. She improved gradually and was able to integrate into the milieu with help from the nursing staff. Patients symptoms improved gradually including stabilized mood and more organization. She was quite on the unit, appropriate in her interactions, and cooperative with medications and the unit routine. Please see individual progress notes for more specific details regarding patient's hospitalization course. Patient was discharged as per the plan. She had been doing well on the unit as per the report of the nursing staff and my observations. No PRN medication for agitation, seclusion or restraints were required during the last 48 hours of her stay. Félix Grier had improved progressively to the point of being stable for discharge and outpatient FU. At this time she did not offer any complaints. Patient denied any SI or HI. Denied any AH or VH. She denied any delusions. Was not considered a danger to self or to others and is safe for discharge. Will FU with her appointments and remains motivated to be in treatment. The patient verbalized understanding of her discharge instructions.         DISCHARGE DIAGNOSIS:  Bipolar I Disorder, MRE manic      Current Discharge Medication List      START taking these medications    Details   benztropine (COGENTIN) 1 mg tablet Take 1 Tab by mouth two (2) times a day. Indications: extrapyramidal symptoms as a result of taking the medication  Qty: 60 Tab, Refills: 0      traZODone (DESYREL) 100 mg tablet Take 1 Tab by mouth nightly as needed for Sleep (For insomnia). Indications: insomnia associated with depression  Qty: 30 Tab, Refills: 0         CONTINUE these medications which have CHANGED    Details   !! carBAMazepine (TEGRETOL) 200 mg tablet Take 1 Tab by mouth two (2) times a day. Indications: robyn associated with bipolar disorder  Qty: 60 Tab, Refills: 0      hydroxyzine HCL (ATARAX) 50 mg tablet Take 1 Tab by mouth three (3) times daily as needed for Anxiety for up to 10 days. Indications: anxious  Qty: 90 Tab, Refills: 0      OLANZapine (ZYPREXA) 20 mg tablet Take 1 Tab by mouth nightly. Indications: robyn associated with bipolar disorder  Qty: 30 Tab, Refills: 0       !! - Potential duplicate medications found. Please discuss with provider. CONTINUE these medications which have NOT CHANGED    Details   !! carBAMazepine (TEGRETOL) 200 mg tablet Take 300 mg by mouth nightly. OTHER,NON-FORMULARY, Allergy shots every month - does not recall date of last shot      cholecalciferol (VITAMIN D3) (50,000 UNITS /1250 MCG) capsule Take 50,000 Units by mouth every Monday. ibuprofen (MOTRIN) 800 mg tablet Take 800 mg by mouth every twelve (12) hours as needed for Pain. methocarbamol (ROBAXIN) 500 mg tablet Take 1 Tab by mouth daily as needed for Pain. Qty: 60 Tab, Refills: 2    Associated Diagnoses: Chronic low back pain without sciatica, unspecified back pain laterality      montelukast (SINGULAIR) 10 mg tablet Take 10 mg by mouth daily as needed.  Prior to allergy shots      azelastine (ASTELIN) 137 mcg (0.1 %) nasal spray USE 1 SPRAY IN EACH NOSTRIL TWICE A DAY AS DIRECTED  Qty: 30 Bottle, Refills: 2      fexofenadine (ALLEGRA) 180 mg tablet Take 180 mg by mouth daily. !! - Potential duplicate medications found. Please discuss with provider. Follow-up Information     Follow up With Specialties Details Why [address]   On 2/11/2020  [address] [facility] [place], [facility] [address]         WOUND CARE: none needed. PROGNOSIS:   Good / Fair based on nature of patient's pathology/ies and treatment compliance issues. Prognosis is greatly dependent upon patient's ability to  follow up on psychiatric/psychotherapy appointments as well as to comply with psychiatric medications as prescribed.

## 2020-02-11 NOTE — PROGRESS NOTES
Problem: Discharge Planning  Goal: *Discharge to safe environment  Outcome: Progressing Towards Goal  Note:   She will return home when ready for discharge  She has established outpatient treatment   She has a supportive       Problem: Discharge Planning  Goal: *Knowledge of medication management  Note:   She is compliant with her medications   Goal: *Knowledge of discharge instructions  Note:   She is able to verbalize discharge instructions

## 2020-02-11 NOTE — INTERDISCIPLINARY ROUNDS
Behavioral Health Interdisciplinary Rounds     Patient Name: Mariana Merlos  Age: 63 y.o. Room/Bed:  746/02  Primary Diagnosis: <principal problem not specified>   Admission Status: Involuntary Commitment     Readmission within 30 days: no  Power of  in place: no  Patient requires a blocked bed: no          Reason for blocked bed:   Sleep hours:  4.75      Participation in Care/Groups:  yes  Medication Compliant?: Yes  PRNS (last 24 hours): Sleep Aid    Restraints (last 24 hours):  no     Alcohol screening (AUDIT) completed -  AUDIT Score: 2  If applicable, date SBIRT discussed in treatment team AND documented:    Tobacco - patient is a smoker: Have You Used Tobacco in the Past 30 Days: No  Illegal Drugs use: Have You Used Any Illegal Substances Over the Past 12 Months: No    24 hour chart check complete: yes     Patient goal(s) for today:   Treatment team focus/goals: plan for discharge today   Progress note : plan for discharge today.    Family Contact information:  -   Patient's preferred phone number for follow up call : [phone]     LOS:  9  Expected LOS: today   Participating treatment team members: Paulina Feldman SW- Cleatus Gaw

## 2020-02-11 NOTE — BH NOTES
GROUP THERAPY PROGRESS NOTE    Nata Anderson participated in the General Unit's Process Group, with a focus identifying feelings and planning for the day. Group time: 1013 - 1157     Personal goal for participation: To increase the capacity to improve ones mood and structure. Goal orientation: The patient will be able to identify their feelings, develop a plan for structuring their day, and discharge planning. Therapeutic interventions reviewed and discussed: The group members were asked to introduce themselves to each other and to see if they could identify an emotion they are having and/or let the group know what they want to focus on for the day as they continue to make discharge plans. Impression of participation: With prompting, this patient actively participated in the group, except when called out to meet with his treatment team. She was alert, generally oriented, and talkative. She said she was focused on her allergies and \"wanting to get out of here;\" i.e., discharge. She indicated that her primary goal after keeping her prescriber's and therapist's outpatient's appointments, would be to work on developing \"better communication with my . \"    The patient expressed no current SI/HI and displayed no overt psychotic symptoms. Her affect was mostly non-dysphoric and slightly euphoric. Her mood reflected her affect. This patient spoke as if she were in the process of finalizing her discharge plans with the assistance of her treatment team, especially nursing.

## 2020-02-11 NOTE — PROGRESS NOTES
Problem: Altered Thought Process (Adult/Pediatric)  Goal: *STG: Participates in treatment plan  Outcome: Progressing Towards Goal  Pt is able to participate in unit activities while maintaining safe behaviors.

## 2020-02-11 NOTE — DISCHARGE INSTRUCTIONS
DISCHARGE SUMMARY    Flavia Began  : 1965  MRN: [id number]    The patient Yasir Hebert exhibits the ability to control behavior in a less restrictive environment. Patient's level of functioning is improving. No assaultive/destructive behavior has been observed for the past 24 hours. No suicidal/homicidal threat or behavior has been observed for the past 24 hours. There is no evidence of serious medication side effects. Patient has not been in physical or protective restraints for at least the past 24 hours. If weapons involved, how are they secured? No weapons involved     Is patient aware of and in agreement with discharge plan? She is aware of discharge and is in agreement     Arrangements for medication:  Prescriptions given to patient. Copy of discharge instructions to provider?:fax to [phone]      Arrangements for transportation home:   to      Keep all follow up appointments as scheduled, continue to take prescribed medications per physician instructions. Mental health crisis number:  [phone] or your local mental health crisis line number at [address] from Nurse    PATIENT INSTRUCTIONS:      What to do at Home:  Recommended activity: Activity as tolerated,     If you experience any of the following symptoms thoughts of harming self, disorganized thinking that leads to difficulty problem solving and caring for self, please follow up with staff at [address] [place] [address] Pky at [phone]    *  Please give a list of your current medications to your Primary Care Provider. *  Please update this list whenever your medications are discontinued, doses are      changed, or new medications (including over-the-counter products) are added. *  Please carry medication information at all times in case of emergency situations.     These are general instructions for a healthy lifestyle:    No smoking/ No tobacco products/ Avoid exposure to second hand smoke  Surgeon General's Warning:  Quitting smoking now greatly reduces serious risk to your health. Obesity, smoking, and sedentary lifestyle greatly increases your risk for illness    A healthy diet, regular physical exercise & weight monitoring are important for maintaining a healthy lifestyle    You may be retaining fluid if you have a history of heart failure or if you experience any of the following symptoms:  Weight gain of 3 pounds or more overnight or 5 pounds in a week, increased swelling in our hands or feet or shortness of breath while lying flat in bed. Please call your doctor as soon as you notice any of these symptoms; do not wait until your next office visit. The discharge information has been reviewed with the patient. The patient verbalized understanding. Discharge medications reviewed with the patient and appropriate educational materials and side effects teaching were provided.   ___________________________________________________________________________________________________________________________________

## 2020-02-11 NOTE — BH NOTES
Behavioral Health Transition Record to Provider    Patient Name: Sil Billings  YOB: 1965  Medical Record Number: [id number]  Date of Admission: 2/2/2020  Date of Discharge: 2/11/2020     Attending Provider: Darell Canavan, MD  Discharging Provider: Kacy Dunn   To contact this individual call [phone] and ask the  to page. If unavailable, ask to be transferred to [facility] Provider on call. [facility] Provider will be available on call 24/7 and during holidays. Primary Care Provider: Marybel Michael MD    Allergies   Allergen Reactions    Other Food Hives     Artifical sweetners    Claritin-D 12 Hour [Loratadine-Pseudoephedrine] Palpitations     palpatations and ringing in the ear    Other Medication Anaphylaxis     Artificial sweeteners cause anaphylaxis    Pcn [Penicillins] Anaphylaxis    Sunscreen Contact Dermatitis     Burns and opens the skin    Ultram [Tramadol] Hives and Other (comments)     Hives and ringing of the ears    Benzoyl Peroxide Hives    Cephalexin Itching    Divalproex Itching    Maltodextrin-Glycerin Shortness of Breath       Reason for Admission:  Patient was admitted to the inpatient psychiatry unit for acute psychiatric stabilization in regards to symptomatology as described in the HPI above and placed on Q15 minute checks and withdrawal precautions. While on the unit Sil Billings was involved in individual, group, occupational and milieu therapy. She was started back on her usual medication regimen as well as PRN medications including hydroxyzine and trazodone. She improved gradually and was able to integrate into the milieu with help from the nursing staff. Patients symptoms improved gradually including stabilized mood and more organization. She was quite on the unit, appropriate in her interactions, and cooperative with medications and the unit routine.  Please see individual progress notes for more specific details regarding patient's hospitalization course. Patient was discharged as per the plan. She had been doing well on the unit as per the report of the nursing staff and my observations. No PRN medication for agitation, seclusion or restraints were required during the last 48 hours of her stay. Admission Diagnosis: [facility] ([facility] [F31.9]    * No surgery found *    Results for orders placed or performed during the hospital encounter of 02/02/20   GLUCOSE, FASTING   Result Value Ref Range    Glucose 107 (H) 65 - 100 MG/DL   LIPID PANEL   Result Value Ref Range    LIPID PROFILE          Cholesterol, total 216 (H) <200 MG/DL    Triglyceride 54 <150 MG/DL    HDL Cholesterol 99 MG/DL    LDL, calculated 106.2 (H) 0 - 100 MG/DL    VLDL, calculated 10.8 MG/DL    CHOL/HDL Ratio 2.2 0.0 - 5.0     CBC WITH AUTOMATED DIFF   Result Value Ref Range    WBC 5.4 3.6 - 11.0 K/uL    RBC 3.76 (L) 3.80 - 5.20 M/uL    HGB 11.8 11.5 - 16.0 g/dL    HCT 37.0 35.0 - 47.0 %    MCV 98.4 80.0 - 99.0 FL    MCH 31.4 26.0 - 34.0 PG    MCHC 31.9 30.0 - 36.5 g/dL    RDW 12.3 11.5 - 14.5 %    PLATELET 759 032 - 928 K/uL    MPV 9.5 8.9 - 12.9 FL    NRBC 0.0 0  WBC    ABSOLUTE NRBC 0.00 0.00 - 0.01 K/uL    NEUTROPHILS 55 32 - 75 %    LYMPHOCYTES 32 12 - 49 %    MONOCYTES 10 5 - 13 %    EOSINOPHILS 2 0 - 7 %    BASOPHILS 1 0 - 1 %    IMMATURE GRANULOCYTES 0 0.0 - 0.5 %    ABS. NEUTROPHILS 3.0 1.8 - 8.0 K/UL    ABS. LYMPHOCYTES 1.7 0.8 - 3.5 K/UL    ABS. MONOCYTES 0.5 0.0 - 1.0 K/UL    ABS. EOSINOPHILS 0.1 0.0 - 0.4 K/UL    ABS. BASOPHILS 0.1 0.0 - 0.1 K/UL    ABS. IMM.  GRANS. 0.0 0.00 - 0.04 K/UL    DF SMEAR SCANNED      RBC COMMENTS NORMOCYTIC, NORMOCHROMIC     METABOLIC PANEL, BASIC   Result Value Ref Range    Sodium 136 136 - 145 mmol/L    Potassium 3.9 3.5 - 5.1 mmol/L    Chloride 103 97 - 108 mmol/L    CO2 29 21 - 32 mmol/L    Anion gap 4 (L) 5 - 15 mmol/L    Glucose 107 (H) 65 - 100 mg/dL    BUN 5 (L) 6 - 20 MG/DL    Creatinine 0.81 0.55 - 1.02 MG/DL    BUN/Creatinine ratio 6 (L) 12 - 20      GFR est AA >60 >60 ml/min/1.73m2    GFR est non-AA >60 >60 ml/min/1.73m2    Calcium 9.8 8.5 - 10.1 MG/DL   CARBAMAZEPINE   Result Value Ref Range    Carbamazepine 14.2 (H) 4.0 - 12.0 ug/mL   CARBAMAZEPINE   Result Value Ref Range    Carbamazepine 13.4 (H) 4.0 - 12.0 ug/mL   CARBAMAZEPINE   Result Value Ref Range    Carbamazepine 9.7 4.0 - 12.0 ug/mL       Immunizations administered during this encounter:   Immunization History   Administered Date(s) Administered    Influenza Vaccine (Quad) PF 11/12/2015, 12/30/2019    TDAP Vaccine 07/06/2012    Zoster Recombinant 12/30/2019       Screening for Metabolic Disorders for Patients on Antipsychotic Medications  (Data obtained from the EMR)    Estimated Body Mass Index  Estimated body mass index is 30.7 kg/m² as calculated from the following:    Height as of an earlier encounter on 2/2/20: 5' (1.524 m). Weight as of this encounter: 71.3 kg (157 lb 3.2 oz). Vital Signs/Blood Pressure  Visit Vitals  /72   Pulse 76   Temp 97.9 °F (36.6 °C)   Resp 18   Wt 71.3 kg (157 lb 3.2 oz)   SpO2 100%   Breastfeeding No   BMI 30.70 kg/m²       Blood Glucose/Hemoglobin A1c  Lab Results   Component Value Date/Time    Glucose 107 (H) 02/03/2020 05:40 AM    Glucose 107 (H) 02/03/2020 05:40 AM    Glucose (POC) 136 (H) 01/10/2020 04:22 PM       Lab Results   Component Value Date/Time    Hemoglobin A1c 5.4 12/30/2019 02:09 PM        Lipid Panel  Lab Results   Component Value Date/Time    Cholesterol, total 216 (H) 02/03/2020 05:40 AM    HDL Cholesterol 99 02/03/2020 05:40 AM    LDL, calculated 106.2 (H) 02/03/2020 05:40 AM    Triglyceride 54 02/03/2020 05:40 AM    CHOL/HDL Ratio 2.2 02/03/2020 05:40 AM        Discharge Diagnosis: Bipolar I Disorder, MRE manic    Discharge Plan: She will be discharge home with her . The patient Christian Gonzalez exhibits the ability to control behavior in a less restrictive environment. Patient's level of functioning is improving. No assaultive/destructive behavior has been observed for the past 24 hours. No suicidal/homicidal threat or behavior has been observed for the past 24 hours. There is no evidence of serious medication side effects. Patient has not been in physical or protective restraints for at least the past 24 hours. If weapons involved, how are they secured? No weapons involved     Is patient aware of and in agreement with discharge plan? She is aware of discharge and is in agreement     Arrangements for medication:  Prescriptions given to patient. Copy of discharge instructions to provider?:fax to [phone]      Arrangements for transportation home:   to      Keep all follow up appointments as scheduled, continue to take prescribed medications per physician instructions. Mental health crisis number:  [phone] or your local mental health crisis line number at [phone]         Discharge Medication List and Instructions:   Current Discharge Medication List      START taking these medications    Details   benztropine (COGENTIN) 1 mg tablet Take 1 Tab by mouth two (2) times a day. Indications: extrapyramidal symptoms as a result of taking the medication  Qty: 60 Tab, Refills: 0      traZODone (DESYREL) 100 mg tablet Take 1 Tab by mouth nightly as needed for Sleep (For insomnia). Indications: insomnia associated with depression  Qty: 30 Tab, Refills: 0         CONTINUE these medications which have CHANGED    Details   carBAMazepine (TEGRETOL) 200 mg tablet Take 1 Tab by mouth two (2) times a day. Indications: robyn associated with bipolar disorder  Qty: 60 Tab, Refills: 0      hydroxyzine HCL (ATARAX) 50 mg tablet Take 1 Tab by mouth three (3) times daily as needed for Anxiety for up to 10 days. Indications: anxious  Qty: 90 Tab, Refills: 0      OLANZapine (ZYPREXA) 20 mg tablet Take 1 Tab by mouth nightly.  Indications: robyn associated with bipolar disorder  Qty: 30 Tab, Refills: 0         CONTINUE these medications which have NOT CHANGED    Details   OTHER,NON-FORMULARY, Allergy shots every month - does not recall date of last shot      cholecalciferol (VITAMIN D3) (50,000 UNITS /1250 MCG) capsule Take 50,000 Units by mouth every Monday. ibuprofen (MOTRIN) 800 mg tablet Take 800 mg by mouth every twelve (12) hours as needed for Pain. methocarbamol (ROBAXIN) 500 mg tablet Take 1 Tab by mouth daily as needed for Pain. Qty: 60 Tab, Refills: 2    Associated Diagnoses: Chronic low back pain without sciatica, unspecified back pain laterality      montelukast (SINGULAIR) 10 mg tablet Take 10 mg by mouth daily as needed. Prior to allergy shots      azelastine (ASTELIN) 137 mcg (0.1 %) nasal spray USE 1 SPRAY IN EACH NOSTRIL TWICE A DAY AS DIRECTED  Qty: 30 Bottle, Refills: 2      fexofenadine (ALLEGRA) 180 mg tablet Take 180 mg by mouth daily. Unresulted Labs (24h ago, onward)    None        To obtain results of studies pending at discharge, please contact [phone]    Follow-up Information     Follow up With Specialties Details Why Contact Info    Dr Naomi Wasserman  On 2/13/2020 appointment for psychiatric medication management. Your  has your appointment confirmaiton card. [address] [place] [address]   [address] [address]     Justice Scott,    Schedule an appointment as soon as possible for a visit on 2/13/2020 you have a 9:00 appointment  [address] [place] [address]   [address] [facility]", [place] [address]     Char Benítez MD Internal Medicine   [address]  [address] [address]      [address] [place] [address]   On 2/13/2020 you have a 9:30 appointment with Naomi Wasserman NP [address]             Advanced Directive:   Does the patient have an appointed surrogate decision maker? No  Does the patient have a Medical Advance Directive?  No  Does the patient have a Psychiatric Advance Directive? No  If the patient does not have a surrogate or Medical Advance Directive AND Psychiatric Advance Directive, the patient was offered information on these advance directives Patient declined to complete    Patient Instructions: Please continue all medications until otherwise directed by physician. Tobacco Cessation Discharge Plan:   Is the patient a smoker and needs referral for smoking cessation? No  Patient referred to the following for smoking cessation with an appointment? No     Patient was offered medication to assist with smoking cessation at discharge? No  Was education for smoking cessation added to the discharge instructions? Yes    Alcohol/Substance Abuse Discharge Plan:   Does the patient have a history of substance/alcohol abuse and requires a referral for treatment? No  Patient referred to the following for substance/alcohol abuse treatment with an appointment? No  Patient was offered medication to assist with alcohol cessation at discharge? No  Was education for substance/alcohol abuse added to discharge instructions? No    Patient discharged to Home; discussed with patient/caregiver and provided to the patient/caregiver either in hard copy or electronically.

## 2020-02-11 NOTE — PROGRESS NOTES
Pt receiving continuous q15m safety checks, pt currently asleep resting comfortably in bed. No distress noted, respiratory WNL. Supplemental lighting utilized. Problem: Altered Thought Process (Adult/Pediatric)  Goal: *STG: Remains safe in hospital  Outcome: Progressing Towards Goal     Problem: Falls - Risk of  Goal: *Absence of Falls  Description  Document Ricardo Garza Fall Risk and appropriate interventions in the flowsheet. Outcome: Progressing Towards Goal  Note: Fall Risk Interventions:  Mobility Interventions: Assess mobility with egress test    Mentation Interventions: Adequate sleep, hydration, pain control    Medication Interventions: Teach patient to arise slowly    Elimination Interventions:  Toilet paper/wipes in reach    History of Falls Interventions: Room close to nurse's station

## 2020-02-12 ENCOUNTER — TELEPHONE (OUTPATIENT)
Dept: INTERNAL MEDICINE CLINIC | Age: 55
End: 2020-02-12

## 2020-02-12 NOTE — TELEPHONE ENCOUNTER
#[phone]  (use this number)  pt was discharged from [facility] on 2-11-20 and needs a BETSEY. Please call to schedule.

## 2020-02-13 NOTE — TELEPHONE ENCOUNTER
MD Fartun Bowman LPN   Caller: Unspecified Jose Dinero, 10:34 AM)             Can work her in      Left message for patient to return call

## 2020-02-17 NOTE — TELEPHONE ENCOUNTER
Pt states she is still waiting on a call back for a BETSEY appt that she called about on 2-12-20. Pt is asking why has there been no call back.

## 2020-02-17 NOTE — TELEPHONE ENCOUNTER
Spoke with patient. Two pt identifiers confirmed. Patient offered an appointment with Dr. Mary Corona on 02/18/2020. Appointment accepted. Patient advised if anything changes or if unable to keep this appointment to call the office  Pt verbalized understanding of information discussed w/ no further questions at this time.

## 2020-02-18 ENCOUNTER — OFFICE VISIT (OUTPATIENT)
Dept: INTERNAL MEDICINE CLINIC | Age: 55
End: 2020-02-18

## 2020-02-18 VITALS
OXYGEN SATURATION: 99 % | TEMPERATURE: 97.8 F | DIASTOLIC BLOOD PRESSURE: 68 MMHG | WEIGHT: 159 LBS | BODY MASS INDEX: 31.22 KG/M2 | HEIGHT: 60 IN | SYSTOLIC BLOOD PRESSURE: 103 MMHG | RESPIRATION RATE: 18 BRPM | HEART RATE: 85 BPM

## 2020-02-18 DIAGNOSIS — Z09 HOSPITAL DISCHARGE FOLLOW-UP: Primary | ICD-10-CM

## 2020-02-18 NOTE — PROGRESS NOTES
Reviewed record in preparation for visit and have obtained necessary documentation. Identified pt with two pt identifiers(name and ). Chief Complaint   Patient presents with   [facility] Follow Up       There are no preventive care reminders to display for this patient. Ms. Negra Corrales has a reminder for a \"due or due soon\" health maintenance. I have asked that she discuss this further with her primary care provider for follow-up on this health maintenance. Coordination of Care Questionnaire:  :     1) Have you been to an emergency room, urgent care clinic since your last visit? no   Hospitalized since your last visit? yes             2) Have you seen or consulted any other health care providers outside of [address] [place] [address] since your last visit? no  (Include any pap smears or colon screenings in this section.)    3) In the event something were to happen to you and you were unable to speak on your behalf, do you have an Advance Directive/ Living Will in place stating your wishes? NO    Do you have an Advance Directive on file? no    4) Are you interested in receiving information on Advance Directives? NO    Patient is accompanied by self I have received verbal consent from Tahmina Cornell to discuss any/all medical information while they are present in the room.

## 2020-02-18 NOTE — PATIENT INSTRUCTIONS
Office Policies    Phone calls/patient messages:            Please allow up to 24 hours for someone in the office to contact you about your call or message. Be mindful your provider may be out of the office or your message may require further review. We encourage you to use [employer] for your messages as this is a faster, more efficient way to communicate with our office                         Medication Refills:            Prescription medications require 48-72 business hours to process. We encourage you to use [employer] for your refills. For controlled medications: Please allow 72 business hours to process. Certain medications may require you to  a written prescription at our office. NO narcotic/controlled medications will be prescribed after 4pm Monday through Friday or on weekends              Form/Paperwork Completion:            Please note a $25 fee may incur for all paperwork for completed by our providers. We ask that you allow 7-10 business days. Pre-payment is due prior to picking up/faxing the completed form. You may also download your forms to [employer] to have your doctor print off.

## 2020-02-18 NOTE — PROGRESS NOTES
CC: Hospital Follow Up      HPI:    She is a 63 y.o. female who presents for evaluation of hospital follow admitted from 02/02/2020 to 02/11/2020   Patient was admitted for psychiatric stabilization. Patient was discharged on tegretol, atarax and zyprexa, cogentin and desyrel. She feels well today she denies hallucinations. She feels in the twilight zone and feels like her thoughts. Thoughts are organized in her head. She is resting - slept well the past 2 nights. She feels thirsty frequently    Support system -  [employer]. Helps with her medications   She cooks her meals. Not working. No kids    ROS:  Constitutional: negative for fevers, chills, anorexia and weight loss  Eyes:   negative for visual disturbance,  irritation  ENT:   negative for tinnitus,sore throat,nasal congestion,ear pain, sinus pain.    Respiratory:  negative for cough, hemoptysis, dyspnea,wheezing  CV:   negative for chest pain, palpitations, lower extremity edema  GI:   negative for nausea, vomiting, diarrhea, abdominal pain,melena  Genitourinary: negative for frequency, dysuria, hematuria  Musculoskel: negative for myalgias, arthralgias, back pain, muscle weakness, joint pain  Neurological:  negative for headaches, dizziness, focal weakness, numbness  Psych:      See HPI    Past Medical History:   Diagnosis Date    Aggressive outburst     Arthritis     joints    Bipolar 1 disorder with moderate robyn ([facility] [address] 3/17/2014    Chronic pain     back with stress    Contact dermatitis and other eczema, due to unspecified cause     Depression     Headache(784.0)     Hyperlipidemia 8/22/2016    Other ill-defined conditions(799.89)     sinus infection,hay fever    Other ill-defined conditions(799.89)     scoliosis    Psychiatric disorder     bipolar    Psychotic disorder (HCC)     Stool color black     Tennis elbow syndrome 5/4/2015    Trauma        Current Outpatient Medications on File Prior to Visit   Medication Sig Dispense Refill    carBAMazepine (TEGRETOL) 200 mg tablet Take 1 Tab by mouth two (2) times a day. Indications: robyn associated with bipolar disorder 60 Tab 0    hydroxyzine HCL (ATARAX) 50 mg tablet Take 1 Tab by mouth three (3) times daily as needed for Anxiety for up to 10 days. Indications: anxious 90 Tab 0    OLANZapine (ZYPREXA) 20 mg tablet Take 1 Tab by mouth nightly. Indications: robyn associated with bipolar disorder 30 Tab 0    benztropine (COGENTIN) 1 mg tablet Take 1 Tab by mouth two (2) times a day. Indications: extrapyramidal symptoms as a result of taking the medication 60 Tab 0    traZODone (DESYREL) 100 mg tablet Take 1 Tab by mouth nightly as needed for Sleep (For insomnia). Indications: insomnia associated with depression 30 Tab 0    OTHER,NON-FORMULARY, Allergy shots every month - does not recall date of last shot      cholecalciferol (VITAMIN D3) (50,000 UNITS /1250 MCG) capsule Take 50,000 Units by mouth every Monday.  ibuprofen (MOTRIN) 800 mg tablet Take 800 mg by mouth every twelve (12) hours as needed for Pain.  methocarbamol (ROBAXIN) 500 mg tablet Take 1 Tab by mouth daily as needed for Pain. 60 Tab 2    montelukast (SINGULAIR) 10 mg tablet Take 10 mg by mouth daily as needed. Prior to allergy shots      azelastine (ASTELIN) 137 mcg (0.1 %) nasal spray USE 1 SPRAY IN EACH NOSTRIL TWICE A DAY AS DIRECTED 30 Bottle 2    fexofenadine (ALLEGRA) 180 mg tablet Take 180 mg by mouth daily. No current facility-administered medications on file prior to visit.         Past Surgical History:   Procedure Laterality Date    HX HEENT      wisdom teeth extraction    HX LIPOMA RESECTION      right gluteal    FREDY BIOPSY BREAST STEREOTACTIC Left 2011    negative    ME DENTAL SURGERY PROCEDURE      hx of dental surgery- wisdom teeth extracted       Family History   Problem Relation Age of Onset   Giuliano Haynes Arthritis-rheumatoid Mother     Migraines Mother     Psychiatric Disorder Mother    Giuliano Haynes Bipolar Disorder Mother     Lupus Mother     Alcohol abuse Father     Lung Disease Father     Heart Disease Father     Stroke Father     High Cholesterol Father     Psychiatric Disorder Sister     Alcohol abuse Sister     Bipolar Disorder Sister     Stroke Brother     Cancer Maternal Aunt     Breast Cancer Maternal Aunt         pt thniks she was under 48    Bipolar Disorder Sister      Reviewed and no changes     Social History     Socioeconomic History    Marital status:      Spouse name: Not on file    Number of children: Not on file    Years of education: Not on file    Highest education level: Not on file   Occupational History    Not on file   Social Needs    Financial resource strain: Not on file    Food insecurity:     Worry: Not on file     Inability: Not on file    Transportation needs:     Medical: Not on file     Non-medical: Not on file   Tobacco Use    Smoking status: Never Smoker    Smokeless tobacco: Never Used   Substance and Sexual Activity    Alcohol use: Yes     Comment: occasional    Drug use: No    Sexual activity: Yes     Partners: Male   Lifestyle    Physical activity:     Days per week: Not on file     Minutes per session: Not on file    Stress: Not on file   Relationships    Social connections:     Talks on phone: Not on file     Gets together: Not on file     Attends [employer] service: Not on file     Active member of club or organization: Not on file     Attends meetings of clubs or organizations: Not on file     Relationship status: Not on file    Intimate partner violence:     Fear of current or ex partner: Not on file     Emotionally abused: Not on file     Physically abused: Not on file     Forced sexual activity: Not on file   Other Topics Concern     Service Not Asked    Blood Transfusions Not Asked    Caffeine Concern Not Asked    Occupational Exposure Not Asked    Hobby Hazards Not Asked    Sleep Concern Not Asked    Stress Concern Not Asked    Weight Concern Not Asked    Special Diet Not Asked    Back Care Not Asked    Exercise Not Asked    Bike Helmet Not Asked   [address] Not Asked    Self-Exams Not Asked   Social History Narrative    , 0 children, not working at present. On disability for bipolar illness.              Visit Vitals  /68 (BP 1 Location: Right arm, BP Patient Position: Sitting)   Pulse 85   Temp 97.8 °F (36.6 °C) (Oral)   Resp 18   Ht 5' (1.524 m)   Wt 159 lb (72.1 kg)   SpO2 99%   BMI 31.05 kg/m²       Physical Examination:   General - Well appearing female  HEENT - PERRL, TM no erythema/opacification, normal nasal turbinates, oropharynx no erythema or exudate, MMM  Neck - supple, no bruits, no TMG, no LAD  Pulm - clear to auscultation bilaterally  Cardio - RRR, normal S1 S2, no murmur gallops or rubs  Abd - soft, nontender, no masses, no HSM  Extrem - no edema, +2 distal pulses  Psych - speech is fluent and coherent, mood is appropriate no hallucinations    Lab Results   Component Value Date/Time    WBC 5.4 02/03/2020 05:40 AM    HGB 11.8 02/03/2020 05:40 AM    Hematocrit (POC) 34 (L) 07/17/2018 03:33 AM    HCT 37.0 02/03/2020 05:40 AM    PLATELET 009 09/77/3393 05:40 AM    MCV 98.4 02/03/2020 05:40 AM     Lab Results   Component Value Date/Time    Sodium 136 02/03/2020 05:40 AM    Potassium 3.9 02/03/2020 05:40 AM    Chloride 103 02/03/2020 05:40 AM    CO2 29 02/03/2020 05:40 AM    Anion gap 4 (L) 02/03/2020 05:40 AM    Glucose 107 (H) 02/03/2020 05:40 AM    Glucose 107 (H) 02/03/2020 05:40 AM    BUN 5 (L) 02/03/2020 05:40 AM    Creatinine 0.81 02/03/2020 05:40 AM    BUN/Creatinine ratio 6 (L) 02/03/2020 05:40 AM    GFR est AA >60 02/03/2020 05:40 AM    GFR est non-AA >60 02/03/2020 05:40 AM    Calcium 9.8 02/03/2020 05:40 AM     Lab Results   Component Value Date/Time    Cholesterol, total 216 (H) 02/03/2020 05:40 AM    HDL Cholesterol 99 02/03/2020 05:40 AM    LDL, calculated 106.2 (H) 02/03/2020 05:40 AM    VLDL, calculated 10.8 02/03/2020 05:40 AM    Triglyceride 54 02/03/2020 05:40 AM    CHOL/HDL Ratio 2.2 02/03/2020 05:40 AM     Lab Results   Component Value Date/Time    TSH 0.86 01/10/2020 08:00 PM     No results found for: PSA, PSA2, PSAR1, [place], PSAR3, [id number], [id number], PSALT  Lab Results   Component Value Date/Time    Hemoglobin A1c 5.4 12/30/2019 02:09 PM    Hemoglobin A1c (POC) 5.4 04/20/2015 10:00 AM     Lab Results   Component Value Date/Time    Vitamin D 25-Hydroxy 12.7 (L) 11/10/2011 09:25 AM    VITAMIN D, 25-HYDROXY 69.0 11/12/2015 03:52 PM       Lab Results   Component Value Date/Time    ALT (SGPT) 31 02/02/2020 11:12 AM    AST (SGOT) 20 02/02/2020 11:12 AM    Alk. phosphatase 81 02/02/2020 11:12 AM    Bilirubin, total 0.4 02/02/2020 11:12 AM           Assessment/Plan:    1. Hospital discharge follow-up  Reviewed hospital course. Patient was admitted for psychiatric stabilization. Her mood appears stable. Her though process was more organized and coherent. She reports compliance with medications and has good family support. She will follow up with her psychiatrist in the next 2 weeks        Follow-up and Dispositions    · Return in about 3 months (around 5/18/2020).          Kaylyn Chamorro MD

## 2020-06-16 ENCOUNTER — TELEPHONE (OUTPATIENT)
Dept: INTERNAL MEDICINE CLINIC | Age: 55
End: 2020-06-16

## 2020-06-16 NOTE — TELEPHONE ENCOUNTER
Spoke with patient. Two pt identifiers confirmed. Patient advised that Dr. Carmenza Pritchett would like for her to schedule a virtual visit to discuss what is going on with her ankles. Patient offered an appointment with Dr. Carmenza Pritchett on 06/18/2020. Appointment accepted. Patient advised if anything changes or if unable to keep this appointment to call the office  Pt verbalized understanding of information discussed w/ no further questions at this time.

## 2020-06-16 NOTE — TELEPHONE ENCOUNTER
#[phone] pt states she has had tendinitis of both ankles since 2018. Pt is asking if this is normal?   She is very limited to what she can do. Can't work in her garden as she can't shovel dirt etc.      What is the next step for this? Please call to advise after she has waited this long with no end in sight.

## 2020-06-18 ENCOUNTER — VIRTUAL VISIT (OUTPATIENT)
Dept: INTERNAL MEDICINE CLINIC | Age: 55
End: 2020-06-18

## 2020-06-18 DIAGNOSIS — M77.8 TENDINITIS OF BOTH FEET: Primary | ICD-10-CM

## 2020-06-18 NOTE — PROGRESS NOTES
CC: Ankle Pain      HPI:    She is a 63 y.o. female with a hx of bipolar disorder who presents for evaluation of ankle pain     She fell in 2018 and hurt both ankles and tendinitis and had rehab    She reports having pain in both her ankles which has progressed     Notes from 11/2018 reviewed     Left peroneal tendinosis and right peroneal tendinosis per chart   X rays anthony at the time were normal     States walking is painful, wearing shoes is painful, this is debilitating       This is an established visit conducted via telemedicine with video. The patient has been instructed that this meets HIPAA criteria and acknowledges and agrees to this method of visitation. Pursuant to the emergency declaration under the [facility], [facility] waiver authority and the [employer] Act, this Virtual Visit was conducted, with patient's consent, to reduce the patient's risk of exposure to COVID-19 and provide continuity of care for an established patient. Services were provided through a video synchronous discussion virtually to substitute for in-person clinic visit. ROS:  Constitutional: negative for fevers, chills, anorexia and weight loss  Eyes:   negative for visual disturbance,  irritation  ENT:   negative for tinnitus,sore throat,nasal congestion,ear pain, sinus pain.    Respiratory:  negative for cough, hemoptysis, dyspnea,wheezing  CV:   negative for chest pain, palpitations, lower extremity edema  GI:   negative for nausea, vomiting, diarrhea, abdominal pain,melena  Genitourinary: negative for frequency, dysuria, hematuria  Musculoskel:see HPI   Neurological:  negative for headaches, dizziness, focal weakness, numbness  Psych:             Negative for depression and anxiety    Past Medical History:   Diagnosis Date    Aggressive outburst     Arthritis     joints    Bipolar 1 disorder with moderate robyn ([facility] [address] 3/17/2014  Chronic pain     back with stress    Contact dermatitis and other eczema, due to unspecified cause     Depression     Headache(784.0)     Hyperlipidemia 8/22/2016    Other ill-defined conditions(799.89)     sinus infection,hay fever    Other ill-defined conditions(799.89)     scoliosis    Psychiatric disorder     bipolar    Psychotic disorder (HCC)     Stool color black     Tennis elbow syndrome 5/4/2015    Trauma        Current Outpatient Medications on File Prior to Visit   Medication Sig Dispense Refill    carBAMazepine (TEGRETOL) 200 mg tablet Take 1 Tab by mouth two (2) times a day. Indications: robyn associated with bipolar disorder 60 Tab 0    OLANZapine (ZYPREXA) 20 mg tablet Take 1 Tab by mouth nightly. Indications: robyn associated with bipolar disorder 30 Tab 0    benztropine (COGENTIN) 1 mg tablet Take 1 Tab by mouth two (2) times a day. Indications: extrapyramidal symptoms as a result of taking the medication 60 Tab 0    traZODone (DESYREL) 100 mg tablet Take 1 Tab by mouth nightly as needed for Sleep (For insomnia). Indications: insomnia associated with depression 30 Tab 0    OTHER,NON-FORMULARY, Allergy shots every month - does not recall date of last shot      cholecalciferol (VITAMIN D3) (50,000 UNITS /1250 MCG) capsule Take 50,000 Units by mouth every Monday.  ibuprofen (MOTRIN) 800 mg tablet Take 800 mg by mouth every twelve (12) hours as needed for Pain.  methocarbamol (ROBAXIN) 500 mg tablet Take 1 Tab by mouth daily as needed for Pain. 60 Tab 2    montelukast (SINGULAIR) 10 mg tablet Take 10 mg by mouth daily as needed. Prior to allergy shots      azelastine (ASTELIN) 137 mcg (0.1 %) nasal spray USE 1 SPRAY IN EACH NOSTRIL TWICE A DAY AS DIRECTED 30 Bottle 2    fexofenadine (ALLEGRA) 180 mg tablet Take 180 mg by mouth daily. No current facility-administered medications on file prior to visit.         Past Surgical History:   Procedure Laterality Date    HX HEENT      wisdom teeth extraction    HX LIPOMA RESECTION      right gluteal    FREDY BIOPSY BREAST STEREOTACTIC Left 2011    negative    NE DENTAL SURGERY PROCEDURE      hx of dental surgery- wisdom teeth extracted       Family History   Problem Relation Age of Onset   [facility] Arthritis-rheumatoid Mother     Migraines Mother     Psychiatric Disorder Mother     Bipolar Disorder Mother     Lupus Mother     Alcohol abuse Father     Lung Disease Father     Heart Disease Father     Stroke Father     High Cholesterol Father     Psychiatric Disorder Sister     Alcohol abuse Sister     Bipolar Disorder Sister     Stroke Brother     Cancer Maternal Aunt     Breast Cancer Maternal Aunt         pt thniks she was under 48    Bipolar Disorder Sister      Reviewed and no changes     Social History     Socioeconomic History    Marital status:      Spouse name: Not on file    Number of children: Not on file    Years of education: Not on file    Highest education level: Not on file   Occupational History    Not on file   Social Needs    Financial resource strain: Not on file    Food insecurity     Worry: Not on file     Inability: Not on file    Transportation needs     Medical: Not on file     Non-medical: Not on file   Tobacco Use    Smoking status: Never Smoker    Smokeless tobacco: Never Used   Substance and Sexual Activity    Alcohol use: Yes     Comment: occasional    Drug use: No    Sexual activity: Yes     Partners: Male   Lifestyle    Physical activity     Days per week: Not on file     Minutes per session: Not on file    Stress: Not on file   Relationships    Social connections     Talks on phone: Not on file     Gets together: Not on file     Attends Muslim service: Not on file     Active member of club or organization: Not on file     Attends meetings of clubs or organizations: Not on file     Relationship status: Not on file    Intimate partner violence     Fear of current or ex partner: Not on file     Emotionally abused: Not on file     Physically abused: Not on file     Forced sexual activity: Not on file   Other Topics Concern     Service Not Asked    Blood Transfusions Not Asked    Caffeine Concern Not Asked    Occupational Exposure Not Asked    Hobby Hazards Not Asked    Sleep Concern Not Asked    Stress Concern Not Asked    Weight Concern Not Asked    Special Diet Not Asked    Back Care Not Asked    Exercise Not Asked    Bike Helmet Not Asked   [address] Not Asked    Self-Exams Not Asked   Social History Narrative    , 0 children, not working at present. On disability for bipolar illness. There were no vitals taken for this visit. Physical Examination:   Gen: well appearing female  HEENT: normal conjunctiva, no audible congestion, patient does not see oral erythema, has MMM  Neck: patient does not feel enlarged or tender LAD or masses  Resp: normal respiratory effort, no audible wheezing.    CV: patient does not feel palpitations or heart irregularity  Abd: patient does not feel abdominal tenderness or mass, patient does not notice distension  Extrem: patient does note swelling in ankle bilaterally / no redness  Neuro: Alert and oriented, able to answer questions without difficulty, able to move all extremities and walk normally          Lab Results   Component Value Date/Time    WBC 5.4 02/03/2020 05:40 AM    HGB 11.8 02/03/2020 05:40 AM    Hematocrit (POC) 34 (L) 07/17/2018 03:33 AM    HCT 37.0 02/03/2020 05:40 AM    PLATELET 621 13/30/5196 05:40 AM    MCV 98.4 02/03/2020 05:40 AM     Lab Results   Component Value Date/Time    Sodium 136 02/03/2020 05:40 AM    Potassium 3.9 02/03/2020 05:40 AM    Chloride 103 02/03/2020 05:40 AM    CO2 29 02/03/2020 05:40 AM    Anion gap 4 (L) 02/03/2020 05:40 AM    Glucose 107 (H) 02/03/2020 05:40 AM    Glucose 107 (H) 02/03/2020 05:40 AM    BUN 5 (L) 02/03/2020 05:40 AM    Creatinine 0.81 02/03/2020 05:40 AM    BUN/Creatinine ratio 6 (L) 02/03/2020 05:40 AM    GFR est AA >60 02/03/2020 05:40 AM    GFR est non-AA >60 02/03/2020 05:40 AM    Calcium 9.8 02/03/2020 05:40 AM     Lab Results   Component Value Date/Time    Cholesterol, total 216 (H) 02/03/2020 05:40 AM    HDL Cholesterol 99 02/03/2020 05:40 AM    LDL, calculated 106.2 (H) 02/03/2020 05:40 AM    VLDL, calculated 10.8 02/03/2020 05:40 AM    Triglyceride 54 02/03/2020 05:40 AM    CHOL/HDL Ratio 2.2 02/03/2020 05:40 AM     Lab Results   Component Value Date/Time    TSH 0.86 01/10/2020 08:00 PM     No results found for: PSA, PSA2, PSAR1, Doc Seth, [id number], [id number], PSALT  Lab Results   Component Value Date/Time    Hemoglobin A1c 5.4 12/30/2019 02:09 PM    Hemoglobin A1c (POC) 5.4 04/20/2015 10:00 AM     Lab Results   Component Value Date/Time    Vitamin D 25-Hydroxy 12.7 (L) 11/10/2011 09:25 AM    VITAMIN D, 25-HYDROXY 69.0 11/12/2015 03:52 PM       Lab Results   Component Value Date/Time    ALT (SGPT) 31 02/02/2020 11:12 AM    Alk. phosphatase 81 02/02/2020 11:12 AM    Bilirubin, total 0.4 02/02/2020 11:12 AM           Assessment/Plan:  62 yo woman with a hx of bipolar disorder, presenting with persistent pain in ankles    1. Tendinitis of both feet  Notes pain in both ankles bilaterally for two years progressing, has had imaging and PT in the past. Referral to Dr Cat Swanson MD    This is an established visit conducted via real time video and audio telemedicine. The patient has been instructed that this meets HIPAA criteria and acknowledges and agrees to this method of visitation.

## 2020-06-18 NOTE — PROGRESS NOTES
Reviewed record in preparation for visit and have obtained necessary documentation. Identified pt with two pt identifiers(name and ). Chief Complaint   Patient presents with    Ankle Pain       There are no preventive care reminders to display for this patient. Ms. Lydia Milner has a reminder for a \"due or due soon\" health maintenance. I have asked that she discuss this further with her primary care provider for follow-up on this health maintenance. Coordination of Care Questionnaire:  :     1) Have you been to an emergency room, urgent care clinic since your last visit? no   Hospitalized since your last visit? no             2) Have you seen or consulted any other health care providers outside of [address] [place] [address] since your last visit? no  (Include any pap smears or colon screenings in this section.)    3) In the event something were to happen to you and you were unable to speak on your behalf, do you have an Advance Directive/ Living Will in place stating your wishes? NO    Do you have an Advance Directive on file? no    4) Are you interested in receiving information on Advance Directives? NO    Patient is accompanied by self I have received verbal consent from Yair Catherine to discuss any/all medical information while they are present in the room.

## 2020-07-26 ENCOUNTER — HOSPITAL ENCOUNTER (OUTPATIENT)
Age: 55
Setting detail: OBSERVATION
Discharge: PSYCHIATRIC OR SUBSTANCE USE FACILITY | End: 2020-07-28
Attending: EMERGENCY MEDICINE | Admitting: INTERNAL MEDICINE
Payer: MEDICARE

## 2020-07-26 DIAGNOSIS — F29 PSYCHOSIS, UNSPECIFIED PSYCHOSIS TYPE (HCC): Primary | ICD-10-CM

## 2020-07-26 DIAGNOSIS — E87.1 HYPONATREMIA: ICD-10-CM

## 2020-07-26 PROBLEM — F30.9 MANIA (HCC): Status: ACTIVE | Noted: 2020-07-26

## 2020-07-26 PROBLEM — R79.89 LOW VITAMIN D LEVEL: Status: RESOLVED | Noted: 2018-06-18 | Resolved: 2020-07-26

## 2020-07-26 PROBLEM — F31.9 BIPOLAR DISORDER (HCC): Status: RESOLVED | Noted: 2020-02-02 | Resolved: 2020-07-26

## 2020-07-26 LAB
ALBUMIN SERPL-MCNC: 4.3 G/DL (ref 3.5–5)
ALBUMIN/GLOB SERPL: 1 {RATIO} (ref 1.1–2.2)
ALP SERPL-CCNC: 86 U/L (ref 45–117)
ALT SERPL-CCNC: 30 U/L (ref 12–78)
AMPHET UR QL SCN: NEGATIVE
ANION GAP SERPL CALC-SCNC: 7 MMOL/L (ref 5–15)
ANION GAP SERPL CALC-SCNC: 8 MMOL/L (ref 5–15)
APPEARANCE UR: CLEAR
AST SERPL-CCNC: 26 U/L (ref 15–37)
BACTERIA URNS QL MICRO: NEGATIVE /HPF
BARBITURATES UR QL SCN: NEGATIVE
BASOPHILS # BLD: 0 K/UL (ref 0–0.1)
BASOPHILS NFR BLD: 0 % (ref 0–1)
BENZODIAZ UR QL: NEGATIVE
BILIRUB SERPL-MCNC: 0.5 MG/DL (ref 0.2–1)
BILIRUB UR QL: NEGATIVE
BUN SERPL-MCNC: 5 MG/DL (ref 6–20)
BUN SERPL-MCNC: 6 MG/DL (ref 6–20)
BUN/CREAT SERPL: 8 (ref 12–20)
BUN/CREAT SERPL: 8 (ref 12–20)
CALCIUM SERPL-MCNC: 9.4 MG/DL (ref 8.5–10.1)
CALCIUM SERPL-MCNC: 9.8 MG/DL (ref 8.5–10.1)
CANNABINOIDS UR QL SCN: NEGATIVE
CARBAMAZEPINE SERPL-MCNC: 14.1 UG/ML (ref 4–12)
CHLORIDE SERPL-SCNC: 91 MMOL/L (ref 97–108)
CHLORIDE SERPL-SCNC: 94 MMOL/L (ref 97–108)
CO2 SERPL-SCNC: 23 MMOL/L (ref 21–32)
CO2 SERPL-SCNC: 28 MMOL/L (ref 21–32)
COCAINE UR QL SCN: NEGATIVE
COLOR UR: ABNORMAL
CREAT SERPL-MCNC: 0.63 MG/DL (ref 0.55–1.02)
CREAT SERPL-MCNC: 0.72 MG/DL (ref 0.55–1.02)
DIFFERENTIAL METHOD BLD: NORMAL
DRUG SCRN COMMENT,DRGCM: NORMAL
EOSINOPHIL # BLD: 0 K/UL (ref 0–0.4)
EOSINOPHIL NFR BLD: 0 % (ref 0–7)
EPITH CASTS URNS QL MICRO: ABNORMAL /LPF
ERYTHROCYTE [DISTWIDTH] IN BLOOD BY AUTOMATED COUNT: 11.7 % (ref 11.5–14.5)
ETHANOL SERPL-MCNC: <10 MG/DL
GLOBULIN SER CALC-MCNC: 4.3 G/DL (ref 2–4)
GLUCOSE SERPL-MCNC: 122 MG/DL (ref 65–100)
GLUCOSE SERPL-MCNC: 127 MG/DL (ref 65–100)
GLUCOSE UR STRIP.AUTO-MCNC: NEGATIVE MG/DL
HCT VFR BLD AUTO: 35.3 % (ref 35–47)
HGB BLD-MCNC: 12.1 G/DL (ref 11.5–16)
HGB UR QL STRIP: NEGATIVE
IMM GRANULOCYTES # BLD AUTO: 0 K/UL (ref 0–0.04)
IMM GRANULOCYTES NFR BLD AUTO: 0 % (ref 0–0.5)
KETONES UR QL STRIP.AUTO: NEGATIVE MG/DL
LEUKOCYTE ESTERASE UR QL STRIP.AUTO: ABNORMAL
LYMPHOCYTES # BLD: 1 K/UL (ref 0.8–3.5)
LYMPHOCYTES NFR BLD: 26 % (ref 12–49)
MCH RBC QN AUTO: 30.5 PG (ref 26–34)
MCHC RBC AUTO-ENTMCNC: 34.3 G/DL (ref 30–36.5)
MCV RBC AUTO: 88.9 FL (ref 80–99)
METHADONE UR QL: NEGATIVE
MONOCYTES # BLD: 0.4 K/UL (ref 0–1)
MONOCYTES NFR BLD: 11 % (ref 5–13)
NEUTS SEG # BLD: 2.4 K/UL (ref 1.8–8)
NEUTS SEG NFR BLD: 63 % (ref 32–75)
NITRITE UR QL STRIP.AUTO: NEGATIVE
NRBC # BLD: 0 K/UL (ref 0–0.01)
NRBC BLD-RTO: 0 PER 100 WBC
OPIATES UR QL: NEGATIVE
PCP UR QL: NEGATIVE
PH UR STRIP: 6.5 [PH] (ref 5–8)
PLATELET # BLD AUTO: 250 K/UL (ref 150–400)
PMV BLD AUTO: 9.2 FL (ref 8.9–12.9)
POTASSIUM SERPL-SCNC: 3.4 MMOL/L (ref 3.5–5.1)
POTASSIUM SERPL-SCNC: 4.8 MMOL/L (ref 3.5–5.1)
PROT SERPL-MCNC: 8.6 G/DL (ref 6.4–8.2)
PROT UR STRIP-MCNC: NEGATIVE MG/DL
RBC # BLD AUTO: 3.97 M/UL (ref 3.8–5.2)
RBC #/AREA URNS HPF: ABNORMAL /HPF (ref 0–5)
SARS-COV-2, COV2: NOT DETECTED
SODIUM SERPL-SCNC: 125 MMOL/L (ref 136–145)
SODIUM SERPL-SCNC: 126 MMOL/L (ref 136–145)
SOURCE, COVRS: NORMAL
SP GR UR REFRACTOMETRY: 1 (ref 1–1.03)
SPECIMEN SOURCE, FCOV2M: NORMAL
UA: UC IF INDICATED,UAUC: ABNORMAL
UROBILINOGEN UR QL STRIP.AUTO: 0.2 EU/DL (ref 0.2–1)
WBC # BLD AUTO: 3.8 K/UL (ref 3.6–11)
WBC URNS QL MICRO: ABNORMAL /HPF (ref 0–4)

## 2020-07-26 PROCEDURE — 36415 COLL VENOUS BLD VENIPUNCTURE: CPT

## 2020-07-26 PROCEDURE — 99218 HC RM OBSERVATION: CPT

## 2020-07-26 PROCEDURE — 81001 URINALYSIS AUTO W/SCOPE: CPT

## 2020-07-26 PROCEDURE — 74011250637 HC RX REV CODE- 250/637: Performed by: EMERGENCY MEDICINE

## 2020-07-26 PROCEDURE — 90791 PSYCH DIAGNOSTIC EVALUATION: CPT

## 2020-07-26 PROCEDURE — 85025 COMPLETE CBC W/AUTO DIFF WBC: CPT

## 2020-07-26 PROCEDURE — 74011250637 HC RX REV CODE- 250/637: Performed by: INTERNAL MEDICINE

## 2020-07-26 PROCEDURE — 80307 DRUG TEST PRSMV CHEM ANLYZR: CPT

## 2020-07-26 PROCEDURE — 87635 SARS-COV-2 COVID-19 AMP PRB: CPT

## 2020-07-26 PROCEDURE — 80156 ASSAY CARBAMAZEPINE TOTAL: CPT

## 2020-07-26 PROCEDURE — 80053 COMPREHEN METABOLIC PANEL: CPT

## 2020-07-26 PROCEDURE — 99285 EMERGENCY DEPT VISIT HI MDM: CPT

## 2020-07-26 RX ORDER — CETIRIZINE HCL 10 MG
10 TABLET ORAL DAILY
Status: DISCONTINUED | OUTPATIENT
Start: 2020-07-26 | End: 2020-07-28 | Stop reason: HOSPADM

## 2020-07-26 RX ORDER — ONDANSETRON 4 MG/1
4 TABLET, ORALLY DISINTEGRATING ORAL
Status: DISCONTINUED | OUTPATIENT
Start: 2020-07-26 | End: 2020-07-28 | Stop reason: HOSPADM

## 2020-07-26 RX ORDER — HYDROXYZINE 25 MG/1
50 TABLET, FILM COATED ORAL
Status: COMPLETED | OUTPATIENT
Start: 2020-07-26 | End: 2020-07-26

## 2020-07-26 RX ORDER — BENZTROPINE MESYLATE 1 MG/1
0.5 TABLET ORAL 2 TIMES DAILY
Status: DISCONTINUED | OUTPATIENT
Start: 2020-07-26 | End: 2020-07-28 | Stop reason: HOSPADM

## 2020-07-26 RX ORDER — ONDANSETRON 2 MG/ML
4 INJECTION INTRAMUSCULAR; INTRAVENOUS
Status: DISCONTINUED | OUTPATIENT
Start: 2020-07-26 | End: 2020-07-28 | Stop reason: HOSPADM

## 2020-07-26 RX ORDER — IBUPROFEN 400 MG/1
400 TABLET ORAL
Status: DISCONTINUED | OUTPATIENT
Start: 2020-07-26 | End: 2020-07-28 | Stop reason: HOSPADM

## 2020-07-26 RX ORDER — ACETAMINOPHEN 650 MG/1
650 SUPPOSITORY RECTAL
Status: DISCONTINUED | OUTPATIENT
Start: 2020-07-26 | End: 2020-07-28 | Stop reason: HOSPADM

## 2020-07-26 RX ORDER — ACETAMINOPHEN 325 MG/1
650 TABLET ORAL
Status: DISCONTINUED | OUTPATIENT
Start: 2020-07-26 | End: 2020-07-28 | Stop reason: HOSPADM

## 2020-07-26 RX ORDER — FAMOTIDINE 20 MG/1
20 TABLET, FILM COATED ORAL 2 TIMES DAILY
Status: DISCONTINUED | OUTPATIENT
Start: 2020-07-26 | End: 2020-07-28 | Stop reason: HOSPADM

## 2020-07-26 RX ORDER — POLYETHYLENE GLYCOL 3350 17 G/17G
17 POWDER, FOR SOLUTION ORAL DAILY PRN
Status: DISCONTINUED | OUTPATIENT
Start: 2020-07-26 | End: 2020-07-28 | Stop reason: HOSPADM

## 2020-07-26 RX ORDER — TRAZODONE HYDROCHLORIDE 100 MG/1
100 TABLET ORAL
Status: DISCONTINUED | OUTPATIENT
Start: 2020-07-26 | End: 2020-07-28 | Stop reason: HOSPADM

## 2020-07-26 RX ORDER — MONTELUKAST SODIUM 10 MG/1
10 TABLET ORAL
Status: DISCONTINUED | OUTPATIENT
Start: 2020-07-26 | End: 2020-07-28 | Stop reason: HOSPADM

## 2020-07-26 RX ORDER — ENOXAPARIN SODIUM 100 MG/ML
40 INJECTION SUBCUTANEOUS DAILY
Status: DISCONTINUED | OUTPATIENT
Start: 2020-07-26 | End: 2020-07-28 | Stop reason: HOSPADM

## 2020-07-26 RX ORDER — CARBAMAZEPINE 200 MG/1
200 TABLET, EXTENDED RELEASE ORAL EVERY 12 HOURS
Status: DISCONTINUED | OUTPATIENT
Start: 2020-07-26 | End: 2020-07-28 | Stop reason: HOSPADM

## 2020-07-26 RX ORDER — SODIUM CHLORIDE 9 MG/ML
75 INJECTION, SOLUTION INTRAVENOUS CONTINUOUS
Status: DISCONTINUED | OUTPATIENT
Start: 2020-07-26 | End: 2020-07-27

## 2020-07-26 RX ORDER — HYDROXYZINE 50 MG/1
50 TABLET, FILM COATED ORAL
Status: ON HOLD | COMMUNITY
End: 2020-08-06 | Stop reason: SDUPTHER

## 2020-07-26 RX ADMIN — CARBAMAZEPINE 200 MG: 200 TABLET, EXTENDED RELEASE ORAL at 10:02

## 2020-07-26 RX ADMIN — CARBAMAZEPINE 200 MG: 200 TABLET, EXTENDED RELEASE ORAL at 21:29

## 2020-07-26 RX ADMIN — BENZTROPINE MESYLATE 0.5 MG: 1 TABLET ORAL at 09:58

## 2020-07-26 RX ADMIN — TRAZODONE HYDROCHLORIDE 100 MG: 100 TABLET ORAL at 21:29

## 2020-07-26 RX ADMIN — HYDROXYZINE HYDROCHLORIDE 50 MG: 25 TABLET, FILM COATED ORAL at 10:03

## 2020-07-26 RX ADMIN — MONTELUKAST 10 MG: 10 TABLET, FILM COATED ORAL at 21:29

## 2020-07-26 NOTE — ED NOTES
Safety check of environment done with call bell in reach; Toilet rounds done check to see if patient needs bedpan, BSC or to get up to bathroom. Ongoing  Plan of Care reviewed including any test results reviewed; Pain re-evaluated. Hourly Rounding complete.   Asked pt and  what medications she needs this morning that she'd normally take at home  Dr. Zonia Mendoza aware

## 2020-07-26 NOTE — ED NOTES
Myself Dr. Fauzia Durham and tech in room to talk calmly with pt to get iv access and more blood work  Pt is pulling away and saying no and no \"ya'll are stressing me out\"  Asked pt calmly if we can get vital signs she has pulled the bp cuff off as well  Pt repeats no and no to these calm requests  Pt hands are very dry pt lips are very dry  Pt did drink some apple juice and had a box meal(unsure if she ate anything)   remains at the bedside.

## 2020-07-26 NOTE — ED NOTES
Change of shift report   Charge RN has escalated and paged Crisis for patient for a TDO  Awaiting a phone call from Crisis. Patient is walking around the room. Patient  is in the room with the patient.

## 2020-07-26 NOTE — PROGRESS NOTES
CM consult received. Patient presented to the ED for c/o insomnia. Patient with history of  Psych history. Patient was evaluated by [facility] counselor and [address] [place] [address] was consulted. CM spoke with Riky Duenas at with [address] [place] [address] who confirmed pt would not be a TDO and a medical admission was planned. CM contacted Dr. Janice Chandler and Dr. Bryson Reyes for confirmation. Patient to be admitted for hyponatremia management with transfer to inpatient Psych once medically stable. CM team will continue to follow patient for discharge planning needs and arrange for services as deemed necessary.      CIERRA Harris, RN  Care Manager

## 2020-07-26 NOTE — BSMART NOTE
Shelbie London is a 63year old female who is seeking assessment due to not sleeping tonight, racing thoughts and pacing. Shelbie London reported that she came to the hospital because her  prompted her to come. Shelbie London had difficulty answering questions and appeared to become easily overwhelmed and was fixated on the [facility] Counselor's name. Shelbie London kept asking the [facility] Counselor her name and repeating it. Shelbie London reported she does not know how long she has experienced racing thoughts and is unsure what the thoughts are. Shelbie London denied any SI/HI at this time. Shelbie London reported that she is experiencing auditory hallucinations but could not describe them. Shelbie London displayed blocking thought evident by her stopping her conversation and then just staring at the [address] Shelbie London reported that she is compliant on her medications and that she took all of her night medications tonight. Her  reported she slept 12 hours on Friday night and then tonight was unable to sleep and pacing around the house. Shelbie London was accompanied to the ED visit by her , who verified her service providers at [facility] (Zacarias GREGORIO and LEONC[facility]). Shelbie London was hospitalized through [facility] on the following dates this year:  2/2-11/2020 TDO due left AMA, 1/20-30/2020 [facility]. Throughout the session, Shelbie London would end and restart the interview and her mood and affect appeared to become increasingly agitated. Shelbie London ended the session because the interview was stressing her out and she could not finish. BSMART informed Shelbie London that if the ED Physician felt that Cherie needed then [facility] would be called. BSMART consulted with Dr. Darshan Medina and both agreed that Shelbie London would require crisis intervention. LINH spoke to [facility]) and she reported she would meet with Shelbie London.

## 2020-07-26 NOTE — ED NOTES
Charge advises we'll have to enforce with several staff assist to get the [employer] swab  Have attempted again to call report

## 2020-07-26 NOTE — ED NOTES
TRANSFER - OUT REPORT:    Verbal report given to Ashtyn(name) on Melanie Rodriguez  being transferred to nstu (unit) for routine progression of care       Report consisted of patients Situation, Background, Assessment and   Recommendations(SBAR). Information from the following report(s) SBAR, Kardex, ED Summary, MAR, Accordion, Recent Results and Med Rec Status was reviewed with the receiving nurse. Lines:       Opportunity for questions and clarification was provided.       Patient transported with:   tech Wheelchair

## 2020-07-26 NOTE — ED NOTES
Patient refuses SARS Cov swab  Unable to convince pt to get this swab with two staff attempting this.     reminded pt that she wanted one from [facility]  Breakfast given to patient

## 2020-07-26 NOTE — PROGRESS NOTES
Bedside, Verbal and Written shift change report given to Rene Brandt RN   (oncoming nurse) by Dorman Ganser (offgoing nurse).  Report included the following information SBAR, Kardex, MAR and Recent Results.

## 2020-07-26 NOTE — H&P
[facility]    Hospitalist Admission Note      NAME:  Landy Gao   :   1965   MRN:  [id number]     PCP:  Angelique Barcenas MD     Date/Time of service:  2020 10:01 AM          Subjective:     CHIEF COMPLAINT: insomnia, robyn     HISTORY OF PRESENT ILLNESS:     Ms. Dakota Dudley is a 63 y.o.  female who presented to the Emergency Department complaining of insomnia. Brought by . She provides no reliable hx. Hx of bipolar. ER workup unremarkable other than slightly lower Na than her baseline. She is medically stable for psych admission and requires no acute medical care. We will admit her for management until she is transferred to a psychiatric gaona.       Past Medical History:   Diagnosis Date    Anxiety and depression     Bipolar 1 disorder with moderate robyn ([address] 3/17/2014    Chronic back pain     Hyperlipidemia 2016    Hyponatremia     Psychotic disorder [address] [address]     Scoliosis         Past Surgical History:   Procedure Laterality Date    HX HEENT      wisdom teeth extraction    HX LIPOMA RESECTION      right gluteal    FREDY BIOPSY BREAST STEREOTACTIC Left     negative    OR DENTAL SURGERY PROCEDURE      hx of dental surgery- wisdom teeth extracted       Social History     Tobacco Use    Smoking status: Never Smoker    Smokeless tobacco: Never Used   Substance Use Topics    Alcohol use: Yes     Comment: occasional        Family History   Problem Relation Age of Onset   Baeza Arthritis-rheumatoid Mother    Baeza Migraines Mother     Psychiatric Disorder Mother     Bipolar Disorder Mother     Lupus Mother     Alcohol abuse Father     Lung Disease Father     Heart Disease Father     Stroke Father     High Cholesterol Father     Psychiatric Disorder Sister     Alcohol abuse Sister     Bipolar Disorder Sister     Stroke Brother     Cancer Maternal Aunt     Breast Cancer Maternal Aunt         pt thniks she was under 48    Bipolar Disorder Sister Family hx cannot be fully assessed, since the patient cannot provide information    Allergies   Allergen Reactions    Other Food Hives     Artifical sweetners    Claritin-D 12 Hour [Loratadine-Pseudoephedrine] Palpitations     palpatations and ringing in the ear    Other Medication Anaphylaxis     Artificial sweeteners cause anaphylaxis    Pcn [Penicillins] Anaphylaxis    Sunscreen Contact Dermatitis     Burns and opens the skin    Ultram [Tramadol] Hives and Other (comments)     Hives and ringing of the ears    Benzoyl Peroxide Hives    Cephalexin Itching    Divalproex Itching    Maltodextrin-Glycerin Shortness of Breath        Prior to Admission medications    Medication Sig Start Date End Date Taking? Authorizing Provider   hydrOXYzine HCL (ATARAX) 50 mg tablet Take 50 mg by mouth three (3) times daily as needed. Yes Fatuma, MD Caroline   carBAMazepine (TEGRETOL) 200 mg tablet Take 1 Tab by mouth two (2) times a day. Indications: robyn associated with bipolar disorder 2/11/20  Yes Mariela Hagen NP   OLANZapine (ZYPREXA) 20 mg tablet Take 1 Tab by mouth nightly. Indications: robyn associated with bipolar disorder 2/11/20  Yes Mariela Hagen NP   benztropine (COGENTIN) 1 mg tablet Take 1 Tab by mouth two (2) times a day. Indications: extrapyramidal symptoms as a result of taking the medication  Patient taking differently: Take 0.5 mg by mouth two (2) times a day. Indications: extrapyramidal symptoms as a result of taking the medication 2/11/20  Yes Mariela Hagen NP   traZODone (DESYREL) 100 mg tablet Take 1 Tab by mouth nightly as needed for Sleep (For insomnia). Indications: insomnia associated with depression 2/11/20  Yes Mariela Hagen NP   OTHER,NON-FORMULARY, Allergy shots every month - does not recall date of last shot   Yes Provider, Historical   cholecalciferol (VITAMIN D3) (50,000 UNITS /1250 MCG) capsule Take 50,000 Units by mouth every Monday.    Yes Fatuma, MD Caroline   ibuprofen (MOTRIN) 800 mg tablet Take 800 mg by mouth every twelve (12) hours as needed for Pain. Yes Provider, Historical   methocarbamol (ROBAXIN) 500 mg tablet Take 1 Tab by mouth daily as needed for Pain. 9/30/19  Yes Juana Seay MD   montelukast (SINGULAIR) 10 mg tablet Take 10 mg by mouth daily as needed. Prior to allergy shots   Yes Caroline Burris MD   azelastine (ASTELIN) 137 mcg (0.1 %) nasal spray USE 1 SPRAY IN EACH NOSTRIL TWICE A DAY AS DIRECTED 10/25/17  Yes Rosa Elena Ross MD   fexofenadine (ALLEGRA) 180 mg tablet Take 180 mg by mouth daily. Yes Provider, Historical       Review of Systems:  (bold if positive, if negative)    Gen:  Eyes:  ENT:  CVS:  Pulm:  GI:  :  MS:  PainarthritisSkin:  Psych:   Insomnia, depression, anxietyEndo:  Hem:  Renal:  Neuro:        Objective:      VITALS:    Vital signs reviewed; most recent are:    Visit Vitals  /90 (BP 1 Location: Left arm, BP Patient Position: At rest)   Pulse 92   Temp 98 °F (36.7 °C)   Resp 18   Ht 5' 1\" (1.549 m)   Wt 72.1 kg (158 lb 15.2 oz)   SpO2 100%   BMI 30.03 kg/m²     SpO2 Readings from Last 6 Encounters:   07/26/20 100%   02/18/20 99%   02/11/20 100%   02/02/20 100%   01/30/20 100%   01/20/20 100%        No intake or output data in the 24 hours ending 07/26/20 1001     Exam:     Physical Exam:    Gen:  Obese, in no acute distress  HEENT:  Pink conjunctivae, PERRL, hearing intact to voice, moist mucous membranes  Neck:  Supple, without masses, thyroid non-tender  Resp:  No accessory muscle use, clear breath sounds without wheezes rales or rhonchi  Card:  No murmurs, tachycardic S1, S2 without thrills, bruits or peripheral edema  Abd:  Soft, non-tender, non-distended, normoactive bowel sounds are present, no mass  Lymph:  No cervical or inguinal adenopathy  Musc:  No cyanosis or clubbing  Skin:  No rashes or ulcers, skin turgor is good  Neuro:  Cranial nerves are grossly intact, no focal motor weakness, follows commands vaguely  Psych:  Poor insight, oriented to person     Labs:    Recent Labs     07/26/20  0936   WBC 3.8   HGB 12.1   HCT 35.3        Recent Labs     07/26/20  0419   *   K 4.8   CL 94*   CO2 23   *   BUN 6   CREA 0.72   CA 9.4   ALB 4.3   TBILI 0.5   ALT 30     Lab Results   Component Value Date/Time    Glucose (POC) 136 (H) 01/10/2020 04:22 PM    Glucose (POC) 119 (H) 07/17/2018 03:33 AM     No results for input(s): PH, PCO2, PO2, HCO3, FIO2 in the last 72 hours. No results for input(s): INR, INREXT in the last 72 hours. All Micro Results     None          I have reviewed previous records       Assessment and Plan:      Lorena / Bipolar 1 disorder with moderate lorena / Anxiety and depression - Lorena POA, recurrent, severe. Needs acute inpatient psychiatric managemnt. She is medically stable and I have cleared her for transfer to a psych unit. Usually on atarax, carbamazepine, olanzapine, benztropine, trazaodone    Hyponatremia - POA, chronic, likely related to excess free water intake, and use of carbamazepine. Give some NS IVF, limit free water intake. I will not adjust her psych meds. The currently degree of hyponatremia is not dangerous and does not require inpatient management. Chronic back pain / Scoliosis - Prn motrin and tylenol. Hyperlipidemia - Not listing meds     Telemetry reviewed:   normal sinus rhythm    Risk of deterioration: high      Total time spent with patient: 79 Minutes I personally reviewed chart, notes, data and current medications in the medical record. I have personally examined and treated the patient at bedside during this period.                  Care Plan discussed with: Patient, Nursing Staff and >50% of time spent in counseling and coordination of care    Discussed:  Care Plan       ___________________________________________________    Attending Physician: Janet Fernández MD

## 2020-07-26 NOTE — ED NOTES
36: Assumed patient care. MD Norberto at bedside. 0425: Nurse at bedside to straight stick patient for ordered lab work. Patient only allowed Nurse to obtain ONE tube of blood. 0597: Patient ambulatory to restroom assisted by her  with urine sample collection cup.     7307: Call attempted to Karla, Left a voice mail with Blazemary Mercedes : Call completed to Blaze Mercedes at [phone], call transferred to [facility], MD.     1457: Call received from Lab, Alcohol can NOT be processed due to sample level. MD notified. 6230: BSMART at bedside via Tele Monitor for evaluation and treatment plan. [id number] Manriquez MD at bedside.

## 2020-07-26 NOTE — ED NOTES
Safety check of environment done with call bell in reach; Toilet rounds done check to see if patient needs bedpan, BSC or to get up to bathroom. Ongoing  Plan of Care reviewed including any test results reviewed; Pain re-evaluated. Hourly Rounding complete. Introduced myself to pt and  and offered if they need anything.

## 2020-07-26 NOTE — ED PROVIDER NOTES
EMERGENCY DEPARTMENT HISTORY AND PHYSICAL EXAM      Date: 7/26/2020  Patient Name: Felton Severe    History of Presenting Illness     Chief Complaint   Patient presents with    Anxiety     Patient's  reports she has been up since Sat morning. He is unable to sleep. History Provided By: Patient and Patient's     HPI: Felton Severe, 63 y.o. female with PMHx significant for bipolar disorder who presents with a chief complaint of inability to sleep. Patient's  reports that she has been since yesterday morning and has been pacing the house. Patient is able to tell me she has racing thoughts, however is often not able to complete sentences and seems to be responding to internal stimuli. Additional history review systems limited by psychiatric condition. PCP: Tamala Cheadle, MD      Current Outpatient Medications   Medication Sig Dispense Refill    hydrOXYzine HCL (ATARAX) 50 mg tablet Take 50 mg by mouth three (3) times daily as needed.  carBAMazepine (TEGRETOL) 200 mg tablet Take 1 Tab by mouth two (2) times a day. Indications: robyn associated with bipolar disorder 60 Tab 0    OLANZapine (ZYPREXA) 20 mg tablet Take 1 Tab by mouth nightly. Indications: robyn associated with bipolar disorder 30 Tab 0    benztropine (COGENTIN) 1 mg tablet Take 1 Tab by mouth two (2) times a day. Indications: extrapyramidal symptoms as a result of taking the medication 60 Tab 0    traZODone (DESYREL) 100 mg tablet Take 1 Tab by mouth nightly as needed for Sleep (For insomnia). Indications: insomnia associated with depression 30 Tab 0    OTHER,NON-FORMULARY, Allergy shots every month - does not recall date of last shot      cholecalciferol (VITAMIN D3) (50,000 UNITS /1250 MCG) capsule Take 50,000 Units by mouth every Monday.  ibuprofen (MOTRIN) 800 mg tablet Take 800 mg by mouth every twelve (12) hours as needed for Pain.       methocarbamol (ROBAXIN) 500 mg tablet Take 1 Tab by mouth daily as needed for Pain. 60 Tab 2    montelukast (SINGULAIR) 10 mg tablet Take 10 mg by mouth daily as needed. Prior to allergy shots      azelastine (ASTELIN) 137 mcg (0.1 %) nasal spray USE 1 SPRAY IN EACH NOSTRIL TWICE A DAY AS DIRECTED 30 Bottle 2    fexofenadine (ALLEGRA) 180 mg tablet Take 180 mg by mouth daily.        Past History     Past Medical History:  Past Medical History:   Diagnosis Date    Aggressive outburst     Arthritis     joints    Bipolar 1 disorder with moderate robyn ([facility] [address] 3/17/2014    Chronic pain     back with stress    Contact dermatitis and other eczema, due to unspecified cause     Depression     Headache(784.0)     Hyperlipidemia 8/22/2016    Other ill-defined conditions(799.89)     sinus infection,hay fever    Other ill-defined conditions(799.89)     scoliosis    Psychiatric disorder     bipolar    Psychotic disorder (HCC)     Stool color black     Tennis elbow syndrome 5/4/2015    Trauma      Past Surgical History:  Past Surgical History:   Procedure Laterality Date    HX HEENT      wisdom teeth extraction    HX LIPOMA RESECTION      right gluteal    FREDY BIOPSY BREAST STEREOTACTIC Left 2011    negative    HI DENTAL SURGERY PROCEDURE      hx of dental surgery- wisdom teeth extracted     Family History:  Family History   Problem Relation Age of Onset   [facility] Arthritis-rheumatoid Mother     Migraines Mother     Psychiatric Disorder Mother     Bipolar Disorder Mother     Lupus Mother     Alcohol abuse Father     Lung Disease Father     Heart Disease Father     Stroke Father     High Cholesterol Father     Psychiatric Disorder Sister     Alcohol abuse Sister     Bipolar Disorder Sister     Stroke Brother     Cancer Maternal Aunt     Breast Cancer Maternal Aunt         pt thniks she was under 48    Bipolar Disorder Sister      Social History:  Social History     Tobacco Use    Smoking status: Never Smoker    Smokeless tobacco: Never Used   Substance Use Topics    Alcohol use: Yes     Comment: occasional    Drug use: No     Allergies: Allergies   Allergen Reactions    Other Food Hives     Artifical sweetners    Claritin-D 12 Hour [Loratadine-Pseudoephedrine] Palpitations     palpatations and ringing in the ear    Other Medication Anaphylaxis     Artificial sweeteners cause anaphylaxis    Pcn [Penicillins] Anaphylaxis    Sunscreen Contact Dermatitis     Burns and opens the skin    Ultram [Tramadol] Hives and Other (comments)     Hives and ringing of the ears    Benzoyl Peroxide Hives    Cephalexin Itching    Divalproex Itching    Maltodextrin-Glycerin Shortness of Breath     Review of Systems   Review of Systems   Unable to perform ROS: Psychiatric disorder     Physical Exam   Physical Exam  Vitals signs and nursing note reviewed. Constitutional:       General: She is not in acute distress. Appearance: She is well-developed. Comments: Refusing to sit still, wandering the room   HENT:      Head: Normocephalic and atraumatic. Eyes:      Conjunctiva/sclera: Conjunctivae normal.      Pupils: Pupils are equal, round, and reactive to light. Neck:      Musculoskeletal: Normal range of motion. Cardiovascular:      Rate and Rhythm: Normal rate and regular rhythm. Pulmonary:      Effort: Pulmonary effort is normal. No respiratory distress. Breath sounds: Normal breath sounds. No stridor. Abdominal:      General: There is no distension. Palpations: Abdomen is soft. Tenderness: There is no abdominal tenderness. Musculoskeletal: Normal range of motion. Skin:     General: Skin is warm and dry. Neurological:      Mental Status: She is alert and oriented to person, place, and time. Psychiatric:         Attention and Perception: She is inattentive. Speech: Speech is delayed.       Comments: Seems to be responding to internal stimuli, thought blocking       Diagnostic Study Results   Labs -     Recent Results (from the past 12 hour(s))   METABOLIC PANEL, COMPREHENSIVE    Collection Time: 07/26/20  4:19 AM   Result Value Ref Range    Sodium 125 (L) 136 - 145 mmol/L    Potassium 4.8 3.5 - 5.1 mmol/L    Chloride 94 (L) 97 - 108 mmol/L    CO2 23 21 - 32 mmol/L    Anion gap 8 5 - 15 mmol/L    Glucose 127 (H) 65 - 100 mg/dL    BUN 6 6 - 20 MG/DL    Creatinine 0.72 0.55 - 1.02 MG/DL    BUN/Creatinine ratio 8 (L) 12 - 20      GFR est AA >60 >60 ml/min/1.73m2    GFR est non-AA >60 >60 ml/min/1.73m2    Calcium 9.4 8.5 - 10.1 MG/DL    Bilirubin, total 0.5 0.2 - 1.0 MG/DL    ALT (SGPT) 30 12 - 78 U/L    AST (SGOT) 26 15 - 37 U/L    Alk. phosphatase 86 45 - 117 U/L    Protein, total 8.6 (H) 6.4 - 8.2 g/dL    Albumin 4.3 3.5 - 5.0 g/dL    Globulin 4.3 (H) 2.0 - 4.0 g/dL    A-G Ratio 1.0 (L) 1.1 - 2.2     URINALYSIS W/ REFLEX CULTURE    Collection Time: 07/26/20  4:50 AM    Specimen: Urine   Result Value Ref Range    Color YELLOW/STRAW      Appearance CLEAR CLEAR      Specific gravity 1.005 1.003 - 1.030      pH (UA) 6.5 5.0 - 8.0      Protein Negative NEG mg/dL    Glucose Negative NEG mg/dL    Ketone Negative NEG mg/dL    Bilirubin Negative NEG      Blood Negative NEG      Urobilinogen 0.2 0.2 - 1.0 EU/dL    Nitrites Negative NEG      Leukocyte Esterase SMALL (A) NEG      WBC 5-10 0 - 4 /hpf    RBC 0-5 0 - 5 /hpf    Epithelial cells FEW FEW /lpf    Bacteria Negative NEG /hpf    UA:UC IF INDICATED CULTURE NOT INDICATED BY UA RESULT CNI     DRUG SCREEN, URINE    Collection Time: 07/26/20  4:50 AM   Result Value Ref Range    AMPHETAMINES Negative NEG      BARBITURATES Negative NEG      BENZODIAZEPINES Negative NEG      COCAINE Negative NEG      METHADONE Negative NEG      OPIATES Negative NEG      PCP(PHENCYCLIDINE) Negative NEG      THC (TH-CANNABINOL) Negative NEG      Drug screen comment (NOTE)        Radiologic Studies -   No orders to display     No results found. Medical Decision Making   I am the first provider for this patient.     I reviewed the vital signs, available nursing notes, past medical history, past surgical history, family history and social history. Vital Signs-Reviewed the patient's vital signs. Patient Vitals for the past 12 hrs:   Temp Pulse Resp BP SpO2   07/26/20 0347 98 °F (36.7 °C) 94 18 172/82 100 %       Pulse Oximetry Analysis - 100% on ra      Records Reviewed: Nursing Notes and Old Medical Records    Provider Notes (Medical Decision Making):   Patient presents the chief complaint of inability to sleep. On evaluation she appears to be responding to internal stimuli and thought blocking. Will require psychiatric evaluation. Will sign urinalysis, UDS, alcohol level, basic lab work. ED Course:   Initial assessment performed. The patients presenting problems have been discussed, and they are in agreement with the care plan formulated and outlined with them. I have encouraged them to ask questions as they arise throughout their visit. ED Course as of Jul 27 0949   Sun Jul 26, 2020   045 Spoke with David from [address], will see the patient via tele-psych    [BECKY]   0556 BSmart attempted to evaluate the patient, however she was not able to fully participate. I spoke with Albert Jett who felt the patient was thought blocking and not capable of making her own decisions at this time. She will contact crisis to evaluate the patient. [facility]   7498 Patient signed out to Dr. Gladis Ambriz, pending crisis eval    [BECKY]   0820 Sodium(!): 125 [AR]   [phone] Patient will be admitted to hospitalist service for management of low sodium. 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  However, I spoke with psychiatric service and there not comfortable having this on the service, recommend admission to medicine with psychiatry consult.     [AR]      ED Course User Index  [AR] MD Karina Camarena MD       Procedures:  Procedures    Critical Care:  none    Disposition:  Admit        Diagnosis     Clinical Impression:   1. Psychosis, unspecified psychosis type (Jenise Ply)    2. Hyponatremia        This note will not be viewable in [employer]. Please note that this dictation was completed with [employer], the computer voice recognition software. Quite often unanticipated grammatical, syntax, homophones, and other interpretive errors are inadvertently transcribed by the computer software. Please disregard these errors.   Please excuse any errors that have escaped final proofreading

## 2020-07-27 LAB
ALBUMIN SERPL-MCNC: 3.5 G/DL (ref 3.5–5)
ALBUMIN/GLOB SERPL: 0.9 {RATIO} (ref 1.1–2.2)
ALP SERPL-CCNC: 77 U/L (ref 45–117)
ALT SERPL-CCNC: 25 U/L (ref 12–78)
ANION GAP SERPL CALC-SCNC: 6 MMOL/L (ref 5–15)
AST SERPL-CCNC: 20 U/L (ref 15–37)
BILIRUB SERPL-MCNC: 0.6 MG/DL (ref 0.2–1)
BUN SERPL-MCNC: 4 MG/DL (ref 6–20)
BUN/CREAT SERPL: 7 (ref 12–20)
CALCIUM SERPL-MCNC: 8.8 MG/DL (ref 8.5–10.1)
CHLORIDE SERPL-SCNC: 94 MMOL/L (ref 97–108)
CO2 SERPL-SCNC: 27 MMOL/L (ref 21–32)
CREAT SERPL-MCNC: 0.54 MG/DL (ref 0.55–1.02)
ERYTHROCYTE [DISTWIDTH] IN BLOOD BY AUTOMATED COUNT: 11.6 % (ref 11.5–14.5)
GLOBULIN SER CALC-MCNC: 3.8 G/DL (ref 2–4)
GLUCOSE SERPL-MCNC: 99 MG/DL (ref 65–100)
HCT VFR BLD AUTO: 30.2 % (ref 35–47)
HGB BLD-MCNC: 10.4 G/DL (ref 11.5–16)
MAGNESIUM SERPL-MCNC: 2.1 MG/DL (ref 1.6–2.4)
MCH RBC QN AUTO: 30.4 PG (ref 26–34)
MCHC RBC AUTO-ENTMCNC: 34.4 G/DL (ref 30–36.5)
MCV RBC AUTO: 88.3 FL (ref 80–99)
NRBC # BLD: 0 K/UL (ref 0–0.01)
NRBC BLD-RTO: 0 PER 100 WBC
PLATELET # BLD AUTO: 219 K/UL (ref 150–400)
PMV BLD AUTO: 9.4 FL (ref 8.9–12.9)
POTASSIUM SERPL-SCNC: 3.7 MMOL/L (ref 3.5–5.1)
PROT SERPL-MCNC: 7.3 G/DL (ref 6.4–8.2)
RBC # BLD AUTO: 3.42 M/UL (ref 3.8–5.2)
SODIUM SERPL-SCNC: 127 MMOL/L (ref 136–145)
WBC # BLD AUTO: 4.1 K/UL (ref 3.6–11)

## 2020-07-27 PROCEDURE — 85027 COMPLETE CBC AUTOMATED: CPT

## 2020-07-27 PROCEDURE — 74011250637 HC RX REV CODE- 250/637: Performed by: INTERNAL MEDICINE

## 2020-07-27 PROCEDURE — 99218 HC RM OBSERVATION: CPT

## 2020-07-27 PROCEDURE — 74011250636 HC RX REV CODE- 250/636: Performed by: INTERNAL MEDICINE

## 2020-07-27 PROCEDURE — 83735 ASSAY OF MAGNESIUM: CPT

## 2020-07-27 PROCEDURE — 80053 COMPREHEN METABOLIC PANEL: CPT

## 2020-07-27 PROCEDURE — 96372 THER/PROPH/DIAG INJ SC/IM: CPT

## 2020-07-27 PROCEDURE — 36415 COLL VENOUS BLD VENIPUNCTURE: CPT

## 2020-07-27 RX ORDER — HYDROXYZINE 25 MG/1
50 TABLET, FILM COATED ORAL 2 TIMES DAILY
Status: DISCONTINUED | OUTPATIENT
Start: 2020-07-28 | End: 2020-07-28 | Stop reason: HOSPADM

## 2020-07-27 RX ORDER — OLANZAPINE 10 MG/1
20 TABLET ORAL
Status: DISCONTINUED | OUTPATIENT
Start: 2020-07-27 | End: 2020-07-28 | Stop reason: HOSPADM

## 2020-07-27 RX ORDER — HYDROXYZINE 25 MG/1
50 TABLET, FILM COATED ORAL
Status: DISCONTINUED | OUTPATIENT
Start: 2020-07-27 | End: 2020-07-28 | Stop reason: HOSPADM

## 2020-07-27 RX ORDER — OLANZAPINE 10 MG/1
20 TABLET ORAL EVERY EVENING
Status: DISCONTINUED | OUTPATIENT
Start: 2020-07-27 | End: 2020-07-27

## 2020-07-27 RX ADMIN — OLANZAPINE 20 MG: 10 TABLET, FILM COATED ORAL at 21:24

## 2020-07-27 RX ADMIN — MONTELUKAST 10 MG: 10 TABLET, FILM COATED ORAL at 21:23

## 2020-07-27 RX ADMIN — TRAZODONE HYDROCHLORIDE 100 MG: 100 TABLET ORAL at 21:23

## 2020-07-27 RX ADMIN — ENOXAPARIN SODIUM 40 MG: 30 INJECTION SUBCUTANEOUS at 09:08

## 2020-07-27 RX ADMIN — CETIRIZINE HYDROCHLORIDE 10 MG: 10 TABLET, FILM COATED ORAL at 09:05

## 2020-07-27 RX ADMIN — HYDROXYZINE HYDROCHLORIDE 50 MG: 25 TABLET, FILM COATED ORAL at 21:24

## 2020-07-27 RX ADMIN — CARBAMAZEPINE 200 MG: 200 TABLET, EXTENDED RELEASE ORAL at 09:05

## 2020-07-27 RX ADMIN — CARBAMAZEPINE 200 MG: 200 TABLET, EXTENDED RELEASE ORAL at 21:24

## 2020-07-27 RX ADMIN — BENZTROPINE MESYLATE 0.5 MG: 1 TABLET ORAL at 17:28

## 2020-07-27 RX ADMIN — BENZTROPINE MESYLATE 0.5 MG: 1 TABLET ORAL at 09:06

## 2020-07-27 RX ADMIN — FAMOTIDINE 20 MG: 20 TABLET, FILM COATED ORAL at 09:04

## 2020-07-27 RX ADMIN — FAMOTIDINE 20 MG: 20 TABLET, FILM COATED ORAL at 17:29

## 2020-07-27 NOTE — DISCHARGE SUMMARY
Hospitalist Discharge Summary     Patient ID:  Dede Coles  [id number]  22 y.o.  1965 7/26/2020    PCP on record: Alcon Zaragoza MD    Admit date: 7/26/2020  Discharge date and time: 7/27/2020    DISCHARGE DIAGNOSIS:    Robyn / Bipolar 1 disorder with moderate robyn / Anxiety and depression - Robyn POA   Hyponatremia - POA   Chronic back pain / Scoliosis - Prn motrin and tylenol. Hyperlipidemia     CONSULTATIONS:  IP CONSULT TO HOSPITALIST  IP CONSULT TO PSYCHIATRY    Excerpted HPI from H&P of Yasmany Valenzuela MD:  Ms. Taylor Gómez is a 63 y.o.  female who presented to the Emergency Department complaining of insomnia. Brought by . She provides no reliable hx. Hx of bipolar. ER workup unremarkable other than slightly lower Na than her baseline. She is medically stable for psych admission and requires no acute medical care. We will admit her for management until she is transferred to a psychiatric [place].    ______________________________________________________________________  DISCHARGE SUMMARY/HOSPITAL COURSE:  for full details see H&P, daily progress notes, labs, consult notes. Pt was placed under hospitalist service yesterday. Pt was cleared for Transfer yesterday, does not need inpatient treatment for Hyponatremia as per admitting physician and I agree. It is likely due to her behavior. In hospital, she has refused IVF , though her Na has improved. Pt need Psych evaluation. Pt remain medically stable. I have placed consult for Psych and texted NP,  Also Discussed case with Psych NP Today AM, she will see pt today. Waiting her recommendations      Admitting Physician note :  Robyn / Bipolar 1 disorder with moderate robyn / Anxiety and depression - Robyn POA, recurrent, severe. Needs acute inpatient psychiatric managemnt. She is medically stable and I have cleared her for transfer to a psych unit.  Usually on atarax, carbamazepine, olanzapine, benztropine, trazaodone     Hyponatremia - POA, chronic, likely related to excess free water intake, and use of carbamazepine. Give some NS IVF, limit free water intake. I will not adjust her psych meds. The currently degree of hyponatremia is not dangerous and does not require inpatient management.      Chronic back pain / Scoliosis - Prn motrin and tylenol.     Hyperlipidemia - Not listing meds      _______________________________________________________________________  Patient seen and examined by me on discharge day. Pertinent Findings:  Gen:    Not in distress  Chest: Clear lungs  CVS:   Regular rhythm. No edema  Abd:  Soft, not distended, not tender  Neuro:  Alert, oriented  _______________________________________________________________________  DISCHARGE MEDICATIONS:   Current Discharge Medication List      CONTINUE these medications which have NOT CHANGED    Details   hydrOXYzine HCL (ATARAX) 50 mg tablet Take 50 mg by mouth three (3) times daily as needed. carBAMazepine (TEGRETOL) 200 mg tablet Take 1 Tab by mouth two (2) times a day. Indications: robyn associated with bipolar disorder  Qty: 60 Tab, Refills: 0      OLANZapine (ZYPREXA) 20 mg tablet Take 1 Tab by mouth nightly. Indications: robyn associated with bipolar disorder  Qty: 30 Tab, Refills: 0      benztropine (COGENTIN) 1 mg tablet Take 1 Tab by mouth two (2) times a day. Indications: extrapyramidal symptoms as a result of taking the medication  Qty: 60 Tab, Refills: 0      traZODone (DESYREL) 100 mg tablet Take 1 Tab by mouth nightly as needed for Sleep (For insomnia). Indications: insomnia associated with depression  Qty: 30 Tab, Refills: 0      OTHER,NON-FORMULARY, Allergy shots every month - does not recall date of last shot      cholecalciferol (VITAMIN D3) (50,000 UNITS /1250 MCG) capsule Take 50,000 Units by mouth every Monday. ibuprofen (MOTRIN) 800 mg tablet Take 800 mg by mouth every twelve (12) hours as needed for Pain. methocarbamol (ROBAXIN) 500 mg tablet Take 1 Tab by mouth daily as needed for Pain. Qty: 60 Tab, Refills: 2    Associated Diagnoses: Chronic low back pain without sciatica, unspecified back pain laterality      montelukast (SINGULAIR) 10 mg tablet Take 10 mg by mouth daily as needed. Prior to allergy shots      azelastine (ASTELIN) 137 mcg (0.1 %) nasal spray USE 1 SPRAY IN EACH NOSTRIL TWICE A DAY AS DIRECTED  Qty: 30 Bottle, Refills: 2      fexofenadine (ALLEGRA) 180 mg tablet Take 180 mg by mouth daily.                  Follow-up Information    None       ________________________________________________________________    Risk of deterioration: Low    Condition at Discharge:  Stable  __________________________________________________________________    Disposition  As per Psych    ____________________________________________________________________    Code Status: Full Code  ___________________________________________________________________      Total time in minutes spent coordinating this discharge (includes going over instructions, follow-up, prescriptions, and preparing report for sign off to her PCP) :  35 minutes    Signed:  Stephanie Wong MD

## 2020-07-27 NOTE — PROGRESS NOTES
Telemed: Pt takes Trazadone 100 mg PO at home to help her sleep. pt  very concerned that pt has not slept in more than 24 hours and says she needs her med. Please order.  Thanks    Plan: Chart reviewed, trazodone resumed

## 2020-07-27 NOTE — DISCHARGE INSTRUCTIONS
HOSPITALIST DISCHARGE INSTRUCTIONS    NAME: Miranda Hand   :  1965   MRN:  [id number]     Date/Time:  2020 10:31 AM    ADMIT DATE: 2020   DISCHARGE DATE: 2020     Lorena / Bipolar 1 disorder with moderate lorena / Anxiety and depression - Lorena POA   Hyponatremia - POA   Chronic back pain / Scoliosis - Prn motrin and tylenol. Hyperlipidemia     · It is important that you take the medication exactly as they are prescribed. · Keep your medication in the bottles provided by the pharmacist and keep a list of the medication names, dosages, and times to be taken in your wallet. · Do not take other medications without consulting your doctor. What to do at [address]:  Resume previous diet    Recommended activity: Activity as tolerated      If you have questions regarding the hospital related prescriptions or hospital related issues please call [facility] at [phone]. You can always direct your questions to your primary care doctor if you are unable to reach your hospital physician; your PCP works as an extension of your hospital doctor just like your hospital doctor is an extension of your PCP for your time at the [facility], [facility])    If you experience any of the following symptoms then please call your primary care physician or return to the emergency room if you cannot get hold of your doctor:    Fever, chills, nausea, vomiting, or persistent diarrhea  Worsening weakness or new problems with your speech or balance  Dark stools or visible blood in your stools  New Leg swelling or shortness of breath as these could be signs of a clot    Additional Instructions:      Bring these papers with you to your follow up appointments. The papers will help your doctors be sure to continue the care plan from the hospital.              Information obtained by :  I understand that if any problems occur once I am at home I am to contact my physician.     I understand and acknowledge receipt of the instructions indicated above.                                                                                                                                            Physician's or R.N.'s Signature                                                                  Date/Time                                                                                                                                              Patient or Representative Signature

## 2020-07-27 NOTE — PROGRESS NOTES
Notified MD \"pt concerned she isn't getting some of her medicines : Atarax 50 mg - one tab in the morning and midday, one tab at night as needed for anxiety; Zyprexa 20 mg - one tab at night for mood stabilization\". MD asked RN to resume these home meds. Bedside shift change report given to Live (oncoming nurse) by Lu Strong (offgoing nurse). Report included the following information SBAR, Kardex, Intake/Output, MAR, Accordion and Recent Results.

## 2020-07-27 NOTE — PROGRESS NOTES
Bedside and Verbal shift change report given to Nati Biggs RN (oncoming nurse) by Mariposa Dunbar RN (offgoing nurse). Report included the following information SBAR, Kardex, Intake/Output and MAR.

## 2020-07-27 NOTE — PROGRESS NOTES
Phone call received from Dr. Patricia Lr regarding inpatient psych placement for patient. Phone call to [address] [facility],  [address] [place] - [phone] - awaiting return phone call as HIPPA compliant message had to be left. Per [facility] notes, patient needs inpatient psych when medically cleared. Hospitalist to request psych consult to update notes.     Sussy Díaz RN, BSN, [facility]    Coordinator  [phone]

## 2020-07-28 ENCOUNTER — HOSPITAL ENCOUNTER (INPATIENT)
Age: 55
LOS: 9 days | Discharge: HOME | DRG: 885 | End: 2020-08-06
Attending: PSYCHIATRY & NEUROLOGY | Admitting: PSYCHIATRY & NEUROLOGY
Payer: MEDICARE

## 2020-07-28 VITALS
TEMPERATURE: 98.6 F | HEART RATE: 86 BPM | WEIGHT: 158.95 LBS | BODY MASS INDEX: 30.01 KG/M2 | DIASTOLIC BLOOD PRESSURE: 72 MMHG | OXYGEN SATURATION: 98 % | RESPIRATION RATE: 18 BRPM | HEIGHT: 61 IN | SYSTOLIC BLOOD PRESSURE: 118 MMHG

## 2020-07-28 PROCEDURE — 99218 HC RM OBSERVATION: CPT

## 2020-07-28 PROCEDURE — 65220000003 HC RM SEMIPRIVATE PSYCH

## 2020-07-28 PROCEDURE — 74011250637 HC RX REV CODE- 250/637: Performed by: INTERNAL MEDICINE

## 2020-07-28 PROCEDURE — 74011250637 HC RX REV CODE- 250/637: Performed by: NURSE PRACTITIONER

## 2020-07-28 RX ORDER — ADHESIVE BANDAGE
30 BANDAGE TOPICAL DAILY PRN
Status: DISCONTINUED | OUTPATIENT
Start: 2020-07-28 | End: 2020-08-06 | Stop reason: HOSPADM

## 2020-07-28 RX ORDER — BENZTROPINE MESYLATE 1 MG/1
1 TABLET ORAL
Status: DISCONTINUED | OUTPATIENT
Start: 2020-07-28 | End: 2020-08-06 | Stop reason: HOSPADM

## 2020-07-28 RX ORDER — DIPHENHYDRAMINE HYDROCHLORIDE 50 MG/ML
50 INJECTION, SOLUTION INTRAMUSCULAR; INTRAVENOUS
Status: DISCONTINUED | OUTPATIENT
Start: 2020-07-28 | End: 2020-08-06 | Stop reason: HOSPADM

## 2020-07-28 RX ORDER — HYDROXYZINE 50 MG/1
50 TABLET, FILM COATED ORAL
Status: DISCONTINUED | OUTPATIENT
Start: 2020-07-28 | End: 2020-08-06 | Stop reason: HOSPADM

## 2020-07-28 RX ORDER — HALOPERIDOL 5 MG/ML
5 INJECTION INTRAMUSCULAR
Status: DISCONTINUED | OUTPATIENT
Start: 2020-07-28 | End: 2020-08-06 | Stop reason: HOSPADM

## 2020-07-28 RX ORDER — LORAZEPAM 2 MG/ML
1 INJECTION INTRAMUSCULAR
Status: DISCONTINUED | OUTPATIENT
Start: 2020-07-28 | End: 2020-08-06 | Stop reason: HOSPADM

## 2020-07-28 RX ORDER — ACETAMINOPHEN 325 MG/1
650 TABLET ORAL
Status: DISCONTINUED | OUTPATIENT
Start: 2020-07-28 | End: 2020-08-06 | Stop reason: HOSPADM

## 2020-07-28 RX ORDER — OLANZAPINE 5 MG/1
5 TABLET ORAL
Status: DISCONTINUED | OUTPATIENT
Start: 2020-07-28 | End: 2020-08-06 | Stop reason: HOSPADM

## 2020-07-28 RX ORDER — TRAZODONE HYDROCHLORIDE 50 MG/1
50 TABLET ORAL
Status: DISCONTINUED | OUTPATIENT
Start: 2020-07-28 | End: 2020-07-31

## 2020-07-28 RX ADMIN — OLANZAPINE 5 MG: 5 TABLET, FILM COATED ORAL at 17:45

## 2020-07-28 RX ADMIN — HYDROXYZINE HYDROCHLORIDE 50 MG: 25 TABLET, FILM COATED ORAL at 09:12

## 2020-07-28 RX ADMIN — CARBAMAZEPINE 200 MG: 200 TABLET, EXTENDED RELEASE ORAL at 09:00

## 2020-07-28 RX ADMIN — FAMOTIDINE 20 MG: 20 TABLET, FILM COATED ORAL at 09:00

## 2020-07-28 RX ADMIN — HYDROXYZINE HYDROCHLORIDE 50 MG: 50 TABLET, FILM COATED ORAL at 17:00

## 2020-07-28 RX ADMIN — TRAZODONE HYDROCHLORIDE 50 MG: 50 TABLET ORAL at 20:24

## 2020-07-28 RX ADMIN — CETIRIZINE HYDROCHLORIDE 10 MG: 10 TABLET, FILM COATED ORAL at 09:12

## 2020-07-28 RX ADMIN — BENZTROPINE MESYLATE 0.5 MG: 1 TABLET ORAL at 09:12

## 2020-07-28 NOTE — CONSULTS
PSYCHIATRIC CONSULTATION NOTE:    REASON FOR CONSULT:    A psychiatric consultation was requested by Ana Medina MD to evaluate or provide advice/opinion related to evaluating need for inpatient psychiatry. HISTORY OF PRESENTING COMPLAINT:     Ms. Jihan Guerrero is a 63 y.o. BLACK OR  female who is currently admitted to the medical floor at [facility] on 7/26/2020 for the treatment of Robyn [facility]). Per medical records patient has not been sleeping at night, having racing thoughts, and pacing. Patient reports that she has had multiple episodes of that and confusion. Patient believes she needs some medication management to help with her anxiety and insomnia. Patient reports history of Bipolar I and also multiple psychiatric hospitalizations. Patient  is also present during the assessment and reports patient needs some more medication assistance. Patient denies SI/HI/AVH/Paranoia    Spoke to Dr. Lorenzo Graves last evening who reports patient came in with robyn and due to hyponatremia was admitted to the medical floor. Patient's sodium levels have been correcting itself. REVIEW OF SYMPTOMS:  Patient denies appetite change, weight change, vision change, sleep change, fever, night sweats, headache, cough, shortness of breath, chest pain, palpitations, nausea,vomiting, diarrhea, dizziness, weakness, tremors. PAST MEDICAL HISTORY:  Please see History & Physical for details.    Past Medical History:   Diagnosis Date    Anxiety and depression     Bipolar 1 disorder with moderate robyn ([address] 3/17/2014    Chronic back pain     Hyperlipidemia 8/22/2016    Hyponatremia     Psychotic disorder (HCC)     Scoliosis          ALLERGIES:  Allergies   Allergen Reactions    Other Food Hives     Artifical sweetners    Claritin-D 12 Hour [Loratadine-Pseudoephedrine] Palpitations     palpatations and ringing in the ear    Other Medication Anaphylaxis     Artificial sweeteners cause anaphylaxis    Pcn [Penicillins] Anaphylaxis    Sunscreen Contact Dermatitis     Burns and opens the skin    Ultram [Tramadol] Hives and Other (comments)     Hives and ringing of the ears    Benzoyl Peroxide Hives    Cephalexin Itching    Divalproex Itching    Maltodextrin-Glycerin Shortness of Breath       MEDICATIONS PRIOR TO ADMISSION:  Medications Prior to Admission   Medication Sig    hydrOXYzine HCL (ATARAX) 50 mg tablet Take 50 mg by mouth three (3) times daily as needed.  carBAMazepine (TEGRETOL) 200 mg tablet Take 1 Tab by mouth two (2) times a day. Indications: robyn associated with bipolar disorder    OLANZapine (ZYPREXA) 20 mg tablet Take 1 Tab by mouth nightly. Indications: robyn associated with bipolar disorder    benztropine (COGENTIN) 1 mg tablet Take 1 Tab by mouth two (2) times a day. Indications: extrapyramidal symptoms as a result of taking the medication (Patient taking differently: Take 0.5 mg by mouth two (2) times a day. Indications: extrapyramidal symptoms as a result of taking the medication)    traZODone (DESYREL) 100 mg tablet Take 1 Tab by mouth nightly as needed for Sleep (For insomnia). Indications: insomnia associated with depression    OTHER,NON-FORMULARY, Allergy shots every month - does not recall date of last shot    cholecalciferol (VITAMIN D3) (50,000 UNITS /1250 MCG) capsule Take 50,000 Units by mouth every Monday.  ibuprofen (MOTRIN) 800 mg tablet Take 800 mg by mouth every twelve (12) hours as needed for Pain.  methocarbamol (ROBAXIN) 500 mg tablet Take 1 Tab by mouth daily as needed for Pain.  montelukast (SINGULAIR) 10 mg tablet Take 10 mg by mouth daily as needed. Prior to allergy shots    azelastine (ASTELIN) 137 mcg (0.1 %) nasal spray USE 1 SPRAY IN EACH NOSTRIL TWICE A DAY AS DIRECTED    fexofenadine (ALLEGRA) 180 mg tablet Take 180 mg by mouth daily.        CURRENT MEDICATIONS:    Current Facility-Administered Medications:     hydrOXYzine HCL (ATARAX) tablet 50 mg, 50 mg, Oral, BID, Yonis Jewell MD, 50 mg at 07/28/20 0912    hydrOXYzine HCL (ATARAX) tablet 50 mg, 50 mg, Oral, QHS PRN, Yonis Jewell MD, 50 mg at 07/27/20 2124    OLANZapine (ZyPREXA) tablet 20 mg, 20 mg, Oral, QHS, Yonis Jewell MD, 20 mg at 07/27/20 2124    carBAMazepine XR (TEGretol XR) tablet 200 mg, 200 mg, Oral, Q12H, Graciela Vargas MD, 200 mg at 07/27/20 2124    benztropine (COGENTIN) tablet 0.5 mg, 0.5 mg, Oral, BID, Graciela Vargas MD, 0.5 mg at 07/28/20 0912    cetirizine (ZYRTEC) tablet 10 mg, 10 mg, Oral, DAILY, Graciela Vargas MD, 10 mg at 07/28/20 0912    ibuprofen (MOTRIN) tablet 400 mg, 400 mg, Oral, Q6H PRN, Graciela Vargas MD    montelukast (SINGULAIR) tablet 10 mg, 10 mg, Oral, QHS, Graciela Vargas MD, 10 mg at 07/27/20 2123    acetaminophen (TYLENOL) tablet 650 mg, 650 mg, Oral, Q6H PRN **OR** acetaminophen (TYLENOL) suppository 650 mg, 650 mg, Rectal, Q6H PRN, Graciela Vargas MD    polyethylene glycol (MIRALAX) packet 17 g, 17 g, Oral, DAILY PRN, Graciela Vargas MD    ondansetron (ZOFRAN ODT) tablet 4 mg, 4 mg, Oral, Q8H PRN **OR** ondansetron (ZOFRAN) injection 4 mg, 4 mg, IntraVENous, Q6H PRN, Graciela Vargas MD    famotidine (PEPCID) tablet 20 mg, 20 mg, Oral, BID, Graciela Vargas MD, 20 mg at 07/27/20 1729    enoxaparin (LOVENOX) injection 40 mg, 40 mg, SubCUTAneous, DAILY, Graciela Vargas MD, 40 mg at 07/27/20 0908    traZODone (DESYREL) tablet 100 mg, 100 mg, Oral, QHS PRN, Rosario, Mar TORRES MD, 100 mg at 07/27/20 2123     LAB RESULTS:  Lab Results   Component Value Date/Time    WBC 4.1 07/27/2020 04:21 AM    HGB 10.4 (L) 07/27/2020 04:21 AM    Hematocrit (POC) 34 (L) 07/17/2018 03:33 AM    HCT 30.2 (L) 07/27/2020 04:21 AM    PLATELET 231 02/93/4922 04:21 AM    MCV 88.3 07/27/2020 04:21 AM      Lab Results   Component Value Date/Time    Sodium 127 (L) 07/27/2020 04:21 AM    Potassium 3.7 07/27/2020 04:21 AM Chloride 94 (L) 07/27/2020 04:21 AM    CO2 27 07/27/2020 04:21 AM    Anion gap 6 07/27/2020 04:21 AM    Glucose 99 07/27/2020 04:21 AM    Glucose 107 (H) 02/03/2020 05:40 AM    BUN 4 (L) 07/27/2020 04:21 AM    Creatinine 0.54 (L) 07/27/2020 04:21 AM    BUN/Creatinine ratio 7 (L) 07/27/2020 04:21 AM    GFR est AA >60 07/27/2020 04:21 AM    GFR est non-AA >60 07/27/2020 04:21 AM    Calcium 8.8 07/27/2020 04:21 AM    Bilirubin, total 0.6 07/27/2020 04:21 AM    Alk. phosphatase 77 07/27/2020 04:21 AM    Protein, total 7.3 07/27/2020 04:21 AM    Albumin 3.5 07/27/2020 04:21 AM    Globulin 3.8 07/27/2020 04:21 AM    A-G Ratio 0.9 (L) 07/27/2020 04:21 AM    ALT (SGPT) 25 07/27/2020 04:21 AM        PAST PSYCHIATRIC HISTORY:  Patient reports history of Bipolar Disorder and multiple psychiatric admissions. SUBSTANCE ABUSE HISTORY:  Social History     Substance and Sexual Activity   Drug Use No      Drug Screen Most Recent Result Date     DRUG SCREEN, URINE  Collected: 7/26/2020  4:50 AM (Final result)    Complete Results                 PSYCHOSOCIAL HISTORY:  Patient lives with . MENTAL STATUS EXAM:  General appearance:  Appropriate  Eye contact: Good  Speech: Normal  Affect: Congruent  Mood: ok\"  Orientation: Alert and Oriented  Thought Process: Goal Directed, Logical   Perception: Denies AVH/Paranoia   Thought Content: Denies SI/HI  Insight: Fair  Judgement: Fair  Cognition: Intact grossly  Impulse Control: Fair    ASSESSMENT AND PLAN/RECOMMENDATION:  Radha Morel meets criteria for a diagnosis of Bipolar Disorder, manic episode . At this time I recommend/plan the following: At this time the patient will likely benefit from inpatient psychiatric treatment and will need to be admitted in to a psychiatric facility due to robyn. Please have the case management team locate an inpatient psychiatric facility for the patient to immediately be admitted.  Patient reports she will go voluntarily, if patient refuses please contact your local [facility] for TDO (Temporary long term Order) assessment. COVID - NEGATIVE    Please consult Psychiatry again for any concerns regarding the patient's mental health changes and/or management. Thank you for the opportunity to participate in the care of your patient.

## 2020-07-28 NOTE — BH NOTES
PRN Medication Documentation    Specific patient behavior that led to need for PRN medication: very anxious Staff interventions attempted prior to PRN being given: coping skillsPRN medication given: atarax   Patient response/effectiveness of PRN medication: tl aware  9700- pt continues to be anxious and guarded ,paranoid,zyprexa given. tl aware 1

## 2020-07-28 NOTE — BH NOTES
TRANSFER - IN REPORT:    Verbal report received from [facility], [address] on [address]rier  being received from ANILA WILEYLTAC, located within [facility] telemetry for routine progression of care      Report consisted of patients Situation, Background, Assessment and   Recommendations(SBAR). Information from the following report(s) SBAR was reviewed with the receiving nurse. Opportunity for questions and clarification was provided. Assessment completed upon patients arrival to unit and care assumed.

## 2020-07-28 NOTE — PROGRESS NOTES
BETSEY Plan:    * in-pt psych [facility])    > Call report to [facility] at d/c ([phone]); pt assigned to [id number]  > EMTALA to be completed for transport; accepting MD is Dr. Moon Bui  > [facility] transport secured for 1:30 PM pick-up; PCS completed & on chart  > Observation status addressed; State/MOON notices provided, copies on chart    12:39 PM: CM met with pt at bedside to check in, introduce role, and review BETSEY plan for d/c. Pt contacted her  Billi Riedel: [phone] to remain on speaker phone while CM provided overview of plan. Both pt and  agreeable to plan & aware of 1:30 PM pick-up time. Pt and  both informed of observation status; pt received copies of both the State/MOON observation notices. CM placed copies of observation documentation on bedside chart. No further CM needs identified. CM notified pt's nurse of d/c. Initial note: CM acknowledged d/c. CM confirmed BETSEY plan for d/c today; information detailed above. CM informed MD & RN of GABRIELLE; both parties verbalized understanding. CM confirmed [facility] transportation for 1:30 PM pick-up time, PCS on chart. CM will meet with pt to provide verbal explanation of observation status as well as corresponding documentation.     Care Management Interventions  Mode of Transport at Discharge: BLS([facility] transport confirmed for 1:30 PM pick-up)  Hospital Transport Time of Discharge: [address] (CM Consult): Discharge Planning  Discharge Durable Medical Equipment: No  Physical Therapy Consult: No  Occupational Therapy Consult: No  Speech Therapy Consult: No  Confirm Follow Up Transport: Family  Discharge Location  Discharge Placement: Psychiatric Unit([facility] in-pt psych)    Feng Leon, [address] [facility], [address] [place] [address]

## 2020-07-28 NOTE — PROGRESS NOTES
Call places to Hospitalist answering service, requesting return call from MD, to notify MD of consult.   Awaiting return call from MD.

## 2020-07-28 NOTE — PROGRESS NOTES
CM received phone call from [facility] -     Patient has been accepted to [facility] Inpatient psych by Dr. Kevin Stephenson. CM will set up transportation for EMTALA - patient going to [facility] [address].      Nursing to call report to [phone]    Emily Faust RN, BSN, [address] [place] [address]    Coordinator  [phone]

## 2020-07-28 NOTE — PROGRESS NOTES
Called report to [facility] behavioral health for pt. SBAR given and all questions answered.

## 2020-07-28 NOTE — PROGRESS NOTES
Phone call received from Marifer Medina who is aware of referral.  They are awaiting psychiatry updates for review for transfer. Psych notes need to be uploaded as soon as possible for review by Inpatient [address] [place], [place] [address]kindra. CM will call Transfer Center at [phone] once notes available.     Katherine Coyle RN, BSN, [facility]    Coordinator  [phone]

## 2020-07-28 NOTE — BH NOTES
Patient admitted voluntarily to Acute  Psychiatry, under the services of . Patient currently denies suicidal ideation. Patient currently denies homicidal ideation. Patient admits with psychotic symptoms. Pt denies ETOH use. Pt denies drug use.

## 2020-07-28 NOTE — PROGRESS NOTES
Hospitalist Progress Note    NAME: Eileen Mercedes   :  1965   MRN:  [id number]       Assessment / Plan:  Lorena / Bipolar 1 disorder with moderate lorena / Anxiety and depression - Lorena POA, recurrent, severe.  Needs acute inpatient psychiatric managemnt. She is medically stable     Hyponatremia -improved     Chronic back pain / Scoliosis - Prn motrin and tylenol.     Hyperlipidemia -  Discussed with the psych, patient seen by psych recommended inpatient psych treatment.  working on transfer       Subjective:     Chief Complaint / Reason for Physician Visit  \" No complaints\". Discussed with RN events overnight. Review of Systems:  Symptom Y/N Comments  Symptom Y/N Comments   Fever/Chills n   Chest Pain n    Poor Appetite n   Edema n    Cough n   Abdominal Pain n    Sputum    Joint Pain     SOB/BURKS    Pruritis/Rash     Nausea/vomit    Tolerating PT/OT     Diarrhea    Tolerating Diet     Constipation    Other       Could NOT obtain due to:      Objective:     VITALS:   Last 24hrs VS reviewed since prior progress note. Most recent are:  Patient Vitals for the past 24 hrs:   Temp Pulse Resp BP SpO2   20 0643 98.6 °F (37 °C) 86 18 118/72 98 %   20 0300 98.3 °F (36.8 °C) 90 18 132/81 99 %   20 2330 98.4 °F (36.9 °C) 98 18 114/56 100 %   20 1854 99.1 °F (37.3 °C) 87 16 112/59 100 %   20 1520 99.2 °F (37.3 °C) 80 16 111/84 100 %   20 1133 97.8 °F (36.6 °C) 87 16 104/70 98 %     No intake or output data in the 24 hours ending 20 1057     PHYSICAL EXAM:  General: WD, WN. Alert, cooperative, no acute distress    EENT:  EOMI. Anicteric sclerae. MMM  Resp:  CTA bilaterally, no wheezing or rales. No accessory muscle use  CV:  Regular  rhythm,  No edema  GI:  Soft, Non distended, Non tender.  +Bowel sounds  Neurologic:  Alert and oriented X 3, normal speech,   Psych:   Poor eye contact  Skin:  No rashes.   No jaundice    Reviewed most current lab test results and cultures  YES  Reviewed most current radiology test results   YES  Review and summation of old records today    NO  Reviewed patient's current orders and MAR    YES  PMH/SH reviewed - no change compared to H&P  ________________________________________________________________________  Care Plan discussed with:    Comments   Patient     Family      RN     Care Manager     Consultant                        Multidiciplinary team rounds were held today with , nursing, pharmacist and clinical coordinator. Patient's plan of care was discussed; medications were reviewed and discharge planning was addressed. ________________________________________________________________________  Total NON critical care TIME:  20   Minutes    Total CRITICAL CARE TIME Spent:   Minutes non procedure based      Comments   >50% of visit spent in counseling and coordination of care     ________________________________________________________________________  Paco Cr MD     Procedures: see electronic medical records for all procedures/Xrays and details which were not copied into this note but were reviewed prior to creation of Plan. LABS:  I reviewed today's most current labs and imaging studies.   Pertinent labs include:  Recent Labs     07/27/20 0421 07/26/20  0936   WBC 4.1 3.8   HGB 10.4* 12.1   HCT 30.2* 35.3    250     Recent Labs     07/27/20 0421 07/26/20  0936 07/26/20  0419   * 126* 125*   K 3.7 3.4* 4.8   CL 94* 91* 94*   CO2 27 28 23   GLU 99 122* 127*   BUN 4* 5* 6   CREA 0.54* 0.63 0.72   CA 8.8 9.8 9.4   MG 2.1  --   --    ALB 3.5  --  4.3   TBILI 0.6  --  0.5   ALT 25  --  30       Signed: Paco Cr MD

## 2020-07-28 NOTE — PROGRESS NOTES
GABRIELLE completed. Vitals and report given to [facility]. Pt is being transferred to [facility] behavioral health unit.

## 2020-07-28 NOTE — PROGRESS NOTES
Bedside and Verbal shift change report given to Suleiman Reddy, [facility] (oncoming nurse) by Shayy Wilhelm RN (offgoing nurse). Report included the following information SBAR, Kardex, Intake/Output and MAR.

## 2020-07-29 PROCEDURE — 74011250636 HC RX REV CODE- 250/636: Performed by: NURSE PRACTITIONER

## 2020-07-29 PROCEDURE — 65220000003 HC RM SEMIPRIVATE PSYCH

## 2020-07-29 PROCEDURE — 74011250637 HC RX REV CODE- 250/637: Performed by: PSYCHIATRY & NEUROLOGY

## 2020-07-29 PROCEDURE — 74011250637 HC RX REV CODE- 250/637: Performed by: NURSE PRACTITIONER

## 2020-07-29 RX ORDER — LITHIUM CARBONATE 300 MG/1
300 CAPSULE ORAL 2 TIMES DAILY
Status: DISCONTINUED | OUTPATIENT
Start: 2020-07-29 | End: 2020-08-06 | Stop reason: HOSPADM

## 2020-07-29 RX ORDER — OLANZAPINE 5 MG/1
20 TABLET ORAL
Status: DISCONTINUED | OUTPATIENT
Start: 2020-07-29 | End: 2020-08-03

## 2020-07-29 RX ORDER — CETIRIZINE HCL 10 MG
10 TABLET ORAL
Status: ON HOLD | COMMUNITY
End: 2022-05-12 | Stop reason: SDUPTHER

## 2020-07-29 RX ORDER — AZELASTINE 1 MG/ML
1 SPRAY, METERED NASAL
Status: ON HOLD | COMMUNITY
End: 2022-05-12 | Stop reason: SDUPTHER

## 2020-07-29 RX ORDER — BENZTROPINE MESYLATE 0.5 MG/1
0.5 TABLET ORAL 2 TIMES DAILY
COMMUNITY
End: 2020-08-06

## 2020-07-29 RX ADMIN — LORAZEPAM 1 MG: 2 INJECTION INTRAMUSCULAR; INTRAVENOUS at 05:04

## 2020-07-29 RX ADMIN — HYDROXYZINE HYDROCHLORIDE 50 MG: 50 TABLET, FILM COATED ORAL at 16:17

## 2020-07-29 RX ADMIN — LITHIUM CARBONATE 300 MG: 300 CAPSULE ORAL at 12:08

## 2020-07-29 RX ADMIN — LITHIUM CARBONATE 300 MG: 300 CAPSULE ORAL at 20:00

## 2020-07-29 RX ADMIN — HALOPERIDOL LACTATE 5 MG: 5 INJECTION, SOLUTION INTRAMUSCULAR at 05:04

## 2020-07-29 RX ADMIN — OLANZAPINE 20 MG: 5 TABLET, FILM COATED ORAL at 21:00

## 2020-07-29 NOTE — BH NOTES
Pt states\"I NEED A SHOT,SOMETHING THAT WILL MAKE ME CALM DOWN!!!\"  Pt continues to be restless,guarded and paranoid. Needs prompting at times. appears agitated mostly. trazadone  Given as ordered. tl aware

## 2020-07-29 NOTE — INTERDISCIPLINARY ROUNDS
Behavioral Health Interdisciplinary Rounds     Patient Name: Magnus Win  Age: 63 y.o. Room/Bed:  730/02  Primary Diagnosis: <principal problem not specified>   Admission Status: Voluntary     Readmission within 30 days: no  Power of  in place: no  Patient requires a blocked bed: yes          Reason for blocked bed: paranoid    VTE Prophylaxis: No    Mobility needs/Fall risk: no  Flu Vaccine : no   Nutritional Plan: no  Consults:          Labs/Testing due today?: yes: refused    Sleep hours: 2.5        Participation in Care/Groups:    Medication Compliant?: No scheduled medications  PRNS (last 24 hours): Antipsychotic (PO), Antianxiety and Sleep Aid    Restraints (last 24 hours):  no     CIWA (range last 24 hours):     COWS (range last 24 hours):      Alcohol screening (AUDIT) completed -   AUDIT Score: 0     If applicable, date SBIRT discussed in treatment team AND documented:   AUDIT Screen Score: AUDIT Score: 0    Tobacco - patient is a smoker: Have You Used Tobacco in the Past 30 Days: No  Illegal Drugs use: Have You Used Any Illegal Substances Over the Past 12 Months: No    24 hour chart check complete:     Patient goal(s) for today: : meet treatment team, acclimate to unit  Treatment team focus/goals: assess needs for treatment and safe discharge, Stop carbamazipene and start lithium. Progress note: Pt is lethargic, disoriented, guarded and paranoid she will die in the hospital.     LOS:  1  Expected LOS: TBD    Financial concerns/prescription coverage:  [place] MEDICARE PART A & B   Family contact: , Barbara Minas ([phone])   Family requesting physician contact today: No  Discharge plan: return home with . Access to weapons : TAYLOR  Outpatient provider(s): Milagros BLAND  Patient's preferred phone number for follow up call : [phone]     Participating treatment team members: Magnus Win,; Dr Governor Martin;  Garrett Cummings, RN; Lea Weaver, PharmD;  Caleb Ramirez MSW

## 2020-07-29 NOTE — BH NOTES
Refused lab draw stated \"I was suppose to be dead. I have no blood. \"    Blocked Bed Documentation:    Room number: 730  Type: Behavior  Rationale: Poor Sleep Pattern  Anticipated duration: Evaluated on continuous basis

## 2020-07-29 NOTE — H&P
[address] [facility] HISTORY AND PHYSICAL    Name:  Nicholas Paul  MR#:  [id number]  :  1965  ACCOUNT #:  [de-identified]  ADMIT DATE:  2020    Psychiatric initial interview:    Ms. Oneil Champagne is transferred to us from [facility] for further management. She presented there on 2020 with her  who reported that the patient had been in a manic episode, and on evaluation in the ER, she was noted to have hyponatremia with her sodium at 125. She was admitted to the medical floor for this and her sodium levels appeared to have normalized since. She is somewhat of a poor historian. Expresses paranoia and thinks that she is in the hospital so that something harmful can be done to her. Accused this author and the pharmacist of trying to make things worse for her. According to , she had been sleeping poorly, making impulsive decisions and talking excessively. States that she has not slept well in many days. Nursing staff report that she had to be given an IM medication after arrival on the unit to help her calm down. Denies regular or heavy use of alcohol and denies use of any recreational drugs. States that she has been compliant with taking her medications and could not recall any recent stressors. Most recently, she was seeing a psychiatrist at [facility] as well as an individual therapist and says that she has been following up there regularly. Denies any psychotic symptoms at the present time. PAST MEDICAL HISTORY:  Reviewed as per the history and physical exam.    Past Medical History:   Diagnosis Date    Anxiety and depression     Bipolar 1 disorder with moderate robyn ([address] 3/17/2014    Chronic back pain     Hyperlipidemia 2016    Hyponatremia     Psychotic disorder [facility]     Scoliosis      Prior to Admission medications    Medication Sig Start Date End Date Taking?  Authorizing Provider   benztropine (COGENTIN) 0.5 mg tablet Take 0.5 mg by mouth two (2) times a day. Yes Provider, Historical   azelastine (ASTELIN) 137 mcg (0.1 %) nasal spray 1 [place] by Both Nostrils route as needed for Rhinitis. Prior to allergy injections   Yes Provider, Historical   cetirizine (ZYRTEC) 10 mg tablet Take 10 mg by mouth every evening. Yes Provider, Historical   hydrOXYzine HCL (ATARAX) 50 mg tablet Take 50 mg by mouth three (3) times daily as needed. Other, MD Caroline   carBAMazepine (TEGRETOL) 200 mg tablet Take 1 Tab by mouth two (2) times a day. Indications: robyn associated with bipolar disorder 2/11/20   Blaire WILEY NP   OLANZapine (ZYPREXA) 20 mg tablet Take 1 Tab by mouth nightly. Indications: robyn associated with bipolar disorder 2/11/20   Blaire WILEY NP   traZODone (DESYREL) 100 mg tablet Take 1 Tab by mouth nightly as needed for Sleep (For insomnia). Indications: insomnia associated with depression 2/11/20   Deanna Cornell NP   OTHER,NON-FORMULARY, Allergy shots every month  Indications: Allergy    Provider, Historical   cholecalciferol (VITAMIN D3) (50,000 UNITS /1250 MCG) capsule Take 50,000 Units by mouth every Monday. Caroline Burris MD   ibuprofen (MOTRIN) 800 mg tablet Take 800 mg by mouth every twelve (12) hours as needed for Pain. Provider, Historical   methocarbamol (ROBAXIN) 500 mg tablet Take 1 Tab by mouth daily as needed for Pain. 9/30/19   Appa Hoang Hamm MD   montelukast (SINGULAIR) 10 mg tablet Take 10 mg by mouth daily as needed. Prior to allergy shots    Caroline Burris MD   fexofenadine (ALLEGRA) 180 mg tablet Take 180 mg by mouth daily.     Provider, Historical     Vitals:    07/30/20 1525 07/30/20 1908 07/31/20 0748 07/31/20 1121   BP: 122/75 138/77 138/86 149/85   Pulse: 93 91 97 99   Resp: 16 16 18 16   Temp: 97.9 °F (36.6 °C) 98.5 °F (36.9 °C) 98.1 °F (36.7 °C) 98.2 °F (36.8 °C)   SpO2: 98% 97% 100%    Weight:         Lab Results   Component Value Date/Time    WBC 4.1 07/27/2020 04:21 AM HGB 10.4 (L) 07/27/2020 04:21 AM    Hematocrit (POC) 34 (L) 07/17/2018 03:33 AM    HCT 30.2 (L) 07/27/2020 04:21 AM    PLATELET 697 14/15/6898 04:21 AM    MCV 88.3 07/27/2020 04:21 AM     Lab Results   Component Value Date/Time    Sodium 138 07/30/2020 06:17 AM    Potassium 4.2 07/30/2020 06:17 AM    Chloride 106 07/30/2020 06:17 AM    CO2 24 07/30/2020 06:17 AM    Anion gap 8 07/30/2020 06:17 AM    Glucose 100 07/30/2020 06:17 AM    Glucose 107 (H) 02/03/2020 05:40 AM    BUN 7 07/30/2020 06:17 AM    Creatinine 0.72 07/30/2020 06:17 AM    BUN/Creatinine ratio 10 (L) 07/30/2020 06:17 AM    GFR est AA >60 07/30/2020 06:17 AM    GFR est non-AA >60 07/30/2020 06:17 AM    Calcium 9.5 07/30/2020 06:17 AM    Bilirubin, total 0.6 07/27/2020 04:21 AM    Alk. phosphatase 77 07/27/2020 04:21 AM    Protein, total 7.3 07/27/2020 04:21 AM    Albumin 3.5 07/27/2020 04:21 AM    Globulin 3.8 07/27/2020 04:21 AM    A-G Ratio 0.9 (L) 07/27/2020 04:21 AM    ALT (SGPT) 25 07/27/2020 04:21 AM    AST (SGOT) 20 07/27/2020 04:21 AM     Lab Results   Component Value Date/Time    Carbamazepine 14.1 (H) 07/26/2020 04:19 AM     No results found for: LITHM  RADIOLOGY REPORTS:(reviewed/updated 7/31/2020)  No results found. Lab Results   Component Value Date/Time    Pregnancy test,urine (POC) NEGATIVE  12/08/2011 08:00 AM         PAST PSYCHIATRIC HISTORY:  The patient reports that she has been in psychiatric treatment for many years and was diagnosed with bipolar disorder in 2005, following which she has had several psychiatric hospitalizations. Does not remember many of the details. Most recently, she was seeing Dr. Linda Hdez at [facility] and also has an individual therapist there. She was admitted to my care in 02/2020, and has a diagnosis of schizoaffective disorder, bipolar type. Denies any prior history of substance abuse. PSYCHOSOCIAL HISTORY:  The patient currently lives in [facility] [place] [facility] with her  of 28 years.   States that she was born in [place], grew up there and has been living in [place] with her  after the marriage. She does not have any children and is unemployed at the present time. Currently, the patient gets social security disability income for her diagnosis of bipolar disorder. Denies any major legal stressors at the present time. MENTAL STATUS EXAM:  The patient is a middle-aged [place] American female who is dressed in hospital apparel. She makes limited eye contact and is suspicious of the treatment team.  Her speech is spontaneous and coherent but decreased output of speech is noted. Psychomotor activity is within normal limits. Her affect is blunted and mood is reported as being afraid. Delusions of persecution are present as described above. Denies any perceptual abnormalities. Her thought process is somewhat disorganized and tangential at times. Cognitively, she is awake and alert, oriented to time, place and person. Intelligence is average and memory is intact. Fund of knowledge is average. Insight is poor. Judgment is poor. ASSESSMENT AND PLAN/DIAGNOSIS:  Schizoaffective disorder, bipolar type. I will continue her inpatient stay. She will be provided with support and attend groups. Estimated length of stay is 5-7 days. Her strengths include her ability to seek help and support from her family.       Mackenzie Stevens MD      [id number]  D:  07/29/2020 12:02  T:  07/29/2020 13:37  JOB #:  [id number]

## 2020-07-29 NOTE — PROGRESS NOTES
Admission Medication Reconciliation:    Information obtained from:  Patient interview, medication list, VA , insurance claims data  RxQuery data available¹:  YES    Comments/Recommendations: Updated PTA meds/reviewed patient's allergies. 1)  Patient provided pharmacist with a medication list (from wallet). Also, reports that she takes fexofenadine 180mg every mornign and cetirizine 10mg every evening. Prior to she mothly allergy shots she also takes montelukast 10mg and uses azelastine nasal spray. Reports that she due for an allergy shot last Tuesday. 2)  Medication changes (since last review): Added  - cetirizine 10mg every evening    Adjusted  - benztropine 0.5mg BID (from 1mg BID)    3)  The [place] Prescription Monitoring Program () was assessed to determine fill history of any controlled medications. The patient has NOT filled any controlled medications in the last 6 months. She was filling temazepam regularly 10/2019-1/2020 and flurazepam prior to that. ¹RxQuery pharmacy benefit data reflects medications filled and processed through the patient's insurance, however   this data does NOT capture whether the medication was picked up or is currently being taken by the patient. Allergies: Other food; Claritin-d 12 hour [loratadine-pseudoephedrine]; Other medication; Pcn [penicillins]; Sunscreen; Ultram [tramadol]; Benzoyl peroxide; Cephalexin; Divalproex; and Maltodextrin-glycerin    Significant PMH/Disease States:   Past Medical History:   Diagnosis Date    Anxiety and depression     Bipolar 1 disorder with moderate robyn ([facility] [address] [address] 3/17/2014    Chronic back pain     Hyperlipidemia 8/22/2016    Hyponatremia     Psychotic disorder [facility])     Scoliosis      Chief Complaint for this Admission:  No chief complaint on file. Prior to Admission Medications:   Prior to Admission Medications   Prescriptions Last Dose Informant Taking?    OLANZapine (ZYPREXA) 20 mg tablet   No   Sig: Take 1 Tab by mouth nightly. Indications: robyn associated with bipolar disorder   OTHER,NON-FORMULARY, Unknown at Unknown time  No   Sig: Allergy shots every month  Indications: Allergy   azelastine (ASTELIN) 137 mcg (0.1 %) nasal spray   Yes   [place] by Both Nostrils route as needed for Rhinitis. Prior to allergy injections   benztropine (COGENTIN) 0.5 mg tablet   Yes   Sig: Take 0.5 mg by mouth two (2) times a day. carBAMazepine (TEGRETOL) 200 mg tablet   No   Sig: Take 1 Tab by mouth two (2) times a day. Indications: robyn associated with bipolar disorder   cetirizine (ZYRTEC) 10 mg tablet   Yes   Sig: Take 10 mg by mouth every evening. cholecalciferol (VITAMIN D3) (50,000 UNITS /1250 MCG) capsule  Self No   Sig: Take 50,000 Units by mouth every Monday. fexofenadine (ALLEGRA) 180 mg tablet  Self No   Sig: Take 180 mg by mouth daily. hydrOXYzine HCL (ATARAX) 50 mg tablet   No   Sig: Take 50 mg by mouth three (3) times daily as needed. ibuprofen (MOTRIN) 800 mg tablet  Self No   Sig: Take 800 mg by mouth every twelve (12) hours as needed for Pain. methocarbamol (ROBAXIN) 500 mg tablet  Self No   Sig: Take 1 Tab by mouth daily as needed for Pain.   montelukast (SINGULAIR) 10 mg tablet  Self No   Sig: Take 10 mg by mouth daily as needed. Prior to allergy shots   traZODone (DESYREL) 100 mg tablet   No   Sig: Take 1 Tab by mouth nightly as needed for Sleep (For insomnia).  Indications: insomnia associated with depression      Facility-Administered Medications: None     RENETTA Richter

## 2020-07-29 NOTE — PROGRESS NOTES
Problem: Falls - Risk of  Goal: *Absence of Falls  Description: Document Be Carmona Fall Risk and appropriate interventions in the flowsheet. Outcome: Progressing Towards Goal  Note: Fall Risk Interventions:  Mobility Interventions: Assess mobility with egress test         Medication Interventions: Teach patient to arise slowly    Elimination Interventions:  Toilet paper/wipes in reach    Pt remains fall free  Will continue q 15 minute safety checks

## 2020-07-29 NOTE — PROGRESS NOTES
[address] [facility]                                                                                          Hospitalist Progress Note  Oliver Butler NP  Answering service: [phone] OR 36 from in house phone        Date of Service:  2020  NAME:  Dede Coles  :  1965  MRN:  [id number]      Admission Summary:   Dede Coles is a 63 y.o. female with past medical history significant for anxiety, depression, bipolar 1, psychotic disorder, chronic back pain, hyponatremia and scoliosis. Was asked to follow patient for Hyponatremia. Interval history / Subjective: Follow-up for Hyponatremia. Patient seen and examined, no c/o's. She is now willing to have lab drawn in am.     Assessment & Plan:     Hyponatremia, asymptomatic. Na 127 when last checked on   -Fluid restriction  -BMP ordered and repeatedly refused, no w agreeable to am lab draw      DVTppx: up ad srinivasan  Code Status: Full code  Activity: ad srinivasan  Discharge: TBD           Hospital Problems  Date Reviewed: 2020          Codes Class Noted POA    Bipolar 1 disorder ([facility] ICD-10-CM: F31.9  ICD-9-CM: 296.7  2020 Unknown                Review of Systems:   A comprehensive review of systems was negative except for that written in the HPI. Vital Signs:    Last 24hrs VS reviewed since prior progress note. Most recent are:  Visit Vitals  /75   Pulse 97   Temp 98.7 °F (37.1 °C)   Resp 16   Wt 70.5 kg (155 lb 6.4 oz)   SpO2 100%   Breastfeeding No   BMI 29.36 kg/m²         Intake/Output Summary (Last 24 hours) at 2020 1254  Last data filed at 2020 0510  Gross per 24 hour   Intake 0 ml   Output --   Net 0 ml        Physical Examination:             Constitutional:  No acute distress, calm    ENT:  Oral mucosa moist.    Resp:  CTA bilaterally. No wheezing/rhonchi/rales. No accessory muscle use. CV:  Regular rhythm, normal rate. GI:  Soft, non distended, non tender. BS present. Musculoskeletal:  No edema, warm, 2+ pulses throughout    Neurologic:  Moves all extremities. AAOx3. Data Review:    Review and/or order of clinical lab test      Labs:     Recent Labs     07/27/20 0421   WBC 4.1   HGB 10.4*   HCT 30.2*        Recent Labs     07/27/20 0421   *   K 3.7   CL 94*   CO2 27   BUN 4*   CREA 0.54*   GLU 99   CA 8.8   MG 2.1     Recent Labs     07/27/20 0421   ALT 25   AP 77   TBILI 0.6   TP 7.3   ALB 3.5   GLOB 3.8     No results for input(s): INR, PTP, APTT, INREXT in the last 72 hours. No results for input(s): FE, TIBC, PSAT, FERR in the last 72 hours. Lab Results   Component Value Date/Time    Folate CANCELED 11/12/2015 03:52 PM      No results for input(s): PH, PCO2, PO2 in the last 72 hours. No results for input(s): CPK, CKNDX, TROIQ in the last 72 hours.     No lab exists for component: CPKMB  Lab Results   Component Value Date/Time    Cholesterol, total 216 (H) 02/03/2020 05:40 AM    HDL Cholesterol 99 02/03/2020 05:40 AM    LDL, calculated 106.2 (H) 02/03/2020 05:40 AM    Triglyceride 54 02/03/2020 05:40 AM    CHOL/HDL Ratio 2.2 02/03/2020 05:40 AM     Lab Results   Component Value Date/Time    Glucose (POC) 136 (H) 01/10/2020 04:22 PM    Glucose (POC) 119 (H) 07/17/2018 03:33 AM     Lab Results   Component Value Date/Time    Color YELLOW/STRAW 07/26/2020 04:50 AM    Appearance CLEAR 07/26/2020 04:50 AM    Specific gravity 1.005 07/26/2020 04:50 AM    pH (UA) 6.5 07/26/2020 04:50 AM    Protein Negative 07/26/2020 04:50 AM    Glucose Negative 07/26/2020 04:50 AM    Ketone Negative 07/26/2020 04:50 AM    Bilirubin Negative 07/26/2020 04:50 AM    Urobilinogen 0.2 07/26/2020 04:50 AM    Nitrites Negative 07/26/2020 04:50 AM    Leukocyte Esterase SMALL (A) 07/26/2020 04:50 AM    Epithelial cells FEW 07/26/2020 04:50 AM    Bacteria Negative 07/26/2020 04:50 AM    WBC 5-10 07/26/2020 04:50 AM    RBC 0-5 07/26/2020 04:50 AM         Medications Reviewed: Current Facility-Administered Medications   Medication Dose Route Frequency    OLANZapine (ZyPREXA) tablet 20 mg  20 mg Oral QHS    lithium carbonate capsule 300 mg  300 mg Oral BID    OLANZapine (ZyPREXA) tablet 5 mg  5 mg Oral Q6H PRN    haloperidol lactate (HALDOL) injection 5 mg  5 mg IntraMUSCular Q6H PRN    benztropine (COGENTIN) tablet 1 mg  1 mg Oral BID PRN    diphenhydrAMINE (BENADRYL) injection 50 mg  50 mg IntraMUSCular BID PRN    hydrOXYzine HCL (ATARAX) tablet 50 mg  50 mg Oral TID PRN    LORazepam (ATIVAN) injection 1 mg  1 mg IntraMUSCular Q4H PRN    traZODone (DESYREL) tablet 50 mg  50 mg Oral QHS PRN    acetaminophen (TYLENOL) tablet 650 mg  650 mg Oral Q4H PRN    magnesium hydroxide (MILK OF MAGNESIA) 400 mg/5 mL oral suspension 30 mL  30 mL Oral DAILY PRN     ______________________________________________________________________  EXPECTED LENGTH OF STAY: - - -  ACTUAL LENGTH OF STAY:          1                 Patsy Horne NP

## 2020-07-29 NOTE — BH NOTES
PRN Medication Documentation    Specific patient behavior that led to need for PRN medication: anxiety,guarded,paranoid Staff interventions attempted prior to PRN being given:coping skills ,redirection  PRN medication given:atarax  Patient response/effectiveness of PRN medication: tl aware

## 2020-07-29 NOTE — BH NOTES
PSYCHOSOCIAL ASSESSMENT  :Patient identifying info:  Dede Coles is a 63 y.o., female admitted 7/28/2020  3:03 PM     Presenting problem and precipitating factors: Pt admitted to [place] [address] under [facility] TDO, as tranfer form [facility] telemetry unit, for robyn. Pt arrived at [facility] ED after  encouraged her to seek help for racing thoughts and insomnia. Pt reported AH and appears to have thought blocking. Expresses delusion she will die in hospital.     Mental status assessment: lethargic, oriented, soft spoken, paranoid delusion she is being \"tested\" on.     Strengths: supportive partner, stable housing, stable income, outpatient providers/connected to [facility]    Collateral information: , Vibha Zimmerman -[phone]    Current psychiatric /substance abuse providers and contact info: [phone] (Zachary GREGORIO and OBERMAYRHOF). Previous psychiatric/substance abuse providers and response to treatment:  Lynne Pool was hospitalized through [facility] on the following dates this year:  2/2-11/2020 TDO due left AMA, 1/20-30/2020 [facility]. Family history of mental illness or substance abuse: none indicated    Substance abuse history:  None indicated  Social History     Tobacco Use    Smoking status: Never Smoker    Smokeless tobacco: Never Used   Substance Use Topics    Alcohol use: Yes     Comment: occasional       History of biomedical complications associated with substance abuse : none indicated    Patient's current acceptance of treatment or motivation for change: Lexi SUAZO    Family constellation: , no kids    Is significant other involved? Yes, Pt is  and lives with  who brought her to ED for help.     Describe support system:     Describe living arrangements and home environment: lives at home with hsuband    Health issues:   Hospital Problems  Date Reviewed: 7/26/2020          Codes Class Noted POA    Bipolar 1 disorder [facility] ICD-10-CM: F31.9  ICD-9-CM: 296.7  2020 Unknown              Trauma history: none indicated    Legal issues: none indicated    History of  service: no    Financial status: SSDI for bipolar    Restorationism/cultural factors: Jehovah Witness      Education/work history: college education     Have you been licensed as a health care professional (current or ): no    Leisure and recreation preferences: socializing with friends    Describe coping skills: limited, ineffective    Montserrat Arciniega  2020

## 2020-07-29 NOTE — PROGRESS NOTES
Laboratory Monitoring for Antipsychotics: This patient is currently prescribed the following medication(s):   Current Facility-Administered Medications   Medication Dose Route Frequency    OLANZapine (ZyPREXA) tablet 20 mg  20 mg Oral QHS    lithium carbonate capsule 300 mg  300 mg Oral BID     The following labs have been completed for monitoring of antipsychotics and/or mood stabilizers:    Height, Weight, BMI Estimation  Estimated body mass index is 29.36 kg/m² as calculated from the following:    Height as of 7/26/20: 154.9 cm (61\"). Weight as of this encounter: 70.5 kg (155 lb 6.4 oz). Vital Signs/Blood Pressure  Visit Vitals  /75   Pulse 97   Temp 98.7 °F (37.1 °C)   Resp 16   Wt 70.5 kg (155 lb 6.4 oz)   SpO2 100%   Breastfeeding No   BMI 29.36 kg/m²     Renal Function, Hepatic Function and Chemistry  Estimated Creatinine Clearance: 80.6 mL/min (A) (by C-G formula based on SCr of 0.54 mg/dL (L)). Lab Results   Component Value Date/Time    Sodium 127 (L) 07/27/2020 04:21 AM    Potassium 3.7 07/27/2020 04:21 AM    Chloride 94 (L) 07/27/2020 04:21 AM    CO2 27 07/27/2020 04:21 AM    Anion gap 6 07/27/2020 04:21 AM    BUN 4 (L) 07/27/2020 04:21 AM    Creatinine 0.54 (L) 07/27/2020 04:21 AM    BUN/Creatinine ratio 7 (L) 07/27/2020 04:21 AM    Bilirubin, total 0.6 07/27/2020 04:21 AM    Protein, total 7.3 07/27/2020 04:21 AM    Albumin 3.5 07/27/2020 04:21 AM    Globulin 3.8 07/27/2020 04:21 AM    A-G Ratio 0.9 (L) 07/27/2020 04:21 AM    ALT (SGPT) 25 07/27/2020 04:21 AM    AST (SGOT) 20 07/27/2020 04:21 AM    Alk.  phosphatase 77 07/27/2020 04:21 AM     Lab Results   Component Value Date/Time    Glucose 99 07/27/2020 04:21 AM    Glucose 107 (H) 02/03/2020 05:40 AM    Glucose (POC) 136 (H) 01/10/2020 04:22 PM     Lab Results   Component Value Date/Time    Hemoglobin A1c 5.4 12/30/2019 02:09 PM     Hematology  Lab Results   Component Value Date/Time    WBC 4.1 07/27/2020 04:21 AM    RBC 3.42 (L) 07/27/2020 04:21 AM    HGB 10.4 (L) 07/27/2020 04:21 AM    HCT 30.2 (L) 07/27/2020 04:21 AM    MCV 88.3 07/27/2020 04:21 AM    MCH 30.4 07/27/2020 04:21 AM    MCHC 34.4 07/27/2020 04:21 AM    RDW 11.6 07/27/2020 04:21 AM    PLATELET 515 56/42/3250 04:21 AM     Lipids  Lab Results   Component Value Date/Time    Cholesterol, total 216 (H) 02/03/2020 05:40 AM    HDL Cholesterol 99 02/03/2020 05:40 AM    LDL, calculated 106.2 (H) 02/03/2020 05:40 AM    Triglyceride 54 02/03/2020 05:40 AM    CHOL/HDL Ratio 2.2 02/03/2020 05:40 AM     Thyroid Function  Lab Results   Component Value Date/Time    TSH 0.86 01/10/2020 08:00 PM    T3 Uptake 29 11/12/2015 03:52 PM    T4, Free 0.85 04/25/2019 04:09 PM    T4, Total 3.0 (L) 11/12/2015 03:52 PM     Pregnancy Status  Lab Results   Component Value Date/Time    Pregnancy test,urine (POC) NEGATIVE  12/08/2011 08:00 AM     Assessment/Plan:  Will order lipid panel and hemoglobin A1c or fasting glucose to complete the recommended baseline laboratory monitoring based on the patient's current medication regimen.         Sumanth Galaviz, YOSVANYD

## 2020-07-29 NOTE — BH NOTES
GROUP THERAPY PROGRESS NOTE    Sapphire Knott is participating in [place].      Group time: 20 minutes    Personal goal for participation: none    Goal orientation: community    Group therapy participation: minimal    Therapeutic interventions reviewed and discussed: na    Impression of participation: limited

## 2020-07-29 NOTE — PROGRESS NOTES
Pt in bed resting quietly. No acute distress noted. Respirations equal and unlabored. Will continue to monitor throughout shift. Problem: Falls - Risk of  Goal: *Absence of Falls  Description: Document Jeanne Hernández Fall Risk and appropriate interventions in the flowsheet. Outcome: Progressing Towards Goal  Note:     Fall Risk Interventions:    Medication Interventions: Teach patient to arise slowly    0400: Pt continuously coming out of room to nurses station demanding to leave and refusing all redirection. Pt extremely agitated and uncooperative. All staff redirection and education is quickly responded with a \"no. \" Pt is paranoid believes staff is \"videotaping me\" Pt refusing all PO medication. 0445: Pt increasing in paranoia and agitation. Unresponsive to any staff redirection. Pt body language aggressive. Pt willing to take IM medication. PRN Medication Documentation   Specific patient behavior that led to need for PRN medication: see above   Staff interventions attempted prior to PRN being given: education, redirection. therapeutic talk   PRN medication given: IM haldol and ativan   Patient response/effectiveness of PRN medication: will assess  0500: Bed board made aware of pt room becoming blocked.       Blocked Bed Documentation:  Room number: 730  Type: Behavior  Rationale: Poor Self-Control  Anticipated duration: TBD  Additional comments: pt extremely paranoid and argumentative

## 2020-07-29 NOTE — CONSULTS
Hospitalist History and Physical    Date of Service: 7/28/2020  Primary Care Provider: Lamberto Pickering MD  Source of Information: Patient, chart review    History of Presenting Illness:   Elisabeth Perry is a 63 y.o. female with past medical history significant for anxiety, depression, bipolar 1, psychotic disorder, chronic back pain hyponatremia and scoliosis admitted from [facility] on 7/28 for insomnia, racing thoughts and anxiety. This information was obtained from the medical record and the psychiatric NP's history and physical.  Ms. Donnie Rodriguez is refusing to communicate with me and will not provide any information. She will not allow physical exam and will not answer ROS questions. Medical work-up at [facility] was essentially unremarkable when she initially presented there in the late morning on 7/26. CBC and UA were both normal.  Urine drug screen was negative. BMP was significant for hyponatremia: Sodium upon arrival on 7/26 was 25, increased to 127 at 4 AM on 7/27. The hospitalist group is consulted for medical management.      Assessment & Plan     Anxiety, depression, bipolar 1, psychotic disorder  Management per primary team    Hyponatremia  Na 127 when last checked on 7/27  · Fluid restriction  · BMP in AM        DVT Prophylaxis: Up ad srinivasan, none indicated  Code status: Full  Disposition: Per primary team     Review of Systems:    Patient was not able to provide review of systems due to mental status change/acute illness      LMP: Menopausal (per medical records)    Past Medical History:   Diagnosis Date    Anxiety and depression     Bipolar 1 disorder with moderate robyn ([address] 3/17/2014    Chronic back pain     Hyperlipidemia 8/22/2016    Hyponatremia     Psychotic disorder ([address]     Scoliosis       Past Surgical History:   Procedure Laterality Date    HX HEENT      wisdom teeth extraction    HX LIPOMA RESECTION      right gluteal    FREDY BIOPSY BREAST STEREOTACTIC Left 2011    negative    [place] DENTAL SURGERY PROCEDURE      hx of dental surgery- wisdom teeth extracted     Prior to Admission medications    Medication Sig Start Date End Date Taking? Authorizing Provider   hydrOXYzine HCL (ATARAX) 50 mg tablet Take 50 mg by mouth three (3) times daily as needed. Caroline Burris MD   carBAMazepine (TEGRETOL) 200 mg tablet Take 1 Tab by mouth two (2) times a day. Indications: robyn associated with bipolar disorder 2/11/20   Siena WILEY NP   OLANZapine (ZYPREXA) 20 mg tablet Take 1 Tab by mouth nightly. Indications: robyn associated with bipolar disorder 2/11/20   Siena WILEY NP   benztropine (COGENTIN) 1 mg tablet Take 1 Tab by mouth two (2) times a day. Indications: extrapyramidal symptoms as a result of taking the medication  Patient taking differently: Take 0.5 mg by mouth two (2) times a day. Indications: extrapyramidal symptoms as a result of taking the medication 2/11/20   Siena WILEY NP   traZODone (DESYREL) 100 mg tablet Take 1 Tab by mouth nightly as needed for Sleep (For insomnia). Indications: insomnia associated with depression 2/11/20   Sanjeev Norman NP   OTHER,NON-FORMULARY, Allergy shots every month - does not recall date of last shot    Provider, Historical   cholecalciferol (VITAMIN D3) (50,000 UNITS /1250 MCG) capsule Take 50,000 Units by mouth every Monday. Caroline Burris MD   ibuprofen (MOTRIN) 800 mg tablet Take 800 mg by mouth every twelve (12) hours as needed for Pain. Provider, Historical   methocarbamol (ROBAXIN) 500 mg tablet Take 1 Tab by mouth daily as needed for Pain. 9/30/19   Juana Aquino MD   montelukast (SINGULAIR) 10 mg tablet Take 10 mg by mouth daily as needed.  Prior to allergy shots    Caroline Burris MD   azelastine (ASTELIN) 137 mcg (0.1 %) nasal spray USE 1 SPRAY IN EACH NOSTRIL TWICE A DAY AS DIRECTED 10/25/17   Fredick Goodpasture, MD   fexofenadine (ALLEGRA) 180 mg tablet Take 180 mg by mouth daily.    Provider, Historical     Allergies   Allergen Reactions    Other Food Hives     Artifical sweetners    Claritin-D 12 Hour [Loratadine-Pseudoephedrine] Palpitations     palpatations and ringing in the ear    Other Medication Anaphylaxis     Artificial sweeteners cause anaphylaxis    Pcn [Penicillins] Anaphylaxis    Sunscreen Contact Dermatitis     Burns and opens the skin    Ultram [Tramadol] Hives and Other (comments)     Hives and ringing of the ears    Benzoyl Peroxide Hives    Cephalexin Itching    Divalproex Itching    Maltodextrin-Glycerin Shortness of Breath      Family History   Problem Relation Age of Onset   [facility] Arthritis-rheumatoid Mother     Migraines Mother     Psychiatric Disorder Mother     Bipolar Disorder Mother     Lupus Mother     Alcohol abuse Father     Lung Disease Father     Heart Disease Father     Stroke Father     High Cholesterol Father     Psychiatric Disorder Sister     Alcohol abuse Sister     Bipolar Disorder Sister     Stroke Brother     Cancer Maternal Aunt     Breast Cancer Maternal Aunt         pt thniks she was under 48    Bipolar Disorder Sister         SOCIAL HISTORY:    FUNCTIONAL STATUS PRIOR TO ADMISSION: Ambulates Independently    Patient resides:   With Family     Smoking history:  Unable to evaluate  Drugs:         Unable to evaluate  Alcohol history:    Unable to evaluate  Ambulates:            Independently    Work History:          CODE STATUS:   Full       Objective:     Physical Exam:     Constitutional:  No acute distress, cooperative, pleasant   Psych:  Flat affect. Answers some questions yes/no, otherwise non verbal..  Neurologic:  DANE, EOMI. AAOx3, CN II-XII reviewed  Resp:  Pt refused examination  HEENT:  Oral mucosa moist,   CV:  Pt refused examination  GI:  Pt refused examination  Musculoskeletal: GAMBOA, ambulatory.   Peripheral Vascular: No edema,           Visit Vitals  BP (!) 152/93 (BP 1 Location: Left arm, BP Patient Position: Sitting)   Pulse 87   Resp 16   SpO2 96%   Breastfeeding No             Data Review:   Lab results (past 7 days)  Admission on 07/26/2020, Discharged on 07/28/2020   Component Date Value    Sodium 07/26/2020 125*    Potassium 07/26/2020 4.8     Chloride 07/26/2020 94*    CO2 07/26/2020 23     Anion gap 07/26/2020 8     Glucose 07/26/2020 127*    BUN 07/26/2020 6     Creatinine 07/26/2020 0.72     BUN/Creatinine ratio 07/26/2020 8*    GFR est AA 07/26/2020 >60     GFR est non-AA 07/26/2020 >60     Calcium 07/26/2020 9.4     Bilirubin, total 07/26/2020 0.5     ALT (SGPT) 07/26/2020 30     AST (SGOT) 07/26/2020 26     Alk.  phosphatase 07/26/2020 86     Protein, total 07/26/2020 8.6*    Albumin 07/26/2020 4.3     Globulin 07/26/2020 4.3*    A-G Ratio 07/26/2020 1.0*    Carbamazepine 07/26/2020 14.1*    Color 07/26/2020 YELLOW/STRAW     Appearance 07/26/2020 CLEAR     Specific gravity 07/26/2020 1.005     pH (UA) 07/26/2020 6.5     Protein 07/26/2020 Negative     Glucose 07/26/2020 Negative     Ketone 07/26/2020 Negative     Bilirubin 07/26/2020 Negative     Blood 07/26/2020 Negative     Urobilinogen 07/26/2020 0.2     Nitrites 07/26/2020 Negative     Leukocyte Esterase 07/26/2020 SMALL*    WBC 07/26/2020 5-10     RBC 07/26/2020 0-5     Epithelial cells 07/26/2020 FEW     Bacteria 07/26/2020 Negative     UA:UC IF INDICATED 07/26/2020 CULTURE NOT INDICATED BY UA RESULT     AMPHETAMINES 07/26/2020 Negative     BARBITURATES 07/26/2020 Negative     BENZODIAZEPINES 07/26/2020 Negative     COCAINE 07/26/2020 Negative     METHADONE 07/26/2020 Negative     OPIATES 07/26/2020 Negative     PCP(PHENCYCLIDINE) 07/26/2020 Negative     THC (TH-CANNABINOL) 07/26/2020 Negative     Drug screen comment 07/26/2020 (NOTE)     ALCOHOL(ETHYL),SERUM 07/26/2020 <10     WBC 07/26/2020 3.8     RBC 07/26/2020 3.97     HGB 07/26/2020 12.1     HCT 07/26/2020 35.3     MCV 07/26/2020 88.9  MCH 07/26/2020 30.5     MCHC 07/26/2020 34.3     RDW 07/26/2020 11.7     PLATELET 87/13/6288 345     MPV 07/26/2020 9.2     NRBC 07/26/2020 0.0     ABSOLUTE NRBC 07/26/2020 0.00     NEUTROPHILS 07/26/2020 63     LYMPHOCYTES 07/26/2020 26     MONOCYTES 07/26/2020 11     EOSINOPHILS 07/26/2020 0     BASOPHILS 07/26/2020 0     IMMATURE GRANULOCYTES 07/26/2020 0     ABS. NEUTROPHILS 07/26/2020 2.4     ABS. LYMPHOCYTES 07/26/2020 1.0     ABS. MONOCYTES 07/26/2020 0.4     ABS. EOSINOPHILS 07/26/2020 0.0     ABS. BASOPHILS 07/26/2020 0.0     ABS. IMM. GRANS. 07/26/2020 0.0     DF 07/26/2020 AUTOMATED     Sodium 07/26/2020 126*    Potassium 07/26/2020 3.4*    Chloride 07/26/2020 91*    CO2 07/26/2020 28     Anion gap 07/26/2020 7     Glucose 07/26/2020 122*    BUN 07/26/2020 5*    Creatinine 07/26/2020 0.63     BUN/Creatinine ratio 07/26/2020 8*    GFR est AA 07/26/2020 >60     GFR est non-AA 07/26/2020 >60     Calcium 07/26/2020 9.8     Specimen source 07/26/2020 Nasopharyngeal     SARS-CoV-2 07/26/2020 Not detected     Specimen source 07/26/2020 Nasopharyngeal     Sodium 07/27/2020 127*    Potassium 07/27/2020 3.7     Chloride 07/27/2020 94*    CO2 07/27/2020 27     Anion gap 07/27/2020 6     Glucose 07/27/2020 99     BUN 07/27/2020 4*    Creatinine 07/27/2020 0.54*    BUN/Creatinine ratio 07/27/2020 7*    GFR est AA 07/27/2020 >60     GFR est non-AA 07/27/2020 >60     Calcium 07/27/2020 8.8     Bilirubin, total 07/27/2020 0.6     ALT (SGPT) 07/27/2020 25     AST (SGOT) 07/27/2020 20     Alk.  phosphatase 07/27/2020 77     Protein, total 07/27/2020 7.3     Albumin 07/27/2020 3.5     Globulin 07/27/2020 3.8     A-G Ratio 07/27/2020 0.9*    Magnesium 07/27/2020 2.1     WBC 07/27/2020 4.1     RBC 07/27/2020 3.42*    HGB 07/27/2020 10.4*    HCT 07/27/2020 30.2*    MCV 07/27/2020 88.3     MCH 07/27/2020 30.4     MCHC 07/27/2020 34.4     RDW 07/27/2020 11.6     PLATELET 05/72/6003 689     MPV 07/27/2020 9.4     NRBC 07/27/2020 0.0     ABSOLUTE NRBC 07/27/2020 0.00         Imaging results (past 24 hours):  No results found. EKG (most recent):   No results found for this or any previous visit.     Signed By: Elis Medley NP     July 28, 2020       Mao [address], NP-C  [phone]  and via [address] [place] [address]

## 2020-07-29 NOTE — PROGRESS NOTES
[facility]  Master Treatment Plan for Radha Morel    Date Treatment Plan Initiated: 7/29/20    Treatment Plan Modalities:  Type of Modality Amount  (x minutes) Frequency (x/week) Duration (x days) Name of Responsible Staff   Community & wrap-up meetings to encourage peer interactions 15 7 1   Ziggy TAYLOR   Group psychotherapy to assist in building coping skills and internal controls 60 7 1    Therapeutic activity groups to build coping skills 60 7 1    Psychoeducation in group setting to address:   Medication education   15 7 1 [facility] RN   Coping skills      HEAVEN TAYLOR   Relaxation techniques      YOAV HENDRICKSON   Symptom management      STAFF   Discharge planning   60 2 [facility]    60 2 1 Chaplain FANG   61 1 1 volunteer   Recovery/AA/NA      volunteer   Physician medication management   15 7 1 [facility]   Family meeting/discharge planning   15 2 1 Nena Garcia and Montserrat Evans                                      Problem: Lorena/Hypomania  Goal: *LTG: Reduce psychic energy and return to normal activity levels, good judgement, stable mood, and goal-directed behavior  Outcome: Not Progressing Towards Goal   Mood appears stable and then decompensates quickly. Judgement is impaired      Problem: Lorena/Hypomania  Goal: *LTG: Achieve controlled behavior, moderated  mood, and more deliberative speech and thought processes  Description: Achieve controlled behavior, moderated  mood, and more deliberative speech and thought processes through psychotherapy and medication.   Outcome: Not Progressing Towards Goal   Speech varies from slow to rapid and pressured  Problem: Lorena/Hypomania  Goal: *STG: Sleep about 5 hours or more per night  Outcome: Progressing Towards Goal   Initially reported at 2.5 during night shift but did sleep early part of day shift  Problem: Lorena/Hypomania    Goal: *STG: Achieve mood stability  Description: Achieve mood stability, e.g. slower to react with anger and less expansive or elevated.   Outcome: Not Progressing Towards Goal

## 2020-07-30 LAB
ANION GAP SERPL CALC-SCNC: 8 MMOL/L (ref 5–15)
BUN SERPL-MCNC: 7 MG/DL (ref 6–20)
BUN/CREAT SERPL: 10 (ref 12–20)
CALCIUM SERPL-MCNC: 9.5 MG/DL (ref 8.5–10.1)
CHLORIDE SERPL-SCNC: 106 MMOL/L (ref 97–108)
CHOLEST SERPL-MCNC: 191 MG/DL
CO2 SERPL-SCNC: 24 MMOL/L (ref 21–32)
CREAT SERPL-MCNC: 0.72 MG/DL (ref 0.55–1.02)
EST. AVERAGE GLUCOSE BLD GHB EST-MCNC: 117 MG/DL
GLUCOSE SERPL-MCNC: 100 MG/DL (ref 65–100)
HBA1C MFR BLD: 5.7 % (ref 4–5.6)
HDLC SERPL-MCNC: 84 MG/DL
HDLC SERPL: 2.3 {RATIO} (ref 0–5)
LDLC SERPL CALC-MCNC: 97.8 MG/DL (ref 0–100)
LIPID PROFILE,FLP: NORMAL
POTASSIUM SERPL-SCNC: 4.2 MMOL/L (ref 3.5–5.1)
SODIUM SERPL-SCNC: 138 MMOL/L (ref 136–145)
TRIGL SERPL-MCNC: 46 MG/DL (ref ?–150)
TSH SERPL DL<=0.05 MIU/L-ACNC: 1.91 UIU/ML (ref 0.36–3.74)
VLDLC SERPL CALC-MCNC: 9.2 MG/DL

## 2020-07-30 PROCEDURE — 84443 ASSAY THYROID STIM HORMONE: CPT

## 2020-07-30 PROCEDURE — 83036 HEMOGLOBIN GLYCOSYLATED A1C: CPT

## 2020-07-30 PROCEDURE — 80061 LIPID PANEL: CPT

## 2020-07-30 PROCEDURE — 80048 BASIC METABOLIC PNL TOTAL CA: CPT

## 2020-07-30 PROCEDURE — 74011250637 HC RX REV CODE- 250/637: Performed by: PSYCHIATRY & NEUROLOGY

## 2020-07-30 PROCEDURE — 36415 COLL VENOUS BLD VENIPUNCTURE: CPT

## 2020-07-30 PROCEDURE — 65220000003 HC RM SEMIPRIVATE PSYCH

## 2020-07-30 RX ORDER — LORATADINE 10 MG/1
10 TABLET ORAL DAILY
Status: DISCONTINUED | OUTPATIENT
Start: 2020-07-30 | End: 2020-08-06 | Stop reason: HOSPADM

## 2020-07-30 RX ADMIN — LORATADINE 10 MG: 10 TABLET ORAL at 14:08

## 2020-07-30 RX ADMIN — LITHIUM CARBONATE 300 MG: 300 CAPSULE ORAL at 08:29

## 2020-07-30 RX ADMIN — LITHIUM CARBONATE 300 MG: 300 CAPSULE ORAL at 21:07

## 2020-07-30 RX ADMIN — OLANZAPINE 20 MG: 5 TABLET, FILM COATED ORAL at 21:07

## 2020-07-30 NOTE — PROGRESS NOTES
[facility]  Hospitalist Group                                                                                          Hospitalist Progress Note  Landon Laguerre NP  Answering service: [phone] OR [phone] from in house phone        Date of Service:  2020  NAME:  Noemi Hoang  :  1965  MRN:  [id number]      Admission Summary:   Millie Pemberton a 63 y.o. female with past medical history significant for anxiety, depression, bipolar 1, psychotic disorder, chronic back pain, hyponatremia and scoliosis. Was asked to follow patient for Hyponatremia.       Interval history / Subjective:    Seen and examined patient for follow up of hyponatremia. States that she is feeling fine and has always had problems with her sodium because she drinks a lot of water. Educated her on limiting fluid intake. Does not want further blood to be drawn. Denies any dizziness, syncope, shortness of breath, chest pain or tightness, nausea, vomiting, or diarrhea. Assessment & Plan:     Hyponatremia, asymptomatic-   - Chronic  - Na 138 this am   - Monitor fluid intake   - Repeat BMP in one week to monitor Na level. Anxiety, bipolar 1, psychotic disorder   Management per primary team      Code status: Full   DVT prophylaxis:     Care Plan discussed with: Patient/Family and Nurse  Anticipated Disposition: Per primary team   Anticipated Discharge: Per primary team       Thank you for allowing us to participate in Ms Jones's care. At this time we will sign off. If you need any further assistance please do not hesitate to call us. Hospital Problems  Date Reviewed: 2020          Codes Class Noted POA    Bipolar 1 disorder ([facility] [address] ICD-10-CM: F31.9  ICD-9-CM: 296.7  2020 Unknown                Review of Systems:   A comprehensive review of systems was negative except for that written in the HPI. Vital Signs:    Last 24hrs VS reviewed since prior progress note.  Most recent are:  Visit Vitals  /75 (BP 1 Location: Right arm, BP Patient Position: Sitting)   Pulse 93   Temp 97.9 °F (36.6 °C)   Resp 16   Wt 70.5 kg (155 lb 6.4 oz)   SpO2 98%   Breastfeeding No   BMI 29.36 kg/m²         Intake/Output Summary (Last 24 hours) at 7/30/2020 1539  Last data filed at 7/30/2020 0603  Gross per 24 hour   Intake 120 ml   Output --   Net 120 ml        Physical Examination:             Constitutional:  No acute distress, cooperative, pleasant    ENT:  Oral mucosa moist, oropharynx benign. Resp:  CTA bilaterally. No wheezing/rhonchi/rales. No accessory muscle use   CV:  Regular rhythm, normal rate, no murmurs, gallops, rubs    GI:  Soft, non distended, non tender. normoactive bowel sounds, no hepatosplenomegaly     Musculoskeletal:  No edema, warm, 2+ pulses throughout    Neurologic:  Moves all extremities. AAOx3, CN II-XII reviewed     Psych:  Anxious  Skin:  Good turgor, no rashes or ulcers       Data Review:    Review and/or order of clinical lab test      Labs:   No results for input(s): WBC, HGB, HCT, PLT, HGBEXT, HCTEXT, PLTEXT in the last 72 hours. Recent Labs     07/30/20  0617      K 4.2      CO2 24   BUN 7   CREA 0.72      CA 9.5     No results for input(s): ALT, AP, TBIL, TBILI, TP, ALB, GLOB, GGT, AML, LPSE in the last 72 hours. No lab exists for component: SGOT, GPT, AMYP, HLPSE  No results for input(s): INR, PTP, APTT, INREXT in the last 72 hours. No results for input(s): FE, TIBC, PSAT, FERR in the last 72 hours. Lab Results   Component Value Date/Time    Folate CANCELED 11/12/2015 03:52 PM      No results for input(s): PH, PCO2, PO2 in the last 72 hours. No results for input(s): CPK, CKNDX, TROIQ in the last 72 hours.     No lab exists for component: CPKMB  Lab Results   Component Value Date/Time    Cholesterol, total 191 07/30/2020 06:17 AM    HDL Cholesterol 84 07/30/2020 06:17 AM    LDL, calculated 97.8 07/30/2020 06:17 AM    Triglyceride 46 07/30/2020 06:17 AM    CHOL/HDL Ratio 2.3 07/30/2020 06:17 AM     Lab Results   Component Value Date/Time    Glucose (POC) 136 (H) 01/10/2020 04:22 PM    Glucose (POC) 119 (H) 07/17/2018 03:33 AM     Lab Results   Component Value Date/Time    Color YELLOW/STRAW 07/26/2020 04:50 AM    Appearance CLEAR 07/26/2020 04:50 AM    Specific gravity 1.005 07/26/2020 04:50 AM    pH (UA) 6.5 07/26/2020 04:50 AM    Protein Negative 07/26/2020 04:50 AM    Glucose Negative 07/26/2020 04:50 AM    Ketone Negative 07/26/2020 04:50 AM    Bilirubin Negative 07/26/2020 04:50 AM    Urobilinogen 0.2 07/26/2020 04:50 AM    Nitrites Negative 07/26/2020 04:50 AM    Leukocyte Esterase SMALL (A) 07/26/2020 04:50 AM    Epithelial cells FEW 07/26/2020 04:50 AM    Bacteria Negative 07/26/2020 04:50 AM    WBC 5-10 07/26/2020 04:50 AM    RBC 0-5 07/26/2020 04:50 AM         Medications Reviewed:     Current Facility-Administered Medications   Medication Dose Route Frequency    loratadine (CLARITIN) tablet 10 mg  10 mg Oral DAILY    OLANZapine (ZyPREXA) tablet 20 mg  20 mg Oral QHS    lithium carbonate capsule 300 mg  300 mg Oral BID    OLANZapine (ZyPREXA) tablet 5 mg  5 mg Oral Q6H PRN    haloperidol lactate (HALDOL) injection 5 mg  5 mg IntraMUSCular Q6H PRN    benztropine (COGENTIN) tablet 1 mg  1 mg Oral BID PRN    diphenhydrAMINE (BENADRYL) injection 50 mg  50 mg IntraMUSCular BID PRN    hydrOXYzine HCL (ATARAX) tablet 50 mg  50 mg Oral TID PRN    LORazepam (ATIVAN) injection 1 mg  1 mg IntraMUSCular Q4H PRN    traZODone (DESYREL) tablet 50 mg  50 mg Oral QHS PRN    acetaminophen (TYLENOL) tablet 650 mg  650 mg Oral Q4H PRN    magnesium hydroxide (MILK OF MAGNESIA) 400 mg/5 mL oral suspension 30 mL  30 mL Oral DAILY PRN     ______________________________________________________________________  EXPECTED LENGTH OF STAY: - - -  ACTUAL LENGTH OF STAY:          2                 Debbie Estes NP

## 2020-07-30 NOTE — BH NOTES
Pt thought process appears more organized today. appears more appropriate with her conversation. able to redirect today.   Continues with the anxious behav

## 2020-07-30 NOTE — PROGRESS NOTES
Pt resting in bed quietly with eyes closed and respirations equal. NAD. Will continue to monitor throughout shift. Problem: Falls - Risk of  Goal: *Absence of Falls  Description: Document Yanakatina Rollepuma Fall Risk and appropriate interventions in the flowsheet. Outcome: Progressing Towards Goal  Note: Fall Risk Interventions:  Mobility Interventions: Assess mobility with egress test     Medication Interventions: Teach patient to arise slowly    Elimination Interventions:  Toilet paper/wipes in reach            Blocked Bed Documentation:  Room number: 730  Type: Behavior  Rationale: Poor Self-Control  Anticipated duration: TBD in treatment team  Additional comments: pt paranoid

## 2020-07-30 NOTE — BH NOTES
Chief Complaint:  I feel more organized. Length of Stay: 2 Days    Interval History:  Bola Celestin reports feeling better today. She slept 7.5 hours last night and woke up feeling more rested. Feels her thoughts are slowing down. Shared that she spoke to her  and enjoyed talking to him. Denies any adverse events from her medications. She has not been agitated or aggressive and no episodes of threatening behavior have been noted. Pleasant and calm during the interview. Past Medical History:  Past Medical History:   Diagnosis Date    Anxiety and depression     Bipolar 1 disorder with moderate robyn ([facility] [address] 3/17/2014    Chronic back pain     Hyperlipidemia 8/22/2016    Hyponatremia     Psychotic disorder ([facility] [address]     Scoliosis            Labs:  Lab Results   Component Value Date/Time    WBC 4.1 07/27/2020 04:21 AM    HGB 10.4 (L) 07/27/2020 04:21 AM    Hematocrit (POC) 34 (L) 07/17/2018 03:33 AM    HCT 30.2 (L) 07/27/2020 04:21 AM    PLATELET 875 76/72/2554 04:21 AM    MCV 88.3 07/27/2020 04:21 AM      Lab Results   Component Value Date/Time    Sodium 138 07/30/2020 06:17 AM    Potassium 4.2 07/30/2020 06:17 AM    Chloride 106 07/30/2020 06:17 AM    CO2 24 07/30/2020 06:17 AM    Anion gap 8 07/30/2020 06:17 AM    Glucose 100 07/30/2020 06:17 AM    Glucose 107 (H) 02/03/2020 05:40 AM    BUN 7 07/30/2020 06:17 AM    Creatinine 0.72 07/30/2020 06:17 AM    BUN/Creatinine ratio 10 (L) 07/30/2020 06:17 AM    GFR est AA >60 07/30/2020 06:17 AM    GFR est non-AA >60 07/30/2020 06:17 AM    Calcium 9.5 07/30/2020 06:17 AM    Bilirubin, total 0.6 07/27/2020 04:21 AM    Alk.  phosphatase 77 07/27/2020 04:21 AM    Protein, total 7.3 07/27/2020 04:21 AM    Albumin 3.5 07/27/2020 04:21 AM    Globulin 3.8 07/27/2020 04:21 AM    A-G Ratio 0.9 (L) 07/27/2020 04:21 AM    ALT (SGPT) 25 07/27/2020 04:21 AM      Vitals:    07/29/20 1534 07/29/20 2100 07/30/20 0717 07/30/20 1103   BP: 126/75 126/82 117/79 127/85   Pulse: 65 94 98 95   Resp: 14 16 16 16   Temp: 98.1 °F (36.7 °C) 98.9 °F (37.2 °C) 98.8 °F (37.1 °C) 98.4 °F (36.9 °C)   SpO2: 100% 100% 98%    Weight:             Current Facility-Administered Medications   Medication Dose Route Frequency Provider Last Rate Last Dose    OLANZapine (ZyPREXA) tablet 20 mg  20 mg Oral QHS Alejandrina Jiang MD   20 mg at 07/29/20 2100    lithium carbonate capsule 300 mg  300 mg Oral BID Alejandrina Jiang MD   300 mg at 07/30/20 0829    OLANZapine (ZyPREXA) tablet 5 mg  5 mg Oral Q6H PRN Marlene Figueroa A, NP   5 mg at 07/28/20 1745    haloperidol lactate (HALDOL) injection 5 mg  5 mg IntraMUSCular Q6H PRN Stephanie Aquino, NP   5 mg at 07/29/20 0504    benztropine (COGENTIN) tablet 1 mg  1 mg Oral BID PRN Marlene WILEY, NP        diphenhydrAMINE (BENADRYL) injection 50 mg  50 mg IntraMUSCular BID PRN Stephanie Aquino NP        hydrOXYzine HCL (ATARAX) tablet 50 mg  50 mg Oral TID PRN Stephanie Aquino NP   50 mg at 07/29/20 1617    LORazepam (ATIVAN) injection 1 mg  1 mg IntraMUSCular Q4H PRN Stephanie Aquino, NP   1 mg at 07/29/20 0504    traZODone (DESYREL) tablet 50 mg  50 mg Oral QHS PRN Marlene WILEY, NP   50 mg at 07/28/20 2024    acetaminophen (TYLENOL) tablet 650 mg  650 mg Oral Q4H PRN Stephanie Aquino, NP        magnesium hydroxide (MILK OF MAGNESIA) 400 mg/5 mL oral suspension 30 mL  30 mL Oral DAILY PRN Stephanie Aquino NP             Mental Status Exam:  Eye contact: good  Grooming: fair  Psychomotor activity: WNL  Speech is spontaneous, coherent, increased output. Mood is \"okay\"  Affect: reactive  Perception: Denies any AH or VH  Suicidal ideation: Denies any SI or plan. Cognition is grossly intact       Physical Exam:  Body habitus: Body mass index is 29.36 kg/m².   Musculoskeletal system: normal gait  Tremor - neg  Cog wheeling - neg      Assessment and Plan:  Julito Desir meets criteria for a diagnosis of Schizoaffective disorder, bipolar type. Continue the medication regimen as prescribed  Disposition planning to continue. I certify that this patients inpatient psychiatric hospital services furnished since the previous certification were, and continue to be, required for treatment that could reasonably be expected to improve the patient's condition, or for diagnostic study, and that the patient continues to need, on a daily basis, active treatment furnished directly by or requiring the supervision of inpatient psychiatric facility personnel. In addition, the hospital records show that services furnished were intensive treatment services, admission or related services, or equivalent services.

## 2020-07-30 NOTE — PROGRESS NOTES
Problem: Discharge Planning  Goal: *Discharge to safe environment  Outcome: Progressing Towards Goal  Note: Patient identifies home as safe environment and plans to return upon discharge. Patient has supportive family . Goal: *Knowledge of medication management  Outcome: Progressing Towards Goal  Note: Patient is taking medications as prescribed and is connected with [facility] for outpatient follow up. Goal: *Knowledge of discharge instructions  Outcome: Progressing Towards Goal  Note: Patient verbalizes understanding of goals for treatment and safe discharge.

## 2020-07-30 NOTE — PROGRESS NOTES
Laboratory Monitoring for Antipsychotics: This patient is currently prescribed the following medication(s):   Current Facility-Administered Medications   Medication Dose Route Frequency    OLANZapine (ZyPREXA) tablet 20 mg  20 mg Oral QHS    lithium carbonate capsule 300 mg  300 mg Oral BID     The following labs have been completed for monitoring of antipsychotics and/or mood stabilizers:    Height, Weight, BMI Estimation  Estimated body mass index is 29.36 kg/m² as calculated from the following:    Height as of 7/26/20: 154.9 cm (61\"). Weight as of this encounter: 70.5 kg (155 lb 6.4 oz). Vital Signs/Blood Pressure  Visit Vitals  /79 (BP 1 Location: Left arm, BP Patient Position: Sitting)   Pulse 98   Temp 98.8 °F (37.1 °C)   Resp 16   Wt 70.5 kg (155 lb 6.4 oz)   SpO2 98%   Breastfeeding No   BMI 29.36 kg/m²     Renal Function, Hepatic Function and Chemistry  Estimated Creatinine Clearance: 80.6 mL/min (A) (by C-G formula based on SCr of 0.54 mg/dL (L)). Lab Results   Component Value Date/Time    Sodium 127 (L) 07/27/2020 04:21 AM    Potassium 3.7 07/27/2020 04:21 AM    Chloride 94 (L) 07/27/2020 04:21 AM    CO2 27 07/27/2020 04:21 AM    Anion gap 6 07/27/2020 04:21 AM    BUN 4 (L) 07/27/2020 04:21 AM    Creatinine 0.54 (L) 07/27/2020 04:21 AM    BUN/Creatinine ratio 7 (L) 07/27/2020 04:21 AM    Bilirubin, total 0.6 07/27/2020 04:21 AM    Protein, total 7.3 07/27/2020 04:21 AM    Albumin 3.5 07/27/2020 04:21 AM    Globulin 3.8 07/27/2020 04:21 AM    A-G Ratio 0.9 (L) 07/27/2020 04:21 AM    ALT (SGPT) 25 07/27/2020 04:21 AM    AST (SGOT) 20 07/27/2020 04:21 AM    Alk.  phosphatase 77 07/27/2020 04:21 AM     Lab Results   Component Value Date/Time    Glucose 99 07/27/2020 04:21 AM    Glucose 107 (H) 02/03/2020 05:40 AM    Glucose (POC) 136 (H) 01/10/2020 04:22 PM     Lab Results   Component Value Date/Time    Hemoglobin A1c 5.7 (H) 07/30/2020 06:17 AM     Hematology  Lab Results   Component Value Date/Time    WBC 4.1 07/27/2020 04:21 AM    RBC 3.42 (L) 07/27/2020 04:21 AM    HGB 10.4 (L) 07/27/2020 04:21 AM    HCT 30.2 (L) 07/27/2020 04:21 AM    MCV 88.3 07/27/2020 04:21 AM    MCH 30.4 07/27/2020 04:21 AM    MCHC 34.4 07/27/2020 04:21 AM    RDW 11.6 07/27/2020 04:21 AM    PLATELET 051 52/87/9019 04:21 AM     Lipids  Lab Results   Component Value Date/Time    Cholesterol, total 191 07/30/2020 06:17 AM    HDL Cholesterol 84 07/30/2020 06:17 AM    LDL, calculated 97.8 07/30/2020 06:17 AM    Triglyceride 46 07/30/2020 06:17 AM    CHOL/HDL Ratio 2.3 07/30/2020 06:17 AM     Thyroid Function  Lab Results   Component Value Date/Time    TSH 1.91 07/30/2020 06:17 AM    T3 Uptake 29 11/12/2015 03:52 PM    T4, Free 0.85 04/25/2019 04:09 PM    T4, Total 3.0 (L) 11/12/2015 03:52 PM     Pregnancy Status  Lab Results   Component Value Date/Time    Pregnancy test,urine (POC) NEGATIVE  12/08/2011 08:00 AM     Assessment/Plan:  Recommended baseline laboratory monitoring has been completed based on this patient's current medication regimen. The patient's estimated 10-year ASCVD risk is 1.7%; therefore, the patient is not a candidate for a high-intensity statin. No further intervention needed at this time. Follow-up metabolic monitoring labs should be completed at least annually.      Logan Buerger, YOSVANYD

## 2020-07-30 NOTE — BH NOTES
Patient remains calm and cooperative during shift. Patient seemed confused and unsure what she is supposed to be doing on the unit. Patient was asked to come onto the mileu and out of her room, but stated it was too cold. Patient agitated when  brought wrong clothes to the unit. Patient med compliant.

## 2020-07-30 NOTE — INTERDISCIPLINARY ROUNDS
Behavioral Health Interdisciplinary Rounds     Patient Name: Landy Gao  Age: 63 y.o. Room/Bed:  730/02  Primary Diagnosis: <principal problem not specified>   Admission Status: Voluntary     Readmission within 30 days: no  Power of  in place: no  Patient requires a blocked bed: yes          Reason for blocked bed: paranoia    VTE Prophylaxis: No    Mobility needs/Fall risk: no  Flu Vaccine : no   Nutritional Plan: no  Consults:          Labs/Testing due today?: yes    Sleep hours: 7.5       Participation in Care/Groups:  Medication Compliant?: Yes  PRNS (last 24 hours): Antianxiety    Restraints (last 24 hours):  no     CIWA (range last 24 hours):     COWS (range last 24 hours):      Alcohol screening (AUDIT) completed -   AUDIT Score: 0     If applicable, date SBIRT discussed in treatment team AND documented:   AUDIT Screen Score: AUDIT Score: 0    Tobacco - patient is a smoker: Have You Used Tobacco in the Past 30 Days: No  Illegal Drugs use: Have You Used Any Illegal Substances Over the Past 12 Months: No    24 hour chart check complete:     Patient goal(s) for today: attend groups, take medications  Treatment team focus/goals: titrate medications and coordinate follow up  Progress note: Pt is alert, oriented, and reports \"I don't feel good. \" She is tolerating lithium well and reports sleeping well and feeling hungry. She is confused about lithium and thinks she will be poisoned but is receptive of education and may need ongoing education and reassurance. LOS:  2  Expected LOS: TBD     Financial concerns/prescription coverage:  [place] MED[place]ARE PART A & B   Family contact: , Franki Wood ([phone])     Family requesting physician contact today: No  Discharge plan: return home with .   Access to weapons : NA  Outpatient provider(s): Milagros BLAND  Patient's preferred phone number for follow up call : [phone]      Participating treatment team members: Landy Gao,; Dr Levi Gustafson; Luisa Obando Chucky Blakely; John Fournier, PharmD;  Meaghan Wang, MSW

## 2020-07-31 PROCEDURE — 74011250637 HC RX REV CODE- 250/637: Performed by: PSYCHIATRY & NEUROLOGY

## 2020-07-31 PROCEDURE — 65220000001 HC RM PRIVATE PSYCH

## 2020-07-31 PROCEDURE — 74011250637 HC RX REV CODE- 250/637: Performed by: NURSE PRACTITIONER

## 2020-07-31 RX ORDER — TRAZODONE HYDROCHLORIDE 100 MG/1
100 TABLET ORAL
Status: DISCONTINUED | OUTPATIENT
Start: 2020-07-31 | End: 2020-08-03

## 2020-07-31 RX ADMIN — LORATADINE 10 MG: 10 TABLET ORAL at 08:55

## 2020-07-31 RX ADMIN — LITHIUM CARBONATE 300 MG: 300 CAPSULE ORAL at 21:00

## 2020-07-31 RX ADMIN — OLANZAPINE 20 MG: 5 TABLET, FILM COATED ORAL at 20:59

## 2020-07-31 RX ADMIN — TRAZODONE HYDROCHLORIDE 100 MG: 100 TABLET ORAL at 21:35

## 2020-07-31 RX ADMIN — LITHIUM CARBONATE 300 MG: 300 CAPSULE ORAL at 08:55

## 2020-07-31 RX ADMIN — TRAZODONE HYDROCHLORIDE 50 MG: 50 TABLET ORAL at 00:17

## 2020-07-31 NOTE — PROGRESS NOTES
Problem: Lorena/Hypomania  Goal: *STG: Be less agitated and distracted  Description: Be less agitated and distracted, e.g. be able to sit quietly and calmly for 60 minutes  Outcome: Progressing Towards Goal  Patient able to sit for long periods of time. However, patient continues to be distracted by details out of her immediate control. Problem: Patient Education: Go to Patient Education Activity  Goal: Patient/Family Education  Outcome: Progressing Towards Goal     Problem: Lorena/Hypomania  Goal: *STG: Sleep about 5 hours or more per night  Outcome: Not Progressing Towards Goal   Patient slept 2 hours last night.     Blocked Bed Documentation:    Room number: 730  Type: Behavior  Rationale: Poor Sleep Pattern  Anticipated duration: TBD  Additional comments:

## 2020-07-31 NOTE — BH NOTES
GROUP THERAPY PROGRESS NOTE    Aracely Fagan is participating in [employer] / Wellness Group    Group time: 45 minutes    Personal goal for participation: Prep for Today     Goal orientation: [address] therapy participation: active    Therapeutic interventions reviewed and discussed:     Impression of participation: Pt told peers & US that her wellness was   un-balanced thus her mood was low, felt depressed, low energy ( exhausted) and confused. Pt said it is difficult to cope  living in this environment when you feel unsteady. Pt also stated outside events were un resolved thus affecting her mood and thinking. She did not share what were those events and US did not push to further elaborate.

## 2020-07-31 NOTE — PROGRESS NOTES
Problem: Falls - Risk of  Goal: *Absence of Falls  Description: Document Scott Bert Fall Risk and appropriate interventions in the flowsheet. Outcome: Progressing Towards Goal  Note: Fall Risk Interventions:  Mobility Interventions: Assess mobility with egress test         Medication Interventions: Teach patient to arise slowly    Elimination Interventions: Toilet paper/wipes in reach    Pt. Receive awake in bed, NAD, respirations even and unlabored. Will continue q15 safety checks.        Blocked Bed Documentation:    Room number: 730-2  Type: Behavior  Rationale: Paranoid  Anticipated duration: hospital duration   Additional comments will continue to assess

## 2020-07-31 NOTE — BH NOTES
Chief Complaint:  I didn't sleep much last night. Length of Stay: 3 Days    Interval History:  Bri Burden reports feeling worse today. Says she did not sleep at all last night and woke up multiple times. Expressed belief that she was punished for doing \"bad things\" to other people. Says she did something \"terrible\" 6 years ago and is being punished for this. Unable to bring up any specific examples of what she might have done and stated \"I was just spinning like a top\". She has been isolative to her room and appears to paranoid at times. Minimally interactive with peers and tends to stay in the doorway of her room staring at peers and nursing staff. Hears people from her office talking about her in a derogatory manner. No adverse events are noted from Lithium. Past Medical History:  Past Medical History:   Diagnosis Date    Anxiety and depression     Bipolar 1 disorder with moderate robyn ([facility] [address] 3/17/2014    Chronic back pain     Hyperlipidemia 8/22/2016    Hyponatremia     [facility]     Scoliosis            Labs:  Lab Results   Component Value Date/Time    WBC 4.1 07/27/2020 04:21 AM    HGB 10.4 (L) 07/27/2020 04:21 AM    Hematocrit (POC) 34 (L) 07/17/2018 03:33 AM    HCT 30.2 (L) 07/27/2020 04:21 AM    PLATELET 431 85/26/7235 04:21 AM    MCV 88.3 07/27/2020 04:21 AM      Lab Results   Component Value Date/Time    Sodium 138 07/30/2020 06:17 AM    Potassium 4.2 07/30/2020 06:17 AM    Chloride 106 07/30/2020 06:17 AM    CO2 24 07/30/2020 06:17 AM    Anion gap 8 07/30/2020 06:17 AM    Glucose 100 07/30/2020 06:17 AM    Glucose 107 (H) 02/03/2020 05:40 AM    BUN 7 07/30/2020 06:17 AM    Creatinine 0.72 07/30/2020 06:17 AM    BUN/Creatinine ratio 10 (L) 07/30/2020 06:17 AM    GFR est AA >60 07/30/2020 06:17 AM    GFR est non-AA >60 07/30/2020 06:17 AM    Calcium 9.5 07/30/2020 06:17 AM    Bilirubin, total 0.6 07/27/2020 04:21 AM    Alk.  phosphatase 77 07/27/2020 04:21 AM    Protein, total 7.3 07/27/2020 04:21 AM    Albumin 3.5 07/27/2020 04:21 AM    Globulin 3.8 07/27/2020 04:21 AM    A-G Ratio 0.9 (L) 07/27/2020 04:21 AM    ALT (SGPT) 25 07/27/2020 04:21 AM      Vitals:    07/30/20 1525 07/30/20 1908 07/31/20 0748 07/31/20 1121   BP: 122/75 138/77 138/86 149/85   Pulse: 93 91 97 99   Resp: 16 16 18 16   Temp: 97.9 °F (36.6 °C) 98.5 °F (36.9 °C) 98.1 °F (36.7 °C) 98.2 °F (36.8 °C)   SpO2: 98% 97% 100%    Weight:             Current Facility-Administered Medications   Medication Dose Route Frequency Provider Last Rate Last Dose    traZODone (DESYREL) tablet 100 mg  100 mg Oral QHS Esther Allan MD        loratadine (CLARITIN) tablet 10 mg  10 mg Oral DAILY Esther Allan MD   10 mg at 07/31/20 0855    OLANZapine (ZyPREXA) tablet 20 mg  20 mg Oral QHS Esther Allan MD   20 mg at 07/30/20 2107    lithium carbonate capsule 300 mg  300 mg Oral BID Esther Allan MD   300 mg at 07/31/20 0855    OLANZapine (ZyPREXA) tablet 5 mg  5 mg Oral Q6H PRN Migdalia WILEY, NP   5 mg at 07/28/20 1745    haloperidol lactate (HALDOL) injection 5 mg  5 mg IntraMUSCular Q6H PRN Stephanie Aquino NP   5 mg at 07/29/20 0504    benztropine (COGENTIN) tablet 1 mg  1 mg Oral BID PRN Migdalia Tapia A, NP        diphenhydrAMINE (BENADRYL) injection 50 mg  50 mg IntraMUSCular BID PRN Stephanie Aquino NP        hydrOXYzine HCL (ATARAX) tablet 50 mg  50 mg Oral TID PRN Migdalia WILEY, NP   50 mg at 07/29/20 1617    LORazepam (ATIVAN) injection 1 mg  1 mg IntraMUSCular Q4H PRN Stephanie Aquino NP   1 mg at 07/29/20 0504    acetaminophen (TYLENOL) tablet 650 mg  650 mg Oral Q4H PRN Stephanie Aquino NP        magnesium hydroxide (MILK OF MAGNESIA) 400 mg/5 mL oral suspension 30 mL  30 mL Oral DAILY PRN Stephanie Aquino NP             Mental Status Exam:  Eye contact: good  Grooming: fair  Psychomotor activity: WNL  Speech is spontaneous, coherent, increased output.    Mood is \"okay\"  Affect: blunted  Perception: Derogatory AH +  Suicidal ideation: Denies any SI or plan. Paranoid and nihilistic delusions + as described above. Cognition is grossly intact       Physical Exam:  Body habitus: Body mass index is 29.36 kg/m². Musculoskeletal system: normal gait  Tremor - neg  Cog wheeling - neg      Assessment and Plan:  Willy Closs meets criteria for a diagnosis of Schizoaffective disorder, bipolar type. Increased her dosage of Zyprexa to 30mg at bedtime. Trazodone was increased to 100mg at bedtime. Continue the medication regimen as prescribed  Disposition planning to continue. I certify that this patients inpatient psychiatric hospital services furnished since the previous certification were, and continue to be, required for treatment that could reasonably be expected to improve the patient's condition, or for diagnostic study, and that the patient continues to need, on a daily basis, active treatment furnished directly by or requiring the supervision of inpatient psychiatric facility personnel. In addition, the hospital records show that services furnished were intensive treatment services, admission or related services, or equivalent services.

## 2020-07-31 NOTE — INTERDISCIPLINARY ROUNDS
Behavioral Health Interdisciplinary Rounds     Patient Name: Felton Severe  Age: 63 y.o. Room/Bed:  730/02  Primary Diagnosis: <principal problem not specified>   Admission Status: Voluntary     Readmission within 30 days: no  Power of  in place: no  Patient requires a blocked bed: yes          Reason for blocked bed: paranoia    VTE Prophylaxis: No    Mobility needs/Fall risk: no  Flu Vaccine : no   Nutritional Plan: no  Consults:          Labs/Testing due today?: no    Sleep hours: 2       Participation in Care/Groups:    Medication Compliant?: Yes  PRNS (last 24 hours): Sleep Aid    Restraints (last 24 hours):  no     CIWA (range last 24 hours):     COWS (range last 24 hours):      Alcohol screening (AUDIT) completed -   AUDIT Score: 0     If applicable, date SBIRT discussed in treatment team AND documented:   AUDIT Screen Score: AUDIT Score: 0    Tobacco - patient is a smoker: Have You Used Tobacco in the Past 30 Days: No  Illegal Drugs use: Have You Used Any Illegal Substances Over the Past 12 Months: No    24 hour chart check complete: yes     Patient goal(s) for today: attend groups, take medications  Treatment team focus/goals: titrate medication, increase Trazodone for sleep, coordinate follow up. Progress note : Pt is alert, oriented and reported feeling tired. Her thoughts are tangential and disorganized. Denies SI. Pt only slept 2 hours and is upset she does not have her personal clothing. She says she is a bad person and that is why she doesn't sleep and stays up \"to cover up what I've done. \" Ezzard Lorena of people telling her she did something bad. LOS:  3  Expected LOS: TBD     Financial concerns/prescription coverage:  [place] MEDICARE PART A & B   Family contact: Leah ([phone])     Family requesting physician contact today: No  Discharge plan: return home with .   Access to weapons :[address] [place] [address] provider(s): Milagros BLAND  Patient's preferred phone number for follow up call : [phone]      Participating treatment team Melody Saez,; Dr Salty Chakraborty, RN; Jazmine Brown, PharmD;  Quinn Augustin MSW

## 2020-07-31 NOTE — PROGRESS NOTES
Problem: Lorena/Hypomania  Goal: *LTG: Reduce agitation, impulsivity, and pressured speech  Description: Reduce agitation, impulsivity, and pressured speech while achieving sensitivity to the consequences of behavior and having more realistic expectations. Outcome: Progressing Towards Goal  Goal: *LTG: Achieve controlled behavior, moderated  mood, and more deliberative speech and thought processes  Description: Achieve controlled behavior, moderated  mood, and more deliberative speech and thought processes through psychotherapy and medication. Outcome: Progressing Towards Goal  Variance Patient slowly responding  Goal: *STG: Be less agitated and distracted  Description: Be less agitated and distracted. Outcome: Progressing Towards Goal  Goal: *STG: Decrease impulsivity in action  Description: Decrease impulsivity in action, e.g. refrain from engaging in self-destructive behaviors such as over-spending, promiscuity, substance abuse, or use of profane language. Outcome: Progressing Towards Goal  Goal: *STG: Comply with psychotropic medications as prescribed  Outcome: Progressing Towards Goal  Pt expresses paranoid thoughts. She is less intrusive, pacing in her room, instead of staying at the nurses   station. She complies with medications. Affect is blunted. Facial expression is void of emotion.

## 2020-07-31 NOTE — BH NOTES
PRN Medication Documentation    Specific patient behavior that led to need for PRN medication: sleeplessness  Staff interventions attempted prior to PRN being given: none  PRN medication given: Trazodone  Patient response/effectiveness of PRN medication: will monitor

## 2020-08-01 PROCEDURE — 74011250637 HC RX REV CODE- 250/637: Performed by: NURSE PRACTITIONER

## 2020-08-01 PROCEDURE — 74011250637 HC RX REV CODE- 250/637: Performed by: PSYCHIATRY & NEUROLOGY

## 2020-08-01 PROCEDURE — 65220000001 HC RM PRIVATE PSYCH

## 2020-08-01 RX ADMIN — LITHIUM CARBONATE 300 MG: 300 CAPSULE ORAL at 08:11

## 2020-08-01 RX ADMIN — TRAZODONE HYDROCHLORIDE 100 MG: 100 TABLET ORAL at 22:01

## 2020-08-01 RX ADMIN — ACETAMINOPHEN 650 MG: 325 TABLET ORAL at 05:47

## 2020-08-01 RX ADMIN — LORATADINE 10 MG: 10 TABLET ORAL at 08:11

## 2020-08-01 RX ADMIN — LITHIUM CARBONATE 300 MG: 300 CAPSULE ORAL at 20:00

## 2020-08-01 RX ADMIN — HYDROXYZINE HYDROCHLORIDE 50 MG: 50 TABLET, FILM COATED ORAL at 16:33

## 2020-08-01 RX ADMIN — OLANZAPINE 20 MG: 5 TABLET, FILM COATED ORAL at 21:07

## 2020-08-01 NOTE — BH NOTES
Chief Complaint:  I didn't sleep much last night. Length of Stay: 4 Days    Interval History:  Bri Burden slept 5 hours last night. Up walking around pacing in room and endorses she was looking for some \"important papers\". Patient intrusive at times when staff are talking to one another. Denies SI/HI/AVH at this time but observed internally responding to herself. No aggression noted at this time. Hyperverbal during assessment and tone is low. Past Medical History:  Past Medical History:   Diagnosis Date    Anxiety and depression     Bipolar 1 disorder with moderate robyn ([facility] [address] 3/17/2014    Chronic back pain     Hyperlipidemia 8/22/2016    Hyponatremia     Psychotic disorder ([facility]     Scoliosis            Labs:  Lab Results   Component Value Date/Time    WBC 4.1 07/27/2020 04:21 AM    HGB 10.4 (L) 07/27/2020 04:21 AM    Hematocrit (POC) 34 (L) 07/17/2018 03:33 AM    HCT 30.2 (L) 07/27/2020 04:21 AM    PLATELET 109 26/08/0022 04:21 AM    MCV 88.3 07/27/2020 04:21 AM      Lab Results   Component Value Date/Time    Sodium 138 07/30/2020 06:17 AM    Potassium 4.2 07/30/2020 06:17 AM    Chloride 106 07/30/2020 06:17 AM    CO2 24 07/30/2020 06:17 AM    Anion gap 8 07/30/2020 06:17 AM    Glucose 100 07/30/2020 06:17 AM    Glucose 107 (H) 02/03/2020 05:40 AM    BUN 7 07/30/2020 06:17 AM    Creatinine 0.72 07/30/2020 06:17 AM    BUN/Creatinine ratio 10 (L) 07/30/2020 06:17 AM    GFR est AA >60 07/30/2020 06:17 AM    GFR est non-AA >60 07/30/2020 06:17 AM    Calcium 9.5 07/30/2020 06:17 AM    Bilirubin, total 0.6 07/27/2020 04:21 AM    Alk.  phosphatase 77 07/27/2020 04:21 AM    Protein, total 7.3 07/27/2020 04:21 AM    Albumin 3.5 07/27/2020 04:21 AM    Globulin 3.8 07/27/2020 04:21 AM    A-G Ratio 0.9 (L) 07/27/2020 04:21 AM    ALT (SGPT) 25 07/27/2020 04:21 AM      Vitals:    07/31/20 1543 07/31/20 1948 08/01/20 0659 08/01/20 0701   BP: 149/87 154/85 103/72 103/72   Pulse: 96 98 82 82   Resp: 16 18 16 16   Temp: 98 °F (36.7 °C) 97.9 °F (36.6 °C) 98.4 °F (36.9 °C) 98.4 °F (36.9 °C)   SpO2: 100% 100% 98% 98%   Weight:             Current Facility-Administered Medications   Medication Dose Route Frequency Provider Last Rate Last Dose    traZODone (DESYREL) tablet 100 mg  100 mg Oral QHS Ángela Rivera MD   100 mg at 07/31/20 2135    loratadine (CLARITIN) tablet 10 mg  10 mg Oral DAILY Ángela Rivera MD   10 mg at 08/01/20 0811    OLANZapine (ZyPREXA) tablet 20 mg  20 mg Oral QHS Ángela Rivera MD   20 mg at 07/31/20 2059    lithium carbonate capsule 300 mg  300 mg Oral BID Ángela Rivera MD   300 mg at 08/01/20 0811    OLANZapine (ZyPREXA) tablet 5 mg  5 mg Oral Q6H PRN Shravan Montero A, NP   5 mg at 07/28/20 1745    haloperidol lactate (HALDOL) injection 5 mg  5 mg IntraMUSCular Q6H PRN Stephanie Aquino, NP   5 mg at 07/29/20 0504    benztropine (COGENTIN) tablet 1 mg  1 mg Oral BID PRN Shravan WILEY, NP        diphenhydrAMINE (BENADRYL) injection 50 mg  50 mg IntraMUSCular BID PRN Stephanie Aquino, NP        hydrOXYzine HCL (ATARAX) tablet 50 mg  50 mg Oral TID PRN Stephanie Aquino, NP   50 mg at 07/29/20 1617    LORazepam (ATIVAN) injection 1 mg  1 mg IntraMUSCular Q4H PRN Stephanie Aquino, NP   1 mg at 07/29/20 0504    acetaminophen (TYLENOL) tablet 650 mg  650 mg Oral Q4H PRN Stephanie Aquino, NP   650 mg at 08/01/20 0547    magnesium hydroxide (MILK OF MAGNESIA) 400 mg/5 mL oral suspension 30 mL  30 mL Oral DAILY PRN Shravan Montero A, NP             Mental Status Exam:  Eye contact: good  Grooming: fair  Psychomotor activity: WNL  Speech is spontaneous, coherent, increased output. Mood is \"okay\"  Affect: blunted  Perception: Derogatory AH +  Suicidal ideation: Denies any SI or plan. Paranoid and nihilistic delusions + as described above. Cognition is grossly intact       Physical Exam:  Body habitus: Body mass index is 29.36 kg/m².   Musculoskeletal system: normal gait  Tremor - neg  Cog wheeling - neg      Assessment and Plan:  Sapphire Knott meets criteria for a diagnosis of Schizoaffective disorder, bipolar type. Continue the medication regimen as prescribed  Disposition planning to continue. I certify that this patients inpatient psychiatric hospital services furnished since the previous certification were, and continue to be, required for treatment that could reasonably be expected to improve the patient's condition, or for diagnostic study, and that the patient continues to need, on a daily basis, active treatment furnished directly by or requiring the supervision of inpatient psychiatric facility personnel. In addition, the hospital records show that services furnished were intensive treatment services, admission or related services, or equivalent services.

## 2020-08-01 NOTE — PROGRESS NOTES
Problem: Falls - Risk of  Goal: *Absence of Falls  Description: Document Kasey Robinhood Fall Risk and appropriate interventions in the flowsheet. Outcome: Progressing Towards Goal  Note: Fall Risk Interventions:  Mobility Interventions: Assess mobility with egress test         Medication Interventions: Assess postural VS orthostatic hypotension    Elimination Interventions:  Toilet paper/wipes in reach       Will continue with q 15 minute safety checks  Pt engaged on unit

## 2020-08-01 NOTE — BH NOTES
PRN Medication Documentation    Specific patient behavior that led to need for PRN medication: Patient came to nurses' station complaining of lower back pain, stating it is mild but \"I want to keep it from getting worse. \" States her thinking feels more clear and has no other complaints at this time. Staff interventions attempted prior to PRN being given: Reassurance. PRN medication given: 650 mg acetaminophen PO.   Patient response/effectiveness of PRN medication: Patient returned to room to rest.

## 2020-08-01 NOTE — INTERDISCIPLINARY ROUNDS
Behavioral Health Interdisciplinary Rounds     Patient Name: Elisabeth Perry  Age: 63 y.o. Room/Bed:  724/  Primary Diagnosis: <principal problem not specified>   Admission Status: Voluntary     Readmission within 30 days: no  Power of  in place: no  Patient requires a blocked bed: yes          Reason for blocked bed: paranoid    VTE Prophylaxis: No    Mobility needs/Fall risk: no  Flu Vaccine :  Nutritional Plan: no  Consults:          Labs/Testing due today?: no    Sleep hours:  5      Participation in Care/Groups:  yes  Medication Compliant?: Yes  PRNS (last 24 hours): None    Restraints (last 24 hours):  no     CIWA (range last 24 hours):     COWS (range last 24 hours):      Alcohol screening (AUDIT) completed -   AUDIT Score: 0     If applicable, date SBIRT discussed in treatment team AND documented:   AUDIT Screen Score: AUDIT Score: 0      Document Brief Intervention (corresponds directly with the 5 A's, Ask, Advise, Assess, Assist, and Arrange): At- Risk Patients (Score 7-15 for women; 8-15 for men)  Discuss concern patient is drinking at unhealthy levels known to increase risk of alcohol-related health problems. Is Patient ready to commit to change? If No:   Encourage reflection   Discuss short term and long term health risks of consuming alcohol   Barriers to change   Reaffirm willingness to help / Educational materials provided  If Yes:   Set goal  [employer] provided    Harmful use or Dependence (Score 16 or greater)   Discuss short term and long term health risks of consuming alcohol   Recommendations   Negotiate drinking goal   Recommend addiction specialist/center   Arrange follow-up appointments.     Tobacco - patient is a smoker: Have You Used Tobacco in the Past 30 Days: No  Illegal Drugs use: Have You Used Any Illegal Substances Over the Past 12 Months: No    24 hour chart check complete: yes     Patient goal(s) for today:   Treatment team focus/goals:   Progress note     LOS:  4  Expected LOS:     Financial concerns/prescription coverage:    Family contact:      Family requesting physician contact today:    Discharge plan:   Access to weapons :        Outpatient provider(s):  Patient's preferred phone number for follow up call :    Participating treatment team members: Levy Riddle, * (assigned SW),

## 2020-08-01 NOTE — PROGRESS NOTES
Problem: Falls - Risk of  Goal: *Absence of Falls  Description: Document Aidan Host Fall Risk and appropriate interventions in the flowsheet. Outcome: Progressing Towards Goal  Note: Fall Risk Interventions:  Mobility Interventions: Assess mobility with egress test         Medication Interventions: Patient to call before getting OOB    Elimination Interventions: Toilet paper/wipes in reach       Resting in bed with eyes closed, no complaints, no distress noted. Safety measures in place, will continue to monitor.       Blocked Bed Documentation:     Room number: 730  Type: Behavior  Rationale: Poor Sleep Pattern  Anticipated duration: TBD  Additional comments:

## 2020-08-01 NOTE — PROGRESS NOTES
Problem: Lorena/Hypomania  Goal: *STG: Be less agitated and distracted  Description: Be less agitated and distracted, e.g. be able to sit quietly and calmly for 5 minutes  Outcome: Progressing Towards Goal  Goal: *STG: Pay attention to dressing and grooming appropriately  Outcome: Progressing Towards Goal     Patient is calm and able to be redirected in am. Requests clothing to be laundered. Patient showered. Isolative to room. Sits quietly. Denies SI/HI. Staff focus on positive coping skills, redirection as needed. Patient goal is to shower.

## 2020-08-01 NOTE — BH NOTES
PRN Medication Documentation    Specific patient behavior that led to need for PRN medication:sl anxietyStaff interventions attempted prior to PRN being given:coping skills  PRN medication given: atarax  Patient response/effectiveness of PRN medication:pat aware

## 2020-08-02 LAB
DATE LAST DOSE: NORMAL
LITHIUM SERPL-SCNC: 0.79 MMOL/L (ref 0.6–1.2)
REPORTED DOSE,DOSE: NORMAL UNITS
REPORTED DOSE/TIME,TMG: NORMAL

## 2020-08-02 PROCEDURE — 65220000001 HC RM PRIVATE PSYCH

## 2020-08-02 PROCEDURE — 74011250637 HC RX REV CODE- 250/637: Performed by: PSYCHIATRY & NEUROLOGY

## 2020-08-02 PROCEDURE — 80178 ASSAY OF LITHIUM: CPT

## 2020-08-02 PROCEDURE — 74011250637 HC RX REV CODE- 250/637: Performed by: NURSE PRACTITIONER

## 2020-08-02 PROCEDURE — 36415 COLL VENOUS BLD VENIPUNCTURE: CPT

## 2020-08-02 RX ADMIN — LITHIUM CARBONATE 300 MG: 300 CAPSULE ORAL at 08:02

## 2020-08-02 RX ADMIN — TRAZODONE HYDROCHLORIDE 100 MG: 100 TABLET ORAL at 22:00

## 2020-08-02 RX ADMIN — OLANZAPINE 20 MG: 5 TABLET, FILM COATED ORAL at 21:00

## 2020-08-02 RX ADMIN — HYDROXYZINE HYDROCHLORIDE 50 MG: 50 TABLET, FILM COATED ORAL at 16:03

## 2020-08-02 RX ADMIN — OLANZAPINE 5 MG: 5 TABLET, FILM COATED ORAL at 17:56

## 2020-08-02 RX ADMIN — LORATADINE 10 MG: 10 TABLET ORAL at 08:02

## 2020-08-02 RX ADMIN — LITHIUM CARBONATE 300 MG: 300 CAPSULE ORAL at 20:07

## 2020-08-02 NOTE — PROGRESS NOTES
Problem: Lorena/Hypomania  Goal: *LTG: Reduce agitation, impulsivity, and pressured speech  Description: Reduce agitation, impulsivity, and pressured speech while achieving sensitivity to the consequences of behavior and having more realistic expectations. Outcome: Progressing Towards Goal     Received pt in bed resting. Pt appears to be in no distress. Respirations even and unlabored. Continuing to monitor pt with q15 min safety rounds.

## 2020-08-02 NOTE — PROGRESS NOTES
Pt received up in bed, sitting and able to answer staff appropriately. Pt exhibits anxious edge, voices numerous questions, information and reassurance offered by staff. Respirations even and unlabored, NAD noted. Staff to continue q15 minute checks for safety.     Problem: Lorena/Hypomania  Goal: *STG: Be less agitated and distracted  Description: Be less agitated and distracted, e.g. be able to sit quietly and calmly for 20 minutes  Outcome: Progressing Towards Goal  Goal: *STG: Speak more slowly and be more subject-focused  Outcome: Progressing Towards Goal  Goal: *STG: Comply with psychotropic medications as prescribed  Outcome: Progressing Towards Goal

## 2020-08-02 NOTE — PROGRESS NOTES
Problem: Lorena/Hypomania  Goal: *LTG: Reduce psychic energy and return to normal activity levels, good judgement, stable mood, and goal-directed behavior  Outcome: Progressing Towards Goal  Goal: *LTG: Reduce agitation, impulsivity, and pressured speech  Description: Reduce agitation, impulsivity, and pressured speech while achieving sensitivity to the consequences of behavior and having more realistic expectations. Outcome: Progressing Towards Goal  Goal: *LTG: Achieve controlled behavior, moderated  mood, and more deliberative speech and thought processes  Description: Achieve controlled behavior, moderated  mood, and more deliberative speech and thought processes through psychotherapy and medication. Outcome: Progressing Towards Goal  Goal: *STG: Sleep about 5 hours or more per night  Outcome: Progressing Towards Goal  Goal: *STG: Be less agitated and distracted  Description: Be less agitated and distracted, e.g. be able to sit quietly and calmly for 10 minutes  Outcome: Progressing Towards Goal  Goal: *STG: Decrease impulsivity in action  Description: Decrease impulsivity in action, e.g. refrain from engaging in self-destructive behaviors such as over-spending, promiscuity, substance abuse, or use of profane language. Outcome: Progressing Towards Goal  Goal: *STG: Speak more slowly and be more subject-focused  Outcome: Progressing Towards Goal  Goal: *STG: Achieve mood stability  Description: Achieve mood stability, e.g. slower to react with anger and less expansive or elevated. Outcome: Progressing Towards Goal  Goal: *STG: Pay attention to dressing and grooming appropriately  Outcome: Progressing Towards Goal  Goal: *STG: Comply with psychotropic medications as prescribed  Outcome: Progressing Towards Goal  Pt demonstrates less thought blocking, better hygiene and more animation in her interactions with others. Affect is brighter. She is better able to follow directions and concentrate on tasks.   She complies with medications.

## 2020-08-02 NOTE — BH NOTES
PRN Medication Documentation    Specific patient behavior that led to need for PRN medication:c/o anxiety Staff interventions attempted prior to PRN being given coping skills  PRN medication given: atarax  Patient response/effectiveness of PRN medication:pat aware

## 2020-08-02 NOTE — BH NOTES
Chief Complaint:  I didn't sleep much last night. Length of Stay: 5 Days    Interval History:  Bola Celestin slept 5 hours last night. She states, \"I feel slower to process things today for some reason\". Patient has not been intrusive today and has improved. Denies SI/HI/AVH at this time but observed internally responding to herself. No aggression noted at this time. Not as hyperverbal today and patient is pleasant. Past Medical History:  Past Medical History:   Diagnosis Date    Anxiety and depression     Bipolar 1 disorder with moderate robyn ([facility] 3/17/2014    Chronic back pain     Hyperlipidemia 8/22/2016    Hyponatremia     Psychotic disorder ([facility]     Scoliosis            Labs:  Lab Results   Component Value Date/Time    WBC 4.1 07/27/2020 04:21 AM    HGB 10.4 (L) 07/27/2020 04:21 AM    Hematocrit (POC) 34 (L) 07/17/2018 03:33 AM    HCT 30.2 (L) 07/27/2020 04:21 AM    PLATELET 340 03/95/5440 04:21 AM    MCV 88.3 07/27/2020 04:21 AM      Lab Results   Component Value Date/Time    Sodium 138 07/30/2020 06:17 AM    Potassium 4.2 07/30/2020 06:17 AM    Chloride 106 07/30/2020 06:17 AM    CO2 24 07/30/2020 06:17 AM    Anion gap 8 07/30/2020 06:17 AM    Glucose 100 07/30/2020 06:17 AM    Glucose 107 (H) 02/03/2020 05:40 AM    BUN 7 07/30/2020 06:17 AM    Creatinine 0.72 07/30/2020 06:17 AM    BUN/Creatinine ratio 10 (L) 07/30/2020 06:17 AM    GFR est AA >60 07/30/2020 06:17 AM    GFR est non-AA >60 07/30/2020 06:17 AM    Calcium 9.5 07/30/2020 06:17 AM    Bilirubin, total 0.6 07/27/2020 04:21 AM    Alk.  phosphatase 77 07/27/2020 04:21 AM    Protein, total 7.3 07/27/2020 04:21 AM    Albumin 3.5 07/27/2020 04:21 AM    Globulin 3.8 07/27/2020 04:21 AM    A-G Ratio 0.9 (L) 07/27/2020 04:21 AM    ALT (SGPT) 25 07/27/2020 04:21 AM      Vitals:    08/01/20 1605 08/01/20 2024 08/02/20 0705 08/02/20 0755   BP: 126/85 140/85 93/65    Pulse: 97 99 100    Resp: 16 16 16    Temp: 99 °F (37.2 °C) 99.2 °F (37.3 °C) 98.6 °F (37 °C)    SpO2: 100%  99%    Weight:    69.7 kg (153 lb 9.6 oz)         Current Facility-Administered Medications   Medication Dose Route Frequency Provider Last Rate Last Dose    traZODone (DESYREL) tablet 100 mg  100 mg Oral QHS Mali Weiner MD   100 mg at 08/01/20 2201    loratadine (CLARITIN) tablet 10 mg  10 mg Oral DAILY Mali Weiner MD   10 mg at 08/02/20 0802    OLANZapine (ZyPREXA) tablet 20 mg  20 mg Oral QHS Mali Weiner MD   20 mg at 08/01/20 2107    lithium carbonate capsule 300 mg  300 mg Oral BID Mali Weiner MD   300 mg at 08/02/20 0802    OLANZapine (ZyPREXA) tablet 5 mg  5 mg Oral Q6H PRN Mani Range A, NP   5 mg at 07/28/20 1745    haloperidol lactate (HALDOL) injection 5 mg  5 mg IntraMUSCular Q6H PRN Stephanie Aquino, NP   5 mg at 07/29/20 0504    benztropine (COGENTIN) tablet 1 mg  1 mg Oral BID PRN Mani Range A, NP        diphenhydrAMINE (BENADRYL) injection 50 mg  50 mg IntraMUSCular BID PRN Stephanie Aquino, NP        hydrOXYzine HCL (ATARAX) tablet 50 mg  50 mg Oral TID PRN Stephanie Aquino, NP   50 mg at 08/01/20 1633    LORazepam (ATIVAN) injection 1 mg  1 mg IntraMUSCular Q4H PRN Stephanie Aquino, NP   1 mg at 07/29/20 0504    acetaminophen (TYLENOL) tablet 650 mg  650 mg Oral Q4H PRN Stephanie Aquino, NP   650 mg at 08/01/20 0547    magnesium hydroxide (MILK OF MAGNESIA) 400 mg/5 mL oral suspension 30 mL  30 mL Oral DAILY PRN Mani Range A, NP             Mental Status Exam:  Eye contact: good  Grooming: fair  Psychomotor activity: WNL  Speech is spontaneous, coherent, increased output. Mood is \"okay\"  Affect: blunted  Perception: Derogatory AH +  Suicidal ideation: Denies any SI or plan. Cognition is grossly intact       Physical Exam:  Body habitus: Body mass index is 29.02 kg/m².   Musculoskeletal system: normal gait  Tremor - neg  Cog wheeling - neg      Assessment and Plan:  [place] [employer] meets criteria for a diagnosis of Schizoaffective disorder, bipolar type. Continue the medication regimen as prescribed  Disposition planning to continue. I certify that this patients inpatient psychiatric hospital services furnished since the previous certification were, and continue to be, required for treatment that could reasonably be expected to improve the patient's condition, or for diagnostic study, and that the patient continues to need, on a daily basis, active treatment furnished directly by or requiring the supervision of inpatient psychiatric facility personnel. In addition, the hospital records show that services furnished were intensive treatment services, admission or related services, or equivalent services.

## 2020-08-02 NOTE — BH NOTES
PRN Medication Documentation    Specific patient behavior that led to need for PRN medication: c/o racing thoughts,anxiety  Staff interventions attempted prior to PRN being given: coping skills  PRN medication given: zyprexa Patient response/effectiveness of PRN medication:tl aware

## 2020-08-03 PROCEDURE — 65220000001 HC RM PRIVATE PSYCH

## 2020-08-03 PROCEDURE — 74011250637 HC RX REV CODE- 250/637: Performed by: PSYCHIATRY & NEUROLOGY

## 2020-08-03 PROCEDURE — 74011250637 HC RX REV CODE- 250/637: Performed by: NURSE PRACTITIONER

## 2020-08-03 RX ORDER — POLYETHYLENE GLYCOL 3350 17 G/17G
17 POWDER, FOR SOLUTION ORAL DAILY
Status: DISCONTINUED | OUTPATIENT
Start: 2020-08-03 | End: 2020-08-06 | Stop reason: HOSPADM

## 2020-08-03 RX ORDER — OLANZAPINE 5 MG/1
30 TABLET ORAL
Status: DISCONTINUED | OUTPATIENT
Start: 2020-08-03 | End: 2020-08-06 | Stop reason: HOSPADM

## 2020-08-03 RX ADMIN — TRAZODONE HYDROCHLORIDE 150 MG: 100 TABLET ORAL at 21:02

## 2020-08-03 RX ADMIN — LORATADINE 10 MG: 10 TABLET ORAL at 08:23

## 2020-08-03 RX ADMIN — LITHIUM CARBONATE 300 MG: 300 CAPSULE ORAL at 21:02

## 2020-08-03 RX ADMIN — ACETAMINOPHEN 650 MG: 325 TABLET ORAL at 06:22

## 2020-08-03 RX ADMIN — ACETAMINOPHEN 650 MG: 325 TABLET ORAL at 20:37

## 2020-08-03 RX ADMIN — LITHIUM CARBONATE 300 MG: 300 CAPSULE ORAL at 08:23

## 2020-08-03 RX ADMIN — POLYETHYLENE GLYCOL 3350 17 G: 17 POWDER, FOR SOLUTION ORAL at 13:00

## 2020-08-03 RX ADMIN — OLANZAPINE 30 MG: 5 TABLET, FILM COATED ORAL at 21:02

## 2020-08-03 NOTE — PROGRESS NOTES
Problem: Lorena/Hypomania  Goal: *STG: Be less agitated and distracted  Description: Be less agitated and distracted, e.g. be able to sit quietly and calmly for 20 minutes  Outcome: Progressing Towards Goal  Goal: *STG: Speak more slowly and be more subject-focused  Outcome: Progressing Towards Goal  Goal: *STG: Comply with psychotropic medications as prescribed  Outcome: Progressing Towards Goal

## 2020-08-03 NOTE — BH NOTES
Chief Complaint:  I slept poorly last night. Length of Stay: 6 Days    Interval History:  Lynne Pool reports feeling \"okay\". She slept poorly last night and estimates that she slept 5 hours last night. Says she feels tired today and tends to isolate to her room. Encouraged to attend groups and she agrees to do so. Her paranoid delusions persist and states that \"people are trying to keep me here\". Thinks she is being closely watched. No adverse events are noted from her medications. Calm and cooperative during the interview. Lithium level was 0.79 yesterday. No signs or symptoms of toxicity are noted. Past Medical History:  Past Medical History:   Diagnosis Date    Anxiety and depression     Bipolar 1 disorder with moderate robyn ([facility] [address] 3/17/2014    Chronic back pain     Hyperlipidemia 8/22/2016    Hyponatremia     Psychotic disorder ([facility]     Scoliosis            Labs:  Lab Results   Component Value Date/Time    WBC 4.1 07/27/2020 04:21 AM    HGB 10.4 (L) 07/27/2020 04:21 AM    Hematocrit (POC) 34 (L) 07/17/2018 03:33 AM    HCT 30.2 (L) 07/27/2020 04:21 AM    PLATELET 811 86/79/8171 04:21 AM    MCV 88.3 07/27/2020 04:21 AM      Lab Results   Component Value Date/Time    Sodium 138 07/30/2020 06:17 AM    Potassium 4.2 07/30/2020 06:17 AM    Chloride 106 07/30/2020 06:17 AM    CO2 24 07/30/2020 06:17 AM    Anion gap 8 07/30/2020 06:17 AM    Glucose 100 07/30/2020 06:17 AM    Glucose 107 (H) 02/03/2020 05:40 AM    BUN 7 07/30/2020 06:17 AM    Creatinine 0.72 07/30/2020 06:17 AM    BUN/Creatinine ratio 10 (L) 07/30/2020 06:17 AM    GFR est AA >60 07/30/2020 06:17 AM    GFR est non-AA >60 07/30/2020 06:17 AM    Calcium 9.5 07/30/2020 06:17 AM    Bilirubin, total 0.6 07/27/2020 04:21 AM    Alk.  phosphatase 77 07/27/2020 04:21 AM    Protein, total 7.3 07/27/2020 04:21 AM    Albumin 3.5 07/27/2020 04:21 AM    Globulin 3.8 07/27/2020 04:21 AM    A-G Ratio 0.9 (L) 07/27/2020 04:21 AM    ALT (SGPT) 25 07/27/2020 04:21 AM      Vitals:    08/02/20 0755 08/02/20 1520 08/02/20 1936 08/03/20 0735   BP:  129/82 130/77 138/80   Pulse:  (!) 105 (!) 102 94   Resp:  16 16 16   Temp:  98.8 °F (37.1 °C) 98.8 °F (37.1 °C) 98.9 °F (37.2 °C)   SpO2:  98% 97% 100%   Weight: 69.7 kg (153 lb 9.6 oz)            Current Facility-Administered Medications   Medication Dose Route Frequency Provider Last Rate Last Dose    OLANZapine (ZyPREXA) tablet 30 mg  30 mg Oral QHS Joanne Modi MD        traZODone (DESYREL) tablet 100 mg  100 mg Oral QHS Pankaj HERNANDEZ MD   100 mg at 08/02/20 2200    loratadine (CLARITIN) tablet 10 mg  10 mg Oral DAILY Joanne Modi MD   10 mg at 08/03/20 6987    lithium carbonate capsule 300 mg  300 mg Oral BID Joanne Modi MD   300 mg at 08/03/20 0823    OLANZapine (ZyPREXA) tablet 5 mg  5 mg Oral Q6H PRN Criselda WILEY NP   5 mg at 08/02/20 1756    haloperidol lactate (HALDOL) injection 5 mg  5 mg IntraMUSCular Q6H PRN Stephanie Aquino NP   5 mg at 07/29/20 0504    benztropine (COGENTIN) tablet 1 mg  1 mg Oral BID PRN Criselda WILEY NP        diphenhydrAMINE (BENADRYL) injection 50 mg  50 mg IntraMUSCular BID PRN Stephanie Aquino NP        hydrOXYzine HCL (ATARAX) tablet 50 mg  50 mg Oral TID PRN Stephanie Aquino NP   50 mg at 08/02/20 1603    LORazepam (ATIVAN) injection 1 mg  1 mg IntraMUSCular Q4H PRN Stephanie Aquino NP   1 mg at 07/29/20 0504    acetaminophen (TYLENOL) tablet 650 mg  650 mg Oral Q4H PRN Stephanie Aquino NP   650 mg at 08/03/20 0622    magnesium hydroxide (MILK OF MAGNESIA) 400 mg/5 mL oral suspension 30 mL  30 mL Oral DAILY PRN Criselda Gentleman A, NP             Mental Status Exam:  Eye contact: good  Grooming: fair  Psychomotor activity: WNL  Speech is spontaneous, coherent, increased output. Mood is \"okay\"  Affect: blunted  Perception: Derogatory AH +  Suicidal ideation: Denies any SI or plan.   Cognition is grossly intact       Physical Exam:  Body habitus: Body mass index is 29.02 kg/m². Musculoskeletal system: normal gait  Tremor - neg  Cog wheeling - neg      Assessment and Plan:  Melanie Rodriguez meets criteria for a diagnosis of Schizoaffective disorder, bipolar type. Increased dosage of Zyprexa to 30mg at bedtime  Increased Trazodone to 150mg at bedtime. Disposition planning to continue. I certify that this patients inpatient psychiatric hospital services furnished since the previous certification were, and continue to be, required for treatment that could reasonably be expected to improve the patient's condition, or for diagnostic study, and that the patient continues to need, on a daily basis, active treatment furnished directly by or requiring the supervision of inpatient psychiatric facility personnel. In addition, the hospital records show that services furnished were intensive treatment services, admission or related services, or equivalent services.

## 2020-08-03 NOTE — PROGRESS NOTES
Problem: Lorena/Hypomania  Goal: *STG: Achieve mood stability  Description: Achieve mood stability, e.g. slower to react with anger and less expansive or elevated. Outcome: Progressing Towards Goal     Received pt in bed resting. Pt appears to be in no distress. Respirations even and unlabored. Continuing to monitor pt with q15 min safety rounds.

## 2020-08-03 NOTE — PROGRESS NOTES
Problem: Lorena/Hypomania  Goal: *STG: Sleep about 5 hours or more per night  Outcome: Not Progressing Towards Goal  Note: Pt. Slept  3 hours last night      Problem: Lorena/Hypomania  Goal: *STG: Decrease impulsivity in action  Description: Decrease impulsivity in action, e.g. refrain from engaging in self-destructive behaviors such as over-spending, promiscuity, substance abuse, or use of profane language. Outcome: Progressing Towards Goal     Problem: Lorena/Hypomania  Goal: *STG: Achieve mood stability  Description: Achieve mood stability, e.g. slower to react with anger and less expansive or elevated. Outcome: Progressing Towards Goal     Problem: Lorena/Hypomania  Goal: *STG: Comply with psychotropic medications as prescribed  Outcome: Progressing Towards Goal  Note: Medication compliant   lithium level . 79  on 8/2  reviewed in treatment team  Pt, required atarax and zyprexa on 3-11     Problem: Lorena/Hypomania  Goal: Lorena/Hypomania Interventions  Outcome: Not Progressing Towards Goal  Note: Will continue to monitor on 15 min.  Checks for safety  assess lorena   medication compliance effectiveness  encourage groups     Problem: Patient Education: Go to Patient Education Activity  Goal: Patient/Family Education  Outcome: Progressing Towards Goal

## 2020-08-04 PROCEDURE — 74011250637 HC RX REV CODE- 250/637: Performed by: NURSE PRACTITIONER

## 2020-08-04 PROCEDURE — 74011250637 HC RX REV CODE- 250/637: Performed by: PSYCHIATRY & NEUROLOGY

## 2020-08-04 PROCEDURE — 65220000001 HC RM PRIVATE PSYCH

## 2020-08-04 RX ADMIN — LORATADINE 10 MG: 10 TABLET ORAL at 08:21

## 2020-08-04 RX ADMIN — POLYETHYLENE GLYCOL 3350 17 G: 17 POWDER, FOR SOLUTION ORAL at 08:21

## 2020-08-04 RX ADMIN — ACETAMINOPHEN 650 MG: 325 TABLET ORAL at 15:48

## 2020-08-04 RX ADMIN — TRAZODONE HYDROCHLORIDE 150 MG: 100 TABLET ORAL at 20:45

## 2020-08-04 RX ADMIN — OLANZAPINE 30 MG: 5 TABLET, FILM COATED ORAL at 20:45

## 2020-08-04 RX ADMIN — LITHIUM CARBONATE 300 MG: 300 CAPSULE ORAL at 20:45

## 2020-08-04 RX ADMIN — LITHIUM CARBONATE 300 MG: 300 CAPSULE ORAL at 08:21

## 2020-08-04 RX ADMIN — HYDROXYZINE HYDROCHLORIDE 50 MG: 50 TABLET, FILM COATED ORAL at 15:48

## 2020-08-04 NOTE — PROGRESS NOTES
Problem: Lorena/Hypomania  Goal: *STG: Be less agitated and distracted  Description: Be less agitated and distracted, e.g. be able to sit quietly and calmly for 30 minutes  Outcome: Progressing Towards Goal     Received pt in bed resting. Pt appears to be in no distress. Respirations even and unlabored. Continuing to monitor pt with q15 min safety rounds.

## 2020-08-04 NOTE — INTERDISCIPLINARY ROUNDS
Behavioral Health Interdisciplinary Rounds     Patient Name: Maximiliano Lewis  Age: 63 y.o. Room/Bed:  724/  Primary Diagnosis: <principal problem not specified>   Admission Status: Voluntary     Readmission within 30 days: no  Power of  in place: no  Patient requires a blocked bed: yes          Reason for blocked bed: behavior    VTE Prophylaxis: No    Mobility needs/Fall risk: no  Flu Vaccine :  Nutritional Plan: no  Consults:          Labs/Testing due today?: no    Sleep hours: 8       Participation in Care/Groups:  yes  Medication Compliant?: Yes  PRNS (last 24 hours): Pain    Restraints (last 24 hours):  no     CIWA (range last 24 hours):     COWS (range last 24 hours):      Alcohol screening (AUDIT) completed -   AUDIT Score: 0     If applicable, date SBIRT discussed in treatment team AND documented:   AUDIT Screen Score: AUDIT Score: 0  Tobacco - patient is a smoker: Have You Used Tobacco in the Past 30 Days: No  Illegal Drugs use: Have You Used Any Illegal Substances Over the Past 12 Months: No    24 hour chart check complete: yes     Patient goal(s) for today:   Treatment team focus/goals: Plan for discharge on Thursday. Plan for lab work tonight.       Progress note :She remains pleasant and compliant with her treatment,      LOS:  6  Expected LOS: tomorrow     Financial concerns/prescription coverage:  Medicare   Family contact:   Angy silence - [phone]     Family requesting physician contact today:    Discharge plan: she will return home when ready for discharge  Access to weapons :  No       Outpatient provider(s):[facility]   Patient's preferred phone number for follow up call :    Participating treatment team members: TORIE Owens Dr., NP

## 2020-08-04 NOTE — PROGRESS NOTES
Problem: Lorena/Hypomania  Goal: *STG: Be less agitated and distracted  Description: Be less agitated and distracted, e.g. be able to sit quietly and calmly for 60 minutes  Outcome: Progressing Towards Goal  Patient spent > 1 hour in dayroom interacting with staff and peers, reading paper and watching TV. Goal: *STG: Speak more slowly and be more subject-focused  Outcome: Progressing Towards Goal  Patient able to stay focused during conversations with staff. Goal: *STG: Comply with psychotropic medications as prescribed  Outcome: Progressing Towards Goal       Patient med compliant with all medications. Problem: Patient Education: Go to Patient Education Activity  Goal: Patient/Family Education  Outcome: Progressing Towards Goal    Problem: Lorena/Hypomania  Goal: *STG: Sleep about 5 hours or more per night  Outcome: Resolved/Met   Patient slept 8 hours last night      Patient with increased anxiety at beginning of shift. Patient educated on coping methods. Patient then came out to dayroom to be around others. Patient given atarax 50mg for anxiety.

## 2020-08-04 NOTE — PROGRESS NOTES
Problem: Discharge Planning  Goal: *Discharge to safe environment  Outcome: Progressing Towards Goal  Note: Patient will return home when ready for discharge   She has established outpatient treatment   Goal: *Knowledge of medication management  Outcome: Progressing Towards Goal  Note: She is compliant with her medications   Goal: *Knowledge of discharge instructions  Outcome: Progressing Towards Goal  Note: She is able to verbalize discharge instructions

## 2020-08-04 NOTE — PROGRESS NOTES
Problem: Lorena/Hypomania  Goal: *STG: Be less agitated and distracted  Description: Be less agitated and distracted, e.g. be able to sit quietly and calmly for 10 minutes  Outcome: Progressing Towards Goal  Note: Patient sitting in milieu most of the evening, coloring with peers and interacting appropriately with peers and staff. Goal: *STG: Decrease impulsivity in action  Description: Decrease impulsivity in action, e.g. refrain from engaging in self-destructive behaviors such as over-spending, promiscuity, substance abuse, or use of profane language. Outcome: Progressing Towards Goal  Note: Actively seeking staff when feelings of anxiety arise, and using appropriate coping skills related to anxiety versus turning to PRN medications. Goal: *STG: Speak more slowly and be more subject-focused  Outcome: Progressing Towards Goal  Note: Appropriately interacting with peers and able to efficiently communication with peers and staff in a coherent matter. Loose associate and disorganized thought process seems to be decreased compared to when admitted.

## 2020-08-04 NOTE — MASTER TREATMENT PLAN
[facility] 60  Master Treatment Plan for Dat [place]que Treatment Plan Initiated: 8/4/20    Treatment Plan Modalities:  Type of Modality Amount  (x minutes) Frequency (x/week) Duration (x days) Name of Responsible Staff   [facility] meetings to encourage peer interactions 15 7 1 Ziggy TAYLOR     Group psychotherapy to assist in building coping skills and internal controls 60 7 1 Reji Renee   Therapeutic activity groups to build coping skills 60 7 1 Reji Renee   Psychoeducation in group setting to address:   Medication education   15 7 1 59 Renate Maxwell skills         Relaxation techniques         Symptom management         Discharge planning   [address]    60 2 1 Chaplain FANG   61 1 1 volunteer   Recovery/AA/NA      volunteer   Physician medication management   15 7 1 Dr. Valroie Haynes   15 2 [facility] and [facility]

## 2020-08-04 NOTE — BH NOTES
Chief Complaint:  I slept poorly last night. Length of Stay: 7 Days    Interval History:  Hema Peer reports feeling \"okay\". Says she was able to sleep well last night and her mood has been \"decent\". Still feels like she is being watched and finds that \"hard to shake off\". Denies any AH or VH. Pleasant and calm during the interview. Mild tremor since morning and is present on exam. Denies any other adverse events from her medications. No SI or plan is reported. Also notes poor concentration and struggles to read a magazine or book. Calm and cooperative with peers and interactive with the nursing staff. Past Medical History:  Past Medical History:   Diagnosis Date    Anxiety and depression     Bipolar 1 disorder with moderate robyn ([facility] [address] 3/17/2014    Chronic back pain     Hyperlipidemia 8/22/2016    Hyponatremia     Psychotic disorder ([facility]     Scoliosis            Labs:  Lab Results   Component Value Date/Time    WBC 4.1 07/27/2020 04:21 AM    HGB 10.4 (L) 07/27/2020 04:21 AM    Hematocrit (POC) 34 (L) 07/17/2018 03:33 AM    HCT 30.2 (L) 07/27/2020 04:21 AM    PLATELET 630 22/70/0957 04:21 AM    MCV 88.3 07/27/2020 04:21 AM      Lab Results   Component Value Date/Time    Sodium 138 07/30/2020 06:17 AM    Potassium 4.2 07/30/2020 06:17 AM    Chloride 106 07/30/2020 06:17 AM    CO2 24 07/30/2020 06:17 AM    Anion gap 8 07/30/2020 06:17 AM    Glucose 100 07/30/2020 06:17 AM    Glucose 107 (H) 02/03/2020 05:40 AM    BUN 7 07/30/2020 06:17 AM    Creatinine 0.72 07/30/2020 06:17 AM    BUN/Creatinine ratio 10 (L) 07/30/2020 06:17 AM    GFR est AA >60 07/30/2020 06:17 AM    GFR est non-AA >60 07/30/2020 06:17 AM    Calcium 9.5 07/30/2020 06:17 AM    Bilirubin, total 0.6 07/27/2020 04:21 AM    Alk.  phosphatase 77 07/27/2020 04:21 AM    Protein, total 7.3 07/27/2020 04:21 AM    Albumin 3.5 07/27/2020 04:21 AM    Globulin 3.8 07/27/2020 04:21 AM    A-G Ratio 0.9 (L) 07/27/2020 04:21 AM    ALT (SGPT) 25 07/27/2020 04:21 AM      Vitals:    08/03/20 0735 08/03/20 1522 08/03/20 1908 08/04/20 0829   BP: 138/80 121/83 131/82 115/75   Pulse: 94 96 94 (!) 107   Resp: 16 16 18 16   Temp: 98.9 °F (37.2 °C) 98.3 °F (36.8 °C) 99.2 °F (37.3 °C)    SpO2: 100% 100% 99% 99%   Weight:             Current Facility-Administered Medications   Medication Dose Route Frequency Provider Last Rate Last Dose    OLANZapine (ZyPREXA) tablet 30 mg  30 mg Oral QHS Hugh Hawk MD   30 mg at 08/03/20 2102    polyethylene glycol (MIRALAX) packet 17 g  17 g Oral DAILY Hugh Hawk MD   17 g at 08/04/20 2347    traZODone (DESYREL) tablet 150 mg  150 mg Oral QHS Hugh Hawk MD   150 mg at 08/03/20 2102    loratadine (CLARITIN) tablet 10 mg  10 mg Oral DAILY Hugh Hawk MD   10 mg at 08/04/20 3197    lithium carbonate capsule 300 mg  300 mg Oral BID Hugh Hawk MD   300 mg at 08/04/20 7614    OLANZapine (ZyPREXA) tablet 5 mg  5 mg Oral Q6H PRN Kirby Octavio A, NP   5 mg at 08/02/20 1756    haloperidol lactate (HALDOL) injection 5 mg  5 mg IntraMUSCular Q6H PRN Kenia, Stephanie A, NP   5 mg at 07/29/20 0504    benztropine (COGENTIN) tablet 1 mg  1 mg Oral BID PRN Kirby Octavio A, NP        diphenhydrAMINE (BENADRYL) injection 50 mg  50 mg IntraMUSCular BID PRN Kenia, Stephanie A, NP        hydrOXYzine HCL (ATARAX) tablet 50 mg  50 mg Oral TID PRN Kenia, Stephanie A, NP   50 mg at 08/02/20 1603    LORazepam (ATIVAN) injection 1 mg  1 mg IntraMUSCular Q4H PRN Kenia, Stephanie A, NP   1 mg at 07/29/20 0504    acetaminophen (TYLENOL) tablet 650 mg  650 mg Oral Q4H PRN Alannah Aquinoanie A, NP   650 mg at 08/03/20 2037    magnesium hydroxide (MILK OF MAGNESIA) 400 mg/5 mL oral suspension 30 mL  30 mL Oral DAILY PRN Kirby Octavio A, NP             Mental Status Exam:  Eye contact: good  Grooming: fair  Psychomotor activity: WNL  Speech is spontaneous, coherent, increased output.    Mood is \"okay\"  Affect: blunted  Perception: Denies currently   Suicidal ideation: Denies any SI or plan. Delusions of reference and persecution +  Cognition is grossly intact       Physical Exam:  Body habitus: Body mass index is 29.02 kg/m². Musculoskeletal system: normal gait  Tremor - neg  Cog wheeling - neg      Assessment and Plan:  Sapphire Knott meets criteria for a diagnosis of Schizoaffective disorder, bipolar type. Disposition planning to continue. I certify that this patients inpatient psychiatric hospital services furnished since the previous certification were, and continue to be, required for treatment that could reasonably be expected to improve the patient's condition, or for diagnostic study, and that the patient continues to need, on a daily basis, active treatment furnished directly by or requiring the supervision of inpatient psychiatric facility personnel. In addition, the hospital records show that services furnished were intensive treatment services, admission or related services, or equivalent services.

## 2020-08-05 PROCEDURE — 74011250637 HC RX REV CODE- 250/637: Performed by: PSYCHIATRY & NEUROLOGY

## 2020-08-05 PROCEDURE — 65220000001 HC RM PRIVATE PSYCH

## 2020-08-05 RX ADMIN — LITHIUM CARBONATE 300 MG: 300 CAPSULE ORAL at 10:02

## 2020-08-05 RX ADMIN — OLANZAPINE 30 MG: 5 TABLET, FILM COATED ORAL at 21:00

## 2020-08-05 RX ADMIN — LORATADINE 10 MG: 10 TABLET ORAL at 08:11

## 2020-08-05 RX ADMIN — TRAZODONE HYDROCHLORIDE 150 MG: 100 TABLET ORAL at 21:01

## 2020-08-05 RX ADMIN — POLYETHYLENE GLYCOL 3350 17 G: 17 POWDER, FOR SOLUTION ORAL at 08:11

## 2020-08-05 RX ADMIN — LITHIUM CARBONATE 300 MG: 300 CAPSULE ORAL at 21:01

## 2020-08-05 NOTE — INTERDISCIPLINARY ROUNDS
Behavioral Health Interdisciplinary Rounds     Patient Name: Radha Morel  Age: 63 y.o. Room/Bed:  724/  Primary Diagnosis: <principal problem not specified>   Admission Status: Voluntary     Readmission within 30 days: no  Power of  in place: no  Patient requires a blocked bed: yes          Reason for blocked bed: behavior  Sleep hours:  7      Participation in Care/Groups:  yes  Medication Compliant?: Yes  PRNS (last 24 hours):  Antianxiety and Pain    Restraints (last 24 hours):  no     Alcohol screening (AUDIT) completed -  AUDIT Score: 0  If applicable, date SBIRT discussed in treatment team AND documented:    Tobacco - patient is a smoker: Have You Used Tobacco in the Past 30 Days: No  Illegal Drugs use: Have You Used Any Illegal Substances Over the Past 12 Months: No    24 hour chart check complete: yes     Patient goal(s) for today:   Treatment team focus/goals:   Progress note   Family Contact information:   Patient's preferred phone number for follow up call :     LOS:  8  Expected LOS:     Participating treatment team members: Radha Morel, * (assigned SW),

## 2020-08-05 NOTE — PROGRESS NOTES
Problem: Falls - Risk of  Goal: *Absence of Falls  Description: Document Mariela Barker Fall Risk and appropriate interventions in the flowsheet. Outcome: Progressing Towards Goal  Note: Fall Risk Interventions:  Mobility Interventions: Assess mobility with egress test         Medication Interventions: Teach patient to arise slowly    Elimination Interventions: Toilet paper/wipes in reach       Resting in bed with eyes closed, no complaints, no distress noted. Safety measures in place, will continue to monitor.

## 2020-08-05 NOTE — PROGRESS NOTES
Patient educated on fall precautions while taking sedative medications. Will continue to educate and monitor. Problem: Lorena/Hypomania  Goal: *STG: Comply with psychotropic medications as prescribed  Outcome: Progressing Towards Goal     Problem: Falls - Risk of  Goal: *Absence of Falls  Description: Document Clara Fall Risk and appropriate interventions in the flowsheet. Outcome: Progressing Towards Goal  Note: Fall Risk Interventions:  Mobility Interventions: Assess mobility with egress test         Medication Interventions: Teach patient to arise slowly, Evaluate medications/consider consulting pharmacy    Elimination Interventions:  Toilet paper/wipes in reach

## 2020-08-05 NOTE — BH NOTES
Chief Complaint:  I think I am better. Length of Stay: 8 Days    Interval History:  Faisal Pereira continues to slowly improve. Says her mood is better and she feels like she is slowly starting to return back to her usual state of health. Slept 7 hours last night and says she felt rested on waking up. Calm and pleasant during the interview. Still has brief periods of paranoid ideation but these are getting less frequent. Expressing a desire to be released home tomorrow which appears to be a reasonable goal. No adverse events are noted from her medications. Past Medical History:  Past Medical History:   Diagnosis Date    Anxiety and depression     Bipolar 1 disorder with moderate robyn ([facility] 3/17/2014    Chronic back pain     Hyperlipidemia 8/22/2016    Hyponatremia     Psychotic disorder ([facility]     Scoliosis            Labs:  Lab Results   Component Value Date/Time    WBC 4.1 07/27/2020 04:21 AM    HGB 10.4 (L) 07/27/2020 04:21 AM    Hematocrit (POC) 34 (L) 07/17/2018 03:33 AM    HCT 30.2 (L) 07/27/2020 04:21 AM    PLATELET 029 17/57/2586 04:21 AM    MCV 88.3 07/27/2020 04:21 AM      Lab Results   Component Value Date/Time    Sodium 138 07/30/2020 06:17 AM    Potassium 4.2 07/30/2020 06:17 AM    Chloride 106 07/30/2020 06:17 AM    CO2 24 07/30/2020 06:17 AM    Anion gap 8 07/30/2020 06:17 AM    Glucose 100 07/30/2020 06:17 AM    Glucose 107 (H) 02/03/2020 05:40 AM    BUN 7 07/30/2020 06:17 AM    Creatinine 0.72 07/30/2020 06:17 AM    BUN/Creatinine ratio 10 (L) 07/30/2020 06:17 AM    GFR est AA >60 07/30/2020 06:17 AM    GFR est non-AA >60 07/30/2020 06:17 AM    Calcium 9.5 07/30/2020 06:17 AM    Bilirubin, total 0.6 07/27/2020 04:21 AM    Alk.  phosphatase 77 07/27/2020 04:21 AM    Protein, total 7.3 07/27/2020 04:21 AM    Albumin 3.5 07/27/2020 04:21 AM    Globulin 3.8 07/27/2020 04:21 AM    A-G Ratio 0.9 (L) 07/27/2020 04:21 AM    ALT (SGPT) 25 07/27/2020 04:21 AM      Vitals:    08/03/20 1908 08/04/20 9066 08/04/20 1507 08/05/20 0745   BP: 131/82 115/75 136/76 128/80   Pulse: 94 (!) 107 (!) 106 95   Resp: 18 16 16 16   Temp: 99.2 °F (37.3 °C) 98.5 °F (36.9 °C) 98.6 °F (37 °C) 98 °F (36.7 °C)   SpO2: 99% 99% 98% 99%   Weight:             Current Facility-Administered Medications   Medication Dose Route Frequency Provider Last Rate Last Dose    OLANZapine (ZyPREXA) tablet 30 mg  30 mg Oral QHS Malena Lazo MD   30 mg at 08/04/20 2045    polyethylene glycol (MIRALAX) packet 17 g  17 g Oral DAILY Malena Lazo MD   17 g at 08/05/20 0331    traZODone (DESYREL) tablet 150 mg  150 mg Oral QHS Malena Lazo MD   150 mg at 08/04/20 2045    loratadine (CLARITIN) tablet 10 mg  10 mg Oral DAILY Malena Lazo MD   10 mg at 08/05/20 2218    lithium carbonate capsule 300 mg  300 mg Oral BID Malena Lazo MD   300 mg at 08/05/20 1002    OLANZapine (ZyPREXA) tablet 5 mg  5 mg Oral Q6H PRN Saroj WILEY, NP   5 mg at 08/02/20 1756    haloperidol lactate (HALDOL) injection 5 mg  5 mg IntraMUSCular Q6H PRN Stephanie Aquino, NP   5 mg at 07/29/20 0504    benztropine (COGENTIN) tablet 1 mg  1 mg Oral BID PRN Saroj WILEY, NP        diphenhydrAMINE (BENADRYL) injection 50 mg  50 mg IntraMUSCular BID PRN Stephanie Aquino, NP        hydrOXYzine HCL (ATARAX) tablet 50 mg  50 mg Oral TID PRN Stephanie Aquino A, NP   50 mg at 08/04/20 1548    LORazepam (ATIVAN) injection 1 mg  1 mg IntraMUSCular Q4H PRN Stephanie Aquino A, NP   1 mg at 07/29/20 0504    acetaminophen (TYLENOL) tablet 650 mg  650 mg Oral Q4H PRN Stephanie Aquino, NP   650 mg at 08/04/20 1548    magnesium hydroxide (MILK OF MAGNESIA) 400 mg/5 mL oral suspension 30 mL  30 mL Oral DAILY PRN Saroj WILEY NP             Mental Status Exam:  Eye contact: good  Grooming: fair  Psychomotor activity: WNL  Speech is spontaneous, coherent, increased output.    Mood is \"okay\"  Affect: blunted  Perception: Denies currently   Suicidal ideation: Denies any SI or plan. Cognition is grossly intact       Physical Exam:  Body habitus: Body mass index is 29.02 kg/m². Musculoskeletal system: normal gait  Tremor - mild tremor in upper limbs. Cog wheeling - neg      Assessment and Plan:  Mita Amos meets criteria for a diagnosis of Schizoaffective disorder, bipolar type. Lithium level in AM.   Disposition planning to continue and plan for discharge home tomorrow. .   I certify that this patients inpatient psychiatric hospital services furnished since the previous certification were, and continue to be, required for treatment that could reasonably be expected to improve the patient's condition, or for diagnostic study, and that the patient continues to need, on a daily basis, active treatment furnished directly by or requiring the supervision of inpatient psychiatric facility personnel. In addition, the hospital records show that services furnished were intensive treatment services, admission or related services, or equivalent services.

## 2020-08-05 NOTE — PROGRESS NOTES
Problem: Lorena/Hypomania  Goal: *STG: Be less agitated and distracted  Description: Be less agitated and distracted, e.g. be able to sit quietly and calmly for 60 minutes  Outcome: Progressing Towards Goal  Patient seen in dayroom interacting with peers and staff  Goal: *STG: Speak more slowly and be more subject-focused  Outcome: Progressing Towards Goal  Patient able to speak about goals upon discharge  Goal: *STG: Achieve mood stability  Description: Achieve mood stability, e.g. slower to react with anger and less expansive or elevated. Outcome: Progressing Towards Goal  Patient has achieved stable mood.      Problem: Patient Education: Go to Patient Education Activity  Goal: Patient/Family Education  Outcome: Progressing Towards Goal

## 2020-08-06 VITALS
RESPIRATION RATE: 16 BRPM | SYSTOLIC BLOOD PRESSURE: 122 MMHG | DIASTOLIC BLOOD PRESSURE: 75 MMHG | WEIGHT: 153.6 LBS | TEMPERATURE: 99 F | BODY MASS INDEX: 29.02 KG/M2 | OXYGEN SATURATION: 98 % | HEART RATE: 98 BPM

## 2020-08-06 LAB
DATE LAST DOSE: NORMAL
LITHIUM SERPL-SCNC: 0.75 MMOL/L (ref 0.6–1.2)
REPORTED DOSE,DOSE: NORMAL UNITS
REPORTED DOSE/TIME,TMG: NORMAL

## 2020-08-06 PROCEDURE — 36415 COLL VENOUS BLD VENIPUNCTURE: CPT

## 2020-08-06 PROCEDURE — 74011250637 HC RX REV CODE- 250/637: Performed by: NURSE PRACTITIONER

## 2020-08-06 PROCEDURE — 80178 ASSAY OF LITHIUM: CPT

## 2020-08-06 PROCEDURE — 74011250637 HC RX REV CODE- 250/637: Performed by: PSYCHIATRY & NEUROLOGY

## 2020-08-06 RX ORDER — TRAZODONE HYDROCHLORIDE 150 MG/1
150 TABLET ORAL
Qty: 30 TAB | Refills: 0 | Status: ON HOLD | OUTPATIENT
Start: 2020-08-06 | End: 2022-05-12 | Stop reason: SDUPTHER

## 2020-08-06 RX ORDER — LITHIUM CARBONATE 300 MG/1
300 CAPSULE ORAL 2 TIMES DAILY
Qty: 60 CAP | Refills: 0 | Status: ON HOLD | OUTPATIENT
Start: 2020-08-06 | End: 2022-05-08 | Stop reason: SDUPTHER

## 2020-08-06 RX ORDER — HYDROXYZINE 50 MG/1
50 TABLET, FILM COATED ORAL
Qty: 60 TAB | Refills: 0 | Status: ON HOLD | OUTPATIENT
Start: 2020-08-06 | End: 2022-05-09

## 2020-08-06 RX ORDER — OLANZAPINE 15 MG/1
30 TABLET ORAL
Qty: 60 TAB | Refills: 0 | Status: ON HOLD | OUTPATIENT
Start: 2020-08-06 | End: 2022-05-09

## 2020-08-06 RX ADMIN — LORATADINE 10 MG: 10 TABLET ORAL at 08:45

## 2020-08-06 RX ADMIN — POLYETHYLENE GLYCOL 3350 17 G: 17 POWDER, FOR SOLUTION ORAL at 08:45

## 2020-08-06 RX ADMIN — HYDROXYZINE HYDROCHLORIDE 50 MG: 50 TABLET, FILM COATED ORAL at 08:45

## 2020-08-06 RX ADMIN — LITHIUM CARBONATE 300 MG: 300 CAPSULE ORAL at 08:45

## 2020-08-06 NOTE — INTERDISCIPLINARY ROUNDS
Behavioral Health Interdisciplinary Rounds     Patient Name: Sarah Mora  Age: 63 y.o. Room/Bed:  724/  Primary Diagnosis: <principal problem not specified>   Admission Status: Voluntary     Readmission within 30 days: no  Power of  in place: no  Patient requires a blocked bed: yes          Reason for blocked bed: behavior  Sleep hours: 8       Participation in Care/Groups:  yes  Medication Compliant?: Yes  PRNS (last 24 hours): None    Restraints (last 24 hours):  no     Alcohol screening (AUDIT) completed -  AUDIT Score: 0  If applicable, date SBIRT discussed in treatment team AND documented:    Tobacco - patient is a smoker: Have You Used Tobacco in the Past 30 Days: No  Illegal Drugs use: Have You Used Any Illegal Substances Over the Past 12 Months: No    24 hour chart check complete: yes     Patient goal(s) for today:   Treatment team focus/goals: Plan for discharge today. Progress note : Plan for discharge today.    Family Contact information:    Patient's preferred phone number for follow up call : [phone]    LOS:  9  Expected LOS: today     Participating treatment team members: TORIE Moffett Dr.

## 2020-08-06 NOTE — DISCHARGE INSTRUCTIONS
DISCHARGE SUMMARY    Marti Mehta  : 1965  MRN: [id number]    The patient Mita Amos exhibits the ability to control behavior in a less restrictive environment. Patient's level of functioning is improving. No assaultive/destructive behavior has been observed for the past 24 hours. No suicidal/homicidal threat or behavior has been observed for the past 24 hours. There is no evidence of serious medication side effects. Patient has not been in physical or protective restraints for at least the past 24 hours. If weapons involved, how are they secured? NA    Is patient aware of and in agreement with discharge plan? Yes    Arrangements for medication:  Prescriptions given to patient    Copy of discharge instructions to provider?:  [address] [place] - fax to [phone] [facility] [address] [facility]:   to     Keep all follow up appointments as scheduled, continue to take prescribed medications per physician instructions. Mental health crisis number:  244 or your local mental health crisis line number at [phone]. DISCHARGE SUMMARY from Nurse    PATIENT INSTRUCTIONS:    What to do at Home:  Recommended activity: Activity as tolerated,     If you experience any of the following symptoms feelings of helplessness, hopelessness, thoughts of hurting yourself or someone else, please follow up with 911 or your local mental health crisis line number at [phone]. *  Please give a list of your current medications to your Primary Care Provider. *  Please update this list whenever your medications are discontinued, doses are      changed, or new medications (including over-the-counter products) are added. *  Please carry medication information at all times in case of emergency situations.     These are general instructions for a healthy lifestyle:    No smoking/ No tobacco products/ Avoid exposure to second hand smoke  Surgeon Donald Green Warning:  Quitting smoking now greatly reduces serious risk to your health. Obesity, smoking, and sedentary lifestyle greatly increases your risk for illness    A healthy diet, regular physical exercise & weight monitoring are important for maintaining a healthy lifestyle    You may be retaining fluid if you have a history of heart failure or if you experience any of the following symptoms:  Weight gain of 3 pounds or more overnight or 5 pounds in a week, increased swelling in our hands or feet or shortness of breath while lying flat in bed. Please call your doctor as soon as you notice any of these symptoms; do not wait until your next office visit. The discharge information has been reviewed with the patient. The patient verbalized understanding. Discharge medications reviewed with the patient and appropriate educational materials and side effects teaching were provided.   ___________________________________________________________________________________________________________________________________

## 2020-08-06 NOTE — BH NOTES
Behavioral Health Transition Record to Provider    Patient Name: Melanie Rodriguez  YOB: 1965  Medical Record Number: [id number]  Date of Admission: 7/28/2020  Date of Discharge: 8/6/2020    Attending Provider: No att. providers found  Discharging Provider: Dr. Gregg Saavedra   To contact this individual call [phone] and ask the  to page. If unavailable, ask to be transferred to [address] [place] [address] Provider on call. [facility] Provider will be available on call 24/7 and during holidays. Primary Care Provider: Michelle Leach MD    Allergies   Allergen Reactions    Other Food Hives     Artifical sweetners    Claritin-D 12 Hour [Loratadine-Pseudoephedrine] Palpitations     palpatations and ringing in the ear    Other Medication Anaphylaxis     Artificial sweeteners cause anaphylaxis    Pcn [Penicillins] Anaphylaxis    Sunscreen Contact Dermatitis     Burns and opens the skin    Ultram [Tramadol] Hives and Other (comments)     Hives and ringing of the ears    Benzoyl Peroxide Hives    Cephalexin Itching    Divalproex Itching    Maltodextrin-Glycerin Shortness of Breath       Reason for Admission: Ms. Winnie Lopez is transferred to us from [facility] for further management. She presented there on 07/26/2020 with her  who reported that the patient had been in a manic episode, and on evaluation in the ER, she was noted to have hyponatremia with her sodium at 125. She was admitted to the medical floor for this and her sodium levels appeared to have normalized since. She is somewhat of a poor historian. Expresses paranoia and thinks that she is in the hospital so that something harmful can be done to her. Accused this author and the pharmacist of trying to make things worse for her. According to , she had been sleeping poorly, making impulsive decisions and talking excessively. States that she has not slept well in many days.   Nursing staff report that she had to be given an IM medication after arrival on the unit to help her calm down. Denies regular or heavy use of alcohol and denies use of any recreational drugs. States that she has been compliant with taking her medications and could not recall any recent stressors. Most recently, she was seeing a psychiatrist at [address] [place] [address] as well as an individual therapist and says that she has been following up there regularly.   Denies any psychotic symptoms at the present time.       Admission Diagnosis: [facility] ([facility] [F31.9]    * No surgery found *    Results for orders placed or performed during the hospital encounter of 07/28/20   HEMOGLOBIN A1C WITH EAG   Result Value Ref Range    Hemoglobin A1c 5.7 (H) 4.0 - 5.6 %    Est. average glucose 117 mg/dL   LIPID PANEL   Result Value Ref Range    LIPID PROFILE          Cholesterol, total 191 <200 MG/DL    Triglyceride 46 <150 MG/DL    HDL Cholesterol 84 MG/DL    LDL, calculated 97.8 0 - 100 MG/DL    VLDL, calculated 9.2 MG/DL    CHOL/HDL Ratio 2.3 0.0 - 5.0     TSH 3RD GENERATION   Result Value Ref Range    TSH 1.91 0.36 - 5.69 uIU/mL   METABOLIC PANEL, BASIC   Result Value Ref Range    Sodium 138 136 - 145 mmol/L    Potassium 4.2 3.5 - 5.1 mmol/L    Chloride 106 97 - 108 mmol/L    CO2 24 21 - 32 mmol/L    Anion gap 8 5 - 15 mmol/L    Glucose 100 65 - 100 mg/dL    BUN 7 6 - 20 MG/DL    Creatinine 0.72 0.55 - 1.02 MG/DL    BUN/Creatinine ratio 10 (L) 12 - 20      GFR est AA >60 >60 ml/min/1.73m2    GFR est non-AA >60 >60 ml/min/1.73m2    Calcium 9.5 8.5 - 10.1 MG/DL   LITHIUM   Result Value Ref Range    Lithium level 0.79 0.60 - 1.20 MMOL/L    Reported dose date: NOT PROVIDED      Reported dose time: NOT PROVIDED      Reported dose: NOT PROVIDED UNITS   LITHIUM   Result Value Ref Range    Lithium level 0.75 0.60 - 1.20 MMOL/L    Reported dose date: NOT PROVIDED      Reported dose time: NOT PROVIDED      Reported dose: NOT PROVIDED UNITS Immunizations administered during this encounter:   Immunization History   Administered Date(s) Administered    Influenza Vaccine (Quad) PF 11/12/2015, 12/30/2019    TDAP Vaccine 07/06/2012    Zoster Recombinant 12/30/2019, 05/13/2020       Screening for Metabolic Disorders for Patients on Antipsychotic Medications  (Data obtained from the EMR)    Estimated Body Mass Index  Estimated body mass index is 29.02 kg/m² as calculated from the following:    Height as of 7/26/20: 5' 1\" (1.549 m). Weight as of this encounter: 69.7 kg (153 lb 9.6 oz). Vital Signs/Blood Pressure  Visit Vitals  /75   Pulse 98   Temp 99 °F (37.2 °C)   Resp 16   Wt 69.7 kg (153 lb 9.6 oz)   SpO2 98%   Breastfeeding No   BMI 29.02 kg/m²       Blood Glucose/Hemoglobin A1c  Lab Results   Component Value Date/Time    Glucose 100 07/30/2020 06:17 AM    Glucose 107 (H) 02/03/2020 05:40 AM    Glucose (POC) 136 (H) 01/10/2020 04:22 PM       Lab Results   Component Value Date/Time    Hemoglobin A1c 5.7 (H) 07/30/2020 06:17 AM        Lipid Panel  Lab Results   Component Value Date/Time    Cholesterol, total 191 07/30/2020 06:17 AM    HDL Cholesterol 84 07/30/2020 06:17 AM    LDL, calculated 97.8 07/30/2020 06:17 AM    Triglyceride 46 07/30/2020 06:17 AM    CHOL/HDL Ratio 2.3 07/30/2020 06:17 AM        Discharge Diagnosis: Schizoaffective disorder, bipolar type. Discharge Plan:She will return home with her        The patient Landy Gao exhibits the ability to control behavior in a less restrictive environment. Patient's level of functioning is improving. No assaultive/destructive behavior has been observed for the past 24 hours. No suicidal/homicidal threat or behavior has been observed for the past 24 hours. There is no evidence of serious medication side effects. Patient has not been in physical or protective restraints for at least the past 24 hours. If weapons involved, how are they secured?  NA    Is patient aware of and in agreement with discharge plan? Yes    Arrangements for medication:  Prescriptions given to patient    Copy of discharge instructions to provider?:  [address] [place] - fax to [phone] attention [address] for transportation home:   to     Keep all follow up appointments as scheduled, continue to take prescribed medications per physician instructions. Mental health crisis number:  570 or your local mental health crisis line number at [phone]. Discharge Medication List and Instructions:   Discharge Medication List as of 8/6/2020 12:59 PM      START taking these medications    Details   lithium carbonate 300 mg capsule Take 1 Cap by mouth two (2) times a day. Indications: robyn associated with bipolar disorder, Normal, Disp-60 Cap,R-0         CONTINUE these medications which have CHANGED    Details   OLANZapine (ZyPREXA) 15 mg tablet Take 2 Tabs by mouth nightly. Indications: robyn associated with bipolar disorder, Normal, Disp-60 Tab,R-0      traZODone (DESYREL) 150 mg tablet Take 1 Tab by mouth nightly. Indications: insomnia associated with depression, Normal, Disp-30 Tab,R-0      hydrOXYzine HCL (ATARAX) 50 mg tablet Take 1 Tab by mouth two (2) times daily as needed for Anxiety. Indications: anxious, Normal, Disp-60 Tab,R-0         CONTINUE these medications which have NOT CHANGED    Details   azelastine (ASTELIN) 137 mcg (0.1 %) nasal spray 1 [place] by Both Nostrils route as needed for Rhinitis. Prior to allergy injections, Historical Med      cetirizine (ZYRTEC) 10 mg tablet Take 10 mg by mouth every evening., Historical Med      cholecalciferol (VITAMIN D3) (50,000 UNITS /1250 MCG) capsule Take 50,000 Units by mouth every Monday., Historical Med      montelukast (SINGULAIR) 10 mg tablet Take 10 mg by mouth daily as needed. Prior to allergy shots, Historical Med      fexofenadine (ALLEGRA) 180 mg tablet Take 180 mg by mouth daily. , Historical Med         STOP taking these medications       benztropine (COGENTIN) 0.5 mg tablet Comments:   Reason for Stopping:         carBAMazepine (TEGRETOL) 200 mg tablet Comments:   Reason for Stopping:         OTHER,NON-FORMULARY, Comments:   Reason for Stopping:         ibuprofen (MOTRIN) 800 mg tablet Comments:   Reason for Stopping:         methocarbamol (ROBAXIN) 500 mg tablet Comments:   Reason for Stopping:               Unresulted Labs (24h ago, onward)    None        To obtain results of studies pending at discharge, please contact [phone]    Follow-up Information     Follow up With Specialties Details Why Contact Info    Tiny GREGORIO  On 8/10/2020 you have a 9:30 telepsych appointment with Tiny Braswell, [address] [facility] [facility]  On 8/7/2020 Heather Tobar will call you at 10:30 for phone check in  [address] MD Internal Medicine Schedule an appointment as soon as possible for a visit  [address] [place] [address]  [phone]            Advanced Directive:   Does the patient have an appointed surrogate decision maker? No  Does the patient have a Medical Advance Directive? No  Does the patient have a Psychiatric Advance Directive? No  If the patient does not have a surrogate or Medical Advance Directive AND Psychiatric Advance Directive, the patient was offered information on these advance directives Patient declined to complete    Patient Instructions: Please continue all medications until otherwise directed by physician. Tobacco Cessation Discharge Plan:   Is the patient a smoker and needs referral for smoking cessation? No  Patient referred to the following for smoking cessation with an appointment? No     Patient was offered medication to assist with smoking cessation at discharge? No  Was education for smoking cessation added to the discharge instructions?  Yes    Alcohol/Substance Abuse Discharge Plan:   Does the patient have a history of substance/alcohol abuse and requires a referral for treatment? No  Patient referred to the following for substance/alcohol abuse treatment with an appointment? No  Patient was offered medication to assist with alcohol cessation at discharge? NO  Was education for substance/alcohol abuse added to discharge instructions? No    Patient discharged to Home; discussed with patient/caregiver and provided to the patient/caregiver either in hard copy or electronically.

## 2020-08-06 NOTE — PROGRESS NOTES
Problem: Falls - Risk of  Goal: *Absence of Falls  Description: Document Inderjit Alexa Fall Risk and appropriate interventions in the flowsheet. Outcome: Progressing Towards Goal  Note: Fall Risk Interventions:  Mobility Interventions: Assess mobility with egress test         Medication Interventions: Teach patient to arise slowly    Elimination Interventions:  Toilet paper/wipes in reach

## 2020-08-06 NOTE — GROUP NOTE
DANIEL  GROUP DOCUMENTATION INDIVIDUAL Group Therapy Note Date: 8/6/2020 Group Start Time: 6359 Group End Time: 1100 Group Topic: Recreational/Music Therapy Geovanny Childress [facility] GROUP DOCUMENTATION GROUP Group Therapy Note Attendees: 5 Attendance: Attended Patient's Goal:  Relaxation Interventions/techniques: Supported Follows Directions: Followed directions Interactions: Interacted appropriately Mental Status: Calm Behavior/appearance: Withdrawn/quiet Goals Achieved: Able to engage in interactions Additional Notes:   
 
Jose Silva

## 2020-08-06 NOTE — PROGRESS NOTES
Problem: Lorena/Hypomania  Goal: *STG: Be less agitated and distracted  Description: Be less agitated and distracted, e.g. be able to sit quietly and calmly for 5 minutes  Outcome: Progressing Towards Goal  Goal: *STG: Decrease impulsivity in action  Description: Decrease impulsivity in action, e.g. refrain from engaging in self-destructive behaviors such as over-spending, promiscuity, substance abuse, or use of profane language. Outcome: Progressing Towards Goal  Goal: *STG: Achieve mood stability  Description: Achieve mood stability, e.g. slower to react with anger and less expansive or elevated.   Outcome: Not Progressing Towards Goal     Problem: Discharge Planning  Goal: *Discharge to safe environment  Outcome: Progressing Towards Goal  Plan is to discharge to her private residence  Goal: *Knowledge of medication management  Outcome: Progressing Towards Goal  Verbalized understanding of discharge instructions  Goal: *Knowledge of discharge instructions  Outcome: Progressing Towards Goal   After review verbalized understanding of discharge instructions

## 2020-08-07 ENCOUNTER — PATIENT OUTREACH (OUTPATIENT)
Dept: CASE MANAGEMENT | Age: 55
End: 2020-08-07

## 2020-08-07 NOTE — PROGRESS NOTES
Patient was admitted to [facility] on 2020 and discharged on 2020 for Bipolar disorder. Patient was contacted within 1 business days of discharge. Top Discharge Challenges to be reviewed by the provider   Additional needs identified to be addressed with provider no  medications- lithium carbonate  Discussed COVID-19 related testing which was not done at this time. Test results were not done. Patient informed of results, if available? n/a   Method of communication with provider : none       Advance Care Planning:   Does patient have an Advance Directive:  not on file     Inpatient Readmission Risk score:   Was this a readmission? no Patient transferred from [facility] 2020 to 2020  Patient stated reason for the admission: anxiety  Patients top risk factors for readmission: medical condition  Interventions to address risk factors: educated on attending PCP appointment and appointments with [facility] Mental health providers and     Care Transition Nurse (CTN) contacted the patient by telephone to perform post hospital discharge assessment. Verified name and  with patient as identifiers. Provided introduction to self, and explanation of the CTN role. CTN reviewed discharge instructions, medical action plan and red flags with patient who verbalized understanding. Patient given an opportunity to ask questions and does not have any further questions or concerns at this time. The patient agrees to contact the PCP office for questions related to their healthcare. Medication reconciliation was performed with patient, who verbalizes understanding of administration of home medications. Advised obtaining a 90-day supply of all daily and as-needed medications.    Referral to Pharm D needed: no     Home Health/Outpatient orders at discharge: [address]: n/a  Date of initial visit: [address] [facility] ordered at discharge: none  [address]: n/a  Durable Medical Equipment received: n/a    Covid Risk Education    Patient has following risk factors of: no known risk factors. Education provided regarding infection prevention, and signs and symptoms of COVID-19 and when to seek medical attention with patient who verbalized understanding. Discussed exposure protocols and quarantine From CDC: Are you at higher risk for severe illness?  and given an opportunity for questions and concerns. The patient agrees to contact the COVID-19 hotline [phone] or PCP office for questions related to COVID-19. For more information on steps you can take to protect yourself, see CDC's How to Protect Yourself     Patient/family/caregiver given information for [employer] [facility] and agrees to enroll no  Patient's preferred e-mail: declines  Patient's preferred phone number: declines    Discussed follow-up appointments. If no appointment was previously scheduled, appointment scheduling offered: [facility] follow up appointment(s): No future appointments. Non-[facility] follow up appointment(s): [facility] 8/7/2020 and 8/10/2020  Patient declines calls will monitor chart for readmission in 14 days. based on severity of symptoms and risk factors. CTN provided contact information for future needs. Goals Addressed                 This Visit's Progress     Attend follow up appointments on schedule        8/7/2020 Patient reports she has attended appointment with [place], with [address] [place], [place] [address]y today. She has appointment with [address] [place] [address] Pky provider on 8/10/2020. Debbie Cuevas She has contact PCP office to schedule follow up appointment. Patient will report PCP appointment details and changes inplan of care at next outreach.  DARREL

## 2020-08-12 ENCOUNTER — TELEPHONE (OUTPATIENT)
Dept: INTERNAL MEDICINE CLINIC | Age: 55
End: 2020-08-12

## 2020-08-12 NOTE — TELEPHONE ENCOUNTER
Spoke with patient. Two pt identifiers confirmed. Patient offered an appointment with Dr. Juan Jose Healy on 08/14/2020. Appointment accepted. Patient advised if anything changes or if unable to keep this appointment to call the office  Pt verbalized understanding of information discussed w/ no further questions at this time.

## 2020-08-12 NOTE — TELEPHONE ENCOUNTER
[facility]abel [facility] Office Pool               Pt is requesting a hospital f/up appointment for tomorrow. Pt went to [facility] ER on 7/28/20 for not sleeping and she was discharged on 8/6/20. Pts number is [phone].      Copy/Paste  ENVERA

## 2020-08-14 ENCOUNTER — VIRTUAL VISIT (OUTPATIENT)
Dept: INTERNAL MEDICINE CLINIC | Age: 55
End: 2020-08-14
Payer: MEDICARE

## 2020-08-14 DIAGNOSIS — F31.9 BIPOLAR 1 DISORDER (HCC): ICD-10-CM

## 2020-08-14 DIAGNOSIS — Z09 HOSPITAL DISCHARGE FOLLOW-UP: Primary | ICD-10-CM

## 2020-08-14 DIAGNOSIS — R73.03 PRE-DIABETES: ICD-10-CM

## 2020-08-14 PROCEDURE — G9899 SCRN MAM PERF RSLTS DOC: HCPCS | Performed by: INTERNAL MEDICINE

## 2020-08-14 PROCEDURE — G8417 CALC BMI ABV UP PARAM F/U: HCPCS | Performed by: INTERNAL MEDICINE

## 2020-08-14 PROCEDURE — 1111F DSCHRG MED/CURRENT MED MERGE: CPT | Performed by: INTERNAL MEDICINE

## 2020-08-14 PROCEDURE — G8427 DOCREV CUR MEDS BY ELIG CLIN: HCPCS | Performed by: INTERNAL MEDICINE

## 2020-08-14 PROCEDURE — 3017F COLORECTAL CA SCREEN DOC REV: CPT | Performed by: INTERNAL MEDICINE

## 2020-08-14 PROCEDURE — 99214 OFFICE O/P EST MOD 30 MIN: CPT | Performed by: INTERNAL MEDICINE

## 2020-08-14 PROCEDURE — G9717 DOC PT DX DEP/BP F/U NT REQ: HCPCS | Performed by: INTERNAL MEDICINE

## 2020-08-14 NOTE — PROGRESS NOTES
CC: Hospital Follow Up      HPI:    She is a 63 y.o. female who presents for evaluation of hospital follow up    Patient had manic episode admitted from 07/28/2020 - 08/06/2020  Sodium was 125  She did not sleep for several days   She is taking olanzapine 30mg at bedtime  Lithium 300mg morning and evening  Trazodone 150mg at bedtime  Atarax 50mg BID    No racing thoughts  Not hearing voices  Sleeping 7-8 hours at bedtime      Cooking and house cleaning      is doing ok and being supportive  Not driving due to panic attacks    Follows up with psychiatrist next Month - Chuckie Tavera         This is an established visit conducted via telemedicine with video. The patient has been instructed that this meets HIPAA criteria and acknowledges and agrees to this method of visitation. Pursuant to the emergency declaration under the [facility] waiver authority and the [employer] Act, this Virtual Visit was conducted, with patient's consent, to reduce the patient's risk of exposure to COVID-19 and provide continuity of care for an established patient. Services were provided through a video synchronous discussion virtually to substitute for in-person clinic visit. ROS:  Constitutional: negative for fevers, chills, anorexia and weight loss  10 systems reviewed and negative other than HPI     Past Medical History:   Diagnosis Date    Anxiety and depression     Bipolar 1 disorder with moderate robyn (HCC) 3/17/2014    Chronic back pain     Hyperlipidemia 8/22/2016    Hyponatremia     Psychotic disorder ([facility] [address]     Scoliosis        Current Outpatient Medications on File Prior to Visit   Medication Sig Dispense Refill    OLANZapine (ZyPREXA) 15 mg tablet Take 2 Tabs by mouth nightly.  Indications: robyn associated with bipolar disorder 60 Tab 0    lithium carbonate 300 mg capsule Take 1 Cap by mouth two (2) times a day. Indications: robyn associated with bipolar disorder 60 Cap 0    traZODone (DESYREL) 150 mg tablet Take 1 Tab by mouth nightly. Indications: insomnia associated with depression 30 Tab 0    hydrOXYzine HCL (ATARAX) 50 mg tablet Take 1 Tab by mouth two (2) times daily as needed for Anxiety. Indications: anxious 60 Tab 0    azelastine (ASTELIN) 137 mcg (0.1 %) nasal spray 1 [place] by Both Nostrils route as needed for Rhinitis. Prior to allergy injections      cetirizine (ZYRTEC) 10 mg tablet Take 10 mg by mouth every evening.  cholecalciferol (VITAMIN D3) (50,000 UNITS /1250 MCG) capsule Take 50,000 Units by mouth every Monday.  montelukast (SINGULAIR) 10 mg tablet Take 10 mg by mouth daily as needed. Prior to allergy shots      fexofenadine (ALLEGRA) 180 mg tablet Take 180 mg by mouth daily. No current facility-administered medications on file prior to visit.         Past Surgical History:   Procedure Laterality Date    HX HEENT      wisdom teeth extraction    HX LIPOMA RESECTION      right gluteal    FREDY BIOPSY BREAST STEREOTACTIC Left 2011    negative    [place] DENTAL SURGERY PROCEDURE      hx of dental surgery- wisdom teeth extracted       Family History   Problem Relation Age of Onset   [address] [facility] Arthritis-rheumatoid Mother    [address] [facility] Migraines Mother     Psychiatric Disorder Mother     Bipolar Disorder Mother     Lupus Mother     Alcohol abuse Father     Lung Disease Father     Heart Disease Father     Stroke Father     High Cholesterol Father     Psychiatric Disorder Sister     Alcohol abuse Sister     Bipolar Disorder Sister     Stroke Brother     Cancer Maternal Aunt     Breast Cancer Maternal Aunt         pt thniks she was under 48    Bipolar Disorder Sister      Reviewed and no changes     Social History     Socioeconomic History    Marital status:      Spouse name: Not on file    Number of children: Not on file    Years of education: Not on file   [facility] education level: Not on file   Occupational History    Not on file   Social Needs    Financial resource strain: Not on file    Food insecurity     Worry: Not on file     Inability: Not on file    Transportation needs     Medical: Not on file     Non-medical: Not on file   Tobacco Use    Smoking status: Never Smoker    Smokeless tobacco: Never Used   Substance and Sexual Activity    Alcohol use: Yes     Comment: occasional    Drug use: No    Sexual activity: Yes     Partners: Male   Lifestyle    Physical activity     Days per week: Not on file     Minutes per session: Not on file    Stress: Not on file   Relationships    Social connections     Talks on phone: Not on file     Gets together: Not on file     Attends Alevism service: Not on file     Active member of club or organization: Not on file     Attends meetings of clubs or organizations: Not on file     Relationship status: Not on file    Intimate partner violence     Fear of current or ex partner: Not on file     Emotionally abused: Not on file     Physically abused: Not on file     Forced sexual activity: Not on file   Other Topics Concern     Service Not Asked    Blood Transfusions Not Asked    Caffeine Concern Not Asked    Occupational Exposure Not Asked   Denette Ana Hazards Not Asked    Sleep Concern Not Asked    Stress Concern Not Asked    Weight Concern Not Asked    Special Diet Not Asked    Back Care Not Asked    Exercise Not Asked    Bike Helmet Not Asked   [address] Not Asked    Self-Exams Not Asked   Social History Narrative    , 0 children, not working at present. On disability for bipolar illness. There were no vitals taken for this visit.     Physical Examination:   Gen: well appearing female  HEENT: normal conjunctiva, no audible congestion, patient does not see oral erythema, has MMM  Neck: patient does not feel enlarged or tender LAD or masses  Resp: normal respiratory effort, no audible wheezing. CV: patient does not feel palpitations or heart irregularity  Abd: patient does not feel abdominal tenderness or mass, patient does not notice distension  Extrem: patient does not see swelling in ankles or joints.    Neuro: Alert and oriented, able to answer questions without difficulty, able to move all extremities and walk normally   Psych: coherent speech, not pressured        Lab Results   Component Value Date/Time    WBC 4.1 07/27/2020 04:21 AM    HGB 10.4 (L) 07/27/2020 04:21 AM    Hematocrit (POC) 34 (L) 07/17/2018 03:33 AM    HCT 30.2 (L) 07/27/2020 04:21 AM    PLATELET 478 27/07/1411 04:21 AM    MCV 88.3 07/27/2020 04:21 AM     Lab Results   Component Value Date/Time    Sodium 138 07/30/2020 06:17 AM    Potassium 4.2 07/30/2020 06:17 AM    Chloride 106 07/30/2020 06:17 AM    CO2 24 07/30/2020 06:17 AM    Anion gap 8 07/30/2020 06:17 AM    Glucose 100 07/30/2020 06:17 AM    Glucose 107 (H) 02/03/2020 05:40 AM    BUN 7 07/30/2020 06:17 AM    Creatinine 0.72 07/30/2020 06:17 AM    BUN/Creatinine ratio 10 (L) 07/30/2020 06:17 AM    GFR est AA >60 07/30/2020 06:17 AM    GFR est non-AA >60 07/30/2020 06:17 AM    Calcium 9.5 07/30/2020 06:17 AM     Lab Results   Component Value Date/Time    Cholesterol, total 191 07/30/2020 06:17 AM    HDL Cholesterol 84 07/30/2020 06:17 AM    LDL, calculated 97.8 07/30/2020 06:17 AM    VLDL, calculated 9.2 07/30/2020 06:17 AM    Triglyceride 46 07/30/2020 06:17 AM    CHOL/HDL Ratio 2.3 07/30/2020 06:17 AM     Lab Results   Component Value Date/Time    TSH 1.91 07/30/2020 06:17 AM     No results found for: PSA, PSA2, PSAR1, Ayush Purdue, PSAR3, [id number], [id number], PSALT  Lab Results   Component Value Date/Time    Hemoglobin A1c 5.7 (H) 07/30/2020 06:17 AM    Hemoglobin A1c (POC) 5.4 04/20/2015 10:00 AM     Lab Results   Component Value Date/Time    Vitamin D 25-Hydroxy 12.7 (L) 11/10/2011 09:25 AM    VITAMIN D, 25-HYDROXY 69.0 11/12/2015 03:52 PM       Lab Results   Component Value Date/Time    ALT (SGPT) 25 07/27/2020 04:21 AM    Alk. phosphatase 77 07/27/2020 04:21 AM    Bilirubin, total 0.6 07/27/2020 04:21 AM           Assessment/Plan:    1. Hospital discharge follow-up    2. Bipolar 1 disorder [facility])  Reviewed hospital course admitted for robyn. Her mood is now stable. Speech is coherent, non pressured. Sleeping at night. She has close follow up with her psychiatrist     3. Pre-diabetes  Mild counseled on healthy diet and exercise       She is up to date on mammogram and colonoscopy    Follow up in 5 months   Mone Hoffman MD    This is an established visit conducted via real time video and audio telemedicine. The patient has been instructed that this meets HIPAA criteria and acknowledges and agrees to this method of visitation.

## 2020-08-14 NOTE — PROGRESS NOTES
Reviewed record in preparation for visit and have obtained necessary documentation. Identified pt with two pt identifiers(name and ). Chief Complaint   Patient presents with   [facility] Follow Up       Health Maintenance Due   Topic Date Due    Hemoglobin A1C    1975    Flu Vaccine  2020       Ms. Donnie Rodriguez has a reminder for a \"due or due soon\" health maintenance. I have asked that she discuss this further with her primary care provider for follow-up on this health maintenance. Coordination of Care Questionnaire:  :     1) Have you been to an emergency room, urgent care clinic since your last visit? no   Hospitalized since your last visit? no             2) Have you seen or consulted any other health care providers outside of [address] [place] [address] since your last visit? no  (Include any pap smears or colon screenings in this section.)    3) In the event something were to happen to you and you were unable to speak on your behalf, do you have an Advance Directive/ Living Will in place stating your wishes? NO    Do you have an Advance Directive on file? no    4) Are you interested in receiving information on Advance Directives? NO    Patient is accompanied by self I have received verbal consent from Elisabeth Perry to discuss any/all medical information while they are present in the room.

## 2020-09-09 ENCOUNTER — TELEPHONE (OUTPATIENT)
Dept: INTERNAL MEDICINE CLINIC | Age: 55
End: 2020-09-09

## 2020-09-09 DIAGNOSIS — G25.0 ESSENTIAL TREMOR: Primary | ICD-10-CM

## 2020-09-09 NOTE — TELEPHONE ENCOUNTER
Lena Gong. [place] [address] Office Pool               General Message/Vendor Calls     Caller's first and last name: pt       Reason for call: referral to a neurologist       Callback required yes/no and why: yes       Best contact number(s): [phone]       Details to clarify the request: Pt sees a psychiatrist and at recent visit it was determined due to \"hand shakes\" he would like to have her referred to a neurologist.  Pt takes [place] and psychiatrist wants to ensure the hand shakes are from the medication and not family history. Details to clarify the request: Please call Pt with any questions and to confirm referral has been generated.          Reyna Gong      Copy/paste Noelle Del Valle

## 2020-09-09 NOTE — TELEPHONE ENCOUNTER
Referral to neuro placed  per [facility]OR[facility] by Dr. Alvin Parker. Patient provided with ph# to Dr. Rochelle Coulter office.

## 2020-09-14 ENCOUNTER — PATIENT MESSAGE (OUTPATIENT)
Dept: INTERNAL MEDICINE CLINIC | Age: 55
End: 2020-09-14

## 2020-10-19 ENCOUNTER — OFFICE VISIT (OUTPATIENT)
Dept: NEUROLOGY | Age: 55
End: 2020-10-19
Payer: MEDICARE

## 2020-10-19 VITALS
BODY MASS INDEX: 30.58 KG/M2 | WEIGHT: 162 LBS | DIASTOLIC BLOOD PRESSURE: 70 MMHG | OXYGEN SATURATION: 98 % | HEART RATE: 73 BPM | HEIGHT: 61 IN | SYSTOLIC BLOOD PRESSURE: 110 MMHG

## 2020-10-19 DIAGNOSIS — G25.1 MEDICATION-INDUCED POSTURAL TREMOR: Primary | ICD-10-CM

## 2020-10-19 PROCEDURE — G9899 SCRN MAM PERF RSLTS DOC: HCPCS | Performed by: PSYCHIATRY & NEUROLOGY

## 2020-10-19 PROCEDURE — G9717 DOC PT DX DEP/BP F/U NT REQ: HCPCS | Performed by: PSYCHIATRY & NEUROLOGY

## 2020-10-19 PROCEDURE — G8428 CUR MEDS NOT DOCUMENT: HCPCS | Performed by: PSYCHIATRY & NEUROLOGY

## 2020-10-19 PROCEDURE — 99204 OFFICE O/P NEW MOD 45 MIN: CPT | Performed by: PSYCHIATRY & NEUROLOGY

## 2020-10-19 PROCEDURE — 3017F COLORECTAL CA SCREEN DOC REV: CPT | Performed by: PSYCHIATRY & NEUROLOGY

## 2020-10-19 PROCEDURE — G8417 CALC BMI ABV UP PARAM F/U: HCPCS | Performed by: PSYCHIATRY & NEUROLOGY

## 2020-10-19 RX ORDER — METHOCARBAMOL 500 MG/1
500 TABLET, FILM COATED ORAL AS NEEDED
Status: ON HOLD | COMMUNITY
End: 2022-05-09

## 2020-10-19 NOTE — PROGRESS NOTES
Neurology Note    Chief Complaint   Patient presents with    New Patient     taking lithium for bipolar started having tremors started in Aug    Tremors       HPI/Subjective  Gomez Peterson is a 63 y.o. female who presented with tremors. She was hospitalized for bipolar disorder and started on Lithium in August 2020 and she started having tremors. She was taking lithium 300 mg po bid and now it is down to 300 mg po qhs and it has helped her with the tremors. The tremors are postural. Her hand writing has changed. Her cups shake when she is trying to drink water or coffee. + constipation, sense of smell is good. Current Outpatient Medications   Medication Sig    methocarbamoL (ROBAXIN) 500 mg tablet Take 500 mg by mouth as needed for Muscle Spasm(s).  OLANZapine (ZyPREXA) 15 mg tablet Take 2 Tabs by mouth nightly. Indications: robyn associated with bipolar disorder    lithium carbonate 300 mg capsule Take 1 Cap by mouth two (2) times a day. Indications: robyn associated with bipolar disorder (Patient taking differently: Take 300 mg by mouth daily. Indications: robyn associated with bipolar disorder)    traZODone (DESYREL) 150 mg tablet Take 1 Tab by mouth nightly. Indications: insomnia associated with depression    hydrOXYzine HCL (ATARAX) 50 mg tablet Take 1 Tab by mouth two (2) times daily as needed for Anxiety. Indications: anxious    azelastine (ASTELIN) 137 mcg (0.1 %) nasal spray 1 [place] by Both Nostrils route as needed for Rhinitis. Prior to allergy injections    cetirizine (ZYRTEC) 10 mg tablet Take 10 mg by mouth every evening.  cholecalciferol (VITAMIN D3) (50,000 UNITS /1250 MCG) capsule Take 50,000 Units by mouth every Monday.  montelukast (SINGULAIR) 10 mg tablet Take 10 mg by mouth daily as needed. Prior to allergy shots    fexofenadine (ALLEGRA) 180 mg tablet Take 180 mg by mouth daily. No current facility-administered medications for this visit.       Allergies   Allergen Reactions    Other Food Hives     Artifical sweetners    Claritin-D 12 Hour [Loratadine-Pseudoephedrine] Palpitations     palpatations and ringing in the ear    Other Medication Anaphylaxis     Artificial sweeteners cause anaphylaxis    Pcn [Penicillins] Anaphylaxis    Sunscreen Contact Dermatitis     Burns and opens the skin    Ultram [Tramadol] Hives and Other (comments)     Hives and ringing of the ears    Benzoyl Peroxide Hives    Cephalexin Itching    Divalproex Itching    Maltodextrin-Glycerin Shortness of Breath     Past Medical History:   Diagnosis Date    Anxiety and depression     Bipolar 1 disorder with moderate robyn ([address] 3/17/2014    Chronic back pain     Hyperlipidemia 8/22/2016    Hyponatremia     Psychotic disorder ([facility] [address]     Scoliosis      Past Surgical History:   Procedure Laterality Date    HX HEENT      wisdom teeth extraction    HX LIPOMA RESECTION      right gluteal    FREDY BIOPSY BREAST STEREOTACTIC Left 2011    negative    [place] DENTAL SURGERY PROCEDURE      hx of dental surgery- wisdom teeth extracted     Family History   Problem Relation Age of Onset   [facility] Arthritis-rheumatoid Mother     Migraines Mother     Psychiatric Disorder Mother     Bipolar Disorder Mother     Lupus Mother     Alcohol abuse Father     Lung Disease Father     Heart Disease Father     Stroke Father     High Cholesterol Father     Psychiatric Disorder Sister     Alcohol abuse Sister     Bipolar Disorder Sister     Stroke Brother     Cancer Maternal Aunt     Breast Cancer Maternal Aunt         pt thniks she was under 48    Bipolar Disorder Sister      Social History     Tobacco Use    Smoking status: Never Smoker    Smokeless tobacco: Never Used   Substance Use Topics    Alcohol use: Yes     Comment: occasional    Drug use: No       REVIEW OF SYSTEMS:   A ten system review of constitutional, cardiovascular, respiratory, musculoskeletal, endocrine, skin, SHEENT, genitourinary, psychiatric and neurologic systems was obtained and is unremarkable with the exception of tremors     EXAMINATION:   Visit Vitals  /70   Pulse 73   Ht 5' 1\" (1.549 m)   Wt 162 lb (73.5 kg)   SpO2 98%   BMI 30.61 kg/m²        General:   General appearance: Pt is in no acute distress   Distal pulses are preserved    Neurological Examination:   Mental Status: AAO x3. Speech is fluent. Follows commands, has normal fund of knowledge, attention, short term recall, comprehension and insight. Cranial Nerves: Visual fields are full. PERRL, Extraocular movements are full. Facial sensation intact. Facial movement intact. Hearing intact to conversation. Palate elevates symmetrically. Shoulder shrug symmetric. Tongue midline. Motor: Strength is 5/5 in all 4 ext. No atrophy. Tone: Normal    Sensation: Light touch - Normal    Reflexes: DTRs 2+ throughout. Coordination/Cerebellar: Intact to finger-nose-finger     Gait: Casual gait is normal.     Skin: No significant bruising or lacerations.     Laboratory review:   Results for orders placed or performed during the hospital encounter of 07/28/20   HEMOGLOBIN A1C WITH EAG   Result Value Ref Range    Hemoglobin A1c 5.7 (H) 4.0 - 5.6 %    Est. average glucose 117 mg/dL   LIPID PANEL   Result Value Ref Range    LIPID PROFILE          Cholesterol, total 191 <200 MG/DL    Triglyceride 46 <150 MG/DL    HDL Cholesterol 84 MG/DL    LDL, calculated 97.8 0 - 100 MG/DL    VLDL, calculated 9.2 MG/DL    CHOL/HDL Ratio 2.3 0.0 - 5.0     TSH 3RD GENERATION   Result Value Ref Range    TSH 1.91 0.36 - 5.61 uIU/mL   METABOLIC PANEL, BASIC   Result Value Ref Range    Sodium 138 136 - 145 mmol/L    Potassium 4.2 3.5 - 5.1 mmol/L    Chloride 106 97 - 108 mmol/L    CO2 24 21 - 32 mmol/L    Anion gap 8 5 - 15 mmol/L    Glucose 100 65 - 100 mg/dL    BUN 7 6 - 20 MG/DL    Creatinine 0.72 0.55 - 1.02 MG/DL    BUN/Creatinine ratio 10 (L) 12 - 20      GFR est AA >60 >60 ml/min/1.73m2 GFR est non-AA >60 >60 ml/min/1.73m2    Calcium 9.5 8.5 - 10.1 MG/DL   LITHIUM   Result Value Ref Range    Lithium level 0.79 0.60 - 1.20 MMOL/L    Reported dose date NOT PROVIDED      Reported dose time: NOT PROVIDED      Reported dose: NOT PROVIDED UNITS   LITHIUM   Result Value Ref Range    Lithium level 0.75 0.60 - 1.20 MMOL/L    Reported dose date NOT PROVIDED      Reported dose time: NOT PROVIDED      Reported dose: NOT PROVIDED UNITS       Imaging review:  None    Documentation review:  None    Assessment/Plan:   Ty Syed is a 63 y.o. female who presented with tremors. Her tremors started in August and she was started on lithium. She was taking lithium 300 mg p.o. twice daily and because of the tremors, her lithium was decreased to 300 mg p.o. nightly and her tremors have improved. On my examination I do not find any signs of Parkinson's disease. These are medication induced tremors. If possible, the patient can be taken off the lithium by the psychiatrist.    No further treatment at this time. Call with questions. No flowsheet data found. Primary care to address possible depression if PHQ-9 score is more than 9. ICD-10-CM ICD-9-CM    1. Medication-induced postural tremor  G25.1 333.1      E980.5       Thank you for allowing me to participate in the care of Ms. Carly Arguello. Please feel free to contact me if you have any questions. Electronically signed. Roberta Arreguin MD  Neurologist    CC: Pavan Rolon MD  Fax: [phone]    This note was created using voice recognition software. Despite editing, there may be syntax errors.
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
## 2020-10-27 ENCOUNTER — HOSPITAL ENCOUNTER (OUTPATIENT)
Dept: MAMMOGRAPHY | Age: 55
Discharge: HOME | End: 2020-10-27
Attending: INTERNAL MEDICINE
Payer: MEDICARE

## 2020-10-27 DIAGNOSIS — Z12.31 VISIT FOR SCREENING MAMMOGRAM: ICD-10-CM

## 2020-10-27 PROCEDURE — 77067 SCR MAMMO BI INCL CAD: CPT

## 2020-11-30 ENCOUNTER — HOSPITAL ENCOUNTER (OUTPATIENT)
Dept: LAB | Age: 55
Discharge: HOME | End: 2020-11-30
Payer: MEDICARE

## 2020-11-30 ENCOUNTER — VIRTUAL VISIT (OUTPATIENT)
Dept: INTERNAL MEDICINE CLINIC | Age: 55
End: 2020-11-30
Payer: MEDICARE

## 2020-11-30 ENCOUNTER — TELEPHONE (OUTPATIENT)
Dept: INTERNAL MEDICINE CLINIC | Age: 55
End: 2020-11-30

## 2020-11-30 DIAGNOSIS — F31.9 BIPOLAR 1 DISORDER (HCC): Primary | ICD-10-CM

## 2020-11-30 DIAGNOSIS — E87.1 HYPONATREMIA: ICD-10-CM

## 2020-11-30 DIAGNOSIS — R45.0 JITTERY FEELING: ICD-10-CM

## 2020-11-30 DIAGNOSIS — F30.9 MANIA (HCC): ICD-10-CM

## 2020-11-30 DIAGNOSIS — R73.01 IFG (IMPAIRED FASTING GLUCOSE): ICD-10-CM

## 2020-11-30 DIAGNOSIS — E78.49 OTHER HYPERLIPIDEMIA: ICD-10-CM

## 2020-11-30 PROCEDURE — 80178 ASSAY OF LITHIUM: CPT

## 2020-11-30 PROCEDURE — 83036 HEMOGLOBIN GLYCOSYLATED A1C: CPT

## 2020-11-30 PROCEDURE — 80053 COMPREHEN METABOLIC PANEL: CPT

## 2020-11-30 PROCEDURE — 84443 ASSAY THYROID STIM HORMONE: CPT

## 2020-11-30 PROCEDURE — G8427 DOCREV CUR MEDS BY ELIG CLIN: HCPCS | Performed by: INTERNAL MEDICINE

## 2020-11-30 PROCEDURE — 36415 COLL VENOUS BLD VENIPUNCTURE: CPT

## 2020-11-30 PROCEDURE — G9899 SCRN MAM PERF RSLTS DOC: HCPCS | Performed by: INTERNAL MEDICINE

## 2020-11-30 PROCEDURE — 99213 OFFICE O/P EST LOW 20 MIN: CPT | Performed by: INTERNAL MEDICINE

## 2020-11-30 PROCEDURE — 85027 COMPLETE CBC AUTOMATED: CPT

## 2020-11-30 PROCEDURE — G9717 DOC PT DX DEP/BP F/U NT REQ: HCPCS | Performed by: INTERNAL MEDICINE

## 2020-11-30 PROCEDURE — 3017F COLORECTAL CA SCREEN DOC REV: CPT | Performed by: INTERNAL MEDICINE

## 2020-11-30 PROCEDURE — G0463 HOSPITAL OUTPT CLINIC VISIT: HCPCS | Performed by: INTERNAL MEDICINE

## 2020-11-30 NOTE — TELEPHONE ENCOUNTER
Spoke with patient. Two pt identifiers confirmed. Patient advised that Dr. Mickey Lane can see her today for a virtual visit at 11:45am.  Appointment accepted. Patient advised if anything changes or if unable to keep this appointment to call the office  Pt verbalized understanding of information discussed w/ no further questions at this time.

## 2020-11-30 NOTE — PROGRESS NOTES
HISTORY OF PRESENT ILLNESS  Jamal Galarza is a 63 y.o. female. HPI     Pt normally follows with Dr Rich Small. Pt is here for acute care. This is an established visit completed with telemedicine was completed with video assist  the patient acknowledges and agrees to this method of visitation doxyme    She c/o increasing anxiety   She follows with psych for bipolar disorder  She called her today 11/30/20   She is on lithium 300 , zyprexa 15 mg, trazadone 150 mg, and hydroxyazine 50 mg  Discussed psych would address increasing anxiety   She will be f/u 12/8/20    She endorses new jitteriness, uneasiness, and lightheadedness x 3 weeks ago  Denies cp, sob, fever, chills   This was very bad this past weekend    Her psych would like basic labs to be checked     Bp is controlled, no home readings  She will start monitoring     Reviewed labs     She saw Dr. Rao Members (neuro) for tremors   Reviewed note 10/19/20: Jamal Galarza is a 63 y.o. female who presented with tremors. Her tremors started in August and she was started on lithium. She was taking lithium 300 mg p.o. twice daily and because of the tremors, her lithium was decreased to 300 mg p.o. nightly and her tremors have improved. On my examination I do not find any signs of Parkinson's disease. These are medication induced tremors. If possible, the patient can be taken off the lithium by the psychiatrist.  No further treatment at this time. Call with questions.     PREVENTATIVE:   Lipids: 7/20 98   A1c: 7/20 5.7      Patient Active Problem List    Diagnosis Date Noted    L[facility]na ([facility] 07/26/2020    Scoliosis     Anxiety and depression     Bipolar 1 disorder ([facility] 01/21/2020    Hyponatremia 01/10/2020    Chronic back pain 08/22/2016    Hyperlipidemia 08/22/2016    Bipolar 1 disorder with moderate lorena ([facility] 03/17/2014     Current Outpatient Medications   Medication Sig Dispense Refill    methocarbamoL (ROBAXIN) 500 mg tablet Take 500 mg by mouth as needed for Muscle Spasm(s).  OLANZapine (ZyPREXA) 15 mg tablet Take 2 Tabs by mouth nightly. Indications: robyn associated with bipolar disorder 60 Tab 0    lithium carbonate 300 mg capsule Take 1 Cap by mouth two (2) times a day. Indications: robyn associated with bipolar disorder (Patient taking differently: Take 300 mg by mouth daily. Indications: robyn associated with bipolar disorder) 60 Cap 0    traZODone (DESYREL) 150 mg tablet Take 1 Tab by mouth nightly. Indications: insomnia associated with depression 30 Tab 0    hydrOXYzine HCL (ATARAX) 50 mg tablet Take 1 Tab by mouth two (2) times daily as needed for Anxiety. Indications: anxious 60 Tab 0    azelastine (ASTELIN) 137 mcg (0.1 %) nasal spray 1 [place] by Both Nostrils route as needed for Rhinitis. Prior to allergy injections      cetirizine (ZYRTEC) 10 mg tablet Take 10 mg by mouth every evening.  cholecalciferol (VITAMIN D3) (50,000 UNITS /1250 MCG) capsule Take 50,000 Units by mouth every Monday.  montelukast (SINGULAIR) 10 mg tablet Take 10 mg by mouth daily as needed. Prior to allergy shots      fexofenadine (ALLEGRA) 180 mg tablet Take 180 mg by mouth daily.        Past Surgical History:   Procedure Laterality Date    HX HEENT      wisdom teeth extraction    HX LIPOMA RESECTION      right gluteal    FREDY BIOPSY BREAST STEREOTACTIC Left 2011    negative    [place] DENTAL SURGERY PROCEDURE      hx of dental surgery- wisdom teeth extracted      Lab Results   Component Value Date/Time    WBC 4.1 07/27/2020 04:21 AM    HGB 10.4 (L) 07/27/2020 04:21 AM    HCT 30.2 (L) 07/27/2020 04:21 AM    Hematocrit (POC) 34 (L) 07/17/2018 03:33 AM    PLATELET 085 37/19/4527 04:21 AM    MCV 88.3 07/27/2020 04:21 AM     Lab Results   Component Value Date/Time    Cholesterol, total 191 07/30/2020 06:17 AM    HDL Cholesterol 84 07/30/2020 06:17 AM    LDL, calculated 97.8 07/30/2020 06:17 AM    Triglyceride 46 07/30/2020 06:17 AM    CHOL/HDL Ratio 2.3 07/30/2020 06:17 AM     Lab Results   Component Value Date/Time    GFR est non-AA >60 07/30/2020 06:17 AM    GFRNA, POC >60 07/17/2018 03:33 AM    GFR est AA >60 07/30/2020 06:17 AM    GFRAA, POC >60 07/17/2018 03:33 AM    Creatinine 0.72 07/30/2020 06:17 AM    Creatinine (POC) 0.4 (L) 07/17/2018 03:33 AM    BUN 7 07/30/2020 06:17 AM    BUN (POC) <3 (L) 07/17/2018 03:33 AM    Sodium 138 07/30/2020 06:17 AM    Sodium (POC) 126 (L) 07/17/2018 03:33 AM    Potassium 4.2 07/30/2020 06:17 AM    Potassium (POC) 3.7 07/17/2018 03:33 AM    Chloride 106 07/30/2020 06:17 AM    Chloride (POC) 91 (L) 07/17/2018 03:33 AM    CO2 24 07/30/2020 06:17 AM    Magnesium 2.1 07/27/2020 04:21 AM    Phosphorus 3.0 01/22/2020 06:10 AM        Review of Systems   Constitutional: Negative for chills and fever. Respiratory: Negative for shortness of breath. Cardiovascular: Negative for chest pain. Psychiatric/Behavioral: The patient is nervous/anxious. Physical Exam  Constitutional:       General: She is not in acute distress. Appearance: Normal appearance. She is not ill-appearing, toxic-appearing or diaphoretic. HENT:      Head: Normocephalic and atraumatic. Eyes:      General:         Right eye: No discharge. Left eye: No discharge. Conjunctiva/sclera: Conjunctivae normal.   Pulmonary:      Effort: No respiratory distress. Neurological:      Mental Status: She is alert and oriented to person, place, and time. Mental status is at baseline. Gait: Gait normal.   Psychiatric:         Mood and Affect: Mood normal.         Behavior: Behavior normal.         ASSESSMENT and PLAN    ICD-10-CM ICD-9-CM    1.  Bipolar 1 disorder ([facility]         She is on lithium 300 , zyprexa 15 mg, trazadone 150 mg, and hydroxyazine 50 mg      She follows closely with psychiatry    She has been feeling more anxious and jittery of late    She reports contacting her psychiatrist    And they wanted basic labs checked first including a lithium level    We will check labs and she will follow up with psychiatry for further management of her anxiety   J17.7 870.7 METABOLIC PANEL, COMPREHENSIVE      CBC W/O DIFF      LITHIUM      TSH 3RD GENERATION      HEMOGLOBIN A1C WITH EAG   2. Lorena ([address]  R19.9 947.29 METABOLIC PANEL, COMPREHENSIVE   See above   CBC W/O DIFF      LITHIUM      TSH 3RD GENERATION      HEMOGLOBIN A1C WITH EAG   3. Jittery feeling  G99.2 839.23 METABOLIC PANEL, COMPREHENSIVE      CBC W/O DIFF   Checking basic labs    Of note she was also evaluated by neurology for tremor    Primarily seems related to her anxiety    If labs unrevealing she will follow-up with psychiatry for further management of her anxiety   LITHIUM      TSH 3RD GENERATION      HEMOGLOBIN A1C WITH EAG   4. IFG (impaired fasting glucose)  I92.19 685.99 METABOLIC PANEL, COMPREHENSIVE      CBC W/O DIFF   Check A1c   LITHIUM      TSH 3RD GENERATION      HEMOGLOBIN A1C WITH EAG   5. Other hyperlipidemia  Q38.03 770.8 METABOLIC PANEL, COMPREHENSIVE      CBC W/O DIFF   Diet controlled recent labs   LITHIUM      TSH 3RD GENERATION      HEMOGLOBIN A1C WITH EAG   6. Hyponatremia  H40.5 433.6 METABOLIC PANEL, COMPREHENSIVE      CBC W/O DIFF   Resolved on last labs we will repeat given her jittery symptoms   LITHIUM      TSH 3RD GENERATION      HEMOGLOBIN A1C WITH EAG           Scribed by Sarah Rincon of [address] [facility] [address], as dictated by Dr. Donnie Dee. Current diagnosis and concerns discussed with pt at length. Pt understands risks and benefits or current treatment plan and medications, and accepts the treatment and medication with any possible risks. Pt asks appropriate questions, which were answered. Pt was instructed to call with any concerns or problems. I have reviewed the note documented by the scribe. The services provided are my own.   The documentation is accurate     Bradford Humphrey, who was evaluated through a synchronous (real-time) audio-video encounter, and/or her healthcare decision maker, is aware that it is a billable service, with coverage as determined by her insurance carrier. She provided verbal consent to proceed: Yes, and patient identification was verified. It was conducted pursuant to the emergency declaration under the [address] [place] [address], [address] [place] [address] authority and the [employer] and [email or web address] General Act. A caregiver was present when appropriate. Ability to conduct physical exam was limited. I was at home. The patient was at home   .

## 2020-12-01 LAB
ALBUMIN SERPL-MCNC: 4.6 G/DL (ref 3.8–4.9)
ALBUMIN/GLOB SERPL: 1.6 {RATIO} (ref 1.2–2.2)
ALP SERPL-CCNC: 67 IU/L (ref 39–117)
ALT SERPL-CCNC: 10 IU/L (ref 0–32)
AST SERPL-CCNC: 15 IU/L (ref 0–40)
BILIRUB SERPL-MCNC: <0.2 MG/DL (ref 0–1.2)
BUN SERPL-MCNC: 11 MG/DL (ref 6–24)
BUN/CREAT SERPL: 13 (ref 9–23)
CALCIUM SERPL-MCNC: 9.7 MG/DL (ref 8.7–10.2)
CHLORIDE SERPL-SCNC: 103 MMOL/L (ref 96–106)
CO2 SERPL-SCNC: 26 MMOL/L (ref 20–29)
CREAT SERPL-MCNC: 0.87 MG/DL (ref 0.57–1)
ERYTHROCYTE [DISTWIDTH] IN BLOOD BY AUTOMATED COUNT: 11.7 % (ref 11.7–15.4)
EST. AVERAGE GLUCOSE BLD GHB EST-MCNC: 105 MG/DL
GLOBULIN SER CALC-MCNC: 2.8 G/DL (ref 1.5–4.5)
GLUCOSE SERPL-MCNC: 110 MG/DL (ref 65–99)
HBA1C MFR BLD: 5.3 % (ref 4.8–5.6)
HCT VFR BLD AUTO: 37.4 % (ref 34–46.6)
HGB BLD-MCNC: 12.2 G/DL (ref 11.1–15.9)
LITHIUM SERPL-SCNC: 0.4 MMOL/L (ref 0.6–1.2)
MCH RBC QN AUTO: 30 PG (ref 26.6–33)
MCHC RBC AUTO-ENTMCNC: 32.6 G/DL (ref 31.5–35.7)
MCV RBC AUTO: 92 FL (ref 79–97)
PLATELET # BLD AUTO: 252 X10E3/UL (ref 150–450)
POTASSIUM SERPL-SCNC: 4.1 MMOL/L (ref 3.5–5.2)
PROT SERPL-MCNC: 7.4 G/DL (ref 6–8.5)
RBC # BLD AUTO: 4.06 X10E6/UL (ref 3.77–5.28)
SODIUM SERPL-SCNC: 141 MMOL/L (ref 134–144)
TSH SERPL DL<=0.005 MIU/L-ACNC: 1.15 UIU/ML (ref 0.45–4.5)
WBC # BLD AUTO: 8 X10E3/UL (ref 3.4–10.8)

## 2021-09-13 ENCOUNTER — DOCUMENTATION ONLY (OUTPATIENT)
Dept: INTERNAL MEDICINE CLINIC | Age: 56
End: 2021-09-13

## 2021-09-13 NOTE — PROGRESS NOTES
Received Affidavit from The [address] [place] [address] on 9/9/21. Faxed request to [employer] on 9/13/21 and scanned in chart.

## 2021-10-04 ENCOUNTER — TRANSCRIBE ORDER (OUTPATIENT)
Dept: SCHEDULING | Age: 56
End: 2021-10-04

## 2021-10-04 DIAGNOSIS — Z12.31 VISIT FOR SCREENING MAMMOGRAM: Primary | ICD-10-CM

## 2021-11-02 NOTE — TELEPHONE ENCOUNTER
Called, spoke to pt. Two identifiers confirmed. Pt stated that she has been having foot pain that started with her left foot 2-3 weeks ago and has now progressed to both feet. Pt stated she is able to walk around OK but uncomfortable. Pt stated her pain is usually in the evening after walking around during the day. Offered pt the next available appointment with any physician. Pt took appointment for 11/5 with Dr. Karo Padilla. Offered pt phone numbers for [facility] to see if she could get in with them in the meantime. Pt's insurance does not require referrals. Pt to call the office if her symptoms worsen. alert and oriented x 3

## 2021-11-04 ENCOUNTER — HOSPITAL ENCOUNTER (OUTPATIENT)
Dept: MAMMOGRAPHY | Age: 56
Discharge: HOME | End: 2021-11-04
Attending: INTERNAL MEDICINE
Payer: MEDICARE

## 2021-11-04 DIAGNOSIS — Z12.31 VISIT FOR SCREENING MAMMOGRAM: ICD-10-CM

## 2021-11-04 PROCEDURE — 77063 BREAST TOMOSYNTHESIS BI: CPT

## 2021-11-05 NOTE — PROGRESS NOTES
IMPRESSION  BI-RADS 1: Negative. No mammographic evidence of malignancy.     RECOMMENDATIONS:  Next screening mammogram is recommended in one year.      The patient will be notified of these results.

## 2021-11-09 NOTE — BH NOTES
Blocked Bed Documentation:    Room number: 730-01  Type: Behavior  Rationale: Physical Aggression with increased paranoia  Anticipated duration: hospital duration or Tx team decision  Additional comments:escalates easily, non-cooperative with staff, increased paranoia No

## 2021-12-08 ENCOUNTER — DOCUMENTATION ONLY (OUTPATIENT)
Dept: INTERNAL MEDICINE CLINIC | Age: 56
End: 2021-12-08

## 2021-12-28 ENCOUNTER — TELEPHONE (OUTPATIENT)
Dept: INTERNAL MEDICINE CLINIC | Age: 56
End: 2021-12-28

## 2021-12-28 NOTE — TELEPHONE ENCOUNTER
Called, spoke with Pt. Two pt identifiers confirmed. Pt informed Dr. Paola Portillo wasn't in the office and will need go to [facility] for treatment. Pt verbalized understanding of information discussed w/ no further questions at this time.

## 2021-12-28 NOTE — TELEPHONE ENCOUNTER
Per [facility] patient states she needs to get an appt for UTI Symptoms to include Blood In Urine. Please call to discuss & advise.  Thank you

## 2022-01-20 ENCOUNTER — OFFICE VISIT (OUTPATIENT)
Dept: INTERNAL MEDICINE CLINIC | Age: 57
End: 2022-01-20
Payer: MEDICARE

## 2022-01-20 VITALS
SYSTOLIC BLOOD PRESSURE: 99 MMHG | BODY MASS INDEX: 30.58 KG/M2 | RESPIRATION RATE: 16 BRPM | HEIGHT: 61 IN | HEART RATE: 79 BPM | WEIGHT: 162 LBS | TEMPERATURE: 98.2 F | DIASTOLIC BLOOD PRESSURE: 59 MMHG | OXYGEN SATURATION: 100 %

## 2022-01-20 DIAGNOSIS — Z00.00 MEDICARE ANNUAL WELLNESS VISIT, SUBSEQUENT: Primary | ICD-10-CM

## 2022-01-20 DIAGNOSIS — R73.01 IFG (IMPAIRED FASTING GLUCOSE): ICD-10-CM

## 2022-01-20 DIAGNOSIS — E87.1 HYPONATREMIA: ICD-10-CM

## 2022-01-20 DIAGNOSIS — F25.0 SCHIZOAFFECTIVE DISORDER, BIPOLAR TYPE (HCC): ICD-10-CM

## 2022-01-20 DIAGNOSIS — E78.49 OTHER HYPERLIPIDEMIA: ICD-10-CM

## 2022-01-20 LAB
ALBUMIN SERPL-MCNC: 4.1 G/DL (ref 3.5–5)
ALBUMIN/GLOB SERPL: 1.1 {RATIO} (ref 1.1–2.2)
ALP SERPL-CCNC: 55 U/L (ref 45–117)
ALT SERPL-CCNC: 25 U/L (ref 12–78)
ANION GAP SERPL CALC-SCNC: 3 MMOL/L (ref 5–15)
AST SERPL-CCNC: 15 U/L (ref 15–37)
BASOPHILS # BLD: 0 K/UL (ref 0–0.1)
BASOPHILS NFR BLD: 1 % (ref 0–1)
BILIRUB SERPL-MCNC: 0.4 MG/DL (ref 0.2–1)
BUN SERPL-MCNC: 12 MG/DL (ref 6–20)
BUN/CREAT SERPL: 14 (ref 12–20)
CALCIUM SERPL-MCNC: 9.5 MG/DL (ref 8.5–10.1)
CHLORIDE SERPL-SCNC: 106 MMOL/L (ref 97–108)
CHOLEST SERPL-MCNC: 202 MG/DL
CO2 SERPL-SCNC: 29 MMOL/L (ref 21–32)
CREAT SERPL-MCNC: 0.84 MG/DL (ref 0.55–1.02)
DIFFERENTIAL METHOD BLD: NORMAL
EOSINOPHIL # BLD: 0.2 K/UL (ref 0–0.4)
EOSINOPHIL NFR BLD: 3 % (ref 0–7)
ERYTHROCYTE [DISTWIDTH] IN BLOOD BY AUTOMATED COUNT: 13.2 % (ref 11.5–14.5)
EST. AVERAGE GLUCOSE BLD GHB EST-MCNC: 103 MG/DL
GLOBULIN SER CALC-MCNC: 3.6 G/DL (ref 2–4)
GLUCOSE SERPL-MCNC: 93 MG/DL (ref 65–100)
HBA1C MFR BLD: 5.2 % (ref 4–5.6)
HCT VFR BLD AUTO: 39.1 % (ref 35–47)
HDLC SERPL-MCNC: 84 MG/DL
HDLC SERPL: 2.4 {RATIO} (ref 0–5)
HGB BLD-MCNC: 12.3 G/DL (ref 11.5–16)
IMM GRANULOCYTES # BLD AUTO: 0 K/UL (ref 0–0.04)
IMM GRANULOCYTES NFR BLD AUTO: 0 % (ref 0–0.5)
LDLC SERPL CALC-MCNC: 110.2 MG/DL (ref 0–100)
LYMPHOCYTES # BLD: 1.4 K/UL (ref 0.8–3.5)
LYMPHOCYTES NFR BLD: 30 % (ref 12–49)
MCH RBC QN AUTO: 30.1 PG (ref 26–34)
MCHC RBC AUTO-ENTMCNC: 31.5 G/DL (ref 30–36.5)
MCV RBC AUTO: 95.8 FL (ref 80–99)
MONOCYTES # BLD: 0.3 K/UL (ref 0–1)
MONOCYTES NFR BLD: 7 % (ref 5–13)
NEUTS SEG # BLD: 2.9 K/UL (ref 1.8–8)
NEUTS SEG NFR BLD: 59 % (ref 32–75)
NRBC # BLD: 0 K/UL (ref 0–0.01)
NRBC BLD-RTO: 0 PER 100 WBC
PLATELET # BLD AUTO: 249 K/UL (ref 150–400)
PMV BLD AUTO: 10 FL (ref 8.9–12.9)
POTASSIUM SERPL-SCNC: 3.9 MMOL/L (ref 3.5–5.1)
PROT SERPL-MCNC: 7.7 G/DL (ref 6.4–8.2)
RBC # BLD AUTO: 4.08 M/UL (ref 3.8–5.2)
SODIUM SERPL-SCNC: 138 MMOL/L (ref 136–145)
TRIGL SERPL-MCNC: 39 MG/DL (ref ?–150)
VLDLC SERPL CALC-MCNC: 7.8 MG/DL
WBC # BLD AUTO: 4.9 K/UL (ref 3.6–11)

## 2022-01-20 PROCEDURE — G9899 SCRN MAM PERF RSLTS DOC: HCPCS | Performed by: INTERNAL MEDICINE

## 2022-01-20 PROCEDURE — G0439 PPPS, SUBSEQ VISIT: HCPCS | Performed by: INTERNAL MEDICINE

## 2022-01-20 PROCEDURE — G9717 DOC PT DX DEP/BP F/U NT REQ: HCPCS | Performed by: INTERNAL MEDICINE

## 2022-01-20 PROCEDURE — G8427 DOCREV CUR MEDS BY ELIG CLIN: HCPCS | Performed by: INTERNAL MEDICINE

## 2022-01-20 PROCEDURE — 3017F COLORECTAL CA SCREEN DOC REV: CPT | Performed by: INTERNAL MEDICINE

## 2022-01-20 PROCEDURE — G8417 CALC BMI ABV UP PARAM F/U: HCPCS | Performed by: INTERNAL MEDICINE

## 2022-01-20 RX ORDER — TEMAZEPAM 15 MG/1
CAPSULE ORAL
COMMUNITY
Start: 2021-11-03 | End: 2022-05-08

## 2022-01-20 RX ORDER — TRIHEXYPHENIDYL HYDROCHLORIDE 2 MG/1
2 TABLET ORAL DAILY
Status: ON HOLD | COMMUNITY
Start: 2022-01-03 | End: 2022-05-12 | Stop reason: SDUPTHER

## 2022-01-20 NOTE — PROGRESS NOTES
PROGRESS NOTE  Name: Zenia Yuan   : 1965       ASSESSMENT/ PLAN:     Diagnoses and all orders for this visit:    Medicare annual wellness visit, subsequent    IFG (impaired fasting glucose)  -     HEMOGLOBIN A1C WITH EAG; Future  -     HEMOGLOBIN A1C WITH EAG    Other hyperlipidemia  -     LIPID PANEL; Future  -     LIPID PANEL    Hyponatremia  -     METABOLIC PANEL, COMPREHENSIVE; Future  -     CBC WITH AUTOMATED DIFF; Future  -     CBC WITH AUTOMATED DIFF  -     METABOLIC PANEL, COMPREHENSIVE    Schizoaffective disorder, bipolar type ([facility]      Follow-up and Dispositions  ·   Return in about 1 year (around 2023) for AWV, Dr. Stanford Caba. I have reviewed the patient's medications and risks/side effects/benefits were discussed. Diagnosis(-es) explained to patient and questions answered. Literature provided where appropriate. SUBJECTIVE:   Ms. Zenia Yuan is a 61 y.o. female pt of Jung Riley MD who is here for follow up of routine medical issues. Chief Complaint   Patient presents with    Physical       \"I'm okay\" with respect to mood. Hasn't driven in a year and a half due to panic attacks. She sees psychiatrist.     Had UTI over the New Year's weekend. At this time, she is otherwise doing well and has brought no other complaints to my attention today. For a list of the medical issues addressed today, see the assessment and plan below.     PMH:   Past Medical History:   Diagnosis Date    Anxiety and depression     Bipolar 1 disorder with moderate robyn ([facility] 3/17/2014    Chronic back pain     Hyperlipidemia 2016    Hyponatremia     Psychotic disorder ([facility]     Scoliosis        Past Surgical History:   Procedure Laterality Date    HX HEENT      wisdom teeth extraction    HX LIPOMA RESECTION      right gluteal    FREDY BIOPSY BREAST STEREOTACTIC Left     negative    [place] DENTAL SURGERY PROCEDURE      hx of dental surgery- wisdom teeth extracted All: is allergic to other food, claritin-d 12 hour [loratadine-pseudoephedrine], other medication, pcn [penicillins], sunscreen, ultram [tramadol], benzoyl peroxide, cephalexin, divalproex, and maltodextrin-glycerin. Current Outpatient Medications   Medication Sig    temazepam (RESTORIL) 15 mg capsule     trihexyphenidyL (ARTANE) 2 mg tablet     lithium carbonate 300 mg capsule Take 1 Cap by mouth two (2) times a day. Indications: robyn associated with bipolar disorder (Patient taking differently: Take 300 mg by mouth daily. Indications: robyn associated with bipolar disorder)    traZODone (DESYREL) 150 mg tablet Take 1 Tab by mouth nightly. Indications: insomnia associated with depression    hydrOXYzine HCL (ATARAX) 50 mg tablet Take 1 Tab by mouth two (2) times daily as needed for Anxiety. Indications: anxious    azelastine (ASTELIN) 137 mcg (0.1 %) nasal spray 1 [place] by Both Nostrils route as needed for Rhinitis. Prior to allergy injections    cholecalciferol (VITAMIN D3) (50,000 UNITS /1250 MCG) capsule Take 50,000 Units by mouth every Monday.  fexofenadine (ALLEGRA) 180 mg tablet Take 180 mg by mouth daily.  methocarbamoL (ROBAXIN) 500 mg tablet Take 500 mg by mouth as needed for Muscle Spasm(s). (Patient not taking: Reported on 1/20/2022)    OLANZapine (ZyPREXA) 15 mg tablet Take 2 Tabs by mouth nightly. Indications: robyn associated with bipolar disorder (Patient not taking: Reported on 1/20/2022)    cetirizine (ZYRTEC) 10 mg tablet Take 10 mg by mouth every evening. (Patient not taking: Reported on 1/20/2022)    montelukast (SINGULAIR) 10 mg tablet Take 10 mg by mouth daily as needed. Prior to allergy shots (Patient not taking: Reported on 1/20/2022)     No current facility-administered medications for this visit. FH: family history includes Alcohol abuse in her father and sister;  Arthritis-rheumatoid in her mother; Bipolar Disorder in her mother, sister, and sister; Breast Cancer in her maternal aunt; Cancer in her maternal aunt; Heart Disease in her father; High Cholesterol in her father; Lung Disease in her father; Lupus in her mother; Migraines in her mother; Psychiatric Disorder in her mother and sister; Stroke in her brother and father. SH:  reports that she has never smoked. She has never used smokeless tobacco. She reports current alcohol use. She reports that she does not use drugs. ROS: See above; Complete ROS otherwise negative. OBJECTIVE:   Vitals:   Visit Vitals  BP (!) 99/59   Pulse 79   Temp 98.2 °F (36.8 °C) (Temporal)   Resp 16   Ht 5' 1\" (1.549 m)   Wt 162 lb (73.5 kg)   SpO2 100%   BMI 30.61 kg/m²      Gen: Pleasant 61 y.o.  female in NAD. HEENT: PERRLA. EOMI. OP moist and pink. Neck: Supple. No LAD. HEART: RRR, No M/G/R.    LUNGS: CTAB No W/R. ABDOMEN: S, NT, ND, BS+. EXTREMITIES: Warm. No C/C/E.  MUSCULOSKELETAL: Normal ROM, muscle strength 5/5 all groups. NEURO: Alert and oriented x 3. Cranial nerves grossly intact. No focal sensory or motor deficits noted. SKIN: Warm. Dry. No rashes or other lesions noted. Lab Results   Component Value Date/Time    Sodium 141 11/30/2020 03:56 PM    Potassium 4.1 11/30/2020 03:56 PM    Chloride 103 11/30/2020 03:56 PM    CO2 26 11/30/2020 03:56 PM    Anion gap 8 07/30/2020 06:17 AM    Glucose 110 (H) 11/30/2020 03:56 PM    Glucose 107 (H) 02/03/2020 05:40 AM    BUN 11 11/30/2020 03:56 PM    Creatinine 0.87 11/30/2020 03:56 PM    BUN/Creatinine ratio 13 11/30/2020 03:56 PM    GFR est AA 86 11/30/2020 03:56 PM    GFR est non-AA 75 11/30/2020 03:56 PM    Calcium 9.7 11/30/2020 03:56 PM    Bilirubin, total <0.2 11/30/2020 03:56 PM    ALT (SGPT) 10 11/30/2020 03:56 PM    Alk.  phosphatase 67 11/30/2020 03:56 PM    Protein, total 7.4 11/30/2020 03:56 PM    Albumin 4.6 11/30/2020 03:56 PM    Globulin 3.8 07/27/2020 04:21 AM    A-G Ratio 1.6 11/30/2020 03:56 PM       Lab Results   Component Value Date/Time Cholesterol, total 191 07/30/2020 06:17 AM    HDL Cholesterol 84 07/30/2020 06:17 AM    LDL, calculated 97.8 07/30/2020 06:17 AM    Triglyceride 46 07/30/2020 06:17 AM    CHOL/HDL Ratio 2.3 07/30/2020 06:17 AM        Lab Results   Component Value Date/Time    Hemoglobin A1c 5.3 11/30/2020 03:56 PM       Lab Results   Component Value Date/Time    WBC 8.0 11/30/2020 03:56 PM    HGB 12.2 11/30/2020 03:56 PM    Hematocrit (POC) 34 (L) 07/17/2018 03:33 AM    HCT 37.4 11/30/2020 03:56 PM    PLATELET 356 41/33/5538 03:56 PM    MCV 92 11/30/2020 03:56 PM

## 2022-01-20 NOTE — PROGRESS NOTES
This is the Subsequent Medicare Annual Wellness Exam, performed 12 months or more after the Initial AWV or the last Subsequent AWV    I have reviewed the patient's medical history in detail and updated the computerized patient record. Assessment/Plan   Education and counseling provided:  Are appropriate based on today's review and evaluation      Depression Risk Factor Screening     3 most recent PHQ Screens 1/20/2022   Little interest or pleasure in doing things Several days   Feeling down, depressed, irritable, or hopeless Several days   Total Score PHQ 2 2       Alcohol & Drug Abuse Risk Screen    Do you average more than 1 drink per night or more than 7 drinks a week:  No    On any one occasion in the past three months have you have had more than 3 drinks containing alcohol:  No          Functional Ability and Level of Safety    Hearing: Hearing is good. Activities of Daily Living: The home contains: no safety equipment. Patient needs help with:  transportation      Ambulation: with no difficulty     Fall Risk:  No flowsheet data found. Abuse Screen:  Patient is not abused       Cognitive Screening    Has your family/caregiver stated any concerns about your memory: yes - \"So so\"     Cognitive Screening: Normal    Health Maintenance Due     Health Maintenance Due   Topic Date Due    Medicare Yearly Exam  12/30/2020    Flu Vaccine (1) 09/01/2021       Patient Care Team   Patient Care Team:  Rafael James MD as PCP - General (Internal Medicine)  Rafael James MD as PCP - [facility] Empaneled Provider  Gavino Prieto MD (Allergy)  Ranjit Brennan MD (Gastroenterology)    History     Patient Active Problem List   Diagnosis Code    Bipolar 1 disorder with moderate robyn (HCC) F31.12    Chronic back pain M54.9, G89.29    Hyponatremia E87.1    Bipolar 1 disorder ([address] F31.9    Robyn ([address] F30.9    Scoliosis M41.9    Hyperlipidemia E78.5    Anxiety and depression F41.9, F32. A Past Medical History:   Diagnosis Date    Anxiety and depression     Bipolar 1 disorder with moderate robyn ([address] 3/17/2014    Chronic back pain     Hyperlipidemia 8/22/2016    Hyponatremia     Psychotic disorder ([facility]     Scoliosis       Past Surgical History:   Procedure Laterality Date    HX HEENT      wisdom teeth extraction    HX LIPOMA RESECTION      right gluteal    FREDY BIOPSY BREAST STEREOTACTIC Left 2011    negative    [place] DENTAL SURGERY PROCEDURE      hx of dental surgery- wisdom teeth extracted     Current Outpatient Medications   Medication Sig Dispense Refill    temazepam (RESTORIL) 15 mg capsule       trihexyphenidyL (ARTANE) 2 mg tablet       lithium carbonate 300 mg capsule Take 1 Cap by mouth two (2) times a day. Indications: robyn associated with bipolar disorder (Patient taking differently: Take 300 mg by mouth daily. Indications: robyn associated with bipolar disorder) 60 Cap 0    traZODone (DESYREL) 150 mg tablet Take 1 Tab by mouth nightly. Indications: insomnia associated with depression 30 Tab 0    hydrOXYzine HCL (ATARAX) 50 mg tablet Take 1 Tab by mouth two (2) times daily as needed for Anxiety. Indications: anxious 60 Tab 0    azelastine (ASTELIN) 137 mcg (0.1 %) nasal spray 1 [place] by Both Nostrils route as needed for Rhinitis. Prior to allergy injections      cholecalciferol (VITAMIN D3) (50,000 UNITS /1250 MCG) capsule Take 50,000 Units by mouth every Monday.  fexofenadine (ALLEGRA) 180 mg tablet Take 180 mg by mouth daily.  methocarbamoL (ROBAXIN) 500 mg tablet Take 500 mg by mouth as needed for Muscle Spasm(s). (Patient not taking: Reported on 1/20/2022)      OLANZapine (ZyPREXA) 15 mg tablet Take 2 Tabs by mouth nightly. Indications: robyn associated with bipolar disorder (Patient not taking: Reported on 1/20/2022) 60 Tab 0    cetirizine (ZYRTEC) 10 mg tablet Take 10 mg by mouth every evening.  (Patient not taking: Reported on 1/20/2022)      montelukast (SINGULAIR) 10 mg tablet Take 10 mg by mouth daily as needed.  Prior to allergy shots (Patient not taking: Reported on 1/20/2022)       Allergies   Allergen Reactions    Other Food Hives     Artifical sweetners    Claritin-D 12 Hour [Loratadine-Pseudoephedrine] Palpitations     palpatations and ringing in the ear    Other Medication Anaphylaxis     Artificial sweeteners cause anaphylaxis    Pcn [Penicillins] Anaphylaxis    Sunscreen Contact Dermatitis     Burns and opens the skin    Ultram [Tramadol] Hives and Other (comments)     Hives and ringing of the ears    Benzoyl Peroxide Hives    Cephalexin Itching    Divalproex Itching    Maltodextrin-Glycerin Shortness of Breath       Family History   Problem Relation Age of Onset   Mica Carr Arthritis-rheumatoid Mother     Migraines Mother     Psychiatric Disorder Mother     Bipolar Disorder Mother     Lupus Mother     Alcohol abuse Father     Lung Disease Father     Heart Disease Father     Stroke Father     High Cholesterol Father     Psychiatric Disorder Sister     Alcohol abuse Sister     Bipolar Disorder Sister     Stroke Brother     Cancer Maternal Aunt     Breast Cancer Maternal Aunt         pt thniks she was under 48    Bipolar Disorder Sister      Social History     Tobacco Use    Smoking status: Never Smoker    Smokeless tobacco: Never Used   Substance Use Topics    Alcohol use: Yes     Comment: occasional         Chad Singh MD

## 2022-01-20 NOTE — PATIENT INSTRUCTIONS

## 2022-01-20 NOTE — PROGRESS NOTES
Fabi Hough is a 61 y.o. female  Chief Complaint   Patient presents with    Physical     Health Maintenance Due   Topic Date Due    COVID-19 Vaccine (1) Never done    Medicare Yearly Exam  12/30/2020    Flu Vaccine (1) 09/01/2021     Visit Vitals  BP (!) 99/59   Pulse 79   Temp 98.2 °F (36.8 °C) (Temporal)   Resp 16   Ht 5' 1\" (1.549 m)   Wt 162 lb (73.5 kg)   SpO2 100%   BMI 30.61 kg/m²

## 2022-01-25 NOTE — PROGRESS NOTES
Please call the patient and let the patient know that her test result(s) is/are normal except for slightly elevated cholesterol; Diet and exercise recommended. Thanks. Rickey Hargrove.

## 2022-01-26 NOTE — PROGRESS NOTES
Verified two patient identifiers, name and . Patient provided with lab results per Dr. Randee Deshpande. See message below. Please call the patient and let the patient know that her test result(s) is/are normal except for slightly elevated cholesterol;   Diet and exercise recommended. Thanks. Rickey Hargrove.      No further questions at this time

## 2022-02-24 NOTE — GROUP NOTE
HISTORY OF PRESENT ILLNESS    3 weeks s/p hindfoot arthrodesis for nonunion.  Reasonable comfort.     PHYSICAL EXAM    Ortho Exam      RLE: Dressings cdi.Incisions CDI. Digits pink    XR TIBIA FIBULA 2 VIEWS RIGHT  Multiple views right tibia and fibula demonstrate well aligned tibial   talocalcaneal arthrodesis.  Intramedullary implant is intact.    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 NWB.  FU 4 weeks for XOOC    Casting   Date/Time: 2/24/2022 12:55 PM   Performed by: Semaj Winkler MD   Authorized by: Semaj Winkler MD   Consent given by: patient  Injury  Location details: right ankle  Pre-procedure assessment  neurovascularly intact  Procedure  Manipulation performed? no manipulation performed  Immobilization: cast  Cast type: short leg  Supplies used: cotton padding and Ortho-Glass  Post-procedure assessment  neurovascularly intact  Patient tolerance: patient tolerated the procedure well with no immediate complications      Discussed tx plan with Nurse .     [facility] GROUP DOCUMENTATION INDIVIDUAL                                                                          Group Therapy Note    Date: 2/10/2020    Group Start Time: 1330  Group End Time: 0  Group Topic: Social Work Group    [facility] 7W GEN BEHAV HLTH    Palmira Talbot    IP [address] [facility] GROUP DOCUMENTATION GROUP    Group Therapy Note    Attendees: 10         Attendance: Attended    Patient's Goal:  Pt will process the idea of \"Shame\" as evidenced in their recovery. Pt will identify ways that Shame affects their relapse, and to implement positive coping skills in place of negative thought patterns. Interventions/techniques: Challenged, Informed, Validated, Promoted peer support and Provide feedback    Follows Directions: Followed directions    Interactions: Interacted appropriately    Mental Status: Calm, Congruent and Happy    Behavior/appearance: Attentive and Cooperative    Goals Achieved: Able to listen to others, Able to give feedback to another and Able to self-disclose      Additional Notes:  Pt stated that she \"doesn't want to think about shame any more. \" Pt was excited about discharging, and stated that she felt she has turned a corner in her treatment.      Selwyn Ramos

## 2022-03-18 PROBLEM — E87.1 HYPONATREMIA: Status: ACTIVE | Noted: 2020-01-10

## 2022-03-19 PROBLEM — F31.9 BIPOLAR 1 DISORDER (HCC): Status: ACTIVE | Noted: 2020-01-21

## 2022-03-19 PROBLEM — F30.9 MANIA (HCC): Status: ACTIVE | Noted: 2020-07-26

## 2022-05-03 ENCOUNTER — HOSPITAL ENCOUNTER (INPATIENT)
Age: 57
LOS: 5 days | Discharge: PSYCHIATRIC OR SUBSTANCE USE FACILITY | DRG: 918 | End: 2022-05-08
Attending: EMERGENCY MEDICINE | Admitting: INTERNAL MEDICINE
Payer: MEDICARE

## 2022-05-03 DIAGNOSIS — T14.91XA SUICIDE ATTEMPT (HCC): ICD-10-CM

## 2022-05-03 DIAGNOSIS — T56.892A INTENTIONAL LITHIUM OVERDOSE, INITIAL ENCOUNTER (HCC): Primary | ICD-10-CM

## 2022-05-03 PROBLEM — T50.901A DRUG OVERDOSE: Status: ACTIVE | Noted: 2022-05-03

## 2022-05-03 LAB
ALBUMIN SERPL-MCNC: 4.8 G/DL (ref 3.5–5)
ALBUMIN/GLOB SERPL: 1 {RATIO} (ref 1.1–2.2)
ALP SERPL-CCNC: 53 U/L (ref 45–117)
ALT SERPL-CCNC: 28 U/L (ref 12–78)
AMPHET UR QL SCN: NEGATIVE
ANION GAP SERPL CALC-SCNC: 1 MMOL/L (ref 5–15)
APPEARANCE UR: CLEAR
AST SERPL-CCNC: 16 U/L (ref 15–37)
BACTERIA URNS QL MICRO: NEGATIVE /HPF
BARBITURATES UR QL SCN: NEGATIVE
BASOPHILS # BLD: 0 K/UL (ref 0–0.1)
BASOPHILS NFR BLD: 0 % (ref 0–1)
BENZODIAZ UR QL: POSITIVE
BILIRUB SERPL-MCNC: 0.4 MG/DL (ref 0.2–1)
BILIRUB UR QL: NEGATIVE
BUN SERPL-MCNC: 10 MG/DL (ref 6–20)
BUN/CREAT SERPL: 9 (ref 12–20)
CALCIUM SERPL-MCNC: 10.5 MG/DL (ref 8.5–10.1)
CANNABINOIDS UR QL SCN: NEGATIVE
CHLORIDE SERPL-SCNC: 104 MMOL/L (ref 97–108)
CO2 SERPL-SCNC: 31 MMOL/L (ref 21–32)
COCAINE UR QL SCN: NEGATIVE
COLOR UR: ABNORMAL
CREAT SERPL-MCNC: 1.09 MG/DL (ref 0.55–1.02)
DATE LAST DOSE: ABNORMAL
DIFFERENTIAL METHOD BLD: ABNORMAL
DRUG SCRN COMMENT,DRGCM: ABNORMAL
EOSINOPHIL # BLD: 0 K/UL (ref 0–0.4)
EOSINOPHIL NFR BLD: 1 % (ref 0–7)
EPITH CASTS URNS QL MICRO: ABNORMAL /LPF
ERYTHROCYTE [DISTWIDTH] IN BLOOD BY AUTOMATED COUNT: 12.5 % (ref 11.5–14.5)
ETHANOL SERPL-MCNC: <10 MG/DL
GLOBULIN SER CALC-MCNC: 4.6 G/DL (ref 2–4)
GLUCOSE SERPL-MCNC: 103 MG/DL (ref 65–100)
GLUCOSE UR STRIP.AUTO-MCNC: NEGATIVE MG/DL
HCT VFR BLD AUTO: 48.5 % (ref 35–47)
HGB BLD-MCNC: 15.7 G/DL (ref 11.5–16)
HGB UR QL STRIP: NEGATIVE
HYALINE CASTS URNS QL MICRO: ABNORMAL /LPF (ref 0–2)
IMM GRANULOCYTES # BLD AUTO: 0 K/UL (ref 0–0.04)
IMM GRANULOCYTES NFR BLD AUTO: 0 % (ref 0–0.5)
KETONES UR QL STRIP.AUTO: NEGATIVE MG/DL
LEUKOCYTE ESTERASE UR QL STRIP.AUTO: ABNORMAL
LITHIUM SERPL-SCNC: 3.59 MMOL/L (ref 0.6–1.2)
LYMPHOCYTES # BLD: 1.6 K/UL (ref 0.8–3.5)
LYMPHOCYTES NFR BLD: 24 % (ref 12–49)
MCH RBC QN AUTO: 30.1 PG (ref 26–34)
MCHC RBC AUTO-ENTMCNC: 32.4 G/DL (ref 30–36.5)
MCV RBC AUTO: 92.9 FL (ref 80–99)
METHADONE UR QL: NEGATIVE
MONOCYTES # BLD: 0.4 K/UL (ref 0–1)
MONOCYTES NFR BLD: 5 % (ref 5–13)
NEUTS SEG # BLD: 4.7 K/UL (ref 1.8–8)
NEUTS SEG NFR BLD: 70 % (ref 32–75)
NITRITE UR QL STRIP.AUTO: NEGATIVE
NRBC # BLD: 0 K/UL (ref 0–0.01)
NRBC BLD-RTO: 0 PER 100 WBC
OPIATES UR QL: NEGATIVE
PCP UR QL: NEGATIVE
PH UR STRIP: >8.5 [PH] (ref 5–8)
PLATELET # BLD AUTO: 250 K/UL (ref 150–400)
PMV BLD AUTO: 9.3 FL (ref 8.9–12.9)
POTASSIUM SERPL-SCNC: 3.8 MMOL/L (ref 3.5–5.1)
PROT SERPL-MCNC: 9.4 G/DL (ref 6.4–8.2)
PROT UR STRIP-MCNC: NEGATIVE MG/DL
RBC # BLD AUTO: 5.22 M/UL (ref 3.8–5.2)
RBC #/AREA URNS HPF: ABNORMAL /HPF (ref 0–5)
REPORTED DOSE,DOSE: ABNORMAL UNITS
REPORTED DOSE/TIME,TMG: ABNORMAL
SALICYLATES SERPL-MCNC: <1.7 MG/DL (ref 2.8–20)
SODIUM SERPL-SCNC: 136 MMOL/L (ref 136–145)
SP GR UR REFRACTOMETRY: 1.01
UA: UC IF INDICATED,UAUC: ABNORMAL
UROBILINOGEN UR QL STRIP.AUTO: 0.2 EU/DL (ref 0.2–1)
WBC # BLD AUTO: 6.6 K/UL (ref 3.6–11)
WBC URNS QL MICRO: ABNORMAL /HPF (ref 0–4)

## 2022-05-03 PROCEDURE — 36415 COLL VENOUS BLD VENIPUNCTURE: CPT

## 2022-05-03 PROCEDURE — 74011000250 HC RX REV CODE- 250: Performed by: INTERNAL MEDICINE

## 2022-05-03 PROCEDURE — 80178 ASSAY OF LITHIUM: CPT

## 2022-05-03 PROCEDURE — 74011250636 HC RX REV CODE- 250/636: Performed by: INTERNAL MEDICINE

## 2022-05-03 PROCEDURE — 82077 ASSAY SPEC XCP UR&BREATH IA: CPT

## 2022-05-03 PROCEDURE — 80053 COMPREHEN METABOLIC PANEL: CPT

## 2022-05-03 PROCEDURE — 65270000046 HC RM TELEMETRY

## 2022-05-03 PROCEDURE — 81001 URINALYSIS AUTO W/SCOPE: CPT

## 2022-05-03 PROCEDURE — 99285 EMERGENCY DEPT VISIT HI MDM: CPT

## 2022-05-03 PROCEDURE — 80307 DRUG TEST PRSMV CHEM ANLYZR: CPT

## 2022-05-03 PROCEDURE — 93005 ELECTROCARDIOGRAM TRACING: CPT

## 2022-05-03 PROCEDURE — 80179 DRUG ASSAY SALICYLATE: CPT

## 2022-05-03 PROCEDURE — 85025 COMPLETE CBC W/AUTO DIFF WBC: CPT

## 2022-05-03 RX ORDER — SODIUM CHLORIDE 0.9 % (FLUSH) 0.9 %
5-40 SYRINGE (ML) INJECTION EVERY 8 HOURS
Status: DISCONTINUED | OUTPATIENT
Start: 2022-05-03 | End: 2022-05-08 | Stop reason: HOSPADM

## 2022-05-03 RX ORDER — ONDANSETRON 2 MG/ML
4 INJECTION INTRAMUSCULAR; INTRAVENOUS
Status: DISCONTINUED | OUTPATIENT
Start: 2022-05-03 | End: 2022-05-08 | Stop reason: HOSPADM

## 2022-05-03 RX ORDER — ACETAMINOPHEN 325 MG/1
650 TABLET ORAL
Status: DISCONTINUED | OUTPATIENT
Start: 2022-05-03 | End: 2022-05-08 | Stop reason: HOSPADM

## 2022-05-03 RX ORDER — POLYETHYLENE GLYCOL 3350 17 G/17G
17 POWDER, FOR SOLUTION ORAL DAILY PRN
Status: DISCONTINUED | OUTPATIENT
Start: 2022-05-03 | End: 2022-05-08 | Stop reason: HOSPADM

## 2022-05-03 RX ORDER — ACETAMINOPHEN 650 MG/1
650 SUPPOSITORY RECTAL
Status: DISCONTINUED | OUTPATIENT
Start: 2022-05-03 | End: 2022-05-08 | Stop reason: HOSPADM

## 2022-05-03 RX ORDER — SODIUM CHLORIDE 0.9 % (FLUSH) 0.9 %
5-40 SYRINGE (ML) INJECTION AS NEEDED
Status: DISCONTINUED | OUTPATIENT
Start: 2022-05-03 | End: 2022-05-08

## 2022-05-03 RX ORDER — ENOXAPARIN SODIUM 100 MG/ML
40 INJECTION SUBCUTANEOUS DAILY
Status: DISCONTINUED | OUTPATIENT
Start: 2022-05-04 | End: 2022-05-08 | Stop reason: HOSPADM

## 2022-05-03 RX ORDER — ONDANSETRON 4 MG/1
4 TABLET, ORALLY DISINTEGRATING ORAL
Status: DISCONTINUED | OUTPATIENT
Start: 2022-05-03 | End: 2022-05-08 | Stop reason: HOSPADM

## 2022-05-03 RX ORDER — SODIUM CHLORIDE 9 MG/ML
125 INJECTION, SOLUTION INTRAVENOUS CONTINUOUS
Status: DISCONTINUED | OUTPATIENT
Start: 2022-05-03 | End: 2022-05-06

## 2022-05-03 RX ADMIN — SODIUM CHLORIDE 1000 ML: 9 INJECTION, SOLUTION INTRAVENOUS at 22:51

## 2022-05-03 RX ADMIN — SODIUM CHLORIDE 100 ML/HR: 9 INJECTION, SOLUTION INTRAVENOUS at 22:51

## 2022-05-03 RX ADMIN — SODIUM CHLORIDE, PRESERVATIVE FREE 10 ML: 5 INJECTION INTRAVENOUS at 22:51

## 2022-05-03 RX ADMIN — ONDANSETRON 4 MG: 2 INJECTION INTRAMUSCULAR; INTRAVENOUS at 22:52

## 2022-05-03 NOTE — ED PROVIDER NOTES
EMERGENCY DEPARTMENT HISTORY AND PHYSICAL EXAM      Date: 5/3/2022  Patient Name: Kirke Rubinstein    History of Presenting Illness     Chief Complaint   Patient presents with   [address] Problem     around 0730 this morning pt took pills in an attempt to harm herself. She took temazepam, hydroxyzine, and lithium \"I took some of each\"         HPI: Kirke Rubinstein, 61 y.o. female with history of bipolar/schizoaffective d/o presenting to the ED after intentional overdose. Around 730 this morning, patient took an unknown quantity of her lithium, temazepam, hydroxyzine. She has felt sleepy ever since. She reports no other medical symptoms. Patient states she was trying to kill her self because something is wrong. She still feels that way. There are no other complaints, changes, or physical findings at this time. PCP: Ambar Gallagher MD    No current facility-administered medications on file prior to encounter. Current Outpatient Medications on File Prior to Encounter   Medication Sig Dispense Refill    temazepam (RESTORIL) 15 mg capsule       trihexyphenidyL (ARTANE) 2 mg tablet       methocarbamoL (ROBAXIN) 500 mg tablet Take 500 mg by mouth as needed for Muscle Spasm(s). (Patient not taking: Reported on 1/20/2022)      OLANZapine (ZyPREXA) 15 mg tablet Take 2 Tabs by mouth nightly. Indications: robyn associated with bipolar disorder (Patient not taking: Reported on 1/20/2022) 60 Tab 0    lithium carbonate 300 mg capsule Take 1 Cap by mouth two (2) times a day. Indications: robyn associated with bipolar disorder (Patient taking differently: Take 300 mg by mouth daily. Indications: robyn associated with bipolar disorder) 60 Cap 0    traZODone (DESYREL) 150 mg tablet Take 1 Tab by mouth nightly. Indications: insomnia associated with depression 30 Tab 0    hydrOXYzine HCL (ATARAX) 50 mg tablet Take 1 Tab by mouth two (2) times daily as needed for Anxiety.  Indications: anxious 60 Tab 0  azelastine (ASTELIN) 137 mcg (0.1 %) nasal spray 1 [place] by Both Nostrils route as needed for Rhinitis. Prior to allergy injections      cetirizine (ZYRTEC) 10 mg tablet Take 10 mg by mouth every evening. (Patient not taking: Reported on 1/20/2022)      cholecalciferol (VITAMIN D3) (50,000 UNITS /1250 MCG) capsule Take 50,000 Units by mouth every Monday.  montelukast (SINGULAIR) 10 mg tablet Take 10 mg by mouth daily as needed. Prior to allergy shots (Patient not taking: Reported on 1/20/2022)      fexofenadine (ALLEGRA) 180 mg tablet Take 180 mg by mouth daily. Past History     Past Medical History:  Past Medical History:   Diagnosis Date    Anxiety and depression     Bipolar 1 disorder with moderate robyn ([address] 3/17/2014    Chronic back pain     Hyperlipidemia 8/22/2016    Hyponatremia     Psychotic disorder ([address]     Scoliosis        Past Surgical History:  Past Surgical History:   Procedure Laterality Date    HX HEENT      wisdom teeth extraction    HX LIPOMA RESECTION      right gluteal    FREDY BIOPSY BREAST STEREOTACTIC Left 2011    negative    NH DENTAL SURGERY PROCEDURE      hx of dental surgery- wisdom teeth extracted       Family History:  Family History   Problem Relation Age of Onset   [address] [facility] [address] Arthritis-rheumatoid Mother     Migraines Mother     Psychiatric Disorder Mother     Bipolar Disorder Mother     Lupus Mother     Alcohol abuse Father     Lung Disease Father     Heart Disease Father     Stroke Father     High Cholesterol Father     Psychiatric Disorder Sister     Alcohol abuse Sister     Bipolar Disorder Sister     Stroke Brother     Cancer Maternal Aunt     Breast Cancer Maternal Aunt         pt thniks she was under 48    Bipolar Disorder Sister        Social History:  Social History     Tobacco Use    Smoking status: Never Smoker    Smokeless tobacco: Never Used   Substance Use Topics    Alcohol use: Yes     Comment: occasional    Drug use:  No Allergies: Allergies   Allergen Reactions    Other Food Hives     Artifical sweetners    Claritin-D 12 Hour [Loratadine-Pseudoephedrine] Palpitations     palpatations and ringing in the ear    Other Medication Anaphylaxis     Artificial sweeteners cause anaphylaxis    Pcn [Penicillins] Anaphylaxis    Sunscreen Contact Dermatitis     Burns and opens the skin    Ultram [Tramadol] Hives and Other (comments)     Hives and ringing of the ears    Benzoyl Peroxide Hives    Cephalexin Itching    Divalproex Itching    Maltodextrin-Glycerin Shortness of Breath         Review of Systems   Review of Systems   Constitutional: Positive for fatigue. Negative for chills and fever. HENT: Negative for rhinorrhea. Eyes: Negative for redness. Respiratory: Negative for shortness of breath. Cardiovascular: Negative for chest pain. Gastrointestinal: Negative for abdominal pain. Genitourinary: Negative for dysuria. Musculoskeletal: Negative for back pain. Neurological: Negative for syncope. Psychiatric/Behavioral: Positive for dysphoric mood, self-injury and suicidal ideas. The patient is nervous/anxious. All other systems reviewed and are negative. Physical Exam   Physical Exam  Vitals and nursing note reviewed. Constitutional:       Comments: Slow speech, pleasant and conversant   HENT:      Head: Normocephalic and atraumatic. Mouth/Throat:      Mouth: Mucous membranes are moist.   Eyes:      Extraocular Movements: Extraocular movements intact. Cardiovascular:      Rate and Rhythm: Normal rate and regular rhythm. Pulses: Normal pulses. Pulmonary:      Effort: Pulmonary effort is normal.      Breath sounds: Normal breath sounds. Abdominal:      Palpations: Abdomen is soft. Tenderness: There is no abdominal tenderness. Musculoskeletal:         General: No deformity. Cervical back: Neck supple. Skin:     General: Skin is warm and dry.    Neurological:      General: No focal deficit present. Mental Status: She is alert. Psychiatric:         Mood and Affect: Mood normal.         Behavior: Behavior normal.         Diagnostic Study Results     Labs -     Recent Results (from the past 24 hour(s))   CBC WITH AUTOMATED DIFF    Collection Time: 05/03/22  5:11 PM   Result Value Ref Range    WBC 6.6 3.6 - 11.0 K/uL    RBC 5.22 (H) 3.80 - 5.20 M/uL    HGB 15.7 11.5 - 16.0 g/dL    HCT 48.5 (H) 35.0 - 47.0 %    MCV 92.9 80.0 - 99.0 FL    MCH 30.1 26.0 - 34.0 PG    MCHC 32.4 30.0 - 36.5 g/dL    RDW 12.5 11.5 - 14.5 %    PLATELET 465 314 - 844 K/uL    MPV 9.3 8.9 - 12.9 FL    NRBC 0.0 0  WBC    ABSOLUTE NRBC 0.00 0.00 - 0.01 K/uL    NEUTROPHILS 70 32 - 75 %    LYMPHOCYTES 24 12 - 49 %    MONOCYTES 5 5 - 13 %    EOSINOPHILS 1 0 - 7 %    BASOPHILS 0 0 - 1 %    IMMATURE GRANULOCYTES 0 0.0 - 0.5 %    ABS. NEUTROPHILS 4.7 1.8 - 8.0 K/UL    ABS. LYMPHOCYTES 1.6 0.8 - 3.5 K/UL    ABS. MONOCYTES 0.4 0.0 - 1.0 K/UL    ABS. EOSINOPHILS 0.0 0.0 - 0.4 K/UL    ABS. BASOPHILS 0.0 0.0 - 0.1 K/UL    ABS. IMM. GRANS. 0.0 0.00 - 0.04 K/UL    DF AUTOMATED     METABOLIC PANEL, COMPREHENSIVE    Collection Time: 05/03/22  5:11 PM   Result Value Ref Range    Sodium 136 136 - 145 mmol/L    Potassium 3.8 3.5 - 5.1 mmol/L    Chloride 104 97 - 108 mmol/L    CO2 31 21 - 32 mmol/L    Anion gap 1 (L) 5 - 15 mmol/L    Glucose 103 (H) 65 - 100 mg/dL    BUN 10 6 - 20 MG/DL    Creatinine 1.09 (H) 0.55 - 1.02 MG/DL    BUN/Creatinine ratio 9 (L) 12 - 20      GFR est AA >60 >60 ml/min/1.73m2    GFR est non-AA 52 (L) >60 ml/min/1.73m2    Calcium 10.5 (H) 8.5 - 10.1 MG/DL    Bilirubin, total 0.4 0.2 - 1.0 MG/DL    ALT (SGPT) 28 12 - 78 U/L    AST (SGOT) 16 15 - 37 U/L    Alk.  phosphatase 53 45 - 117 U/L    Protein, total 9.4 (H) 6.4 - 8.2 g/dL    Albumin 4.8 3.5 - 5.0 g/dL    Globulin 4.6 (H) 2.0 - 4.0 g/dL    A-G Ratio 1.0 (L) 1.1 - 2.2     ETHYL ALCOHOL    Collection Time: 05/03/22  5:11 PM   Result Value Ref Range    ALCOHOL(ETHYL),SERUM <73 <81 MG/DL   SALICYLATE    Collection Time: 05/03/22  5:11 PM   Result Value Ref Range    Salicylate level <0.9 (L) 2.8 - 20.0 MG/DL   LITHIUM    Collection Time: 05/03/22  5:11 PM   Result Value Ref Range    Lithium level 3.59 (HH) 0.60 - 1.20 MMOL/L    Reported dose date NOT PROVIDED      Reported dose time: NOT PROVIDED      Reported dose: NOT PROVIDED UNITS   URINALYSIS W/ REFLEX CULTURE    Collection Time: 05/03/22  5:50 PM    Specimen: Urine   Result Value Ref Range    Color YELLOW/STRAW      Appearance CLEAR CLEAR      Specific gravity 1.006      pH (UA) >8.5 (H) 5.0 - 8.0    Protein Negative NEG mg/dL    Glucose Negative NEG mg/dL    Ketone Negative NEG mg/dL    Bilirubin Negative NEG      Blood Negative NEG      Urobilinogen 0.2 0.2 - 1.0 EU/dL    Nitrites Negative NEG      Leukocyte Esterase TRACE (A) NEG      UA:UC IF INDICATED CULTURE NOT INDICATED BY UA RESULT CNI      WBC 0-4 0 - 4 /hpf    RBC 0-5 0 - 5 /hpf    Epithelial cells FEW FEW /lpf    Bacteria Negative NEG /hpf    Hyaline cast 0-2 0 - 2 /lpf   DRUG SCREEN, URINE    Collection Time: 05/03/22  5:50 PM   Result Value Ref Range    AMPHETAMINES Negative NEG      BARBITURATES Negative NEG      BENZODIAZEPINES Positive (A) NEG      COCAINE Negative NEG      METHADONE Negative NEG      OPIATES Negative NEG      PCP(PHENCYCLIDINE) Negative NEG      THC (TH-CANNABINOL) Negative NEG      Drug screen comment (NOTE)    EKG, 12 LEAD, INITIAL    Collection Time: 05/03/22  7:15 PM   Result Value Ref Range    Ventricular Rate 78 BPM    Atrial Rate 78 BPM    P-R Interval 150 ms    QRS Duration 72 ms    Q-T Interval 402 ms    QTC Calculation (Bezet) 458 ms    Calculated P Axis 67 degrees    Calculated R Axis 61 degrees    Calculated T Axis 50 degrees    Diagnosis       Normal sinus rhythm  Biatrial enlargement  When compared with ECG of 10-CINDY-2020 16:29,  No significant change was found         Radiologic Studies -   No orders to display     CT Results  (Last 48 hours)    None        CXR Results  (Last 48 hours)    None            Medical Decision Making   I am the first provider for this patient. I reviewed the vital signs, available nursing notes, past medical history, past surgical history, family history and social history. Vital Signs-Reviewed the patient's vital signs. Patient Vitals for the past 24 hrs:   Temp Pulse Resp BP SpO2   05/03/22 2056 -- 99 16 106/78 98 %   05/03/22 1644 97.4 °F (36.3 °C) 90 16 99/82 100 %         Provider Notes (Medical Decision Making):   50-year-old presenting to ED after Intentional overdose. Lithium level is high, no symptoms suggestive of acute overdose. She will be given IV fluids and admitted to hospitalist service for further management    EKG sinus, raet 78, nonspecific ST T changes, normal QT    ED Course:     Initial assessment performed. The patients presenting problems have been discussed, and they are in agreement with the care plan formulated and outlined with them. I have encouraged them to ask questions as they arise throughout their visit. Disposition:  admit    PLAN:  1. Current Discharge Medication List        2.    Follow-up Information    None       Return to ED if worse     Diagnosis     Clinical Impression: lithium overdose, suicide attempt

## 2022-05-04 LAB
ALBUMIN SERPL-MCNC: 3.4 G/DL (ref 3.5–5)
ALBUMIN/GLOB SERPL: 1 {RATIO} (ref 1.1–2.2)
ALP SERPL-CCNC: 37 U/L (ref 45–117)
ALT SERPL-CCNC: 20 U/L (ref 12–78)
ANION GAP SERPL CALC-SCNC: 0 MMOL/L (ref 5–15)
APAP SERPL-MCNC: <2 UG/ML (ref 10–30)
AST SERPL-CCNC: 16 U/L (ref 15–37)
ATRIAL RATE: 78 BPM
BILIRUB SERPL-MCNC: 0.3 MG/DL (ref 0.2–1)
BUN SERPL-MCNC: 8 MG/DL (ref 6–20)
BUN/CREAT SERPL: 9 (ref 12–20)
CALCIUM SERPL-MCNC: 8.9 MG/DL (ref 8.5–10.1)
CALCULATED P AXIS, ECG09: 67 DEGREES
CALCULATED R AXIS, ECG10: 61 DEGREES
CALCULATED T AXIS, ECG11: 50 DEGREES
CHLORIDE SERPL-SCNC: 109 MMOL/L (ref 97–108)
CO2 SERPL-SCNC: 30 MMOL/L (ref 21–32)
CREAT SERPL-MCNC: 0.92 MG/DL (ref 0.55–1.02)
DATE LAST DOSE: ABNORMAL
DIAGNOSIS, 93000: NORMAL
ERYTHROCYTE [DISTWIDTH] IN BLOOD BY AUTOMATED COUNT: 12.7 % (ref 11.5–14.5)
GLOBULIN SER CALC-MCNC: 3.5 G/DL (ref 2–4)
GLUCOSE SERPL-MCNC: 75 MG/DL (ref 65–100)
HCT VFR BLD AUTO: 36.9 % (ref 35–47)
HGB BLD-MCNC: 11.8 G/DL (ref 11.5–16)
LITHIUM SERPL-SCNC: 3.48 MMOL/L (ref 0.6–1.2)
MCH RBC QN AUTO: 30.6 PG (ref 26–34)
MCHC RBC AUTO-ENTMCNC: 32 G/DL (ref 30–36.5)
MCV RBC AUTO: 95.6 FL (ref 80–99)
NRBC # BLD: 0 K/UL (ref 0–0.01)
NRBC BLD-RTO: 0 PER 100 WBC
P-R INTERVAL, ECG05: 150 MS
PLATELET # BLD AUTO: 221 K/UL (ref 150–400)
PMV BLD AUTO: 9.5 FL (ref 8.9–12.9)
POTASSIUM SERPL-SCNC: 3.9 MMOL/L (ref 3.5–5.1)
PROT SERPL-MCNC: 6.9 G/DL (ref 6.4–8.2)
Q-T INTERVAL, ECG07: 402 MS
QRS DURATION, ECG06: 72 MS
QTC CALCULATION (BEZET), ECG08: 458 MS
RBC # BLD AUTO: 3.86 M/UL (ref 3.8–5.2)
REPORTED DOSE,DOSE: ABNORMAL UNITS
REPORTED DOSE/TIME,TMG: ABNORMAL
SODIUM SERPL-SCNC: 139 MMOL/L (ref 136–145)
VENTRICULAR RATE, ECG03: 78 BPM
WBC # BLD AUTO: 7.5 K/UL (ref 3.6–11)

## 2022-05-04 PROCEDURE — 74011250636 HC RX REV CODE- 250/636: Performed by: INTERNAL MEDICINE

## 2022-05-04 PROCEDURE — 80178 ASSAY OF LITHIUM: CPT

## 2022-05-04 PROCEDURE — 80143 DRUG ASSAY ACETAMINOPHEN: CPT

## 2022-05-04 PROCEDURE — 80053 COMPREHEN METABOLIC PANEL: CPT

## 2022-05-04 PROCEDURE — 65270000046 HC RM TELEMETRY

## 2022-05-04 PROCEDURE — 36415 COLL VENOUS BLD VENIPUNCTURE: CPT

## 2022-05-04 PROCEDURE — 85027 COMPLETE CBC AUTOMATED: CPT

## 2022-05-04 PROCEDURE — 74011000250 HC RX REV CODE- 250: Performed by: INTERNAL MEDICINE

## 2022-05-04 RX ADMIN — SODIUM CHLORIDE 125 ML/HR: 9 INJECTION, SOLUTION INTRAVENOUS at 19:59

## 2022-05-04 RX ADMIN — SODIUM CHLORIDE, PRESERVATIVE FREE 10 ML: 5 INJECTION INTRAVENOUS at 20:06

## 2022-05-04 RX ADMIN — SODIUM CHLORIDE, PRESERVATIVE FREE 10 ML: 5 INJECTION INTRAVENOUS at 06:00

## 2022-05-04 RX ADMIN — SODIUM CHLORIDE, PRESERVATIVE FREE 10 ML: 5 INJECTION INTRAVENOUS at 15:27

## 2022-05-04 RX ADMIN — SODIUM CHLORIDE 100 ML/HR: 9 INJECTION, SOLUTION INTRAVENOUS at 10:01

## 2022-05-04 RX ADMIN — ENOXAPARIN SODIUM 40 MG: 100 INJECTION SUBCUTANEOUS at 15:38

## 2022-05-04 NOTE — PROGRESS NOTES
09:34: Attending BARRON Healy notified of critical Lithium level via [employer], \"Pt's Lithium: 3.48 (down from 3.59 yesterday ~ 17:00)\"    11:54: attempted to call report, no answer on unit  13:45: Transport at bedside to move pt to room #5675  13:49: 2nd attempt to call report, again, no answer on unit

## 2022-05-04 NOTE — ED NOTES
TRANSITION OF CARE - SBAR OUT    Patient is being transferred to [facility] Emergency Dept, Room# 39. Report GIVEN TO EMEKA Schneider on Félix Grier for routine progression of care. Report is consisted of the following information SBAR. Patient transferred to receiving unit by: Harpal Barth RN     Called outstanding consults: Yes   Collected routine labs: Yes     All current orders reviewed with accepting nurse: Yes    The following personal items will be sent with the patient during transfer to the floor:   All valuables:                          CARDIAC MONITORING ORDERED: Yes     The following CURRENT information were reported to the receiving RN:    CODE STATUS: Full Code    NIH SCORE:    DONNIE SCREENING:      NEURO ASSESSMENT:        RESTRAINTS IN USE: No      IS DOCUMENTATION COMPLETE: Yes      Vital Signs  Level of Consciousness: Alert (0) (05/03/22 2056)  Temp: 97.4 °F (36.3 °C) (05/03/22 1644)  Temp Source: Oral (05/03/22 1644)  Pulse (Heart Rate): 99 (05/03/22 2056)  Resp Rate: 16 (05/03/22 2056)  BP: 106/78 (05/03/22 2056)  MAP (Calculated): 87 (05/03/22 2056)  BP 1 Location: Right upper arm (05/03/22 2056)  BP 1 Method: Automatic (05/03/22 2056)  BP Patient Position: At rest (05/03/22 2056)  MEWS Score: 2 (05/03/22 1644)  Pain 1  Pain Scale 1: Numeric (0 - 10) (05/03/22 1644)  Pain Intensity 1: 0 (05/03/22 1644)  Patient Stated Pain Goal: 0 (05/03/22 1644)      OXYGEN: Oxygen Therapy  O2 Device: None (Room air) (05/03/22 1644)    VASQUEZ FALL RISK:        WOUNDS: No    URINARY CATHETER: voiding    LINE ACCESS:   Peripheral IV 48/62/00 Left Basilic (Active)   Site Assessment Clean, dry, & intact 05/03/22 2016   Phlebitis Assessment 0 05/03/22 2016   Infiltration Assessment 0 05/03/22 2016   Dressing Status Clean, dry, & intact 05/03/22 2016   Dressing Type Tape;Transparent 05/03/22 2016   Hub Color/Line Status Green;Flushed;Patent 05/03/22 2016        Opportunity for questions and clarification were provided.   Erick Nugent RN

## 2022-05-04 NOTE — PROGRESS NOTES
Problem: Suicide  Goal: *STG: Remains safe in hospital  Outcome: Progressing Towards Goal  Goal: *STG: Seeks staff when feelings of self harm or harm towards others arise  Outcome: Progressing Towards Goal  Goal: *STG/LTG: Complies with medication therapy  Outcome: Progressing Towards Goal  Goal: *STG/LTG: No longer expresses self destructive or suicidal thoughts  Outcome: Progressing Towards Goal

## 2022-05-04 NOTE — ED NOTES
Pt experienced one episode of vomitus from still position. Pt changed by ACT and linens replaced.  Pt moved to ED 39

## 2022-05-04 NOTE — PROGRESS NOTES
Hospitalist Progress Note    NAME: Mica Cortés   :  1965   MRN:  [id number]       Assessment / Plan:  Intentional Drug overdose POA-  with lithium, temazepam and hydroxyzine  Elevated lithium levels POA- 3.59-->3.48 today  Suicidal ideation POA  -Patient is currently asymptomatic and is at baseline  Creatinine is within normal limits. Repeat lithium levels and if they are going up, consider hemodialysis  -Salicylate level is normal.   Check acetaminophen level= <2  -Urine drug screen positive for benzodiazepines only  -LFTs are within normal limits  IP Psychiatry consult-- will need IP Psych admission once medically cleared  Cont 1:1 Bedside sitter, suicide precautions. Li levels daily till <2.0  -Telemetry monitoring        Schizophrenia  Bipolar disorder  Dyslipidemia  Chronic low back pain  -Hold all home medications due to drug overdose and resume as tolerated        Code Status: Full code  Surrogate Decision Maker:  Kandis Narvaez     DVT Prophylaxis: Lovenox  GI Prophylaxis: not indicated     Baseline: From home, independent of ADLs      Estimated discharge date: May 6  Barriers: Elevated Li levels    Recommended Disposition: IP Psych      Subjective:     Chief Complaint / Reason for Physician Visit: F/U Intentional Drug Overdose, Schizophrenia, Bipolar  \"I am fine\". Discussed with RN events overnight. Review of Systems:  Symptom Y/N Comments  Symptom Y/N Comments   Fever/Chills n   Chest Pain n    Poor Appetite n   Edema n    Cough n   Abdominal Pain n    Sputum n   Joint Pain n    SOB/BURKS    Pruritis/Rash     Nausea/vomit    Tolerating PT/OT y    Diarrhea    Tolerating Diet y    Constipation    Other       Could NOT obtain due to:      Objective:     VITALS:   Last 24hrs VS reviewed since prior progress note.  Most recent are:  Patient Vitals for the past 24 hrs:   Temp Pulse Resp BP SpO2   22 1422 97.9 °F (36.6 °C) 75 18 111/64 100 %   22 1000 -- 79 17 111/77 100 %   22 0821 97.8 °F (36.6 °C) 78 16 93/64 100 %   05/04/22 0527 97.7 °F (36.5 °C) 83 16 99/69 100 %   05/04/22 0230 -- 75 17 101/63 95 %   05/03/22 2300 97.9 °F (36.6 °C) 86 16 113/79 98 %   05/03/22 2056 -- 99 16 106/78 98 %   05/03/22 1644 97.4 °F (36.3 °C) 90 16 99/82 100 %       Intake/Output Summary (Last 24 hours) at 5/4/2022 1507  Last data filed at 5/4/2022 1002  Gross per 24 hour   Intake 1118.33 ml   Output --   Net 1118.33 ml        I had a face to face encounter and independently examined this patient on 5/4/2022, as outlined below:  PHYSICAL EXAM:  General: WD, WN. Alert, cooperative, no acute distress    EENT:  EOMI. Anicteric sclerae. MMM  Resp:  CTA bilaterally, no wheezing or rales. No accessory muscle use  CV:  Regular  rhythm,  No edema  GI:  Soft, Non distended, Non tender. +Bowel sounds  Neurologic:  Alert and oriented X 3, normal speech,   Psych:   Good insight. Not anxious nor agitated  Skin:  No rashes. No jaundice    Reviewed most current lab test results and cultures  YES  Reviewed most current radiology test results   YES  Review and summation of old records today    NO  Reviewed patient's current orders and MAR    YES  PMH/ reviewed - no change compared to H&P  ________________________________________________________________________  Care Plan discussed with:    Comments   Patient x    Family      RN x    Care Manager     Consultant                        Multidiciplinary team rounds were held today with , nursing, pharmacist and clinical coordinator. Patient's plan of care was discussed; medications were reviewed and discharge planning was addressed.      ________________________________________________________________________  Total NON critical care TIME:  36   Minutes    Total CRITICAL CARE TIME Spent:   Minutes non procedure based      Comments   >50% of visit spent in counseling and coordination of care ________________________________________________________________________  Amy Albarado MD     Procedures: see electronic medical records for all procedures/Xrays and details which were not copied into this note but were reviewed prior to creation of Plan. LABS:  I reviewed today's most current labs and imaging studies.   Pertinent labs include:  Recent Labs     05/04/22  0339 05/03/22  1711   WBC 7.5 6.6   HGB 11.8 15.7   HCT 36.9 48.5*    250     Recent Labs     05/04/22  0339 05/03/22  1711    136   K 3.9 3.8   * 104   CO2 30 31   GLU 75 103*   BUN 8 10   CREA 0.92 1.09*   CA 8.9 10.5*   ALB 3.4* 4.8   TBILI 0.3 0.4   ALT 20 28       Signed: Amy Albarado MD

## 2022-05-04 NOTE — H&P
Hospitalist Admission Note    NAME: Koko Ruano   :  1965   MRN:  [id number]     Date/Time:  5/3/2022 9:45 PM    Patient PCP: Lima Albrecht MD  ________________________________________________________________________    My assessment of this patient's clinical condition and my plan of care is as follows. Assessment / Plan:  Drug overdose with lithium, temazepam and hydroxyzine  Elevated lithium levels  Suicidal ideation  -Patient is currently asymptomatic and is at baseline  -Lithium level is elevated at 3.59. Creatinine is within normal limits. Repeat lithium levels and if they are going up, consider hemodialysis  -Salicylate level is normal.  Check acetaminophen level.  -Urine drug screen positive for benzodiazepines only  -LFTs are within normal limits  -Consult psychiatry. Bedside sitter. Check CBC and CMP in a.m. tomorrow  -Telemetry monitoring  -She will need to go to inpatient psych facility      Schizophrenia  Bipolar disorder  Dyslipidemia  Chronic low back pain  -Hold all home medications due to drug overdose and resume as tolerated      Code Status: Full code  Surrogate Decision Maker:  Dion Christian    DVT Prophylaxis: Lovenox  GI Prophylaxis: not indicated    Baseline: From home, independent of ADLs        Subjective:   CHIEF COMPLAINT: Drug overdose    HISTORY OF PRESENT ILLNESS:     Carmen Pritchard is a 61 y.o.   female who presents with past medical history of schizophrenia, depression, anxiety is coming the hospital chief complaints of drug overdose. Patient reports that around 730 she took some unknown quantity of lithium, temazepam and hydroxyzine. She currently does not report any symptoms. Does not report any confusion, chest pain, palpitations or syncopal episodes. Does not report any shortness of breath. Denies any abdominal pain, nausea or vomiting.     On arrival to ED, vital signs are normal.  On labs CBC is normal.  CMP is normal.  Lithium level is elevated at 3.59. Salicylate level is normal at 1.7. EKG shows normal sinus rhythm. We were asked to admit for work up and evaluation of the above problems.      Past Medical History:   Diagnosis Date    Anxiety and depression     Bipolar 1 disorder with moderate robyn ([address] 3/17/2014    Chronic back pain     Hyperlipidemia 8/22/2016    Hyponatremia     Psychotic disorder ([address]     Scoliosis         Past Surgical History:   Procedure Laterality Date    HX HEENT      wisdom teeth extraction    HX LIPOMA RESECTION      right gluteal    FREDY BIOPSY BREAST STEREOTACTIC Left 2011    negative    CT DENTAL SURGERY PROCEDURE      hx of dental surgery- wisdom teeth extracted       Social History     Tobacco Use    Smoking status: Never Smoker    Smokeless tobacco: Never Used   Substance Use Topics    Alcohol use: Yes     Comment: occasional        Family History   Problem Relation Age of Onset   Baeza Arthritis-rheumatoid Mother     Migraines Mother     Psychiatric Disorder Mother     Bipolar Disorder Mother     Lupus Mother     Alcohol abuse Father     Lung Disease Father     Heart Disease Father     Stroke Father     High Cholesterol Father     Psychiatric Disorder Sister     Alcohol abuse Sister     Bipolar Disorder Sister     Stroke Brother     Cancer Maternal Aunt     Breast Cancer Maternal Aunt         pt thniks she was under 48    Bipolar Disorder Sister      Allergies   Allergen Reactions    Other Food Hives     Artifical sweetners    Claritin-D 12 Hour [Loratadine-Pseudoephedrine] Palpitations     palpatations and ringing in the ear    Other Medication Anaphylaxis     Artificial sweeteners cause anaphylaxis    Pcn [Penicillins] Anaphylaxis    Sunscreen Contact Dermatitis     Burns and opens the skin    Ultram [Tramadol] Hives and Other (comments)     Hives and ringing of the ears    Benzoyl Peroxide Hives    Cephalexin Itching    Divalproex Itching    Maltodextrin-Glycerin Shortness of Breath        Prior to Admission medications    Medication Sig Start Date End Date Taking? Authorizing Provider   temazepam (RESTORIL) 15 mg capsule  11/3/21   Provider, Historical   trihexyphenidyL (ARTANE) 2 mg tablet  1/3/22   Provider, Historical   methocarbamoL (ROBAXIN) 500 mg tablet Take 500 mg by mouth as needed for Muscle Spasm(s). Patient not taking: Reported on 1/20/2022    Provider, Historical   OLANZapine (ZyPREXA) 15 mg tablet Take 2 Tabs by mouth nightly. Indications: robyn associated with bipolar disorder  Patient not taking: Reported on 1/20/2022 8/6/20   Bridget Saunders MD   lithium carbonate 300 mg capsule Take 1 Cap by mouth two (2) times a day. Indications: robyn associated with bipolar disorder  Patient taking differently: Take 300 mg by mouth daily. Indications: robyn associated with bipolar disorder 8/6/20   Bridget Saunders MD   traZODone (DESYREL) 150 mg tablet Take 1 Tab by mouth nightly. Indications: insomnia associated with depression 8/6/20   Bridget Saunders MD   hydrOXYzine HCL (ATARAX) 50 mg tablet Take 1 Tab by mouth two (2) times daily as needed for Anxiety. Indications: anxious 8/6/20   Bridget Saunders MD   azelastine (ASTELIN) 137 mcg (0.1 %) nasal spray 1 [place] by Both Nostrils route as needed for Rhinitis. Prior to allergy injections    Provider, Historical   cetirizine (ZYRTEC) 10 mg tablet Take 10 mg by mouth every evening. Patient not taking: Reported on 1/20/2022    Provider, Historical   cholecalciferol (VITAMIN D3) (50,000 UNITS /1250 MCG) capsule Take 50,000 Units by mouth every Monday. Other, MD Caroline   montelukast (SINGULAIR) 10 mg tablet Take 10 mg by mouth daily as needed. Prior to allergy shots  Patient not taking: Reported on 1/20/2022    Other, MD Caroline   fexofenadine (ALLEGRA) 180 mg tablet Take 180 mg by mouth daily. Provider, Historical       REVIEW OF SYSTEMS:     I am not able to complete the review of systems because:    The patient is intubated and sedated    The patient has altered mental status due to his acute medical problems    The patient has baseline aphasia from prior stroke(s)    The patient has baseline dementia and is not reliable historian    The patient is in acute medical distress and unable to provide information           Total of 12 systems reviewed as follows:       POSITIVE= underlined text  Negative = text not underlined  General:  fever, chills, sweats, generalized weakness, weight loss/gain,      loss of appetite   Eyes:    blurred vision, eye pain, loss of vision, double vision  ENT:    rhinorrhea, pharyngitis   Respiratory:   cough, sputum production, SOB, BURKS, wheezing, pleuritic pain   Cardiology:   chest pain, palpitations, orthopnea, PND, edema, syncope   Gastrointestinal:  abdominal pain , N/V, diarrhea, dysphagia, constipation, bleeding   Genitourinary:  frequency, urgency, dysuria, hematuria, incontinence   Muskuloskeletal :  arthralgia, myalgia, back pain  Hematology:  easy bruising, nose or gum bleeding, lymphadenopathy   Dermatological: rash, ulceration, pruritis, color change / jaundice  Endocrine:   hot flashes or polydipsia   Neurological:  headache, dizziness, confusion, focal weakness, paresthesia,     Speech difficulties, memory loss, gait difficulty  Psychological: Feelings of anxiety, depression, agitation    Objective:   VITALS:    Visit Vitals  /78 (BP 1 Location: Right upper arm, BP Patient Position: At rest)   Pulse 99   Temp 97.4 °F (36.3 °C)   Resp 16   Ht 5' 1\" (1.549 m)   Wt 72.6 kg (160 lb)   SpO2 98%   BMI 30.23 kg/m²       PHYSICAL EXAM:    General:    Alert, cooperative, no distress, appears stated age. HEENT: Atraumatic, anicteric sclerae, pink conjunctivae     No oral ulcers, mucosa moist, throat clear, dentition fair  Neck:  Supple, symmetrical,  thyroid: non tender  Lungs:   Clear to auscultation bilaterally. No Wheezing or Rhonchi. No rales.   Chest wall:  No tenderness  No Accessory muscle use. Heart:   Regular  rhythm,  No  murmur   No edema  Abdomen:   Soft, non-tender. Not distended. Bowel sounds normal  Extremities: No cyanosis. No clubbing,      Skin turgor normal, Capillary refill normal, Radial dial pulse 2+  Skin:     Not pale. Not Jaundiced  No rashes   Psych:  Not anxious or agitated. Neurologic: EOMs intact. No facial asymmetry. No aphasia or slurred speech. Symmetrical strength, Sensation grossly intact. Alert and awake.    _______________________________________________________________________  Care Plan discussed with:    Comments   Patient y    Family      RN y    Care Manager                    Consultant:      _______________________________________________________________________  Expected  Disposition:   Home with Family y   HH/PT/OT/RN    SNF/LTC    ELIESER    ________________________________________________________________________  TOTAL TIME: 61 Minutes    Critical Care Provided     Minutes non procedure based      Comments    y Reviewed previous records   >50% of visit spent in counseling and coordination of care y Discussion with patient and/or family and questions answered       ________________________________________________________________________  Signed: Héctor Reinoso MD    Procedures: see electronic medical records for all procedures/Xrays and details which were not copied into this note but were reviewed prior to creation of Plan.     LAB DATA REVIEWED:    Recent Results (from the past 24 hour(s))   CBC WITH AUTOMATED DIFF    Collection Time: 05/03/22  5:11 PM   Result Value Ref Range    WBC 6.6 3.6 - 11.0 K/uL    RBC 5.22 (H) 3.80 - 5.20 M/uL    HGB 15.7 11.5 - 16.0 g/dL    HCT 48.5 (H) 35.0 - 47.0 %    MCV 92.9 80.0 - 99.0 FL    MCH 30.1 26.0 - 34.0 PG    MCHC 32.4 30.0 - 36.5 g/dL    RDW 12.5 11.5 - 14.5 %    PLATELET 183 685 - 012 K/uL    MPV 9.3 8.9 - 12.9 FL    NRBC 0.0 0  WBC    ABSOLUTE NRBC 0.00 0.00 - 0.01 K/uL    NEUTROPHILS 70 32 - 75 %    LYMPHOCYTES 24 12 - 49 %    MONOCYTES 5 5 - 13 %    EOSINOPHILS 1 0 - 7 %    BASOPHILS 0 0 - 1 %    IMMATURE GRANULOCYTES 0 0.0 - 0.5 %    ABS. NEUTROPHILS 4.7 1.8 - 8.0 K/UL    ABS. LYMPHOCYTES 1.6 0.8 - 3.5 K/UL    ABS. MONOCYTES 0.4 0.0 - 1.0 K/UL    ABS. EOSINOPHILS 0.0 0.0 - 0.4 K/UL    ABS. BASOPHILS 0.0 0.0 - 0.1 K/UL    ABS. IMM. GRANS. 0.0 0.00 - 0.04 K/UL    DF AUTOMATED     METABOLIC PANEL, COMPREHENSIVE    Collection Time: 05/03/22  5:11 PM   Result Value Ref Range    Sodium 136 136 - 145 mmol/L    Potassium 3.8 3.5 - 5.1 mmol/L    Chloride 104 97 - 108 mmol/L    CO2 31 21 - 32 mmol/L    Anion gap 1 (L) 5 - 15 mmol/L    Glucose 103 (H) 65 - 100 mg/dL    BUN 10 6 - 20 MG/DL    Creatinine 1.09 (H) 0.55 - 1.02 MG/DL    BUN/Creatinine ratio 9 (L) 12 - 20      GFR est AA >60 >60 ml/min/1.73m2    GFR est non-AA 52 (L) >60 ml/min/1.73m2    Calcium 10.5 (H) 8.5 - 10.1 MG/DL    Bilirubin, total 0.4 0.2 - 1.0 MG/DL    ALT (SGPT) 28 12 - 78 U/L    AST (SGOT) 16 15 - 37 U/L    Alk.  phosphatase 53 45 - 117 U/L    Protein, total 9.4 (H) 6.4 - 8.2 g/dL    Albumin 4.8 3.5 - 5.0 g/dL    Globulin 4.6 (H) 2.0 - 4.0 g/dL    A-G Ratio 1.0 (L) 1.1 - 2.2     ETHYL ALCOHOL    Collection Time: 05/03/22  5:11 PM   Result Value Ref Range    ALCOHOL(ETHYL),SERUM <46 <11 MG/DL   SALICYLATE    Collection Time: 05/03/22  5:11 PM   Result Value Ref Range    Salicylate level <4.1 (L) 2.8 - 20.0 MG/DL   LITHIUM    Collection Time: 05/03/22  5:11 PM   Result Value Ref Range    Lithium level 3.59 (HH) 0.60 - 1.20 MMOL/L    Reported dose date NOT PROVIDED      Reported dose time: NOT PROVIDED      Reported dose: NOT PROVIDED UNITS   URINALYSIS W/ REFLEX CULTURE    Collection Time: 05/03/22  5:50 PM    Specimen: Urine   Result Value Ref Range    Color YELLOW/STRAW      Appearance CLEAR CLEAR      Specific gravity 1.006      pH (UA) >8.5 (H) 5.0 - 8.0    Protein Negative NEG mg/dL    Glucose Negative NEG mg/dL    Ketone Negative NEG mg/dL    Bilirubin Negative NEG      Blood Negative NEG      Urobilinogen 0.2 0.2 - 1.0 EU/dL    Nitrites Negative NEG      Leukocyte Esterase TRACE (A) NEG      UA:UC IF INDICATED CULTURE NOT INDICATED BY UA RESULT CNI      WBC 0-4 0 - 4 /hpf    RBC 0-5 0 - 5 /hpf    Epithelial cells FEW FEW /lpf    Bacteria Negative NEG /hpf    Hyaline cast 0-2 0 - 2 /lpf   DRUG SCREEN, URINE    Collection Time: 05/03/22  5:50 PM   Result Value Ref Range    AMPHETAMINES Negative NEG      BARBITURATES Negative NEG      BENZODIAZEPINES Positive (A) NEG      COCAINE Negative NEG      METHADONE Negative NEG      OPIATES Negative NEG      PCP(PHENCYCLIDINE) Negative NEG      THC (TH-CANNABINOL) Negative NEG      Drug screen comment (NOTE)    EKG, 12 LEAD, INITIAL    Collection Time: 05/03/22  7:15 PM   Result Value Ref Range    Ventricular Rate 78 BPM    Atrial Rate 78 BPM    P-R Interval 150 ms    QRS Duration 72 ms    Q-T Interval 402 ms    QTC Calculation (Bezet) 458 ms    Calculated P Axis 67 degrees    Calculated R Axis 61 degrees    Calculated T Axis 50 degrees    Diagnosis       Normal sinus rhythm  Biatrial enlargement  When compared with ECG of 10-CINDY-2020 16:29,  No significant change was found

## 2022-05-04 NOTE — PROGRESS NOTES
Physical Therapy Screening:  Services are not indicated at this time. [facility] based on results obtained during the nursing admission assessment. The patients chart was reviewed and the patient is not appropriate for a skilled therapy evaluation at this time. Please consult physical therapy if any therapy needs arise. Thank you.     Greg Palafox, [address]

## 2022-05-04 NOTE — PROGRESS NOTES
End of Shift Note    Bedside shift change report given to Elif (oncoming nurse) by Darron Cabot, RN (offgoing nurse). Report included the following information SBAR, Kardex, MAR and Recent Results    Shift worked: [id number]     Shift summary and any significant changes:    New admit    1:1  for suicide attempt    Pt family would like to speak with the provider       Concerns for physician to address: See above   Zone phone for oncoming shift:  [phone]     Patient Information  Rosalina Cano  61 y.o.  5/3/2022  4:54 PM by Juan Villatoro MD. Rosalina Cano was admitted from Home    Problem List  Patient Active Problem List    Diagnosis Date Noted    Drug overdose 05/03/2022    Robyn ([address] 07/26/2020    Scoliosis     Anxiety and depression     Bipolar 1 disorder ([address] 01/21/2020    Hyponatremia 01/10/2020    Chronic back pain 08/22/2016    Hyperlipidemia 08/22/2016    Bipolar 1 disorder with moderate robyn ([address] 03/17/2014     Past Medical History:   Diagnosis Date    Anxiety and depression     Bipolar 1 disorder with moderate robyn ([address] 3/17/2014    Chronic back pain     Hyperlipidemia 8/22/2016    Hyponatremia     Psychotic disorder ([address]     Scoliosis        Core Measures:  CVA: No No  CHF:No No   PNA:No No     Activity:  Activity Level: Up with Assistance  Number times ambulated in hallways past shift: 0  Number of times OOB to chair past shift: 0    Cardiac:   Cardiac Monitoring: Yes     Cardiac Rhythm: Sinus Rhythm    Access:   Current line(s): PIV   Central Line? No   PICC LINE? No    Genitourinary:   Urinary status: voiding  Urinary Catheter?  No     Respiratory:   O2 Device: None (Room air)  Chronic home O2 use?: NO  Incentive spirometer at bedside: NO       GI:  Last Bowel Movement Date: 05/04/22  Current diet:  ADULT DIET Regular; Safety Tray; Safety Tray (Disposables)  DIET ONE TIME MESSAGE  Passing flatus: YES  Tolerating current diet: YES       Pain Management:   Patient states pain is manageable on current regimen: YES    Skin:  Fareed Score: 20  Interventions: increase time out of bed    Patient Safety:  Fall Score:  Total Score: 4  Interventions: bed/chair alarm  High Fall Risk: Yes  @Rollbelt  @dexterity to release roll belt  Yes/No ( must document dexterity  here by stating Yes or No here, otherwise this is a restraint and must follow restraint documentation policy.)    DVT prophylaxis:  DVT prophylaxis Med- Yes  DVT prophylaxis SCD or BRIJESH- No     Wounds: (If Applicable)  Wounds- No  Location     Active Consults:  IP CONSULT TO HOSPITALIST  IP CONSULT TO PSYCHIATRY    Length of Stay:  Expected LOS: - - -  Actual LOS: 1  Discharge Plan: No      Cathy Jhaveri RN

## 2022-05-04 NOTE — ED NOTES
Pt. Arrived to room 39 via stretcher, accompanied by sitter and . Patient denies c/o pain, but does c/o nausea and requests something to drink. She is alert and oriented x 4 and is ambulatory with assistance. Patient states that she is not suicidal at this time and does not wish to harm herself or others. Will continue to monitor and assess.

## 2022-05-05 ENCOUNTER — APPOINTMENT (OUTPATIENT)
Dept: INTERNAL MEDICINE CLINIC | Age: 57
DRG: 918 | End: 2022-05-05
Payer: MEDICARE

## 2022-05-05 LAB
DATE LAST DOSE: ABNORMAL
LITHIUM SERPL-SCNC: 1.35 MMOL/L (ref 0.6–1.2)
REPORTED DOSE,DOSE: ABNORMAL UNITS
REPORTED DOSE/TIME,TMG: ABNORMAL

## 2022-05-05 PROCEDURE — 74011250637 HC RX REV CODE- 250/637: Performed by: INTERNAL MEDICINE

## 2022-05-05 PROCEDURE — 74011250636 HC RX REV CODE- 250/636: Performed by: INTERNAL MEDICINE

## 2022-05-05 PROCEDURE — 80178 ASSAY OF LITHIUM: CPT

## 2022-05-05 PROCEDURE — 36415 COLL VENOUS BLD VENIPUNCTURE: CPT

## 2022-05-05 PROCEDURE — 65270000046 HC RM TELEMETRY

## 2022-05-05 PROCEDURE — 74011000250 HC RX REV CODE- 250: Performed by: INTERNAL MEDICINE

## 2022-05-05 RX ORDER — HYDROXYZINE 25 MG/1
50 TABLET, FILM COATED ORAL
Status: DISCONTINUED | OUTPATIENT
Start: 2022-05-05 | End: 2022-05-08 | Stop reason: HOSPADM

## 2022-05-05 RX ORDER — OLANZAPINE 10 MG/1
30 TABLET ORAL
Status: DISCONTINUED | OUTPATIENT
Start: 2022-05-05 | End: 2022-05-08 | Stop reason: HOSPADM

## 2022-05-05 RX ORDER — AZELASTINE 1 MG/ML
1 SPRAY, METERED NASAL DAILY PRN
Status: DISCONTINUED | OUTPATIENT
Start: 2022-05-05 | End: 2022-05-08 | Stop reason: HOSPADM

## 2022-05-05 RX ORDER — CETIRIZINE HCL 10 MG
10 TABLET ORAL EVERY EVENING
Status: DISCONTINUED | OUTPATIENT
Start: 2022-05-05 | End: 2022-05-08 | Stop reason: HOSPADM

## 2022-05-05 RX ADMIN — OLANZAPINE 30 MG: 10 TABLET, FILM COATED ORAL at 21:21

## 2022-05-05 RX ADMIN — SODIUM CHLORIDE 125 ML/HR: 9 INJECTION, SOLUTION INTRAVENOUS at 05:11

## 2022-05-05 RX ADMIN — CETIRIZINE HYDROCHLORIDE 10 MG: 10 TABLET, FILM COATED ORAL at 12:53

## 2022-05-05 RX ADMIN — ACETAMINOPHEN 650 MG: 325 TABLET ORAL at 21:21

## 2022-05-05 RX ADMIN — ENOXAPARIN SODIUM 40 MG: 100 INJECTION SUBCUTANEOUS at 12:56

## 2022-05-05 RX ADMIN — SODIUM CHLORIDE, PRESERVATIVE FREE 10 ML: 5 INJECTION INTRAVENOUS at 22:00

## 2022-05-05 RX ADMIN — TRAZODONE HYDROCHLORIDE 150 MG: 100 TABLET ORAL at 21:22

## 2022-05-05 RX ADMIN — SODIUM CHLORIDE 125 ML/HR: 9 INJECTION, SOLUTION INTRAVENOUS at 12:53

## 2022-05-05 NOTE — CONSULTS
PSYCHIATRY CONSULT NOTE:    REASON FOR CONSULT: Suicide attempt    HISTORY OF PRESENTING COMPLAINT:  Ne Sow is a 61 y.o. BLACK/ female who [facility] [address] [facility] Ms. Kriss Michele took an overdose on lithium, temazepam, and hydroxyzine in a suicide attempt 2 days ago. Her lithium level is trending down. Ms. Kriss Michele was calm and cooperative, alert in all spheres during assessment. She estimates she took about #10 of the 300mg lithium tablets. She estimates she took another #10 hydroxyzine, but she isn't sure the dose. She also took about #10 temazepam. Ms. Kriss Michele states she had been thinking about suicide for 2 days. She felt she had done something \"really really bad\" and she felt she couldn't talk to anyone about it. Her  found her and took her to the hospital. Ms. Kriss Michele is glad she didn't die; she has been talking to her mother and other supports and is feeling better now, and is no longer having thoughts of suicide. PAST PSYCHIATRIC HISTORY:  Ms. Kriss Michele has a past psychiatric history significant for schizoaffective disorder. This was her first suicide attempt, and she states she has not even had thoughts of suicide before. She does have a history of inpatient psychiatric treatment, about 7-8 times in total starting in 2005. She sees Baldemar Hunter for psychiatry. She has not been hearing voices or having other perceptual disturbances recently. SUBSTANCE ABUSE HISTORY:  None    PSYCHOSOCIAL HISTORY:  Ms. Kriss Michele is , no children. She is on disability due to mental illness. PAST MEDICAL HISTORY:  Please see H&P for details. Past Medical History:   Diagnosis Date    Anxiety and depression     Bipolar 1 disorder with moderate robyn ([address] 3/17/2014    Chronic back pain     Hyperlipidemia 8/22/2016    Hyponatremia     [address] ([facility] [address]     Scoliosis      Prior to Admission medications    Medication Sig Start Date End Date Taking? Authorizing Provider   temazepam (RESTORIL) 15 mg capsule  11/3/21   Provider, Historical   trihexyphenidyL (ARTANE) 2 mg tablet  1/3/22   Provider, Historical   methocarbamoL (ROBAXIN) 500 mg tablet Take 500 mg by mouth as needed for Muscle Spasm(s). Patient not taking: Reported on 1/20/2022    Provider, Historical   OLANZapine (ZyPREXA) 15 mg tablet Take 2 Tabs by mouth nightly. Indications: robyn associated with bipolar disorder  Patient not taking: Reported on 1/20/2022 8/6/20   Izabela Longoria MD   lithium carbonate 300 mg capsule Take 1 Cap by mouth two (2) times a day. Indications: robyn associated with bipolar disorder 8/6/20   Izabela Longoria MD   traZODone (DESYREL) 150 mg tablet Take 1 Tab by mouth nightly. Indications: insomnia associated with depression 8/6/20   Izabela Longoria MD   hydrOXYzine HCL (ATARAX) 50 mg tablet Take 1 Tab by mouth two (2) times daily as needed for Anxiety. Indications: anxious 8/6/20   Izabela Longoria MD   azelastine (ASTELIN) 137 mcg (0.1 %) nasal spray 1 [place] by Both Nostrils route as needed for Rhinitis. Prior to allergy injections    Provider, Historical   cetirizine (ZYRTEC) 10 mg tablet Take 10 mg by mouth every evening. Patient not taking: Reported on 1/20/2022    Provider, Historical   cholecalciferol (VITAMIN D3) (50,000 UNITS /1250 MCG) capsule Take 50,000 Units by mouth every Monday. Other, MD Caroline   montelukast (SINGULAIR) 10 mg tablet Take 10 mg by mouth daily as needed. Prior to allergy shots  Patient not taking: Reported on 1/20/2022    Other, MD Caroline   fexofenadine (ALLEGRA) 180 mg tablet Take 180 mg by mouth daily.     Provider, Historical     Vitals:    05/04/22 1524 05/05/22 0013 05/05/22 0437 05/05/22 0756   BP: 122/79 127/79 105/80 106/79   Pulse: 71 70 72 83   Resp: 18 18 18 16   Temp: 97.7 °F (36.5 °C) 97.9 °F (36.6 °C) 97.9 °F (36.6 °C) 98 °F (36.7 °C)   SpO2: 100% 100% 100% 100%   Weight:       Height:         Lab Results   Component Value Date/Time WBC 7.5 05/04/2022 03:39 AM    HGB 11.8 05/04/2022 03:39 AM    Hematocrit (POC) 34 (L) 07/17/2018 03:33 AM    HCT 36.9 05/04/2022 03:39 AM    PLATELET 535 42/75/4558 03:39 AM    MCV 95.6 05/04/2022 03:39 AM     Lab Results   Component Value Date/Time    Sodium 139 05/04/2022 03:39 AM    Potassium 3.9 05/04/2022 03:39 AM    Chloride 109 (H) 05/04/2022 03:39 AM    CO2 30 05/04/2022 03:39 AM    Anion gap 0 (L) 05/04/2022 03:39 AM    Glucose 75 05/04/2022 03:39 AM    Glucose 107 (H) 02/03/2020 05:40 AM    BUN 8 05/04/2022 03:39 AM    Creatinine 0.92 05/04/2022 03:39 AM    BUN/Creatinine ratio 9 (L) 05/04/2022 03:39 AM    GFR est AA >60 05/04/2022 03:39 AM    GFR est non-AA >60 05/04/2022 03:39 AM    Calcium 8.9 05/04/2022 03:39 AM    Bilirubin, total 0.3 05/04/2022 03:39 AM    Alk. phosphatase 37 (L) 05/04/2022 03:39 AM    Protein, total 6.9 05/04/2022 03:39 AM    Albumin 3.4 (L) 05/04/2022 03:39 AM    Globulin 3.5 05/04/2022 03:39 AM    A-G Ratio 1.0 (L) 05/04/2022 03:39 AM    ALT (SGPT) 20 05/04/2022 03:39 AM    AST (SGOT) 16 05/04/2022 03:39 AM     Lab Results   Component Value Date/Time    Carbamazepine 14.1 (H) 07/26/2020 04:19 AM     Lab Results   Component Value Date/Time    Lithium level 3.48 (HH) 05/04/2022 03:39 AM     RADIOLOGY REPORTS:(reviewed/updated 5/5/2022)  No results found. Lab Results   Component Value Date/Time    Pregnancy test,urine (POC) NEGATIVE  12/08/2011 08:00 AM     MENTAL STATUS EXAM:  General appearance: Moderately groomed, psychomotor activity is WNL  Eye contact: fair  Speech: Spontaneous  Affect: blunt  Mood: \"better\"  Thought Process: Logical, goal directed  Perception: Denies AH or VH. Thought Content: denies SI/plan/intent, denies HI/plan/intent  Insight: Partial  Judgment: Fair  Cognition: Intact grossly. ASSESSMENT AND PLAN:  Tara Cerna meets criteria for a diagnosis of schizoaffective disorder.   -Plan: Admit to inpatient psychiatry when medically cleared.  Continue current monitoring.   -Ms. Sloane Keith states she does not want to admit to inpatient psychiatry due to having a negative experience in the hospital the last time she stayed. However, due to this potentially serious suicide attempt she does meet criteria for inpatient psychiatry, so if pt remains opposed to voluntary hospitalization, please contact [facility] and request a TDO evaluation. Thank your your consult. Please feel free to consult us again as needed.

## 2022-05-05 NOTE — PROGRESS NOTES
Problem: Falls - Risk of  Goal: *Absence of Falls  Description: Document Cindia Neigh Fall Risk and appropriate interventions in the flowsheet.   Outcome: Progressing Towards Goal  Note: Fall Risk Interventions:  Mobility Interventions: Patient to call before getting OOB         Medication Interventions: Patient to call before getting OOB    Elimination Interventions: Call light in reach    History of Falls Interventions: Consult care management for discharge planning         Problem: Patient Education: Go to Patient Education Activity  Goal: Patient/Family Education  Outcome: Progressing Towards Goal
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
## 2022-05-05 NOTE — PROGRESS NOTES
Problem: Falls - Risk of  Goal: *Absence of Falls  Description: Document Helen Fall Risk and appropriate interventions in the flowsheet.   Outcome: Progressing Towards Goal  Note: Fall Risk Interventions:  Mobility Interventions: Patient to call before getting OOB         Medication Interventions: Assess postural VS orthostatic hypotension    Elimination Interventions: Call light in reach    History of Falls Interventions: Consult care management for discharge planning         Problem: Patient Education: Go to Patient Education Activity  Goal: Patient/Family Education  Outcome: Progressing Towards Goal

## 2022-05-05 NOTE — PROGRESS NOTES
Hospitalist Progress Note    NAME: Bharath Grider   :  1965   MRN:  [id number]       Assessment / Plan:  Intentional Drug overdose POA-  with lithium, temazepam and hydroxyzine  Elevated lithium levels POA- 3.59-->3.48 --> pending today AM  Suicidal ideation POA  -Patient is currently asymptomatic and is at baseline  Creatinine is within normal limits. Repeat lithium levels and if they are going up, consider hemodialysis  -Salicylate level is normal.   Check acetaminophen level= <2  -Urine drug screen positive for benzodiazepines only  -LFTs are within normal limits  IP Psychiatry consult-- will need IP Psych admission- pt likely medically cleared in 24 hrs one Li level today cont to trend downwards  Cont 1:1 Bedside sitter, suicide precautions. Li levels daily till <2.0  -Telemetry monitoring  Will resume Hydroxyzine ,Trazodone, zyprexa today as pt has demonstrated stability in past 24 hrs, Cont to hold Li        Schizophrenia  Bipolar disorder  Dyslipidemia  Chronic low back pain    -Hold all home medications due to drug overdose and resume as tolerated  IP Psych eval today for DC planning in 24-48 hrs if stable        Code Status: Full code  Surrogate Decision Maker:  Abebe Peng     DVT Prophylaxis: Lovenox  GI Prophylaxis: not indicated     Baseline: From home, independent of ADLs      Estimated discharge date: May 6  Barriers: Elevated Li levels, psych eval    Recommended Disposition: IP Psych  likely     Subjective:     Chief Complaint / Reason for Physician Visit: F/U Intentional Drug Overdose, Schizophrenia, Bipolar  \"I am fine\". Discussed with RN events overnight.      Review of Systems:  Symptom Y/N Comments  Symptom Y/N Comments   Fever/Chills n   Chest Pain n    Poor Appetite n   Edema n    Cough n   Abdominal Pain n    Sputum n   Joint Pain n    SOB/BURKS    Pruritis/Rash     Nausea/vomit    Tolerating PT/OT y    Diarrhea    Tolerating Diet y    Constipation    Other       Could NOT obtain due to:      Objective:     VITALS:   Last 24hrs VS reviewed since prior progress note. Most recent are:  Patient Vitals for the past 24 hrs:   Temp Pulse Resp BP SpO2   05/05/22 0756 98 °F (36.7 °C) 83 16 106/79 100 %   05/05/22 0437 97.9 °F (36.6 °C) 72 18 105/80 100 %   05/05/22 0013 97.9 °F (36.6 °C) 70 18 127/79 100 %   05/04/22 1524 97.7 °F (36.5 °C) 71 18 122/79 100 %   05/04/22 1422 97.9 °F (36.6 °C) 75 18 111/64 100 %   05/04/22 1000 -- 79 17 111/77 100 %       Intake/Output Summary (Last 24 hours) at 5/5/2022 0949  Last data filed at 5/5/2022 0404  Gross per 24 hour   Intake 1518.33 ml   Output --   Net 1518.33 ml        I had a face to face encounter and independently examined this patient on 5/5/2022, as outlined below:  PHYSICAL EXAM:  General: WD, WN. Alert, cooperative, no acute distress    EENT:  EOMI. Anicteric sclerae. MMM  Resp:  CTA bilaterally, no wheezing or rales. No accessory muscle use  CV:  Regular  rhythm,  No edema  GI:  Soft, Non distended, Non tender. +Bowel sounds  Neurologic:  Alert and oriented X 3, normal speech,   Psych:   Good insight. Not anxious nor agitated  Skin:  No rashes. No jaundice    Reviewed most current lab test results and cultures  YES  Reviewed most current radiology test results   YES  Review and summation of old records today    NO  Reviewed patient's current orders and MAR    YES  PMH/ reviewed - no change compared to H&P  ________________________________________________________________________  Care Plan discussed with:    Comments   Patient x    Family  x    RN x    Care Manager x    Consultant                        Multidiciplinary team rounds were held today with , nursing, pharmacist and clinical coordinator. Patient's plan of care was discussed; medications were reviewed and discharge planning was addressed.      ________________________________________________________________________  Total NON critical care TIME:  26 Minutes    Total CRITICAL CARE TIME Spent:   Minutes non procedure based      Comments   >50% of visit spent in counseling and coordination of care     ________________________________________________________________________  Rosy Robles MD     Procedures: see electronic medical records for all procedures/Xrays and details which were not copied into this note but were reviewed prior to creation of Plan. LABS:  I reviewed today's most current labs and imaging studies.   Pertinent labs include:  Recent Labs     05/04/22  0339 05/03/22  1711   WBC 7.5 6.6   HGB 11.8 15.7   HCT 36.9 48.5*    250     Recent Labs     05/04/22  0339 05/03/22  1711    136   K 3.9 3.8   * 104   CO2 30 31   GLU 75 103*   BUN 8 10   CREA 0.92 1.09*   CA 8.9 10.5*   ALB 3.4* 4.8   TBILI 0.3 0.4   ALT 20 28       Signed: Rosy Robles MD

## 2022-05-05 NOTE — PROGRESS NOTES
1900--bedside report received from joel frost, pt resting in bed with street clothes on, denies pain, nausea, sitter at bedside assessment as noted    0300--pt resting comfortably call bell in reach, sitter at bedside    0700--bedside report given to joel pastor, who is assuming care of pt

## 2022-05-05 NOTE — PROGRESS NOTES
1900--bedside report received from joel pastor pt resting in bed oriented x 4, reports improvement with nausea, call bell in reach assessment as noted    0630--PICC called for assist with am lab    0700--bedside report given to joel frost who is assuming care of pt

## 2022-05-05 NOTE — PROGRESS NOTES
Problem: Falls - Risk of  Goal: *Absence of Falls  Description: Document Thomas Rangel Fall Risk and appropriate interventions in the flowsheet.   Outcome: Progressing Towards Goal  Note: Fall Risk Interventions:  Mobility Interventions: Patient to call before getting OOB         Medication Interventions: Patient to call before getting OOB,Teach patient to arise slowly    Elimination Interventions: Call light in reach    History of Falls Interventions: Consult care management for discharge planning         Problem: Suicide  Goal: *STG: Remains safe in hospital  Outcome: Progressing Towards Goal  Goal: *STG: Seeks staff when feelings of self harm or harm towards others arise  Outcome: Progressing Towards Goal  Goal: *STG:  Verbalizes alternative ways of dealing with maladaptive feelings/behaviors  Outcome: Progressing Towards Goal  Goal: *STG/LTG: Complies with medication therapy  Outcome: Progressing Towards Goal  Goal: *STG/LTG: No longer expresses self destructive or suicidal thoughts  Outcome: Progressing Towards Goal  Goal: *LTG:  Identifies available community resources  Outcome: Progressing Towards Goal  Goal: *LTG:  Develops proactive suicide prevention plan  Outcome: Progressing Towards Goal  Goal: Interventions  Outcome: Progressing Towards Goal

## 2022-05-05 NOTE — PROGRESS NOTES
End of Shift Note    Bedside shift change report given to EMEKA Olsen (oncoming nurse) by Indy Curtis RN (offgoing nurse). Report included the following information SBAR, Kardex and Recent Results    Shift worked: 7a-7p   Shift summary and any significant changes:    No IV access. Dr. Cara Ma states it does not need to be replaced. Her lithium level is trending down. Concerns for physician to address: none   Zone phone for oncoming shift:  [phone]     Patient Information  Raheel Rodriguez  61 y.o.  5/3/2022  4:54 PM by Erwin Sherman MD. Raheel Rodriguez was admitted from Home    Problem List  Patient Active Problem List    Diagnosis Date Noted    Drug overdose 05/03/2022    Robyn ([address] 07/26/2020    Scoliosis     Anxiety and depression     Bipolar 1 disorder ([address] 01/21/2020    Hyponatremia 01/10/2020    Chronic back pain 08/22/2016    Hyperlipidemia 08/22/2016    Bipolar 1 disorder with moderate robyn ([address] 03/17/2014     Past Medical History:   Diagnosis Date    Anxiety and depression     Bipolar 1 disorder with moderate robyn ([address] 3/17/2014    Chronic back pain     Hyperlipidemia 8/22/2016    Hyponatremia     Psychotic disorder ([address]     Scoliosis        Core Measures:  CVA: No No  CHF:No No  PNA:No No    Activity:  Activity Level: Up with Assistance  Number times ambulated in hallways past shift: 2  Number of times OOB to chair past shift: 3    Cardiac:   Cardiac Monitoring: Yes      Cardiac Rhythm: Sinus Rhythm    Access:   Current line(s): PIV   Central Line? No   PICC LINE? No     Genitourinary:   Urinary status: voiding  Urinary Catheter?  No     Respiratory:   O2 Device: None (Room air)  Chronic home O2 use?: NO  Incentive spirometer at bedside: NO       GI:  Last Bowel Movement Date: 05/04/22  Current diet:  ADULT DIET Regular; Safety Tray; Safety Tray (Disposables)  DIET ONE TIME MESSAGE  Passing flatus: YES  Tolerating current diet: YES       Pain Management:   Patient states pain is manageable on current regimen: NO    Skin:  Fareed Score: 20  Interventions: increase time out of bed and nutritional support     Patient Safety:  Fall Score:  Total Score: 2  Interventions: gripper socks, pt to call before getting OOB, stay with me (per policy) and sitter at bedside   High Fall Risk: Yes    DVT prophylaxis:  DVT prophylaxis Med- Yes  DVT prophylaxis SCD or BRIJESH- No     Wounds: (If Applicable)  Wounds- No    Active Consults:  IP CONSULT TO HOSPITALIST  IP CONSULT TO PSYCHIATRY    Length of Stay:  Expected LOS: 2d 7h  Actual LOS: 2  Discharge Plan: 5/7/22      Tanna Roman RN

## 2022-05-06 LAB — SARS-COV-2, COV2: NORMAL

## 2022-05-06 PROCEDURE — 74011250637 HC RX REV CODE- 250/637: Performed by: INTERNAL MEDICINE

## 2022-05-06 PROCEDURE — 74011250636 HC RX REV CODE- 250/636: Performed by: INTERNAL MEDICINE

## 2022-05-06 PROCEDURE — U0005 INFEC AGEN DETEC AMPLI PROBE: HCPCS

## 2022-05-06 PROCEDURE — 74011000250 HC RX REV CODE- 250: Performed by: INTERNAL MEDICINE

## 2022-05-06 PROCEDURE — 65270000046 HC RM TELEMETRY

## 2022-05-06 RX ORDER — LITHIUM CARBONATE 150 MG/1
150 CAPSULE ORAL 2 TIMES DAILY
Status: DISCONTINUED | OUTPATIENT
Start: 2022-05-06 | End: 2022-05-06 | Stop reason: SDUPTHER

## 2022-05-06 RX ORDER — LITHIUM CARBONATE 300 MG
150 TABLET ORAL 2 TIMES DAILY
Status: DISCONTINUED | OUTPATIENT
Start: 2022-05-06 | End: 2022-05-08

## 2022-05-06 RX ADMIN — LITHIUM CARBONATE 150 MG: 300 TABLET ORAL at 11:09

## 2022-05-06 RX ADMIN — SODIUM CHLORIDE, PRESERVATIVE FREE 10 ML: 5 INJECTION INTRAVENOUS at 06:00

## 2022-05-06 RX ADMIN — OLANZAPINE 30 MG: 10 TABLET, FILM COATED ORAL at 22:48

## 2022-05-06 RX ADMIN — LITHIUM CARBONATE 150 MG: 300 TABLET ORAL at 17:39

## 2022-05-06 RX ADMIN — CETIRIZINE HYDROCHLORIDE 10 MG: 10 TABLET, FILM COATED ORAL at 17:39

## 2022-05-06 RX ADMIN — ENOXAPARIN SODIUM 40 MG: 100 INJECTION SUBCUTANEOUS at 09:27

## 2022-05-06 RX ADMIN — TRAZODONE HYDROCHLORIDE 150 MG: 100 TABLET ORAL at 22:48

## 2022-05-06 NOTE — PROGRESS NOTES
Transition of Care Plan:     RUR: 8%  Disposition: IP psych unit once medically cleared - may need TDO evaluation   >psych re-consulted on 5/6  Follow up appointments: PCP, specialists as indicated   DME needed: N/A  Transportation at Discharge: medical transport if transferred to [address] [place] [address] or means to access home: yes       IM Medicare Letter: to be reviewed prior to d/c   Is patient a BCPI-A Bundle: No                  If yes, was Bundle Letter given?:    Is patient a  and connected with the [place]? If yes, was [facility] transfer form completed and [facility] notified? Caregiver Contact: Danny Roach (spouse) [phone]   Discharge Caregiver contacted prior to discharge? yes  Care Conference needed?:  No    Update 4:48 PM  Noted ongoing recommendation for IP psychiatry placement at discharge. [facility] 4183 to request TDO evaluation. Spoke with Cassandra Reyez, assessment arranged for 2:45PM. CM faxed clinicals for review to [phone]. Assessment completed. Spoke with Cassandra Reyez around [address] [place] [address] who agrees with recommendation for IP hospitalization and recommends a TDO. Per Cassandra Reyez, she will send her report to [facility] admissions and work on coordinating with bed placement to secure a bed. In the mean time, CM placed call to [facility] admissions to notify of pending TDO. Per Cam Sutherland, the following labs will be required for admission: BMP, CBC, UDS, UA, and PCR covid test. Informed hospitalist who is placing orders now. Weekend CM to follow up with [facility] ([phone]) on Saturday to identify any updates available on bed placement. Per Cassandra Reyez with [address] [place] [address], they will not issue a TDO until bed is available for pt to admit. Will continue to follow. Initial note: Chart reviewed. Acknowledged CM consult. Spoke with hospitalist regarding d/c planning. Psychiatry to be re-consulted today.  Pt is medically cleared for d/c and is currently not suicidal. Pt and her  not in agreement with IP psych placement and prefer pt to return home with OP follow up. Call placed to [facility] [place] [facility] admissions bed board this AM. IP psych bed may be available at [facility], however if pt is not agreeable will need to coordinate placement through [address] should TDO be required. Cannot move forward with securing IP psych bed until psychiatry completes re-evaluation. Will continue to follow.     Liz Montejo, MSW   Care Manager, [address]  [phone]

## 2022-05-06 NOTE — PROGRESS NOTES
Hospitalist Progress Note    NAME: Raheel Rodriguez   :  1965   MRN:  [id number]       Assessment / Plan:  Intentional Drug overdose POA-  with lithium, temazepam and hydroxyzine  Elevated lithium levels POA- 3.59-->3.48 --> 1.3 last  Suicidal ideation POA- currently not suicidal  -Patient is currently asymptomatic and is at baseline  Creatinine is within normal limits. Repeat lithium levels and if they are going up, consider hemodialysis  -Salicylate level is normal.   Check acetaminophen level= <2  -Urine drug screen positive for benzodiazepines only  -LFTs are within normal limits  IP Psychiatry consult () noted- \"pt does not want to admit to inpatient psychiatry due to having a negative experience in the hospital the last time she stayed. However, due to this potentially serious suicide attempt she does meet criteria for inpatient psychiatry, so if pt remains opposed to voluntary hospitalization, please contact [facility] and request a TDO evaluation\"  Pt medically cleared today for DC planning  Cont 1:1 Bedside sitter, suicide precautions. Resumed  Hydroxyzine ,Trazodone, zyprexa as per psych yesterday  Cont to hold Mearl Pong for now        Schizophrenia  Bipolar disorder  Dyslipidemia  Chronic low back pain    -Hold all home medications due to drug overdose and resume as tolerated  IP Psych eval noted as above- recommends IP psych admission- [facility] eval if pt/family not voluntary-- May need Psych TDO  Stable for DC as of today  IP CM consulted for DC planning        Code Status: Full code  Surrogate Decision Maker:  Zander Parisi     DVT Prophylaxis: Lovenox  GI Prophylaxis: not indicated     Baseline: From home, independent of ADLs      Estimated discharge date: May 6-? Barriers: pending IP Psych Bed availability    Recommended Disposition: IP Psych  ? ?     Subjective:     Chief Complaint / Reason for Physician Visit: F/U Intentional Drug Overdose, Schizophrenia, Bipolar  \"I am fine\". Discussed with RN events overnight. Review of Systems:  Symptom Y/N Comments  Symptom Y/N Comments   Fever/Chills n   Chest Pain n    Poor Appetite n   Edema n    Cough n   Abdominal Pain n    Sputum n   Joint Pain n    SOB/BURKS    Pruritis/Rash     Nausea/vomit    Tolerating PT/OT y    Diarrhea    Tolerating Diet y    Constipation    Other       Could NOT obtain due to:      Objective:     VITALS:   Last 24hrs VS reviewed since prior progress note. Most recent are:  Patient Vitals for the past 24 hrs:   Temp Pulse Resp BP SpO2   05/06/22 0732 98.7 °F (37.1 °C) 90 18 126/78 100 %   05/05/22 2127 -- -- 18 (!) 143/77 100 %   05/05/22 2017 98.7 °F (37.1 °C) 87 16 (!) 155/80 100 %   05/05/22 1533 98 °F (36.7 °C) 82 16 (!) 140/79 100 %       Intake/Output Summary (Last 24 hours) at 5/6/2022 0806  Last data filed at 5/6/2022 0246  Gross per 24 hour   Intake 820 ml   Output --   Net 820 ml        I had a face to face encounter and independently examined this patient on 5/6/2022, as outlined below:  PHYSICAL EXAM:  General: WD, WN. Alert, cooperative, no acute distress    EENT:  EOMI. Anicteric sclerae. MMM  Resp:  CTA bilaterally, no wheezing or rales. No accessory muscle use  CV:  Regular  rhythm,  No edema  GI:  Soft, Non distended, Non tender. +Bowel sounds  Neurologic:  Alert and oriented X 3, normal speech,   Psych:   Good insight. Not anxious nor agitated  Skin:  No rashes.   No jaundice    Reviewed most current lab test results and cultures  YES  Reviewed most current radiology test results   YES  Review and summation of old records today    NO  Reviewed patient's current orders and MAR    YES  PMH/SH reviewed - no change compared to H&P  ________________________________________________________________________  Care Plan discussed with:    Comments   Patient x    Family  x  yesterday   RN x    Care Manager x Akanksha Hart   Consultant  x Psych NP Yoko Cerda yesterday                     Multidiciplinary team rounds were held today with , nursing, pharmacist and clinical coordinator. Patient's plan of care was discussed; medications were reviewed and discharge planning was addressed. ________________________________________________________________________  Total NON critical care TIME:  16   Minutes    Total CRITICAL CARE TIME Spent:   Minutes non procedure based      Comments   >50% of visit spent in counseling and coordination of care     ________________________________________________________________________  Bella Hilliard MD     Procedures: see electronic medical records for all procedures/Xrays and details which were not copied into this note but were reviewed prior to creation of Plan. LABS:  I reviewed today's most current labs and imaging studies.   Pertinent labs include:  Recent Labs     05/04/22  0339 05/03/22  1711   WBC 7.5 6.6   HGB 11.8 15.7   HCT 36.9 48.5*    250     Recent Labs     05/04/22  0339 05/03/22  1711    136   K 3.9 3.8   * 104   CO2 30 31   GLU 75 103*   BUN 8 10   CREA 0.92 1.09*   CA 8.9 10.5*   ALB 3.4* 4.8   TBILI 0.3 0.4   ALT 20 28       Signed: Bella Hilliard MD

## 2022-05-06 NOTE — CONSULTS
PSYCHIATRY CONSULT NOTE:    REASON FOR CONSULT: Suicide attempt, follow-up    INTERVAL HISTORY:   5/6/22: Ms. Parke Lombard states she is \"good\" today. She states she slept well last night and is feeling good today. She states her mood is \"fine\" currently. She denies SI/plan/intent and HI/plan/intent currently. She denies current perceptual disturbances. She denies other changes at this time. HISTORY OF PRESENTING COMPLAINT:  Elmira Duke is a 61 y.o. BLACK/ female who is currently admitted to the medical floor at [facility]. Ms. Parke Lombard took an overdose on lithium, temazepam, and hydroxyzine in a suicide attempt 2 days ago. Her lithium level is trending down. Ms. Parke Lombard was calm and cooperative, alert in all spheres during assessment. She estimates she took about #10 of the 300mg lithium tablets. She estimates she took another #10 hydroxyzine, but she isn't sure the dose. She also took about #10 temazepam. Ms. Parke Lombard states she had been thinking about suicide for 2 days. She felt she had done something \"really really bad\" and she felt she couldn't talk to anyone about it. Her  found her and took her to the hospital. Ms. Parke Lombard is glad she didn't die; she has been talking to her mother and other supports and is feeling better now, and is no longer having thoughts of suicide. PAST PSYCHIATRIC HISTORY:  Ms. Parke Lombard has a past psychiatric history significant for schizoaffective disorder. This was her first suicide attempt, and she states she has not even had thoughts of suicide before. She does have a history of inpatient psychiatric treatment, about 7-8 times in total starting in 2005. She sees [facility] for psychiatry. She has not been hearing voices or having other perceptual disturbances recently. SUBSTANCE ABUSE HISTORY:  None    PSYCHOSOCIAL HISTORY:  Ms. Parke Lombard is , no children. She is on disability due to mental illness. PAST MEDICAL HISTORY:  Please see H&P for details.      Past Medical History:   Diagnosis Date    Anxiety and depression     Bipolar 1 disorder with moderate robyn ([facility] 3/17/2014    Chronic back pain     Hyperlipidemia 8/22/2016    Hyponatremia     Psychotic disorder ([facility]     Scoliosis      Prior to Admission medications    Medication Sig Start Date End Date Taking? Authorizing Provider   temazepam (RESTORIL) 15 mg capsule  11/3/21   Provider, Historical   trihexyphenidyL (ARTANE) 2 mg tablet  1/3/22   Provider, Historical   methocarbamoL (ROBAXIN) 500 mg tablet Take 500 mg by mouth as needed for Muscle Spasm(s). Patient not taking: Reported on 1/20/2022    Provider, Historical   OLANZapine (ZyPREXA) 15 mg tablet Take 2 Tabs by mouth nightly. Indications: robyn associated with bipolar disorder  Patient not taking: Reported on 1/20/2022 8/6/20   Zac Mcdonough MD   lithium carbonate 300 mg capsule Take 1 Cap by mouth two (2) times a day. Indications: robyn associated with bipolar disorder 8/6/20   Zac Mcdonough MD   traZODone (DESYREL) 150 mg tablet Take 1 Tab by mouth nightly. Indications: insomnia associated with depression 8/6/20   Zac Mcdonough MD   hydrOXYzine HCL (ATARAX) 50 mg tablet Take 1 Tab by mouth two (2) times daily as needed for Anxiety. Indications: anxious 8/6/20   Zac Mcdonough MD   azelastine (ASTELIN) 137 mcg (0.1 %) nasal spray 1 [place] by Both Nostrils route as needed for Rhinitis. Prior to allergy injections    Provider, Historical   cetirizine (ZYRTEC) 10 mg tablet Take 10 mg by mouth every evening. Patient not taking: Reported on 1/20/2022    Provider, Historical   cholecalciferol (VITAMIN D3) (50,000 UNITS /1250 MCG) capsule Take 50,000 Units by mouth every Monday. Other, MD Caroline   montelukast (SINGULAIR) 10 mg tablet Take 10 mg by mouth daily as needed. Prior to allergy shots  Patient not taking: Reported on 1/20/2022    Other, MD Caroline   fexofenadine (ALLEGRA) 180 mg tablet Take 180 mg by mouth daily.     Provider, Historical Vitals:    05/05/22 1533 05/05/22 2017 05/05/22 2127 05/06/22 0732   BP: (!) 140/79 (!) 155/80 (!) 143/77 126/78   Pulse: 82 87  90   Resp: 16 16 18 18   Temp: 98 °F (36.7 °C) 98.7 °F (37.1 °C)  98.7 °F (37.1 °C)   SpO2: 100% 100% 100% 100%   Weight:       Height:         Lab Results   Component Value Date/Time    WBC 7.5 05/04/2022 03:39 AM    HGB 11.8 05/04/2022 03:39 AM    Hematocrit (POC) 34 (L) 07/17/2018 03:33 AM    HCT 36.9 05/04/2022 03:39 AM    PLATELET 522 81/17/3653 03:39 AM    MCV 95.6 05/04/2022 03:39 AM     Lab Results   Component Value Date/Time    Sodium 139 05/04/2022 03:39 AM    Potassium 3.9 05/04/2022 03:39 AM    Chloride 109 (H) 05/04/2022 03:39 AM    CO2 30 05/04/2022 03:39 AM    Anion gap 0 (L) 05/04/2022 03:39 AM    Glucose 75 05/04/2022 03:39 AM    Glucose 107 (H) 02/03/2020 05:40 AM    BUN 8 05/04/2022 03:39 AM    Creatinine 0.92 05/04/2022 03:39 AM    BUN/Creatinine ratio 9 (L) 05/04/2022 03:39 AM    GFR est AA >60 05/04/2022 03:39 AM    GFR est non-AA >60 05/04/2022 03:39 AM    Calcium 8.9 05/04/2022 03:39 AM    Bilirubin, total 0.3 05/04/2022 03:39 AM    Alk. phosphatase 37 (L) 05/04/2022 03:39 AM    Protein, total 6.9 05/04/2022 03:39 AM    Albumin 3.4 (L) 05/04/2022 03:39 AM    Globulin 3.5 05/04/2022 03:39 AM    A-G Ratio 1.0 (L) 05/04/2022 03:39 AM    ALT (SGPT) 20 05/04/2022 03:39 AM    AST (SGOT) 16 05/04/2022 03:39 AM     Lab Results   Component Value Date/Time    Carbamazepine 14.1 (H) 07/26/2020 04:19 AM     Lab Results   Component Value Date/Time    Lithium level 1.35 (H) 05/05/2022 12:11 PM     RADIOLOGY REPORTS:(reviewed/updated 5/6/2022)  No results found. Lab Results   Component Value Date/Time    Pregnancy test,urine (POC) NEGATIVE  12/08/2011 08:00 AM     MENTAL STATUS EXAM:  General appearance:   Moderately groomed, psychomotor activity is WNL  Eye contact: fair  Speech: Spontaneous  Affect: blunt  Mood: \"better\"  Thought Process: Logical, goal directed  Perception: Denies AH or VH. Thought Content: denies SI/plan/intent, denies HI/plan/intent  Insight: Partial  Judgment: Fair  Cognition: Intact grossly. ASSESSMENT AND PLAN:  James Rich meets criteria for a diagnosis of schizoaffective disorder. Update 5/6/22: Ms. Julianne Chavarria continues to refuse to admit voluntarily to inpatient psychiatry. However, because of her potentially lethal suicide attempt, my recommendation is still to admit to inpatient psychiatry even though she is currently denying SI or HI. [facility] evaluation as I continue to feel that Ms. Julianne Chavarria requires inpatient psychiatry because of her serious suicide attempt. Thank your your consult. Please feel free to consult us again as needed.

## 2022-05-06 NOTE — PROGRESS NOTES
End of Shift Note     Bedside shift change report given to Harper Desai (oncoming nurse) by Liya Birmingham RN (offgoing nurse). Report included the following information SBAR, Kardex and Recent Results     Shift worked: 7a-7p   Shift summary and any significant changes:     Psych reconsulted, IP psych placement still recommended     CM following for possible TDO    PCR completed today    Spoke with Lalo Patel who is handling placement. There is a bed at [address] for her tomorrow. Per Roxy CBC and metabolic panel no longer needed. They do need repeat lithium level. Night shift made aware.       Concerns for physician to address: none   Zone phone for oncoming shift:  [phone]      Patient Information  Dm Flores  61 y.o.  5/3/2022  4:54 PM by Isidoro Faulkner MD. Dm Flores was admitted from Home     Problem List       Patient Active Problem List     Diagnosis Date Noted    Drug overdose 05/03/2022    Robyn ([address] 07/26/2020    Scoliosis      Anxiety and depression      Bipolar 1 disorder ([address] 01/21/2020    Hyponatremia 01/10/2020    Chronic back pain 08/22/2016    Hyperlipidemia 08/22/2016    Bipolar 1 disorder with moderate robyn ([address] 03/17/2014           Past Medical History:   Diagnosis Date    Anxiety and depression      Bipolar 1 disorder with moderate robyn ([address] 3/17/2014    Chronic back pain      Hyperlipidemia 8/22/2016    Hyponatremia      Psychotic disorder ([address]      Scoliosis           Core Measures:  CVA: No No  CHF:No No  PNA:No No     Activity:  Activity Level: Up with Assistance  Number times ambulated in hallways past shift: 2  Number of times OOB to chair past shift: 3     Cardiac:   Cardiac Monitoring: Yes      Cardiac Rhythm: Sinus Rhythm     Access:   Current line(s): PIV   Central Line? No   PICC LINE? No      Genitourinary:   Urinary status: voiding  Urinary Catheter?  No      Respiratory:   O2 Device: None (Room air)  Chronic home O2 use?: NO  Incentive spirometer at bedside: NO     GI:  Last Bowel Movement Date: 05/04/22  Current diet:  ADULT DIET Regular; Safety Tray; Safety Tray (Disposables)  DIET ONE TIME MESSAGE  Passing flatus: YES  Tolerating current diet: YES     Pain Management:   Patient states pain is manageable on current regimen: NO     Skin:  Fareed Score: 20  Interventions: increase time out of bed and nutritional support     Patient Safety:  Fall Score: Total Score: 2  Interventions: gripper socks, pt to call before getting OOB, stay with me (per policy) and sitter at bedside   High Fall Risk:  Yes     DVT prophylaxis:  DVT prophylaxis Med- Yes  DVT prophylaxis SCD or BRIJESH- No      Wounds: (If Applicable)  Wounds- No     Active Consults:  IP CONSULT TO HOSPITALIST  IP CONSULT TO PSYCHIATRY     Length of Stay:  Expected LOS: 2d 7h  Actual LOS: 3  Discharge Plan: 5/7/22        Baudilio King RN

## 2022-05-07 LAB
ANION GAP SERPL CALC-SCNC: 1 MMOL/L (ref 5–15)
BUN SERPL-MCNC: 11 MG/DL (ref 6–20)
BUN/CREAT SERPL: 13 (ref 12–20)
CALCIUM SERPL-MCNC: 9.7 MG/DL (ref 8.5–10.1)
CHLORIDE SERPL-SCNC: 107 MMOL/L (ref 97–108)
CO2 SERPL-SCNC: 29 MMOL/L (ref 21–32)
CREAT SERPL-MCNC: 0.82 MG/DL (ref 0.55–1.02)
ERYTHROCYTE [DISTWIDTH] IN BLOOD BY AUTOMATED COUNT: 13 % (ref 11.5–14.5)
GLUCOSE SERPL-MCNC: 121 MG/DL (ref 65–100)
HCT VFR BLD AUTO: 35.2 % (ref 35–47)
HGB BLD-MCNC: 11.3 G/DL (ref 11.5–16)
MCH RBC QN AUTO: 29.8 PG (ref 26–34)
MCHC RBC AUTO-ENTMCNC: 32.1 G/DL (ref 30–36.5)
MCV RBC AUTO: 92.9 FL (ref 80–99)
NRBC # BLD: 0 K/UL (ref 0–0.01)
NRBC BLD-RTO: 0 PER 100 WBC
PLATELET # BLD AUTO: 206 K/UL (ref 150–400)
PMV BLD AUTO: 9.4 FL (ref 8.9–12.9)
POTASSIUM SERPL-SCNC: 4 MMOL/L (ref 3.5–5.1)
RBC # BLD AUTO: 3.79 M/UL (ref 3.8–5.2)
SARS-COV-2, XPLCVT: NOT DETECTED
SODIUM SERPL-SCNC: 137 MMOL/L (ref 136–145)
SOURCE, COVRS: NORMAL
WBC # BLD AUTO: 7.4 K/UL (ref 3.6–11)

## 2022-05-07 PROCEDURE — 36415 COLL VENOUS BLD VENIPUNCTURE: CPT

## 2022-05-07 PROCEDURE — 74011250637 HC RX REV CODE- 250/637: Performed by: INTERNAL MEDICINE

## 2022-05-07 PROCEDURE — 74011250636 HC RX REV CODE- 250/636: Performed by: INTERNAL MEDICINE

## 2022-05-07 PROCEDURE — 85027 COMPLETE CBC AUTOMATED: CPT

## 2022-05-07 PROCEDURE — 74011000250 HC RX REV CODE- 250: Performed by: INTERNAL MEDICINE

## 2022-05-07 PROCEDURE — 65270000046 HC RM TELEMETRY

## 2022-05-07 PROCEDURE — 80048 BASIC METABOLIC PNL TOTAL CA: CPT

## 2022-05-07 PROCEDURE — 80178 ASSAY OF LITHIUM: CPT

## 2022-05-07 RX ADMIN — SODIUM CHLORIDE, PRESERVATIVE FREE 10 ML: 5 INJECTION INTRAVENOUS at 13:28

## 2022-05-07 RX ADMIN — POLYETHYLENE GLYCOL 3350 17 G: 17 POWDER, FOR SOLUTION ORAL at 17:33

## 2022-05-07 RX ADMIN — TRAZODONE HYDROCHLORIDE 150 MG: 100 TABLET ORAL at 21:07

## 2022-05-07 RX ADMIN — CETIRIZINE HYDROCHLORIDE 10 MG: 10 TABLET, FILM COATED ORAL at 17:29

## 2022-05-07 RX ADMIN — LITHIUM CARBONATE 150 MG: 300 TABLET ORAL at 08:45

## 2022-05-07 RX ADMIN — LITHIUM CARBONATE 150 MG: 300 TABLET ORAL at 17:29

## 2022-05-07 RX ADMIN — ENOXAPARIN SODIUM 40 MG: 100 INJECTION SUBCUTANEOUS at 08:45

## 2022-05-07 RX ADMIN — OLANZAPINE 30 MG: 10 TABLET, FILM COATED ORAL at 21:06

## 2022-05-07 NOTE — PROGRESS NOTES
Transition of Care Plan:     RUR: 8%  Disposition: IP Psych Unit [facility] (bed on hold)   *Waiting on Lithium Levels Lab Test (per psychiatry)   *[facility] will call [facility] once test done   *COVID19 PCR results completed  Follow up appointments: PCP, specialists as indicated   DME needed: N/A  Transportation at [place] [address] transport if transferred to [address] [place] [address] or means to access home: yes       IM Medicare Letter: to be reviewed prior to d/c   Is patient a BCPI-A Bundle: No                  If yes, was Bundle Letter given?:    Is patient a [place] and connected with the [facility]?                If yes, was [place] transfer form completed and [facility] notified? Caregiver Contact: Paolo Gill (spouse) [phone]   Discharge Caregiver contacted prior to [address] needed?:  No    4:13PM UPDATE  COVID-19 PCR test resulted. CM called  Access Line for update on Psych Bed ([phone]). Bed is being held at [facility]. Waiting on lab for lithium levels per Psychiatrist. Once received, [facility] will call [address] [place] [address] ([phone]) for the update and move forward with TDO and hospitalization.      Quirino Calderon. [address] Manager  [phone]

## 2022-05-07 NOTE — PROGRESS NOTES
Hospitalist Progress Note    NAME: Lainey Mcgee   :  1965   MRN:  [id number]       Assessment / Plan:  Intentional Drug overdose POA-  with lithium, temazepam and hydroxyzine  Elevated lithium levels POA- 3.59-->3.48 --> 1.3   Suicidal ideation POA- currently not suicidal  -Patient is currently asymptomatic and is at baseline  Creatinine is within normal limits. Repeat lithium levels and if they are going up, consider hemodialysis  -Salicylate level is normal.   Check acetaminophen level= <2  -Urine drug screen positive for benzodiazepines only  -LFTs are within normal limits  IP Psychiatry consult () noted- \"pt does not want to admit to inpatient psychiatry due to having a negative experience in the hospital the last time she stayed. However, due to this potentially serious suicide attempt she does meet criteria for inpatient psychiatry, so if pt remains opposed to voluntary hospitalization, please contact [facility] and request a TDO evaluation\"  Pt medically cleared today for DC planning  Cont 1:1 Bedside sitter, suicide precautions. Resumed  Hydroxyzine ,Trazodone, zyprexa as per psych   Resumed Li BID now  Awaiting IP Psych bed to initiate psych TDO per Bartholomew Crises        Schizophrenia  Bipolar disorder  Dyslipidemia  Chronic low back pain    -Hold all home medications due to drug overdose and resume as tolerated  IP Psych re-eval noted as above- recommends IP psych admission- hanover Crises will not issue Psych TDO till IP Psych bed arranged per CM  Stable for DC as of today  IP CM consulted for DC planning- working on it        Code Status: Full code  Surrogate Decision Maker:  Ale Kraus     DVT Prophylaxis: Lovenox  GI Prophylaxis: not indicated     Baseline: From home, independent of ADLs      Estimated discharge date: May 8-9?   Barriers: pending IP Psych Bed availability & then Psych TDO by Bartholomew Crises    Recommended Disposition: IP Psych  when arranged Subjective:     Chief Complaint / Reason for Physician Visit: F/U Intentional Drug Overdose, Schizophrenia, Bipolar  \"I am fine\". Discussed with RN events overnight. Review of Systems:  Symptom Y/N Comments  Symptom Y/N Comments   Fever/Chills n   Chest Pain n    Poor Appetite n   Edema n    Cough n   Abdominal Pain n    Sputum n   Joint Pain n    SOB/BURKS    Pruritis/Rash     Nausea/vomit    Tolerating PT/OT y    Diarrhea    Tolerating Diet y    Constipation    Other       Could NOT obtain due to:      Objective:     VITALS:   Last 24hrs VS reviewed since prior progress note. Most recent are:  Patient Vitals for the past 24 hrs:   Temp Pulse Resp BP SpO2   05/07/22 0808 98 °F (36.7 °C) 78 16 109/69 --   05/07/22 0744 97.7 °F (36.5 °C) 88 18 117/87 95 %   05/07/22 0300 97.5 °F (36.4 °C) 81 18 116/64 98 %   05/06/22 2300 97.8 °F (36.6 °C) 84 20 111/65 99 %   05/06/22 1938 98.9 °F (37.2 °C) (!) 108 18 118/69 97 %   05/06/22 1523 99.3 °F (37.4 °C) 91 18 136/69 99 %   05/06/22 1148 97.9 °F (36.6 °C) 83 18 111/60 99 %     No intake or output data in the 24 hours ending 05/07/22 1114     I had a face to face encounter and independently examined this patient on 5/7/2022, as outlined below:  PHYSICAL EXAM:  General: WD, WN. Alert, cooperative, no acute distress    EENT:  EOMI. Anicteric sclerae. MMM  Resp:  CTA bilaterally, no wheezing or rales. No accessory muscle use  CV:  Regular  rhythm,  No edema  GI:  Soft, Non distended, Non tender. +Bowel sounds  Neurologic:  Alert and oriented X 3, normal speech,   Psych:   Good insight. Not anxious nor agitated  Skin:  No rashes.   No jaundice    Reviewed most current lab test results and cultures  YES  Reviewed most current radiology test results   YES  Review and summation of old records today    NO  Reviewed patient's current orders and MAR    YES  PMH/SH reviewed - no change compared to H&P  ________________________________________________________________________  Care Plan discussed with:    Comments   Patient x    Family  x  Thursday   RN x    Care Manager x Weekend JERED Botello   Consultant                        Multidiciplinary team rounds were held today with , nursing, pharmacist and clinical coordinator. Patient's plan of care was discussed; medications were reviewed and discharge planning was addressed. ________________________________________________________________________  Total NON critical care TIME:  16   Minutes    Total CRITICAL CARE TIME Spent:   Minutes non procedure based      Comments   >50% of visit spent in counseling and coordination of care     ________________________________________________________________________  Catarino Diaz MD     Procedures: see electronic medical records for all procedures/Xrays and details which were not copied into this note but were reviewed prior to creation of Plan. LABS:  I reviewed today's most current labs and imaging studies.   Pertinent labs include:  Recent Labs     05/07/22  0403   WBC 7.4   HGB 11.3*   HCT 35.2        Recent Labs     05/07/22  0403      K 4.0      CO2 29   *   BUN 11   CREA 0.82   CA 9.7       Signed: Catarino Diaz MD

## 2022-05-07 NOTE — ROUTINE PROCESS
End of Shift Note     Bedside shift change report given to Dylon Reeves RN (oncoming nurse) by Silvana Hodges RN (offgoing nurse). Report included the following information SBAR, Kardex and Recent Results     Shift worked: 7p - 7a   Shift summary and any significant changes:     Patient disorientated to situation. Possible transfer today          Concerns for physician to address: Noted above   Zone phone for oncoming shift:  [phone]      Patient Information  Hemalatha Melton  61 y.o.  5/3/2022  4:54 PM by Debbie Valero MD. Hemalatha Melton was admitted from Home     Problem List       Patient Active Problem List     Diagnosis Date Noted    Drug overdose 05/03/2022    Robyn ([address] 07/26/2020    Scoliosis      Anxiety and depression      Bipolar 1 disorder ([address] 01/21/2020    Hyponatremia 01/10/2020    Chronic back pain 08/22/2016    Hyperlipidemia 08/22/2016    Bipolar 1 disorder with moderate robyn ([address] 03/17/2014           Past Medical History:   Diagnosis Date    Anxiety and depression      Bipolar 1 disorder with moderate robyn ([address] 3/17/2014    Chronic back pain      Hyperlipidemia 8/22/2016    Hyponatremia      Psychotic disorder ([address]      Scoliosis           Core Measures:  CVA: No No  CHF:No No  PNA:No No     Activity:  Activity Level: Up with Assistance  Number times ambulated in hallways past shift: 2  Number of times OOB to chair past shift: 3     Cardiac:   Cardiac Monitoring: Yes      Cardiac Rhythm: Sinus Rhythm     Access:   Current line(s): PIV   Central Line? No   PICC LINE? No      Genitourinary:   Urinary status: voiding  Urinary Catheter?  No      Respiratory:   O2 Device: None (Room air)  Chronic home O2 use?: NO  Incentive spirometer at bedside: NO     GI:  Last Bowel Movement Date: 05/04/22  Current diet:  ADULT DIET Regular; Safety Tray; Safety Tray (Disposables)  DIET ONE TIME MESSAGE  Passing flatus: YES  Tolerating current diet: YES     Pain Management:   Patient states pain is manageable on current regimen: NO     Skin:  Fareed Score: 20  Interventions: increase time out of bed and nutritional support     Patient Safety:  Fall Score: Total Score: 2  Interventions: gripper socks, pt to call before getting OOB, stay with me (per policy) and sitter at bedside   High Fall Risk:  Yes     DVT prophylaxis:  DVT prophylaxis Med- Yes  DVT prophylaxis SCD or BRIJESH- No      Wounds: (If Applicable)  Wounds- No     Active Consults:  IP CONSULT TO HOSPITALIST  IP CONSULT TO PSYCHIATRY     Length of Stay:  Expected LOS: 2d 7h  Actual LOS: 3  Discharge Plan: 5/7/22        Thierry Alas RN

## 2022-05-07 NOTE — PROGRESS NOTES
Spiritual Care Assessment/Progress Note  [facility]      NAME: Dm Flores      MRN: [id number]  AGE: 61 y.o.  SEX: female  Mu-ism Affiliation: Jehovah witness   Language: English     5/7/2022     Total Time (in minutes): 10     Spiritual Assessment begun in [facility]OSCI[facility] TELEMETRY through conversation with:         [x]Patient        [] Family    [] Friend(s)        Reason for Consult: Initial/Spiritual assessment, patient floor     Spiritual beliefs: (Please include comment if needed)     [x] Identifies with a gillian tradition:  Lynnh Witness       [] Supported by a gillian community:            [] Claims no spiritual orientation:           [] Seeking spiritual identity:                [] Adheres to an individual form of spirituality:           [] Not able to assess:                           Identified resources for coping:      [] Prayer                               [] Music                  [] Guided Imagery     [] Family/friends                 [] Pet visits     [] Devotional reading                         [x] Unknown     [] Other:                                            Interventions offered during this visit: (See comments for more details)    Patient Interventions: Affirmation of emotions/emotional suffering,Affirmation of gillian,Iconic (affirming the presence of God/Higher Power),Initial/Spiritual assessment, patient floor,Mu-ism beliefs/image of God discussed           Plan of Care:     [] Support spiritual and/or cultural needs    [] Support AMD and/or advance care planning process      [] Support grieving process   [] Coordinate Rites and/or Rituals    [] Coordination with community clergy   [x] No spiritual needs identified at this time   [] Detailed Plan of Care below (See Comments)  [] Make referral to Music Therapy  [] Make referral to Pet Therapy     [] Make referral to Addiction services  [] Make referral to [facility]  [] Make referral to Spiritual Care Partner  [] No future visits requested        [x] Contact Spiritual Care for further referrals     Comments:  Reviewed chart prior to visit on Neuro unit for spiritual assessment. Patient sitter in room.  introduced self to patient; she declined pastoral support. Ms Micah Oneill indicated she is Jehovah Witness and is working on her studies. Offered words of encouragement and advised her of ongoing  availability.      GEORGES Damon, [address], Staff [address] [place] [address] Avenue    [address] Paging Service  [phone])

## 2022-05-07 NOTE — PROGRESS NOTES
Problem: Falls - Risk of  Goal: *Absence of Falls  Description: Document Eva Fall Risk and appropriate interventions in the flowsheet.   Outcome: Progressing Towards Goal  Note: Fall Risk Interventions:  Mobility Interventions: Patient to call before getting OOB         Medication Interventions: Bed/chair exit alarm    Elimination Interventions: Call light in reach,Patient to call for help with toileting needs    History of Falls Interventions: Bed/chair exit alarm         Problem: Patient Education: Go to Patient Education Activity  Goal: Patient/Family Education  Outcome: Progressing Towards Goal     Problem: Suicide  Goal: *STG: Remains safe in hospital  Outcome: Progressing Towards Goal  Goal: *STG: Seeks staff when feelings of self harm or harm towards others arise  Outcome: Progressing Towards Goal  Goal: *STG: Attends activities and groups  Outcome: Progressing Towards Goal  Goal: *STG:  Verbalizes alternative ways of dealing with maladaptive feelings/behaviors  Outcome: Progressing Towards Goal  Goal: *STG/LTG: Complies with medication therapy  Outcome: Progressing Towards Goal  Goal: *STG/LTG: No longer expresses self destructive or suicidal thoughts  Outcome: Progressing Towards Goal  Goal: *LTG:  Identifies available community resources  Outcome: Progressing Towards Goal  Goal: *LTG:  Develops proactive suicide prevention plan  Outcome: Progressing Towards Goal  Goal: Interventions  Outcome: Progressing Towards Goal     Problem: Patient Education: Go to Patient Education Activity  Goal: Patient/Family Education  Outcome: Progressing Towards Goal     Problem: Pain  Goal: *Control of Pain  Outcome: Progressing Towards Goal  Goal: *PALLIATIVE CARE:  Alleviation of Pain  Outcome: Progressing Towards Goal     Problem: Patient Education: Go to Patient Education Activity  Goal: Patient/Family Education  Outcome: Progressing Towards Goal

## 2022-05-08 ENCOUNTER — HOSPITAL ENCOUNTER (INPATIENT)
Age: 57
LOS: 4 days | Discharge: HOME | DRG: 885 | End: 2022-05-12
Attending: PSYCHIATRY & NEUROLOGY | Admitting: PSYCHIATRY & NEUROLOGY
Payer: MEDICARE

## 2022-05-08 VITALS
HEIGHT: 61 IN | RESPIRATION RATE: 14 BRPM | OXYGEN SATURATION: 100 % | SYSTOLIC BLOOD PRESSURE: 117 MMHG | HEART RATE: 98 BPM | WEIGHT: 160 LBS | TEMPERATURE: 98.7 F | DIASTOLIC BLOOD PRESSURE: 64 MMHG | BODY MASS INDEX: 30.21 KG/M2

## 2022-05-08 PROBLEM — F25.9 SCHIZOAFFECTIVE DISORDER (HCC): Status: ACTIVE | Noted: 2022-05-08

## 2022-05-08 LAB
DATE LAST DOSE: NORMAL
LITHIUM SERPL-SCNC: 0.67 MMOL/L (ref 0.6–1.2)
REPORTED DOSE,DOSE: NORMAL UNITS
REPORTED DOSE/TIME,TMG: NORMAL

## 2022-05-08 PROCEDURE — 74011250636 HC RX REV CODE- 250/636: Performed by: INTERNAL MEDICINE

## 2022-05-08 PROCEDURE — 74011250637 HC RX REV CODE- 250/637: Performed by: INTERNAL MEDICINE

## 2022-05-08 PROCEDURE — 65220000003 HC RM SEMIPRIVATE PSYCH

## 2022-05-08 PROCEDURE — 74011250637 HC RX REV CODE- 250/637

## 2022-05-08 RX ORDER — TRAZODONE HYDROCHLORIDE 50 MG/1
50 TABLET ORAL
Status: DISCONTINUED | OUTPATIENT
Start: 2022-05-08 | End: 2022-05-09

## 2022-05-08 RX ORDER — LITHIUM CARBONATE 300 MG/1
300 CAPSULE ORAL DAILY
Qty: 60 CAPSULE | Refills: 0 | Status: ON HOLD
Start: 2022-05-08 | End: 2022-05-09

## 2022-05-08 RX ORDER — DIPHENHYDRAMINE HYDROCHLORIDE 50 MG/ML
50 INJECTION, SOLUTION INTRAMUSCULAR; INTRAVENOUS
Status: DISCONTINUED | OUTPATIENT
Start: 2022-05-08 | End: 2022-05-12 | Stop reason: HOSPADM

## 2022-05-08 RX ORDER — ACETAMINOPHEN 325 MG/1
650 TABLET ORAL
Status: DISCONTINUED | OUTPATIENT
Start: 2022-05-08 | End: 2022-05-12 | Stop reason: HOSPADM

## 2022-05-08 RX ORDER — HALOPERIDOL 5 MG/ML
5 INJECTION INTRAMUSCULAR
Status: DISCONTINUED | OUTPATIENT
Start: 2022-05-08 | End: 2022-05-12 | Stop reason: HOSPADM

## 2022-05-08 RX ORDER — HYDROXYZINE 25 MG/1
50 TABLET, FILM COATED ORAL
Status: DISCONTINUED | OUTPATIENT
Start: 2022-05-08 | End: 2022-05-12 | Stop reason: HOSPADM

## 2022-05-08 RX ORDER — OLANZAPINE 5 MG/1
5 TABLET ORAL
Status: DISCONTINUED | OUTPATIENT
Start: 2022-05-08 | End: 2022-05-12 | Stop reason: HOSPADM

## 2022-05-08 RX ORDER — ADHESIVE BANDAGE
30 BANDAGE TOPICAL DAILY PRN
Status: DISCONTINUED | OUTPATIENT
Start: 2022-05-08 | End: 2022-05-09

## 2022-05-08 RX ORDER — BENZTROPINE MESYLATE 1 MG/1
1 TABLET ORAL
Status: DISCONTINUED | OUTPATIENT
Start: 2022-05-08 | End: 2022-05-12 | Stop reason: HOSPADM

## 2022-05-08 RX ORDER — LORAZEPAM 2 MG/ML
1 INJECTION INTRAMUSCULAR
Status: DISCONTINUED | OUTPATIENT
Start: 2022-05-08 | End: 2022-05-12 | Stop reason: HOSPADM

## 2022-05-08 RX ORDER — LITHIUM CARBONATE 300 MG
150 TABLET ORAL DAILY
Status: DISCONTINUED | OUTPATIENT
Start: 2022-05-09 | End: 2022-05-08 | Stop reason: HOSPADM

## 2022-05-08 RX ORDER — BISACODYL 5 MG
5 TABLET, DELAYED RELEASE (ENTERIC COATED) ORAL
Status: COMPLETED | OUTPATIENT
Start: 2022-05-08 | End: 2022-05-08

## 2022-05-08 RX ADMIN — BISACODYL 5 MG: 5 TABLET, COATED ORAL at 13:37

## 2022-05-08 RX ADMIN — LITHIUM CARBONATE 150 MG: 300 TABLET ORAL at 10:00

## 2022-05-08 RX ADMIN — MAGNESIUM HYDROXIDE 30 ML: 2400 SUSPENSION ORAL at 21:39

## 2022-05-08 RX ADMIN — TRAZODONE HYDROCHLORIDE 50 MG: 50 TABLET ORAL at 21:39

## 2022-05-08 RX ADMIN — HYDROXYZINE HYDROCHLORIDE 50 MG: 25 TABLET, FILM COATED ORAL at 21:39

## 2022-05-08 RX ADMIN — ENOXAPARIN SODIUM 40 MG: 100 INJECTION SUBCUTANEOUS at 10:00

## 2022-05-08 NOTE — PROGRESS NOTES
0745  Shift report received. 0920  Okay to give AM Lithium dose per provider. Therapeutic for patient. Pending transfer to [facility] at this time. 1603  Patient to [facility] at this time with [facility] for inpatient psychiatric care. Family notified and with patient at this time. Voluntary. EMTALA completed.

## 2022-05-08 NOTE — BH NOTES
Destin Aguilar is a 61year old  women, admitted to unit with a primary diagnosis of schizoaffective disorder. Patient admitted from [facility] discharging facility as a TDO patient. Patient is alert and oriented x3. Patient admitted due to suicide attempt by overdose on Restoril, lithium, and hydroxyzine. Pt triggered by the belief that she committed a sin against god. UDS urine drug screen positive for benzodiazepines. BAL (blood alcohol level) >10. Patient cooperative with admission process. Patient oriented to unit. Admission packet and confidentiality code given. Q15 minute checks initiated for safety.

## 2022-05-08 NOTE — PROGRESS NOTES
Hospitalist Progress Note    NAME: Ne Sow   :  1965   MRN:  [id number]       Assessment / Plan:  Intentional Drug overdose POA-  with lithium, temazepam and hydroxyzine  Elevated lithium levels POA- 3.59-->3.48 --> 1.3 ->0.67 today  Suicidal ideation POA- currently not suicidal  -Patient is currently asymptomatic and is at baseline  Creatinine is within normal limits. Repeat lithium levels and if they are going up, consider hemodialysis  -Salicylate level is normal.   Check acetaminophen level= <2  -Urine drug screen positive for benzodiazepines only  -LFTs are within normal limits  IP Psychiatry consult () noted- \"pt does not want to admit to inpatient psychiatry due to having a negative experience in the hospital the last time she stayed. However, due to this potentially serious suicide attempt she does meet criteria for inpatient psychiatry, so if pt remains opposed to voluntary hospitalization, please contact [facility] and request a TDO evaluation\"  Pt medically cleared today for DC planning  Cont 1:1 Bedside sitter, suicide precautions. Resumed  Hydroxyzine ,Trazodone, zyprexa as per psych   Resumed Li daily as pt was taking at home before admission (use to be on BID dosing bedore)- Li level in therapeutic range now/safe range  Awaiting IP Psych bed to initiate psych TDO per [facility] Cr[facility]        Schizophrenia  Bipolar disorder  Dyslipidemia  Chronic low back pain    -Hold all home medications due to drug overdose and resume as tolerated  IP Psych re-eval noted as above- recommends IP psych admission- [facility] will not issue Psych TDO till IP Psych bed arranged per CM  Stable for DC as of today  IP CM consulted for DC planning- working on it        Code Status: Full code  Surrogate Decision Maker:  Abbey Martínez     DVT Prophylaxis: Lovenox  GI Prophylaxis: not indicated     Baseline: From home, independent of ADLs      Estimated discharge date: May 8-?   Barriers: pending IP Psych Bed availability & then Psych TDO by Lexi Orellana    Recommended Disposition: IP Psych  when arranged     Subjective:     Chief Complaint / Reason for Physician Visit: F/U Intentional Drug Overdose, Schizophrenia, Bipolar  \"I am fine\". Discussed with RN events overnight. Review of Systems:  Symptom Y/N Comments  Symptom Y/N Comments   Fever/Chills n   Chest Pain n    Poor Appetite n   Edema n    Cough n   Abdominal Pain n    Sputum n   Joint Pain n    SOB/BURKS    Pruritis/Rash     Nausea/vomit    Tolerating PT/OT y    Diarrhea    Tolerating Diet y    Constipation    Other       Could NOT obtain due to:      Objective:     VITALS:   Last 24hrs VS reviewed since prior progress note. Most recent are:  Patient Vitals for the past 24 hrs:   Temp Pulse Resp BP SpO2   05/08/22 0835 98.1 °F (36.7 °C) 96 14 104/75 98 %   05/07/22 2223 98 °F (36.7 °C) 83 16 115/69 100 %   05/07/22 1927 98.7 °F (37.1 °C) 90 16 124/69 100 %   05/07/22 1550 97.9 °F (36.6 °C) 89 16 124/61 100 %     No intake or output data in the 24 hours ending 05/08/22 1159     I had a face to face encounter and independently examined this patient on 5/8/2022, as outlined below:  PHYSICAL EXAM:  General: WD, WN. Alert, cooperative, no acute distress    EENT:  EOMI. Anicteric sclerae. MMM  Resp:  CTA bilaterally, no wheezing or rales. No accessory muscle use  CV:  Regular  rhythm,  No edema  GI:  Soft, Non distended, Non tender. +Bowel sounds  Neurologic:  Alert and oriented X 3, normal speech,   Psych:   Good insight. Not anxious nor agitated  Skin:  No rashes.   No jaundice    Reviewed most current lab test results and cultures  YES  Reviewed most current radiology test results   YES  Review and summation of old records today    NO  Reviewed patient's current orders and MAR    YES  PMH/SH reviewed - no change compared to H&P  ________________________________________________________________________  Care Plan discussed with:    Comments Patient x    Family  x  Thursday   RN x    Care Manager x Weekend JERED Botello   Consultant                        Multidiciplinary team rounds were held today with , nursing, pharmacist and clinical coordinator. Patient's plan of care was discussed; medications were reviewed and discharge planning was addressed. ________________________________________________________________________  Total NON critical care TIME:  16   Minutes    Total CRITICAL CARE TIME Spent:   Minutes non procedure based      Comments   >50% of visit spent in counseling and coordination of care     ________________________________________________________________________  Latasha Rao MD     Procedures: see electronic medical records for all procedures/Xrays and details which were not copied into this note but were reviewed prior to creation of Plan. LABS:  I reviewed today's most current labs and imaging studies.   Pertinent labs include:  Recent Labs     05/07/22  0403   WBC 7.4   HGB 11.3*   HCT 35.2        Recent Labs     05/07/22  0403      K 4.0      CO2 29   *   BUN 11   CREA 0.82   CA 9.7       Signed: Latasha Rao MD

## 2022-05-08 NOTE — ROUTINE PROCESS
End of Shift Note     Bedside shift change report given to Julián Bob RN (oncoming nurse) by Deejay Ascencio RN (offgoing nurse). Report included the following information SBAR, Kardex and Recent Results     Shift worked: 7p - 7a   Shift summary and any significant changes:     Lithium levels drawn          Concerns for physician to address: Noted above   Zone phone for oncoming shift:  [phone]      Patient Information  Ne Sow  61 y.o.  5/3/2022  4:54 PM by Sujatha Ballesteros MD. Ne Sow was admitted from Home     Problem List       Patient Active Problem List     Diagnosis Date Noted    Drug overdose 05/03/2022    Robyn ([address] 07/26/2020    Scoliosis      Anxiety and depression      Bipolar 1 disorder ([address] 01/21/2020    Hyponatremia 01/10/2020    Chronic back pain 08/22/2016    Hyperlipidemia 08/22/2016    Bipolar 1 disorder with moderate robyn ([address] 03/17/2014           Past Medical History:   Diagnosis Date    Anxiety and depression      Bipolar 1 disorder with moderate robyn ([address] 3/17/2014    Chronic back pain      Hyperlipidemia 8/22/2016    Hyponatremia      Psychotic disorder (HCC)      Scoliosis           Core Measures:  CVA: No No  CHF:No No  PNA:No No     Activity:  Activity Level: Up with Assistance  Number times ambulated in hallways past shift: 2  Number of times OOB to chair past shift: 3     Cardiac:   Cardiac Monitoring: Yes      Cardiac Rhythm: Sinus Rhythm     Access:   Current line(s): PIV   Central Line? No   PICC LINE? No      Genitourinary:   Urinary status: voiding  Urinary Catheter?  No      Respiratory:   O2 Device: None (Room air)  Chronic home O2 use?: NO  Incentive spirometer at bedside: NO     GI:  Last Bowel Movement Date: 05/04/22  Current diet:  ADULT DIET Regular; Safety Tray; Safety Tray (Disposables)  DIET ONE TIME MESSAGE  Passing flatus: YES  Tolerating current diet: YES     Pain Management:   Patient states pain is manageable on current regimen: NO     Skin:  Fareed Score: 20  Interventions: increase time out of bed and nutritional support     Patient Safety:  Fall Score: Total Score: 2  Interventions: gripper socks, pt to call before getting OOB, stay with me (per policy) and sitter at bedside   High Fall Risk:  Yes     DVT prophylaxis:  DVT prophylaxis Med- Yes  DVT prophylaxis SCD or BRIJESH- No      Wounds: (If Applicable)  Wounds- No     Active Consults:  IP CONSULT TO HOSPITALIST  IP CONSULT TO PSYCHIATRY     Length of Stay:  Expected LOS: 2d 7h  Actual LOS: 3  Discharge Plan: 5/7/22        Connie Enriquez RN

## 2022-05-08 NOTE — DISCHARGE INSTRUCTIONS
HOSPITALIST DISCHARGE INSTRUCTIONS    NAME: Rosalina Cano   :  1965   MRN:  [id number]     Date/Time:  2022 2:04 PM    ADMIT DATE: 5/3/2022     DISCHARGE DATE: 2022     DISCHARGE DIAGNOSIS:  Intentional Drug overdose POA-  with lithium, temazepam and hydroxyzine-- IP Psych at [facility] as arranged- pt currently Voluntary accepting of the DC recommendations by Psychiatry. (NO TDO needed)  Elevated lithium levels POA- 3.59-->3.48 --> 1.3 ->0.67 today  Suicidal ideation POA- currently not suicidal  ?Schizophrenia  Bipolar disorder  Dyslipidemia  Chronic low back pain  Full code  Active Problems:    Drug overdose (5/3/2022)         MEDICATIONS:  As per medication reconciliation  list  · It is important that you take the medication exactly as they are prescribed. · Keep your medication in the bottles provided by the pharmacist and keep a list of the medication names, dosages, and times to be taken in your wallet. · Do not take other medications without consulting your doctor. Pain Management: per above medications    What to do at Home    Recommended diet:  Regular Diet    Recommended activity: Activity as tolerated    If you have questions regarding the hospital related prescriptions or hospital related issues please call [facility] matteo Barros at [phone]. Follow Up:  Dr. Deepak Jaeger MD  you are to call and set up an appointment to see them in 7-10 days. IP Psych at [facility] as arranged      Information obtained by :  I understand that if any problems occur once I am at home I am to contact my physician. I understand and acknowledge receipt of the instructions indicated above.                                                                                                                                            Physician's or R.N.'s Signature                                                                  Date/Time Patient or Representative Signature                                                          Date/Time

## 2022-05-08 NOTE — DISCHARGE SUMMARY
Hospitalist Discharge Summary     Patient ID:  Kirke Rubinstein  [id number]  61 y.o.  1965  5/3/2022    PCP on record: Ambar Gallagher MD    Admit date: 5/3/2022  Discharge date and time: 5/8/2022    DISCHARGE DIAGNOSIS:    Intentional Drug overdose POA-  with lithium, temazepam and hydroxyzine-- IP Psych at [facility] as arranged- pt currently Voluntary accepting of the DC recommendations by Psychiatry. (NO TDO needed)  Elevated lithium levels POA- 3.59-->3.48 --> 1.3 ->0.67 today  Suicidal ideation POA- currently not suicidal  ?Schizophrenia  Bipolar disorder  Dyslipidemia  Chronic low back pain  Full code    CONSULTATIONS:  IP CONSULT TO HOSPITALIST  IP CONSULT TO PSYCHIATRY  IP CONSULT TO PSYCHIATRY    Excerpted HPI from H&P of Mortimer Rusk, MD:  [age] y.o.   female who presents with past medical history of schizophrenia, depression, anxiety is coming the hospital chief complaints of drug overdose. Patient reports that around 730 she took some unknown quantity of lithium, temazepam and hydroxyzine. She currently does not report any symptoms. Does not report any confusion, chest pain, palpitations or syncopal episodes. Does not report any shortness of breath. Denies any abdominal pain, nausea or vomiting.     On arrival to ED, vital signs are normal.  On labs CBC is normal.  CMP is normal.  Lithium level is elevated at 3.59. Salicylate level is normal at 1.7. EKG shows normal sinus rhythm.     We were asked to admit for work up and evaluation of the above problems\"    ______________________________________________________________________  DISCHARGE SUMMARY/HOSPITAL COURSE:  for full details see H&P, daily progress notes, labs, consult notes.      Intentional Drug overdose POA-  with lithium, temazepam and hydroxyzine  Elevated lithium levels POA- 3.59-->3.48 --> 1.3 ->0.67 today  Suicidal ideation POA- currently not suicidal  -Patient is currently asymptomatic and is at baseline  Creatinine is within normal limits.    Repeat lithium levels and if they are going up, consider hemodialysis  -Salicylate level is normal.   Check acetaminophen level= <2  -Urine drug screen positive for benzodiazepines only  -LFTs are within normal limits  IP Psychiatry consult (5/5) noted- \"pt does not want to admit to inpatient psychiatry due to having a negative experience in the hospital the last time she stayed. However, due to this potentially serious suicide attempt she does meet criteria for inpatient psychiatry, so if pt remains opposed to voluntary hospitalization, please contact [facility] [address] and request a TDO evaluation\"  Pt medically cleared today for DC planning  Cont 1:1 Bedside sitter, suicide precautions. Resumed  Hydroxyzine ,Trazodone, zyprexa as per psych   Resumed Li daily as pt was taking at home before admission (use to be on BID dosing bedore)- Li level in therapeutic range now/safe range  Awaiting IP Psych bed to initiate psych TDO per [facility] Crises        Schizophrenia  Bipolar disorder  Dyslipidemia  Chronic low back pain     -Hold all home medications due to drug overdose and resume as tolerated  IP Psych re-eval noted as above- recommends IP psych admission- [facility] will not issue Psych TDO till IP Psych bed arranged per CM  Stable for DC as of today  IP CM consulted for DC planning- working on it        Code Status: Full code  Surrogate Decision Meena Primer        _______________________________________________________________________  Patient seen and examined by me on discharge day. Pertinent Findings:  Gen:    Not in distress  Chest: Clear lungs  CVS:   Regular rhythm.   No edema  Abd:  Soft, not distended, not tender  Neuro:  Alert, oriented x 3  _______________________________________________________________________  DISCHARGE MEDICATIONS:   Current Discharge Medication List      CONTINUE these medications which have CHANGED    Details   lithium carbonate 300 mg capsule Take 1 Capsule by mouth daily. Indications: robyn associated with bipolar disorder  Qty: 60 Capsule, Refills: 0  Start date: 5/8/2022         CONTINUE these medications which have NOT CHANGED    Details   trihexyphenidyL (ARTANE) 2 mg tablet       methocarbamoL (ROBAXIN) 500 mg tablet Take 500 mg by mouth as needed for Muscle Spasm(s). OLANZapine (ZyPREXA) 15 mg tablet Take 2 Tabs by mouth nightly. Indications: robyn associated with bipolar disorder  Qty: 60 Tab, Refills: 0      traZODone (DESYREL) 150 mg tablet Take 1 Tab by mouth nightly. Indications: insomnia associated with depression  Qty: 30 Tab, Refills: 0      hydrOXYzine HCL (ATARAX) 50 mg tablet Take 1 Tab by mouth two (2) times daily as needed for Anxiety. Indications: anxious  Qty: 60 Tab, Refills: 0      azelastine (ASTELIN) 137 mcg (0.1 %) nasal spray 1 [place] by Both Nostrils route as needed for Rhinitis. Prior to allergy injections      cetirizine (ZYRTEC) 10 mg tablet Take 10 mg by mouth every evening. cholecalciferol (VITAMIN D3) (50,000 UNITS /1250 MCG) capsule Take 50,000 Units by mouth every Monday. montelukast (SINGULAIR) 10 mg tablet Take 10 mg by mouth daily as needed. Prior to allergy shots      fexofenadine (ALLEGRA) 180 mg tablet Take 180 mg by mouth daily. STOP taking these medications       temazepam (RESTORIL) 15 mg capsule Comments:   Reason for Stopping:                 Patient Follow Up Instructions:    Activity: Activity as tolerated  Diet: Regular Diet      Follow-up with IP Psych unit at [address] [place] [address] as arranged      Follow-up Information     Follow up With Specialties Details Why MD Jos[facility] Physician   [address]  [facility]  [phone]          ________________________________________________________________    Risk of deterioration: Low    Condition at Discharge: Stable  __________________________________________________________________    Disposition  IP Psych unit at [address] hospital    ____________________________________________________________________    Code Status: Full Code  ___________________________________________________________________      Total time in minutes spent coordinating this discharge (includes going over instructions, follow-up, prescriptions, and preparing report for sign off to her PCP) :  >30 minutes    Signed:  Karlo Quiñones MD

## 2022-05-09 PROCEDURE — 74011250637 HC RX REV CODE- 250/637: Performed by: PSYCHIATRY & NEUROLOGY

## 2022-05-09 PROCEDURE — 65220000003 HC RM SEMIPRIVATE PSYCH

## 2022-05-09 RX ORDER — CETIRIZINE HCL 10 MG
10 TABLET ORAL
Status: DISCONTINUED | OUTPATIENT
Start: 2022-05-09 | End: 2022-05-12 | Stop reason: HOSPADM

## 2022-05-09 RX ORDER — OLANZAPINE 20 MG/1
20 TABLET ORAL
Status: ON HOLD | COMMUNITY
End: 2022-05-12 | Stop reason: SDUPTHER

## 2022-05-09 RX ORDER — TRIHEXYPHENIDYL HYDROCHLORIDE 2 MG/1
2 TABLET ORAL DAILY
Status: DISCONTINUED | OUTPATIENT
Start: 2022-05-09 | End: 2022-05-12 | Stop reason: HOSPADM

## 2022-05-09 RX ORDER — POLYETHYLENE GLYCOL 3350 17 G/17G
17 POWDER, FOR SOLUTION ORAL
Status: ON HOLD | COMMUNITY
End: 2022-05-12 | Stop reason: SDUPTHER

## 2022-05-09 RX ORDER — MELATONIN
1000 DAILY
Status: DISCONTINUED | OUTPATIENT
Start: 2022-05-09 | End: 2022-05-12 | Stop reason: HOSPADM

## 2022-05-09 RX ORDER — OLANZAPINE 10 MG/1
20 TABLET ORAL
Status: DISCONTINUED | OUTPATIENT
Start: 2022-05-09 | End: 2022-05-12 | Stop reason: HOSPADM

## 2022-05-09 RX ORDER — LANOLIN ALCOHOL/MO/W.PET/CERES
3 CREAM (GRAM) TOPICAL
Status: DISCONTINUED | OUTPATIENT
Start: 2022-05-09 | End: 2022-05-12 | Stop reason: HOSPADM

## 2022-05-09 RX ORDER — LITHIUM CARBONATE 300 MG
150 TABLET ORAL DAILY
Status: DISCONTINUED | OUTPATIENT
Start: 2022-05-09 | End: 2022-05-12 | Stop reason: HOSPADM

## 2022-05-09 RX ORDER — TEMAZEPAM 15 MG/1
15 CAPSULE ORAL
COMMUNITY
End: 2022-05-12

## 2022-05-09 RX ORDER — LITHIUM CARBONATE 150 MG/1
150 CAPSULE ORAL
Status: ON HOLD | COMMUNITY
End: 2022-05-12 | Stop reason: SDUPTHER

## 2022-05-09 RX ORDER — POLYETHYLENE GLYCOL 3350 17 G/17G
17 POWDER, FOR SOLUTION ORAL
Status: DISCONTINUED | OUTPATIENT
Start: 2022-05-09 | End: 2022-05-12 | Stop reason: HOSPADM

## 2022-05-09 RX ORDER — AZELASTINE 1 MG/ML
1 SPRAY, METERED NASAL
Status: DISCONTINUED | OUTPATIENT
Start: 2022-05-09 | End: 2022-05-12 | Stop reason: HOSPADM

## 2022-05-09 RX ORDER — HYDROXYZINE 50 MG/1
50 TABLET, FILM COATED ORAL
Status: ON HOLD | COMMUNITY
End: 2022-05-12 | Stop reason: SDUPTHER

## 2022-05-09 RX ADMIN — POLYETHYLENE GLYCOL 3350 17 G: 17 POWDER, FOR SOLUTION ORAL at 21:40

## 2022-05-09 RX ADMIN — OLANZAPINE 20 MG: 10 TABLET, FILM COATED ORAL at 21:29

## 2022-05-09 RX ADMIN — TRAZODONE HYDROCHLORIDE 150 MG: 100 TABLET ORAL at 21:30

## 2022-05-09 RX ADMIN — Medication 1000 UNITS: at 15:36

## 2022-05-09 RX ADMIN — TRIHEXYPHENIDYL HYDROCHLORIDE 2 MG: 2 TABLET ORAL at 15:34

## 2022-05-09 RX ADMIN — LITHIUM CARBONATE 150 MG: 300 TABLET ORAL at 15:34

## 2022-05-09 NOTE — GROUP NOTE
DANIEL  GROUP DOCUMENTATION INDIVIDUAL                                                                          Group Therapy Note    Date: 5/9/2022    Group Start Time: 1100  Group End Time: 1200  Group Topic: Topic Group    [facility] 3 ACUTE BEHAV [facility]    [place], [address] [facility] GROUP DOCUMENTATION GROUP    Group Therapy Note    Attendees: 9         Attendance: Attended    Patient's Goal:  To participate in relaxation activity    Interventions/techniques: Supported -game    Follows Directions:  Followed directions    Interactions: Interacted appropriately    Mental Status: Calm    Behavior/appearance: Attentive, Cooperative and Needed prompting    Goals Achieved: Able to engage in interactions and Able to listen to others -active participant in game      Brittany Garcia

## 2022-05-09 NOTE — GROUP NOTE
DANIEL  GROUP DOCUMENTATION INDIVIDUAL                                                                          Group Therapy Note    Date: 5/9/2022    Group Start Time: 0900  Group End Time: 1000  Group Topic: Topic Group    [facility] 3 ACUTE BEHAV [facility]    [address] GROUP    Group Therapy Note    Attendees: 5         Attendance: Did not attend    Patient's Goal:      Interventions/techniques:  Rayne Heart

## 2022-05-09 NOTE — BH NOTES
Behavioral Health Interdisciplinary Rounds    Patient goal(s) for today: Rest, communicate needs to staff, complete ADLs, attend groups, attend treatment team  Treatment team focus/goals: Discuss reason for admission  Progress note: Pt met with treatment team for the first time since admission. Pt reports she committed a sin and she has been fixated on the fact that she did something wrong for 3 days prior to suicide attempt. Pt also reports she has been practicing driving again which has increased her stress levels. Pt reports back pain that she would like medication for and reports issues with bowel movements, medication adjusted. Pt meds to be adjusted and will return home with  and continue to follow up with [facility]. Left voicemail for  to return call regarding discharge planning at earliest convenience.          Financial concerns/prescription coverage: BLUE CROSS MEDICARE - [id number] [facility]  Family contact: Mehreen Tubbs, , [phone], gave permission to call                       Family requesting physician contact today:  No  Discharge plan: Return home once stable  Access to weapons :   No                                                           Outpatient provider(s): [address] [place] [address], Dr. Ashley Andres and Blaise Mosher   Patient's preferred phone number for follow up call: [phone]   Patient's preferred e-mail address : N/A    LOS:  1  Expected LOS: TBD    Participating treatment team members: MERCED Medrano, Dr. Robert Ferguson

## 2022-05-09 NOTE — BH NOTES
PSYCHOSOCIAL ASSESSMENT  :Patient identifying info:   Rosalina Cano is a 61 y.o., female admitted 5/8/2022  4:47 PM     Presenting problem and precipitating factors: 61year old female admitted from [facility] Neuro/Telemetry for attempted suicide by overdosing on Lithium (10 300mg) Hydrozyzine (10) and temazepam (10). Pt reports struggling with SI for days prior to attempt. Denies HI and [facility] AT [facility]. Mental status assessment: Pt is pleasant when meeting with treatment team. Pt mood and affect appears flat and depressed. Pt responds to assessment questions appropriately, thought process appears linear. Strengths/Recreation/Coping Skills: Stable housing, steady income, connection to outside community resources, family support    Collateral information: [place] -[phone]    Current psychiatric /substance abuse providers and contact info: [facility] (Gabriella GREGORIO and Jae Red)    Previous psychiatric/substance abuse providers and response to treatment: 5-7 previous psychiatric admissions; [facility] 1/2020, [facility] 2/2020, 7/2020. Family history of mental illness or substance abuse: None stated    Substance abuse history:  Denies, UDS-, BAL 0  Social History     Tobacco Use    Smoking status: Never Smoker    Smokeless tobacco: Never Used   Substance Use Topics    Alcohol use: Yes     Comment: occasional       History of biomedical complications associated with substance abuse: None stated    Patient's current acceptance of treatment or motivation for change: TDO    Family constellation: Pt is  without children    Is significant other involved?  Yes    Describe support system: Minimal    Describe living arrangements and home environment: Lives with     GUARDIAN/POA: NO    Guardian Name: None    Guardian Contact: None    Health issues:   Hospital Problems  Date Reviewed: 11/30/2020          Codes Class Noted POA    Schizoaffective disorder ([facility] ICD-10-CM: F25.9  ICD-9-CM: 295.70  2022 Unknown              Trauma history: None stated    Legal issues: No pending legal charges    History of  service: None    Financial status: SSDI    Mormon/cultural factors: Jehovah Witness    Education/work history: Achieved high school level of education    Have you been licensed as a health care professional (current or ): No    Describe coping skills: Limited, ineffective    Will Orn  2022

## 2022-05-09 NOTE — PROGRESS NOTES
Laboratory monitoring for mood stabilizer and antipsychotics:    Recommended baseline monitoring has been completed based on this patient's current medication regimen. The patient is currently taking the following medication(s):   Current Facility-Administered Medications   Medication Dose Route Frequency    cholecalciferol (VITAMIN D3) (1000 Units /25 mcg) tablet 1,000 Units  1,000 Units Oral DAILY    lithium carbonate tablet 150 mg  150 mg Oral DAILY    OLANZapine (ZyPREXA) tablet 20 mg  20 mg Oral QHS    traZODone (DESYREL) tablet 150 mg  150 mg Oral QHS    trihexyphenidyL (ARTANE) tablet 2 mg  2 mg Oral DAILY       Serum Drug Level(s)  LITHIUM  Lab Results   Component Value Date/Time    Lithium level 0.67 05/07/2022 08:38 PM       Height, Weight, BMI Estimation  Estimated body mass index is 30.23 kg/m² as calculated from the following:    Height as of 5/3/22: 154.9 cm (61\"). Weight as of 5/3/22: 72.6 kg (160 lb). Renal Function, Hepatic Function and Chemistry  Estimated Creatinine Clearance: 68.1 mL/min (by C-G formula based on SCr of 0.82 mg/dL). Lab Results   Component Value Date/Time    Sodium 137 05/07/2022 04:03 AM    Potassium 4.0 05/07/2022 04:03 AM    Chloride 107 05/07/2022 04:03 AM    CO2 29 05/07/2022 04:03 AM    Anion gap 1 (L) 05/07/2022 04:03 AM    Glucose 121 (H) 05/07/2022 04:03 AM    Glucose 107 (H) 02/03/2020 05:40 AM    BUN 11 05/07/2022 04:03 AM    Creatinine 0.82 05/07/2022 04:03 AM    BUN/Creatinine ratio 13 05/07/2022 04:03 AM    GFR est AA >60 05/07/2022 04:03 AM    GFR est non-AA >60 05/07/2022 04:03 AM    Calcium 9.7 05/07/2022 04:03 AM    ALT (SGPT) 20 05/04/2022 03:39 AM    Alk.  phosphatase 37 (L) 05/04/2022 03:39 AM    Protein, total 6.9 05/04/2022 03:39 AM    Albumin 3.4 (L) 05/04/2022 03:39 AM    Globulin 3.5 05/04/2022 03:39 AM    A-G Ratio 1.0 (L) 05/04/2022 03:39 AM    Bilirubin, total 0.3 05/04/2022 03:39 AM       Lab Results   Component Value Date/Time Glucose 121 (H) 05/07/2022 04:03 AM    Glucose 107 (H) 02/03/2020 05:40 AM    Glucose (POC) 136 (H) 01/10/2020 04:22 PM       Lab Results   Component Value Date/Time    Hemoglobin A1c 5.2 01/20/2022 09:49 AM       Hematology  Lab Results   Component Value Date/Time    WBC 7.4 05/07/2022 04:03 AM    HGB 11.3 (L) 05/07/2022 04:03 AM    Hematocrit (POC) 34 (L) 07/17/2018 03:33 AM    HCT 35.2 05/07/2022 04:03 AM    PLATELET 384 77/86/4920 04:03 AM    MCV 92.9 05/07/2022 04:03 AM       Lipids  Lab Results   Component Value Date/Time    Cholesterol, total 202 (H) 01/20/2022 09:49 AM    HDL Cholesterol 84 01/20/2022 09:49 AM    LDL, calculated 110.2 (H) 01/20/2022 09:49 AM    Triglyceride 39 01/20/2022 09:49 AM    CHOL/HDL Ratio 2.4 01/20/2022 09:49 AM       Thyroid Function    Lab Results   Component Value Date/Time    TSH 1.150 11/30/2020 03:56 PM    T3 Uptake 29 11/12/2015 03:52 PM    T4, Free 0.85 04/25/2019 04:09 PM    T4, Total 3.0 (L) 11/12/2015 03:52 PM     Vitals  Visit Vitals  /89   Pulse 95   Temp 98.6 °F (37 °C)   Resp 18   SpO2 100%   Breastfeeding No       Pregnancy Test  Lab Results   Component Value Date/Time    Pregnancy test,urine (POC) NEGATIVE  12/08/2011 08:00 AM       [address], [address], [facility]  [phone] (pharmacy)

## 2022-05-09 NOTE — PROGRESS NOTES
[facility] Admission Pharmacy Medication Reconciliation     Information obtained from: Patient interview, RxQuery  RxQuery data available1: Yes    Comments/recommendations:  Of note, patient is a transfer from [facility] medical floor following an overdose of lithium, temazepam and hydroxyzine    Medication changes (since last review): Added  Temazepam  Polyethylene glycol   Removed  Fexofenadine  Methocarbamol  Montelukast  Adjusted  Cetirizine  Cholecalciferol  Lithium carbonate  Olanzapine  Trihexyphenidyl     The [place] Prescription Monitoring Program () was accessed to determine fill history of any controlled medications. She routinely fills temazepam 15 mg capsules #30 for 30 DS, last filled 22.    1RxQuery pharmacy benefit data reflects medications filled and processed through the patient's insurance, however                this data does NOT capture whether the medication was picked up or is currently being taken by the patient. Patient allergies: Allergies as of 2022 - Fully Reviewed 2022   Allergen Reaction Noted    Other food Hives 2018    Claritin-d 12 hour [loratadine-pseudoephedrine] Palpitations 2011    Other medication Anaphylaxis 2011    Pcn [penicillins] Anaphylaxis 2011    Sunscreen Contact Dermatitis 2011    Ultram [tramadol] Hives and Other (comments) 2011    Benzoyl peroxide Hives 2013    Cephalexin Itching 2015    Divalproex Itching 2018    Maltodextrin-glycerin Shortness of Breath 2014       Prior to Admission Medications   Prescriptions Last Dose Informant Patient Reported? Taking? CHOLECALCIFEROL, VITAMIN D3, PO  Self Yes Yes   Sig: Take  by mouth. \"5 drops daily\"   OLANZapine (ZyPREXA) 20 mg tablet  Self Yes Yes   Sig: Take 20 mg by mouth nightly. Indications: bipolar disorder   azelastine (ASTELIN) 137 mcg (0.1 %) nasal spray  Self Yes Yes   [place] by Both Nostrils route daily as needed for Rhinitis. Indications: seasonal runny nose   cetirizine (ZYRTEC) 10 mg tablet  Self Yes Yes   Sig: Take 10 mg by mouth daily as needed for Allergies. Indications: seasonal runny nose   hydrOXYzine HCL (ATARAX) 50 mg tablet  Self Yes Yes   Sig: Take 50 mg by mouth three (3) times daily as needed for Anxiety. lithium carbonate 150 mg capsule  Self Yes Yes   Sig: Take 150 mg by mouth nightly. Indications: bipolar disorder   polyethylene glycol (Miralax) 17 gram packet  Self Yes Yes   Sig: Take 17 g by mouth daily as needed for Constipation. temazepam (RESTORIL) 15 mg capsule  Self Yes Yes   Sig: Take 15 mg by mouth nightly as needed for Sleep.   traZODone (DESYREL) 150 mg tablet  Self No Yes   Sig: Take 1 Tab by mouth nightly. Indications: insomnia associated with depression   trihexyphenidyL (ARTANE) 2 mg tablet  Self Yes Yes   Sig: Take 2 mg by mouth daily.  Indications: extrapyramidal symptoms as a result of taking the medication      Facility-Administered Medications: None        Thank you,  [facility] Specialist, [address] [place] [address] Nw: [phone] ([id number])  Pharmacy: [phone] ([id number])

## 2022-05-09 NOTE — PROGRESS NOTES
Problem: Suicide  Goal: *STG: Remains safe in hospital  Outcome: Progressing Towards Goal  Goal: *STG: Seeks staff when feelings of self harm or harm towards others arise  Outcome: Progressing Towards Goal  Goal: *STG: Attends activities and groups  Outcome: Progressing Towards Goal  Goal: *STG:  Verbalizes alternative ways of dealing with maladaptive feelings/behaviors  Outcome: Progressing Towards Goal     [id number] Shift report received from Carlos Head 9099: Patient met resting in bed, she was very calm and cooperative. Endorses anxiety at 5/10. Denies SI/HI/VAH. Patient is medication and meal compliant, prn Atarax and Trazodone given for anxiety and insomnia. No violence or aggression recorded.      2300 Patient is sleeping in her room, respiration is even and unlabored. No violence recorded.   Quick 15 minutes checks and RN hourly rounding ongoing for safety. 0000: Patient is sleeping. will continue to monitor patient. 0100: Patient resting quietly in bed. Will continue to monitor patient. 0200: appears to be sleeping  0300: Awake in bed, quiet  0400: sleeping  0500 - 0600 Patient is resting, no complain.   She slept for 9 hours

## 2022-05-09 NOTE — GROUP NOTE
DANIEL  GROUP DOCUMENTATION INDIVIDUAL                                                                          Group Therapy Note    Date: 5/9/2022    Group Start Time: 1500  Group End Time: 1600  Group Topic: Recreational/Music Therapy    [facility] - [place] [address] [address] [facility]    [place], [address] [facility] GROUP DOCUMENTATION GROUP    Group Therapy Note    Attendees: 6         Attendance: Attended    Patient's Goal:  To concentrate on selected task    Interventions/techniques: Supported -crafts,games,music    Follows Directions:  Followed directions    Interactions: Interacted appropriately    Mental Status: Calm    Behavior/appearance: Attentive, Cooperative and Needed prompting    Goals Achieved: Able to engage in interactions and Able to listen to others-active participant in game    Joanietaz Ackermany

## 2022-05-09 NOTE — PROGRESS NOTES
Behavioral Services  Medicare Certification Upon Admission    I certify that this patient's inpatient psychiatric hospital admission is medically necessary for:    [x] (1) Treatment which could reasonably be expected to improve this patient's condition,       [x] (2) Or for diagnostic study;     AND     [x](2) The inpatient psychiatric services are provided while the individual is under the care of a physician and are included in the individualized plan of care.     Estimated length of stay/service 5 - 7 days    Plan for post-hospital care per social work    Electronically signed by Ghassan Pillai MD on 5/9/2022 at 12:02 PM

## 2022-05-10 PROCEDURE — 74011250637 HC RX REV CODE- 250/637: Performed by: PSYCHIATRY & NEUROLOGY

## 2022-05-10 PROCEDURE — 65220000003 HC RM SEMIPRIVATE PSYCH

## 2022-05-10 PROCEDURE — 74011250637 HC RX REV CODE- 250/637

## 2022-05-10 RX ADMIN — TRAZODONE HYDROCHLORIDE 150 MG: 100 TABLET ORAL at 21:07

## 2022-05-10 RX ADMIN — TRIHEXYPHENIDYL HYDROCHLORIDE 2 MG: 2 TABLET ORAL at 08:52

## 2022-05-10 RX ADMIN — OLANZAPINE 20 MG: 10 TABLET, FILM COATED ORAL at 21:08

## 2022-05-10 RX ADMIN — HYDROXYZINE HYDROCHLORIDE 50 MG: 25 TABLET, FILM COATED ORAL at 21:07

## 2022-05-10 RX ADMIN — Medication 1000 UNITS: at 08:52

## 2022-05-10 RX ADMIN — HYDROXYZINE HYDROCHLORIDE 50 MG: 25 TABLET, FILM COATED ORAL at 00:28

## 2022-05-10 RX ADMIN — LITHIUM CARBONATE 150 MG: 300 TABLET ORAL at 08:52

## 2022-05-10 NOTE — BH NOTES
Personal goal for participation: Positive Psychology     Goal orientation: personal  10:30am-11:15am  Group therapy participation: patient served as active member of group. She was able to participate without prompts and served as a role model to peers, as she asked questions. She continues to show progress towards her goals by being an active member of her mental health treatment. Therapeutic interventions reviewed and discussed: Group facilitator conducted psychoeducation in effort to work on interventions related to cognitive restructuring and cognitive distortions. This exercise can also help improve the quality of thought records by properly identifying the facts of a situation. Group members were then introduced to three ways to initiate soft startups as a way to practice effective communication with loved ones. Group facilitated discussed the importance of setting up a conversation to be successful and encouraged to resolve conflict.      Impression of participation: active participant      MERCED Reyes

## 2022-05-10 NOTE — GROUP NOTE
DANIEL  GROUP DOCUMENTATION INDIVIDUAL                                                                          Group Therapy Note    Date: 5/10/2022    Group Start Time: 1500  Group End Time: 1600  Group Topic: Recreational/Music Therapy    [address] [facility] [address] ACUTE BEHAV [facility]    [place], [address] [facility] GROUP DOCUMENTATION GROUP    Group Therapy Note    Attendees: 9         Attendance: Attended    Patient's Goal:  To concentrate on selected task    Interventions/techniques: Supported -crafts,games,music    Follows Directions:  Followed directions    Interactions: Interacted appropriately    Mental Status: Calm -pleasant  Behavior/appearance: Attentive, Cooperative and Needed prompting    Goals Achieved: Able to engage in interactions and Able to listen to others-active participant in game      Santos Franklin

## 2022-05-10 NOTE — BH NOTES
Personal goal for participation: The Cycle of Depression    Goal orientation: personal    Group therapy participation: 2:00pm-2:45pm    Therapeutic interventions reviewed and discussed: Group facilitator briefly discussed the cycle of depression (stressors, thoughts, feelings, physical symptoms, and behavioral responses). Group members were then able to identify specific stressors that contributed to their hospitalizations. Participants then were able to discuss some of the negative thoughts, feelings, physical symptoms and behaviors that were a result of their stressors. Group members were able to process and be vulnerable with each other as they opened up about their experiences and trauma. Impression of participation: Patient was an active participant and was able to discuss her trauma that she continues to struggle with. She continues to show progress towards her goals by identifying ways to process and cope with her trauma.        MERCED Flores

## 2022-05-10 NOTE — GROUP NOTE
DANIEL  GROUP DOCUMENTATION INDIVIDUAL                                                                          Group Therapy Note    Date: 5/10/2022    Group Start Time: 0900  Group End Time: 1000  Group Topic: Topic Group    [facility] - [place] [address] ACUTE BEHAV [facility]    [place], [address] [facility] GROUP DOCUMENTATION GROUP    Group Therapy Note    Attendees: 7         Attendance: Attended    Patient's Goal:  To participate in chair exercise routine    Interventions/techniques: Supported-benefits of exercise    Follows Directions:  Followed directions    Interactions: Interacted appropriately    Mental Status: Calm    Behavior/appearance: Attentive and Cooperative    Goals Achieved: Able to engage in interactions, Able to listen to others, Able to self-disclose and Discussed coping-completed routine    Azeem Rebolledo

## 2022-05-10 NOTE — PROGRESS NOTES
Assumed care of pt after receiving shift report from outgoing nurse. No apparent distress. Pt currently denies SI/HI/AVH and pain. Pt denies depression and anxiety. Mood is anxious with dull affect. Alert and oriented. Med and meal compliant. Pt visible in milieu, attending groups, and interacting appropriately with staff and peers. Pt voices no concerns/complaints at this time. No behavioral issues observed at this time. 9350-7521: pt showered, calm and cooperative  9963-7616: pt in DR, attending groups, eating lunch  9869-2231: pt attending groups  0277-2683: pt ate dinner, transferred to general unit        Problem: Falls - Risk of  Goal: *Absence of Falls  Description: Document Julianne Winter Fall Risk and appropriate interventions in the flowsheet.   Outcome: Progressing Towards Goal  Note: Fall Risk Interventions:  Mobility Interventions: Patient to call before getting OOB      History of Falls Interventions: Door open when patient unattended

## 2022-05-10 NOTE — H&P
[address] [facility] HISTORY AND PHYSICAL    Name:  Carly Serna  MR#:  [id number]  :  1965  ACCOUNT #:  [de-identified]  ADMIT DATE:  2022    PSYCHIATRIC INTAKE NOTE    CHIEF COMPLAINT:  Suicide attempt, found by . HISTORY OF PRESENT ILLNESS:  This is a 50-year-old female with history of bipolar disorder, schizoaffective disorder, bipolar type who was brought to the emergency room after an intentional overdose on lithium, temazepam, and hydroxyzine in attempt to kill herself, states that she tried to commit suicide in a fleeting moment due to her new [employer] as a [employer]. She felt that she had committed an ultimate sin against God and that is unforgivable and therefore, she had to kill herself in order to make things right. I brought to her attention that [address] do not believe in suicide and that God would not help her in to heaven and suicide is not their belief system, she agreed and recognized that there was a strong emotion driving her thoughts, and she regrets the attempt at this time. Recognizes that there was a strong sensation that was noted down the road despite the beliefs. She has denied suicidal or homicidal ideations currently and is here for management of her condition. PAST PSYCHIATRIC HISTORY:  She has had 7-8 prior psychiatric hospitalizations. MEDICATIONS:  She was on Zyprexa, Lithium, Trazodone, monitoring done by pharmacy. She said they were helpful, and she is willing to go back on them. She did have some gaps in compliance with taking the medications. PAST MEDICAL HISTORY:  Back pain, vitamin D deficiency, hyperlipidemia, hyponatremia, and scoliosis. ALLERGIES:  MYRIAD. SHE HAS PALPITATIONS TO CLARITIN D; ANAPHYLAXIS TO PENICILLINS; CONTACT DERMATITIS TO SUNSCREEN; ULTRAM - HIVES ALONG WITH BENZOYL PEROXIDE; ITCHING TO CEPHALOSPORINS; AND DEPAKOTE, SHORTNESS OF BREATH WITH NORCO, DEXTRAN, GLYCERIN.     SOCIAL HISTORY:  , no children. Denies alcohol, drugs, or cigarettes. Lives with her . Mother is in [place] but is supportive. MENTAL STATUS EXAM:  Adult female. Calm, cooperative. Clear coherent speech of average rate, volume, and tone. Mood is depressed. Affect, fair range. Thoughts are linear, goal directed. Denies suicidal or homicidal ideations and no auditory or visual hallucinations. Aware of her surroundings, location, and situation. Here for management of her condition. DIAGNOSIS:  Schizoaffective disorder, bipolar type, acute exacerbation. PLAN:  Admit for safety and stabilization. Medication modification as needed. Group therapy, individual therapy. ESTIMATED LENGTH OF STAY:  5-7 days. DISPOSITION:  Planning with Social Work. STRENGTHS:  Willingness for treatment. DISABILITIES:  Compliance, multiple allergies. SULEIMAN Sandoval MD      [id number]  D:  05/09/2022 23:55  T:  05/10/2022 8:51  JOB #:  [id number]

## 2022-05-10 NOTE — DISCHARGE SUMMARY
Psychiatric Progress Note    Patient: Consuelo Alarcon MRN: [id number]  SSN: [id number]    YOB: 1965  Age: 61 y.o. Sex: female      Admit Date: 5/8/2022    LOS: 2 days     Subjective:     Consuelo Alarcon  reports feeling good and moods are mildly anxious. Complains of dry eyes, poor sleep and anxiety due to her roommates issues last evening. Denies SI/HI/AH/VH. No aggression or violence. Appropriately interactive and aware. Tolerating medications well. Eating fairly and sleeping poorly with melatonin.     Objective:     Vitals:    05/08/22 1728 05/08/22 2038 05/09/22 1826 05/09/22 2022   BP: 128/82 133/89 129/88 (!) 122/90   Pulse: 96 95 92 93   Resp:  18 16 16   Temp:  98.6 °F (37 °C) 98.6 °F (37 °C) 98.6 °F (37 °C)   SpO2:  100%  100%        Mental Status Exam:   Sensorium  oriented to time, place and person   Relations cooperative   Eye Contact appropriate   Appearance:  age appropriate   Speech:  normal volume, non-pressured, and soft   Thought Process: goal directed   Thought Content internally preoccupied    Suicidal ideations none   Mood:  euthymic   Affect:  constricted   Memory   adequate   Concentration:  adequate   Insight:  fair   Judgment:  limited and poor       MEDICATIONS:  Current Facility-Administered Medications   Medication Dose Route Frequency    melatonin tablet 3 mg  3 mg Oral QHS PRN    azelastine (ASTELIN) 137mcg/spray nasal spray  1 Spray Both Nostrils DAILY PRN    cetirizine (ZYRTEC) tablet 10 mg  10 mg Oral DAILY PRN    cholecalciferol (VITAMIN D3) (1000 Units /25 mcg) tablet 1,000 Units  1,000 Units Oral DAILY    lithium carbonate tablet 150 mg  150 mg Oral DAILY    OLANZapine (ZyPREXA) tablet 20 mg  20 mg Oral QHS    polyethylene glycol (MIRALAX) packet 17 g  17 g Oral DAILY PRN    traZODone (DESYREL) tablet 150 mg  150 mg Oral QHS    trihexyphenidyL (ARTANE) tablet 2 mg  2 mg Oral DAILY    OLANZapine (ZyPREXA) tablet 5 mg  5 mg Oral Q6H PRN    haloperidol lactate (HALDOL) injection 5 mg  5 mg IntraMUSCular Q6H PRN    benztropine (COGENTIN) tablet 1 mg  1 mg Oral BID PRN    diphenhydrAMINE (BENADRYL) injection 50 mg  50 mg IntraMUSCular BID PRN    hydrOXYzine HCL (ATARAX) tablet 50 mg  50 mg Oral TID PRN    LORazepam (ATIVAN) injection 1 mg  1 mg IntraMUSCular Q4H PRN    acetaminophen (TYLENOL) tablet 650 mg  650 mg Oral Q4H PRN      DISCUSSION:   the risks and benefits of the proposed medication  patient given opportunity to ask questions    Lab/Data Review: All lab results for the last 24 hours reviewed. No results found for this or any previous visit (from the past 24 hour(s)). Assessment:     Principal Problem:    Schizoaffective disorder ([facility] [address] (5/8/2022)    Active Problems:    Chronic back pain (8/22/2016)      Scoliosis ()        Plan:     Continue current care  Collateral information  Artificial tears  Move to general unit  Disposition planning with social work    I certify that this patient's inpatient psychiatric hospital services furnished since the previous certification were, and continue to be, required for treatment that could reasonably be expected to improve the patient's condition, or for diagnostic study, and that the patient continues to need, on a daily basis, active treatment furnished directly by or requiring the supervision of inpatient psychiatric facility personnel. In addition, the hospital records show that services furnished were intensive treatment services, admission or related services, or equivalent services.   Signed By: Austin Sorenson MD     May 10, 2022

## 2022-05-10 NOTE — PROGRESS NOTES
Problem: Falls - Risk of  Goal: *Absence of Falls  Description: Document Clinton Hancock Fall Risk and appropriate interventions in the flowsheet. Outcome: Progressing Towards Goal  Note: Fall Risk Interventions:  Mobility Interventions: Patient to call before getting OOB         Medication Interventions: Bed/chair exit alarm    Elimination Interventions: Call light in reach    History of Falls Interventions: Bed/chair exit alarm  Problem: Suicide  Goal: *STG/LTG: Complies with medication therapy  Outcome: Progressing Towards Goal     Problem: Suicide  Goal: *STG: Remains safe in hospital  Outcome: Progressing Towards Goal   1900- 0700:  Report received from outgoing RN. Assumed care of Patient. Patient is resting in bed. Patient is alert and oriented x2. Patient denies any S/I, H/I, anxiety, depression, A/V/H or pain. 1900- 2100: Patient up for snack. Patient compliant with HS medications. Hourly rounds Patient is quiet in bed. 2100 to 2300: hourly rounds maintained Patient is quiet in bed as if sleep. 2300- 0200: Patient observed resting in bed as if sleep during hourly rounds. PRN Hydroxyzine 50 mg P.O given for anxiety 8/10.  0200- 0700: Continuous hourly rounding maintained during shift for care and safety. Patient   Slept about  7 Hrs.

## 2022-05-10 NOTE — BH NOTES
Social Work Progress Note     [address] [facility]     Patient goal(s) for today: Rest, communicate needs to staff, complete ADLs, attend groups, attend treatment team  Treatment team focus/goals: Discuss reason for admission  Progress note:   Pt reports feeling tired today and states she did not sleep well last night. She denies SI/HI/AVH and is compliant with her medications. Pt will move to general today.   MERCED LM for pt's [place], Jesse Red.      Financial concerns/prescription coverage: BLUE CROSS MEDICARE - [id number] [facility]  Family contact: Jason Hathaway, , [phone], gave permission to call                       Family requesting physician contact today:  No  Discharge plan: Return home once stable  Access to weapons :   No                                                           Outpatient provider(s): [address] [place] [address], Dr. Gene Staley and Marcia Rosado   Patient's preferred phone number for follow up call: [phone]   Patient's preferred e-mail address : N/A

## 2022-05-11 PROCEDURE — 74011250637 HC RX REV CODE- 250/637

## 2022-05-11 PROCEDURE — 74011250637 HC RX REV CODE- 250/637: Performed by: PSYCHIATRY & NEUROLOGY

## 2022-05-11 PROCEDURE — 65220000003 HC RM SEMIPRIVATE PSYCH

## 2022-05-11 RX ORDER — ADHESIVE BANDAGE
30 BANDAGE TOPICAL DAILY PRN
Status: DISCONTINUED | OUTPATIENT
Start: 2022-05-11 | End: 2022-05-12 | Stop reason: HOSPADM

## 2022-05-11 RX ADMIN — HYDROXYZINE HYDROCHLORIDE 50 MG: 25 TABLET, FILM COATED ORAL at 21:09

## 2022-05-11 RX ADMIN — TRAZODONE HYDROCHLORIDE 150 MG: 100 TABLET ORAL at 21:08

## 2022-05-11 RX ADMIN — DEXTRAN 70, GLYCERIN, HYPROMELLOSE 1 DROP: 1; 2; 3 SOLUTION/ DROPS OPHTHALMIC at 09:22

## 2022-05-11 RX ADMIN — MAGNESIUM HYDROXIDE 30 ML: 2400 SUSPENSION ORAL at 18:40

## 2022-05-11 RX ADMIN — TRIHEXYPHENIDYL HYDROCHLORIDE 2 MG: 2 TABLET ORAL at 08:42

## 2022-05-11 RX ADMIN — POLYETHYLENE GLYCOL 3350 17 G: 17 POWDER, FOR SOLUTION ORAL at 09:22

## 2022-05-11 RX ADMIN — OLANZAPINE 20 MG: 10 TABLET, FILM COATED ORAL at 21:08

## 2022-05-11 RX ADMIN — LITHIUM CARBONATE 150 MG: 300 TABLET ORAL at 08:42

## 2022-05-11 RX ADMIN — Medication 1000 UNITS: at 08:42

## 2022-05-11 RX ADMIN — CETIRIZINE HYDROCHLORIDE 10 MG: 10 TABLET, FILM COATED ORAL at 09:22

## 2022-05-11 RX ADMIN — DEXTRAN 70, GLYCERIN, HYPROMELLOSE 1 DROP: 1; 2; 3 SOLUTION/ DROPS OPHTHALMIC at 13:52

## 2022-05-11 NOTE — BH NOTES
During shift, pt remained in bed. Pt denies SI, HI, A/V hallucinations. Meal/med compliant. Currently, pt is resting in bed with eyes closed. No signs of distress nor made any further complaints. Locked Unit. Patient slept this shift 8 hours, respirations noted even and unlabored. Safe environment maintained, q15 min safety checks maintained. Q1 hourly nursing rounds maintained. Patient updated on plan of care.

## 2022-05-11 NOTE — PROGRESS NOTES
Patient received walking in the rossi. Denies SI/HI/AVH and depression, endorses an anxiety level of 4. Calm, cooperative, pleasant, out on the unit, attending groups, interacting with her peers. Taking medications as prescribed. Will continue to monitor for safety.

## 2022-05-11 NOTE — BH NOTES
Psychiatric Progress Note    Patient: Dm Flores MRN: [id number]  SSN: [id number]    YOB: 1965  Age: 61 y.o. Sex: female      Admit Date: 5/8/2022    LOS: 3 days     Subjective:     Dm Flores  reports feeling good and moods are mildly anxious. Complains of dry eyes, poor sleep and anxiety due to her roommates issues last evening. Denies SI/HI/AH/VH. No aggression or violence. Appropriately interactive and aware. Tolerating medications well. Eating fairly and sleeping poorly with melatonin. 5/11 - Dm Flores reports feeling well and moods are good. Mild anxiety. Denies SI/HI/AH/VH. No aggression or violence. Appropriately interactive and aware. Tolerating medications well. Eating and sleeping fairly.       Objective:     Vitals:    05/09/22 2022 05/10/22 1818 05/10/22 1915 05/11/22 0935   BP: (!) 122/90 (!) 146/83 126/89 110/66   Pulse: 93 (!) 102 92 92   Resp: 16 18 17 17   Temp: 98.6 °F (37 °C) 98.8 °F (37.1 °C) 98.8 °F (37.1 °C) 97.8 °F (36.6 °C)   SpO2: 100% 100%  99%        Mental Status Exam:   Sensorium  oriented to time, place and person   Relations cooperative   Eye Contact appropriate   Appearance:  age appropriate   Speech:  normal volume, non-pressured, and soft   Thought Process: goal directed   Thought Content internally preoccupied    Suicidal ideations none   Mood:  euthymic   Affect:  Fair range   Memory   adequate   Concentration:  adequate   Insight:  fair   Judgment:  fair       MEDICATIONS:  Current Facility-Administered Medications   Medication Dose Route Frequency    artificial tears (dextran-hypromellose-glycerin) (GENTEAL) ophthalmic solution 1 Drop  1 Drop Both Eyes PRN    melatonin tablet 3 mg  3 mg Oral QHS PRN    azelastine (ASTELIN) 137mcg/spray nasal spray  1 Spray Both Nostrils DAILY PRN    cetirizine (ZYRTEC) tablet 10 mg  10 mg Oral DAILY PRN    cholecalciferol (VITAMIN D3) (1000 Units /25 mcg) tablet 1,000 Units  1,000 Units Oral DAILY  lithium carbonate tablet 150 mg  150 mg Oral DAILY    OLANZapine (ZyPREXA) tablet 20 mg  20 mg Oral QHS    polyethylene glycol (MIRALAX) packet 17 g  17 g Oral DAILY PRN    traZODone (DESYREL) tablet 150 mg  150 mg Oral QHS    trihexyphenidyL (ARTANE) tablet 2 mg  2 mg Oral DAILY    OLANZapine (ZyPREXA) tablet 5 mg  5 mg Oral Q6H PRN    haloperidol lactate (HALDOL) injection 5 mg  5 mg IntraMUSCular Q6H PRN    benztropine (COGENTIN) tablet 1 mg  1 mg Oral BID PRN    diphenhydrAMINE (BENADRYL) injection 50 mg  50 mg IntraMUSCular BID PRN    hydrOXYzine HCL (ATARAX) tablet 50 mg  50 mg Oral TID PRN    LORazepam (ATIVAN) injection 1 mg  1 mg IntraMUSCular Q4H PRN    acetaminophen (TYLENOL) tablet 650 mg  650 mg Oral Q4H PRN      DISCUSSION:   the risks and benefits of the proposed medication  patient given opportunity to ask questions    Lab/Data Review: All lab results for the last 24 hours reviewed. No results found for this or any previous visit (from the past 24 hour(s)). Assessment:     Principal Problem:    Schizoaffective disorder ([facility] [address] (5/8/2022)    Active Problems:    Chronic back pain (8/22/2016)      Scoliosis ()        Plan:     Continue current care  Collateral information  Medication modification as appropriate  Disposition planning with social work for the next few days    I certify that this patient's inpatient psychiatric hospital services furnished since the previous certification were, and continue to be, required for treatment that could reasonably be expected to improve the patient's condition, or for diagnostic study, and that the patient continues to need, on a daily basis, active treatment furnished directly by or requiring the supervision of inpatient psychiatric facility personnel. In addition, the hospital records show that services furnished were intensive treatment services, admission or related services, or equivalent services.   Signed By: Tamy Mckinley MD May 11, 2022

## 2022-05-11 NOTE — PROGRESS NOTES
visit for routine follow up. Patient resting at the time of this visit, door closed for privacy. Please contact spiritual care for further referral or consult. Rev.  Jenniffer Spatz, MDiv, [facility], [facility]   paging service: [phone])

## 2022-05-11 NOTE — PROGRESS NOTES
Problem: Suicide  Goal: *STG: Remains safe in hospital  Outcome: Progressing Towards Goal  Goal: *STG: Attends activities and groups  Outcome: Progressing Towards Goal  Goal: *STG/LTG: Complies with medication therapy  Outcome: Progressing Towards Goal  Goal: *STG/LTG: No longer expresses self destructive or suicidal thoughts  Outcome: Progressing Towards Goal     Problem: Falls - Risk of  Goal: *Absence of Falls  Description: Document Clara Fall Risk and appropriate interventions in the flowsheet.   Outcome: Progressing Towards Goal  Note: Fall Risk Interventions:    Medical Intervention:  Teach patient to arise slowly

## 2022-05-11 NOTE — BH NOTES
Behavioral Health Treatment Team Note     Patient goal(s) for today: Rest, communicate needs to staff, complete ADLs, attend groups, attend treatment team  Treatment team focus/goals: Discuss reason for admission  Progress note:   Pt reports sleeping well and feeling \"good\" today. She denies SI/HI/AVH. Counseling was discussed - she states she is not interested because she feels her current team at [facility] is enough. MSW discussed pt's discharge with her [place], Padilla Ahmadi, and scheduled her medication mgmt follow-up.   MSW called  with pt - he has no safety concerns and will pick her up at 1300, Thursday.     Financial concerns/prescription coverage: [employer] - [facility] [address], , [phone], gave permission to call                       Family requesting physician contact today:  No  Discharge plan: Return home once stable  Access to weapons :   No                                                           Outpatient provider(s): [facility], Dr. Bandar Valiente and Mayela Gu   Patient's preferred phone number for follow up call: [phone]   Patient's preferred e-mail address : N/A  Participating treatment team members: MERCED Lindsey, Dr. Sp Trevino

## 2022-05-11 NOTE — GROUP NOTE
DANIEL  GROUP DOCUMENTATION INDIVIDUAL                                                                          Group Therapy Note    Date: 5/11/2022    Group Start Time: 1500  Group End Time: 1600  Group Topic: Recreational/Music Therapy    [facility] [place] [address] ACUTE BEH[facility]    [place], [address] [facility] GROUP DOCUMENTATION GROUP    Group Therapy Note    Attendees: 7         Attendance: Attended    Patient's Goal:  To concentrate on selected task    Interventions/techniques: Supported-crafts,games,music    Follows Directions:  Followed directions    Interactions: Interacted appropriately    Mental Status: Calm    Behavior/appearance: Attentive, Cooperative and Needed prompting    Goals Achieved: Able to engage in interactions and Able to listen to others-worked on selected task      Segun Orn

## 2022-05-12 VITALS
DIASTOLIC BLOOD PRESSURE: 71 MMHG | SYSTOLIC BLOOD PRESSURE: 105 MMHG | TEMPERATURE: 98.9 F | OXYGEN SATURATION: 99 % | HEART RATE: 98 BPM | RESPIRATION RATE: 18 BRPM

## 2022-05-12 PROCEDURE — 74011250637 HC RX REV CODE- 250/637: Performed by: PSYCHIATRY & NEUROLOGY

## 2022-05-12 RX ORDER — MELATONIN
1000 DAILY
Qty: 30 TABLET | Refills: 0 | Status: SHIPPED | OUTPATIENT
Start: 2022-05-13 | End: 2022-09-20

## 2022-05-12 RX ORDER — AZELASTINE 1 MG/ML
1 SPRAY, METERED NASAL
Qty: 1 EACH | Refills: 0 | Status: SHIPPED | OUTPATIENT
Start: 2022-05-12 | End: 2022-09-20

## 2022-05-12 RX ORDER — CETIRIZINE HCL 10 MG
10 TABLET ORAL
Qty: 30 TABLET | Refills: 0 | Status: SHIPPED | OUTPATIENT
Start: 2022-05-12 | End: 2022-09-20

## 2022-05-12 RX ORDER — TRAZODONE HYDROCHLORIDE 150 MG/1
150 TABLET ORAL
Qty: 30 TABLET | Refills: 0 | Status: ON HOLD | OUTPATIENT
Start: 2022-05-12 | End: 2022-08-09 | Stop reason: SDUPTHER

## 2022-05-12 RX ORDER — HYDROXYZINE 50 MG/1
50 TABLET, FILM COATED ORAL
Qty: 90 TABLET | Refills: 0 | Status: SHIPPED | OUTPATIENT
Start: 2022-05-12 | End: 2022-08-10

## 2022-05-12 RX ORDER — TRIHEXYPHENIDYL HYDROCHLORIDE 2 MG/1
2 TABLET ORAL DAILY
Qty: 30 TABLET | Refills: 0 | Status: ON HOLD | OUTPATIENT
Start: 2022-05-12 | End: 2022-08-09 | Stop reason: SDUPTHER

## 2022-05-12 RX ORDER — LITHIUM CARBONATE 150 MG/1
150 CAPSULE ORAL
Qty: 30 CAPSULE | Refills: 0 | Status: SHIPPED | OUTPATIENT
Start: 2022-05-12 | End: 2022-08-10

## 2022-05-12 RX ORDER — OLANZAPINE 20 MG/1
20 TABLET ORAL
Qty: 30 TABLET | Refills: 0 | Status: ON HOLD | OUTPATIENT
Start: 2022-05-12 | End: 2022-08-09 | Stop reason: SDUPTHER

## 2022-05-12 RX ORDER — LANOLIN ALCOHOL/MO/W.PET/CERES
3 CREAM (GRAM) TOPICAL
Qty: 30 TABLET | Refills: 0 | Status: SHIPPED | OUTPATIENT
Start: 2022-05-12 | End: 2022-09-20

## 2022-05-12 RX ORDER — POLYETHYLENE GLYCOL 3350 17 G/17G
17 POWDER, FOR SOLUTION ORAL
Qty: 30 PACKET | Refills: 0 | Status: SHIPPED | OUTPATIENT
Start: 2022-05-12

## 2022-05-12 RX ADMIN — TRIHEXYPHENIDYL HYDROCHLORIDE 2 MG: 2 TABLET ORAL at 08:22

## 2022-05-12 RX ADMIN — DEXTRAN 70, GLYCERIN, HYPROMELLOSE 1 DROP: 1; 2; 3 SOLUTION/ DROPS OPHTHALMIC at 08:22

## 2022-05-12 RX ADMIN — Medication 3 MG: at 01:23

## 2022-05-12 RX ADMIN — LITHIUM CARBONATE 150 MG: 300 TABLET ORAL at 08:22

## 2022-05-12 RX ADMIN — Medication 1000 UNITS: at 08:21

## 2022-05-12 NOTE — DISCHARGE SUMMARY
PSYCHIATRIC DISCHARGE SUMMARY         IDENTIFICATION:    Patient Name  Raheel Rodriguez   Date of Birth 1965   [facility] [id number]   Medical Record Number  [id number]      Age  61 y.o. PCP Neftaly Kaufman MD   Admit date:  5/8/2022    Discharge date: 5/12/2022   Room Number  319/01  @ [facility]   Date of Service  5/12/2022            TYPE OF DISCHARGE: REGULAR               CONDITION AT DISCHARGE: improved       PROVISIONAL & DISCHARGE DIAGNOSES:    Problem List  Date Reviewed: 11/30/2020          Codes Class    * (Principal) Schizoaffective disorder (HCC) ICD-10-CM: F25.9  ICD-9-CM: 295.70         Drug overdose ICD-10-CM: T50.901A  ICD-9-CM: 977.9, E980.5         Robny ([facility] [address] ICD-10-CM: F30.9  ICD-9-CM: 296.00         Scoliosis ICD-10-CM: M41.9  ICD-9-CM: 737.30         Anxiety and depression ICD-10-CM: F41.9, F32. A  ICD-9-CM: 300.00, 311         Bipolar 1 disorder (HCC) ICD-10-CM: F31.9  ICD-9-CM: 296.7         Hyponatremia ICD-10-CM: E87.1  ICD-9-CM: 276.1         Chronic back pain ICD-10-CM: M54.9, G89.29  ICD-9-CM: 724.5, 338.29         Hyperlipidemia ICD-10-CM: E78.5  ICD-9-CM: 272.4         Bipolar 1 disorder with moderate robyn (HCC) (Chronic) ICD-10-CM: F31.12  ICD-9-CM: 296.42     Overview Signed 8/22/2016  9:10 AM by Alexia Mccabe     Followed by psychiatrist                   Active Hospital Problems    *Schizoaffective disorder ([facility] [address]      Scoliosis      Chronic back pain        DISCHARGE DIAGNOSIS:   Axis I:  SEE ABOVE  Axis II: SEE ABOVE  Axis III: SEE ABOVE  Axis IV:  lack of structure  Axis V:  <50 on admission, 55+ on discharge     CC & HISTORY OF PRESENT ILLNESS:  58-year-old female with history of bipolar disorder, schizoaffective disorder, bipolar type who was brought to the emergency room after an intentional overdose on lithium, temazepam, and hydroxyzine in attempt to kill herself, states that she tried to commit suicide in a fleeting moment due to her new [employer] as a [employer]. She felt that she had committed an ultimate sin against God and that is unforgivable and therefore, she had to kill herself in order to make things right. I brought to her attention that Gianni 173Anna do not believe in suicide and that God would not help her in to heaven and suicide is not their belief system, she agreed and recognized that there was a strong emotion driving her thoughts, and she regrets the attempt at this time. Recognizes that there was a strong sensation that was noted down the road despite the beliefs. She has denied suicidal or homicidal ideations currently and is here for management of her condition. SOCIAL HISTORY:    Social History     Socioeconomic History    Marital status:      Spouse name: Not on file    Number of children: Not on file    Years of education: Not on file    Highest education level: Not on file   Occupational History    Not on file   Tobacco Use    Smoking status: Never Smoker    Smokeless tobacco: Never Used   Substance and Sexual Activity    Alcohol use: Yes     Comment: occasional    Drug use: No    Sexual activity: Yes     Partners: Male   Other Topics Concern     Service Not Asked    Blood Transfusions Not Asked    Caffeine Concern Not Asked    Occupational Exposure Not Asked    Hobby Hazards Not Asked    Sleep Concern Not Asked    Stress Concern Not Asked    Weight Concern Not Asked    Special Diet Not Asked    Back Care Not Asked    Exercise Not Asked    Bike Helmet Not Asked   [address] Not Asked    Self-Exams Not Asked   Social History Narrative    , 0 children, not working at present. On disability for bipolar illness.       Social Determinants of Health     Financial Resource Strain:     Difficulty of Paying Living Expenses: Not on file   Food Insecurity:     Worried About Running Out of Food in the Last Year: Not on file    Ludwig of Food in the Last Year: Not on file Transportation Needs:     Lack of Transportation (Medical): Not on file    Lack of Transportation (Non-Medical): Not on file   Physical Activity:     Days of Exercise per Week: Not on file    Minutes of Exercise per Session: Not on file   Stress:     Feeling of Stress : Not on file   Social Connections:     Frequency of Communication with Friends and Family: Not on file    Frequency of Social Gatherings with Friends and Family: Not on file    Attends Faith Services: Not on file    Active Member of [address] [place] [address] [employer] or Organizations: Not on file    Attends Club or Organization Meetings: Not on file    Marital Status: Not on file   Intimate Partner Violence:     Fear of Current or Ex-Partner: Not on file    Emotionally Abused: Not on file    Physically Abused: Not on file    Sexually Abused: Not on file   Housing Stability:     Unable to Pay for Housing in the Last Year: Not on file    Number of [place] in the Last Year: Not on file    Unstable Housing in the Last Year: Not on file      FAMILY HISTORY:   Family History   Problem Relation Age of Onset   [facility] Arthritis-rheumatoid Mother     Migraines Mother     Psychiatric Disorder Mother     Bipolar Disorder Mother     Lupus Mother     Alcohol abuse Father     Lung Disease Father     Heart Disease Father     Stroke Father     High Cholesterol Father     Psychiatric Disorder Sister     Alcohol abuse Sister     Bipolar Disorder Sister     Stroke Brother     Cancer Maternal Aunt     Breast Cancer Maternal Aunt         pt thniks she was under 48    Bipolar Disorder Sister              HOSPITALIZATION COURSE:    Martha Alfred was admitted to the inpatient psychiatric unit [facility] for acute psychiatric stabilization in regards to symptomatology as described in the HPI above.  The differential diagnosis at time of admission included: schizophrenia vs substance induced psychotic disorder schizoaffective vs bipolar vs adjustment disorder. While on the unit Félix Grier was involved in individual, group, occupational and milieu therapy. Psychiatric medications were adjusted during this hospitalization. Félix Grier demonstrated a progressive improvement in overall condition. Much of patient's initial presentation appeared to be related to situational stressors, effects of medication non-compliance drugs of abuse, and psychological factors. Please see individual progress notes for more specific details regarding patient's hospitalization course. Félix Grier reports feeling well and moods are good. Denies SI/HI/AH/VH. No aggression or violence. Appropriately interactive and aware. Tolerating medications well. Eating and sleeping fairly. Patient with request for discharge today. There are no grounds to seek a TDO. At time of discharge, Félix Grier is without significant problems of depression, psychosis, or robyn. Patient free of suicidal and homicidal ideations (appears to be at very low risk of suicide or homicide) and reports many positive predictive factors in terms of not attempting suicide or homicide. Overall presentation at time of discharge is most consistent with the diagnosis of Schizoaffective Disorder. Patient has maximized benefit to be derived from acute inpatient psychiatric treatment. All members of the treatment team concur with each other in regards to plans for discharge today. Patient and family are aware and in agreement with discharge and discharge plan.          LABS AND IMAGAING:    Labs Reviewed - No data to display  Lab Results   Component Value Date/Time    Carbamazepine 14.1 (H) 07/26/2020 04:19 AM     Admission on 05/03/2022, Discharged on 05/08/2022   Component Date Value Ref Range Status    Color 05/03/2022 YELLOW/STRAW    Final    Appearance 05/03/2022 CLEAR  CLEAR   Final    Specific gravity 05/03/2022 1.006    Final    pH (UA) 05/03/2022 >8.5* 5.0 - 8.0 Final    Protein 05/03/2022 Negative  NEG mg/dL Final    Glucose 05/03/2022 Negative  NEG mg/dL Final    Ketone 05/03/2022 Negative  NEG mg/dL Final    Bilirubin 05/03/2022 Negative  NEG   Final    Blood 05/03/2022 Negative  NEG   Final    Urobilinogen 05/03/2022 0.2  0.2 - 1.0 EU/dL Final    Nitrites 05/03/2022 Negative  NEG   Final    Leukocyte Esterase 05/03/2022 TRACE* NEG   Final    UA:UC IF INDICATED 05/03/2022 CULTURE NOT INDICATED BY UA RESULT  CNI   Final    WBC 05/03/2022 0-4  0 - 4 /hpf Final    RBC 05/03/2022 0-5  0 - 5 /hpf Final    Epithelial cells 05/03/2022 FEW  FEW /lpf Final    Bacteria 05/03/2022 Negative  NEG /hpf Final    Hyaline cast 05/03/2022 0-2  0 - 2 /lpf Final    AMPHETAMINES 05/03/2022 Negative  NEG   Final    BARBITURATES 05/03/2022 Negative  NEG   Final    BENZODIAZEPINES 05/03/2022 Positive* NEG   Final    COCAINE 05/03/2022 Negative  NEG   Final    METHADONE 05/03/2022 Negative  NEG   Final    OPIATES 05/03/2022 Negative  NEG   Final    PCP(PHENCYCLIDINE) 05/03/2022 Negative  NEG   Final    THC (TH-CANNABINOL) 05/03/2022 Negative  NEG   Final    Drug screen comment 05/03/2022 (NOTE)   Final    WBC 05/03/2022 6.6  3.6 - 11.0 K/uL Final    RBC 05/03/2022 5.22* 3.80 - 5.20 M/uL Final    HGB 05/03/2022 15.7  11.5 - 16.0 g/dL Final    HCT 05/03/2022 48.5* 35.0 - 47.0 % Final    MCV 05/03/2022 92.9  80.0 - 99.0 FL Final    MCH 05/03/2022 30.1  26.0 - 34.0 PG Final    MCHC 05/03/2022 32.4  30.0 - 36.5 g/dL Final    RDW 05/03/2022 12.5  11.5 - 14.5 % Final    PLATELET 32/47/8807 367  150 - 400 K/uL Final    MPV 05/03/2022 9.3  8.9 - 12.9 FL Final    NRBC 05/03/2022 0.0  0  WBC Final    ABSOLUTE NRBC 05/03/2022 0.00  0.00 - 0.01 K/uL Final    NEUTROPHILS 05/03/2022 70  32 - 75 % Final    LYMPHOCYTES 05/03/2022 24  12 - 49 % Final    MONOCYTES 05/03/2022 5  5 - 13 % Final    EOSINOPHILS 05/03/2022 1  0 - 7 % Final    BASOPHILS 05/03/2022 0  0 - 1 % Final    IMMATURE GRANULOCYTES 05/03/2022 0  0.0 - 0.5 % Final    ABS. NEUTROPHILS 05/03/2022 4.7  1.8 - 8.0 K/UL Final    ABS. LYMPHOCYTES 05/03/2022 1.6  0.8 - 3.5 K/UL Final    ABS. MONOCYTES 05/03/2022 0.4  0.0 - 1.0 K/UL Final    ABS. EOSINOPHILS 05/03/2022 0.0  0.0 - 0.4 K/UL Final    ABS. BASOPHILS 05/03/2022 0.0  0.0 - 0.1 K/UL Final    ABS. IMM. GRANS. 05/03/2022 0.0  0.00 - 0.04 K/UL Final    DF 05/03/2022 AUTOMATED    Final    Sodium 05/03/2022 136  136 - 145 mmol/L Final    Potassium 05/03/2022 3.8  3.5 - 5.1 mmol/L Final    Chloride 05/03/2022 104  97 - 108 mmol/L Final    CO2 05/03/2022 31  21 - 32 mmol/L Final    Anion gap 05/03/2022 1* 5 - 15 mmol/L Final    Glucose 05/03/2022 103* 65 - 100 mg/dL Final    BUN 05/03/2022 10  6 - 20 MG/DL Final    Creatinine 05/03/2022 1.09* 0.55 - 1.02 MG/DL Final    BUN/Creatinine ratio 05/03/2022 9* 12 - 20   Final    GFR est AA 05/03/2022 >60  >60 ml/min/1.73m2 Final    GFR est non-AA 05/03/2022 52* >60 ml/min/1.73m2 Final    Calcium 05/03/2022 10.5* 8.5 - 10.1 MG/DL Final    Bilirubin, total 05/03/2022 0.4  0.2 - 1.0 MG/DL Final    ALT (SGPT) 05/03/2022 28  12 - 78 U/L Final    AST (SGOT) 05/03/2022 16  15 - 37 U/L Final    Alk.  phosphatase 05/03/2022 53  45 - 117 U/L Final    Protein, total 05/03/2022 9.4* 6.4 - 8.2 g/dL Final    Albumin 05/03/2022 4.8  3.5 - 5.0 g/dL Final    Globulin 05/03/2022 4.6* 2.0 - 4.0 g/dL Final    A-G Ratio 05/03/2022 1.0* 1.1 - 2.2   Final    ALCOHOL(ETHYL),SERUM 05/03/2022 <10  <10 MG/DL Final    Salicylate level 66/19/8869 <1.7* 2.8 - 20.0 MG/DL Final    Lithium level 05/03/2022 3.59* 0.60 - 1.20 MMOL/L Final    Reported dose date 05/03/2022 NOT PROVIDED    Final    Reported dose time: 05/03/2022 NOT PROVIDED    Final    Reported dose: 05/03/2022 NOT PROVIDED  UNITS Final    Ventricular Rate 05/03/2022 78  BPM Final    Atrial Rate 05/03/2022 78  BPM Final    P-R Interval 05/03/2022 150  ms Final    QRS Duration 05/03/2022 72  ms Final    Q-T Interval 05/03/2022 402  ms Final    QTC Calculation (Bezet) 05/03/2022 458  ms Final    Calculated P Axis 05/03/2022 67  degrees Final    Calculated R Axis 05/03/2022 61  degrees Final    Calculated T Axis 05/03/2022 50  degrees Final    Diagnosis 05/03/2022    Final                    Value:Normal sinus rhythm  Biatrial enlargement  When compared with ECG of 10-CINDY-2020 16:29,  No significant change was found  Confirmed by Katheryn Blanton ([id number]) on 5/4/2022 10:30:36 AM      WBC 05/04/2022 7.5  3.6 - 11.0 K/uL Final    RBC 05/04/2022 3.86  3.80 - 5.20 M/uL Final    HGB 05/04/2022 11.8  11.5 - 16.0 g/dL Final    HCT 05/04/2022 36.9  35.0 - 47.0 % Final    MCV 05/04/2022 95.6  80.0 - 99.0 FL Final    MCH 05/04/2022 30.6  26.0 - 34.0 PG Final    MCHC 05/04/2022 32.0  30.0 - 36.5 g/dL Final    RDW 05/04/2022 12.7  11.5 - 14.5 % Final    PLATELET 40/50/1653 546  150 - 400 K/uL Final    MPV 05/04/2022 9.5  8.9 - 12.9 FL Final    NRBC 05/04/2022 0.0  0  WBC Final    ABSOLUTE NRBC 05/04/2022 0.00  0.00 - 0.01 K/uL Final    Sodium 05/04/2022 139  136 - 145 mmol/L Final    Potassium 05/04/2022 3.9  3.5 - 5.1 mmol/L Final    Chloride 05/04/2022 109* 97 - 108 mmol/L Final    CO2 05/04/2022 30  21 - 32 mmol/L Final    Anion gap 05/04/2022 0* 5 - 15 mmol/L Final    Glucose 05/04/2022 75  65 - 100 mg/dL Final    BUN 05/04/2022 8  6 - 20 MG/DL Final    Creatinine 05/04/2022 0.92  0.55 - 1.02 MG/DL Final    BUN/Creatinine ratio 05/04/2022 9* 12 - 20   Final    GFR est AA 05/04/2022 >60  >60 ml/min/1.73m2 Final    GFR est non-AA 05/04/2022 >60  >60 ml/min/1.73m2 Final    Calcium 05/04/2022 8.9  8.5 - 10.1 MG/DL Final    Bilirubin, total 05/04/2022 0.3  0.2 - 1.0 MG/DL Final    ALT (SGPT) 05/04/2022 20  12 - 78 U/L Final    AST (SGOT) 05/04/2022 16  15 - 37 U/L Final    Alk.  phosphatase 05/04/2022 37* 45 - 117 U/L Final    Protein, total 05/04/2022 6.9  6.4 - 8.2 g/dL Final    Albumin 05/04/2022 3.4* 3.5 - 5.0 g/dL Final    Globulin 05/04/2022 3.5  2.0 - 4.0 g/dL Final    A-G Ratio 05/04/2022 1.0* 1.1 - 2.2   Final    Lithium level 05/04/2022 3.48* 0.60 - 1.20 MMOL/L Final    Reported dose date 05/04/2022 NOT PROVIDED    Final    Reported dose time: 05/04/2022 NOT PROVIDED    Final    Reported dose: 05/04/2022 NOT PROVIDED  UNITS Final    Acetaminophen level 05/04/2022 <2* 10 - 30 ug/mL Final    Lithium level 05/05/2022 1.35* 0.60 - 1.20 MMOL/L Final    Reported dose date 05/05/2022 NOT PROVIDED    Final    Reported dose time: 05/05/2022 NOT PROVIDED    Final    Reported dose: 05/05/2022 NOT PROVIDED  UNITS Final    SARS-CoV-2 by PCR 05/06/2022 Please find results under separate order    Final    Specimen source 05/06/2022 Nasopharyngeal    Final    SARS-CoV-2 05/06/2022 Not detected  NOTD   Final    WBC 05/07/2022 7.4  3.6 - 11.0 K/uL Final    RBC 05/07/2022 3.79* 3.80 - 5.20 M/uL Final    HGB 05/07/2022 11.3* 11.5 - 16.0 g/dL Final    HCT 05/07/2022 35.2  35.0 - 47.0 % Final    MCV 05/07/2022 92.9  80.0 - 99.0 FL Final    MCH 05/07/2022 29.8  26.0 - 34.0 PG Final    MCHC 05/07/2022 32.1  30.0 - 36.5 g/dL Final    RDW 05/07/2022 13.0  11.5 - 14.5 % Final    PLATELET 72/00/2436 096  150 - 400 K/uL Final    MPV 05/07/2022 9.4  8.9 - 12.9 FL Final    NRBC 05/07/2022 0.0  0  WBC Final    ABSOLUTE NRBC 05/07/2022 0.00  0.00 - 0.01 K/uL Final    Sodium 05/07/2022 137  136 - 145 mmol/L Final    Potassium 05/07/2022 4.0  3.5 - 5.1 mmol/L Final    Chloride 05/07/2022 107  97 - 108 mmol/L Final    CO2 05/07/2022 29  21 - 32 mmol/L Final    Anion gap 05/07/2022 1* 5 - 15 mmol/L Final    Glucose 05/07/2022 121* 65 - 100 mg/dL Final    BUN 05/07/2022 11  6 - 20 MG/DL Final    Creatinine 05/07/2022 0.82  0.55 - 1.02 MG/DL Final    BUN/Creatinine ratio 05/07/2022 13  12 - 20   Final    GFR est AA 05/07/2022 >60  >60 ml/min/1.73m2 Final    GFR est non-AA 05/07/2022 >60  >60 ml/min/1.73m2 Final    Calcium 05/07/2022 9.7  8.5 - 10.1 MG/DL Final    Lithium level 05/07/2022 0.67  0.60 - 1.20 MMOL/L Final    Reported dose date 05/07/2022 NOT PROVIDED    Final    Reported dose time: 05/07/2022 NOT PROVIDED    Final    Reported dose: 05/07/2022 NOT PROVIDED  UNITS Final     No results found. DISPOSITION:    Home. Patient to f/u with drug/etoh rehabilitation, psychiatric, and psychotherapy appointments. Patient is to f/u with internist as directed. FOLLOW-UP CARE:    Activity as tolerated  Regular diet  Wound Care: none needed. Follow-up Information     Follow up With Specialties Details Why Contact Kofi Torres MD Internal Medicine Physician   [address] [place] [address] [address] [place] [address]  [phone]      Dr. Christal Harrington  On 5/19/2022 1:30 IN-PERSON appt for medication management. [facility]  FAX: [phone] or [phone]                 PROGNOSIS:   Fair ---- based on nature of patient's pathology/ies and treatment compliance issues. Prognosis is greatly dependent upon patient's ability to remain sober and to follow up with scheduled appointments as well as to comply with psychiatric medications as prescribed. DISCHARGE MEDICATIONS:     Informed consent given for the use of following psychotropic medications:  Current Discharge Medication List      START taking these medications    Details   melatonin 3 mg tablet Take 1 Tablet by mouth nightly as needed for Insomnia. Indications: insomnia  Qty: 30 Tablet, Refills: 0  Start date: 5/12/2022         CONTINUE these medications which have CHANGED    Details   azelastine (ASTELIN) 137 mcg (0.1 %) nasal spray 1 [place] by Both Nostrils route daily as needed for Rhinitis.  Indications: seasonal runny nose  Qty: 1 Each, Refills: 0  Start date: 5/12/2022      cetirizine (ZYRTEC) 10 mg tablet Take 1 Tablet by mouth daily as needed for Allergies. Indications: seasonal runny nose  Qty: 30 Tablet, Refills: 0  Start date: 5/12/2022      hydrOXYzine HCL (ATARAX) 50 mg tablet Take 1 Tablet by mouth three (3) times daily as needed for Anxiety. Indications: anxious  Qty: 90 Tablet, Refills: 0  Start date: 5/12/2022      OLANZapine (ZyPREXA) 20 mg tablet Take 1 Tablet by mouth nightly. Indications: bipolar disorder  Qty: 30 Tablet, Refills: 0  Start date: 5/12/2022      trihexyphenidyL (ARTANE) 2 mg tablet Take 1 Tablet by mouth daily. Indications: extrapyramidal symptoms as a result of taking the medication  Qty: 30 Tablet, Refills: 0  Start date: 5/12/2022      cholecalciferol (VITAMIN D3) (1000 Units /25 mcg) tablet Take 1 Tablet by mouth daily. Indications: prevention of vitamin D deficiency  Qty: 30 Tablet, Refills: 0  Start date: 5/13/2022      lithium carbonate 150 mg capsule Take 1 Capsule by mouth nightly. Indications: bipolar disorder  Qty: 30 Capsule, Refills: 0  Start date: 5/12/2022      polyethylene glycol (Miralax) 17 gram packet Take 1 Packet by mouth daily as needed for Constipation. Indications: constipation  Qty: 30 Packet, Refills: 0  Start date: 5/12/2022      traZODone (DESYREL) 150 mg tablet Take 1 Tablet by mouth nightly. Indications: insomnia associated with depression  Qty: 30 Tablet, Refills: 0  Start date: 5/12/2022         STOP taking these medications       temazepam (RESTORIL) 15 mg capsule Comments:   Reason for Stopping:         temazepam (RESTORIL) 15 mg capsule Comments:   Reason for Stopping:                      A coordinated, multidisplinary treatment team round was conducted with Katharine Jones---this is done daily here at [facility]. This team consists of the nurse, psychiatric unit pharmacist,  and writer. I have spent greater than 35 minutes on discharge work.     Signed:  Cori Junior MD  5/12/2022

## 2022-05-12 NOTE — PROGRESS NOTES
Patient received resting in her room. Denies SI/HI/AVH and depression, endorses an anxiety level of 5. Calm, cooperative, pleasant, out on the unit, attending groups, interacting with her peers. Taking medications as prescribed. Will continue to monitor for safety. 1300:  Patient walked down to meet her . All belongings returned. Discharge instructions reviewed with patient.

## 2022-05-12 NOTE — PROGRESS NOTES
Problem: Suicide  Goal: *STG: Remains safe in hospital  Outcome: Progressing Towards Goal     Problem: Suicide  Goal: *STG/LTG: Complies with medication therapy  Outcome: Progressing Towards Goal     Problem: Suicide  Goal: *STG/LTG: No longer expresses self destructive or suicidal thoughts  Outcome: Progressing Towards Goal     Problem: Falls - Risk of  Goal: *Absence of Falls  Description: Document Clara Fall Risk and appropriate interventions in the flowsheet. Outcome: Progressing Towards Goal  Note: Fall Risk Interventions:  Mobility Interventions: Patient to call before getting OOB    Medication Interventions: Bed/chair exit alarm    Elimination Interventions: Call light in reach    History of Falls Interventions: Door open when patient unattended  1900 to 0700:  Report received from outgoing RN. Assumed care of Pateint. Patient is visible in milieu sitting in day room. Patient denies any anxiety, depression, S/I, H/I, A/V/H, or  Pain. Patient is medication compliant and received HS medications including Hydroxine 50 mg P. O  for anxiety 5/10. Faith nous hourly rounds maintained during Shift. Patient observed resting in bed with eyes closed as if sleep. Patient slept about 8 hrs.

## 2022-05-12 NOTE — BH NOTES
Behavioral Health Transition Record to Provider    Patient Name: Bharath Grider  YOB: 1965  Medical Record Number: [id number]  Date of Admission: 5/8/2022  Date of Discharge: 5/11/2022    Attending Provider: Ward Hutchison MD  Discharging Provider: Dr. Nedra Camarena  To contact this individual call [phone] and ask the  to page. If unavailable, ask to be transferred to [facility] Provider on call. [facility] Provider will be available on call 24/7 and during holidays.     Primary Care Provider: Elena Alexander MD    Allergies   Allergen Reactions    Other Food Hives     Artifical sweetners    Claritin-D 12 Hour [Loratadine-Pseudoephedrine] Palpitations     palpatations and ringing in the ear    Other Medication Anaphylaxis     Artificial sweeteners cause anaphylaxis    Pcn [Penicillins] Anaphylaxis    Sunscreen Contact Dermatitis     Burns and opens the skin    Ultram [Tramadol] Hives and Other (comments)     Hives and ringing of the ears    Benzoyl Peroxide Hives    Cephalexin Itching    Divalproex Itching    Maltodextrin-Glycerin Shortness of Breath       Reason for Admission: 74-year-old female with history of bipolar disorder, schizoaffective disorder, bipolar type who was brought to the emergency room after an intentional overdose on lithium, temazepam, and hydroxyzine in attempt to kill herself, states that she tried to commit suicide in a fleeting moment due to her new Lutheran as a L[employer]-day.  She felt that she had committed an ultimate sin against God and that is unforgivable and therefore, she had to kill herself in order to make things right.  I brought to her attention that [employer]es do not believe in suicide and that God would not help her in to heaven and suicide is not their belief system, she agreed and recognized that there was a strong emotion driving her thoughts, and she regrets the attempt at this time.  Recognizes that there was a strong sensation that was noted down the road despite the beliefs. Glynn Carlisle has denied suicidal or homicidal ideations currently and is here for management of her condition. Admission Diagnosis: Schizoaffective disorder ([facility] [address] [F25.9]    * No surgery found *    Results for orders placed or performed during the hospital encounter of 05/03/22   URINALYSIS W/ REFLEX CULTURE    Specimen: Urine   Result Value Ref Range    Color YELLOW/STRAW      Appearance CLEAR CLEAR      Specific gravity 1.006      pH (UA) >8.5 (H) 5.0 - 8.0    Protein Negative NEG mg/dL    Glucose Negative NEG mg/dL    Ketone Negative NEG mg/dL    Bilirubin Negative NEG      Blood Negative NEG      Urobilinogen 0.2 0.2 - 1.0 EU/dL    Nitrites Negative NEG      Leukocyte Esterase TRACE (A) NEG      UA:UC IF INDICATED CULTURE NOT INDICATED BY UA RESULT CNI      WBC 0-4 0 - 4 /hpf    RBC 0-5 0 - 5 /hpf    Epithelial cells FEW FEW /lpf    Bacteria Negative NEG /hpf    Hyaline cast 0-2 0 - 2 /lpf   DRUG SCREEN, URINE   Result Value Ref Range    AMPHETAMINES Negative NEG      BARBITURATES Negative NEG      BENZODIAZEPINES Positive (A) NEG      COCAINE Negative NEG      METHADONE Negative NEG      OPIATES Negative NEG      PCP(PHENCYCLIDINE) Negative NEG      THC (TH-CANNABINOL) Negative NEG      Drug screen comment (NOTE)    CBC WITH AUTOMATED DIFF   Result Value Ref Range    WBC 6.6 3.6 - 11.0 K/uL    RBC 5.22 (H) 3.80 - 5.20 M/uL    HGB 15.7 11.5 - 16.0 g/dL    HCT 48.5 (H) 35.0 - 47.0 %    MCV 92.9 80.0 - 99.0 FL    MCH 30.1 26.0 - 34.0 PG    MCHC 32.4 30.0 - 36.5 g/dL    RDW 12.5 11.5 - 14.5 %    PLATELET 778 358 - 377 K/uL    MPV 9.3 8.9 - 12.9 FL    NRBC 0.0 0  WBC    ABSOLUTE NRBC 0.00 0.00 - 0.01 K/uL    NEUTROPHILS 70 32 - 75 %    LYMPHOCYTES 24 12 - 49 %    MONOCYTES 5 5 - 13 %    EOSINOPHILS 1 0 - 7 %    BASOPHILS 0 0 - 1 %    IMMATURE GRANULOCYTES 0 0.0 - 0.5 %    ABS. NEUTROPHILS 4.7 1.8 - 8.0 K/UL    ABS.  LYMPHOCYTES 1.6 0.8 - 3.5 K/UL    ABS. MONOCYTES 0.4 0.0 - 1.0 K/UL    ABS. EOSINOPHILS 0.0 0.0 - 0.4 K/UL    ABS. BASOPHILS 0.0 0.0 - 0.1 K/UL    ABS. IMM. GRANS. 0.0 0.00 - 0.04 K/UL    DF AUTOMATED     METABOLIC PANEL, COMPREHENSIVE   Result Value Ref Range    Sodium 136 136 - 145 mmol/L    Potassium 3.8 3.5 - 5.1 mmol/L    Chloride 104 97 - 108 mmol/L    CO2 31 21 - 32 mmol/L    Anion gap 1 (L) 5 - 15 mmol/L    Glucose 103 (H) 65 - 100 mg/dL    BUN 10 6 - 20 MG/DL    Creatinine 1.09 (H) 0.55 - 1.02 MG/DL    BUN/Creatinine ratio 9 (L) 12 - 20      GFR est AA >60 >60 ml/min/1.73m2    GFR est non-AA 52 (L) >60 ml/min/1.73m2    Calcium 10.5 (H) 8.5 - 10.1 MG/DL    Bilirubin, total 0.4 0.2 - 1.0 MG/DL    ALT (SGPT) 28 12 - 78 U/L    AST (SGOT) 16 15 - 37 U/L    Alk.  phosphatase 53 45 - 117 U/L    Protein, total 9.4 (H) 6.4 - 8.2 g/dL    Albumin 4.8 3.5 - 5.0 g/dL    Globulin 4.6 (H) 2.0 - 4.0 g/dL    A-G Ratio 1.0 (L) 1.1 - 2.2     ETHYL ALCOHOL   Result Value Ref Range    ALCOHOL(ETHYL),SERUM <96 <83 MG/DL   SALICYLATE   Result Value Ref Range    Salicylate level <9.1 (L) 2.8 - 20.0 MG/DL   LITHIUM   Result Value Ref Range    Lithium level 3.59 (HH) 0.60 - 1.20 MMOL/L    Reported dose date NOT PROVIDED      Reported dose time: NOT PROVIDED      Reported dose: NOT PROVIDED UNITS   CBC W/O DIFF   Result Value Ref Range    WBC 7.5 3.6 - 11.0 K/uL    RBC 3.86 3.80 - 5.20 M/uL    HGB 11.8 11.5 - 16.0 g/dL    HCT 36.9 35.0 - 47.0 %    MCV 95.6 80.0 - 99.0 FL    MCH 30.6 26.0 - 34.0 PG    MCHC 32.0 30.0 - 36.5 g/dL    RDW 12.7 11.5 - 14.5 %    PLATELET 487 475 - 530 K/uL    MPV 9.5 8.9 - 12.9 FL    NRBC 0.0 0  WBC    ABSOLUTE NRBC 0.00 0.00 - 2.84 K/uL   METABOLIC PANEL, COMPREHENSIVE   Result Value Ref Range    Sodium 139 136 - 145 mmol/L    Potassium 3.9 3.5 - 5.1 mmol/L    Chloride 109 (H) 97 - 108 mmol/L    CO2 30 21 - 32 mmol/L    Anion gap 0 (L) 5 - 15 mmol/L    Glucose 75 65 - 100 mg/dL    BUN 8 6 - 20 MG/DL    Creatinine 0.92 0.55 - 1.02 MG/DL    BUN/Creatinine ratio 9 (L) 12 - 20      GFR est AA >60 >60 ml/min/1.73m2    GFR est non-AA >60 >60 ml/min/1.73m2    Calcium 8.9 8.5 - 10.1 MG/DL    Bilirubin, total 0.3 0.2 - 1.0 MG/DL    ALT (SGPT) 20 12 - 78 U/L    AST (SGOT) 16 15 - 37 U/L    Alk.  phosphatase 37 (L) 45 - 117 U/L    Protein, total 6.9 6.4 - 8.2 g/dL    Albumin 3.4 (L) 3.5 - 5.0 g/dL    Globulin 3.5 2.0 - 4.0 g/dL    A-G Ratio 1.0 (L) 1.1 - 2.2     LITHIUM   Result Value Ref Range    Lithium level 3.48 (HH) 0.60 - 1.20 MMOL/L    Reported dose date NOT PROVIDED      Reported dose time: NOT PROVIDED      Reported dose: NOT PROVIDED UNITS   ACETAMINOPHEN   Result Value Ref Range    Acetaminophen level <2 (L) 10 - 30 ug/mL   LITHIUM   Result Value Ref Range    Lithium level 1.35 (H) 0.60 - 1.20 MMOL/L    Reported dose date NOT PROVIDED      Reported dose time: NOT PROVIDED      Reported dose: NOT PROVIDED UNITS   SARS-COV-2   Result Value Ref Range    SARS-CoV-2 by PCR Please find results under separate order     SARS-COV-2   Result Value Ref Range    Specimen source Nasopharyngeal      SARS-CoV-2 Not detected NOTD     CBC W/O DIFF   Result Value Ref Range    WBC 7.4 3.6 - 11.0 K/uL    RBC 3.79 (L) 3.80 - 5.20 M/uL    HGB 11.3 (L) 11.5 - 16.0 g/dL    HCT 35.2 35.0 - 47.0 %    MCV 92.9 80.0 - 99.0 FL    MCH 29.8 26.0 - 34.0 PG    MCHC 32.1 30.0 - 36.5 g/dL    RDW 13.0 11.5 - 14.5 %    PLATELET 186 201 - 837 K/uL    MPV 9.4 8.9 - 12.9 FL    NRBC 0.0 0  WBC    ABSOLUTE NRBC 0.00 0.00 - 2.66 K/uL   METABOLIC PANEL, BASIC   Result Value Ref Range    Sodium 137 136 - 145 mmol/L    Potassium 4.0 3.5 - 5.1 mmol/L    Chloride 107 97 - 108 mmol/L    CO2 29 21 - 32 mmol/L    Anion gap 1 (L) 5 - 15 mmol/L    Glucose 121 (H) 65 - 100 mg/dL    BUN 11 6 - 20 MG/DL    Creatinine 0.82 0.55 - 1.02 MG/DL    BUN/Creatinine ratio 13 12 - 20      GFR est AA >60 >60 ml/min/1.73m2    GFR est non-AA >60 >60 ml/min/1.73m2    Calcium 9.7 8.5 - 10.1 MG/DL   LITHIUM   Result Value Ref Range    Lithium level 0.67 0.60 - 1.20 MMOL/L    Reported dose date NOT PROVIDED      Reported dose time: NOT PROVIDED      Reported dose: NOT PROVIDED UNITS   EKG, 12 LEAD, INITIAL   Result Value Ref Range    Ventricular Rate 78 BPM    Atrial Rate 78 BPM    P-R Interval 150 ms    QRS Duration 72 ms    Q-T Interval 402 ms    QTC Calculation (Bezet) 458 ms    Calculated P Axis 67 degrees    Calculated R Axis 61 degrees    Calculated T Axis 50 degrees    Diagnosis       Normal sinus rhythm  Biatrial enlargement  When compared with ECG of 10-CINDY-2020 16:29,  No significant change was found  Confirmed by Joan Victor ([id number]) on 5/4/2022 10:30:36 AM         Immunizations administered during this encounter:   Immunization History   Administered Date(s) Administered    COVID-19, Pfizer Purple top, DILUTE for use, 12+ yrs, 30mcg/0.3mL dose 03/10/2021, 04/20/2021, 11/10/2021    Influenza Vaccine [email or web address]) PF (>6 Mo Flulaval, Fluarix, and >3 Yrs Afluria, Fluzone [id number]) 11/12/2015, 12/30/2019    TDAP Vaccine 07/06/2012    Zoster Recombinant 12/30/2019, 05/13/2020       Screening for Metabolic Disorders for Patients on Antipsychotic Medications  (Data obtained from the EMR)    Estimated Body Mass Index  Estimated body mass index is 30.23 kg/m² as calculated from the following:    Height as of 5/3/22: 5' 1\" (1.549 m). Weight as of 5/3/22: 72.6 kg (160 lb).      Vital Signs/Blood Pressure  Visit Vitals  /71   Pulse 98   Temp 98.9 °F (37.2 °C)   Resp 18   SpO2 99%   Breastfeeding No       Blood Glucose/Hemoglobin A1c  Lab Results   Component Value Date/Time    Glucose 121 (H) 05/07/2022 04:03 AM    Glucose 107 (H) 02/03/2020 05:40 AM    Glucose (POC) 136 (H) 01/10/2020 04:22 PM       Lab Results   Component Value Date/Time    Hemoglobin A1c 5.2 01/20/2022 09:49 AM        Lipid Panel  Lab Results   Component Value Date/Time    Cholesterol, total 202 (H) 01/20/2022 09:49 AM    HDL Cholesterol 84 01/20/2022 09:49 AM    LDL, calculated 110.2 (H) 01/20/2022 09:49 AM    Triglyceride 39 01/20/2022 09:49 AM    CHOL/HDL Ratio 2.4 01/20/2022 09:49 AM        Discharge Diagnosis: Schizoaffective disorder [facility])    Discharge Plan: The patient Tessy Marquez exhibits the ability to control behavior in a less restrictive environment. Patient's level of functioning is improving. No assaultive/destructive behavior has been observed for the past 24 hours. No suicidal/homicidal threat or behavior has been observed for the past 24 hours. There is no evidence of serious medication side effects. Patient has not been in physical or protective restraints for at least the past 24 hours. If weapons involved, how are they secured? None    Is patient aware of and in agreement with discharge plan? Yes    Arrangements for medication:  Prescriptions sent to pt pharmacy    Copy of discharge instructions to provider?:  Yes    Arrangements for transportation home:   picking up at  all follow up appointments as scheduled, continue to take prescribed medications per physician instructions.   Mental health crisis number:  [address] Crisis: [phone]      [address] Emergency WARM LINE      [phone] ([phone])      M-F: 9am to 9pm      Sat & Sun: 5pm - 9pm  National suicide prevention lines:                             1-516-TSMBUVO (8-992-332-892-873-8000)       0-321-664-TALK ([phone])   24/7 Crisis Text Line:  Text HOME to [phone]      Discharge Medication List and Instructions:   Current Discharge Medication List          Unresulted Labs (24h ago, onward)             Start     Ordered    05/13/22 0600  LITHIUM  ONE TIME,   R         05/11/22 0740              To obtain results of studies pending at discharge, please contact [phone]    Follow-up Information     Follow up With Specialties Details Why Contact Info    Toño Porter MD Internal Medicine Physician   [address] [facility] [address]  [facility]  [phone]      Dr. Jannet Enriquez  On 5/19/2022 1:30 IN-PERSON appt for medication management. [facility]  FAX: [phone] or [phone]          Advanced Directive:   Does the patient have an appointed surrogate decision maker? No  Does the patient have a Medical Advance Directive? No  Does the patient have a Psychiatric Advance Directive? No  If the patient does not have a surrogate or Medical Advance Directive AND Psychiatric Advance Directive, the patient was offered information on these advance directives Patient declined to complete    Patient Instructions: Please continue all medications until otherwise directed by physician. Tobacco Cessation Discharge Plan:   Is the patient a smoker and needs referral for smoking cessation? No  Patient referred to the following for smoking cessation with an appointment? No     Patient was offered medication to assist with smoking cessation at discharge? No  Was education for smoking cessation added to the discharge instructions? Yes    Alcohol/Substance Abuse Discharge Plan:   Does the patient have a history of substance/alcohol abuse and requires a referral for treatment? No  Patient referred to the following for substance/alcohol abuse treatment with an appointment? No  Patient was offered medication to assist with alcohol cessation at discharge? No  Was education for substance/alcohol abuse added to discharge instructions? No    Patient discharged to Home; discussed with patient/caregiver and provided to the patient/caregiver either in hard copy or electronically.

## 2022-05-12 NOTE — DISCHARGE INSTRUCTIONS
DISCHARGE SUMMARY    Rambo Feng YOB: 1965  MRN: [id number]    The patient Key Kat exhibits the ability to control behavior in a less restrictive environment. Patient's level of functioning is improving. No assaultive/destructive behavior has been observed for the past 24 hours. No suicidal/homicidal threat or behavior has been observed for the past 24 hours. There is no evidence of serious medication side effects. Patient has not been in physical or protective restraints for at least the past 24 hours. If weapons involved, how are they secured? None    Is patient aware of and in agreement with discharge plan? Yes    Arrangements for medication:  Prescriptions sent to pt pharmacy    Copy of discharge instructions to provider?:  Yes    Arrangements for transportation home:   picking up at  all follow up appointments as scheduled, continue to take prescribed medications per physician instructions.   Mental health crisis number:  [address] Crisis: [phone]      [address] Emergency WARM LINE      [phone] ([phone]      M-F: 9am to 9pm      Sat & Sun: 5pm - 9pm  National suicide prevention lines:                             1-696-BPSXUZV (5-512-377-864-493-7013)       4-575-432-TALK ([phone])    Crisis Text Line:  Text HOME to [phone]

## 2022-05-12 NOTE — PROGRESS NOTES
Problem: Suicide  Goal: *STG: Remains safe in hospital  5/12/2022 1102 by Yumiko Garcia RN  Outcome: Resolved/Met  5/12/2022 1100 by Yumiko Garcia RN  Outcome: Progressing Towards Goal  Goal: *STG: Seeks staff when feelings of self harm or harm towards others arise  5/12/2022 1102 by Yumiko Garcia RN  Outcome: Resolved/Met  5/12/2022 1100 by Yumiko Garcia RN  Outcome: Progressing Towards Goal  Goal: *STG: Attends activities and groups  5/12/2022 1102 by Yumiko Garcia RN  Outcome: Resolved/Met  5/12/2022 1100 by Yumiko Garcia RN  Outcome: Progressing Towards Goal  Goal: *STG:  Verbalizes alternative ways of dealing with maladaptive feelings/behaviors  Outcome: Resolved/Met  Goal: *STG/LTG: Complies with medication therapy  Outcome: Resolved/Met  Goal: *STG/LTG: No longer expresses self destructive or suicidal thoughts  5/12/2022 1102 by Yumiko Garcia RN  Outcome: Resolved/Met  5/12/2022 1100 by Yumiko Garcia RN  Outcome: Progressing Towards Goal  Goal: *LTG:  Identifies available community resources  Outcome: Resolved/Met  Goal: *LTG:  Develops proactive suicide prevention plan  Outcome: Resolved/Met  Goal: Interventions  Outcome: Resolved/Met     Problem: Patient Education: Go to Patient Education Activity  Goal: Patient/Family Education  Outcome: Resolved/Met     Problem: Falls - Risk of  Goal: *Absence of Falls  Description: Document Clara Fall Risk and appropriate interventions in the flowsheet.   5/12/2022 1102 by Yumiko Garcia RN  Outcome: Resolved/Met  Note: Fall Risk Interventions:  Mobility Interventions: Patient to call before getting OOB         Medication Interventions: Bed/chair exit alarm    Elimination Interventions: Call light in reach    History of Falls Interventions: Door open when patient unattended      5/12/2022 1100 by Yumiko Garcia RN  Outcome: Progressing Towards Goal  Note: Fall Risk Interventions:           Problem: Patient Education: Go to Patient Education Activity  Goal: Patient/Family Education  Outcome: Resolved/Met     Problem: Discharge Planning  Goal: *Discharge to safe environment  Outcome: Resolved/Met  Goal: *Knowledge of medication management  Outcome: Resolved/Met  Goal: *Knowledge of discharge instructions  Outcome: Resolved/Met

## 2022-05-12 NOTE — PROGRESS NOTES
Problem: Suicide  Goal: *STG: Remains safe in hospital  Outcome: Progressing Towards Goal  Goal: *STG: Seeks staff when feelings of self harm or harm towards others arise  Outcome: Progressing Towards Goal  Goal: *STG: Attends activities and groups  Outcome: Progressing Towards Goal  Goal: *STG/LTG: No longer expresses self destructive or suicidal thoughts  Outcome: Progressing Towards Goal     Problem: Falls - Risk of  Goal: *Absence of Falls  Description: Document Clara Fall Risk and appropriate interventions in the flowsheet.   Outcome: Progressing Towards Goal  Note: Fall Risk Interventions:

## 2022-05-20 ENCOUNTER — TELEPHONE (OUTPATIENT)
Dept: INTERNAL MEDICINE CLINIC | Age: 57
End: 2022-05-20

## 2022-05-20 NOTE — TELEPHONE ENCOUNTER
----- Message from Janette Navarro sent at 5/20/2022 12:05 PM EDT -----  Subject: Appointment Request    Reason for Call: Routine Hospital Follow Up    QUESTIONS  Type of Appointment? Established Patient  Reason for appointment request? No appointments available during search  Additional Information for Provider? pt. needs a call back to schedule a   hospital f/up, screened green and would like to be seen. she would like a   morning appt  ---------------------------------------------------------------------------  --------------  CALL BACK INFO  What is the best way for the office to contact you? OK to leave message on   voicemail  Preferred Call Back Phone Number? [phone]  ---------------------------------------------------------------------------  --------------  SCRIPT ANSWERS  Relationship to Patient? Self  (Patient requests to see provider urgently. )? No  (Has the patient been discharged from the hospital within 2 business days   AND does not have a Telephone Encounter  Follow Up From [address] [place] [address]   documented in [place]?)? No  Do you have any questions for your primary care provider that need to be   answered prior to your appointment? (Use RN Triage if question pertains to   anything on the red flag list)? No  (Patient needs follow up visit after hospital discharge) Book first   available appointment within 7 days OF DISCHARGE, if no appt, proceed to   book the next available time slot within 14 days OF DISCHARGE AND Send   Message to Provider. [facility] Follow Up appointment cannot be booked   beyond 14 Days and should result in a Message to Provider. ? Yes   Have you been diagnosed with, awaiting test results for, or told that you   are suspected of having COVID-19 (Coronavirus)? (If patient has tested   negative or was tested as a requirement for work, school, or travel and   not based on symptoms, answer no)?  No  Within the past 10 days have you developed any of the following symptoms   (answer no if symptoms have been present longer than 10 days or began   more than 10 days ago)? Fever or Chills, Cough, Shortness of breath or   difficulty breathing, Loss of taste or smell, Sore throat, Nasal   congestion, Sneezing or runny nose, Fatigue or generalized body aches   (answer no if pain is specific to a body part e.g. back pain), Diarrhea,   Headache? No  Have you had close contact with someone with COVID-19 in the last 7 days? No  (Service Expert  click yes below to proceed with Tag & See As Usual   Scheduling)?  Yes

## 2022-05-23 ENCOUNTER — OFFICE VISIT (OUTPATIENT)
Dept: INTERNAL MEDICINE CLINIC | Age: 57
End: 2022-05-23
Payer: MEDICARE

## 2022-05-23 VITALS
HEIGHT: 61 IN | BODY MASS INDEX: 30.78 KG/M2 | RESPIRATION RATE: 16 BRPM | SYSTOLIC BLOOD PRESSURE: 103 MMHG | OXYGEN SATURATION: 97 % | TEMPERATURE: 97 F | HEART RATE: 83 BPM | WEIGHT: 163 LBS | DIASTOLIC BLOOD PRESSURE: 63 MMHG

## 2022-05-23 DIAGNOSIS — F25.0 SCHIZOAFFECTIVE DISORDER, BIPOLAR TYPE (HCC): ICD-10-CM

## 2022-05-23 DIAGNOSIS — Z09 HOSPITAL DISCHARGE FOLLOW-UP: Primary | ICD-10-CM

## 2022-05-23 PROCEDURE — G8427 DOCREV CUR MEDS BY ELIG CLIN: HCPCS | Performed by: INTERNAL MEDICINE

## 2022-05-23 PROCEDURE — 1111F DSCHRG MED/CURRENT MED MERGE: CPT | Performed by: INTERNAL MEDICINE

## 2022-05-23 PROCEDURE — 99214 OFFICE O/P EST MOD 30 MIN: CPT | Performed by: INTERNAL MEDICINE

## 2022-05-23 NOTE — PROGRESS NOTES
Ms. Dong Corral is presenting to follow up     CC:  Follow-up From Hospital (suicide attempt )       HPI:    Admit date: 5/3/2022  Discharge date and time: 05/12/2022     DISCHARGE DIAGNOSIS:     Intentional Drug overdose POA-  with lithium, temazepam and hydroxyzine-- IP Psych at [facility] as arranged-  Elevated lithium levels POA- 3.59-->3.48 --> 1.3 ->0.67  At discharge    The reason for overdose was feeling very guilty for possible unforgivable sin. Patient now realizes that her actions were reasonable. That God would not want her to kill herself she denies suicidal thoughts today she has followed up with psychiatrist  She feels well today. She is currently on lithium, Zyprexa, and trazodone.       Review of systems:  Constitutional: negative for fever, chills, weight loss, night sweats   Eyes : negative for vision changes, eye pain and discharge  Nose and Throat: negative for tinnitus, sore throat   Cardiovascular: negative for chest pain, palpitations and shortness of breath  Respiratory: negative for shortness of breath, cough and wheezing   Gastroinstestinal: negative for abdominal pain, nausea, vomiting, diarrhea, constipation, and blood in the stool  Musculoskeletal: negative for back ache and joint ache   Genitourinary: negative for dysuria, nocturia, polyuria and hematuria   Neurologic: Negative for focal weakness, numbness or incoordination  Skin: negative for rash, pruritus  Hematologic: negative for easy bruising      Past Medical History:   Diagnosis Date    Anxiety and depression     Bipolar 1 disorder with moderate robyn ([facility] [address] 3/17/2014    Chronic back pain     Hyperlipidemia 8/22/2016    Hyponatremia     Psychotic disorder ([facility] [address]     Scoliosis         Past Surgical History:   Procedure Laterality Date    HX HEENT      wisdom teeth extraction    HX LIPOMA RESECTION      right gluteal    FREDY BIOPSY BREAST STEREOTACTIC Left 2011    negative    UT DENTAL SURGERY PROCEDURE      hx of dental surgery- wisdom teeth extracted       Allergies   Allergen Reactions    Other Food Hives     Artifical sweetners    Claritin-D 12 Hour [Loratadine-Pseudoephedrine] Palpitations     palpatations and ringing in the ear    Other Medication Anaphylaxis     Artificial sweeteners cause anaphylaxis    Pcn [Penicillins] Anaphylaxis    Sunscreen Contact Dermatitis     Burns and opens the skin    Ultram [Tramadol] Hives and Other (comments)     Hives and ringing of the ears    Benzoyl Peroxide Hives    Cephalexin Itching    Divalproex Itching    Maltodextrin-Glycerin Shortness of Breath       Current Outpatient Medications on File Prior to Visit   Medication Sig Dispense Refill    azelastine (ASTELIN) 137 mcg (0.1 %) nasal spray 1 [place] by Both Nostrils route daily as needed for Rhinitis. Indications: seasonal runny nose 1 Each 0    cetirizine (ZYRTEC) 10 mg tablet Take 1 Tablet by mouth daily as needed for Allergies. Indications: seasonal runny nose 30 Tablet 0    hydrOXYzine HCL (ATARAX) 50 mg tablet Take 1 Tablet by mouth three (3) times daily as needed for Anxiety. Indications: anxious 90 Tablet 0    OLANZapine (ZyPREXA) 20 mg tablet Take 1 Tablet by mouth nightly. Indications: bipolar disorder 30 Tablet 0    trihexyphenidyL (ARTANE) 2 mg tablet Take 1 Tablet by mouth daily. Indications: extrapyramidal symptoms as a result of taking the medication 30 Tablet 0    cholecalciferol (VITAMIN D3) (1000 Units /25 mcg) tablet Take 1 Tablet by mouth daily. Indications: prevention of vitamin D deficiency 30 Tablet 0    lithium carbonate 150 mg capsule Take 1 Capsule by mouth nightly. Indications: bipolar disorder 30 Capsule 0    polyethylene glycol (Miralax) 17 gram packet Take 1 Packet by mouth daily as needed for Constipation. Indications: constipation 30 Packet 0    traZODone (DESYREL) 150 mg tablet Take 1 Tablet by mouth nightly.  Indications: insomnia associated with depression 30 Tablet 0    melatonin 3 mg tablet Take 1 Tablet by mouth nightly as needed for Insomnia. Indications: insomnia 30 Tablet 0     No current facility-administered medications on file prior to visit. family history includes Alcohol abuse in her father and sister; Arthritis-rheumatoid in her mother; Bipolar Disorder in her mother, sister, and sister; Breast Cancer in her maternal aunt; Cancer in her maternal aunt; Heart Disease in her father; High Cholesterol in her father; Lung Disease in her father; Lupus in her mother; Migraines in her mother; Psychiatric Disorder in her mother and sister; Stroke in her brother and father. Social History     Socioeconomic History    Marital status:      Spouse name: Not on file    Number of children: Not on file    Years of education: Not on file    Highest education level: Not on file   Occupational History    Not on file   Tobacco Use    Smoking status: Never Smoker    Smokeless tobacco: Never Used   Vaping Use    Vaping Use: Never used   Substance and Sexual Activity    Alcohol use: Yes     Comment: occasional    Drug use: No    Sexual activity: Yes     Partners: Male   Other Topics Concern     Service Not Asked    Blood Transfusions Not Asked    Caffeine Concern Not Asked    Occupational Exposure Not Asked    Hobby Hazards Not Asked    Sleep Concern Not Asked    Stress Concern Not Asked    Weight Concern Not Asked    Special Diet Not Asked    Back Care Not Asked    Exercise Not Asked    Bike Helmet Not Asked   Julian Not Asked    Self-Exams Not Asked   Social History Narrative    , 0 children, not working at present. On disability for bipolar illness.       Social Determinants of Health     Financial Resource Strain:     Difficulty of Paying Living Expenses: Not on file   Food Insecurity:     Worried About Running Out of Food in the Last Year: Not on file    Ludwig of Food in the Last Year: Not on file   Transportation Needs:     Lack of Transportation (Medical): Not on file    Lack of Transportation (Non-Medical): Not on file   Physical Activity:     Days of Exercise per Week: Not on file    Minutes of Exercise per Session: Not on file   Stress:     Feeling of Stress : Not on file   Social Connections:     Frequency of Communication with Friends and Family: Not on file    Frequency of Social Gatherings with Friends and Family: Not on file    Attends Rastafarian Services: Not on file    Active Member of [address] [place] [address] Seeder or Organizations: Not on file    Attends Club or Organization Meetings: Not on file    Marital Status: Not on file   Intimate Partner Violence:     Fear of Current or Ex-Partner: Not on file    Emotionally Abused: Not on file    Physically Abused: Not on file    Sexually Abused: Not on file   Housing Stability:     Unable to Pay for Housing in the Last Year: Not on file    Number of [place] in the Last Year: Not on file    Unstable Housing in the Last Year: Not on file       Visit Vitals  /63   Pulse 83   Temp 97 °F (36.1 °C) (Temporal)   Resp 16   Ht 5' 1\" (1.549 m)   Wt 163 lb (73.9 kg)   SpO2 97%   BMI 30.80 kg/m²     General:  Well appearing female no acute distress  HEENT:   PERRL,normal conjunctiva. External ear and canals normal, TMs normal.  Hearing normal to voice. Nose without edema or discharge, normal septum. Lips, teeth, gums normal.  Oropharynx: no erythema, no exudates, no lesions, normal tongue. Neck:  Supple. Thyroid normal size, nontender, without nodules. No carotid bruit. No masses or lymphadenopathy  Respiratory: no respiratory distress,  no wheezing, no rhonchi, no rales. No chest wall tenderness. Cardiovascular:  RRR, normal S1S2, no murmur. Gastrointestinal: normal bowel sounds, soft, nontender, without masses. No hepatosplenomegaly. Extremities +2 pulses, no edema, normal sensation   Musculoskeletal:  Normal gait. Normal digits and nails.   Normal strength and tone, no atrophy, and no abnormal movement. Skin:  No rash, no lesions, no ulcers. Skin warm, normal turgor, without induration or nodules. Neuro:  A and OX4, fluent speech, cranial nerves normal 2-12. Psych:  Normal affect      Lab Results   Component Value Date/Time    WBC 7.4 05/07/2022 04:03 AM    HGB 11.3 (L) 05/07/2022 04:03 AM    Hematocrit (POC) 34 (L) 07/17/2018 03:33 AM    HCT 35.2 05/07/2022 04:03 AM    PLATELET 727 56/01/2061 04:03 AM    MCV 92.9 05/07/2022 04:03 AM     Lab Results   Component Value Date/Time    Sodium 137 05/07/2022 04:03 AM    Potassium 4.0 05/07/2022 04:03 AM    Chloride 107 05/07/2022 04:03 AM    CO2 29 05/07/2022 04:03 AM    Anion gap 1 (L) 05/07/2022 04:03 AM    Glucose 121 (H) 05/07/2022 04:03 AM    Glucose 107 (H) 02/03/2020 05:40 AM    BUN 11 05/07/2022 04:03 AM    Creatinine 0.82 05/07/2022 04:03 AM    BUN/Creatinine ratio 13 05/07/2022 04:03 AM    GFR est AA >60 05/07/2022 04:03 AM    GFR est non-AA >60 05/07/2022 04:03 AM    Calcium 9.7 05/07/2022 04:03 AM     Lab Results   Component Value Date/Time    Cholesterol, total 202 (H) 01/20/2022 09:49 AM    HDL Cholesterol 84 01/20/2022 09:49 AM    LDL, calculated 110.2 (H) 01/20/2022 09:49 AM    VLDL, calculated 7.8 01/20/2022 09:49 AM    Triglyceride 39 01/20/2022 09:49 AM    CHOL/HDL Ratio 2.4 01/20/2022 09:49 AM     Lab Results   Component Value Date/Time    TSH 1.150 11/30/2020 03:56 PM     Lab Results   Component Value Date/Time    Hemoglobin A1c 5.2 01/20/2022 09:49 AM    Hemoglobin A1c (POC) 5.4 04/20/2015 10:00 AM     Lab Results   Component Value Date/Time    Vitamin D 25-Hydroxy 12.7 (L) 11/10/2011 09:25 AM    VITAMIN D, 25-HYDROXY 69.0 11/12/2015 03:52 PM                   Assessment and Plan:     1. Hospital discharge follow-up  - [place] DISCHARGE MEDS RECONCILED W/ CURRENT OUTPATIENT MED LIST    2. Schizoaffective disorder, bipolar type ([facility] [address]  Patient's mood is stable and she is following regularly with psychiatry.       Follow-up and Dispositions    · Return in about 6 months (around 11/23/2022).           Meera Tobar MD

## 2022-05-23 NOTE — PROGRESS NOTES
1. \"Have you been to the ER, urgent care clinic since your last visit? Hospitalized since your last visit? \" Yes Reason for visit: [facility]   For suicide attempt     2. \"Have you seen or consulted any other health care providers outside of the [address] [place] [address] since your last visit? \" No     3. For patients aged 39-70: Has the patient had a colonoscopy / FIT/ Cologuard? Yes - no Care Gap present      If the patient is female:    4. For patients aged 41-77: Has the patient had a mammogram within the past 2 years? Yes - no Care Gap present      5. For patients aged 21-65: Has the patient had a pap smear?  Yes - no Care Gap present

## 2022-07-18 ENCOUNTER — TELEPHONE (OUTPATIENT)
Dept: INTERNAL MEDICINE CLINIC | Age: 57
End: 2022-07-18

## 2022-07-18 DIAGNOSIS — R41.3 MEMORY CHANGE: Primary | ICD-10-CM

## 2022-07-18 NOTE — TELEPHONE ENCOUNTER
Pt states that she needs a referral for neurology    States can one be put in and please call landline with contact info.     Wants to se regarding forgetfulness etc.

## 2022-08-05 ENCOUNTER — HOSPITAL ENCOUNTER (EMERGENCY)
Age: 57
Discharge: HOME | End: 2022-08-05
Attending: EMERGENCY MEDICINE | Admitting: EMERGENCY MEDICINE
Payer: MEDICARE

## 2022-08-05 VITALS
HEIGHT: 61 IN | TEMPERATURE: 98.6 F | BODY MASS INDEX: 30.3 KG/M2 | DIASTOLIC BLOOD PRESSURE: 76 MMHG | WEIGHT: 160.5 LBS | HEART RATE: 97 BPM | SYSTOLIC BLOOD PRESSURE: 138 MMHG | OXYGEN SATURATION: 100 % | RESPIRATION RATE: 18 BRPM

## 2022-08-05 DIAGNOSIS — F41.1 ANXIETY STATE: Primary | ICD-10-CM

## 2022-08-05 LAB
ALBUMIN SERPL-MCNC: 4.1 G/DL (ref 3.5–5)
ALBUMIN/GLOB SERPL: 1 {RATIO} (ref 1.1–2.2)
ALP SERPL-CCNC: 54 U/L (ref 45–117)
ALT SERPL-CCNC: 21 U/L (ref 12–78)
AMPHET UR QL SCN: NEGATIVE
ANION GAP SERPL CALC-SCNC: 3 MMOL/L (ref 5–15)
APPEARANCE UR: CLEAR
AST SERPL-CCNC: 15 U/L (ref 15–37)
BACTERIA URNS QL MICRO: NEGATIVE /HPF
BARBITURATES UR QL SCN: NEGATIVE
BASOPHILS # BLD: 0.1 K/UL (ref 0–0.1)
BASOPHILS NFR BLD: 1 % (ref 0–1)
BENZODIAZ UR QL: NEGATIVE
BILIRUB SERPL-MCNC: 0.7 MG/DL (ref 0.2–1)
BILIRUB UR QL: NEGATIVE
BUN SERPL-MCNC: 7 MG/DL (ref 6–20)
BUN/CREAT SERPL: 7 (ref 12–20)
CALCIUM SERPL-MCNC: 9.7 MG/DL (ref 8.5–10.1)
CANNABINOIDS UR QL SCN: NEGATIVE
CHLORIDE SERPL-SCNC: 106 MMOL/L (ref 97–108)
CO2 SERPL-SCNC: 31 MMOL/L (ref 21–32)
COCAINE UR QL SCN: NEGATIVE
COLOR UR: ABNORMAL
CREAT SERPL-MCNC: 1.03 MG/DL (ref 0.55–1.02)
DIFFERENTIAL METHOD BLD: NORMAL
DRUG SCRN COMMENT,DRGCM: NORMAL
EOSINOPHIL # BLD: 0 K/UL (ref 0–0.4)
EOSINOPHIL NFR BLD: 0 % (ref 0–7)
EPITH CASTS URNS QL MICRO: ABNORMAL /LPF
ERYTHROCYTE [DISTWIDTH] IN BLOOD BY AUTOMATED COUNT: 12.7 % (ref 11.5–14.5)
ETHANOL SERPL-MCNC: <10 MG/DL
GLOBULIN SER CALC-MCNC: 4 G/DL (ref 2–4)
GLUCOSE SERPL-MCNC: 137 MG/DL (ref 65–100)
GLUCOSE UR STRIP.AUTO-MCNC: NEGATIVE MG/DL
HCT VFR BLD AUTO: 40.2 % (ref 35–47)
HGB BLD-MCNC: 12.8 G/DL (ref 11.5–16)
HGB UR QL STRIP: NEGATIVE
HYALINE CASTS URNS QL MICRO: ABNORMAL /LPF (ref 0–2)
IMM GRANULOCYTES # BLD AUTO: 0 K/UL (ref 0–0.04)
IMM GRANULOCYTES NFR BLD AUTO: 0 % (ref 0–0.5)
KETONES UR QL STRIP.AUTO: NEGATIVE MG/DL
LEUKOCYTE ESTERASE UR QL STRIP.AUTO: ABNORMAL
LYMPHOCYTES # BLD: 1.8 K/UL (ref 0.8–3.5)
LYMPHOCYTES NFR BLD: 25 % (ref 12–49)
MCH RBC QN AUTO: 29.8 PG (ref 26–34)
MCHC RBC AUTO-ENTMCNC: 31.8 G/DL (ref 30–36.5)
MCV RBC AUTO: 93.7 FL (ref 80–99)
METHADONE UR QL: NEGATIVE
MONOCYTES # BLD: 0.5 K/UL (ref 0–1)
MONOCYTES NFR BLD: 6 % (ref 5–13)
NEUTS SEG # BLD: 4.9 K/UL (ref 1.8–8)
NEUTS SEG NFR BLD: 68 % (ref 32–75)
NITRITE UR QL STRIP.AUTO: NEGATIVE
NRBC # BLD: 0 K/UL (ref 0–0.01)
NRBC BLD-RTO: 0 PER 100 WBC
OPIATES UR QL: NEGATIVE
PCP UR QL: NEGATIVE
PH UR STRIP: 7.5 [PH] (ref 5–8)
PLATELET # BLD AUTO: 259 K/UL (ref 150–400)
PMV BLD AUTO: 9.3 FL (ref 8.9–12.9)
POTASSIUM SERPL-SCNC: 3.6 MMOL/L (ref 3.5–5.1)
PROT SERPL-MCNC: 8.1 G/DL (ref 6.4–8.2)
PROT UR STRIP-MCNC: NEGATIVE MG/DL
RBC # BLD AUTO: 4.29 M/UL (ref 3.8–5.2)
RBC #/AREA URNS HPF: ABNORMAL /HPF (ref 0–5)
SODIUM SERPL-SCNC: 140 MMOL/L (ref 136–145)
SP GR UR REFRACTOMETRY: 1.01
UA: UC IF INDICATED,UAUC: ABNORMAL
UROBILINOGEN UR QL STRIP.AUTO: 1 EU/DL (ref 0.2–1)
WBC # BLD AUTO: 7.3 K/UL (ref 3.6–11)
WBC URNS QL MICRO: ABNORMAL /HPF (ref 0–4)

## 2022-08-05 PROCEDURE — 80307 DRUG TEST PRSMV CHEM ANLYZR: CPT

## 2022-08-05 PROCEDURE — 82077 ASSAY SPEC XCP UR&BREATH IA: CPT

## 2022-08-05 PROCEDURE — 74011250637 HC RX REV CODE- 250/637: Performed by: EMERGENCY MEDICINE

## 2022-08-05 PROCEDURE — 80053 COMPREHEN METABOLIC PANEL: CPT

## 2022-08-05 PROCEDURE — 36415 COLL VENOUS BLD VENIPUNCTURE: CPT

## 2022-08-05 PROCEDURE — 99285 EMERGENCY DEPT VISIT HI MDM: CPT

## 2022-08-05 PROCEDURE — 74011250637 HC RX REV CODE- 250/637

## 2022-08-05 PROCEDURE — 81001 URINALYSIS AUTO W/SCOPE: CPT

## 2022-08-05 PROCEDURE — 85025 COMPLETE CBC W/AUTO DIFF WBC: CPT

## 2022-08-05 RX ORDER — DIAZEPAM 5 MG/1
5 TABLET ORAL
Status: COMPLETED | OUTPATIENT
Start: 2022-08-05 | End: 2022-08-05

## 2022-08-05 RX ORDER — DIAZEPAM 5 MG/1
5 TABLET ORAL
Qty: 7 TABLET | Refills: 0 | Status: SHIPPED | OUTPATIENT
Start: 2022-08-05 | End: 2022-08-10

## 2022-08-05 RX ORDER — HYDROXYZINE 25 MG/1
25 TABLET, FILM COATED ORAL
Status: COMPLETED | OUTPATIENT
Start: 2022-08-05 | End: 2022-08-05

## 2022-08-05 RX ADMIN — HYDROXYZINE HYDROCHLORIDE 25 MG: 25 TABLET, FILM COATED ORAL at 16:35

## 2022-08-05 RX ADMIN — HYDROXYZINE HYDROCHLORIDE 25 MG: 25 TABLET, FILM COATED ORAL at 16:54

## 2022-08-05 RX ADMIN — DIAZEPAM 5 MG: 5 TABLET ORAL at 16:54

## 2022-08-05 NOTE — ED PROVIDER NOTES
EMERGENCY DEPARTMENT HISTORY AND PHYSICAL EXAM          Date: 8/5/2022  Patient Name: Shadi Yu  Attending of Record: Adarsh Rose MD     History of Presenting Illness     Chief Complaint   Patient presents with    Anxiety     2 weeks of \"high anxiety\" no pain no injury. Unable to get appointment until next Wednesday with doctor. History Provided By: Patient    HPI: Is a 26-year-old female with past medical history of schizoaffective disorder, bipolar, anxiety and depression who presents to the emergency department with anxiety. She reports over the past 2 weeks she has gradually had more more anxiety as an upcoming public speaking event approaches. She states she is unable to see her psychiatrist before next Wednesday, 5 days from now. She states she just wants to \"get ahead of it \"before the anxiety gets too bad. Denies any recent medication changes. Denies any suicidal ideation, homicidal ideation, or auditory/visual hallucinations. Symptoms are similar to prior exacerbations of her anxiety. No other complaints at this time. There are no other complaints, changes, or physical findings at this time. PCP: Alana Ibanez MD    Current Outpatient Medications   Medication Sig Dispense Refill    diazePAM (Valium) 5 mg tablet Take 1 Tablet by mouth every eight (8) hours as needed for Anxiety. Max Daily Amount: 15 mg. 7 Tablet 0    azelastine (ASTELIN) 137 mcg (0.1 %) nasal spray 1 [place] by Both Nostrils route daily as needed for Rhinitis. Indications: seasonal runny nose 1 Each 0    cetirizine (ZYRTEC) 10 mg tablet Take 1 Tablet by mouth daily as needed for Allergies. Indications: seasonal runny nose 30 Tablet 0    hydrOXYzine HCL (ATARAX) 50 mg tablet Take 1 Tablet by mouth three (3) times daily as needed for Anxiety. Indications: anxious 90 Tablet 0    OLANZapine (ZyPREXA) 20 mg tablet Take 1 Tablet by mouth nightly.  Indications: bipolar disorder 30 Tablet 0    trihexyphenidyL (ARTANE) 2 mg tablet Take 1 Tablet by mouth daily. Indications: extrapyramidal symptoms as a result of taking the medication 30 Tablet 0    cholecalciferol (VITAMIN D3) (1000 Units /25 mcg) tablet Take 1 Tablet by mouth daily. Indications: prevention of vitamin D deficiency 30 Tablet 0    lithium carbonate 150 mg capsule Take 1 Capsule by mouth nightly. Indications: bipolar disorder 30 Capsule 0    polyethylene glycol (Miralax) 17 gram packet Take 1 Packet by mouth daily as needed for Constipation. Indications: constipation 30 Packet 0    traZODone (DESYREL) 150 mg tablet Take 1 Tablet by mouth nightly. Indications: insomnia associated with depression 30 Tablet 0    melatonin 3 mg tablet Take 1 Tablet by mouth nightly as needed for Insomnia.  Indications: insomnia 30 Tablet 0       Past History     Past Medical History:  Past Medical History:   Diagnosis Date    Anxiety and depression     Bipolar 1 disorder with moderate robyn ([address] 3/17/2014    Chronic back pain     Hyperlipidemia 8/22/2016    Hyponatremia     Psychotic disorder ([address]     Scoliosis        Past Surgical History:  Past Surgical History:   Procedure Laterality Date    HX HEENT      wisdom teeth extraction    HX LIPOMA RESECTION      right gluteal    FREDY BIOPSY BREAST STEREOTACTIC Left 2011    negative    CA DENTAL SURGERY PROCEDURE      hx of dental surgery- wisdom teeth extracted       Family History:  Family History   Problem Relation Age of Onset    Arthritis-rheumatoid Mother     Migraines Mother     Psychiatric Disorder Mother     Bipolar Disorder Mother     Lupus Mother     Alcohol abuse Father     Lung Disease Father     Heart Disease Father     Stroke Father     High Cholesterol Father     Psychiatric Disorder Sister     Alcohol abuse Sister     Bipolar Disorder Sister     Stroke Brother     Cancer Maternal Aunt     Breast Cancer Maternal Aunt         pt thniks she was under 48    Bipolar Disorder Sister        Social History:  Social History Tobacco Use    Smoking status: Never    Smokeless tobacco: Never   Vaping Use    Vaping Use: Never used   Substance Use Topics    Alcohol use: Yes     Comment: occasional    Drug use: No       Allergies: Allergies   Allergen Reactions    Other Food Hives     Artifical sweetners    Claritin-D 12 Hour [Loratadine-Pseudoephedrine] Palpitations     palpatations and ringing in the ear    Other Medication Anaphylaxis     Artificial sweeteners cause anaphylaxis    Pcn [Penicillins] Anaphylaxis    Sunscreen Contact Dermatitis     Burns and opens the skin    Ultram [Tramadol] Hives and Other (comments)     Hives and ringing of the ears    Benzoyl Peroxide Hives    Cephalexin Itching    Divalproex Itching    Maltodextrin-Glycerin Shortness of Breath         Review of Systems   Review of Systems   Constitutional:  Negative for chills and fever. HENT:  Negative for ear pain and sore throat. Respiratory:  Negative for cough and shortness of breath. Cardiovascular:  Negative for chest pain. Gastrointestinal:  Negative for abdominal pain, constipation, diarrhea, nausea and vomiting. Genitourinary:  Negative for dysuria and hematuria. Musculoskeletal:  Negative for arthralgias and myalgias. Skin:  Negative for rash. Neurological:  Negative for weakness and headaches. Psychiatric/Behavioral:  The patient is nervous/anxious. Physical Exam   Physical Exam  Constitutional:       General: She is awake. She is not in acute distress. HENT:      Head: Normocephalic and atraumatic. Nose: Nose normal.      Mouth/Throat:      Mouth: Mucous membranes are moist.      Pharynx: Oropharynx is clear. Eyes:      General: No scleral icterus. Conjunctiva/sclera: Conjunctivae normal.   Cardiovascular:      Rate and Rhythm: Normal rate and regular rhythm. Pulmonary:      Effort: Pulmonary effort is normal. No respiratory distress. Abdominal:      Palpations: Abdomen is soft. Tenderness:  There is no abdominal tenderness. Musculoskeletal:         General: No tenderness or deformity. Skin:     General: Skin is warm and dry. Neurological:      General: No focal deficit present. Mental Status: She is alert. Psychiatric:         Attention and Perception: She does not perceive auditory or visual hallucinations. Mood and Affect: Mood is anxious. Behavior: Behavior normal.         Thought Content: Thought content is not delusional. Thought content does not include homicidal or suicidal ideation. Diagnostic Study Results     Labs -     Recent Results (from the past 12 hour(s))   CBC WITH AUTOMATED DIFF    Collection Time: 08/05/22  2:46 PM   Result Value Ref Range    WBC 7.3 3.6 - 11.0 K/uL    RBC 4.29 3.80 - 5.20 M/uL    HGB 12.8 11.5 - 16.0 g/dL    HCT 40.2 35.0 - 47.0 %    MCV 93.7 80.0 - 99.0 FL    MCH 29.8 26.0 - 34.0 PG    MCHC 31.8 30.0 - 36.5 g/dL    RDW 12.7 11.5 - 14.5 %    PLATELET 850 193 - 822 K/uL    MPV 9.3 8.9 - 12.9 FL    NRBC 0.0 0  WBC    ABSOLUTE NRBC 0.00 0.00 - 0.01 K/uL    NEUTROPHILS 68 32 - 75 %    LYMPHOCYTES 25 12 - 49 %    MONOCYTES 6 5 - 13 %    EOSINOPHILS 0 0 - 7 %    BASOPHILS 1 0 - 1 %    IMMATURE GRANULOCYTES 0 0.0 - 0.5 %    ABS. NEUTROPHILS 4.9 1.8 - 8.0 K/UL    ABS. LYMPHOCYTES 1.8 0.8 - 3.5 K/UL    ABS. MONOCYTES 0.5 0.0 - 1.0 K/UL    ABS. EOSINOPHILS 0.0 0.0 - 0.4 K/UL    ABS. BASOPHILS 0.1 0.0 - 0.1 K/UL    ABS. IMM.  GRANS. 0.0 0.00 - 0.04 K/UL    DF AUTOMATED     METABOLIC PANEL, COMPREHENSIVE    Collection Time: 08/05/22  2:46 PM   Result Value Ref Range    Sodium 140 136 - 145 mmol/L    Potassium 3.6 3.5 - 5.1 mmol/L    Chloride 106 97 - 108 mmol/L    CO2 31 21 - 32 mmol/L    Anion gap 3 (L) 5 - 15 mmol/L    Glucose 137 (H) 65 - 100 mg/dL    BUN 7 6 - 20 MG/DL    Creatinine 1.03 (H) 0.55 - 1.02 MG/DL    BUN/Creatinine ratio 7 (L) 12 - 20      GFR est AA >60 >60 ml/min/1.73m2    GFR est non-AA 55 (L) >60 ml/min/1.73m2    Calcium 9.7 8.5 - 10.1 MG/DL    Bilirubin, total 0.7 0.2 - 1.0 MG/DL    ALT (SGPT) 21 12 - 78 U/L    AST (SGOT) 15 15 - 37 U/L    Alk. phosphatase 54 45 - 117 U/L    Protein, total 8.1 6.4 - 8.2 g/dL    Albumin 4.1 3.5 - 5.0 g/dL    Globulin 4.0 2.0 - 4.0 g/dL    A-G Ratio 1.0 (L) 1.1 - 2.2     ETHYL ALCOHOL    Collection Time: 08/05/22  2:46 PM   Result Value Ref Range    ALCOHOL(ETHYL),SERUM <10 <10 MG/DL   DRUG SCREEN, URINE    Collection Time: 08/05/22  3:12 PM   Result Value Ref Range    AMPHETAMINES Negative NEG      BARBITURATES Negative NEG      BENZODIAZEPINES Negative NEG      COCAINE Negative NEG      METHADONE Negative NEG      OPIATES Negative NEG      PCP(PHENCYCLIDINE) Negative NEG      THC (TH-CANNABINOL) Negative NEG      Drug screen comment (NOTE)    URINALYSIS W/ REFLEX CULTURE    Collection Time: 08/05/22  3:12 PM    Specimen: Urine   Result Value Ref Range    Color DARK YELLOW      Appearance CLEAR CLEAR      Specific gravity 1.015      pH (UA) 7.5 5.0 - 8.0      Protein Negative NEG mg/dL    Glucose Negative NEG mg/dL    Ketone Negative NEG mg/dL    Bilirubin Negative NEG      Blood Negative NEG      Urobilinogen 1.0 0.2 - 1.0 EU/dL    Nitrites Negative NEG      Leukocyte Esterase MODERATE (A) NEG      UA:UC IF INDICATED CULTURE NOT INDICATED BY UA RESULT CNI      WBC 5-10 0 - 4 /hpf    RBC 0-5 0 - 5 /hpf    Epithelial cells FEW FEW /lpf    Bacteria Negative NEG /hpf    Hyaline cast 0-2 0 - 2 /lpf       Radiologic Studies -   No orders to display     CT Results  (Last 48 hours)      None          CXR Results  (Last 48 hours)      None              Medical Decision Making   I am the first provider for this patient. I reviewed the vital signs, available nursing notes, past medical history, past surgical history, family history and social history. Vital Signs-Reviewed the patient's vital signs.   Patient Vitals for the past 12 hrs:   Temp Pulse Resp BP SpO2   08/05/22 1436 98.6 °F (37 °C) 97 18 138/76 100 % Records Reviewed: Nursing Notes and Old Medical Records    Provider Notes (Medical Decision Making):   Is a 50-year-old female with past medical history of schizoaffective disorder, bipolar, anxiety and depression who presents to the emergency department with anxiety. Further history as above. Hemodynamically stable, afebrile. Exam reveals slightly anxious appearing middle-aged female in no acute distress. Otherwise no acute abnormality. Presentation concerning for anxiety, differential includes bipolar, schizoaffective disorder, electrolyte abnormality. CBC, CMP, UDS, UA obtained in triage. Will consult be smart and treat patient with 25 mg p.o. hydroxyzine at this time. ED Course and Progress Notes:   Initial assessment performed. The patients presenting problems have been discussed, and they are in agreement with the care plan formulated and outlined with them. I have encouraged them to ask questions as they arise throughout their visit. ED Course as of 08/05/22 1708   Fri Aug 05, 2022   1623 Spoke with [facility], no current SI, HI, safety plan on record which has been updated. [WB]   [address], URINE:    AMPHETAMINES Negative   BARBITURATES Negative   BENZODIAZEPINES Negative   COCAINE Negative   METHADONE Negative   OPIATES Negative   PCP(PHENCYCLIDINE) Negative   THC (TH-CANNABINOL) Negative   Drug screen comment (NOTE) [EW]   1703 ETHYL ALCOHOL:    ALCOHOL(ETHYL),SERUM <10 [EW]   1703 CBC WITH AUTOMATED DIFF:    WBC 7.3   RBC 4.29   HGB 12.8   HCT 40.2   MCV 93.7   MCH 29.8   MCHC 31.8   RDW 12.7   PLATELET 124   MPV 9.3   NRBC 0.0   ABSOLUTE NRBC 0.00   NEUTROPHILS 68   LYMPHOCYTES 25   MONOCYTES 6   EOSINOPHILS 0   BASOPHILS 1   IMMATURE GRANULOCYTES 0   ABS. NEUTROPHILS 4.9   ABS. LYMPHOCYTES 1.8   ABS. MONOCYTES 0.5   ABS. EOSINOPHILS 0.0   ABS. BASOPHILS 0.1   ABS. IMM.  GRANS. 0.0   DF AUTOMATED [EW]   1703 Nitrites: Negative [EW]   1703 Leukocyte Esterase(!): MODERATE [EW] 1703 WBC: 5-10 [EW]   1703 RBC: 0-5 [EW]   1703 Bacteria: Negative [EW]   1703 Sodium: 140 [EW]   1703 Glucose(!): 137 [EW]   1703 Creatinine(!): 1.03 [EW]   1703 ALT: 21 [EW]   1703 AST: 15 [EW]   1703 Alk. phosphatase: 54 [EW]   1703 Discharge Note:  5:03 PM  The patient has been re-evaluated and is ready for discharge. Reviewed available results with patient. Counseled patient on diagnosis and care plan. Patient has expressed understanding, and all questions have been answered. Patient agrees with plan and agrees to follow up as recommended, or to return to the ED if their symptoms worsen. Discharge instructions have been provided and explained to the patient, along with reasons to return to the ED. [EW]      ED Course User Index  [EW] Stanford Raman MD  [WB] Kay Buchanan MD         Diagnosis     Clinical Impression:   1. Anxiety state        Disposition:  Discharge    DISCHARGE PLAN:    1. Current Discharge Medication List        START taking these medications    Details   diazePAM (Valium) 5 mg tablet Take 1 Tablet by mouth every eight (8) hours as needed for Anxiety. Max Daily Amount: 15 mg.  Qty: 7 Tablet, Refills: 0  Start date: 8/5/2022    Associated Diagnoses: Anxiety state           2. Follow-up Information       Follow up With Specialties Details Why Contact Info    [facility] EMERGENCY DEPT Emergency Medicine Go to  If symptoms worsen or you have any concerns [address] [place] [address] Drive  [address] [facility]  [phone]          3.  Return to ED if worse     Resident Signature:   Landy Madrid DO  PGY 2

## 2022-08-05 NOTE — ED NOTES
Assumed care of pt from triage, pt has family and constant observer at bedside. Room stripped and constant observer at bedside. 1550 BSMART evaluating pt     1625 Pt reporting that her anxiety has been increasing drastically over the last two weeks, worse over the past two days. Pt has been taking meds as prescribed. Pt see psychiatrist every 6 - 8 weeks, last apt June, sees  in between psych apts     1640 MD at bedside, verbal order received for additional 25 mg atarax     1710 Pt discharged by [employer]. Pt provided with discharge instructions Rx and instructions on follow up care. Pt out of ED w/ safety plan completed by [facility] accompanied by .

## 2022-08-05 NOTE — BSMART NOTE
Comprehensive Assessment Form Part 1    Section I - Disposition      The Medical Doctor to Psychiatrist conference was not completed. The Medical Doctor is in agreement with Psychiatrist disposition because of (reason) Patient does not require admission at this time. .  The plan is discharge with  and follow up next week with Dr Flavio Kussmaul. The on-call Psychiatrist consulted was Dr. Yoselyn Palomo. The admitting Psychiatrist will be Dr. Yoselyn Palomo. The admitting Diagnosis is NA. The Payor source is not currently on facesheet. [place] Scale was reviewed and was noted to be HIGH risk due to attempt 3 months ago. Based on this assessment the risk is low due to no current thoughts, no plans, no intent, support person with patient. Patient will be discharged to follow up with outpatient providers. Section II - Integrated Summary  Summary:  Patient came in for mental health evaluation due to increasing anxiety in the last two weeks. Patient was accompanied by her  who reported they were out shopping and decided to \"stop in\" and see if she could get some help for anxiety. 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 They have since decided she will not do that again but the anxiety remains. Patient reported she has an upcoming appointment with Dr Flavio Kussmaul who does therapy and medication management with her. Patient indicated she is on Zyprexa, Lithium, Atarax, and Trazedone and is compliant with her medications. Patient reported no recent medication changes. Patient was hospitalized in May, 2022 for a suicide attempt via overdose and she reported she has been hospitalized psychiatrically a couple of other times. Patient is alert, oriented, and cooperative. Patient seen via [place] and  was given permission to remain in the room by patient. Patient's speech was clear and coherent.   Mood was anxious and affect flat. Patient reported her appetite has been reduced only in the last two days and she has a hard time falling asleep sometimes due to the anxiety  Patient denied any hallucinations at this time. She further denied any suicidal or homicidal ideation at this time. As noted previously, she has had an overdose attempt in May but reported that was her only attempt. No reported history of violence. Patient does not meet admission criteria and is not seeking admission at this time. She is hoping to get some medication for anxiety and follow up with her provider next week. Safety plan was established and was updated as she had attempt within the last 3 months. This writer spoke with Dr Moreno Pinedo who will provide her with medication for anxiety. The patient is deemed competent to provide informed consent. The information is given by the patient, spouse/SO, and past medical records. The Chief Complaint is anxiety increasing. The [employer] are doing a demonstration at [employer]. Previous Hospitalizations: Yes  Current Psychiatrist and/or  is Dr Swapnil Horne. Lethality Assessment:    The potential for suicide is noted by the following: noted by the following;  previous history of attempts which occured on (date)5-22 in the form(s) of overdose. The potential for homicide is not noted. The patient has not been a perpetrator of sexual or physical abuse. There are not pending charges. The patient is not felt to be at risk for self harm or harm to others. The attending nurse was advised that security has not been notified. Section III - Psychosocial  The patient's overall mood and attitude is anxious. Feelings of helplessness and hopelessness are not observed. Generalized anxiety is not observed. Panic is not observed. Phobias are not observed. Obsessive compulsive tendencies are not observed.       Section IV - Mental Status Exam  The patient's appearance shows no evidence of impairment. The patient's behavior shows no evidence of impairment. The patient is oriented to time, place, person and situation. The patient's speech shows no evidence of impairment. The patient's mood  is anxious. The range of affect is flat. The patient's thought content  demonstrates no evidence of impairment. The thought process shows no evidence of impairment. The patient's perception shows no evidence of impairment. The patient's memory shows no evidence of impairment. The patient's appetite is decreased. The patient's sleep has evidence of insomnia. The patient shows little insight. The patient's judgement is psychologically impaired. Section V - Substance Abuse  The patient is not using substances. Patient later reported she drinks a cup of wine with honey added to help her relax and sleep at night. .    Section VI - Living Arrangements  The patient is . The spouses approximate age is 52's  and appears to be in unknown health. The patient lives with a spouse. The patient has no children. The patient does plan to return home upon discharge. The patient does not have legal issues pending. The patient's source of income comes from family. Gnosticist and cultural practices have been noted and include: Faith. The patient's greatest support comes from spouse and this person will be involved with the treatment. The patient has not been in an event described as horrible or outside the realm of ordinary life experience either currently or in the past.  The patient has not been a victim of sexual/physical abuse. Section VII - Other Areas of Clinical Concern  The highest grade achieved is NA with the overall quality of school experience being described as NA. The patient is currently  unemployed and speaks [place] as a primary language. The patient has no communication impairments affecting communication.  The patient's preference for learning can be described as: can read and write adequately.   The patient's hearing is normal.  The patient's vision is normal .      Daija López LPC

## 2022-08-06 ENCOUNTER — HOSPITAL ENCOUNTER (EMERGENCY)
Age: 57
Discharge: PSYCHIATRIC OR SUBSTANCE USE FACILITY | End: 2022-08-06
Attending: STUDENT IN AN ORGANIZED HEALTH CARE EDUCATION/TRAINING PROGRAM
Payer: MEDICARE

## 2022-08-06 ENCOUNTER — HOSPITAL ENCOUNTER (INPATIENT)
Age: 57
LOS: 4 days | Discharge: HOME | DRG: 885 | End: 2022-08-10
Attending: PSYCHIATRY & NEUROLOGY | Admitting: PSYCHIATRY & NEUROLOGY
Payer: MEDICARE

## 2022-08-06 VITALS
RESPIRATION RATE: 16 BRPM | WEIGHT: 160 LBS | DIASTOLIC BLOOD PRESSURE: 76 MMHG | HEART RATE: 101 BPM | HEIGHT: 61 IN | TEMPERATURE: 98.1 F | OXYGEN SATURATION: 99 % | SYSTOLIC BLOOD PRESSURE: 142 MMHG | BODY MASS INDEX: 30.21 KG/M2

## 2022-08-06 DIAGNOSIS — R45.851 SUICIDAL IDEATION: ICD-10-CM

## 2022-08-06 DIAGNOSIS — F41.1 ANXIETY STATE: Primary | ICD-10-CM

## 2022-08-06 LAB
ALBUMIN SERPL-MCNC: 4.3 G/DL (ref 3.5–5)
ALBUMIN/GLOB SERPL: 1.1 {RATIO} (ref 1.1–2.2)
ALP SERPL-CCNC: 52 U/L (ref 45–117)
ALT SERPL-CCNC: 20 U/L (ref 12–78)
AMPHET UR QL SCN: NEGATIVE
ANION GAP SERPL CALC-SCNC: 5 MMOL/L (ref 5–15)
APPEARANCE UR: CLEAR
AST SERPL-CCNC: 18 U/L (ref 15–37)
BACTERIA URNS QL MICRO: NEGATIVE /HPF
BARBITURATES UR QL SCN: NEGATIVE
BASOPHILS # BLD: 0 K/UL (ref 0–0.1)
BASOPHILS NFR BLD: 0 % (ref 0–1)
BENZODIAZ UR QL: NEGATIVE
BILIRUB SERPL-MCNC: 0.3 MG/DL (ref 0.2–1)
BILIRUB UR QL: NEGATIVE
BUN SERPL-MCNC: 8 MG/DL (ref 6–20)
BUN/CREAT SERPL: 8 (ref 12–20)
CALCIUM SERPL-MCNC: 9.8 MG/DL (ref 8.5–10.1)
CANNABINOIDS UR QL SCN: NEGATIVE
CHLORIDE SERPL-SCNC: 106 MMOL/L (ref 97–108)
CO2 SERPL-SCNC: 29 MMOL/L (ref 21–32)
COCAINE UR QL SCN: NEGATIVE
COLOR UR: ABNORMAL
COVID-19 RAPID TEST, COVR: NOT DETECTED
CREAT SERPL-MCNC: 0.96 MG/DL (ref 0.55–1.02)
DATE LAST DOSE: ABNORMAL
DIFFERENTIAL METHOD BLD: NORMAL
DRUG SCRN COMMENT,DRGCM: NORMAL
EOSINOPHIL # BLD: 0 K/UL (ref 0–0.4)
EOSINOPHIL NFR BLD: 0 % (ref 0–7)
EPITH CASTS URNS QL MICRO: ABNORMAL /LPF
ERYTHROCYTE [DISTWIDTH] IN BLOOD BY AUTOMATED COUNT: 12.6 % (ref 11.5–14.5)
ETHANOL SERPL-MCNC: <10 MG/DL
FLUAV RNA SPEC QL NAA+PROBE: NOT DETECTED
FLUBV RNA SPEC QL NAA+PROBE: NOT DETECTED
GLOBULIN SER CALC-MCNC: 3.9 G/DL (ref 2–4)
GLUCOSE SERPL-MCNC: 109 MG/DL (ref 65–100)
GLUCOSE UR STRIP.AUTO-MCNC: NEGATIVE MG/DL
HCT VFR BLD AUTO: 42.7 % (ref 35–47)
HGB BLD-MCNC: 13.8 G/DL (ref 11.5–16)
HGB UR QL STRIP: NEGATIVE
HYALINE CASTS URNS QL MICRO: ABNORMAL /LPF (ref 0–2)
IMM GRANULOCYTES # BLD AUTO: 0 K/UL (ref 0–0.04)
IMM GRANULOCYTES NFR BLD AUTO: 0 % (ref 0–0.5)
KETONES UR QL STRIP.AUTO: NEGATIVE MG/DL
LEUKOCYTE ESTERASE UR QL STRIP.AUTO: ABNORMAL
LITHIUM SERPL-SCNC: 0.27 MMOL/L (ref 0.6–1.2)
LYMPHOCYTES # BLD: 1.3 K/UL (ref 0.8–3.5)
LYMPHOCYTES NFR BLD: 22 % (ref 12–49)
MCH RBC QN AUTO: 30.2 PG (ref 26–34)
MCHC RBC AUTO-ENTMCNC: 32.3 G/DL (ref 30–36.5)
MCV RBC AUTO: 93.4 FL (ref 80–99)
METHADONE UR QL: NEGATIVE
MONOCYTES # BLD: 0.4 K/UL (ref 0–1)
MONOCYTES NFR BLD: 6 % (ref 5–13)
NEUTS SEG # BLD: 4 K/UL (ref 1.8–8)
NEUTS SEG NFR BLD: 72 % (ref 32–75)
NITRITE UR QL STRIP.AUTO: NEGATIVE
NRBC # BLD: 0 K/UL (ref 0–0.01)
NRBC BLD-RTO: 0 PER 100 WBC
OPIATES UR QL: NEGATIVE
PCP UR QL: NEGATIVE
PH UR STRIP: 7.5 [PH] (ref 5–8)
PLATELET # BLD AUTO: 253 K/UL (ref 150–400)
PMV BLD AUTO: 9.4 FL (ref 8.9–12.9)
POTASSIUM SERPL-SCNC: 4.2 MMOL/L (ref 3.5–5.1)
PROT SERPL-MCNC: 8.2 G/DL (ref 6.4–8.2)
PROT UR STRIP-MCNC: NEGATIVE MG/DL
RBC # BLD AUTO: 4.57 M/UL (ref 3.8–5.2)
RBC #/AREA URNS HPF: ABNORMAL /HPF (ref 0–5)
REPORTED DOSE,DOSE: ABNORMAL UNITS
REPORTED DOSE/TIME,TMG: ABNORMAL
SARS-COV-2, COV2: NOT DETECTED
SODIUM SERPL-SCNC: 140 MMOL/L (ref 136–145)
SOURCE, COVRS: NORMAL
SP GR UR REFRACTOMETRY: <1.005
UROBILINOGEN UR QL STRIP.AUTO: 0.2 EU/DL (ref 0.2–1)
WBC # BLD AUTO: 5.7 K/UL (ref 3.6–11)
WBC URNS QL MICRO: ABNORMAL /HPF (ref 0–4)

## 2022-08-06 PROCEDURE — 81001 URINALYSIS AUTO W/SCOPE: CPT

## 2022-08-06 PROCEDURE — 99285 EMERGENCY DEPT VISIT HI MDM: CPT

## 2022-08-06 PROCEDURE — 74011250637 HC RX REV CODE- 250/637: Performed by: STUDENT IN AN ORGANIZED HEALTH CARE EDUCATION/TRAINING PROGRAM

## 2022-08-06 PROCEDURE — 85025 COMPLETE CBC W/AUTO DIFF WBC: CPT

## 2022-08-06 PROCEDURE — 80178 ASSAY OF LITHIUM: CPT

## 2022-08-06 PROCEDURE — 80307 DRUG TEST PRSMV CHEM ANLYZR: CPT

## 2022-08-06 PROCEDURE — 74011250637 HC RX REV CODE- 250/637

## 2022-08-06 PROCEDURE — 87636 SARSCOV2 & INF A&B AMP PRB: CPT

## 2022-08-06 PROCEDURE — 80053 COMPREHEN METABOLIC PANEL: CPT

## 2022-08-06 PROCEDURE — 82077 ASSAY SPEC XCP UR&BREATH IA: CPT

## 2022-08-06 PROCEDURE — 87635 SARS-COV-2 COVID-19 AMP PRB: CPT

## 2022-08-06 PROCEDURE — 65220000003 HC RM SEMIPRIVATE PSYCH

## 2022-08-06 PROCEDURE — 36415 COLL VENOUS BLD VENIPUNCTURE: CPT

## 2022-08-06 RX ORDER — MIDAZOLAM HYDROCHLORIDE 1 MG/ML
2 INJECTION, SOLUTION INTRAMUSCULAR; INTRAVENOUS
Status: DISCONTINUED | OUTPATIENT
Start: 2022-08-06 | End: 2022-08-10 | Stop reason: HOSPADM

## 2022-08-06 RX ORDER — DIPHENHYDRAMINE HYDROCHLORIDE 50 MG/ML
50 INJECTION, SOLUTION INTRAMUSCULAR; INTRAVENOUS
Status: DISCONTINUED | OUTPATIENT
Start: 2022-08-06 | End: 2022-08-10 | Stop reason: HOSPADM

## 2022-08-06 RX ORDER — HALOPERIDOL 5 MG/ML
5 INJECTION INTRAMUSCULAR
Status: DISCONTINUED | OUTPATIENT
Start: 2022-08-06 | End: 2022-08-10 | Stop reason: HOSPADM

## 2022-08-06 RX ORDER — BENZTROPINE MESYLATE 1 MG/1
1 TABLET ORAL
Status: DISCONTINUED | OUTPATIENT
Start: 2022-08-06 | End: 2022-08-10 | Stop reason: HOSPADM

## 2022-08-06 RX ORDER — ADHESIVE BANDAGE
30 BANDAGE TOPICAL DAILY PRN
Status: DISCONTINUED | OUTPATIENT
Start: 2022-08-06 | End: 2022-08-10 | Stop reason: HOSPADM

## 2022-08-06 RX ORDER — ACETAMINOPHEN 325 MG/1
650 TABLET ORAL
Status: DISCONTINUED | OUTPATIENT
Start: 2022-08-06 | End: 2022-08-10 | Stop reason: HOSPADM

## 2022-08-06 RX ORDER — TRAZODONE HYDROCHLORIDE 50 MG/1
50 TABLET ORAL
Status: DISCONTINUED | OUTPATIENT
Start: 2022-08-06 | End: 2022-08-10 | Stop reason: HOSPADM

## 2022-08-06 RX ORDER — OLANZAPINE 5 MG/1
5 TABLET ORAL
Status: DISCONTINUED | OUTPATIENT
Start: 2022-08-06 | End: 2022-08-10 | Stop reason: HOSPADM

## 2022-08-06 RX ORDER — HYDROXYZINE 50 MG/1
50 TABLET, FILM COATED ORAL
Status: DISCONTINUED | OUTPATIENT
Start: 2022-08-06 | End: 2022-08-07

## 2022-08-06 RX ORDER — HYDROXYZINE 25 MG/1
25 TABLET, FILM COATED ORAL
Status: COMPLETED | OUTPATIENT
Start: 2022-08-06 | End: 2022-08-06

## 2022-08-06 RX ADMIN — TRAZODONE HYDROCHLORIDE 50 MG: 50 TABLET ORAL at 20:54

## 2022-08-06 RX ADMIN — HYDROXYZINE HYDROCHLORIDE 25 MG: 25 TABLET, FILM COATED ORAL at 15:01

## 2022-08-06 RX ADMIN — HYDROXYZINE HYDROCHLORIDE 50 MG: 50 TABLET, FILM COATED ORAL at 20:55

## 2022-08-06 NOTE — ED PROVIDER NOTES
EMERGENCY DEPARTMENT HISTORY AND PHYSICAL EXAM      Date: 8/6/2022  Patient Name: Viri Santos    History of Presenting Illness     Chief Complaint   Patient presents with    Suicidal     Pt ambulatory into triage with cc of anxiety, was seen yesterday for the same. Pt states she us very uneasy, unable to answer SI questions because she is \"so worked up\". Upon further questioning, pt reports that she has been thinking about jumping out of a moving vehicle         HPI: Viri Santos, 61 y.o. female presents to the ED with cc of anxiety, depression and suicidal ideation. She has been undergoing significant life stress recently. States that her anxiety has been out of control. Especially over the past 2 weeks. She felt like she cannot keep still, she feels antsy, and this is making her depressed. Over the last 2 days she does not feel like eating.  at bedside reports that she seems much more anxious, has been trying exercise, deep breathing, taking her medications, however nothing is helping. She states that she even started having some suicidal thoughts. No direct plan. No homicidal ideation, no hallucinations. Was prescribed diazepam, picked it up from the pharmacy and took 1 dose this morning, however does not move it is kicked in yet. Reports prior hospitalizations for her psychiatric disorder. Follows with psychiatry, has an appointment next in June. Has been compliant with all of her medications including her lithium and Atarax. Otherwise denies complaints, no fevers or headache. There are no other complaints, changes, or physical findings at this time.     PCP: Lorenzo Townsend MD    Current Facility-Administered Medications on File Prior to Encounter   Medication Dose Route Frequency Provider Last Rate Last Admin    [COMPLETED] hydrOXYzine HCL (ATARAX) tablet 25 mg  25 mg Oral NOW Siena Mendoza MD   25 mg at 08/05/22 1635    [COMPLETED] hydrOXYzine HCL (ATARAX) tablet 25 mg  25 mg Oral NOW Yumiko Rodriguez MD   25 mg at 08/05/22 1654    [COMPLETED] diazePAM (VALIUM) tablet 5 mg  5 mg Oral NOW Yaritza Puentes MD   5 mg at 08/05/22 1654     Current Outpatient Medications on File Prior to Encounter   Medication Sig Dispense Refill    diazePAM (Valium) 5 mg tablet Take 1 Tablet by mouth every eight (8) hours as needed for Anxiety. Max Daily Amount: 15 mg. 7 Tablet 0    azelastine (ASTELIN) 137 mcg (0.1 %) nasal spray 1 Palm by Both Nostrils route daily as needed for Rhinitis. Indications: seasonal runny nose 1 Each 0    cetirizine (ZYRTEC) 10 mg tablet Take 1 Tablet by mouth daily as needed for Allergies. Indications: seasonal runny nose 30 Tablet 0    hydrOXYzine HCL (ATARAX) 50 mg tablet Take 1 Tablet by mouth three (3) times daily as needed for Anxiety. Indications: anxious 90 Tablet 0    OLANZapine (ZyPREXA) 20 mg tablet Take 1 Tablet by mouth nightly. Indications: bipolar disorder 30 Tablet 0    trihexyphenidyL (ARTANE) 2 mg tablet Take 1 Tablet by mouth daily. Indications: extrapyramidal symptoms as a result of taking the medication 30 Tablet 0    cholecalciferol (VITAMIN D3) (1000 Units /25 mcg) tablet Take 1 Tablet by mouth daily. Indications: prevention of vitamin D deficiency 30 Tablet 0    lithium carbonate 150 mg capsule Take 1 Capsule by mouth nightly. Indications: bipolar disorder 30 Capsule 0    polyethylene glycol (Miralax) 17 gram packet Take 1 Packet by mouth daily as needed for Constipation. Indications: constipation 30 Packet 0    traZODone (DESYREL) 150 mg tablet Take 1 Tablet by mouth nightly. Indications: insomnia associated with depression 30 Tablet 0    melatonin 3 mg tablet Take 1 Tablet by mouth nightly as needed for Insomnia.  Indications: insomnia 30 Tablet 0       Past History     Past Medical History:  Past Medical History:   Diagnosis Date    Anxiety and depression     Bipolar 1 disorder with moderate robyn ([address] 3/17/2014    Chronic back pain     Hyperlipidemia 8/22/2016    Hyponatremia     Psychotic disorder ([facility] [address]     Scoliosis        Past Surgical History:  Past Surgical History:   Procedure Laterality Date    HX HEENT      wisdom teeth extraction    HX LIPOMA RESECTION      right gluteal    FREDY BIOPSY BREAST STEREOTACTIC Left 2011    negative    DE DENTAL SURGERY PROCEDURE      hx of dental surgery- wisdom teeth extracted       Family History:  Family History   Problem Relation Age of Onset    Arthritis-rheumatoid Mother     Migraines Mother     Psychiatric Disorder Mother     Bipolar Disorder Mother     Lupus Mother     Alcohol abuse Father     Lung Disease Father     Heart Disease Father     Stroke Father     High Cholesterol Father     Psychiatric Disorder Sister     Alcohol abuse Sister     Bipolar Disorder Sister     Stroke Brother     Cancer Maternal Aunt     Breast Cancer Maternal Aunt         pt thniks she was under 48    Bipolar Disorder Sister        Social History:  Social History     Tobacco Use    Smoking status: Never    Smokeless tobacco: Never   Vaping Use    Vaping Use: Never used   Substance Use Topics    Alcohol use: Yes     Comment: occasional    Drug use: No       Allergies:   Allergies   Allergen Reactions    Other Food Hives     Artifical sweetners    Claritin-D 12 Hour [Loratadine-Pseudoephedrine] Palpitations     palpatations and ringing in the ear    Other Medication Anaphylaxis     Artificial sweeteners cause anaphylaxis    Pcn [Penicillins] Anaphylaxis    Sunscreen Contact Dermatitis     Burns and opens the skin    Ultram [Tramadol] Hives and Other (comments)     Hives and ringing of the ears    Benzoyl Peroxide Hives    Cephalexin Itching    Divalproex Itching    Maltodextrin-Glycerin Shortness of Breath         Review of Systems   no fever  No ear pain  No eye pain  no shortness of breath  no chest pain  no abdominal pain  no dysuria  no leg pain  No rash  No lymphadenopathy  No weight loss    Physical Exam   Physical Exam  Constitutional: Appearance: She is not toxic-appearing. HENT:      Head: Normocephalic. Eyes:      Extraocular Movements: Extraocular movements intact. Cardiovascular:      Rate and Rhythm: Normal rate and regular rhythm. Pulmonary:      Effort: Pulmonary effort is normal.      Breath sounds: Normal breath sounds. Abdominal:      Palpations: Abdomen is soft. Tenderness: There is no abdominal tenderness. Musculoskeletal:         General: No tenderness or deformity. Cervical back: Neck supple. Skin:     General: Skin is warm and dry. Neurological:      General: No focal deficit present. Mental Status: She is alert and oriented to person, place, and time. Cranial Nerves: No cranial nerve deficit. Sensory: No sensory deficit. Motor: No weakness. Psychiatric:      Comments: Anxious appearing, fidgety       Diagnostic Study Results     Labs -     Recent Results (from the past 24 hour(s))   CBC WITH AUTOMATED DIFF    Collection Time: 08/05/22  2:46 PM   Result Value Ref Range    WBC 7.3 3.6 - 11.0 K/uL    RBC 4.29 3.80 - 5.20 M/uL    HGB 12.8 11.5 - 16.0 g/dL    HCT 40.2 35.0 - 47.0 %    MCV 93.7 80.0 - 99.0 FL    MCH 29.8 26.0 - 34.0 PG    MCHC 31.8 30.0 - 36.5 g/dL    RDW 12.7 11.5 - 14.5 %    PLATELET 654 918 - 617 K/uL    MPV 9.3 8.9 - 12.9 FL    NRBC 0.0 0  WBC    ABSOLUTE NRBC 0.00 0.00 - 0.01 K/uL    NEUTROPHILS 68 32 - 75 %    LYMPHOCYTES 25 12 - 49 %    MONOCYTES 6 5 - 13 %    EOSINOPHILS 0 0 - 7 %    BASOPHILS 1 0 - 1 %    IMMATURE GRANULOCYTES 0 0.0 - 0.5 %    ABS. NEUTROPHILS 4.9 1.8 - 8.0 K/UL    ABS. LYMPHOCYTES 1.8 0.8 - 3.5 K/UL    ABS. MONOCYTES 0.5 0.0 - 1.0 K/UL    ABS. EOSINOPHILS 0.0 0.0 - 0.4 K/UL    ABS. BASOPHILS 0.1 0.0 - 0.1 K/UL    ABS. IMM.  GRANS. 0.0 0.00 - 0.04 K/UL    DF AUTOMATED     METABOLIC PANEL, COMPREHENSIVE    Collection Time: 08/05/22  2:46 PM   Result Value Ref Range    Sodium 140 136 - 145 mmol/L    Potassium 3.6 3.5 - 5.1 mmol/L Chloride 106 97 - 108 mmol/L    CO2 31 21 - 32 mmol/L    Anion gap 3 (L) 5 - 15 mmol/L    Glucose 137 (H) 65 - 100 mg/dL    BUN 7 6 - 20 MG/DL    Creatinine 1.03 (H) 0.55 - 1.02 MG/DL    BUN/Creatinine ratio 7 (L) 12 - 20      GFR est AA >60 >60 ml/min/1.73m2    GFR est non-AA 55 (L) >60 ml/min/1.73m2    Calcium 9.7 8.5 - 10.1 MG/DL    Bilirubin, total 0.7 0.2 - 1.0 MG/DL    ALT (SGPT) 21 12 - 78 U/L    AST (SGOT) 15 15 - 37 U/L    Alk.  phosphatase 54 45 - 117 U/L    Protein, total 8.1 6.4 - 8.2 g/dL    Albumin 4.1 3.5 - 5.0 g/dL    Globulin 4.0 2.0 - 4.0 g/dL    A-G Ratio 1.0 (L) 1.1 - 2.2     ETHYL ALCOHOL    Collection Time: 08/05/22  2:46 PM   Result Value Ref Range    ALCOHOL(ETHYL),SERUM <10 <10 MG/DL   DRUG SCREEN, URINE    Collection Time: 08/05/22  3:12 PM   Result Value Ref Range    AMPHETAMINES Negative NEG      BARBITURATES Negative NEG      BENZODIAZEPINES Negative NEG      COCAINE Negative NEG      METHADONE Negative NEG      OPIATES Negative NEG      PCP(PHENCYCLIDINE) Negative NEG      THC (TH-CANNABINOL) Negative NEG      Drug screen comment (NOTE)    URINALYSIS W/ REFLEX CULTURE    Collection Time: 08/05/22  3:12 PM    Specimen: Urine   Result Value Ref Range    Color DARK YELLOW      Appearance CLEAR CLEAR      Specific gravity 1.015      pH (UA) 7.5 5.0 - 8.0      Protein Negative NEG mg/dL    Glucose Negative NEG mg/dL    Ketone Negative NEG mg/dL    Bilirubin Negative NEG      Blood Negative NEG      Urobilinogen 1.0 0.2 - 1.0 EU/dL    Nitrites Negative NEG      Leukocyte Esterase MODERATE (A) NEG      UA:UC IF INDICATED CULTURE NOT INDICATED BY UA RESULT CNI      WBC 5-10 0 - 4 /hpf    RBC 0-5 0 - 5 /hpf    Epithelial cells FEW FEW /lpf    Bacteria Negative NEG /hpf    Hyaline cast 0-2 0 - 2 /lpf   URINALYSIS W/ RFLX MICROSCOPIC    Collection Time: 08/06/22 12:36 PM   Result Value Ref Range    Color YELLOW/STRAW      Appearance CLEAR CLEAR      Specific gravity <1.005     pH (UA) 7.5 5.0 - 8.0 Protein Negative NEG mg/dL    Glucose Negative NEG mg/dL    Ketone Negative NEG mg/dL    Bilirubin Negative NEG      Blood Negative NEG      Urobilinogen 0.2 0.2 - 1.0 EU/dL    Nitrites Negative NEG      Leukocyte Esterase TRACE (A) NEG      WBC 0-4 0 - 4 /hpf    RBC 0-5 0 - 5 /hpf    Epithelial cells FEW FEW /lpf    Bacteria Negative NEG /hpf    Hyaline cast 0-2 0 - 2 /lpf   DRUG SCREEN, URINE    Collection Time: 08/06/22 12:36 PM   Result Value Ref Range    AMPHETAMINES Negative NEG      BARBITURATES Negative NEG      BENZODIAZEPINES Negative NEG      COCAINE Negative NEG      METHADONE Negative NEG      OPIATES Negative NEG      PCP(PHENCYCLIDINE) Negative NEG      THC (TH-CANNABINOL) Negative NEG      Drug screen comment (NOTE)    CBC WITH AUTOMATED DIFF    Collection Time: 08/06/22 12:36 PM   Result Value Ref Range    WBC 5.7 3.6 - 11.0 K/uL    RBC 4.57 3.80 - 5.20 M/uL    HGB 13.8 11.5 - 16.0 g/dL    HCT 42.7 35.0 - 47.0 %    MCV 93.4 80.0 - 99.0 FL    MCH 30.2 26.0 - 34.0 PG    MCHC 32.3 30.0 - 36.5 g/dL    RDW 12.6 11.5 - 14.5 %    PLATELET 355 195 - 566 K/uL    MPV 9.4 8.9 - 12.9 FL    NRBC 0.0 0  WBC    ABSOLUTE NRBC 0.00 0.00 - 0.01 K/uL    NEUTROPHILS 72 32 - 75 %    LYMPHOCYTES 22 12 - 49 %    MONOCYTES 6 5 - 13 %    EOSINOPHILS 0 0 - 7 %    BASOPHILS 0 0 - 1 %    IMMATURE GRANULOCYTES 0 0.0 - 0.5 %    ABS. NEUTROPHILS 4.0 1.8 - 8.0 K/UL    ABS. LYMPHOCYTES 1.3 0.8 - 3.5 K/UL    ABS. MONOCYTES 0.4 0.0 - 1.0 K/UL    ABS. EOSINOPHILS 0.0 0.0 - 0.4 K/UL    ABS. BASOPHILS 0.0 0.0 - 0.1 K/UL    ABS. IMM.  GRANS. 0.0 0.00 - 0.04 K/UL    DF AUTOMATED     METABOLIC PANEL, COMPREHENSIVE    Collection Time: 08/06/22 12:36 PM   Result Value Ref Range    Sodium 140 136 - 145 mmol/L    Potassium 4.2 3.5 - 5.1 mmol/L    Chloride 106 97 - 108 mmol/L    CO2 29 21 - 32 mmol/L    Anion gap 5 5 - 15 mmol/L    Glucose 109 (H) 65 - 100 mg/dL    BUN 8 6 - 20 MG/DL    Creatinine 0.96 0.55 - 1.02 MG/DL BUN/Creatinine ratio 8 (L) 12 - 20      GFR est AA >60 >60 ml/min/1.73m2    GFR est non-AA 60 (L) >60 ml/min/1.73m2    Calcium 9.8 8.5 - 10.1 MG/DL    Bilirubin, total 0.3 0.2 - 1.0 MG/DL    ALT (SGPT) 20 12 - 78 U/L    AST (SGOT) 18 15 - 37 U/L    Alk. phosphatase 52 45 - 117 U/L    Protein, total 8.2 6.4 - 8.2 g/dL    Albumin 4.3 3.5 - 5.0 g/dL    Globulin 3.9 2.0 - 4.0 g/dL    A-G Ratio 1.1 1.1 - 2.2     ETHYL ALCOHOL    Collection Time: 08/06/22 12:36 PM   Result Value Ref Range    ALCOHOL(ETHYL),SERUM <10 <10 MG/DL   COVID-19 WITH INFLUENZA A/B    Collection Time: 08/06/22 12:58 PM   Result Value Ref Range    SARS-CoV-2 by PCR Not detected NOTD      Influenza A by PCR Not detected NOTD      Influenza B by PCR Not detected NOTD     COVID-19 RAPID TEST    Collection Time: 08/06/22 12:58 PM   Result Value Ref Range    Specimen source Nasopharyngeal      COVID-19 rapid test Not detected NOTD         Radiologic Studies -   No orders to display     CT Results  (Last 48 hours)      None          CXR Results  (Last 48 hours)      None              Medical Decision Making   I am the first provider for this patient. I reviewed the vital signs, available nursing notes, past medical history, past surgical history, family history and social history. Vital Signs-Reviewed the patient's vital signs. Patient Vitals for the past 24 hrs:   Temp Pulse Resp BP SpO2   08/06/22 1107 98 °F (36.7 °C) 97 16 (!) 150/73 100 %         Provider Notes (Medical Decision Making):   48-year-old female presenting with anxiety and suicidal ideation. Has known history of psychiatric disorder, concern for exacerbation of this, anxiety in setting of life stress, will assess for electrolyte or metabolic abnormalities and her lithium level. She otherwise denies pain or headaches, is afebrile nontoxic-appearing, unlikely no acute intracranial emergency. ED Course:     Initial assessment performed.  The patients presenting problems have been discussed, and they are in agreement with the care plan formulated and outlined with them. I have encouraged them to ask questions as they arise throughout their visit. CBC negative for leukocytosis or anemia, basic metabolic panel with normal renal function, no worrisome electrolyte abnormalities, UA not suggestive of UTI, UDS negative, she is medically cleared for psychiatric disposition. Spoke with the Smart who recommends admission. Critical Care Time:         Disposition:  Medically cleared for psychiatric disposition    PLAN:  1. Current Discharge Medication List        2.    Follow-up Information    None       Return to ED if worse     Diagnosis     Clinical Impression: Acute anxiety and suicidal ideation

## 2022-08-06 NOTE — BSMART NOTE
Patient has been accepted to [facility] [place] 728/2 by DIONNE Amezcua on behalf of Dr Shikha Mantilla. RN notified to give report [phone].        MERCED Soni, Supervisee in Social Work

## 2022-08-06 NOTE — ED NOTES
Pt presents to ED with high anxiety and SI. Pt states she was here yesterday for the same issue and she talked with a counselor and was discharged. Pt,s  states that her anxiety has been building and he believes it started with her trying to drive.
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
## 2022-08-07 PROCEDURE — 65220000003 HC RM SEMIPRIVATE PSYCH

## 2022-08-07 PROCEDURE — 74011250637 HC RX REV CODE- 250/637

## 2022-08-07 RX ORDER — TRAZODONE HYDROCHLORIDE 50 MG/1
50 TABLET ORAL AS NEEDED
COMMUNITY
End: 2022-08-10

## 2022-08-07 RX ORDER — AZELASTINE 1 MG/ML
1 SPRAY, METERED NASAL
Status: DISCONTINUED | OUTPATIENT
Start: 2022-08-07 | End: 2022-08-10 | Stop reason: HOSPADM

## 2022-08-07 RX ORDER — HYDROXYZINE 50 MG/1
50 TABLET, FILM COATED ORAL 3 TIMES DAILY
Status: DISCONTINUED | OUTPATIENT
Start: 2022-08-07 | End: 2022-08-10 | Stop reason: HOSPADM

## 2022-08-07 RX ORDER — MELATONIN
1000 DAILY
Status: DISCONTINUED | OUTPATIENT
Start: 2022-08-08 | End: 2022-08-10 | Stop reason: HOSPADM

## 2022-08-07 RX ORDER — POLYETHYLENE GLYCOL 3350 17 G/17G
17 POWDER, FOR SOLUTION ORAL
Status: DISCONTINUED | OUTPATIENT
Start: 2022-08-07 | End: 2022-08-10 | Stop reason: HOSPADM

## 2022-08-07 RX ORDER — CETIRIZINE HCL 10 MG
10 TABLET ORAL
Status: DISCONTINUED | OUTPATIENT
Start: 2022-08-07 | End: 2022-08-10 | Stop reason: HOSPADM

## 2022-08-07 RX ORDER — HYDROXYZINE 50 MG/1
50 TABLET, FILM COATED ORAL
Status: DISCONTINUED | OUTPATIENT
Start: 2022-08-07 | End: 2022-08-10 | Stop reason: HOSPADM

## 2022-08-07 RX ORDER — LANOLIN ALCOHOL/MO/W.PET/CERES
3 CREAM (GRAM) TOPICAL
Status: DISCONTINUED | OUTPATIENT
Start: 2022-08-07 | End: 2022-08-10 | Stop reason: HOSPADM

## 2022-08-07 RX ORDER — OLANZAPINE 5 MG/1
20 TABLET ORAL
Status: DISCONTINUED | OUTPATIENT
Start: 2022-08-07 | End: 2022-08-10 | Stop reason: HOSPADM

## 2022-08-07 RX ORDER — TRIHEXYPHENIDYL HYDROCHLORIDE 2 MG/1
2 TABLET ORAL DAILY
Status: DISCONTINUED | OUTPATIENT
Start: 2022-08-08 | End: 2022-08-10 | Stop reason: HOSPADM

## 2022-08-07 RX ORDER — LITHIUM CARBONATE 300 MG/1
300 CAPSULE ORAL
Status: DISCONTINUED | OUTPATIENT
Start: 2022-08-07 | End: 2022-08-10 | Stop reason: HOSPADM

## 2022-08-07 RX ORDER — LITHIUM CARBONATE 150 MG/1
150 CAPSULE ORAL
Status: DISCONTINUED | OUTPATIENT
Start: 2022-08-07 | End: 2022-08-07

## 2022-08-07 RX ADMIN — OLANZAPINE 20 MG: 5 TABLET, FILM COATED ORAL at 20:32

## 2022-08-07 RX ADMIN — ACETAMINOPHEN 650 MG: 325 TABLET ORAL at 19:48

## 2022-08-07 RX ADMIN — HYDROXYZINE HYDROCHLORIDE 50 MG: 50 TABLET, FILM COATED ORAL at 20:32

## 2022-08-07 RX ADMIN — TRAZODONE HYDROCHLORIDE 150 MG: 100 TABLET ORAL at 20:32

## 2022-08-07 RX ADMIN — LITHIUM CARBONATE 300 MG: 300 CAPSULE, GELATIN COATED ORAL at 20:32

## 2022-08-07 RX ADMIN — HYDROXYZINE HYDROCHLORIDE 50 MG: 50 TABLET, FILM COATED ORAL at 09:43

## 2022-08-07 RX ADMIN — HYDROXYZINE HYDROCHLORIDE 50 MG: 50 TABLET, FILM COATED ORAL at 17:15
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
## 2022-08-07 NOTE — PROGRESS NOTES
Problem: Falls - Risk of  Goal: *Absence of Falls  Description: Document [place] Flow Fall Risk and appropriate interventions in the flowsheet.   Outcome: Progressing Towards Goal  Note: Fall Risk Interventions:     Patient present in dayroom this morning reported poor sleep and anxiety, received prn atarax, able to give some information about medication, verified with pharmacy

## 2022-08-07 NOTE — PROGRESS NOTES
Patient received resting in bed with eyes closed. NAD and no complaints noted. Safety measures in place. Will continue to monitor with q15min rounds. Problem: Falls - Risk of  Goal: *Absence of Falls  Description: Document Starleen Freeze Fall Risk and appropriate interventions in the flowsheet.   Outcome: Progressing Towards Goal  Note: Fall Risk Interventions:            Medication Interventions: Teach patient to arise slowly

## 2022-08-07 NOTE — BH NOTES
Admission unit:Acute    Received from:  [address] [place] [address] ED    Admission diagnosis: Schizoaffective Disorder    Admission status: Voluntary, consent signed     Mood/ affect/ thought process / behaviors: Patient's mood is elevated, anxious/restlessness, pacing, c/o poor sleep.      Alcohol/drug: neg    PTA meds verified: Patient fills meds at [facility] and they are currently closed    PT or consults required: H&P currently being completed         Primary Nurse Cosme Dickerson RN and Elin Rae RN performed a dual skin assessment on this patient No impairment noted  Fareed score is 23

## 2022-08-07 NOTE — PROGRESS NOTES
Pt pleasant this evening. Visible on unit, speaking with spouse on phone frequently. Problem: Falls - Risk of  Goal: *Absence of Falls  Description: Document Jose Sol Fall Risk and appropriate interventions in the flowsheet.   Outcome: Progressing Towards Goal  Note: Fall Risk Interventions:            Medication Interventions: Teach patient to arise slowly

## 2022-08-07 NOTE — H&P
[facility] Adult  Hospitalist Group  History and Physical    Date of Service:  8/6/2022  Primary Care Provider: Maisha Haile MD  Source of information: The patient and Chart review    Chief Complaint: No chief complaint on file. History of Presenting Illness:   Radha Petit is a 61 y.o. female with a past medical history of anxiety, depression, bipolar and chronic back pain related to scoliosis who presented to [facility] emergency department with complaints of high anxiety and suicidal ideations. Patient states that she has a lot of stressors currently in her life. She was transferred to [facility] behavioral health unit for further treatment. Hospitalist was consulted for H&P. Upon examination patient is AAO x4 and cooperative throughout. She states that she has a past medical history of scoliosis that sometimes causes her back pain that she just takes OTC medication for relief. At time of examination she denies any syncope, dizziness, shortness of breath, chest pain or tightness, nausea, vomiting or diarrhea. She denies any urinary symptoms. She denies any pain at this time. Denies tobacco, EtOH, and illicit drug usage. COVID vaccinated x3 doses. REVIEW OF SYSTEMS:  A comprehensive review of systems was negative except for that written in the History of Present Illness. Past Medical History:   Diagnosis Date    Anxiety and depression     Bipolar 1 disorder with moderate robyn ([address] 3/17/2014    Chronic back pain     Hyperlipidemia 8/22/2016    Hyponatremia     Psychotic disorder ([facility] [address]     Scoliosis       Past Surgical History:   Procedure Laterality Date    HX HEENT      wisdom teeth extraction    HX LIPOMA RESECTION      right gluteal    FREDY BIOPSY BREAST STEREOTACTIC Left 2011    negative    AR DENTAL SURGERY PROCEDURE      hx of dental surgery- wisdom teeth extracted     Prior to Admission medications    Medication Sig Start Date End Date Taking?  Authorizing Provider   diazePAM (Valium) 5 mg tablet Take 1 Tablet by mouth every eight (8) hours as needed for Anxiety. Max Daily Amount: 15 mg. 8/5/22   Darryl Corona MD   azelastine (ASTELIN) 137 mcg (0.1 %) nasal spray 1 [place] by Both Nostrils route daily as needed for Rhinitis. Indications: seasonal runny nose 5/12/22   Jenna Xavier MD   cetirizine (ZYRTEC) 10 mg tablet Take 1 Tablet by mouth daily as needed for Allergies. Indications: seasonal runny nose 5/12/22   Jenna Xavier MD   hydrOXYzine HCL (ATARAX) 50 mg tablet Take 1 Tablet by mouth three (3) times daily as needed for Anxiety. Indications: anxious 5/12/22   Jenna Xavier MD   OLANZapine (ZyPREXA) 20 mg tablet Take 1 Tablet by mouth nightly. Indications: bipolar disorder 5/12/22   Jenna Xavier MD   trihexyphenidyL (ARTANE) 2 mg tablet Take 1 Tablet by mouth daily. Indications: extrapyramidal symptoms as a result of taking the medication 5/12/22   Jenna Xavier MD   cholecalciferol (VITAMIN D3) (1000 Units /25 mcg) tablet Take 1 Tablet by mouth daily. Indications: prevention of vitamin D deficiency 5/13/22   Jenna Xavier MD   lithium carbonate 150 mg capsule Take 1 Capsule by mouth nightly. Indications: bipolar disorder 5/12/22   Jenna Xavier MD   polyethylene glycol (Miralax) 17 gram packet Take 1 Packet by mouth daily as needed for Constipation. Indications: constipation 5/12/22   Jenna Xavier MD   traZODone (DESYREL) 150 mg tablet Take 1 Tablet by mouth nightly. Indications: insomnia associated with depression 5/12/22   Jenna Xavier MD   melatonin 3 mg tablet Take 1 Tablet by mouth nightly as needed for Insomnia.  Indications: insomnia 5/12/22   Jenna Xavier MD     Allergies   Allergen Reactions    Other Food Hives     Artifical sweetners    Claritin-D 12 Hour [Loratadine-Pseudoephedrine] Palpitations     palpatations and ringing in the ear    Other Medication Anaphylaxis Artificial sweeteners cause anaphylaxis    Pcn [Penicillins] Anaphylaxis    Sunscreen Contact Dermatitis     Burns and opens the skin    Ultram [Tramadol] Hives and Other (comments)     Hives and ringing of the ears    Benzoyl Peroxide Hives    Cephalexin Itching    Divalproex Itching    Maltodextrin-Glycerin Shortness of Breath      Family History   Problem Relation Age of Onset    Arthritis-rheumatoid Mother     Migraines Mother     Psychiatric Disorder Mother     Bipolar Disorder Mother     Lupus Mother     Alcohol abuse Father     Lung Disease Father     Heart Disease Father     Stroke Father     High Cholesterol Father     Psychiatric Disorder Sister     Alcohol abuse Sister     Bipolar Disorder Sister     Stroke Brother     Cancer Maternal Aunt     Breast Cancer Maternal Aunt         pt thpillos she was under 48    Bipolar Disorder Sister       Social History:  reports that she has never smoked. She has never used smokeless tobacco. She reports current alcohol use. She reports that she does not use drugs. Family and social history were personally reviewed, all pertinent and relevant details are outlined as above. Objective: There were no vitals taken for this visit. PHYSICAL EXAM:   General: Alert x oriented x 3, awake, no acute distress, resting in bed, pleasant female, appears to be stated age  [de-identified]: PEERL, EOMI, moist mucus membranes  Neck: Supple, no JVD, no meningeal signs  Chest: Clear to auscultation bilaterally   CVS: RRR, S1 S2 heard, no murmurs/rubs/gallops  Abd: Soft, non-tender, non-distended, +bowel sounds   Ext: No clubbing, no cyanosis, no edema  Neuro/Psych: Pleasant mood and affect, CN 2-12 grossly intact, sensory grossly within normal limit, Strength 5/5 in all extremities, DTR 1+ x 4  Cap refill: Brisk, less than 3 seconds  Pulses: 2+, symmetric in all extremities  Skin: Warm, dry, without rashes or lesions    Data Review:    All diagnostic labs and studies have been reviewed. Abnormal Labs Reviewed - No data to display    All Micro Results       None            IMAGING:   No orders to display        ECG/ECHO:    Results for orders placed or performed during the hospital encounter of 05/03/22   EKG, 12 LEAD, INITIAL   Result Value Ref Range    Ventricular Rate 78 BPM    Atrial Rate 78 BPM    P-R Interval 150 ms    QRS Duration 72 ms    Q-T Interval 402 ms    QTC Calculation (Bezet) 458 ms    Calculated P Axis 67 degrees    Calculated R Axis 61 degrees    Calculated T Axis 50 degrees    Diagnosis       Normal sinus rhythm  Biatrial enlargement  When compared with ECG of 10-CINDY-2020 16:29,  No significant change was found  Confirmed by Adrián Lynn ([id number]) on 5/4/2022 10:30:36 AM          Assessment:   Given the patient's current clinical presentation, there is a high level of concern for decompensation if discharged from the emergency department. Complex decision making was performed, which includes reviewing the patient's available past medical records, laboratory results, and imaging studies. Active Problems:    * No active hospital problems. *    Plan:     Hx of scoliosis  - Acetaminophen for pain as needed    Anxiety/bipolar disorder/suicidal ideations  - Management per primary team    Thank you for allowing us to participate in patient's care. If you have any questions or need further assistance please do not hesitate to contact us again. We will sign off now. Signed By: Raz Pavon NP     August 6, 2022         Please note that this dictation may have been completed with [employer], the computer voice recognition software. Quite often unanticipated grammatical, syntax, homophones, and other interpretive errors are inadvertently transcribed by the computer software. Please disregard these errors. Please excuse any errors that have escaped final proofreading.

## 2022-08-08 LAB
ATRIAL RATE: 78 BPM
CALCULATED P AXIS, ECG09: 72 DEGREES
CALCULATED R AXIS, ECG10: 69 DEGREES
CALCULATED T AXIS, ECG11: 49 DEGREES
DIAGNOSIS, 93000: NORMAL
P-R INTERVAL, ECG05: 154 MS
Q-T INTERVAL, ECG07: 358 MS
QRS DURATION, ECG06: 78 MS
QTC CALCULATION (BEZET), ECG08: 408 MS
VENTRICULAR RATE, ECG03: 78 BPM

## 2022-08-08 PROCEDURE — 93005 ELECTROCARDIOGRAM TRACING: CPT

## 2022-08-08 PROCEDURE — 65220000003 HC RM SEMIPRIVATE PSYCH

## 2022-08-08 PROCEDURE — 74011250637 HC RX REV CODE- 250/637

## 2022-08-08 RX ADMIN — Medication 1000 UNITS: at 09:10

## 2022-08-08 RX ADMIN — HYDROXYZINE HYDROCHLORIDE 50 MG: 50 TABLET, FILM COATED ORAL at 17:10

## 2022-08-08 RX ADMIN — HYDROXYZINE HYDROCHLORIDE 50 MG: 50 TABLET, FILM COATED ORAL at 20:51

## 2022-08-08 RX ADMIN — TRIHEXYPHENIDYL HYDROCHLORIDE 2 MG: 2 TABLET ORAL at 09:10

## 2022-08-08 RX ADMIN — OLANZAPINE 20 MG: 5 TABLET, FILM COATED ORAL at 20:51

## 2022-08-08 RX ADMIN — LITHIUM CARBONATE 300 MG: 300 CAPSULE, GELATIN COATED ORAL at 20:51

## 2022-08-08 RX ADMIN — HYDROXYZINE HYDROCHLORIDE 50 MG: 50 TABLET, FILM COATED ORAL at 09:10

## 2022-08-08 RX ADMIN — TRAZODONE HYDROCHLORIDE 150 MG: 100 TABLET ORAL at 20:51

## 2022-08-08 NOTE — INTERDISCIPLINARY ROUNDS
Behavioral Health Interdisciplinary Rounds     Patient Name: Darylene Lacks  Age: 61 y.o. Room/Bed:  728/02  Primary Diagnosis: <principal problem not specified>   Admission Status: Voluntary     Readmission within 30 days: no  Power of  in place: no  Patient requires a blocked bed: no          Reason for blocked bed:     VTE Prophylaxis: No    Mobility needs/Fall risk: no  Flu Vaccine : no   Nutritional Plan: no  Consults:          Labs/Testing due today?: no    Sleep hours: 7+       Participation in Care/Groups:  yes  Medication Compliant?: Yes  PRNS (last 24 hours): Pain    Restraints (last 24 hours):  no     CIWA (range last 24 hours):     COWS (range last 24 hours):      Alcohol screening (AUDIT) completed -   AUDIT Score: 0     If applicable, date SBIRT discussed in treatment team AND documented:   AUDIT Screen Score: AUDIT Score: 0      Document Brief Intervention (corresponds directly with the 5 A's, Ask, Advise, Assess, Assist, and Arrange): At- Risk Patients (Score 7-15 for women; 8-15 for men)  Discuss concern patient is drinking at unhealthy levels known to increase risk of alcohol-related health problems. Is Patient ready to commit to change? If No:  Encourage reflection  Discuss short term and long term health risks of consuming alcohol  Barriers to change  Reaffirm willingness to help / Educational materials provided  If Yes:  Set goal  Plan  Educational materials provided    Harmful use or Dependence (Score 16 or greater)  Discuss short term and long term health risks of consuming alcohol  Recommendations  Negotiate drinking goal  Recommend addiction specialist/center  Arrange follow-up appointments.     Tobacco - patient is a smoker: Have You Used Tobacco in the Past 30 Days: No  Illegal Drugs use: Have You Used Any Illegal Substances Over the Past 12 Months: No    24 hour chart check complete: yes ____________________________________________________________________________________________________________    Patient goal(s) for today:   Treatment team focus/goals:   Progress note     LOS:  2  Expected LOS:     Financial concerns/prescription coverage:    Family contact:       Family requesting physician contact today:   Discharge plan:   Access to weapons :         Outpatient provider(s):   Patient's preferred phone number for follow up call :   Patient's preferred e-mail address :  Participating treatment team members: Elma Shine, Marco (assigned SW),

## 2022-08-08 NOTE — PROGRESS NOTES
Problem: Falls - Risk of  Goal: *Absence of Falls  Description: Document Becky Blood Fall Risk and appropriate interventions in the flowsheet.   Outcome: Progressing Towards Goal  Note: Fall Risk Interventions:            Medication Interventions: Teach patient to arise slowly                   Problem: Depressed Mood (Adult/Pediatric)  Goal: *STG: Remains safe in hospital  Outcome: Progressing Towards Goal
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
## 2022-08-08 NOTE — PROGRESS NOTES
Patient received resting in bed with eyes closed. NAD and no complaints noted. Safety measures in place. Will continue to monitor with q15min rounds. Problem: Falls - Risk of  Goal: *Absence of Falls  Description: Document Lloyd Nunez Fall Risk and appropriate interventions in the flowsheet.   Outcome: Progressing Towards Goal  Note: Fall Risk Interventions:            Medication Interventions: Teach patient to arise slowly

## 2022-08-08 NOTE — PROGRESS NOTES
Problem: Depressed Mood (Adult/Pediatric)  Goal: *STG: Participates in treatment plan  Outcome: Progressing Towards Goal     Problem: Depressed Mood (Adult/Pediatric)  Goal: *STG: Verbalizes anger, guilt, and other feelings in a constructive manor  Outcome: Not Progressing Towards Goal  Goal: *STG: Demonstrates reduction in symptoms and increase in insight into coping skills/future focused  Outcome: Not Progressing Towards Goal    Patient is isolative to room. Denies si/hi. Mood depressed, anxious, affect is flat.

## 2022-08-08 NOTE — DISCHARGE INSTRUCTIONS
DISCHARGE SUMMARY    Simone Bauman  : 1965  MRN: [id number]    The patient Radha Petit exhibits the ability to control behavior in a less restrictive environment. Patient's level of functioning is improving. No assaultive/destructive behavior has been observed for the past 24 hours. No suicidal/homicidal threat or behavior has been observed for the past 24 hours. There is no evidence of serious medication side effects. Patient has not been in physical or protective restraints for at least the past 24 hours. If weapons involved, how are they secured? None    Is patient aware of and in agreement with discharge plan? Yes    Arrangements for medication:  Prescriptions given to patient, given a weeks supply or 30 day supply. Copy of discharge instructions to provider?: Yes    Arrangements for transportation home: Lyft    Keep all follow up appointments as scheduled, continue to take prescribed medications per physician instructions.   Mental health crisis number:  909 or your local mental health crisis line number at [address] [place] [address] Emergency WARM LINE      [phone] ([phone])      M-F: 9am to 9pm      Sat & Sun: 5pm - 9pm  National suicide prevention lines:                             6-269-PBEKKFA ([phone])       5-007-551-TALK ([phone])    Crisis Text Line:  Text HOME to [phone]

## 2022-08-08 NOTE — BH NOTES
PSYCHIATRIC PROGRESS NOTE       Patient: Darylene Lacks MRN: [id number]  SSN: [id number]    YOB: 1965  Age: 61 y.o. Sex: female      Admit Date: 8/6/2022    LOS: 2 days       Chief Complaint:  I finally got some sleep. Interval History:  Darylene Lacks slept better last night and anxiety has reduced. She says she was afraid she was going to hurt herself due to debilitating anxiety. Endorses passive si, denies any intent or plan. She feels people are out to get her. Limited eye contact. Affect is flat. Psychomotor activity, mildly decreased. At the present time the patient Darylene Lacks remains compliant with taking medications.  Denies any adverse events from taking them and feels they have been beneficial.         Past Medical History:  Past Medical History:   Diagnosis Date    Anxiety and depression     Bipolar 1 disorder with moderate robyn ([address] 3/17/2014    Chronic back pain     Hyperlipidemia 8/22/2016    Hyponatremia     Psychotic disorder ([address]     Scoliosis          ALLERGIES:(reviewed/updated 8/8/2022)  Allergies   Allergen Reactions    Other Food Hives     Artifical sweetners    Claritin-D 12 Hour [Loratadine-Pseudoephedrine] Palpitations     palpatations and ringing in the ear    Other Medication Anaphylaxis     Artificial sweeteners cause anaphylaxis    Pcn [Penicillins] Anaphylaxis    Sunscreen Contact Dermatitis     Burns and opens the skin    Ultram [Tramadol] Hives and Other (comments)     Hives and ringing of the ears    Benzoyl Peroxide Hives    Cephalexin Itching    Divalproex Itching    Maltodextrin-Glycerin Shortness of Breath       Laboratory report:  Lab Results   Component Value Date/Time    WBC 5.7 08/06/2022 12:36 PM    HGB 13.8 08/06/2022 12:36 PM    Hematocrit (POC) 34 (L) 07/17/2018 03:33 AM    HCT 42.7 08/06/2022 12:36 PM    PLATELET 083 58/05/7234 12:36 PM    MCV 93.4 08/06/2022 12:36 PM      Lab Results   Component Value Date/Time    Sodium 140 08/06/2022 12:36 PM Potassium 4.2 08/06/2022 12:36 PM    Chloride 106 08/06/2022 12:36 PM    CO2 29 08/06/2022 12:36 PM    Anion gap 5 08/06/2022 12:36 PM    Glucose 109 (H) 08/06/2022 12:36 PM    Glucose 107 (H) 02/03/2020 05:40 AM    BUN 8 08/06/2022 12:36 PM    Creatinine 0.96 08/06/2022 12:36 PM    BUN/Creatinine ratio 8 (L) 08/06/2022 12:36 PM    GFR est AA >60 08/06/2022 12:36 PM    GFR est non-AA 60 (L) 08/06/2022 12:36 PM    Calcium 9.8 08/06/2022 12:36 PM    Bilirubin, total 0.3 08/06/2022 12:36 PM    Alk. phosphatase 52 08/06/2022 12:36 PM    Protein, total 8.2 08/06/2022 12:36 PM    Albumin 4.3 08/06/2022 12:36 PM    Globulin 3.9 08/06/2022 12:36 PM    A-G Ratio 1.1 08/06/2022 12:36 PM    ALT (SGPT) 20 08/06/2022 12:36 PM      Vitals:    08/07/22 1119 08/07/22 1632 08/07/22 2030 08/08/22 0759   BP:  126/80 125/82 114/75   Pulse:  80 87 93   Resp:  16 16 16   Temp:  98.6 °F (37 °C) 98.2 °F (36.8 °C) 98.4 °F (36.9 °C)   SpO2:  100% 99% 98%   Weight: 71.5 kg (157 lb 9.6 oz)          Lab Results   Component Value Date/Time    Carbamazepine 14.1 (H) 07/26/2020 04:19 AM     Lab Results   Component Value Date/Time    Lithium level 0.27 (L) 08/06/2022 12:36 PM       Vital Signs  Patient Vitals for the past 24 hrs:   Temp Pulse Resp BP SpO2   08/08/22 0759 98.4 °F (36.9 °C) 93 16 114/75 98 %   08/07/22 2030 98.2 °F (36.8 °C) 87 16 125/82 99 %   08/07/22 1632 98.6 °F (37 °C) 80 16 126/80 100 %     Wt Readings from Last 3 Encounters:   08/07/22 71.5 kg (157 lb 9.6 oz)   08/06/22 72.6 kg (160 lb)   08/05/22 72.8 kg (160 lb 7.9 oz)     Temp Readings from Last 3 Encounters:   08/08/22 98.4 °F (36.9 °C)   08/06/22 98.1 °F (36.7 °C)   08/05/22 98.6 °F (37 °C)     BP Readings from Last 3 Encounters:   08/08/22 114/75   08/06/22 (!) 142/76   08/05/22 138/76     Pulse Readings from Last 3 Encounters:   08/08/22 93   08/06/22 (!) 101   08/05/22 97       Radiology (reviewed/updated 8/8/2022)  No results found.     Current Facility-Administered Medications   Medication Dose Route Frequency Provider Last Rate Last Admin    azelastine (ASTELIN) 137mcg/spray nasal spray  1 Spray Both Nostrils DAILY PRBRET Alford NP        cetirizine (ZYRTEC) tablet 10 mg  10 mg Oral DAILY PRN Ben Alford NP        cholecalciferol (VITAMIN D3) (1000 Units /25 mcg) tablet 1,000 Units  1,000 Units Oral DAILY Ovidio Mail A, NP   1,000 Units at 08/08/22 0910    melatonin tablet 3 mg  3 mg Oral QHS PRBRET Alford NP        OLANZapine (ZyPREXA) tablet 20 mg  20 mg Oral QHS Ovidio Mail A, NP   20 mg at 08/07/22 2032    polyethylene glycol (MIRALAX) packet 17 g  17 g Oral DAILY PRBRET Alford NP        traZODone (DESYREL) tablet 150 mg  150 mg Oral QHS Ovidio Mail A, NP   150 mg at 08/07/22 2032    trihexyphenidyL (ARTANE) tablet 2 mg  2 mg Oral DAILY Ovidio Mail A, NP   2 mg at 08/08/22 0232    hydrOXYzine HCL (ATARAX) tablet 50 mg  50 mg Oral TID PRBRET Alford NP        hydrOXYzine HCL (ATARAX) tablet 50 mg  50 mg Oral TID Ben Alford NP   50 mg at 08/08/22 4559    lithium carbonate capsule 300 mg  300 mg Oral QHS Ovidio Mail A, NP   300 mg at 08/07/22 2032    OLANZapine (ZyPREXA) tablet 5 mg  5 mg Oral Q6H PRBRET Alford NP        haloperidol lactate (HALDOL) injection 5 mg  5 mg IntraMUSCular Q6H PRBRET Alford NP        benztropine (COGENTIN) tablet 1 mg  1 mg Oral BID PRBRET Alford NP        diphenhydrAMINE (BENADRYL) injection 50 mg  50 mg IntraMUSCular BID PRBRET Alford NP        traZODone (DESYREL) tablet 50 mg  50 mg Oral QHS PRBRET Alford NP   50 mg at 08/06/22 2054    acetaminophen (TYLENOL) tablet 650 mg  650 mg Oral Q4H PRBRET Alford NP   650 mg at 08/07/22 1948    magnesium hydroxide (MILK OF MAGNESIA) 400 mg/5 mL oral suspension 30 mL  30 mL Oral DAILY PRN Ben Alford NP        midazolam (VERSED) injection 2 mg  2 mg IntraMUSCular Q6H PRN Ben Alford NP           Side Effects: (reviewed/updated 8/8/2022)  None reported or admitted to. Review of Systems: (reviewed/updated 8/8/2022)  Appetite: good  Sleep: good   All other Review of Systems: negative    Mental Status Exam:  Eye contact: Limited eye contact  Psychomotor activity: mildly decreased. Speech is spontaneous  Thought process: Logical and goal directed   Mood is \"ok\"  Affect: flat  Perception: No avh  Suicidal ideation: No si  Homicidal ideation: No hi  Insight/judgment: Poor  Cognition is grossly intact      Physical Exam:  Musculoskeletal system: steady gait  Tremor not present  Cog wheeling not present      Assessment and Plan:  Yesenia Barriga meets criteria for a diagnosis of Schizoaffective disorder, bipolar type, acute exacerbation. Unspecified anxiety disorder. Ekg for qtc monitoring. Continue current medications as prescribed. We will closely monitor for safety. We will encourage reality orientation. Disposition planning to continue. I certify that this patients inpatient psychiatric hospital services furnished since the previous certification were, and continue to be, required for treatment that could reasonably be expected to improve the patient's condition, or for diagnostic study, and that the patient continues to need, on a daily basis, active treatment furnished directly by or requiring the supervision of inpatient psychiatric facility personnel. In addition, the hospital records show that services furnished were intensive treatment services, admission or related services, or equivalent services.       Signed:  Riya Hyde NP  8/8/2022

## 2022-08-08 NOTE — BH NOTES
Behavioral Health Interdisciplinary Rounds    Patient goal(s) for today: Rest, communicate needs to staff, socialize with peers, take medications as scheduled, complete ADL's  Treatment team focus/goals: Discuss reason for admission   Progress note: Pt met with treatment team for the first time since admission and appeared with flat mood and affect and maintained minimal eye contact throughout the assessment. Pt states she was afraid of doing something reckless or harming herself prior to admission due to high rates of anxiety. Pt reports she slept better last night than she did prior to admission. Pt reports some paranoia feeling that others are out to get her. Pt has been isolative to her room today but has been eating meals. MD to start medications.      Financial concerns/prescription coverage:  [place] MED[place]ARE - [place] MED[place]ARE PART A & B  Family contact: Carlin Franks, spouse, [phone]; [phone]                 Family requesting physician contact today:  No  Discharge plan: TBD  Access to weapons : No                                                              Outpatient provider(s): Unknown at this time  Patient's preferred phone number for follow up call : [phone]    LOS:  2  Expected LOS: TBD    Participating treatment team members: MERCED Harmon, Tarun Bucio, RN, Teresa Norris, NP

## 2022-08-09 PROCEDURE — 65220000003 HC RM SEMIPRIVATE PSYCH

## 2022-08-09 PROCEDURE — 74011250637 HC RX REV CODE- 250/637

## 2022-08-09 RX ORDER — TRIHEXYPHENIDYL HYDROCHLORIDE 2 MG/1
2 TABLET ORAL DAILY
Qty: 30 TABLET | Refills: 0 | Status: SHIPPED | OUTPATIENT
Start: 2022-08-09 | End: 2022-09-20

## 2022-08-09 RX ORDER — TRAZODONE HYDROCHLORIDE 150 MG/1
150 TABLET ORAL
Qty: 30 TABLET | Refills: 0 | Status: SHIPPED | OUTPATIENT
Start: 2022-08-09 | End: 2022-09-20

## 2022-08-09 RX ORDER — HYDROXYZINE 50 MG/1
50 TABLET, FILM COATED ORAL 3 TIMES DAILY
Qty: 90 TABLET | Refills: 0 | Status: SHIPPED | OUTPATIENT
Start: 2022-08-09 | End: 2022-09-20

## 2022-08-09 RX ORDER — OLANZAPINE 20 MG/1
20 TABLET ORAL
Qty: 30 TABLET | Refills: 0 | Status: SHIPPED | OUTPATIENT
Start: 2022-08-09 | End: 2022-09-20

## 2022-08-09 RX ORDER — LITHIUM CARBONATE 300 MG/1
300 CAPSULE ORAL
Qty: 30 CAPSULE | Refills: 0 | Status: SHIPPED | OUTPATIENT
Start: 2022-08-09 | End: 2022-09-20

## 2022-08-09 RX ADMIN — TRIHEXYPHENIDYL HYDROCHLORIDE 2 MG: 2 TABLET ORAL at 08:07

## 2022-08-09 RX ADMIN — HYDROXYZINE HYDROCHLORIDE 50 MG: 50 TABLET, FILM COATED ORAL at 21:27

## 2022-08-09 RX ADMIN — HYDROXYZINE HYDROCHLORIDE 50 MG: 50 TABLET, FILM COATED ORAL at 15:38

## 2022-08-09 RX ADMIN — TRAZODONE HYDROCHLORIDE 150 MG: 100 TABLET ORAL at 21:26

## 2022-08-09 RX ADMIN — LITHIUM CARBONATE 300 MG: 300 CAPSULE, GELATIN COATED ORAL at 21:27

## 2022-08-09 RX ADMIN — OLANZAPINE 20 MG: 5 TABLET, FILM COATED ORAL at 21:26

## 2022-08-09 RX ADMIN — HYDROXYZINE HYDROCHLORIDE 50 MG: 50 TABLET, FILM COATED ORAL at 08:07

## 2022-08-09 RX ADMIN — Medication 1000 UNITS: at 08:07
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
## 2022-08-09 NOTE — PROGRESS NOTES
Problem: Depressed Mood (Adult/Pediatric)  Goal: *STG: Participates in treatment plan  Outcome: Progressing Towards Goal  Goal: *STG: Attends activities and groups  Outcome: Progressing Towards Goal  Goal: *STG: Demonstrates reduction in symptoms and increase in insight into coping skills/future focused  Outcome: Progressing Towards Goal  Variance Patient slowly responding     Patient is calm, cooperative, medication compliant. Denies si/hi. Mood is anxious, affect flat. Verbal responses are slow.

## 2022-08-09 NOTE — PROGRESS NOTES
Patient received resting quietly in bed. No signs of distress. Even and unlabored breathing. Staff will continue to monitor safety q15 and provide support. Problem: Falls - Risk of  Goal: *Absence of Falls  Description: Document Jeff Mar Fall Risk and appropriate interventions in the flowsheet.   Outcome: Progressing Towards Goal  Note: Fall Risk Interventions:    Medication Interventions: Teach patient to arise slowly

## 2022-08-09 NOTE — PROGRESS NOTES
Patient up and out on unit; polite, cooperative, calm. Patient med/meal compliant; denies SI/HI. Staff focus is ensure patient remains safe while in the hospital and teach patient importance of medication compliance.    Problem: Patient Education: Go to Patient Education Activity  Goal: Patient/Family Education  Outcome: Progressing Towards Goal     Problem: Depressed Mood (Adult/Pediatric)  Goal: *STG: Remains safe in hospital  Outcome: Progressing Towards Goal

## 2022-08-09 NOTE — INTERDISCIPLINARY ROUNDS
Behavioral Health Interdisciplinary Rounds     Patient Name: Shabana Starkey  Age: 61 y.o. Room/Bed:  728/02  Primary Diagnosis: <principal problem not specified>   Admission Status: Voluntary     Readmission within 30 days: no  Power of  in place: no  Patient requires a blocked bed: no          Reason for blocked bed:     VTE Prophylaxis: Not indicated    Mobility needs/Fall risk: no  Flu Vaccine : no   Nutritional Plan: no  Consults: none         Labs/Testing due today?: no    Sleep hours: 8         Participation in Care/Groups:    Medication Compliant?: Yes  PRNS (last 24 hours): None    Restraints (last 24 hours):  no     CIWA (range last 24 hours):     COWS (range last 24 hours):      Alcohol screening (AUDIT) completed -   AUDIT Score: 0     If applicable, date SBIRT discussed in treatment team AND documented:   AUDIT Screen Score: AUDIT Score: 0      Document Brief Intervention (corresponds directly with the 5 A's, Ask, Advise, Assess, Assist, and Arrange): At- Risk Patients (Score 7-15 for women; 8-15 for men)  Discuss concern patient is drinking at unhealthy levels known to increase risk of alcohol-related health problems. Is Patient ready to commit to change? If No:  Encourage reflection  Discuss short term and long term health risks of consuming alcohol  Barriers to change  Reaffirm willingness to help / Educational materials provided  If Yes:  Set goal  Plan  Educational materials provided    Harmful use or Dependence (Score 16 or greater)  Discuss short term and long term health risks of consuming alcohol  Recommendations  Negotiate drinking goal  Recommend addiction specialist/center  Arrange follow-up appointments.     Tobacco - patient is a smoker: Have You Used Tobacco in the Past 30 Days: No  Illegal Drugs use: Have You Used Any Illegal Substances Over the Past 12 Months: No    24 hour chart check complete: yes ____________________________________________________________________________________________________________    Patient goal(s) for today: Rest, communicate needs to staff, socialize with peers, take medications as scheduled, complete ADL's  Treatment team focus/goals: Discuss disposition planning  Progress note: Pt met with treatment team and reports that anxiety has decreased since admission but she still appears visibly anxious and slightly preoccupied. Pt reports poor appetite at this time but is sleeping well. Pt has appointment scheduled with Dr. Morena Lowe tomorrow at [place] [address] in person, if patient does well with medications this evening plan is to discharge to appointment tomorrow. MD to print prescriptions to take to appointment. Nursing staff to take lithium level to ensure levels are okay at this time. 1350: Sw contacted  and he has no safety concerns to voice at this time regarding possible discharge for tomorrow. Pt  adamantly denies any guns or weapons in the home, states that this was a miscommunication with the counselor in the emergency room.  states he is able to monitor for safety when he is home from work. He is not able to pick pt up from hospital tomorrow, Lyft to be arranged to make appointment at [facility]. 1400: SW left message for Candie Koehler ([phone]) at [facility] 10. location regarding appointment with Dr. Natalie Rendon tomorrow to confirm appointment time and to ensure transportation is available following her appointment because family is not available.  Elvira Lind, long-term  with [facility], is out of the office until 8/11 and has directed calls to supervisor Sherri.     Financial concerns/prescription coverage:  [address] contact: Kareem Ojeda, spouse, [phone]; [phone]                 Family requesting physician contact today:  No  Discharge plan: TBD  Access to weapons : No                                                              Outpatient provider(s): Unknown at this time  Patient's preferred phone number for follow up call : [phone]    LOS:  3  Expected LOS: TBD    Participating treatment team members: Nathan Scott MSW, Lila Joyutant., RN, Calista Joya NP

## 2022-08-09 NOTE — BH NOTES
PSYCHIATRIC PROGRESS NOTE       Patient: Ray Arias MRN: [id number]  SSN: [id number]    YOB: 1965  Age: 61 y.o. Sex: female      Admit Date: 8/6/2022    LOS: 3 days       Chief Complaint:  I am doing good. Interval History:  Ray Arias says her anxiety is much reduced, denies any depression. She has good eye contact, appears more relaxed. Affect is brighter today compared to yesterday. Sleeping and eating well. She also denies si hi or avh. She is future oriented. Nursing staff report no behavioral issues. She feels she is ready for discharge, has an appt coming up tomorrow with her outpatient provider. CM will reach out to her , if the latter has no safety concerns, we will plan for dc tomorrow straight to her appt. At the present time the patient Ray Arias remains compliant with taking medications.  Denies any adverse events from taking them and feels they have been beneficial.         Past Medical History:  Past Medical History:   Diagnosis Date    Anxiety and depression     Bipolar 1 disorder with moderate robyn ([address] 3/17/2014    Chronic back pain     Hyperlipidemia 8/22/2016    Hyponatremia     Psychotic disorder ([facility]     Scoliosis          ALLERGIES:(reviewed/updated 8/9/2022)  Allergies   Allergen Reactions    Other Food Hives     Artifical sweetners    Claritin-D 12 Hour [Loratadine-Pseudoephedrine] Palpitations     palpatations and ringing in the ear    Other Medication Anaphylaxis     Artificial sweeteners cause anaphylaxis    Pcn [Penicillins] Anaphylaxis    Sunscreen Contact Dermatitis     Burns and opens the skin    Ultram [Tramadol] Hives and Other (comments)     Hives and ringing of the ears    Benzoyl Peroxide Hives    Cephalexin Itching    Divalproex Itching    Maltodextrin-Glycerin Shortness of Breath       Laboratory report:  Lab Results   Component Value Date/Time    WBC 5.7 08/06/2022 12:36 PM    HGB 13.8 08/06/2022 12:36 PM    Hematocrit (POC) 34 (L) 07/17/2018 03:33 AM    HCT 42.7 08/06/2022 12:36 PM    PLATELET 265 17/47/5584 12:36 PM    MCV 93.4 08/06/2022 12:36 PM      Lab Results   Component Value Date/Time    Sodium 140 08/06/2022 12:36 PM    Potassium 4.2 08/06/2022 12:36 PM    Chloride 106 08/06/2022 12:36 PM    CO2 29 08/06/2022 12:36 PM    Anion gap 5 08/06/2022 12:36 PM    Glucose 109 (H) 08/06/2022 12:36 PM    Glucose 107 (H) 02/03/2020 05:40 AM    BUN 8 08/06/2022 12:36 PM    Creatinine 0.96 08/06/2022 12:36 PM    BUN/Creatinine ratio 8 (L) 08/06/2022 12:36 PM    GFR est AA >60 08/06/2022 12:36 PM    GFR est non-AA 60 (L) 08/06/2022 12:36 PM    Calcium 9.8 08/06/2022 12:36 PM    Bilirubin, total 0.3 08/06/2022 12:36 PM    Alk.  phosphatase 52 08/06/2022 12:36 PM    Protein, total 8.2 08/06/2022 12:36 PM    Albumin 4.3 08/06/2022 12:36 PM    Globulin 3.9 08/06/2022 12:36 PM    A-G Ratio 1.1 08/06/2022 12:36 PM    ALT (SGPT) 20 08/06/2022 12:36 PM      Vitals:    08/08/22 0759 08/08/22 1621 08/08/22 2030 08/09/22 0818   BP: 114/75 119/83 128/82 112/71   Pulse: 93 99 82 98   Resp: 16 18 18 16   Temp: 98.4 °F (36.9 °C) 98.4 °F (36.9 °C) 98.4 °F (36.9 °C) 98.2 °F (36.8 °C)   SpO2: 98%  99% 98%   Weight:           Lab Results   Component Value Date/Time    Carbamazepine 14.1 (H) 07/26/2020 04:19 AM     Lab Results   Component Value Date/Time    Lithium level 0.27 (L) 08/06/2022 12:36 PM       Vital Signs  Patient Vitals for the past 24 hrs:   Temp Pulse Resp BP SpO2   08/09/22 0818 98.2 °F (36.8 °C) 98 16 112/71 98 %   08/08/22 2030 98.4 °F (36.9 °C) 82 18 128/82 99 %   08/08/22 1621 98.4 °F (36.9 °C) 99 18 119/83 --       Wt Readings from Last 3 Encounters:   08/07/22 71.5 kg (157 lb 9.6 oz)   08/06/22 72.6 kg (160 lb)   08/05/22 72.8 kg (160 lb 7.9 oz)     Temp Readings from Last 3 Encounters:   08/09/22 98.2 °F (36.8 °C)   08/06/22 98.1 °F (36.7 °C)   08/05/22 98.6 °F (37 °C)     BP Readings from Last 3 Encounters:   08/09/22 112/71   08/06/22 (!) 142/76   08/05/22 138/76     Pulse Readings from Last 3 Encounters:   08/09/22 98   08/06/22 (!) 101   08/05/22 97       Radiology (reviewed/updated 8/9/2022)  No results found.     Current Facility-Administered Medications   Medication Dose Route Frequency Provider Last Rate Last Admin    azelastine (ASTELIN) 137mcg/spray nasal spray  1 Spray Both Nostrils DAILY PRN Ron Milks, NP        cetirizine (ZYRTEC) tablet 10 mg  10 mg Oral DAILY PRN Ron Milks, NP        cholecalciferol (VITAMIN D3) (1000 Units /25 mcg) tablet 1,000 Units  1,000 Units Oral DAILY Marlin Dubs A, NP   1,000 Units at 08/09/22 9956    melatonin tablet 3 mg  3 mg Oral QHS PRN Ron Milks, NP        OLANZapine (ZyPREXA) tablet 20 mg  20 mg Oral QHS Marlin Dubs A, NP   20 mg at 08/08/22 2051    polyethylene glycol (MIRALAX) packet 17 g  17 g Oral DAILY PRN Ron Milks, NP        traZODone (DESYREL) tablet 150 mg  150 mg Oral QHS Marlin Dubs A, NP   150 mg at 08/08/22 2051    trihexyphenidyL (ARTANE) tablet 2 mg  2 mg Oral DAILY Marlin Dubs A, NP   2 mg at 08/09/22 0478    hydrOXYzine HCL (ATARAX) tablet 50 mg  50 mg Oral TID PRN Ron Milks, NP        hydrOXYzine HCL (ATARAX) tablet 50 mg  50 mg Oral TID Ron Milks, NP   50 mg at 08/09/22 3041    lithium carbonate capsule 300 mg  300 mg Oral QHS Marlin Dubs A, NP   300 mg at 08/08/22 2051    OLANZapine (ZyPREXA) tablet 5 mg  5 mg Oral Q6H PRN Ron Milks, NP        haloperidol lactate (HALDOL) injection 5 mg  5 mg IntraMUSCular Q6H PRN Ron Milks, NP        benztropine (COGENTIN) tablet 1 mg  1 mg Oral BID PRN Ron Milks, NP        diphenhydrAMINE (BENADRYL) injection 50 mg  50 mg IntraMUSCular BID PRN Ron Milks, NP        traZODone (DESYREL) tablet 50 mg  50 mg Oral QHS PRN Ron Milks, NP   50 mg at 08/06/22 2054    acetaminophen (TYLENOL) tablet 650 mg  650 mg Oral Q4H PRN Ron Milks, NP   650 mg at 08/07/22 1948    magnesium hydroxide (MILK OF MAGNESIA) 400 mg/5 mL oral suspension 30 mL  30 mL Oral DAILY PRN Shruti Bangura NP        midazolam (VERSED) injection 2 mg  2 mg IntraMUSCular Q6H PRN Shruti Bangura NP           Side Effects: (reviewed/updated 8/9/2022)  None reported or admitted to. Review of Systems: (reviewed/updated 8/9/2022)  Appetite: good  Sleep: good   All other Review of Systems: negative    Mental Status Exam:  Eye contact: Good eye contact  Psychomotor activity: wnl  Speech is spontaneous  Thought process: Logical and goal directed   Mood is \"better\"  Affect: blunted  Perception: No avh  Suicidal ideation: No si  Homicidal ideation: No hi  Insight/judgment: Partial  Cognition is grossly intact      Physical Exam:  Musculoskeletal system: steady gait  Tremor not present  Cog wheeling not present      Assessment and Plan:  [facility] meets criteria for a diagnosis of Schizoaffective disorder, bipolar type, acute exacerbation. Unspecified anxiety disorder. Continue current medications as prescribed. We will closely monitor for safety. We will encourage reality orientation. Plan for dc in am.      I certify that this patients inpatient psychiatric hospital services furnished since the previous certification were, and continue to be, required for treatment that could reasonably be expected to improve the patient's condition, or for diagnostic study, and that the patient continues to need, on a daily basis, active treatment furnished directly by or requiring the supervision of inpatient psychiatric facility personnel. In addition, the hospital records show that services furnished were intensive treatment services, admission or related services, or equivalent services.       Signed:  Keshav Fregoso NP  8/9/2022

## 2022-08-10 VITALS
SYSTOLIC BLOOD PRESSURE: 111 MMHG | OXYGEN SATURATION: 99 % | RESPIRATION RATE: 14 BRPM | TEMPERATURE: 98.4 F | HEART RATE: 78 BPM | BODY MASS INDEX: 29.78 KG/M2 | WEIGHT: 157.6 LBS | DIASTOLIC BLOOD PRESSURE: 75 MMHG

## 2022-08-10 LAB
DATE LAST DOSE: ABNORMAL
LITHIUM SERPL-SCNC: 0.45 MMOL/L (ref 0.6–1.2)
REPORTED DOSE,DOSE: ABNORMAL UNITS
REPORTED DOSE/TIME,TMG: ABNORMAL

## 2022-08-10 PROCEDURE — 36415 COLL VENOUS BLD VENIPUNCTURE: CPT

## 2022-08-10 PROCEDURE — 80178 ASSAY OF LITHIUM: CPT

## 2022-08-10 PROCEDURE — 74011250637 HC RX REV CODE- 250/637

## 2022-08-10 RX ADMIN — TRIHEXYPHENIDYL HYDROCHLORIDE 2 MG: 2 TABLET ORAL at 07:55

## 2022-08-10 RX ADMIN — ACETAMINOPHEN 650 MG: 325 TABLET ORAL at 09:25

## 2022-08-10 RX ADMIN — HYDROXYZINE HYDROCHLORIDE 50 MG: 50 TABLET, FILM COATED ORAL at 08:00

## 2022-08-10 RX ADMIN — HYDROXYZINE HYDROCHLORIDE 50 MG: 50 TABLET, FILM COATED ORAL at 01:58

## 2022-08-10 RX ADMIN — Medication 1000 UNITS: at 07:55

## 2022-08-10 NOTE — PROGRESS NOTES
Problem: Depressed Mood (Adult/Pediatric)  Goal: *STG: Participates in treatment plan  Outcome: Progressing Towards Goal  Goal: *STG: Verbalizes anger, guilt, and other feelings in a constructive manor  Outcome: Progressing Towards Goal  Goal: *STG: Attends activities and groups  Outcome: Progressing Towards Goal    Patients is calm, cooperative, medication compliant. Participates in groups, engages appropriately with others. Uses coping skills to navigate periods of anxiety. Denies si/hi. Future focused, plans for discharge and follow up appointment today.

## 2022-08-10 NOTE — PROGRESS NOTES
Patient received resting in bed with eyes closed. NAD and no complaints noted. Safety measures in place. Will continue to monitor with q15min rounds. Problem: Falls - Risk of  Goal: *Absence of Falls  Description: Document [place] Fall Risk and appropriate interventions in the flowsheet.   Outcome: Progressing Towards Goal  Note: Fall Risk Interventions:            Medication Interventions: Teach patient to arise slowly

## 2022-08-10 NOTE — INTERDISCIPLINARY ROUNDS
Behavioral Health Interdisciplinary Rounds     Patient Name: Elma Shine  Age: 61 y.o. Room/Bed:  728/02  Primary Diagnosis: <principal problem not specified>   Admission Status: Voluntary     Readmission within 30 days: no  Power of  in place: no  Patient requires a blocked bed: no          Reason for blocked bed:     VTE Prophylaxis: Not indicated    Mobility needs/Fall risk: no  Flu Vaccine : no   Nutritional Plan: no  Consults: none         Labs/Testing due today?: yes    Sleep hours: 6 hrs       Participation in Care/Groups:    Medication Compliant?:   PRNS (last 24 hours): Antianxiety    Restraints (last 24 hours):  no     CIWA (range last 24 hours):     COWS (range last 24 hours):      Alcohol screening (AUDIT) completed -   AUDIT Score: 0     If applicable, date SBIRT discussed in treatment team AND documented:   AUDIT Screen Score: AUDIT Score: 0      Document Brief Intervention (corresponds directly with the 5 A's, Ask, Advise, Assess, Assist, and Arrange): At- Risk Patients (Score 7-15 for women; 8-15 for men)  Discuss concern patient is drinking at unhealthy levels known to increase risk of alcohol-related health problems. Is Patient ready to commit to change? If No:  Encourage reflection  Discuss short term and long term health risks of consuming alcohol  Barriers to change  Reaffirm willingness to help / Educational materials provided  If Yes:  Set goal  Plan  Educational materials provided    Harmful use or Dependence (Score 16 or greater)  Discuss short term and long term health risks of consuming alcohol  Recommendations  Negotiate drinking goal  Recommend addiction specialist/center  Arrange follow-up appointments.     Tobacco - patient is a smoker: Have You Used Tobacco in the Past 30 Days: No  Illegal Drugs use: Have You Used Any Illegal Substances Over the Past 12 Months: No    24 hour chart check complete: yes ____________________________________________________________________________________________________________    Patient goal(s) for today:   Treatment team focus/goals:   Progress note     LOS:  4  Expected LOS:     Financial concerns/prescription coverage:    Family contact:       Family requesting physician contact today:    Discharge plan:   Access to weapons :         Outpatient provider(s):   Patient's preferred phone number for follow up call :   Patient's preferred e-mail address :  Participating treatment team members: David Peña, * (assigned SW),

## 2022-08-10 NOTE — BH NOTES
PRN Medication Documentation    Specific patient behavior that led to need for PRN medication: restlessness, racing thoughts, pt stated she feels anxious  Staff interventions attempted prior to PRN being given: education   PRN medication given: Atarax 50mg  Patient response/effectiveness of PRN medication: will continue to monitor q15 min rounds

## 2022-08-11 ENCOUNTER — HOSPITAL ENCOUNTER (INPATIENT)
Age: 57
LOS: 3 days | Discharge: ANOTHER HOSPITAL | DRG: 918 | End: 2022-08-14
Attending: STUDENT IN AN ORGANIZED HEALTH CARE EDUCATION/TRAINING PROGRAM | Admitting: INTERNAL MEDICINE
Payer: MEDICARE

## 2022-08-11 DIAGNOSIS — T56.892A INTENTIONAL LITHIUM OVERDOSE, INITIAL ENCOUNTER (HCC): Primary | ICD-10-CM

## 2022-08-11 DIAGNOSIS — T14.91XA SUICIDE ATTEMPT (HCC): ICD-10-CM

## 2022-08-11 DIAGNOSIS — T44.3X5A: ICD-10-CM

## 2022-08-11 PROBLEM — T56.891A LITHIUM TOXICITY: Status: ACTIVE | Noted: 2022-08-11

## 2022-08-11 LAB
ALBUMIN SERPL-MCNC: 4.4 G/DL (ref 3.5–5)
ALBUMIN/GLOB SERPL: 1 {RATIO} (ref 1.1–2.2)
ALP SERPL-CCNC: 49 U/L (ref 45–117)
ALT SERPL-CCNC: 21 U/L (ref 12–78)
AMPHET UR QL SCN: NEGATIVE
ANION GAP SERPL CALC-SCNC: 2 MMOL/L (ref 5–15)
APAP SERPL-MCNC: <2 UG/ML (ref 10–30)
APPEARANCE UR: CLEAR
AST SERPL-CCNC: 15 U/L (ref 15–37)
BACTERIA URNS QL MICRO: NEGATIVE /HPF
BARBITURATES UR QL SCN: NEGATIVE
BASOPHILS # BLD: 0 K/UL (ref 0–0.1)
BASOPHILS NFR BLD: 1 % (ref 0–1)
BENZODIAZ UR QL: NEGATIVE
BILIRUB SERPL-MCNC: 0.4 MG/DL (ref 0.2–1)
BILIRUB UR QL: NEGATIVE
BUN SERPL-MCNC: 10 MG/DL (ref 6–20)
BUN/CREAT SERPL: 10 (ref 12–20)
CALCIUM SERPL-MCNC: 10.2 MG/DL (ref 8.5–10.1)
CANNABINOIDS UR QL SCN: NEGATIVE
CHLORIDE SERPL-SCNC: 103 MMOL/L (ref 97–108)
CO2 SERPL-SCNC: 31 MMOL/L (ref 21–32)
COCAINE UR QL SCN: NEGATIVE
COLOR UR: ABNORMAL
COMMENT, HOLDF: NORMAL
COVID-19 RAPID TEST, COVR: NOT DETECTED
CREAT SERPL-MCNC: 1 MG/DL (ref 0.55–1.02)
DATE LAST DOSE: ABNORMAL
DIFFERENTIAL METHOD BLD: NORMAL
DRUG SCRN COMMENT,DRGCM: NORMAL
EOSINOPHIL # BLD: 0 K/UL (ref 0–0.4)
EOSINOPHIL NFR BLD: 0 % (ref 0–7)
EPITH CASTS URNS QL MICRO: NORMAL /LPF
ERYTHROCYTE [DISTWIDTH] IN BLOOD BY AUTOMATED COUNT: 12.5 % (ref 11.5–14.5)
ETHANOL SERPL-MCNC: <10 MG/DL
FLUAV RNA SPEC QL NAA+PROBE: NOT DETECTED
FLUBV RNA SPEC QL NAA+PROBE: NOT DETECTED
GLOBULIN SER CALC-MCNC: 4.4 G/DL (ref 2–4)
GLUCOSE SERPL-MCNC: 86 MG/DL (ref 65–100)
GLUCOSE UR STRIP.AUTO-MCNC: NEGATIVE MG/DL
HCG UR QL: NEGATIVE
HCT VFR BLD AUTO: 42.8 % (ref 35–47)
HGB BLD-MCNC: 13.8 G/DL (ref 11.5–16)
HGB UR QL STRIP: NEGATIVE
IMM GRANULOCYTES # BLD AUTO: 0 K/UL (ref 0–0.04)
IMM GRANULOCYTES NFR BLD AUTO: 0 % (ref 0–0.5)
KETONES UR QL STRIP.AUTO: NEGATIVE MG/DL
LEUKOCYTE ESTERASE UR QL STRIP.AUTO: ABNORMAL
LITHIUM SERPL-SCNC: 3.42 MMOL/L (ref 0.6–1.2)
LYMPHOCYTES # BLD: 1.5 K/UL (ref 0.8–3.5)
LYMPHOCYTES NFR BLD: 20 % (ref 12–49)
MAGNESIUM SERPL-MCNC: 2.5 MG/DL (ref 1.6–2.4)
MCH RBC QN AUTO: 30.1 PG (ref 26–34)
MCHC RBC AUTO-ENTMCNC: 32.2 G/DL (ref 30–36.5)
MCV RBC AUTO: 93.2 FL (ref 80–99)
METHADONE UR QL: NEGATIVE
MONOCYTES # BLD: 0.4 K/UL (ref 0–1)
MONOCYTES NFR BLD: 6 % (ref 5–13)
NEUTS SEG # BLD: 5.5 K/UL (ref 1.8–8)
NEUTS SEG NFR BLD: 73 % (ref 32–75)
NITRITE UR QL STRIP.AUTO: NEGATIVE
NRBC # BLD: 0 K/UL (ref 0–0.01)
NRBC BLD-RTO: 0 PER 100 WBC
OPIATES UR QL: NEGATIVE
PCP UR QL: NEGATIVE
PH UR STRIP: 8.5 [PH] (ref 5–8)
PLATELET # BLD AUTO: 273 K/UL (ref 150–400)
PMV BLD AUTO: 9.1 FL (ref 8.9–12.9)
POTASSIUM SERPL-SCNC: 3.8 MMOL/L (ref 3.5–5.1)
PROT SERPL-MCNC: 8.8 G/DL (ref 6.4–8.2)
PROT UR STRIP-MCNC: NEGATIVE MG/DL
RBC # BLD AUTO: 4.59 M/UL (ref 3.8–5.2)
RBC #/AREA URNS HPF: NORMAL /HPF (ref 0–5)
REPORTED DOSE,DOSE: ABNORMAL UNITS
REPORTED DOSE/TIME,TMG: ABNORMAL
SALICYLATES SERPL-MCNC: <1.7 MG/DL (ref 2.8–20)
SAMPLES BEING HELD,HOLD: NORMAL
SARS-COV-2, COV2: NORMAL
SARS-COV-2, COV2: NOT DETECTED
SODIUM SERPL-SCNC: 136 MMOL/L (ref 136–145)
SOURCE, COVRS: NORMAL
SP GR UR REFRACTOMETRY: 1.01 (ref 1–1.03)
UROBILINOGEN UR QL STRIP.AUTO: 0.2 EU/DL (ref 0.2–1)
WBC # BLD AUTO: 7.5 K/UL (ref 3.6–11)
WBC URNS QL MICRO: NORMAL /HPF (ref 0–4)

## 2022-08-11 PROCEDURE — 74011250636 HC RX REV CODE- 250/636: Performed by: STUDENT IN AN ORGANIZED HEALTH CARE EDUCATION/TRAINING PROGRAM

## 2022-08-11 PROCEDURE — 80178 ASSAY OF LITHIUM: CPT

## 2022-08-11 PROCEDURE — 80053 COMPREHEN METABOLIC PANEL: CPT

## 2022-08-11 PROCEDURE — 81025 URINE PREGNANCY TEST: CPT

## 2022-08-11 PROCEDURE — 80307 DRUG TEST PRSMV CHEM ANLYZR: CPT

## 2022-08-11 PROCEDURE — 82077 ASSAY SPEC XCP UR&BREATH IA: CPT

## 2022-08-11 PROCEDURE — 80179 DRUG ASSAY SALICYLATE: CPT

## 2022-08-11 PROCEDURE — 36415 COLL VENOUS BLD VENIPUNCTURE: CPT

## 2022-08-11 PROCEDURE — 65270000029 HC RM PRIVATE

## 2022-08-11 PROCEDURE — 93005 ELECTROCARDIOGRAM TRACING: CPT

## 2022-08-11 PROCEDURE — 83735 ASSAY OF MAGNESIUM: CPT

## 2022-08-11 PROCEDURE — 87635 SARS-COV-2 COVID-19 AMP PRB: CPT

## 2022-08-11 PROCEDURE — 74011250636 HC RX REV CODE- 250/636: Performed by: EMERGENCY MEDICINE

## 2022-08-11 PROCEDURE — 80143 DRUG ASSAY ACETAMINOPHEN: CPT

## 2022-08-11 PROCEDURE — 99285 EMERGENCY DEPT VISIT HI MDM: CPT

## 2022-08-11 PROCEDURE — 85025 COMPLETE CBC W/AUTO DIFF WBC: CPT

## 2022-08-11 PROCEDURE — 96374 THER/PROPH/DIAG INJ IV PUSH: CPT

## 2022-08-11 PROCEDURE — 87636 SARSCOV2 & INF A&B AMP PRB: CPT

## 2022-08-11 PROCEDURE — 81001 URINALYSIS AUTO W/SCOPE: CPT

## 2022-08-11 RX ORDER — SODIUM CHLORIDE 0.9 % (FLUSH) 0.9 %
5-40 SYRINGE (ML) INJECTION AS NEEDED
Status: DISCONTINUED | OUTPATIENT
Start: 2022-08-11 | End: 2022-08-14 | Stop reason: HOSPADM

## 2022-08-11 RX ORDER — ENOXAPARIN SODIUM 100 MG/ML
40 INJECTION SUBCUTANEOUS DAILY
Status: DISCONTINUED | OUTPATIENT
Start: 2022-08-12 | End: 2022-08-14 | Stop reason: HOSPADM

## 2022-08-11 RX ORDER — SODIUM CHLORIDE 9 MG/ML
150 INJECTION, SOLUTION INTRAVENOUS CONTINUOUS
Status: DISCONTINUED | OUTPATIENT
Start: 2022-08-11 | End: 2022-08-14

## 2022-08-11 RX ORDER — ACETAMINOPHEN 650 MG/1
650 SUPPOSITORY RECTAL
Status: DISCONTINUED | OUTPATIENT
Start: 2022-08-11 | End: 2022-08-14 | Stop reason: HOSPADM

## 2022-08-11 RX ORDER — ACETAMINOPHEN 325 MG/1
650 TABLET ORAL
Status: DISCONTINUED | OUTPATIENT
Start: 2022-08-11 | End: 2022-08-14 | Stop reason: HOSPADM

## 2022-08-11 RX ORDER — HYDROXYZINE 25 MG/1
50 TABLET, FILM COATED ORAL 3 TIMES DAILY
Status: DISCONTINUED | OUTPATIENT
Start: 2022-08-11 | End: 2022-08-14 | Stop reason: HOSPADM

## 2022-08-11 RX ORDER — CETIRIZINE HCL 10 MG
10 TABLET ORAL
Status: DISCONTINUED | OUTPATIENT
Start: 2022-08-11 | End: 2022-08-14 | Stop reason: HOSPADM

## 2022-08-11 RX ORDER — POLYETHYLENE GLYCOL 3350 17 G/17G
17 POWDER, FOR SOLUTION ORAL DAILY PRN
Status: DISCONTINUED | OUTPATIENT
Start: 2022-08-11 | End: 2022-08-14 | Stop reason: HOSPADM

## 2022-08-11 RX ORDER — ONDANSETRON 2 MG/ML
4 INJECTION INTRAMUSCULAR; INTRAVENOUS
Status: DISCONTINUED | OUTPATIENT
Start: 2022-08-11 | End: 2022-08-14 | Stop reason: HOSPADM

## 2022-08-11 RX ORDER — SODIUM CHLORIDE 0.9 % (FLUSH) 0.9 %
5-40 SYRINGE (ML) INJECTION EVERY 8 HOURS
Status: DISCONTINUED | OUTPATIENT
Start: 2022-08-11 | End: 2022-08-14 | Stop reason: HOSPADM

## 2022-08-11 RX ORDER — PROMETHAZINE HYDROCHLORIDE 25 MG/1
12.5 TABLET ORAL
Status: DISCONTINUED | OUTPATIENT
Start: 2022-08-11 | End: 2022-08-14 | Stop reason: HOSPADM

## 2022-08-11 RX ORDER — ONDANSETRON 2 MG/ML
4 INJECTION INTRAMUSCULAR; INTRAVENOUS
Status: COMPLETED | OUTPATIENT
Start: 2022-08-11 | End: 2022-08-11

## 2022-08-11 RX ORDER — OLANZAPINE 10 MG/1
20 TABLET ORAL
Status: DISCONTINUED | OUTPATIENT
Start: 2022-08-11 | End: 2022-08-13

## 2022-08-11 RX ADMIN — SODIUM CHLORIDE 250 ML/HR: 9 INJECTION, SOLUTION INTRAVENOUS at 23:20

## 2022-08-11 RX ADMIN — ONDANSETRON 4 MG: 2 INJECTION INTRAMUSCULAR; INTRAVENOUS at 16:39

## 2022-08-11 RX ADMIN — SODIUM CHLORIDE 1000 ML: 9 INJECTION, SOLUTION INTRAVENOUS at 16:47
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
## 2022-08-11 NOTE — ED NOTES
Multiple attempts have been made to get I.V. access and/or blood work by different staff and ultrasound machine with no success. Dr. Jaelyn Harper made aware.

## 2022-08-11 NOTE — ED NOTES
Bedside shift change report given to [place] (oncoming nurse) by Karma Palafox RN (offgoing nurse). Report included the following information SBAR, Kardex, ED Summary and MAR.

## 2022-08-11 NOTE — ED PROVIDER NOTES
EMERGENCY DEPARTMENT HISTORY AND PHYSICAL EXAM      Date: 8/11/2022  Patient Name: Marcela Evans    History of Presenting Illness     Chief Complaint   Patient presents with    Suicidal     Pt took bottle of Trihexyphenidyl and unknown # of lithium this morning, has thrown up once since, endorses positive SI       History Provided By: Patient    HPI: Marcela Evans, 61 y.o. female with a past medical history significant for anxiety, depression, bipolar 1 presents to the ED with cc of suicidal ideation and overdose. She notes that she woke up from sleep at approximately 7 AM this morning. Her  was not there which caused her to have a panic attack. She tried to go back to sleep but could not. She became suicidal and overdosed on her medications. She took 30 pills of her 2 mg of trihexyphenidyl and she took approximately 23 pills of her 300 mg lithium at approximately 7:15 AM.  She was brought to the ER by family members. She did vomit once after eating some cherries. She complains of some dizziness right now. She does have SI but no HI, AH or VH. This is the second time she is overdosed on these medications. She denies smoking, drinking or drugs. She denies any physical harm to herself. She denies any chest pain, shortness of breath, abdominal pain. There are no other complaints, changes, or physical findings at this time. PCP: Doni Parisi MD    No current facility-administered medications on file prior to encounter. Current Outpatient Medications on File Prior to Encounter   Medication Sig Dispense Refill    hydrOXYzine HCL (ATARAX) 50 mg tablet Take 1 Tablet by mouth three (3) times daily for 30 days. Indications: anxious 90 Tablet 0    lithium carbonate 300 mg capsule Take 1 Capsule by mouth nightly for 30 days. Indications: bipolar disorder 30 Capsule 0    OLANZapine (ZyPREXA) 20 mg tablet Take 1 Tablet by mouth nightly.  Indications: bipolar disorder 30 Tablet 0    traZODone (DESYREL) 150 mg tablet Take 1 Tablet by mouth nightly. Indications: insomnia associated with depression 30 Tablet 0    trihexyphenidyL (ARTANE) 2 mg tablet Take 1 Tablet by mouth in the morning. Indications: extrapyramidal symptoms as a result of taking the medication 30 Tablet 0    azelastine (ASTELIN) 137 mcg (0.1 %) nasal spray 1 [place] by Both Nostrils route daily as needed for Rhinitis. Indications: seasonal runny nose 1 Each 0    cetirizine (ZYRTEC) 10 mg tablet Take 1 Tablet by mouth daily as needed for Allergies. Indications: seasonal runny nose 30 Tablet 0    cholecalciferol (VITAMIN D3) (1000 Units /25 mcg) tablet Take 1 Tablet by mouth daily. Indications: prevention of vitamin D deficiency 30 Tablet 0    polyethylene glycol (Miralax) 17 gram packet Take 1 Packet by mouth daily as needed for Constipation. Indications: constipation 30 Packet 0    melatonin 3 mg tablet Take 1 Tablet by mouth nightly as needed for Insomnia.  Indications: insomnia 30 Tablet 0       Past History     Past Medical History:  Past Medical History:   Diagnosis Date    Anxiety and depression     Bipolar 1 disorder with moderate robyn ([address] 3/17/2014    Chronic back pain     Hyperlipidemia 8/22/2016    Hyponatremia     Psychotic disorder ([address]     Scoliosis        Past Surgical History:  Past Surgical History:   Procedure Laterality Date    HX HEENT      wisdom teeth extraction    HX LIPOMA RESECTION      right gluteal    FREDY BIOPSY BREAST STEREOTACTIC Left 2011    negative    ME DENTAL SURGERY PROCEDURE      hx of dental surgery- wisdom teeth extracted       Family History:  Family History   Problem Relation Age of Onset    Arthritis-rheumatoid Mother     Migraines Mother     Psychiatric Disorder Mother     Bipolar Disorder Mother     Lupus Mother     Alcohol abuse Father     Lung Disease Father     Heart Disease Father     Stroke Father     High Cholesterol Father     Psychiatric Disorder Sister     Alcohol abuse Sister     Bipolar Disorder Sister     Stroke Brother     Cancer Maternal Aunt     Breast Cancer Maternal Aunt         pt jose d she was under 48    Bipolar Disorder Sister        Social History:  Social History     Tobacco Use    Smoking status: Never    Smokeless tobacco: Never   Vaping Use    Vaping Use: Never used   Substance Use Topics    Alcohol use: Yes     Comment: occasional    Drug use: No       Allergies: Allergies   Allergen Reactions    Other Food Hives     Artifical sweetners    Claritin-D 12 Hour [Loratadine-Pseudoephedrine] Palpitations     palpatations and ringing in the ear    Other Medication Anaphylaxis     Artificial sweeteners cause anaphylaxis    Pcn [Penicillins] Anaphylaxis    Sunscreen Contact Dermatitis     Burns and opens the skin    Ultram [Tramadol] Hives and Other (comments)     Hives and ringing of the ears    Benzoyl Peroxide Hives    Cephalexin Itching    Divalproex Itching    Maltodextrin-Glycerin Shortness of Breath         Review of Systems   Review of Systems   Constitutional:  Negative for fever. HENT:  Negative for congestion. Eyes:  Negative for visual disturbance. Respiratory:  Negative for cough and shortness of breath. Cardiovascular:  Negative for chest pain. Gastrointestinal:  Positive for vomiting. Negative for abdominal pain and blood in stool. Genitourinary:  Negative for dysuria. Musculoskeletal:  Negative for myalgias. Skin:  Negative for rash. Neurological:  Positive for dizziness. Negative for headaches. Hematological:  Does not bruise/bleed easily. Psychiatric/Behavioral:  Positive for self-injury and suicidal ideas. Physical Exam   Physical Exam  Vitals reviewed. Constitutional:       Appearance: Normal appearance. HENT:      Head: Normocephalic and atraumatic. Nose: Nose normal.      Mouth/Throat:      Mouth: Mucous membranes are moist.      Pharynx: Oropharynx is clear. Eyes:      Extraocular Movements: Extraocular movements intact. Pupils: Pupils are equal, round, and reactive to light. Comments: Pupils 2 mm and reactive bilaterally   Cardiovascular:      Rate and Rhythm: Normal rate and regular rhythm. Pulses: Normal pulses. Pulmonary:      Effort: Pulmonary effort is normal.      Breath sounds: Normal breath sounds. Abdominal:      General: Abdomen is flat. Palpations: Abdomen is soft. Musculoskeletal:         General: No swelling, tenderness, deformity or signs of injury. Normal range of motion. Cervical back: Normal range of motion. Skin:     General: Skin is warm and dry. Capillary Refill: Capillary refill takes less than 2 seconds. Findings: No lesion or rash. Neurological:      General: No focal deficit present. Mental Status: She is alert and oriented to person, place, and time.    Psychiatric:         Mood and Affect: Mood normal.         Behavior: Behavior normal.      Comments: Positive SI, denies HI, AH, VH       Diagnostic Study Results     Labs -     Recent Results (from the past 24 hour(s))   EKG, 12 LEAD, INITIAL    Collection Time: 08/11/22  1:46 PM   Result Value Ref Range    Ventricular Rate 84 BPM    Atrial Rate 84 BPM    P-R Interval 152 ms    QRS Duration 68 ms    Q-T Interval 348 ms    QTC Calculation (Bezet) 411 ms    Calculated P Axis 69 degrees    Calculated R Axis 48 degrees    Calculated T Axis 39 degrees    Diagnosis       Normal sinus rhythm  Biatrial enlargement  Nonspecific ST abnormality  When compared with ECG of 08-AUG-2022 11:37,  ST now depressed in Anterior leads     DRUG SCREEN, URINE    Collection Time: 08/11/22  2:52 PM   Result Value Ref Range    AMPHETAMINES Negative NEG      BARBITURATES Negative NEG      BENZODIAZEPINES Negative NEG      COCAINE Negative NEG      METHADONE Negative NEG      OPIATES Negative NEG      PCP(PHENCYCLIDINE) Negative NEG      THC (TH-CANNABINOL) Negative NEG      Drug screen comment (NOTE)    URINALYSIS W/ RFLX MICROSCOPIC Collection Time: 08/11/22  2:52 PM   Result Value Ref Range    Color YELLOW/STRAW      Appearance CLEAR CLEAR      Specific gravity 1.010 1.003 - 1.030      pH (UA) 8.5 (H) 5.0 - 8.0      Protein Negative NEG mg/dL    Glucose Negative NEG mg/dL    Ketone Negative NEG mg/dL    Bilirubin Negative NEG      Blood Negative NEG      Urobilinogen 0.2 0.2 - 1.0 EU/dL    Nitrites Negative NEG      Leukocyte Esterase TRACE (A) NEG     URINE MICROSCOPIC ONLY    Collection Time: 08/11/22  2:52 PM   Result Value Ref Range    WBC 0-4 0 - 4 /hpf    RBC 0-5 0 - 5 /hpf    Epithelial cells FEW FEW /lpf    Bacteria Negative NEG /hpf   HCG URINE, QL. - POC    Collection Time: 08/11/22  2:56 PM   Result Value Ref Range    Pregnancy test,urine (POC) Negative NEG     ETHYL ALCOHOL    Collection Time: 08/11/22  4:35 PM   Result Value Ref Range    ALCOHOL(ETHYL),SERUM <10 <10 MG/DL   CBC WITH AUTOMATED DIFF    Collection Time: 08/11/22  4:35 PM   Result Value Ref Range    WBC 7.5 3.6 - 11.0 K/uL    RBC 4.59 3.80 - 5.20 M/uL    HGB 13.8 11.5 - 16.0 g/dL    HCT 42.8 35.0 - 47.0 %    MCV 93.2 80.0 - 99.0 FL    MCH 30.1 26.0 - 34.0 PG    MCHC 32.2 30.0 - 36.5 g/dL    RDW 12.5 11.5 - 14.5 %    PLATELET 772 415 - 106 K/uL    MPV 9.1 8.9 - 12.9 FL    NRBC 0.0 0  WBC    ABSOLUTE NRBC 0.00 0.00 - 0.01 K/uL    NEUTROPHILS 73 32 - 75 %    LYMPHOCYTES 20 12 - 49 %    MONOCYTES 6 5 - 13 %    EOSINOPHILS 0 0 - 7 %    BASOPHILS 1 0 - 1 %    IMMATURE GRANULOCYTES 0 0.0 - 0.5 %    ABS. NEUTROPHILS 5.5 1.8 - 8.0 K/UL    ABS. LYMPHOCYTES 1.5 0.8 - 3.5 K/UL    ABS. MONOCYTES 0.4 0.0 - 1.0 K/UL    ABS. EOSINOPHILS 0.0 0.0 - 0.4 K/UL    ABS. BASOPHILS 0.0 0.0 - 0.1 K/UL    ABS. IMM.  GRANS. 0.0 0.00 - 0.04 K/UL    DF AUTOMATED     METABOLIC PANEL, COMPREHENSIVE    Collection Time: 08/11/22  4:35 PM   Result Value Ref Range    Sodium 136 136 - 145 mmol/L    Potassium 3.8 3.5 - 5.1 mmol/L    Chloride 103 97 - 108 mmol/L    CO2 31 21 - 32 mmol/L    Anion gap 2 (L) 5 - 15 mmol/L    Glucose 86 65 - 100 mg/dL    BUN 10 6 - 20 MG/DL    Creatinine 1.00 0.55 - 1.02 MG/DL    BUN/Creatinine ratio 10 (L) 12 - 20      GFR est AA >60 >60 ml/min/1.73m2    GFR est non-AA 57 (L) >60 ml/min/1.73m2    Calcium 10.2 (H) 8.5 - 10.1 MG/DL    Bilirubin, total 0.4 0.2 - 1.0 MG/DL    ALT (SGPT) 21 12 - 78 U/L    AST (SGOT) 15 15 - 37 U/L    Alk. phosphatase 49 45 - 117 U/L    Protein, total 8.8 (H) 6.4 - 8.2 g/dL    Albumin 4.4 3.5 - 5.0 g/dL    Globulin 4.4 (H) 2.0 - 4.0 g/dL    A-G Ratio 1.0 (L) 1.1 - 2.2     MAGNESIUM    Collection Time: 08/11/22  4:35 PM   Result Value Ref Range    Magnesium 2.5 (H) 1.6 - 2.4 mg/dL   ACETAMINOPHEN    Collection Time: 08/11/22  4:35 PM   Result Value Ref Range    Acetaminophen level <2 (L) 10 - 30 ug/mL   SALICYLATE    Collection Time: 08/11/22  4:35 PM   Result Value Ref Range    Salicylate level <8.1 (L) 2.8 - 20.0 MG/DL       Radiologic Studies -   No orders to display     CT Results  (Last 48 hours)      None          CXR Results  (Last 48 hours)      None              Medical Decision Making   I am the first provider for this patient. I reviewed the vital signs, available nursing notes, past medical history, past surgical history, family history and social history. Vital Signs-Reviewed the patient's vital signs. Patient Vitals for the past 12 hrs:   Temp Pulse Resp BP SpO2   08/11/22 1810 -- 87 18 126/75 99 %   08/11/22 1244 98.2 °F (36.8 °C) 95 18 110/76 100 %       Records Reviewed: Nursing records and medical records reviewed    MDM:  X includes medication overdose, suicidal ideation, psychosis    Provider Notes (Medical Decision Making):   5year-old female with history of bipolar 1, anxiety, depression presenting with a drug overdose. Vital signs are stable upon arrival with some mild tachycardia. She is awake and alert and protecting her airway. She does endorse SI.   This could possibly be situational but she did overdose on a significant amount of lithium and trihexyphenidyl. Total doses and looking at her bottles which I personally saw would be 60 mg of trihexyphenidyl and 6900 mg of lithium. Will obtain CMP to evaluate liver function and electrolytes and kidney function. Will contact poison control center to report the case and recommendations about lithium overdose monitoring. EKG shows normal sinus rhythm with no ST elevation with a rate of 89 DC interval 146, QRS of 70 and QTC 430ms and a normal axis. We will continue to monitor on telemetry monitoring. Will discuss with [facility] for possible psychiatric admission after medical clearance. Discussed case with poison control center - trihexyphenidyl has anticholinergic effect so will watch for those, benzos as needed for seizures. QQTc monitoring which looks good right now. Treatment for both includes supportive with fluid resuscitation and UOP 1-2cc/kg goal.  Obs for lithium and trihexyphenidyl is 6 hours, which has passed now. ED Course:   Initial assessment performed. The patients presenting problems have been discussed, and they are in agreement with the care plan formulated and outlined with them. I have encouraged them to ask questions as they arise throughout their visit. ED Course as of 08/12/22 1041   Thu Aug 11, 2022   2003 Pt labs WNL, no other coingestions identified on UDS. Hepatic panel WNL. Lithium level still pending and it is a send out. [facility] will not see patient until lithium level results. Patient is medically cleared given her clinical status and labwork and vital signs. [JS]   4825 Received sign-out on this patient and am taking over her care. She is hemodynamically stable, no nausea, vomiting or diarrhea. Making urine and normal renal function. I spoke again with [facility] and poison control. She is at this time NOT medically cleared as lithium level has to result in order to determine disposition and course of treatment.  If lithium is elevated, will need to be trended and may need to be done more than once. Usually trend is every 4 hours. We sent a second lithium level via  to [facility] lab at 4 hour constantino. Will keep her in the ED for now until first level results. Lab at [facility] just notified me that the result should be back within 10 minutes. Plan is to then discuss case with poison control again. If level needs to be trended multiple times, will admit patient to medical service. Otherwise will get second level, ensure it is not going up (which I doubt given time of ingestion) and if it is down-trending will likely be able to medically clear in the ED. [facility] is on board with this plan. [TZ]   5313 I spoke with poison control. In light of her lithium level the plan is:  - admit to medicine  - psych consult  - ns infusion at 250cc/hr  - goal urine output 2ml/kg/hr  - lithium levels q4 hours until normal   [TZ]      ED Course User Index  [JS] Lizbeth Cason MD  [TZ] Columba Acharya MD           Disposition:  Patient signed out to oncoming provider    DISCHARGE PLAN:  1. Current Discharge Medication List        2. Follow-up Information    None       3. Return to ED if worse     Diagnosis     Clinical Impression:   1. Intentional lithium overdose, initial encounter ([facility] [address]    2. Adverse effect of trihexyphenidyl, initial encounter    3. Suicide attempt [facility])        Attestations:    Lucho Caraballo MD    Please note that this dictation was completed with [employer], the computer voice recognition software. Quite often unanticipated grammatical, syntax, homophones, and other interpretive errors are inadvertently transcribed by the computer software. Please disregard these errors. Please excuse any errors that have escaped final proofreading. Thank you.

## 2022-08-12 LAB
ALBUMIN SERPL-MCNC: 3.4 G/DL (ref 3.5–5)
ALBUMIN/GLOB SERPL: 0.9 {RATIO} (ref 1.1–2.2)
ALP SERPL-CCNC: 39 U/L (ref 45–117)
ALT SERPL-CCNC: 19 U/L (ref 12–78)
ANION GAP SERPL CALC-SCNC: ABNORMAL MMOL/L (ref 5–15)
APAP SERPL-MCNC: <2 UG/ML (ref 10–30)
AST SERPL-CCNC: 18 U/L (ref 15–37)
ATRIAL RATE: 89 BPM
BASOPHILS # BLD: 0 K/UL (ref 0–0.1)
BASOPHILS NFR BLD: 0 % (ref 0–1)
BILIRUB SERPL-MCNC: 0.2 MG/DL (ref 0.2–1)
BUN SERPL-MCNC: 7 MG/DL (ref 6–20)
BUN/CREAT SERPL: 8 (ref 12–20)
CALCIUM SERPL-MCNC: 9.3 MG/DL (ref 8.5–10.1)
CALCULATED P AXIS, ECG09: 71 DEGREES
CALCULATED R AXIS, ECG10: 49 DEGREES
CALCULATED T AXIS, ECG11: 41 DEGREES
CHLORIDE SERPL-SCNC: 109 MMOL/L (ref 97–108)
CO2 SERPL-SCNC: 31 MMOL/L (ref 21–32)
CREAT SERPL-MCNC: 0.91 MG/DL (ref 0.55–1.02)
DATE LAST DOSE: ABNORMAL
DIAGNOSIS, 93000: NORMAL
DIFFERENTIAL METHOD BLD: ABNORMAL
EOSINOPHIL # BLD: 0.1 K/UL (ref 0–0.4)
EOSINOPHIL NFR BLD: 1 % (ref 0–7)
ERYTHROCYTE [DISTWIDTH] IN BLOOD BY AUTOMATED COUNT: 12.7 % (ref 11.5–14.5)
GLOBULIN SER CALC-MCNC: 3.8 G/DL (ref 2–4)
GLUCOSE SERPL-MCNC: 81 MG/DL (ref 65–100)
HCT VFR BLD AUTO: 37.6 % (ref 35–47)
HGB BLD-MCNC: 12.2 G/DL (ref 11.5–16)
IMM GRANULOCYTES # BLD AUTO: 0 K/UL (ref 0–0.04)
IMM GRANULOCYTES NFR BLD AUTO: 0 % (ref 0–0.5)
LITHIUM SERPL-SCNC: 2.68 MMOL/L (ref 0.6–1.2)
LITHIUM SERPL-SCNC: 3.2 MMOL/L (ref 0.6–1.2)
LITHIUM SERPL-SCNC: 3.26 MMOL/L (ref 0.6–1.2)
LITHIUM SERPL-SCNC: 3.34 MMOL/L (ref 0.6–1.2)
LITHIUM SERPL-SCNC: 3.4 MMOL/L (ref 0.6–1.2)
LYMPHOCYTES # BLD: 1.6 K/UL (ref 0.8–3.5)
LYMPHOCYTES NFR BLD: 23 % (ref 12–49)
MAGNESIUM SERPL-MCNC: 2.6 MG/DL (ref 1.6–2.4)
MCH RBC QN AUTO: 30.5 PG (ref 26–34)
MCHC RBC AUTO-ENTMCNC: 32.4 G/DL (ref 30–36.5)
MCV RBC AUTO: 94 FL (ref 80–99)
MONOCYTES # BLD: 0.5 K/UL (ref 0–1)
MONOCYTES NFR BLD: 7 % (ref 5–13)
NEUTS SEG # BLD: 4.6 K/UL (ref 1.8–8)
NEUTS SEG NFR BLD: 69 % (ref 32–75)
NRBC # BLD: 0 K/UL (ref 0–0.01)
NRBC BLD-RTO: 0 PER 100 WBC
P-R INTERVAL, ECG05: 146 MS
PLATELET # BLD AUTO: 138 K/UL (ref 150–400)
PLATELET COMMENTS,PCOM: ABNORMAL
POTASSIUM SERPL-SCNC: 4.7 MMOL/L (ref 3.5–5.1)
PROT SERPL-MCNC: 7.2 G/DL (ref 6.4–8.2)
Q-T INTERVAL, ECG07: 354 MS
QRS DURATION, ECG06: 70 MS
QTC CALCULATION (BEZET), ECG08: 430 MS
RBC # BLD AUTO: 4 M/UL (ref 3.8–5.2)
RBC MORPH BLD: ABNORMAL
REPORTED DOSE,DOSE: ABNORMAL UNITS
REPORTED DOSE/TIME,TMG: ABNORMAL
SALICYLATES SERPL-MCNC: <1.7 MG/DL (ref 2.8–20)
SODIUM SERPL-SCNC: 139 MMOL/L (ref 136–145)
VENTRICULAR RATE, ECG03: 89 BPM
WBC # BLD AUTO: 6.8 K/UL (ref 3.6–11)

## 2022-08-12 PROCEDURE — 74011000250 HC RX REV CODE- 250: Performed by: INTERNAL MEDICINE

## 2022-08-12 PROCEDURE — 74011250636 HC RX REV CODE- 250/636: Performed by: INTERNAL MEDICINE

## 2022-08-12 PROCEDURE — 80053 COMPREHEN METABOLIC PANEL: CPT

## 2022-08-12 PROCEDURE — 80179 DRUG ASSAY SALICYLATE: CPT

## 2022-08-12 PROCEDURE — 65270000029 HC RM PRIVATE

## 2022-08-12 PROCEDURE — 36415 COLL VENOUS BLD VENIPUNCTURE: CPT

## 2022-08-12 PROCEDURE — 80178 ASSAY OF LITHIUM: CPT

## 2022-08-12 PROCEDURE — 80143 DRUG ASSAY ACETAMINOPHEN: CPT

## 2022-08-12 PROCEDURE — 83735 ASSAY OF MAGNESIUM: CPT

## 2022-08-12 PROCEDURE — 85025 COMPLETE CBC W/AUTO DIFF WBC: CPT

## 2022-08-12 PROCEDURE — 74011250637 HC RX REV CODE- 250/637: Performed by: INTERNAL MEDICINE

## 2022-08-12 RX ADMIN — CETIRIZINE HYDROCHLORIDE 10 MG: 10 TABLET ORAL at 06:49

## 2022-08-12 RX ADMIN — SODIUM CHLORIDE 250 ML/HR: 9 INJECTION, SOLUTION INTRAVENOUS at 09:53

## 2022-08-12 RX ADMIN — ENOXAPARIN SODIUM 40 MG: 100 INJECTION SUBCUTANEOUS at 09:53

## 2022-08-12 RX ADMIN — ONDANSETRON 4 MG: 2 INJECTION INTRAMUSCULAR; INTRAVENOUS at 03:59

## 2022-08-12 RX ADMIN — SODIUM CHLORIDE, PRESERVATIVE FREE 10 ML: 5 INJECTION INTRAVENOUS at 09:53

## 2022-08-12 RX ADMIN — SODIUM CHLORIDE, PRESERVATIVE FREE 10 ML: 5 INJECTION INTRAVENOUS at 21:48

## 2022-08-12 NOTE — [facility] NOTE
[facility]     Section I - Integrated Summary    Chief Complaint is suicide attempt by overdosing on medications    LOS:  1     Presenting problem/Summary:  Patient brought to ER by family after an intentional overdose on her lithium and artane. Patient asked if this writer could talk to her while nurse went to get the medication for the patient. Patient agreed stating it will make the time pass faster. Patient asked what happened to lead her to overdose and she reports, \"I hit the panic button. It's the hospital's fault. \" When asked why she hit the panic button, she appeared confused. She then stated she overdosed because she woke up and couldn't find her  and panicked. When asked if she still wishes she was dead or wants to hurt herself she reports yes. She denies homicidal ideation and hallucinations. Attempted to determine what happened after discharge Wednesday to the time of overdose Thursday but unable to determine. Patient is disorganized and irritable. She had trouble answering questions . Attempted to clarify statements and patient continues to make statements that do not make sense and getting irritable when this writer tries to understand. She is not agitated or aggressive. She reports there are too many people working with her and she can not remember everyone's names and can't write things down right now. Reminded patient that it was okay not to remember names. She reports that prior to the overdose she struggled with memory and reports that this has been an issue on and off for 2 years or more. Patient presents as confused and pleasant but gets irritable when asked to clarify confusing statements. She reports continued thoughts of wishing she was dead. She denies any plan or intent while in the hospital. She denies HI and hallucinations. Insight and judgement are poor. Memory is impaired.  Patient reports anxiety but did not answer if she was feeling depressed. Precipitant Factors are recent discharge from hospital and panic attack when she did not see her  upon waking up this morning. The information is given by the patient. Current Psychiatrist and/or  is DIONNE Chance at [facility]. Previous Hospitalizations/Treatment: Multiple with last admission 8/6-8/10/22 at [facility]    Lethality Assessment:  The potential for suicide noted by the following: previous history of attempts , current attempt, ideation, and means . The potential for homicide is not noted. The patient has not been a perpetrator of sexual or physical abuse. There are not pending charges. The patient is felt to be at risk for self-harm or harm to others. Section II - Psychosocial  The patient's overall mood and attitude is confused. Feelings of helplessness and hopelessness are observed by verbal report. Generalized anxiety is observed by verbal report. Panic is not observed. Phobias are not observed. Obsessive compulsive tendencies are not observed. Section III - Mental Status Exam  The patient's appearance shows no evidence of impairment. The patient's behavior shows no evidence of impairment. The patient is oriented to time, place, person and situation. The patient's speech is soft. The patient's mood is depressed, is anxious, and is irritable. The range of affect is constricted. The patient's thought content demonstrates no evidence of impairment. The thought process is disorganized. The patient's perception shows no evidence of impairment. The patient's appetite shows no evidence of impairment. The patient's sleep shows no evidence of impairment. The patient shows no insight. The patient's judgement is psychologically impaired. Section IV - Substance Abuse  The patient is not using substances.   UDS result: negative BAL: 0    Section V - Medical  Past Medical History:   Diagnosis Date    Anxiety and depression     Bipolar 1 disorder with moderate robyn ([facility]) 3/17/2014    Chronic back pain     Hyperlipidemia 8/22/2016    Hyponatremia     Psychotic disorder ([facility] [address]     Scoliosis        Section VI - Living Arrangements  The patient is . The spouses approximate age is unknown and appears to be in unknown health. The patient lives with a spouse. The patient has no children. The patient does plan to return home upon discharge. The patient's greatest support comes from  and this person will be involved with the treatment. The patient has not been in an event described as horrible or outside the realm of ordinary life experience either currently or in the past. The patient has not been a victim of sexual/physical abuse. Section VII - Other Areas of Clinical Concern    The highest grade achieved is not assessed with the overall quality of school experience being described as not assessed. The patient is currently unemployed and speaks [place] as a primary language. The patient has no communication impairments affecting communication. The patient's preference for learning can be described as: can read and write adequately.   The patient's hearing is normal.  The patient's vision is normal.      Yamilex Espinoza LPC [facility]

## 2022-08-12 NOTE — PROGRESS NOTES
CM acknowledges consult, CM contacted [phone]  to discuss, was told that CM will need to call back when pt is medically cleared for transition to inpatient psychiatry bed. Care management will continue to be available to assist as transition of care planning needs arise.       Ambar Callahan, Renate Hernandez [phone], [address]

## 2022-08-12 NOTE — CONSULTS
Nephrology Consult Note     Elijah Encinas                Phone - [phone]   Patient: Chelsie Morales   YOB: 1965    Date- 8/12/2022  MRN: [id number]             REASON FOR CONSULTATION: Lithium overdose  CONSULTING PHYSICIAN: Dr. Jia Woo MD     IMPRESSION & PLAN:   Lithium overdose. Trihexyphenidyl overdose  Suicidal attempt  History of bipolar disorder    PLAN-  Lithium level has been trending down slowly. So far no signs of any toxicity noted. Creatinine is at baseline and is normal.  No acute need for RRT from renal standpoint. Continue with aggressive IV hydration. Continue to check lithium level every 4 hours. Hemodialysis is indicated if lithium is 5 or greater than 5 in setting of elevated creatinine and neurologic and GI symptoms. Thank you for the consult follow with you     Active Problems:    Suicide attempt [facility]) (8/11/2022)      Lithium toxicity (8/11/2022)        [x] High complexity decision making was performed  [x] Patient is at high-risk of decompensation with multiple organ involvement    Subjective:   HPI: Chelsie Morales is a 61 y.o.  female with past medical history significant for anxiety depression, bipolar disorder, chronic back pain who was recently discharged from [facility].  She presented to the ED after intentional overdose of 20 pills of lithium and trihexyphenidyl. On my interview she denies any headache nausea vomiting and any kind of diarrhea. She continues report suicidal ideation. She was admitted by the hospitalist team.  She is already been seen by psychiatrist.  Her initial lithium level was found to be 0.45 which increased to 3.4. She is getting aggressive IV fluids. Level has already started to improve. Renal consult requested for evaluation of lithium toxicity and possible need for dialysis.       Review of Systems:       A 11 point review of system was performed today. Pertinent positives and negatives are mentioned in the HPI. The reminder of the ROS is negative and noncontributory. Past Medical History:   Diagnosis Date    Anxiety and depression     Bipolar 1 disorder with moderate robyn ([facility] [address] 3/17/2014    Chronic back pain     Hyperlipidemia 8/22/2016    Hyponatremia     Psychotic disorder ([facility] [address]     Scoliosis       Past Surgical History:   Procedure Laterality Date    HX HEENT      wisdom teeth extraction    HX LIPOMA RESECTION      right gluteal    FREDY BIOPSY BREAST STEREOTACTIC Left 2011    negative    OK DENTAL SURGERY PROCEDURE      hx of dental surgery- wisdom teeth extracted      Prior to Admission medications    Medication Sig Start Date End Date Taking? Authorizing Provider   hydrOXYzine HCL (ATARAX) 50 mg tablet Take 1 Tablet by mouth three (3) times daily for 30 days. Indications: anxious 8/9/22 9/8/22  Filleynata Masona DIONNE WILEY   lithium carbonate 300 mg capsule Take 1 Capsule by mouth nightly for 30 days. Indications: bipolar disorder 8/9/22 9/8/22  Umm WILEY NP   OLANZapine (ZyPREXA) 20 mg tablet Take 1 Tablet by mouth nightly. Indications: bipolar disorder 8/9/22   Umm WILEY NP   traZODone (DESYREL) 150 mg tablet Take 1 Tablet by mouth nightly. Indications: insomnia associated with depression 8/9/22   Umm WILEY NP   trihexyphenidyL (ARTANE) 2 mg tablet Take 1 Tablet by mouth in the morning. Indications: extrapyramidal symptoms as a result of taking the medication 8/9/22   Umm WILEY NP   azelastine (ASTELIN) 137 mcg (0.1 %) nasal spray 1 [place] by Both Nostrils route daily as needed for Rhinitis. Indications: seasonal runny nose 5/12/22   Funmilayo Dugan MD   cetirizine (ZYRTEC) 10 mg tablet Take 1 Tablet by mouth daily as needed for Allergies.  Indications: seasonal runny nose 5/12/22   Funmilayo Dugan MD   cholecalciferol (VITAMIN D3) (1000 Units /25 mcg) tablet Take 1 Tablet by mouth daily. Indications: prevention of vitamin D deficiency 5/13/22   Ana Estevez MD   polyethylene glycol (Miralax) 17 gram packet Take 1 Packet by mouth daily as needed for Constipation. Indications: constipation 5/12/22   Ana Estevez MD   melatonin 3 mg tablet Take 1 Tablet by mouth nightly as needed for Insomnia. Indications: insomnia 5/12/22   Ana Estevez MD     Allergies   Allergen Reactions    Other Food Hives     Artifical sweetners    Claritin-D 12 Hour [Loratadine-Pseudoephedrine] Palpitations     palpatations and ringing in the ear    Other Medication Anaphylaxis     Artificial sweeteners cause anaphylaxis    Pcn [Penicillins] Anaphylaxis    Sunscreen Contact Dermatitis     Burns and opens the skin    Ultram [Tramadol] Hives and Other (comments)     Hives and ringing of the ears    Benzoyl Peroxide Hives    Cephalexin Itching    Divalproex Itching    Maltodextrin-Glycerin Shortness of Breath      Social History     Tobacco Use    Smoking status: Never    Smokeless tobacco: Never   Substance Use Topics    Alcohol use: Yes     Comment: occasional      Family History   Problem Relation Age of Onset    Arthritis-rheumatoid Mother     Migraines Mother     Psychiatric Disorder Mother     Bipolar Disorder Mother     Lupus Mother     Alcohol abuse Father     Lung Disease Father     Heart Disease Father     Stroke Father     High Cholesterol Father     Psychiatric Disorder Sister     Alcohol abuse Sister     Bipolar Disorder Sister     Stroke Brother     Cancer Maternal Aunt     Breast Cancer Maternal Aunt         pt thniks she was under 48    Bipolar Disorder Sister         Objective:    Patient Vitals for the past 24 hrs:   Temp Pulse Resp BP SpO2   08/12/22 1346 -- 88 20 136/84 97 %   08/12/22 1010 98.4 °F (36.9 °C) 98 18 90/69 97 %   08/12/22 0400 98.4 °F (36.9 °C) 79 18 124/69 97 %   08/11/22 1810 -- 87 18 126/75 99 %     No intake/output data recorded.   Last 3 Recorded Weights in this Encounter    08/11/22 1244   Weight: 72.6 kg (160 lb)      Physical Exam:  General:Alert, No distress,   Eyes:No scleral icterus, No conjunctival pallor  Neck:Supple,no mass palpable,no thyromegaly  Lungs:Clears to auscultation Bilaterally, normal respiratory effort  CVS:RRR, S1 S2 normal,  No rub,  Abdomen:Soft, Non tender, No hepatosplenomegaly  Extremities: No LE edema  Skin:No rash or lesions, Warm and DRY   Psych: Depressed  Musculoskeletal : no redness, no joint tenderness  NEURO: Normal Speech, Non focal deficit       CODE STATUS: Full  Care Plan discussed with: Patient     Chart reviewed.     Yes Reviewed previous records   Yes Discussion with patient and/or family and questions answered         Xray/CT/US/MRI REV:yes  Lab Data Personally Reviewed: (see below)  Recent Labs     08/12/22  0534 08/11/22  1635   WBC 6.8 7.5   HGB 12.2 13.8   * 273   ANEU 4.6 5.5    136   K 4.7 3.8   GLU 81 86   BUN 7 10   CREA 0.91 1.00   ALT 19 21   TBILI 0.2 0.4   AP 39* 49   CA 9.3 10.2*   MG  --  2.5*     Lab Results   Component Value Date/Time    Color YELLOW/STRAW 08/11/2022 02:52 PM    Appearance CLEAR 08/11/2022 02:52 PM    Specific gravity 1.010 08/11/2022 02:52 PM    Specific gravity <1.005 08/06/2022 12:36 PM    pH (UA) 8.5 (H) 08/11/2022 02:52 PM    Protein Negative 08/11/2022 02:52 PM    Glucose Negative 08/11/2022 02:52 PM    Ketone Negative 08/11/2022 02:52 PM    Bilirubin Negative 08/11/2022 02:52 PM    Urobilinogen 0.2 08/11/2022 02:52 PM    Nitrites Negative 08/11/2022 02:52 PM    Leukocyte Esterase TRACE (A) 08/11/2022 02:52 PM    Epithelial cells FEW 08/11/2022 02:52 PM    Bacteria Negative 08/11/2022 02:52 PM    WBC 0-4 08/11/2022 02:52 PM    RBC 0-5 08/11/2022 02:52 PM       No results found for: IRON, FE, TIBC, IBCT, PSAT, FERR  Lab Results   Component Value Date/Time    Culture result: NORMAL RESPIRATORY MELY/NO BETA STREP ISOLATED 01/07/2018 07:47 PM     Prior to Admission Medications   Prescriptions Last Dose Informant Patient Reported? Taking? OLANZapine (ZyPREXA) 20 mg tablet   No No   Sig: Take 1 Tablet by mouth nightly. Indications: bipolar disorder   azelastine (ASTELIN) 137 mcg (0.1 %) nasal spray   No No   [place] by Both Nostrils route daily as needed for Rhinitis. Indications: seasonal runny nose   cetirizine (ZYRTEC) 10 mg tablet   No No   Sig: Take 1 Tablet by mouth daily as needed for Allergies. Indications: seasonal runny nose   cholecalciferol (VITAMIN D3) (1000 Units /25 mcg) tablet   No No   Sig: Take 1 Tablet by mouth daily. Indications: prevention of vitamin D deficiency   hydrOXYzine HCL (ATARAX) 50 mg tablet   No No   Sig: Take 1 Tablet by mouth three (3) times daily for 30 days. Indications: anxious   lithium carbonate 300 mg capsule   No No   Sig: Take 1 Capsule by mouth nightly for 30 days. Indications: bipolar disorder   melatonin 3 mg tablet   No No   Sig: Take 1 Tablet by mouth nightly as needed for Insomnia. Indications: insomnia   polyethylene glycol (Miralax) 17 gram packet   No No   Sig: Take 1 Packet by mouth daily as needed for Constipation. Indications: constipation   traZODone (DESYREL) 150 mg tablet   No No   Sig: Take 1 Tablet by mouth nightly. Indications: insomnia associated with depression   trihexyphenidyL (ARTANE) 2 mg tablet   No No   Sig: Take 1 Tablet by mouth in the morning. Indications: extrapyramidal symptoms as a result of taking the medication      Facility-Administered Medications: None     Imaging:    Medications list Personally Reviewed   [x]      Yes     []               No    Thank you for allowing us to participate in the care this patient. We will follow patient with you.   Signed By: Brady Hernández [facility]  [facility] [place], [address]  Phone - [phone]         Fax - [phone] [address] [facility]  Phone - [phone]        Fax - [phone]

## 2022-08-12 NOTE — H&P
Hospitalist Admission Note    NAME: Vonda Suárez   :  1965   MRN:  [id number]     Date/Time:  2022 11:57 PM    Patient PCP: Moises Sanchez MD  ______________________________________________________________________  Given the patient's current clinical presentation, I have a high level of concern for decompensation if discharged from the emergency department. Complex decision making was performed, which includes reviewing the patient's available past medical records, laboratory results, and x-ray films. My assessment of this patient's clinical condition and my plan of care is as follows. Assessment / Plan:    Suicide attempt, POA  Lithium overdose, POA  Trihexyphenidyl overdose, POA  - Presented with suicide attempt  - [facility] after she was treated for bipolar disorder  - Reported taking 30 pills of 2 mg of trihexyphenidyl and around 20 pills of 300 mg lithium pills  - No neurologic symptoms on admission when I evaluated her  - Kidney function within normal limits  - Poison control was called by ED physician who recommended lithium levels every 4 hours, to 50 cc/h normal saline, monitoring urine output maintained to multiple kilogram per hour  - Previous suicide attempt in May 2022 reported by patient  - Denies any homicidal ideation  - Continue to report suicidal ideation  - Suicide precautions  - Consult observation  - Psych consultation  -Lithium level 4.00, salicylate less than 1.7, acetaminophen less than 2    Bipolar disorder, POA  - Medications overdose as above  - On Atarax and Zyprexa, will hold for now till she is evaluated by psych    Code Status: Full code  Surrogate Decision Maker: Patient is not sure what the name    DVT Prophylaxis: Lovenox  GI Prophylaxis: not indicated    Baseline:  Active, independent      Subjective:   CHIEF COMPLAINT: Suicide attempt  HISTORY OF PRESENT ILLNESS:       History obtained from the patient was alert oriented x4. The patient is 61years old woman with past medical history significant for bipolar disorder who presented emergency department after she took around 20 pills of lithium and 30 pills of trihexyphenidyl. Patient reported that she did that to hurt herself when she was trying to commit suicide. Patient reported that she tried this in 2022 in May. She denies any abdominal pain, nausea, vomiting, weakness, paresthesia, or any other symptoms. Patient reported that she does not feel very depressed or anxious currently. She denies any homicidal ideation. She continues to report suicidal ideation. We were asked to admit for work up and evaluation of the above problems. The emergency department, lithium level was found to be more than 3. Poison control was called who recommended lithium levels every 4 hours and to start IV fluids to 250 cc/h and maintain urine output 2 isabel per kilogram per hour.     Past Medical History:   Diagnosis Date    Anxiety and depression     Bipolar 1 disorder with moderate robyn ([address] 3/17/2014    Chronic back pain     Hyperlipidemia 8/22/2016    Hyponatremia     Psychotic disorder (HCC)     Scoliosis         Past Surgical History:   Procedure Laterality Date    HX HEENT      wisdom teeth extraction    HX LIPOMA RESECTION      right gluteal    [facility] BIOPSY BREAST STEREOTACTIC Left 2011    negative    [facility] DENTAL SURGERY PROCEDURE      hx of dental surgery- wisdom teeth extracted       Social History     Tobacco Use    Smoking status: Never    Smokeless tobacco: Never   Substance Use Topics    Alcohol use: Yes     Comment: occasional        Family History   Problem Relation Age of Onset    Arthritis-rheumatoid Mother     Migraines Mother     Psychiatric Disorder Mother     Bipolar Disorder Mother     Lupus Mother     Alcohol abuse Father     Lung Disease Father     Heart Disease Father     Stroke Father     High Cholesterol Father     Psychiatric Disorder Sister     Alcohol abuse Sister     Bipolar Disorder Sister     Stroke Brother     Cancer Maternal Aunt     Breast Cancer Maternal Aunt         pt thniks she was under 48    Bipolar Disorder Sister      Allergies   Allergen Reactions    Other Food Hives     Artifical sweetners    Claritin-D 12 Hour [Loratadine-Pseudoephedrine] Palpitations     palpatations and ringing in the ear    Other Medication Anaphylaxis     Artificial sweeteners cause anaphylaxis    Pcn [Penicillins] Anaphylaxis    Sunscreen Contact Dermatitis     Burns and opens the skin    Ultram [Tramadol] Hives and Other (comments)     Hives and ringing of the ears    Benzoyl Peroxide Hives    Cephalexin Itching    Divalproex Itching    Maltodextrin-Glycerin Shortness of Breath        Prior to Admission medications    Medication Sig Start Date End Date Taking? Authorizing Provider   hydrOXYzine HCL (ATARAX) 50 mg tablet Take 1 Tablet by mouth three (3) times daily for 30 days. Indications: anxious 8/9/22 9/8/22  Maci Julian WILEY NP   lithium carbonate 300 mg capsule Take 1 Capsule by mouth nightly for 30 days. Indications: bipolar disorder 8/9/22 9/8/22  Maci Julian WILEY NP   OLANZapine (ZyPREXA) 20 mg tablet Take 1 Tablet by mouth nightly. Indications: bipolar disorder 8/9/22   Maci [place] SAURABH NP   traZODone (DESYREL) 150 mg tablet Take 1 Tablet by mouth nightly. Indications: insomnia associated with depression 8/9/22   Maci WILEY NP   trihexyphenidyL (ARTANE) 2 mg tablet Take 1 Tablet by mouth in the morning. Indications: extrapyramidal symptoms as a result of taking the medication 8/9/22   Maci WILEY NP   azelastine (ASTELIN) 137 mcg (0.1 %) nasal spray 1 [place] by Both Nostrils route daily as needed for Rhinitis. Indications: seasonal runny nose 5/12/22   Jacoby Prabhakar MD   cetirizine (ZYRTEC) 10 mg tablet Take 1 Tablet by mouth daily as needed for Allergies.  Indications: seasonal runny nose 5/12/22 Armando Beckett MD   cholecalciferol (VITAMIN D3) (1000 Units /25 mcg) tablet Take 1 Tablet by mouth daily. Indications: prevention of vitamin D deficiency 5/13/22   Armando Beckett MD   polyethylene glycol (Miralax) 17 gram packet Take 1 Packet by mouth daily as needed for Constipation. Indications: constipation 5/12/22   Armando Beckett MD   melatonin 3 mg tablet Take 1 Tablet by mouth nightly as needed for Insomnia. Indications: insomnia 5/12/22   Armando Beckett MD       REVIEW OF SYSTEMS:     I am not able to complete the review of systems because:    The patient is intubated and sedated    The patient has altered mental status due to his acute medical problems    The patient has baseline aphasia from prior stroke(s)    The patient has baseline dementia and is not reliable historian    The patient is in acute medical distress and unable to provide information           Total of 12 systems reviewed as follows:       POSITIVE= underlined text  Negative = text not underlined  General:  fever, chills, sweats, generalized weakness, weight loss/gain,      loss of appetite   Eyes:    blurred vision, eye pain, loss of vision, double vision  ENT:    rhinorrhea, pharyngitis   Respiratory:   cough, sputum production, SOB, BURKS, wheezing, pleuritic pain   Cardiology:   chest pain, palpitations, orthopnea, PND, edema, syncope   Gastrointestinal:  abdominal pain , N/V, diarrhea, dysphagia, constipation, bleeding   Genitourinary:  frequency, urgency, dysuria, hematuria, incontinence   Muskuloskeletal :  arthralgia, myalgia, back pain  Hematology:  easy bruising, nose or gum bleeding, lymphadenopathy   Dermatological: rash, ulceration, pruritis, color change / jaundice  Endocrine:   hot flashes or polydipsia   Neurological:  headache, dizziness, confusion, focal weakness, paresthesia,     Speech difficulties, memory loss, gait difficulty  Psychological: Feelings of anxiety, depression, agitation, suicide attempt    Objective:   VITALS:    Visit Vitals  /75 (BP 1 Location: Left upper arm, BP Patient Position: At rest)   Pulse 87   Temp 98.2 °F (36.8 °C)   Resp 18   Ht 5' 1\" (1.549 m)   Wt 72.6 kg (160 lb)   SpO2 99%   BMI 30.23 kg/m²       PHYSICAL EXAM:    General:    Alert, cooperative, no distress, appears stated age. HEENT: Atraumatic, anicteric sclerae, pink conjunctivae     No oral ulcers, mucosa moist, throat clear, dentition fair  Neck:  Supple, symmetrical,  thyroid: non tender  Lungs:   Clear to auscultation bilaterally. No Wheezing or Rhonchi. No rales. Chest wall:  No tenderness  No Accessory muscle use. Heart:   Regular  rhythm,  No  murmur   No edema  Abdomen:   Soft, non-tender. Not distended. Bowel sounds normal  Extremities: No cyanosis. No clubbing,      Skin turgor normal, Capillary refill normal, Radial dial pulse 2+  Skin:     Not pale. Not Jaundiced  No rashes   Psych:  Good insight. Not depressed. Not anxious or agitated. Neurologic: EOMs intact. No facial asymmetry. No aphasia or slurred speech. Symmetrical strength, Sensation grossly intact.  Alert and oriented X 4.     _______________________________________________________________________  Care Plan discussed with:    Comments   Patient x    Family      RN x    Care Manager                    Consultant:      _______________________________________________________________________  Expected  Disposition:   Home with Family x   HH/PT/OT/RN    SNF/LTC    [facility]IES[facility]    ________________________________________________________________________  TOTAL TIME:  68 Minutes    Critical Care Provided     Minutes non procedure based      Comments    x Reviewed previous records   >50% of visit spent in counseling and coordination of care x Discussion with patient and/or family and questions answered       ________________________________________________________________________  Signed: Delfino Chandler MD    Procedures: see electronic medical records for all procedures/Xrays and details which were not copied into this note but were reviewed prior to creation of Plan.     LAB DATA REVIEWED:    Recent Results (from the past 24 hour(s))   EKG, 12 LEAD, INITIAL    Collection Time: 08/11/22  1:46 PM   Result Value Ref Range    Ventricular Rate 84 BPM    Atrial Rate 84 BPM    P-R Interval 152 ms    QRS Duration 68 ms    Q-T Interval 348 ms    QTC Calculation (Bezet) 411 ms    Calculated P Axis 69 degrees    Calculated R Axis 48 degrees    Calculated T Axis 39 degrees    Diagnosis       Normal sinus rhythm  Biatrial enlargement  Nonspecific ST abnormality  When compared with ECG of 08-AUG-2022 11:37,  ST now depressed in Anterior leads     DRUG SCREEN, URINE    Collection Time: 08/11/22  2:52 PM   Result Value Ref Range    AMPHETAMINES Negative NEG      BARBITURATES Negative NEG      BENZODIAZEPINES Negative NEG      COCAINE Negative NEG      METHADONE Negative NEG      OPIATES Negative NEG      PCP(PHENCYCLIDINE) Negative NEG      THC (TH-CANNABINOL) Negative NEG      Drug screen comment (NOTE)    URINALYSIS W/ RFLX MICROSCOPIC    Collection Time: 08/11/22  2:52 PM   Result Value Ref Range    Color YELLOW/STRAW      Appearance CLEAR CLEAR      Specific gravity 1.010 1.003 - 1.030      pH (UA) 8.5 (H) 5.0 - 8.0      Protein Negative NEG mg/dL    Glucose Negative NEG mg/dL    Ketone Negative NEG mg/dL    Bilirubin Negative NEG      Blood Negative NEG      Urobilinogen 0.2 0.2 - 1.0 EU/dL    Nitrites Negative NEG      Leukocyte Esterase TRACE (A) NEG     URINE MICROSCOPIC ONLY    Collection Time: 08/11/22  2:52 PM   Result Value Ref Range    WBC 0-4 0 - 4 /hpf    RBC 0-5 0 - 5 /hpf    Epithelial cells FEW FEW /lpf    Bacteria Negative NEG /hpf   HCG URINE, QL. - POC    Collection Time: 08/11/22  2:56 PM   Result Value Ref Range    Pregnancy test,urine (POC) Negative NEG     ETHYL ALCOHOL    Collection Time: 08/11/22  4:35 PM   Result Value Ref Range ALCOHOL(ETHYL),SERUM <10 <10 MG/DL   CBC WITH AUTOMATED DIFF    Collection Time: 08/11/22  4:35 PM   Result Value Ref Range    WBC 7.5 3.6 - 11.0 K/uL    RBC 4.59 3.80 - 5.20 M/uL    HGB 13.8 11.5 - 16.0 g/dL    HCT 42.8 35.0 - 47.0 %    MCV 93.2 80.0 - 99.0 FL    MCH 30.1 26.0 - 34.0 PG    MCHC 32.2 30.0 - 36.5 g/dL    RDW 12.5 11.5 - 14.5 %    PLATELET 863 436 - 391 K/uL    MPV 9.1 8.9 - 12.9 FL    NRBC 0.0 0  WBC    ABSOLUTE NRBC 0.00 0.00 - 0.01 K/uL    NEUTROPHILS 73 32 - 75 %    LYMPHOCYTES 20 12 - 49 %    MONOCYTES 6 5 - 13 %    EOSINOPHILS 0 0 - 7 %    BASOPHILS 1 0 - 1 %    IMMATURE GRANULOCYTES 0 0.0 - 0.5 %    ABS. NEUTROPHILS 5.5 1.8 - 8.0 K/UL    ABS. LYMPHOCYTES 1.5 0.8 - 3.5 K/UL    ABS. MONOCYTES 0.4 0.0 - 1.0 K/UL    ABS. EOSINOPHILS 0.0 0.0 - 0.4 K/UL    ABS. BASOPHILS 0.0 0.0 - 0.1 K/UL    ABS. IMM. GRANS. 0.0 0.00 - 0.04 K/UL    DF AUTOMATED     METABOLIC PANEL, COMPREHENSIVE    Collection Time: 08/11/22  4:35 PM   Result Value Ref Range    Sodium 136 136 - 145 mmol/L    Potassium 3.8 3.5 - 5.1 mmol/L    Chloride 103 97 - 108 mmol/L    CO2 31 21 - 32 mmol/L    Anion gap 2 (L) 5 - 15 mmol/L    Glucose 86 65 - 100 mg/dL    BUN 10 6 - 20 MG/DL    Creatinine 1.00 0.55 - 1.02 MG/DL    BUN/Creatinine ratio 10 (L) 12 - 20      GFR est AA >60 >60 ml/min/1.73m2    GFR est non-AA 57 (L) >60 ml/min/1.73m2    Calcium 10.2 (H) 8.5 - 10.1 MG/DL    Bilirubin, total 0.4 0.2 - 1.0 MG/DL    ALT (SGPT) 21 12 - 78 U/L    AST (SGOT) 15 15 - 37 U/L    Alk.  phosphatase 49 45 - 117 U/L    Protein, total 8.8 (H) 6.4 - 8.2 g/dL    Albumin 4.4 3.5 - 5.0 g/dL    Globulin 4.4 (H) 2.0 - 4.0 g/dL    A-G Ratio 1.0 (L) 1.1 - 2.2     LITHIUM    Collection Time: 08/11/22  4:35 PM   Result Value Ref Range    Lithium level 3.42 (HH) 0.60 - 1.20 MMOL/L    Reported dose date NOT PROVIDED      Reported dose time: NOT PROVIDED      Reported dose: NOT PROVIDED UNITS   MAGNESIUM    Collection Time: 08/11/22  4:35 PM   Result Value Ref Range    Magnesium 2.5 (H) 1.6 - 2.4 mg/dL   ACETAMINOPHEN    Collection Time: 08/11/22  4:35 PM   Result Value Ref Range    Acetaminophen level <2 (L) 10 - 30 ug/mL   SALICYLATE    Collection Time: 08/11/22  4:35 PM   Result Value Ref Range    Salicylate level <9.0 (L) 2.8 - 20.0 MG/DL   SARS-COV-2    Collection Time: 08/11/22  9:00 PM   Result Value Ref Range    SARS-CoV-2 by PCR Please find results under separate order     COVID-19 RAPID TEST    Collection Time: 08/11/22  9:00 PM   Result Value Ref Range    Specimen source Nasopharyngeal      COVID-19 rapid test Not detected NOTD     COVID-19 WITH INFLUENZA A/B    Collection Time: 08/11/22  9:00 PM   Result Value Ref Range    SARS-CoV-2 by PCR Not detected NOTD      Influenza A by PCR Not detected NOTD      Influenza B by PCR Not detected NOTD     SAMPLES BEING HELD    Collection Time: 08/11/22  9:39 PM   Result Value Ref Range    SAMPLES BEING HELD PST     COMMENT        Add-on orders for these samples will be processed based on acceptable specimen integrity and analyte stability, which may vary by analyte.

## 2022-08-12 NOTE — ED NOTES
Bedside shift change report given to Nilsa (oncoming nurse) by Jeffery Blackwell (offgoing nurse). Report included the following information SBAR, Kardex and ED Summary.

## 2022-08-12 NOTE — CONSULTS
PSYCHIATRY CONSULT NOTE:    REASON FOR CONSULT: Suicide attempt    HISTORY OF PRESENTING COMPLAINT:  Juju Law is a 61 y.o. BLACK/ female who is currently admitted to the ED at [facility] following a suicide attempt by intentional overdose on trihexyphenidyl (#30 of 2mg) and lithium (approximately #23 of 300mg). Pt appeared confused during assessment. She struggled to answer questions. The only context she was able to provide for the reason behind her overdose was \"I woke up and I was anxious\" after initially saying \"I was learning how to ride a bike\" and then correcting herself. She denies current SI/plan/intent or HI/plan/intent. She denies perceptual disturbances currently. She does have a psychiatrist, Dr. Shwetha Jimenez with [address] [place] [id number]. She states she saw him on Wednesday. PAST PSYCHIATRIC HISTORY: Pt has a hx of schizoaffective disorder, bipolar type and generalized anxiety disorder. She has a history of multiple past psychiatric admissions, most recently from 8/6/22-8/10-22 at [facility] for suicidal ideation and worsening anxiety. Prior to this admission, she was admitted at [facility] from 5/8/22-5/12/22 following a suicide attempt by overdose on lithium, temazepam, and hydroxyzine. SUBSTANCE ABUSE HISTORY: Denies    PSYCHOSOCIAL HISTORY: Pt is  and lives with spouse. She has no children. Pt is on disability and does not work. No pending legal issues. PAST MEDICAL HISTORY:  Please see H&P for details. Past Medical History:   Diagnosis Date    Anxiety and depression     Bipolar 1 disorder with moderate orbyn ([address] 3/17/2014    Chronic back pain     Hyperlipidemia 8/22/2016    Hyponatremia     Psychotic disorder ([facility] [address]     Scoliosis      Prior to Admission medications    Medication Sig Start Date End Date Taking? Authorizing Provider   hydrOXYzine HCL (ATARAX) 50 mg tablet Take 1 Tablet by mouth three (3) times daily for 30 days.  Indications: anxious 8/9/22 9/8/22  Oli Kobe WILEY NP   lithium carbonate 300 mg capsule Take 1 Capsule by mouth nightly for 30 days. Indications: bipolar disorder 8/9/22 9/8/22  Oli Kobe WILEY NP   OLANZapine (ZyPREXA) 20 mg tablet Take 1 Tablet by mouth nightly. Indications: bipolar disorder 8/9/22   Oli WILEYDIONNE   traZODone (DESYREL) 150 mg tablet Take 1 Tablet by mouth nightly. Indications: insomnia associated with depression 8/9/22   Oli Kobe WILEY NP   trihexyphenidyL (ARTANE) 2 mg tablet Take 1 Tablet by mouth in the morning. Indications: extrapyramidal symptoms as a result of taking the medication 8/9/22   Oli WILEYDIONNE   azelastine (ASTELIN) 137 mcg (0.1 %) nasal spray 1 [place] by Both Nostrils route daily as needed for Rhinitis. Indications: seasonal runny nose 5/12/22   Juliana Saenz MD   cetirizine (ZYRTEC) 10 mg tablet Take 1 Tablet by mouth daily as needed for Allergies. Indications: seasonal runny nose 5/12/22   Juliana Saenz MD   cholecalciferol (VITAMIN D3) (1000 Units /25 mcg) tablet Take 1 Tablet by mouth daily. Indications: prevention of vitamin D deficiency 5/13/22   Juliana Saenz MD   polyethylene glycol (Miralax) 17 gram packet Take 1 Packet by mouth daily as needed for Constipation. Indications: constipation 5/12/22   Juliana Saenz MD   melatonin 3 mg tablet Take 1 Tablet by mouth nightly as needed for Insomnia.  Indications: insomnia 5/12/22   Juliana Saenz MD     Vitals:    08/11/22 1244 08/11/22 1810 08/12/22 0400   BP: 110/76 126/75 124/69   Pulse: 95 87 79   Resp: 18 18 18   Temp: 98.2 °F (36.8 °C)  98.4 °F (36.9 °C)   SpO2: 100% 99% 97%   Weight: 72.6 kg (160 lb)     Height: 5' 1\" (1.549 m)       Lab Results   Component Value Date/Time    WBC 6.8 08/12/2022 05:34 AM    HGB 12.2 08/12/2022 05:34 AM    Hematocrit (POC) 34 (L) 07/17/2018 03:33 AM    HCT 37.6 08/12/2022 05:34 AM    PLATELET 812 (L) 47/07/1530 05:34 AM    MCV 94.0 08/12/2022 05:34 AM     Lab Results   Component Value Date/Time    Sodium 139 08/12/2022 05:34 AM    Potassium 4.7 08/12/2022 05:34 AM    Chloride 109 (H) 08/12/2022 05:34 AM    CO2 31 08/12/2022 05:34 AM    Anion gap NEG 1 08/12/2022 05:34 AM    Glucose 81 08/12/2022 05:34 AM    Glucose 107 (H) 02/03/2020 05:40 AM    BUN 7 08/12/2022 05:34 AM    Creatinine 0.91 08/12/2022 05:34 AM    BUN/Creatinine ratio 8 (L) 08/12/2022 05:34 AM    GFR est AA >60 08/12/2022 05:34 AM    GFR est non-AA >60 08/12/2022 05:34 AM    Calcium 9.3 08/12/2022 05:34 AM    Bilirubin, total 0.2 08/12/2022 05:34 AM    Alk. phosphatase 39 (L) 08/12/2022 05:34 AM    Protein, total 7.2 08/12/2022 05:34 AM    Albumin 3.4 (L) 08/12/2022 05:34 AM    Globulin 3.8 08/12/2022 05:34 AM    A-G Ratio 0.9 (L) 08/12/2022 05:34 AM    ALT (SGPT) 19 08/12/2022 05:34 AM    AST (SGOT) 18 08/12/2022 05:34 AM     Lab Results   Component Value Date/Time    Carbamazepine 14.1 (H) 07/26/2020 04:19 AM     Lab Results   Component Value Date/Time    Lithium level 3.40 (HH) 08/12/2022 01:11 AM     RADIOLOGY REPORTS:(reviewed/updated 8/12/2022)  No results found. Lab Results   Component Value Date/Time    Pregnancy test,urine (POC) Negative 08/11/2022 02:56 PM     MENTAL STATUS EXAM:  General appearance: moderately groomed, psychomotor activity is reduced  Eye contact: WNL  Speech: Spontaneous, soft, decreased output. Affect: Depressed, decreased range  Mood: \"OK\"  Thought Process: mildly disorganized  Perception: Denies AH or VH. Thought Content: denies SI/plan/intent, denies HI/plan/intent currently  Insight: poor  Judgment: poor  Cognition: Intact grossly. ASSESSMENT AND PLAN:  Jackie Vinson meets criteria for a diagnosis of schizoaffective disorder, bipolar type; generalized anxiety disorder  -Admit to inpatient psychiatry following medical clearance. Pt agreeable to admit voluntarily.    -Nephrology consult may be helpful to evaluate for kidney damage  -Recommend restarting Zyprexa within the next day or two at a lower dose, 5mg QHS and gradually titrating to 20mg, pt's normal home dose, to avoid an exacerbation of robyn or psychosis. Thank your your consult. Please feel free to consult us again as needed.

## 2022-08-12 NOTE — ED NOTES
Received a call form lab with [facility] results, Dr. Tiffani Mar made aware through [facility] Visit      Bucky Edgar MRN# [id number]   YOB: 1958 Age: 61 year old      Date of visit: 11/12/19   PCP: Dr. Percy Deshpande    Chief Complaint   Patient presents with:  Arthritis: Gout. Patient is doing great  Flu Shot  Imm/Inj: Flu Shot    Assessment and Plan     1.  Gout: Started having gout flares in approximately early 2017, acute onset and resolved with prednisone; joints involved include his left ankle, left first MTP, and left wrist.  Colchicine was cost prohibitive.  Currently on allopurinol 700 mg daily and is doing well with no gout flares.  Note that he was still flaring when on allopurinol 600mg daily.   - Continue allopurinol 700 mg daily (two 300mg tablets + one 100mg tablet)  - For gout flare only: prednisone 60mg daily x2days, then 40mg daily x5days, then 20mg daily x5days, then stop.  - Labs in 6 months: CBC, CMP, Uric Acid    2. Hypertersion: Bucky to follow up with Primary Care provider regarding elevated blood pressure.     3. Elevated Hgb: unclear etiology. Possibly related to COPD?  Advised that he see hematology.  - Hematology referral    Mr. Edgar verbalized agreement with and understanding of the rational for the diagnosis and treatment plan.  All questions were answered to best of my ability and the patient's satisfaction. Mr. Edgar was advised to contact the clinic with any questions that may arise after the clinic visit.     Thank you for involving me in the care of the patient    Return to clinic: 6 months      HPI   Bucky Edgar is a 61 year old male with a past medical history significant for COPD, dyslipidemia, vitamin D deficiency, history of rib fractures, DVT on anticoagulation, hypertension, who is seen for follow-up of gout.    Today, Mr. Edgar reports that he is doing great.  Tolerating allopurinol 700 mg daily and has not had a flare since then.     Denies fevers, chills, nausea, vomiting, constipation, diarrhea. No abdominal  pain. No chest pain/pressure, palpitations, or shortness of breath. No LE swelling.  No rash.   No history of inflammatory eye disease or inflammatory bowel disease.      Tobacco: Quit in 2017  EtOH: 12 pack of beer every 2 weeks  Drugs: None    ROS   GEN: No fevers, chills, or night sweats  SKIN: No itching, rashes, sores  HEENT: No oral or nasal ulcers.  CV: No chest pain, pressure, palpitations, or dyspnea on exertion.  PULM: No SOB, wheeze, cough.  GI: No nausea, vomiting, constipation, diarrhea. No blood in stool. No abdominal pain.  : No blood in urine.  MSK: See HPI.  NEURO: No numbness, tingling, or weakness.  EXT: No LE swelling  PSYCH: Negative    Active Problem List     Patient Active Problem List   Diagnosis     Displacement of lumbar intervertebral disc without myelopathy     CARDIOVASCULAR SCREENING; LDL GOAL LESS THAN 130     HTN, goal below 140/90     Advanced directives, counseling/discussion     COPD (chronic obstructive pulmonary disease) (H)     Impaired fasting glucose     Hypertriglyceridemia     Vitamin D deficiency     Chronic bronchitis with COPD (chronic obstructive pulmonary disease) (H)     Closed fracture of multiple ribs of left side with routine healing, subsequent encounter     Chronic pain due to trauma     Long-term (current) use of anticoagulants [Z79.01]     Elevated serum creatinine     History of deep venous thrombosis (DVT) of distal vein of left lower extremity     Elevated liver enzymes     Renal atrophy, right     Abdominal aortic aneurysm (AAA) without rupture (H)     Hepatomegaly     Fatty liver     Past Medical History     Past Medical History:   Diagnosis Date     COPD (chronic obstructive pulmonary disease) (H)      Displacement of lumbar intervertebral disc without myelopathy 1995, 2002     HTN, goal below 140/90      Past Surgical History     Past Surgical History:   Procedure Laterality Date     C DECOMPRESS SPINAL CORD,1 SEG  1995, 4/2002    leftL4-L5, 2nd right  L4-L5     C SPINE FUSION,ANTER,3 SGMTS  2/9/2004    ant and post fusion L4-S1     HC REPAIR ROTATOR CUFF,CHRONIC  1989     Allergy     Allergies   Allergen Reactions     Chlorthalidone Other (See Comments)     Excessive lowering of blood pressure     Amlodipine Other (See Comments)     Intractable headache with use of medication as well as noting a small tremor of the upper extremities     Current Medication List     Current Outpatient Medications   Medication Sig     acetaminophen (TYLENOL) 325 MG tablet Take 650 mg by mouth every 6 hours     albuterol (PROAIR HFA/PROVENTIL HFA/VENTOLIN HFA) 108 (90 Base) MCG/ACT inhaler Inhale 2 puffs into the lungs every 6 hours as needed for shortness of breath / dyspnea     allopurinol (ZYLOPRIM) 100 MG tablet Allopurinol 700mg daily (two 300mg tablets + one 100mg tablet)     allopurinol (ZYLOPRIM) 300 MG tablet Allopurinol 700mg daily (two 300mg tablets + one 100mg tablet)     aspirin 81 MG tablet Take 1 tablet (81 mg) by mouth daily     carvedilol (COREG) 25 MG tablet TAKE ONE TABLET BY MOUTH TWICE DAILY WITH MEALS     lisinopril (PRINIVIL/ZESTRIL) 20 MG tablet Take 2 tablets (40 mg) by mouth daily     predniSONE (DELTASONE) 20 MG tablet PRN gout flare: prednisone 60mg daily x2days, then 40mg daily x5days, then 20mg daily x5days, then stop.     isosorbide mononitrate (IMDUR) 30 MG 24 hr tablet Take 1 tablet (30 mg) by mouth daily (Patient not taking: Reported on 11/12/2019)     nicotine polacrilex (NICORETTE) 2 MG lozenge Place 2 mg inside cheek every hour as needed for smoking cessation     NICOTINE STEP 1 21 MG/24HR 24 hr patch ROBBY 1 PATCH TO SKIN ONCE D X 42 DAYS     sennosides (SENOKOT) 8.6 MG tablet TK 1 T PO BID     No current facility-administered medications for this visit.          Social History   See HPI    Family History     Family History   Problem Relation Age of Onset     Family History Negative Other      Diabetes No family hx of      Coronary Artery Disease  "No family hx of      Hypertension No family hx of      Hyperlipidemia No family hx of      Breast Cancer No family hx of      Prostate Cancer No family hx of      Anxiety Disorder No family hx of      Substance Abuse No family hx of      Asthma No family hx of      Thyroid Disease No family hx of      Unknown/Adopted No family hx of      Genetic Disorder No family hx of      Osteoporosis No family hx of      Anesthesia Reaction No family hx of      Mental Illness No family hx of      Depression No family hx of      Other Cancer No family hx of      Colon Cancer No family hx of      Cerebrovascular Disease No family hx of      Obesity No family hx of        Physical Exam     Temp Readings from Last 3 Encounters:   05/14/19 98.9  F (37.2  C) (Oral)   05/08/19 98.6  F (37  C) (Oral)   05/03/19 98.9  F (37.2  C) (Temporal)     BP Readings from Last 5 Encounters:   11/12/19 (!) 178/110   05/14/19 (!) 188/108   05/08/19 140/88   05/03/19 (!) 160/108   04/10/19 134/86     Pulse Readings from Last 1 Encounters:   11/12/19 63     Resp Readings from Last 1 Encounters:   05/14/19 16     Estimated body mass index is 32.78 kg/m  as calculated from the following:    Height as of this encounter: 1.753 m (5' 9\").    Weight as of this encounter: 100.7 kg (222 lb).    GEN: NAD; obese  HEENT: MMM. No oral lesions.  Anicteric, noninjected sclera  CV: S1, S2. RRR. No m/r/g.  PULM: CTA bilaterally. No w/c.  MSK: MCPs, PIPs, wrists, elbows, shoulders, knees, and ankles without swelling or tenderness to palpation.  Negative MCP and MTP squeeze.     NEURO: UE and LE strengths 5/5 and equal bilaterally.   SKIN: No rash  EXT: No LE edema  PSYCH: Alert. Appropriate.    Labs / Imaging (select studies)   CBC  Recent Labs   Lab Test 11/07/19  0900 05/08/19  1041 08/07/18  0926   WBC 9.9 8.6 7.6   RBC 6.09* 6.13* 5.65   HGB 20.1* 19.7* 17.8*   HCT 59.5* 58.1* 54.3*   MCV 98 95 96   RDW 15.2* 15.2* 15.4*    184 241   MCH 33.0 32.1 31.5 "   MCHC 33.8 33.9 32.8   NEUTROPHIL 72.3 67.7 68.6   LYMPH 16.8 16.5 19.4   MONOCYTE 8.2 11.5 8.3   EOSINOPHIL 2.2 3.7 3.2   BASOPHIL 0.5 0.6 0.5   ANEU 7.2 5.8 5.2   ALYM 1.7 1.4 1.5   LIZZ 0.8 1.0 0.6   AEOS 0.2 0.3 0.2   ABAS 0.1 0.1 0.0     CMP  Recent Labs   Lab Test 11/07/19  0900 05/08/19  1041 08/07/18  0926 05/02/18  1039 03/23/18  0918   NA  --  139  --  142 140   POTASSIUM  --  4.4  --  4.4 3.9   CHLORIDE  --  107  --  111* 108   CO2  --  27  --  27 21   ANIONGAP  --  5  --  4 11   GLC  --  100*  --  102* 106*   BUN  --  15  --  15 10   CR 1.08 1.04 1.16 1.11 1.06   GFRESTIMATED 73 77 64 67 71   GFRESTBLACK 85 89 77 82 86   JUSTIN  --  9.2  --  8.9 9.4   BILITOTAL 1.1 1.5* 0.4 0.8  --    ALBUMIN 3.6 3.6 3.3* 3.4  --    PROTTOTAL 6.9 7.0 6.8 6.8  --    ALKPHOS 103 105 108 67  --    AST 19 20 17 18  --    ALT 27 36 29 26  --      Uric Acid  Recent Labs   Lab Test 11/07/19  0900 05/08/19  1041 08/07/18  0926 05/02/18  1039 03/23/18  0918 02/28/18  1047   URIC 4.0 2.3* 2.0* 3.3* 5.0 6.9     Immunization History     Immunization History   Administered Date(s) Administered     Influenza Quad, Recombinant, p-free (RIV4) 11/12/2019     Influenza Vaccine IM > 6 months Valent IIV4 11/27/2013, 10/29/2015, 12/12/2016, 10/23/2017, 11/01/2018     Mantoux Tuberculin Skin Test 07/17/2012     Pneumo Conj 13-V (2010&after) 01/22/2016     Pneumococcal 23 valent 11/27/2013     TDAP Vaccine (Adacel) 08/25/2009     TDAP Vaccine (Boostrix) 08/25/2009          Chart documentation done in part with Dragon Voice recognition Software. Although reviewed after completion, some word and grammatical error may remain.    Coy Bolaños MD

## 2022-08-12 NOTE — ED NOTES
ADDENDUM TO ED NOTE FROM 8/11/22    I GOT SIGN OUT ON THIS PATIENT AND TOOK OVER HER CARE. BELOW ARE UPDATES ON HER MANAGEMENT AND TREATMENT PLAN. Jian Anaya MD      [id number] Received sign-out on this patient and am taking over her care. She is hemodynamically stable, no nausea, vomiting or diarrhea. Making urine and normal renal function. I spoke again with [facility] and poison control. She is at this time NOT medically cleared as lithium level has to result in order to determine disposition and course of treatment. If lithium is elevated, will need to be trended and may need to be done more than once. Usually trend is every 4 hours. We sent a second lithium level via  to [facility] lab at 4 hour constantino. Will keep her in the ED for now until first level results. Lab at [facility] just notified me that the result should be back within 10 minutes. Plan is to then discuss case with poison control again. If level needs to be trended multiple times, will admit patient to medical service. Otherwise will get second level, ensure it is not going up (which I doubt given time of ingestion) and if it is down-trending will likely be able to medically clear in the ED. [facility] is on board with this plan. [TZ]   4301 I spoke with poison control. In light of her lithium level the plan is:  - admit to medicine  - psych consult  - ns infusion at 250cc/hr  - goal urine output 2ml/kg/hr  - lithium levels q4 hours until normal   [TZ]           DISPOSITION: ADMIT  Patient is being admitted to the hospital.  Their test results and reasons for admission have been discussed. The patient and/or available family express agreement with and understanding of the need to be admitted and their admission diagnosis. Thank you for resuming the care of this patient. Please don't hesitate to contact me in the emergency department if you  have any additional questions. Melissa Guzmán MD, MSc    Clinical Impression:   1.  Intentional lithium overdose, initial encounter ([facility]   2. Adverse effect of trihexyphenidyl, initial encounter   3.  Suicide attempt ([facility]

## 2022-08-12 NOTE — BH NOTES
Received order for psych consult, called unit several times with no answer, please call [phone] to arrange consult over telehealth, otherwise I will try back later on this morning.

## 2022-08-12 NOTE — ED NOTES
Patient is being transferred to \[facility]\"" [facility], Room # 2109. Report given to [facility] , RN on Jason Jain for routine progression of care. Report consisted of the following information SBAR, Kardex, ED Summary, MAR, Recent Results, Med Rec Status, and Cardiac Rhythm   . Patient transferred to receiving unit by: Carolina Reid (RN or tech name). Outstanding consults needed: No     Next labs due: No     The following personal items will be sent with the patient during transfer to the floor: All valuables:    Cardiac monitoring ordered: Yes    The following CURRENT information was reported to the receiving RN:    Code status: Full Code at time of transfer    Last set of vital signs:  Vital Signs  Level of Consciousness: Alert (0) (08/12/22 0400)  Temp: 98.4 °F (36.9 °C) (08/12/22 1010)  Temp Source: Oral (08/12/22 1010)  Pulse (Heart Rate): 88 (08/12/22 1346)  Heart Rate Source: Monitor (08/12/22 1346)  Cardiac Rhythm: Sinus Rhythm (08/12/22 0400)  Resp Rate: 20 (08/12/22 1346)  BP: 136/84 (08/12/22 1346)  MAP (Calculated): 101 (08/12/22 1346)  BP 1 Location: Right upper arm (08/12/22 0400)  BP 1 Method: Automatic (08/12/22 0400)  BP Patient Position: At rest (08/12/22 0400)  MEWS Score: 1 (08/12/22 0400)         Oxygen Therapy  O2 Sat (%): 97 % (08/12/22 1346)  O2 Device: None (Room air) (08/11/22 1810)      Last pain assessment:  Pain 1  Pain Scale 1: Numeric (0 - 10)  Pain Intensity 1: 0  Patient Stated Pain Goal: 0      Wounds: No     Urinary catheter: voiding  Is there a coles order: No     LDAs:       Peripheral IV Right Antecubital (Active)         Opportunity for questions and clarification was provided.     Amanuel Ty RN

## 2022-08-12 NOTE — PROGRESS NOTES
Hospitalist Progress Note    NAME: Mahesh Rodríguez   :  1965   MRN:  [id number]       Assessment / Plan:  Suicide attempt, POA- pt voluntarily ready to go to IP psych when medically cleared  Lithium overdose, POA  Trihexyphenidyl overdose, POA   salicylate less than 1.7, acetaminophen less than 2  - Presented with suicide attempt  - Discharged yesterday from [facility] behavioral unit after she was treated for bipolar disorder  - Reported taking 30 pills of 2 mg of trihexyphenidyl and around 20 pills of 300 mg lithium pills  - No neurologic symptoms on admission when I evaluated her  - Kidney function within normal limits    Poison control was called by ED physician who recommended lithium levels every 4 hours, to 250 cc/h normal saline, monitoring urine output   - Previous suicide attempt in May 2022 reported by patient  - Denies any homicidal ideation  - Continue to report suicidal ideation  - Suicide precautions  IP Psych consultation noted- DC to IPpsych unit once medically cleared (NO TDO needed as pt willing)  -Lithium level 3.42->3.40 last  IP nephrology consulted due to slow Li level correction with IVF and persistent Nausea       Bipolar disorder, POA  - Medications overdose as above  - On Atarax and Zyprexa, will hold for now - Psych Recommends restarting Zyprexa within the next day or two at a lower dose, 5mg QHS and gradually titrating to 20mg, pt's normal home dose, to avoid an exacerbation of robyn or psychosis. Code Status: Full code  Surrogate Decision Maker: Patient is not sure what the name     DVT Prophylaxis: Lovenox  GI Prophylaxis: not indicated     Baseline: Active, independent      25.0 - 29.9 Overweight / Body mass index is 30.23 kg/m².     Estimated discharge date: ?- 15?  Barriers: Li toxicity reversal, IP Psych bed availability    Recommended Disposition:  IP Psych bed     Subjective:     Chief Complaint / Reason for Physician Visit: F/U Nausea/vomiting, Li toxicity, Bipolar disorder, suicidal overdose  \"I am ok\". Discussed with RN events overnight. Review of Systems:  Symptom Y/N Comments  Symptom Y/N Comments   Fever/Chills n   Chest Pain n    Poor Appetite n   Edema n    Cough n   Abdominal Pain n    Sputum n   Joint Pain n    SOB/BURKS n   Pruritis/Rash     Nausea/vomit y   Tolerating PT/OT y    Diarrhea    Tolerating Diet y    Constipation    Other       Could NOT obtain due to:      Objective:     VITALS:   Last 24hrs VS reviewed since prior progress note. Most recent are:  Patient Vitals for the past 24 hrs:   Temp Pulse Resp BP SpO2   08/12/22 1346 -- 88 20 136/84 97 %   08/12/22 1010 98.4 °F (36.9 °C) 98 18 90/69 97 %   08/12/22 0400 98.4 °F (36.9 °C) 79 18 124/69 97 %   08/11/22 1810 -- 87 18 126/75 99 %       Intake/Output Summary (Last 24 hours) at 8/12/2022 1410  Last data filed at 8/12/2022 0331  Gross per 24 hour   Intake 2000 ml   Output --   Net 2000 ml        I had a face to face encounter and independently examined this patient on 8/12/2022, as outlined below:  PHYSICAL EXAM:  General: WD, WN. Alert, cooperative, no acute distress    EENT:  EOMI. Anicteric sclerae. MMM  Resp:  CTA bilaterally, no wheezing or rales. No accessory muscle use  CV:  Regular  rhythm,  No edema  GI:  Soft, Non distended, Non tender. +Bowel sounds  Neurologic:  Alert and oriented X 3, normal speech,   Psych:   Good insight. Not anxious nor agitated  Skin:  No rashes.   No jaundice    Reviewed most current lab test results and cultures  YES  Reviewed most current radiology test results   YES  Review and summation of old records today    NO  Reviewed patient's current orders and MAR    YES  PMH/SH reviewed - no change compared to H&P  ________________________________________________________________________  Care Plan discussed with:    Comments   Patient x    Family      RN x    Care Manager     Consultant                        Multidiciplinary team rounds were held today with case manager, nursing, pharmacist and clinical coordinator. Patient's plan of care was discussed; medications were reviewed and discharge planning was addressed. ________________________________________________________________________  Total NON critical care TIME:  36   Minutes    Total CRITICAL CARE TIME Spent:   Minutes non procedure based      Comments   >50% of visit spent in counseling and coordination of care     ________________________________________________________________________  Bello Dominguez MD     Procedures: see electronic medical records for all procedures/Xrays and details which were not copied into this note but were reviewed prior to creation of Plan. LABS:  I reviewed today's most current labs and imaging studies.   Pertinent labs include:  Recent Labs     08/12/22  0534 08/11/22  1635   WBC 6.8 7.5   HGB 12.2 13.8   HCT 37.6 42.8   * 273     Recent Labs     08/12/22  0534 08/11/22  1635    136   K 4.7 3.8   * 103   CO2 31 31   GLU 81 86   BUN 7 10   CREA 0.91 1.00   CA 9.3 10.2*   MG  --  2.5*   ALB 3.4* 4.4   TBILI 0.2 0.4   ALT 19 21       Signed: Bello Dominguez MD

## 2022-08-13 LAB
ANION GAP SERPL CALC-SCNC: 6 MMOL/L (ref 5–15)
BUN SERPL-MCNC: 7 MG/DL (ref 6–20)
BUN/CREAT SERPL: 10 (ref 12–20)
CALCIUM SERPL-MCNC: 8.7 MG/DL (ref 8.5–10.1)
CHLORIDE SERPL-SCNC: 115 MMOL/L (ref 97–108)
CO2 SERPL-SCNC: 21 MMOL/L (ref 21–32)
CREAT SERPL-MCNC: 0.68 MG/DL (ref 0.55–1.02)
DATE LAST DOSE: ABNORMAL
GLUCOSE SERPL-MCNC: 96 MG/DL (ref 65–100)
LITHIUM SERPL-SCNC: 1.61 MMOL/L (ref 0.6–1.2)
POTASSIUM SERPL-SCNC: 3.8 MMOL/L (ref 3.5–5.1)
REPORTED DOSE,DOSE: ABNORMAL UNITS
REPORTED DOSE/TIME,TMG: ABNORMAL
SODIUM SERPL-SCNC: 142 MMOL/L (ref 136–145)

## 2022-08-13 PROCEDURE — 80048 BASIC METABOLIC PNL TOTAL CA: CPT

## 2022-08-13 PROCEDURE — 65270000029 HC RM PRIVATE

## 2022-08-13 PROCEDURE — 74011250637 HC RX REV CODE- 250/637: Performed by: INTERNAL MEDICINE

## 2022-08-13 PROCEDURE — 74011000250 HC RX REV CODE- 250: Performed by: INTERNAL MEDICINE

## 2022-08-13 PROCEDURE — 74011250636 HC RX REV CODE- 250/636: Performed by: INTERNAL MEDICINE

## 2022-08-13 PROCEDURE — 36415 COLL VENOUS BLD VENIPUNCTURE: CPT

## 2022-08-13 PROCEDURE — 80178 ASSAY OF LITHIUM: CPT

## 2022-08-13 RX ORDER — OLANZAPINE 10 MG/1
10 TABLET ORAL
Status: DISCONTINUED | OUTPATIENT
Start: 2022-08-13 | End: 2022-08-14 | Stop reason: HOSPADM

## 2022-08-13 RX ADMIN — SODIUM CHLORIDE, PRESERVATIVE FREE 10 ML: 5 INJECTION INTRAVENOUS at 14:00

## 2022-08-13 RX ADMIN — SODIUM CHLORIDE, PRESERVATIVE FREE 10 ML: 5 INJECTION INTRAVENOUS at 21:11

## 2022-08-13 RX ADMIN — SODIUM CHLORIDE 150 ML/HR: 9 INJECTION, SOLUTION INTRAVENOUS at 12:01

## 2022-08-13 RX ADMIN — HYDROXYZINE HYDROCHLORIDE 50 MG: 25 TABLET ORAL at 16:26

## 2022-08-13 RX ADMIN — ACETAMINOPHEN 650 MG: 325 TABLET ORAL at 16:26

## 2022-08-13 RX ADMIN — SODIUM CHLORIDE, PRESERVATIVE FREE 10 ML: 5 INJECTION INTRAVENOUS at 06:23

## 2022-08-13 RX ADMIN — HYDROXYZINE HYDROCHLORIDE 50 MG: 25 TABLET ORAL at 08:45

## 2022-08-13 RX ADMIN — OLANZAPINE 10 MG: 10 TABLET, FILM COATED ORAL at 21:10

## 2022-08-13 RX ADMIN — ENOXAPARIN SODIUM 40 MG: 100 INJECTION SUBCUTANEOUS at 08:45

## 2022-08-13 RX ADMIN — HYDROXYZINE HYDROCHLORIDE 50 MG: 25 TABLET ORAL at 21:10

## 2022-08-13 NOTE — [facility] NOTE
[facility] Liaison Team Note     LOS:  2     Patient goal(s) for today: communicate needs to staff, continue taking prescribed medications  [facility] Liaison team focus/goals: assess needs, provide support and encouragement to pt and family, provide education and assistance with coping skills    Progress note: [facility] unable to assess pt. Pt received sitting up in bed with family and sitter at bedside. Pt was alert with irritable/angry affect. This writer introduced herself and pt immediately stated, \"I don't need a counselor,\" waved her hand,  pointed toward the door, and stated \"out! \". This writer spoke with pt's nurse, Lavinia Garcia, who reports pt's lithium level dropped from 2.68 last night to 1.61 at 0400 this morning. She reports pt is medication compliant and states they will be adding Zyprexa and Atarax back to her regimen. RN Lavinia Garcia also reports pt's family commented they are noticing more paranoia today. This writer unable to speak with family at this time. Barriers to Discharge: medical clearance    Outpatient provider(s):  DIONNE Chandler at [facility] [address] info/prescription coverage:  [address] [address] MEDICARE PPO    Diagnosis: Intentional lithium overdose, initial encounter [facility])     Plan:  medical clearance with possible admission to [facility]. [facility] , Marisel Roberts,   .    Follow up Psych Consult placed? no.   Psychiatrist updated? no       Participating treatment team members: PONCE Espinoza,

## 2022-08-13 NOTE — PROGRESS NOTES
Lithium level drawn at 0400 was 1.61. Messaged Dr. Lesley Shook. Draw again tomorrow morning per Dr. Lesley Shook.

## 2022-08-13 NOTE — PROGRESS NOTES
Problem: Falls - Risk of  Goal: *Absence of Falls  Description: Document Jenniffer Kitchen Fall Risk and appropriate interventions in the flowsheet.   Outcome: Progressing Towards Goal  Note: Fall Risk Interventions:            Medication Interventions: Evaluate medications/consider consulting pharmacy                   Problem: Discharge Planning  Goal: *Knowledge of medication management  Outcome: Progressing Towards Goal

## 2022-08-13 NOTE — PROGRESS NOTES
Hospitalist Progress Note    NAME: Carol Moreira   :  1965   MRN:  [id number]       Assessment / Plan:  Suicide attempt, POA- pt voluntarily ready to go to IP psych when medically cleared  Lithium overdose, POA  Trihexyphenidyl overdose, POA   salicylate less than 1.7, acetaminophen less than 2  - Presented with suicide attempt  - [facility] after she was treated for bipolar disorder  - Reported taking 30 pills of 2 mg of trihexyphenidyl and around 20 pills of 300 mg lithium pills  - No neurologic symptoms on admission when I evaluated her  - Kidney function within normal limits    Poison control was called by ED physician who recommended lithium levels every 4 hours, to 250 cc/h normal saline, monitoring urine output   - Previous suicide attempt in May 2022 reported by patient  - Denies any homicidal ideation  - Continue to report suicidal ideation  - Suicide precautions  IP Psych consultation noted- DC to IPpsych unit once medically cleared (NO TDO needed as pt willing)  -Lithium level 3.42->3.40->2.69 last yesterday  IP nephrology consulted - no dialysis needed, cont IVF       Bipolar disorder, POA  - Medications overdose as above  - On Atarax and Zyprexa, will hold for now - Psych Recommends restarting Zyprexa within the next day or two at a lower dose, 5mg QHS and gradually titrating to 20mg, pt's normal home dose, to avoid an exacerbation of robyn or psychosis. Resumed Zyprexa today at 10 mg Q HS dose  Resumed Atarax TID prn     Code Status: Full code  Surrogate Decision Maker: Patient is not sure what the name     DVT Prophylaxis: Lovenox  GI Prophylaxis: not indicated     Baseline: Active, independent      25.0 - 29.9 Overweight / Body mass index is 30.23 kg/m². Estimated discharge date: ?   Barriers: Li toxicity reversal, IP Psych bed availability    Recommended Disposition:  IP Psych bed     Subjective:     Chief Complaint / Reason for Physician Visit: F/U Nausea/vomiting, Li toxicity, Bipolar disorder, suicidal overdose  \"I am ok\". Discussed with RN events overnight. Review of Systems:  Symptom Y/N Comments  Symptom Y/N Comments   Fever/Chills n   Chest Pain n    Poor Appetite n   Edema n    Cough n   Abdominal Pain n    Sputum n   Joint Pain n    SOB/BURKS n   Pruritis/Rash     Nausea/vomit n resolved  Tolerating PT/OT y    Diarrhea    Tolerating Diet y    Constipation    Other       Could NOT obtain due to:      Objective:     VITALS:   Last 24hrs VS reviewed since prior progress note. Most recent are:  Patient Vitals for the past 24 hrs:   Temp Pulse Resp BP SpO2   08/13/22 0839 98.7 °F (37.1 °C) 91 16 125/72 100 %   08/13/22 0100 98.4 °F (36.9 °C) 93 17 136/72 96 %   08/12/22 1539 (!) 96.6 °F (35.9 °C) 97 16 119/77 100 %   08/12/22 1346 -- 88 20 136/84 97 %       No intake or output data in the 24 hours ending 08/13/22 1107       I had a face to face encounter and independently examined this patient on 8/13/2022, as outlined below:  PHYSICAL EXAM:  General: WD, WN. Alert, cooperative, no acute distress    EENT:  EOMI. Anicteric sclerae. MMM  Resp:  CTA bilaterally, no wheezing or rales. No accessory muscle use  CV:  Regular  rhythm,  No edema  GI:  Soft, Non distended, Non tender. +Bowel sounds  Neurologic:  Alert and oriented X 3, normal speech,   Psych:   Good insight. Not anxious nor agitated  Skin:  No rashes.   No jaundice    Reviewed most current lab test results and cultures  YES  Reviewed most current radiology test results   YES  Review and summation of old records today    NO  Reviewed patient's current orders and MAR    YES  PMH/SH reviewed - no change compared to H&P  ________________________________________________________________________  Care Plan discussed with:    Comments   Patient x    Family  x  at bedside    RN x    Care Manager x    Consultant                       x Multidiciplinary team rounds were held today with , nursing, pharmacist and clinical coordinator. Patient's plan of care was discussed; medications were reviewed and discharge planning was addressed. ________________________________________________________________________  Total NON critical care TIME:  26   Minutes    Total CRITICAL CARE TIME Spent:   Minutes non procedure based      Comments   >50% of visit spent in counseling and coordination of care     ________________________________________________________________________  Akosua Palmer MD     Procedures: see electronic medical records for all procedures/Xrays and details which were not copied into this note but were reviewed prior to creation of Plan. LABS:  I reviewed today's most current labs and imaging studies.   Pertinent labs include:  Recent Labs     08/12/22  0534 08/11/22  1635   WBC 6.8 7.5   HGB 12.2 13.8   HCT 37.6 42.8   * 273       Recent Labs     08/13/22  0311 08/12/22  0534 08/11/22  1635    139 136   K 3.8 4.7 3.8   * 109* 103   CO2 21 31 31   GLU 96 81 86   BUN 7 7 10   CREA 0.68 0.91 1.00   CA 8.7 9.3 10.2*   MG  --  2.6* 2.5*   ALB  --  3.4* 4.4   TBILI  --  0.2 0.4   ALT  --  19 21         Signed: Akosua Palmer MD

## 2022-08-13 NOTE — PROGRESS NOTES
Problem: Falls - Risk of  Goal: *Absence of Falls  Description: Document Suhas Ornelas Fall Risk and appropriate interventions in the flowsheet.   Outcome: Progressing Towards Goal  Note: Fall Risk Interventions:            Medication Interventions: Teach patient to arise slowly         Problem: Patient Education: Go to Patient Education Activity  Goal: Patient/Family Education  Outcome: Progressing Towards Goal     Problem: Discharge Planning  Goal: *Discharge to safe environment  Outcome: Progressing Towards Goal  Goal: *Knowledge of medication management  Outcome: Progressing Towards Goal

## 2022-08-13 NOTE — PROGRESS NOTES
Alana Cid  YOB: 1965          Assessment & Plan:     Lithium overdose.  - level pending from today, making urine, not acutely symptomatic  - level 3.4 to 2.7 mg yest    Trihexyphenidyl overdose  Suicidal attempt  History of bipolar disorder     PLAN-  Lithium level daily  No acute need for RRT from renal standpoint. Continue with aggressive IV hydration. Subjective:   CC:Lithium toxicity  HPI: Patient seen   No levels done. No cp/sob  No nvd  Thinking clearly. Current Facility-Administered Medications   Medication Dose Route Frequency    OLANZapine (ZyPREXA) tablet 10 mg  10 mg Oral QHS    0.9% sodium chloride infusion  150 mL/hr IntraVENous CONTINUOUS    cetirizine (ZYRTEC) tablet 10 mg  10 mg Oral DAILY PRN    hydrOXYzine HCL (ATARAX) tablet 50 mg  50 mg Oral TID    sodium chloride (NS) flush 5-40 mL  5-40 mL IntraVENous Q8H    sodium chloride (NS) flush 5-40 mL  5-40 mL IntraVENous PRN    acetaminophen (TYLENOL) tablet 650 mg  650 mg Oral Q6H PRN    Or    acetaminophen (TYLENOL) suppository 650 mg  650 mg Rectal Q6H PRN    polyethylene glycol (MIRALAX) packet 17 g  17 g Oral DAILY PRN    promethazine (PHENERGAN) tablet 12.5 mg  12.5 mg Oral Q6H PRN    Or    ondansetron (ZOFRAN) injection 4 mg  4 mg IntraVENous Q6H PRN    enoxaparin (LOVENOX) injection 40 mg  40 mg SubCUTAneous DAILY          Objective:     Vitals:  Blood pressure 125/72, pulse 91, temperature 98.7 °F (37.1 °C), resp. rate 16, height 5' 1\" (1.549 m), weight 72.6 kg (160 lb), SpO2 100 %. Temp (24hrs), Av °F (36.7 °C), Min:96.6 °F (35.9 °C), Max:98.7 °F (37.1 °C)      Intake and Output:  No intake/output data recorded.    1901 -  0700  In:  [I.V.:]  Out: -     Physical Exam:                Patient is intubated:  no    Physical Examination:   GENERAL ASSESSMENT: NAD  HEENT:Nontraumatic   CHEST: CTA  HEART: S1S2  ABDOMEN: Soft,NT,  :Brewer:   EXTREMITY: EDEMA  NEURO:Grossly non focal          ECG/rhythm:    Data Review      No results for input(s): TNIPOC in the last 72 hours. No lab exists for component: ITNL   No results for input(s): CPK, CKMB, TROIQ in the last 72 hours. Recent Labs     08/13/22  0311 08/12/22  0534 08/11/22  1635    139 136   K 3.8 4.7 3.8   * 109* 103   CO2 21 31 31   BUN 7 7 10   CREA 0.68 0.91 1.00   GLU 96 81 86   MG  --  2.6* 2.5*   CA 8.7 9.3 10.2*   ALB  --  3.4* 4.4   WBC  --  6.8 7.5   HGB  --  12.2 13.8   HCT  --  37.6 42.8   PLT  --  138* 273      No results for input(s): INR, PTP, APTT, INREXT in the last 72 hours. Needs: urine analysis, urine sodium, protein and creatinine  Lab Results   Component Value Date/Time    Sodium,urine random 31 01/20/2020 10:49 AM    Creatinine, urine 36.4 08/08/2013 10:53 AM         Discussed with:  Family    : Tree Alas MD  8/13/2022        [facility]:  [email or web address]  Jalil Josefina office:  [address] [place] [address],[address]  [place], [address] [place] [address]  Phone: [phone]  Fax :     [phone]    [address] [place] [address] office:  [address] [facility]  [address] [facility]  Phone - [phone]  Fax - [phone]

## 2022-08-14 ENCOUNTER — HOSPITAL ENCOUNTER (INPATIENT)
Age: 57
LOS: 37 days | Discharge: HOME | DRG: 885 | End: 2022-09-20
Attending: PSYCHIATRY & NEUROLOGY | Admitting: PSYCHIATRY & NEUROLOGY
Payer: MEDICARE

## 2022-08-14 VITALS
BODY MASS INDEX: 30.21 KG/M2 | HEIGHT: 61 IN | WEIGHT: 160 LBS | OXYGEN SATURATION: 99 % | RESPIRATION RATE: 16 BRPM | HEART RATE: 92 BPM | SYSTOLIC BLOOD PRESSURE: 142 MMHG | TEMPERATURE: 98.4 F | DIASTOLIC BLOOD PRESSURE: 77 MMHG

## 2022-08-14 LAB
ANION GAP SERPL CALC-SCNC: 2 MMOL/L (ref 5–15)
BUN SERPL-MCNC: 3 MG/DL (ref 6–20)
BUN/CREAT SERPL: 4 (ref 12–20)
CALCIUM SERPL-MCNC: 9.1 MG/DL (ref 8.5–10.1)
CHLORIDE SERPL-SCNC: 116 MMOL/L (ref 97–108)
CO2 SERPL-SCNC: 26 MMOL/L (ref 21–32)
CREAT SERPL-MCNC: 0.74 MG/DL (ref 0.55–1.02)
DATE LAST DOSE: NORMAL
GLUCOSE SERPL-MCNC: 102 MG/DL (ref 65–100)
LITHIUM SERPL-SCNC: 0.79 MMOL/L (ref 0.6–1.2)
POTASSIUM SERPL-SCNC: 3.5 MMOL/L (ref 3.5–5.1)
REPORTED DOSE,DOSE: NORMAL UNITS
REPORTED DOSE/TIME,TMG: NORMAL
SODIUM SERPL-SCNC: 144 MMOL/L (ref 136–145)

## 2022-08-14 PROCEDURE — 74011000250 HC RX REV CODE- 250

## 2022-08-14 PROCEDURE — 65220000003 HC RM SEMIPRIVATE PSYCH

## 2022-08-14 PROCEDURE — 74011000250 HC RX REV CODE- 250: Performed by: INTERNAL MEDICINE

## 2022-08-14 PROCEDURE — 74011250636 HC RX REV CODE- 250/636: Performed by: INTERNAL MEDICINE

## 2022-08-14 PROCEDURE — 80178 ASSAY OF LITHIUM: CPT

## 2022-08-14 PROCEDURE — 74011250637 HC RX REV CODE- 250/637: Performed by: INTERNAL MEDICINE

## 2022-08-14 PROCEDURE — 36415 COLL VENOUS BLD VENIPUNCTURE: CPT

## 2022-08-14 PROCEDURE — 80048 BASIC METABOLIC PNL TOTAL CA: CPT

## 2022-08-14 RX ORDER — ACETAMINOPHEN 325 MG/1
650 TABLET ORAL
Status: DISCONTINUED | OUTPATIENT
Start: 2022-08-14 | End: 2022-09-20 | Stop reason: HOSPADM

## 2022-08-14 RX ORDER — BENZTROPINE MESYLATE 1 MG/1
1 TABLET ORAL
Status: DISCONTINUED | OUTPATIENT
Start: 2022-08-14 | End: 2022-09-20 | Stop reason: HOSPADM

## 2022-08-14 RX ORDER — LIDOCAINE 4 G/100G
1 PATCH TOPICAL EVERY 24 HOURS
Status: DISCONTINUED | OUTPATIENT
Start: 2022-08-14 | End: 2022-08-14 | Stop reason: HOSPADM

## 2022-08-14 RX ORDER — OLANZAPINE 5 MG/1
5 TABLET ORAL
Status: DISCONTINUED | OUTPATIENT
Start: 2022-08-14 | End: 2022-09-20 | Stop reason: HOSPADM

## 2022-08-14 RX ORDER — HALOPERIDOL 5 MG/ML
5 INJECTION INTRAMUSCULAR
Status: DISCONTINUED | OUTPATIENT
Start: 2022-08-14 | End: 2022-09-20 | Stop reason: HOSPADM

## 2022-08-14 RX ORDER — TRAZODONE HYDROCHLORIDE 50 MG/1
50 TABLET ORAL
Status: DISCONTINUED | OUTPATIENT
Start: 2022-08-14 | End: 2022-09-20 | Stop reason: HOSPADM

## 2022-08-14 RX ORDER — ADHESIVE BANDAGE
30 BANDAGE TOPICAL DAILY PRN
Status: DISCONTINUED | OUTPATIENT
Start: 2022-08-14 | End: 2022-09-20 | Stop reason: HOSPADM

## 2022-08-14 RX ORDER — HYDROXYZINE 50 MG/1
50 TABLET, FILM COATED ORAL
Status: DISCONTINUED | OUTPATIENT
Start: 2022-08-14 | End: 2022-08-15

## 2022-08-14 RX ORDER — DIPHENHYDRAMINE HYDROCHLORIDE 50 MG/ML
50 INJECTION, SOLUTION INTRAMUSCULAR; INTRAVENOUS
Status: DISCONTINUED | OUTPATIENT
Start: 2022-08-14 | End: 2022-09-20 | Stop reason: HOSPADM

## 2022-08-14 RX ADMIN — SODIUM CHLORIDE, PRESERVATIVE FREE 10 ML: 5 INJECTION INTRAVENOUS at 05:17

## 2022-08-14 RX ADMIN — ENOXAPARIN SODIUM 40 MG: 100 INJECTION SUBCUTANEOUS at 11:25

## 2022-08-14 RX ADMIN — ACETAMINOPHEN 650 MG: 325 TABLET ORAL at 01:35

## 2022-08-14 RX ADMIN — HYDROXYZINE HYDROCHLORIDE 50 MG: 25 TABLET ORAL at 11:25

## 2022-08-14 NOTE — PROGRESS NOTES
Nocturnist NP Note         [id number]- Notified by nursing that patient is complaining of low back pain, rated a 6 out of 10. Received Tylenol 9686. Ordered lidocaine patch. Please note that this note was dictated using Dragon computer voice recognition software. Quite often unanticipated grammatical, syntax, homophones, and other interpretive errors are inadvertently transcribed by the computer software. Please disregard these errors. Please excuse any errors that have escaped final proofreading.

## 2022-08-14 NOTE — PROGRESS NOTES
Bedside shift change report given to Tracy Mistry RN (oncoming nurse) by Yasmany Tidwell RN (offgoing nurse). Report included the following information SBAR, Kardex, Procedure Summary, Intake/Output, MAR, Accordion, Recent Results, and Med Rec Status.
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
## 2022-08-14 NOTE — PROGRESS NOTES
Transition of Care Plan:    RUR: 16%  Disposition: IP Psychiatry (pending bed availability)  Follow up appointments: PCP  DME needed: None  Transportation at Discharge: S EMTALA (psych)  [place] or means to access home: N/A   IM Medicare Letter: Needed at d/c  Is patient a [place] and connected with the [place]? N/A              If yes, was [employer] transfer form completed and [facility] notified? N/A  Caregiver Contact: Anatoliy Galicia, spouse ([phone])  Discharge Caregiver contacted prior to discharge? Pt contacted  Care Conference needed?: No    CM notified pt is medically stable for d/c to IP Psychiatry (voluntary). CM spoke with pt to confirm preferences. Agreeable to bed placement within [facility] for IP Psych, not [facility]. CM called [facility], [phone]), reviewing referral at this time. Awaiting call back on bed availability. Care Management Interventions  PCP Verified by CM: Yes  Palliative Care Criteria Met (RRAT>21 & CHF Dx)?: No  Mode of Transport at Discharge: S  Transition of Care Consult (CM Consult):  Other, Discharge Planning (IP Psych)  Discharge Durable Medical Equipment: No  Health Maintenance Reviewed: Yes  Physical Therapy Consult: No  Speech Therapy Consult: No  Support Systems: Spouse/Significant Other  Confirm Follow Up Transport: Self  The Plan for Transition of Care is Related to the Following Treatment Goals : IP Psych Voluntary  The Patient and/or Patient Representative was Provided with a Choice of Provider and Agrees with the Discharge Plan?: Yes  Name of the Patient Representative Who was Provided with a Choice of Provider and Agrees with the Discharge Plan: Sergio Tierney (Pt)  Discharge Location  Patient Expects to be Discharged to[de-identified] Psychiatric Unit    Derrick Velarde 29 Manager  [phone]

## 2022-08-14 NOTE — PROGRESS NOTES
Hospitalist Progress Note    NAME: Manpreet Allen   :  1965   MRN:  [id number]       Assessment / Plan:  Suicide attempt, POA- pt voluntarily ready to go to IP psych when medically cleared  Lithium overdose, POA  Trihexyphenidyl overdose, POA   salicylate less than 1.7, acetaminophen less than 2  - Presented with suicide attempt  - Discharged yesterday from [facility] behavioral unit after she was treated for bipolar disorder  - Reported taking 30 pills of 2 mg of trihexyphenidyl and around 20 pills of 300 mg lithium pills  - No neurologic symptoms on admission when I evaluated her  - Kidney function within normal limits    Poison control was called by ED physician who recommended lithium levels every 4 hours initially & 250 cc/h normal saline, monitoring urine output - remained good, now to come fof IVF as Li levels in therapeutic range  - Previous suicide attempt in May 2022 reported by patient  - Denies any homicidal ideation  - Continue to report suicidal ideation  - Suicide precautions  IP Psych consultation noted- DC to IP psych unit once medically cleared (NO TDO needed as pt willing)  -Lithium level 3.42->3.40->2.69->1.6 now  IP nephrology consulted - no dialysis needed,   Pt medically cleared for DC to IP psych bed when available- CM notified- working on bed arrangement       Bipolar disorder, POA  - Medications overdose as above  - On Atarax and Zyprexa, will hold for now - Psych Recommends restarting Zyprexa within the next day or two at a lower dose, 5mg QHS and gradually titrating to 20mg, pt's normal home dose, to avoid an exacerbation of robyn or psychosis. Resumed Zyprexa at 10 mg Q HS dose, cont for now  Resumed Atarax TID prn     Code Status: Full code  Surrogate Decision Maker: Patient is not sure what the name     DVT Prophylaxis: Lovenox  GI Prophylaxis: not indicated     Baseline: Active, independent      25.0 - 29.9 Overweight / Body mass index is 30.23 kg/m².     Estimated discharge date: August 14?-15  Barriers: IP Psych bed availability/arrangement    Recommended Disposition:  IP Psych bed  as pt now medically cleared     Subjective:     Chief Complaint / Reason for Physician Visit: F/U Nausea/vomiting, Li toxicity, Bipolar disorder, suicidal overdose  \"I am ok\". Discussed with RN events overnight. Review of Systems:  Symptom Y/N Comments  Symptom Y/N Comments   Fever/Chills n   Chest Pain n    Poor Appetite n   Edema n    Cough n   Abdominal Pain n    Sputum n   Joint Pain n    SOB/BURKS n   Pruritis/Rash     Nausea/vomit n resolved  Tolerating PT/OT y    Diarrhea    Tolerating Diet y    Constipation    Other       Could NOT obtain due to:      Objective:     VITALS:   Last 24hrs VS reviewed since prior progress note. Most recent are:  Patient Vitals for the past 24 hrs:   Temp Pulse Resp BP SpO2   08/14/22 0428 98.5 °F (36.9 °C) 81 16 137/78 99 %   08/14/22 0000 98.3 °F (36.8 °C) 81 17 (!) 145/77 100 %   08/13/22 1624 99 °F (37.2 °C) 82 18 127/68 100 %   08/13/22 1202 99.5 °F (37.5 °C) 86 18 118/69 100 %   08/13/22 0839 98.7 °F (37.1 °C) 91 16 125/72 100 %         Intake/Output Summary (Last 24 hours) at 8/14/2022 0755  Last data filed at 8/14/2022 4689  Gross per 24 hour   Intake --   Output 650 ml   Net -650 ml          I had a face to face encounter and independently examined this patient on 8/14/2022, as outlined below:  PHYSICAL EXAM:  General: WD, WN. Alert, cooperative, no acute distress    EENT:  EOMI. Anicteric sclerae. MMM  Resp:  CTA bilaterally, no wheezing or rales. No accessory muscle use  CV:  Regular  rhythm,  No edema  GI:  Soft, Non distended, Non tender. +Bowel sounds  Neurologic:  Alert and oriented X 3, normal speech,   Psych:   Good insight. Not anxious nor agitated  Skin:  No rashes.   No jaundice    Reviewed most current lab test results and cultures  YES  Reviewed most current radiology test results   YES  Review and summation of old records today NO  Reviewed patient's current orders and MAR    YES  PMH/SH reviewed - no change compared to H&P  ________________________________________________________________________  Care Plan discussed with:    Comments   Patient x    Family  x  at bedside yesterday   RN x    Care Manager x    Consultant                        Multidiciplinary team rounds were held today with , nursing, pharmacist and clinical coordinator. Patient's plan of care was discussed; medications were reviewed and discharge planning was addressed. ________________________________________________________________________  Total NON critical care TIME:  16   Minutes    Total CRITICAL CARE TIME Spent:   Minutes non procedure based      Comments   >50% of visit spent in counseling and coordination of care     ________________________________________________________________________  Marzena Guzman MD     Procedures: see electronic medical records for all procedures/Xrays and details which were not copied into this note but were reviewed prior to creation of Plan. LABS:  I reviewed today's most current labs and imaging studies.   Pertinent labs include:  Recent Labs     08/12/22  0534 08/11/22  1635   WBC 6.8 7.5   HGB 12.2 13.8   HCT 37.6 42.8   * 273       Recent Labs     08/14/22  0345 08/13/22  0311 08/12/22  0534 08/11/22  1635    142 139 136   K 3.5 3.8 4.7 3.8   * 115* 109* 103   CO2 26 21 31 31   * 96 81 86   BUN 3* 7 7 10   CREA 0.74 0.68 0.91 1.00   CA 9.1 8.7 9.3 10.2*   MG  --   --  2.6* 2.5*   ALB  --   --  3.4* 4.4   TBILI  --   --  0.2 0.4   ALT  --   --  19 21         Signed: Marzena Guzman MD

## 2022-08-15 PROCEDURE — 65220000003 HC RM SEMIPRIVATE PSYCH

## 2022-08-15 PROCEDURE — 74011250637 HC RX REV CODE- 250/637: Performed by: NURSE PRACTITIONER

## 2022-08-15 RX ORDER — HYDROXYZINE 50 MG/1
50 TABLET, FILM COATED ORAL
Status: DISCONTINUED | OUTPATIENT
Start: 2022-08-15 | End: 2022-09-20 | Stop reason: HOSPADM

## 2022-08-15 RX ADMIN — HYDROXYZINE HYDROCHLORIDE 50 MG: 50 TABLET, FILM COATED ORAL at 00:15

## 2022-08-15 RX ADMIN — OLANZAPINE 5 MG: 5 TABLET, FILM COATED ORAL at 05:12

## 2022-08-15 RX ADMIN — ACETAMINOPHEN 650 MG: 325 TABLET ORAL at 05:11

## 2022-08-15 RX ADMIN — TRAZODONE HYDROCHLORIDE 50 MG: 50 TABLET ORAL at 00:15

## 2022-08-15 RX ADMIN — HYDROXYZINE HYDROCHLORIDE 50 MG: 50 TABLET, FILM COATED ORAL at 12:05

## 2022-08-15 RX ADMIN — HYDROXYZINE HYDROCHLORIDE 50 MG: 50 TABLET, FILM COATED ORAL at 08:21

## 2022-08-15 NOTE — DISCHARGE SUMMARY
Hospitalist Discharge Summary     Patient ID:  Jackie Vinson  [id number]  61 y.o.  1965 8/11/2022    PCP on record: Zachary Kaur MD    Admit date: 8/11/2022  Discharge date and time: 8/15/2022    DISCHARGE DIAGNOSIS:    Suicide attempt, POA- pt voluntarily ready to go to IP psych   Lithium overdose, POA  Trihexyphenidyl overdose, POA  Bipolar disorder, POA    CONSULTATIONS:  IP CONSULT TO PSYCHIATRY  IP CONSULT TO NEPHROLOGY    Excerpted HPI from H&P of Bozena Vickers MD:  \"55 years old woman with past medical history significant for bipolar disorder who presented emergency department after she took around 20 pills of lithium and 30 pills of trihexyphenidyl. Patient reported that she did that to hurt herself when she was trying to commit suicide. Patient reported that she tried this in 2022 in May. She denies any abdominal pain, nausea, vomiting, weakness, paresthesia, or any other symptoms. Patient reported that she does not feel very depressed or anxious currently. She denies any homicidal ideation. She continues to report suicidal ideation. We were asked to admit for work up and evaluation of the above problems. The emergency department, lithium level was found to be more than 3. Poison control was called who recommended lithium levels every 4 hours and to start IV fluids to 250 cc/h and maintain urine output 2 isabel per kilogram per hour. \"    ______________________________________________________________________  DISCHARGE SUMMARY/HOSPITAL COURSE:  for full details see H&P, daily progress notes, labs, consult notes.      Suicide attempt, POA- pt voluntarily ready to go to IP psych when medically cleared  Lithium overdose, POA  Trihexyphenidyl overdose, POA   salicylate less than 1.7, acetaminophen less than 2  - Presented with suicide attempt  - Discharged yesterday from [facility] behavioral unit after she was treated for bipolar disorder  - Reported taking 30 pills of 2 mg of trihexyphenidyl and around 20 pills of 300 mg lithium pills  - No neurologic symptoms on admission when I evaluated her  - Kidney function within normal limits     Poison control was called by ED physician who recommended lithium levels every 4 hours initially & 250 cc/h normal saline, monitoring urine output - remained good, now to come fof IVF as Li levels in therapeutic range  - Previous suicide attempt in May 2022 reported by patient  - Denies any homicidal ideation  - Continue to report suicidal ideation  - Suicide precautions  IP Psych consultation noted- DC to IP psych unit once medically cleared (NO TDO needed as pt willing)  -Lithium level 3.42->3.40->2.69->1.6 now  IP nephrology consulted - no dialysis needed,  Pt medically cleared for DC to IP psych bed when available- CM notified- working on bed arrangement       Bipolar disorder, POA  - Medications overdose as above  - On Atarax and Zyprexa, will hold for now - Psych Recommends restarting Zyprexa within the next day or two at a lower dose, 5mg QHS and gradually titrating to 20mg, pt's normal home dose, to avoid an exacerbation of robyn or psychosis. Resumed Zyprexa at 10 mg Q HS dose, cont for now  Resumed Atarax TID prn     Code Status: Full code        _______________________________________________________________________  Patient seen and examined by me on discharge day. Pertinent Findings:  Gen:    Not in distress  Chest: Clear lungs  CVS:   Regular rhythm. No edema  Abd:  Soft, not distended, not tender  Neuro:  Alert,   _______________________________________________________________________  DISCHARGE MEDICATIONS:   Discharge Medication List as of 8/14/2022  8:33 PM            Patient Follow Up Instructions: Activity: Activity as tolerated  Diet: Regular Diet      Follow-up with IP psych  as arranged.       Follow-up Information    None       ________________________________________________________________    Risk of deterioration: Low    Condition at Discharge:  Stable  __________________________________________________________________    Disposition  IP Psych hospital    ____________________________________________________________________    Code Status: Full Code  ___________________________________________________________________      Total time in minutes spent coordinating this discharge (includes going over instructions, follow-up, prescriptions, and preparing report for sign off to her PCP) :  >30 minutes    Signed:  Antonio Thomas MD

## 2022-08-15 NOTE — BH NOTES
PRN Medication Documentation    Specific patient behavior that led to need for PRN medication: Patient requested med for a headache  Staff interventions attempted prior to PRN being given: Offered PRN  PRN medication given:  Tylenol 650 mg PO  Patient response/effectiveness of PRN medication: Will continue to monitor

## 2022-08-15 NOTE — H&P
[facility] HISTORY AND PHYSICAL    Name:  Steven Ballard  MR#:  [id number]  :  1965  ACCOUNT #:  [de-identified]  ADMIT DATE:  2022      INITIAL PSYCHIATRIC EVALUATION    CHIEF COMPLAINT:  \"I am just confused. \"    HISTORY OF PRESENT ILLNESS:  The patient is a 70-year-old female who is admitted at [address] on a voluntary basis. This patient is well known to this provider and to this facility. She was just discharged under our care 5 days ago after a 4-day inpatient stay. She is a challenging historian. She is disoriented, confused, and not able to provide a meaningful history. During our brief conversation, she states that she is feeling confused. She is not feeling well about \"everything. \"  She also reports being anxious and requests an anxiety medication. When I asked her if she has any suicidal ideation, she went back and forth. Ultimately, she said she had plans of jumping off something. Then she state that it would be easier if she would overdose instead. She presented back in the emergency room a day after being discharged. She intentionally overdosed on several medications including trihexyphenidyl 30 tablets of 2 mg and lithium approximately 23 capsules upto 300 mg. She reported at [address] that what led to the overdose was \"she hit a panic button and it was the hospital's fault. \"  She also reported that she woke up, could not find her  and she panicked. She also reported that still wishes she was dead when she was at [address]. After the overdose, she was admitted at [address] [place] [address] for several days and was cleared for medical transfer here at [facility]. Her lithium level was initially 3.34; today it was 0.79. She is admitted to the inpatient psychiatric setting for further evaluation and treatment. PAST MEDICAL HISTORY:  See H and P.     Past Medical History:   Diagnosis Date    Anxiety and depression     Bipolar 1 disorder with moderate robyn (HCC) 3/17/2014    Chronic back pain     Hyperlipidemia 8/22/2016    Hyponatremia     Psychotic disorder ([facility] [address]     Scoliosis        Labs: (reviewed/updated 8/16/2022)  Patient Vitals for the past 8 hrs:   BP Temp Pulse Resp SpO2   08/16/22 1823 (!) 159/90 98.2 °F (36.8 °C) 95 16 99 %   08/16/22 1507 (!) 148/84 98.7 °F (37.1 °C) 86 16 99 %     Labs Reviewed - No data to display  Lab Results   Component Value Date/Time    Sodium 144 08/14/2022 03:45 AM    Potassium 3.5 08/14/2022 03:45 AM    Chloride 116 (H) 08/14/2022 03:45 AM    CO2 26 08/14/2022 03:45 AM    Anion gap 2 (L) 08/14/2022 03:45 AM    Glucose 102 (H) 08/14/2022 03:45 AM    Glucose 107 (H) 02/03/2020 05:40 AM    BUN 3 (L) 08/14/2022 03:45 AM    Creatinine 0.74 08/14/2022 03:45 AM    BUN/Creatinine ratio 4 (L) 08/14/2022 03:45 AM    GFR est AA >60 08/14/2022 03:45 AM    GFR est non-AA >60 08/14/2022 03:45 AM    Calcium 9.1 08/14/2022 03:45 AM    Bilirubin, total 0.2 08/12/2022 05:34 AM    Alk. phosphatase 39 (L) 08/12/2022 05:34 AM    Protein, total 7.2 08/12/2022 05:34 AM    Albumin 3.4 (L) 08/12/2022 05:34 AM    Globulin 3.8 08/12/2022 05:34 AM    A-G Ratio 0.9 (L) 08/12/2022 05:34 AM    ALT (SGPT) 19 08/12/2022 05:34 AM     No visits with results within 2 Day(s) from this visit.    Latest known visit with results is:   Admission on 08/11/2022, Discharged on 08/14/2022   Component Date Value Ref Range Status    Ventricular Rate 08/11/2022 89  BPM Final    Atrial Rate 08/11/2022 89  BPM Final    P-R Interval 08/11/2022 146  ms Final    QRS Duration 08/11/2022 70  ms Final    Q-T Interval 08/11/2022 354  ms Final    QTC Calculation (Bezet) 08/11/2022 430  ms Final    Calculated P Axis 08/11/2022 71  degrees Final    Calculated R Axis 08/11/2022 49  degrees Final    Calculated T Axis 08/11/2022 41  degrees Final    Diagnosis 08/11/2022    Final                    Value:Normal sinus rhythm  Biatrial enlargement  Nonspecific ST and T wave abnormality    Confirmed by Shaheen Farley ([id number]) on 8/12/2022 8:31:28 AM      AMPHETAMINES 08/11/2022 Negative  NEG   Final    BARBITURATES 08/11/2022 Negative  NEG   Final    BENZODIAZEPINES 08/11/2022 Negative  NEG   Final    COCAINE 08/11/2022 Negative  NEG   Final    METHADONE 08/11/2022 Negative  NEG   Final    OPIATES 08/11/2022 Negative  NEG   Final    PCP(PHENCYCLIDINE) 08/11/2022 Negative  NEG   Final    THC (TH-CANNABINOL) 08/11/2022 Negative  NEG   Final    Drug screen comment 08/11/2022 (NOTE)   Final    ALCOHOL(ETHYL),SERUM 08/11/2022 <10  <10 MG/DL Final    WBC 08/11/2022 7.5  3.6 - 11.0 K/uL Final    RBC 08/11/2022 4.59  3.80 - 5.20 M/uL Final    HGB 08/11/2022 13.8  11.5 - 16.0 g/dL Final    HCT 08/11/2022 42.8  35.0 - 47.0 % Final    MCV 08/11/2022 93.2  80.0 - 99.0 FL Final    MCH 08/11/2022 30.1  26.0 - 34.0 PG Final    MCHC 08/11/2022 32.2  30.0 - 36.5 g/dL Final    RDW 08/11/2022 12.5  11.5 - 14.5 % Final    PLATELET 16/08/8403 652  150 - 400 K/uL Final    MPV 08/11/2022 9.1  8.9 - 12.9 FL Final    NRBC 08/11/2022 0.0  0  WBC Final    ABSOLUTE NRBC 08/11/2022 0.00  0.00 - 0.01 K/uL Final    NEUTROPHILS 08/11/2022 73  32 - 75 % Final    LYMPHOCYTES 08/11/2022 20  12 - 49 % Final    MONOCYTES 08/11/2022 6  5 - 13 % Final    EOSINOPHILS 08/11/2022 0  0 - 7 % Final    BASOPHILS 08/11/2022 1  0 - 1 % Final    IMMATURE GRANULOCYTES 08/11/2022 0  0.0 - 0.5 % Final    ABS. NEUTROPHILS 08/11/2022 5.5  1.8 - 8.0 K/UL Final    ABS. LYMPHOCYTES 08/11/2022 1.5  0.8 - 3.5 K/UL Final    ABS. MONOCYTES 08/11/2022 0.4  0.0 - 1.0 K/UL Final    ABS. EOSINOPHILS 08/11/2022 0.0  0.0 - 0.4 K/UL Final    ABS. BASOPHILS 08/11/2022 0.0  0.0 - 0.1 K/UL Final    ABS. IMM.  GRANS. 08/11/2022 0.0  0.00 - 0.04 K/UL Final    DF 08/11/2022 AUTOMATED    Final    Sodium 08/11/2022 136  136 - 145 mmol/L Final    Potassium 08/11/2022 3.8  3.5 - 5.1 mmol/L Final    Chloride 08/11/2022 103  97 - 108 mmol/L Final CO2 08/11/2022 31  21 - 32 mmol/L Final    Anion gap 08/11/2022 2 (A) 5 - 15 mmol/L Final    Glucose 08/11/2022 86  65 - 100 mg/dL Final    BUN 08/11/2022 10  6 - 20 MG/DL Final    Creatinine 08/11/2022 1.00  0.55 - 1.02 MG/DL Final    BUN/Creatinine ratio 08/11/2022 10 (A) 12 - 20   Final    GFR est AA 08/11/2022 >60  >60 ml/min/1.73m2 Final    GFR est non-AA 08/11/2022 57 (A) >60 ml/min/1.73m2 Final    Calcium 08/11/2022 10.2 (A) 8.5 - 10.1 MG/DL Final    Bilirubin, total 08/11/2022 0.4  0.2 - 1.0 MG/DL Final    ALT (SGPT) 08/11/2022 21  12 - 78 U/L Final    AST (SGOT) 08/11/2022 15  15 - 37 U/L Final    Alk.  phosphatase 08/11/2022 49  45 - 117 U/L Final    Protein, total 08/11/2022 8.8 (A) 6.4 - 8.2 g/dL Final    Albumin 08/11/2022 4.4  3.5 - 5.0 g/dL Final    Globulin 08/11/2022 4.4 (A) 2.0 - 4.0 g/dL Final    A-G Ratio 08/11/2022 1.0 (A) 1.1 - 2.2   Final    Lithium level 08/11/2022 3.42 (A) 0.60 - 1.20 MMOL/L Final    Reported dose date 08/11/2022 NOT PROVIDED    Final    Reported dose time: 08/11/2022 NOT PROVIDED    Final    Reported dose: 08/11/2022 NOT PROVIDED  UNITS Final    Color 08/11/2022 YELLOW/STRAW    Final    Appearance 08/11/2022 CLEAR  CLEAR   Final    Specific gravity 08/11/2022 1.010  1.003 - 1.030   Final    pH (UA) 08/11/2022 8.5 (A) 5.0 - 8.0   Final    Protein 08/11/2022 Negative  NEG mg/dL Final    Glucose 08/11/2022 Negative  NEG mg/dL Final    Ketone 08/11/2022 Negative  NEG mg/dL Final    Bilirubin 08/11/2022 Negative  NEG   Final    Blood 08/11/2022 Negative  NEG   Final    Urobilinogen 08/11/2022 0.2  0.2 - 1.0 EU/dL Final    Nitrites 08/11/2022 Negative  NEG   Final    Leukocyte Esterase 08/11/2022 TRACE (A) NEG   Final    Pregnancy test,urine (POC) 08/11/2022 Negative  NEG   Final    Acetaminophen level 08/12/2022 <2 (A) 10 - 30 ug/mL Final    Salicylate level 86/81/5129 <1.7 (A) 2.8 - 20.0 MG/DL Final    WBC 08/11/2022 0-4  0 - 4 /hpf Final    RBC 08/11/2022 0-5  0 - 5 /hpf Final Epithelial cells 08/11/2022 FEW  FEW /lpf Final    Bacteria 08/11/2022 Negative  NEG /hpf Final    Magnesium 08/11/2022 2.5 (A) 1.6 - 2.4 mg/dL Final    Acetaminophen level 08/11/2022 <2 (A) 10 - 30 ug/mL Final    Salicylate level 73/60/2463 <1.7 (A) 2.8 - 20.0 MG/DL Final    SARS-CoV-2 by PCR 08/11/2022 Please find results under separate order    Final    Specimen source 08/11/2022 Nasopharyngeal    Final    COVID-19 rapid test 08/11/2022 Not detected  NOTD   Final    Lithium level 08/11/2022 3.34 (A) 0.60 - 1.20 MMOL/L Final    Reported dose date 08/11/2022 NOT PROVIDED    Final    Reported dose time: 08/11/2022 NOT PROVIDED    Final    Reported dose: 08/11/2022 NOT PROVIDED  UNITS Final    SAMPLES BEING HELD 08/11/2022 PST   Final    COMMENT 08/11/2022 Add-on orders for these samples will be processed based on acceptable specimen integrity and analyte stability, which may vary by analyte.     Final    SARS-CoV-2 by PCR 08/11/2022 Not detected  NOTD   Final    Influenza A by PCR 08/11/2022 Not detected  NOTD   Final    Influenza B by PCR 08/11/2022 Not detected  NOTD   Final    Lithium level 08/12/2022 3.40 (A) 0.60 - 1.20 MMOL/L Final    Reported dose date 08/12/2022 NOT PROVIDED    Final    Reported dose time: 08/12/2022 NOT PROVIDED    Final    Reported dose: 08/12/2022 NOT PROVIDED  UNITS Final    Lithium level 08/12/2022 3.26 (A) 0.60 - 1.20 MMOL/L Final    Reported dose date 08/12/2022 NOT PROVIDED    Final    Reported dose time: 08/12/2022 NOT PROVIDED    Final    Reported dose: 08/12/2022 NOT PROVIDED  UNITS Final    Sodium 08/12/2022 139  136 - 145 mmol/L Final    Potassium 08/12/2022 4.7  3.5 - 5.1 mmol/L Final    Chloride 08/12/2022 109 (A) 97 - 108 mmol/L Final    CO2 08/12/2022 31  21 - 32 mmol/L Final    Anion gap 08/12/2022 NEG 1  5 - 15 mmol/L Final    Glucose 08/12/2022 81  65 - 100 mg/dL Final    BUN 08/12/2022 7  6 - 20 MG/DL Final    Creatinine 08/12/2022 0.91  0.55 - 1.02 MG/DL Final BUN/Creatinine ratio 08/12/2022 8 (A) 12 - 20   Final    GFR est AA 08/12/2022 >60  >60 ml/min/1.73m2 Final    GFR est non-AA 08/12/2022 >60  >60 ml/min/1.73m2 Final    Calcium 08/12/2022 9.3  8.5 - 10.1 MG/DL Final    Bilirubin, total 08/12/2022 0.2  0.2 - 1.0 MG/DL Final    ALT (SGPT) 08/12/2022 19  12 - 78 U/L Final    AST (SGOT) 08/12/2022 18  15 - 37 U/L Final    Alk. phosphatase 08/12/2022 39 (A) 45 - 117 U/L Final    Protein, total 08/12/2022 7.2  6.4 - 8.2 g/dL Final    Albumin 08/12/2022 3.4 (A) 3.5 - 5.0 g/dL Final    Globulin 08/12/2022 3.8  2.0 - 4.0 g/dL Final    A-G Ratio 08/12/2022 0.9 (A) 1.1 - 2.2   Final    WBC 08/12/2022 6.8  3.6 - 11.0 K/uL Final    RBC 08/12/2022 4.00  3.80 - 5.20 M/uL Final    HGB 08/12/2022 12.2  11.5 - 16.0 g/dL Final    HCT 08/12/2022 37.6  35.0 - 47.0 % Final    MCV 08/12/2022 94.0  80.0 - 99.0 FL Final    MCH 08/12/2022 30.5  26.0 - 34.0 PG Final    MCHC 08/12/2022 32.4  30.0 - 36.5 g/dL Final    RDW 08/12/2022 12.7  11.5 - 14.5 % Final    PLATELET 16/38/6304 820 (A) 150 - 400 K/uL Final    NRBC 08/12/2022 0.0  0  WBC Final    ABSOLUTE NRBC 08/12/2022 0.00  0.00 - 0.01 K/uL Final    NEUTROPHILS 08/12/2022 69  32 - 75 % Final    LYMPHOCYTES 08/12/2022 23  12 - 49 % Final    MONOCYTES 08/12/2022 7  5 - 13 % Final    EOSINOPHILS 08/12/2022 1  0 - 7 % Final    BASOPHILS 08/12/2022 0  0 - 1 % Final    IMMATURE GRANULOCYTES 08/12/2022 0  0.0 - 0.5 % Final    ABS. NEUTROPHILS 08/12/2022 4.6  1.8 - 8.0 K/UL Final    ABS. LYMPHOCYTES 08/12/2022 1.6  0.8 - 3.5 K/UL Final    ABS. MONOCYTES 08/12/2022 0.5  0.0 - 1.0 K/UL Final    ABS. EOSINOPHILS 08/12/2022 0.1  0.0 - 0.4 K/UL Final    ABS. BASOPHILS 08/12/2022 0.0  0.0 - 0.1 K/UL Final    ABS. IMM.  GRANS. 08/12/2022 0.0  0.00 - 0.04 K/UL Final    DF 08/12/2022 AUTOMATED    Final    PLATELET COMMENTS 38/42/1179 CLUMPED PLATELETS    Final    RBC COMMENTS 08/12/2022 NORMOCYTIC, NORMOCHROMIC    Final    Lithium level 08/12/2022 3.20 (A) 0.60 - 1.20 MMOL/L Final    Reported dose date 08/12/2022 NOT PROVIDED    Final    Reported dose time: 08/12/2022 NOT PROVIDED    Final    Reported dose: 08/12/2022 NOT PROVIDED  UNITS Final    Lithium level 08/12/2022 2.68 (A) 0.60 - 1.20 MMOL/L Final    Reported dose date 08/12/2022 PENDING   Incomplete    Reported dose time: 08/12/2022 PENDING   Incomplete    Reported dose: 08/12/2022 PENDING  UNITS Incomplete    Magnesium 08/12/2022 2.6 (A) 1.6 - 2.4 mg/dL Final    Lithium level 08/13/2022 1.61 (A) 0.60 - 1.20 MMOL/L Final    Reported dose date 08/13/2022 NOT PROVIDED    Final    Reported dose time: 08/13/2022 NOT PROVIDED    Final    Reported dose: 08/13/2022 NOT PROVIDED  UNITS Final    Sodium 08/13/2022 142  136 - 145 mmol/L Final    Potassium 08/13/2022 3.8  3.5 - 5.1 mmol/L Final    Chloride 08/13/2022 115 (A) 97 - 108 mmol/L Final    CO2 08/13/2022 21  21 - 32 mmol/L Final    Anion gap 08/13/2022 6  5 - 15 mmol/L Final    Glucose 08/13/2022 96  65 - 100 mg/dL Final    BUN 08/13/2022 7  6 - 20 MG/DL Final    Creatinine 08/13/2022 0.68  0.55 - 1.02 MG/DL Final    BUN/Creatinine ratio 08/13/2022 10 (A) 12 - 20   Final    GFR est AA 08/13/2022 >60  >60 ml/min/1.73m2 Final    GFR est non-AA 08/13/2022 >60  >60 ml/min/1.73m2 Final    Calcium 08/13/2022 8.7  8.5 - 10.1 MG/DL Final    Lithium level 08/14/2022 0.79  0.60 - 1.20 MMOL/L Final    Reported dose date 08/14/2022 NOT PROVIDED    Final    Reported dose time: 08/14/2022 NOT PROVIDED    Final    Reported dose: 08/14/2022 NOT PROVIDED  UNITS Final    Sodium 08/14/2022 144  136 - 145 mmol/L Final    Potassium 08/14/2022 3.5  3.5 - 5.1 mmol/L Final    Chloride 08/14/2022 116 (A) 97 - 108 mmol/L Final    CO2 08/14/2022 26  21 - 32 mmol/L Final    Anion gap 08/14/2022 2 (A) 5 - 15 mmol/L Final    Glucose 08/14/2022 102 (A) 65 - 100 mg/dL Final    BUN 08/14/2022 3 (A) 6 - 20 MG/DL Final    Creatinine 08/14/2022 0.74  0.55 - 1.02 MG/DL Final BUN/Creatinine ratio 08/14/2022 4 (A) 12 - 20   Final    GFR est AA 08/14/2022 >60  >60 ml/min/1.73m2 Final    GFR est non-AA 08/14/2022 >60  >60 ml/min/1.73m2 Final    Calcium 08/14/2022 9.1  8.5 - 10.1 MG/DL Final     Vitals:    08/16/22 1111 08/16/22 1132 08/16/22 1507 08/16/22 1823   BP: (!) 164/99  (!) 148/84 (!) 159/90   Pulse: 91  86 95   Resp: 16 16 16   Temp: 98.4 °F (36.9 °C)  98.7 °F (37.1 °C) 98.2 °F (36.8 °C)   SpO2:   99% 99%   Weight:  74.2 kg (163 lb 8 oz)     Height:  5' 1\" (1.549 m)       No results found for this or any previous visit (from the past 24 hour(s)). RADIOLOGY REPORTS:  Results from Hospital Encounter encounter on 01/10/20    XR CHEST PORT    Narrative  INDICATION: Admission hyponatremia. COMPARISON: January 7, 2018    FINDINGS: AP portable imaging of the chest performed at 9:41 AM demonstrates a  stable cardiomediastinal silhouette. The lungs are clear bilaterally. No  significant osseous abnormalities are seen. Impression  IMPRESSION: No evidence of acute cardiopulmonary process. No results found. PAST PSYCHIATRIC HISTORY:  She reports a history of bipolar I disorder. Also generalized anxiety disorder. Reportedly, she has a history of multiple inpatient psychiatric admissions, most recently here at [facility] as described above. She was also admitted at [facility] from 05/08/2022 to 05/12/2022 following a suicide attempt on, lithium, temazepam and hydroxyzine. She is currently being followed by Ms. Maureen Hernandez through [address] [place] [address] and Jesse Connor is her . When she was discharged here at [facility], she was discharged straight to her outpatient appointment with [address] [place] [address]. Her  was also reached out by Case Management prior to the discharge and  did not have any safety concern.   The plan also on the last admission was for the  to manage the medications but seems like it fell through. PSYCHOSOCIAL HISTORY:  She is . She lives with her spouse. She has no children. She is on disability and does not work. MENTAL STATUS EXAM:  She is currently sitting up in bed, dressed in street clothes. She is still confused and disoriented, limited eye contact. Speech, spontaneous. She reports her mood is anxious. Affect is blunted. Speech, slow but logical.  She is endorsing suicidal ideation as described above though she went back and forth. Denies auditory or visual hallucinations. She is confused. Insight is poor. Judgment is poor. DIAGNOSES:  Bipolar I disorder, current episode depressed. Generalized anxiety disorder by history. TREATMENT PLAN:  I will continue her inpatient stay. She will be provided with support and encouraged to attend groups. Her safety will be monitored. Her medications will be modified and assessed. Case Management will work on discharge planning. ASSETS AND STRENGTHS:  She is willing to seek help. She is willing to take medications. ESTIMATED LENGTH OF STAY:  7-10 days. This note was dictated with an electronic dictation software. Quite often, unanticipated grammatical, syntax, homophones, and other interpretive errors are inadvertently transcribed. Please disregard these errors. Please excuse any errors that have escaped final proofreading. If there are any questions, please contact me directly for clarification.         DIANELYS SINGH NP [email or web address]  D:  08/15/2022 12:28  T:  08/15/2022 14:28  JOB #:  [id number]

## 2022-08-15 NOTE — BH NOTES
PSYCHOSOCIAL ASSESSMENT  :Patient identifying info:   Aldo Curiel is a 61 y.o., female admitted 8/14/2022 10:05 PM     Presenting problem and precipitating factors: She was transferred from [facility] after she overdosed on her lithium and trihexyphenidyl . She was just discharge from [facility] [place] to her outpatient behavioral health appointment, then returned home. The next morning when she woke up she could not find her  , got upset and took the overdose. Mental status assessment: alert, very disorganized , anxious , somewhat guarded     Strengths/Recreation/Coping Skills:supportive family - willingness to seek treatment - established outpatient treatment     Collateral information:   Lita Baxter [phone]       Current psychiatric /substance abuse providers and contact info: Yordan Paredes, Dr Breanna Neumann. and Bertha Nolen    Previous psychiatric/substance abuse providers and response to treatment: she has had previous admissions to [facility] and was just discharge on August 10 - she also was treated at [address] [place] [address] in May of this year for an overdose attempt     Family history of mental illness or substance abuse: none noted     Substance abuse history:    Social History     Tobacco Use    Smoking status: Never    Smokeless tobacco: Never   Substance Use Topics    Alcohol use: Yes     Comment: occasional       History of biomedical complications associated with substance abuse: none noted     Patient's current acceptance of treatment or motivation for change: she was transferred from [facility] as a voluntary patient     Family constellation: Andres -[phone]    Is significant other involved?  Yes     Describe support system: she has a supportive family and outpatient mental health follow up     Describe living arrangements and home environment: she lives with her      GUARDIAN/POA:     Guardian Name:     Guardian Contact:     Health issues:   Hospital Problems Date Reviewed: 2020            Codes Class Noted POA    Schiz[facility]ffect[facility] ([facility] ICD-10-CM: F25.9  ICD-9-CM: 295.70  2022 Unknown           Trauma history: patient denies a trauma history     Legal issues: no     History of  service: no    Financial status: SSDI     Mandaeism/cultural factors:  Jehovah Witness    Education/work history: High school level of education       Have you been licensed as a health care professional (current or ): no    Describe coping skills:ineffectual     Alejandra Opitz  8/15/2022

## 2022-08-15 NOTE — PROGRESS NOTES
Patient this morning, irritable, easily overstimulated. As much as possible attempts made to prevent overstimulation.  and sister in law, and sitter at bedside. Towards end of day patient less irritable. Patient able to recount why shes in hospital and why she has one to one sitter, communicating she is aware she took overdose of lithium to end her life, but she communicates she no longer wants to die, and currently does not have a plan in place to commit suicide. Patient family very supportive and calming for patient.

## 2022-08-15 NOTE — PROGRESS NOTES
Call that pt has been accepted to [facility] [place] to room number 728 bed 2    Accepting MD Gem Coddington    Call report to [phone]   [address] [place] [address], Ne and arranged transport  ETA [phone]

## 2022-08-15 NOTE — BH NOTES
TRANSFER - IN REPORT:    Verbal report received from Marni Pereira (RN) on Ray Resides  being received from [facility] for routine progression of care      Report consisted of patients Situation, Background, Assessment and   Recommendations(SBAR). Information from the following report(s) SBAR was reviewed with the receiving nurse. Opportunity for questions and clarification was provided. Assessment completed upon patients arrival to unit and care assumed.

## 2022-08-15 NOTE — BH NOTES
GROUP THERAPY PROGRESS NOTE    Patient did not participate in self-care group.     Lysle Leaver MSW, HP-A

## 2022-08-15 NOTE — PROGRESS NOTES
Patient received resting quietly in bed. NAD. Meal and medication compliant. Remains safe on unit. Will continue to monitor with q15min rounds and provide support. Problem: Depressed Mood (Adult/Pediatric)  Goal: *STG: Participates in treatment plan  Outcome: Progressing Towards Goal  Goal: *STG: Remains safe in hospital  Outcome: Progressing Towards Goal  Goal: *STG: Complies with medication therapy  Outcome: Progressing Towards Goal     Problem: Psychosis  Goal: *STG: Remains safe in hospital  Outcome: Progressing Towards Goal  Goal: *STG: Seeks staff when feelings of self harm or harm towards others arise  Outcome: Progressing Towards Goal     Problem: Falls - Risk of  Goal: *Absence of Falls  Description: Document Clara Fall Risk and appropriate interventions in the flowsheet.   Outcome: Progressing Towards Goal  Note: Fall Risk Interventions:            Medication Interventions: Teach patient to arise slowly

## 2022-08-15 NOTE — PROGRESS NOTES
Problem: Depressed Mood (Adult/Pediatric)  Goal: *STG: Attends activities and groups  Outcome: Not Progressing Towards Goal     Problem: Psychosis  Goal: *STG: Seeks staff when feelings of self harm or harm towards others arise  Outcome: Not Progressing Towards Goal  Goal: *STG: Develop safety plan for times when triggered in stressful situations  Outcome: Not Progressing Towards Goal    Patient is generally isolative to self. Endorses anxiety, denies si/hi. States that she doesn't know what she would do if she left today, she thinks she might do something.

## 2022-08-15 NOTE — BH NOTES
PRN Medication Documentation    Specific patient behavior that led to need for PRN medication: anxiety, sleeplessness  Staff interventions attempted prior to PRN being given: therapeutic communication  PRN medication given: atatax 50 mg, trazodone 50 mg  Patient response/effectiveness of PRN medication: back to room, in bed.  Will monitor

## 2022-08-15 NOTE — INTERDISCIPLINARY ROUNDS
Behavioral Health Interdisciplinary Rounds     Patient Name: Shadi Santoro  Age: 61 y.o. Room/Bed:  728/02  Primary Diagnosis: <principal problem not specified>   Admission Status: Voluntary     Readmission within 30 days: no  Power of  in place: no  Patient requires a blocked bed: no          Reason for blocked bed:       Flu Vaccine : no   Consults:          Labs/Testing due today? Have they refused labs?: no    Sleep hours:        Participation in Care/Groups:    Medication Compliant?: Yes  PRNS (last 24 hours): Antianxiety and Sleep Aid    Restraints (last 24 hours):  no     CIWA (range last 24 hours):     COWS (range last 24 hours):      Alcohol screening (AUDIT) completed -         If applicable, date SBIRT discussed in treatment team AND documented:   AUDIT Screen Score: Tobacco - patient is a smoker: Have You Used Tobacco in the Past 30 Days: No  Illegal Drugs use: Have You Used Any Illegal Substances Over the Past 12 Months: No    24 hour chart check complete: yes   ____________________________________________________________________________________________________________    Patient goal(s) for today:   Treatment team focus/goals: Plan to assess for medications and discharge  needs. Progress note : She was somewhat disorganized in treatment today and very anxious. She denies SI today, feels safe in the hospital, but is asking for help with her anxiety. LOS:  1  Expected LOS: 7 days     Financial concerns/prescription coverage:  medicare / [facility] [employer] [place] contact:   Carie Sherman [phone]          Family requesting physician contact today:    Discharge plan: consider stepping down to [facility] after discharge   Access to weapons : no        Outpatient provider(s): [address]   Patient's preferred phone number for follow up call :   Patient's preferred e-mail address :  Participating treatment team members: Cindy Bro Paco Aquino NP - Aniya Bo, RN

## 2022-08-15 NOTE — PROGRESS NOTES
TRANSFER - OUT REPORT:    Verbal report given to Hilda(name) on Heriberto Abarca  being transferred to [facility](unit) for routine progression of care       Report consisted of patients Situation, Background, Assessment and   Recommendations(SBAR). Information from the following report(s) SBAR, Kardex, Intake/Output, MAR, and Recent Results was reviewed with the receiving nurse. Lines:       Opportunity for questions and clarification was provided.       Patient transported by [facility]

## 2022-08-16 PROCEDURE — 74011250637 HC RX REV CODE- 250/637: Performed by: NURSE PRACTITIONER

## 2022-08-16 PROCEDURE — 65220000003 HC RM SEMIPRIVATE PSYCH

## 2022-08-16 RX ORDER — OLANZAPINE 5 MG/1
5 TABLET ORAL 2 TIMES DAILY
Status: DISCONTINUED | OUTPATIENT
Start: 2022-08-16 | End: 2022-08-18

## 2022-08-16 RX ORDER — FLUOXETINE HYDROCHLORIDE 20 MG/1
20 CAPSULE ORAL DAILY
Status: DISCONTINUED | OUTPATIENT
Start: 2022-08-16 | End: 2022-08-18

## 2022-08-16 RX ADMIN — HYDROXYZINE HYDROCHLORIDE 50 MG: 50 TABLET, FILM COATED ORAL at 16:26

## 2022-08-16 RX ADMIN — HYDROXYZINE HYDROCHLORIDE 50 MG: 50 TABLET, FILM COATED ORAL at 09:26

## 2022-08-16 RX ADMIN — HYDROXYZINE HYDROCHLORIDE 50 MG: 50 TABLET, FILM COATED ORAL at 04:34

## 2022-08-16 RX ADMIN — TRAZODONE HYDROCHLORIDE 50 MG: 50 TABLET ORAL at 20:21

## 2022-08-16 RX ADMIN — FLUOXETINE 20 MG: 20 CAPSULE ORAL at 12:17

## 2022-08-16 RX ADMIN — OLANZAPINE 5 MG: 5 TABLET, FILM COATED ORAL at 09:26

## 2022-08-16 RX ADMIN — OLANZAPINE 5 MG: 5 TABLET, FILM COATED ORAL at 16:26

## 2022-08-16 RX ADMIN — OLANZAPINE 5 MG: 5 TABLET, FILM COATED ORAL at 12:17

## 2022-08-16 RX ADMIN — OLANZAPINE 5 MG: 5 TABLET, FILM COATED ORAL at 21:00

## 2022-08-16 NOTE — PROGRESS NOTES
Problem: Depressed Mood (Adult/Pediatric)  Goal: *STG: Remains safe in hospital  Outcome: Progressing Towards Goal  Pt denies any  homicidal thoughts. Contracts for safety. Remains on q 15 min safety checks. STATED 'I have thoughts of hurting myself but fear death and pain\"    Goal: *STG: Complies with medication therapy  Outcome: Progressing Towards Goal  MEDICATION COMPLIANT DENIES SIDE EFFECTS     Problem: *Psychosis: Discharge Outcome  Goal: *Absent or Controlled Active Psychotic Symptoms  Outcome: Not Progressing Towards Goal        SELF REPORTS \"I`M A BAD PERSON MY CHARACTER IS NOT GOOD. I LIE CHEAT AND STEAL I FEAR ALL OF YOU. \"HAS BEEN RELIGIOUSLY PREOCCUPIED.

## 2022-08-16 NOTE — H&P
[address] [facility] Adult  Hospitalist Group  History and Physical    Date of Service:  8/16/2022  Primary Care Provider: Bryson Ferguson MD  Source of information: The patient and Chart review    Chief Complaint: No chief complaint on file. History of Presenting Illness:   Neelima Estevez is a 61 y.o. female who presents with suicidal ideation, had taken overdose of her medications. Was evaluated by behavioral health and transferred to inpatient psychiatry at our facility for further management. Hospitalist service was consulted for medical evaluation and clearance by inpatient psychiatry nurse practitioner January Ballard. At the moment of the initial interview the patient was cooperative with the exam.  She denied fever, chills, pain or discomfort. REVIEW OF SYSTEMS:  Constitutional: negative  Eyes: negative  Ears, Nose, Mouth, Throat, and Face: negative  Respiratory: negative  Cardiovascular: negative  Gastrointestinal: negative  Genitourinary:negative  Neurological: negative     Past Medical History:   Diagnosis Date    Anxiety and depression     Bipolar 1 disorder with moderate robyn ([address] 3/17/2014    Chronic back pain     Hyperlipidemia 8/22/2016    Hyponatremia     Psychotic disorder ([facility]     Scoliosis       Past Surgical History:   Procedure Laterality Date    HX HEENT      wisdom teeth extraction    HX LIPOMA RESECTION      right gluteal    [facility] BIOPSY BREAST STEREOTACTIC Left 2011    negative    [place] DENTAL SURGERY PROCEDURE      hx of dental surgery- wisdom teeth extracted     Prior to Admission medications    Medication Sig Start Date End Date Taking? Authorizing Provider   hydrOXYzine HCL (ATARAX) 50 mg tablet Take 1 Tablet by mouth three (3) times daily for 30 days. Indications: anxious 8/9/22 9/8/22  Theron WILEY NP   lithium carbonate 300 mg capsule Take 1 Capsule by mouth nightly for 30 days.  Indications: bipolar disorder 8/9/22 9/8/22  Theron WILEY NP   OLANZapine (ZyPREXA) 20 mg tablet Take 1 Tablet by mouth nightly. Indications: bipolar disorder 8/9/22   Denis WILEY NP   traZODone (DESYREL) 150 mg tablet Take 1 Tablet by mouth nightly. Indications: insomnia associated with depression 8/9/22   Denis WILEY NP   trihexyphenidyL (ARTANE) 2 mg tablet Take 1 Tablet by mouth in the morning. Indications: extrapyramidal symptoms as a result of taking the medication 8/9/22   Denis WILEY NP   azelastine (ASTELIN) 137 mcg (0.1 %) nasal spray 1 [place] by Both Nostrils route daily as needed for Rhinitis. Indications: seasonal runny nose 5/12/22   Velia Dangelo MD   cetirizine (ZYRTEC) 10 mg tablet Take 1 Tablet by mouth daily as needed for Allergies. Indications: seasonal runny nose 5/12/22   Velia Dangelo MD   cholecalciferol (VITAMIN D3) (1000 Units /25 mcg) tablet Take 1 Tablet by mouth daily. Indications: prevention of vitamin D deficiency 5/13/22   Velia Dangelo MD   polyethylene glycol (Miralax) 17 gram packet Take 1 Packet by mouth daily as needed for Constipation. Indications: constipation  Patient not taking: Reported on 8/12/2022 5/12/22   Velia Dangelo MD   melatonin 3 mg tablet Take 1 Tablet by mouth nightly as needed for Insomnia.  Indications: insomnia 5/12/22   Velia Dangelo MD     Allergies   Allergen Reactions    Other Food Hives     Artifical sweetners    Claritin-D 12 Hour [Loratadine-Pseudoephedrine] Palpitations     palpatations and ringing in the ear    Other Medication Anaphylaxis     Artificial sweeteners cause anaphylaxis    Pcn [Penicillins] Anaphylaxis    Sunscreen Contact Dermatitis     Burns and opens the skin    Ultram [Tramadol] Hives and Other (comments)     Hives and ringing of the ears    Benzoyl Peroxide Hives    Cephalexin Itching    Divalproex Itching    Maltodextrin-Glycerin Shortness of Breath      Family History   Problem Relation Age of Onset    Arthritis-rheumatoid Mother     Migraines Mother     Psychiatric Disorder Mother     Bipolar Disorder Mother     Lupus Mother     Alcohol abuse Father     Lung Disease Father     Heart Disease Father     Stroke Father     High Cholesterol Father     Psychiatric Disorder Sister     Alcohol abuse Sister     Bipolar Disorder Sister     Stroke Brother     Cancer Maternal Aunt     Breast Cancer Maternal Aunt         pt jose d she was under 48    Bipolar Disorder Sister       Social History:  reports that she has never smoked. She has never used smokeless tobacco. She reports current alcohol use. She reports that she does not use drugs. Family and social history were personally reviewed, all pertinent and relevant details are outlined as above. Objective:   Visit Vitals  BP (!) 148/84 (BP 1 Location: Left upper arm, BP Patient Position: Sitting)   Pulse 86   Temp 98.7 °F (37.1 °C)   Resp 16   Ht 5' 1\" (1.549 m)   Wt 74.2 kg (163 lb 8 oz)   SpO2 99%   BMI 30.89 kg/m²           PHYSICAL EXAM:   General: Alert x oriented x 3, awake, no acute distress, resting in bed, pleasant female, appears to be stated age  HEENT: PEERL, EOMI, moist mucus membranes  Neck: Supple, no JVD, no meningeal signs  Chest: Clear to auscultation bilaterally   CVS: RRR, S1 S2 heard, no murmurs/rubs/gallops  Abd: Soft, non-tender, non-distended, +bowel sounds   Ext: No clubbing, no cyanosis, no edema  Neurosensory grossly within normal limit, Strength 5/5 in all extremities,   Cap refill: Brisk, less than 3 seconds  Pulses: 2+, symmetric in all extremities  Skin: Warm, dry, without rashes or lesions    Data Review: All diagnostic labs and studies have been reviewed.     Abnormal Labs Reviewed - No data to display    All Micro Results       None            IMAGING:   No orders to display        ECG/ECHO:    Results for orders placed or performed during the hospital encounter of 08/11/22   EKG, 12 LEAD, INITIAL   Result Value Ref Range    Ventricular Rate 89 BPM Atrial Rate 89 BPM    P-R Interval 146 ms    QRS Duration 70 ms    Q-T Interval 354 ms    QTC Calculation (Bezet) 430 ms    Calculated P Axis 71 degrees    Calculated R Axis 49 degrees    Calculated T Axis 41 degrees    Diagnosis       Normal sinus rhythm  Biatrial enlargement  Nonspecific ST and T wave abnormality    Confirmed by Miguelangel Mejia ([id number]) on 8/12/2022 8:31:28 AM          Assessment:   Given the patient's current clinical presentation, there is a high level of concern for decompensation if discharged from the emergency department. Complex decision making was performed, which includes reviewing the patient's available past medical records, laboratory results, and imaging studies. Active Problems:    Schizoaffective disorder ([facility] (5/8/2022)    Plan:     Bipolar  Treatment per primary team        DIET: ADULT DIET Regular       Rest per primary team.  Thank you for the opportunity to participate in the care of this patient  Please call should the need arise    Signed By: Maris Lr NP     August 16, 2022         Please note that this dictation may have been completed with Shara William, the computer voice recognition software. Quite often unanticipated grammatical, syntax, homophones, and other interpretive errors are inadvertently transcribed by the computer software. Please disregard these errors. Please excuse any errors that have escaped final proofreading.

## 2022-08-16 NOTE — BH NOTES
PRN Medication Documentation    Specific patient behavior that led to need for PRN medication: restless,anxiety,paranoid  Staff interventions attempted prior to PRN being given: redirection,coping skills  PRN medication given: atarax,zyprexa  Patient response/effectiveness of PRN medication: tl aware

## 2022-08-16 NOTE — BH NOTES
PRN Medication Documentation    Specific patient behavior that led to need for PRN medication: anxiety  Staff interventions attempted prior to PRN being given: education  PRN medication given: Atarax  Patient response/effectiveness of PRN medication: will continue to monitor

## 2022-08-16 NOTE — PROGRESS NOTES
Patient appears to be safely sleeping; chest rising & falling. Staff will continue to monitor for safety and to provide support. Problem: Falls - Risk of  Goal: *Absence of Falls  Description: Document Jairo Cease Fall Risk and appropriate interventions in the flowsheet.   Outcome: Progressing Towards Goal  Note: Fall Risk Interventions:            Medication Interventions: Teach patient to arise slowly

## 2022-08-16 NOTE — BH NOTES
GROUP THERAPY PROGRESS NOTE    Patient is participating in Self-care group. Group time: 45 minutes    Personal goal for participation: to gain an understanding of boundaries in our relationships    Goal orientation: Personal    Group therapy participation: Active     Therapeutic interventions reviewed and discussed:  Group members were guided through learning about the importance of boundaries. Members gained an understanding of what boundaries are, how to set them, and the differences between healthy and unhealthy boundaries. Examples of situations were provided for the members to assess and provide the appropriate boundary. Handouts provided. Impression of participation: Pt was present and engaged in group discussion. Pt added insight to group topic. Pt was actively engaged and interacting with peers and Sw. Pt shared personal experiences with topic. Pt was calm, cooperative.        MERCED Tobias, QMHP-A

## 2022-08-16 NOTE — PROGRESS NOTES
Problem: Depressed Mood (Adult/Pediatric)  Goal: *STG: Participates in treatment plan  Outcome: Progressing Towards Goal     Problem: Depressed Mood (Adult/Pediatric)  Goal: *STG: Attends activities and groups  Outcome: Progressing Towards Goal     Problem: Depressed Mood (Adult/Pediatric)  Goal: *STG: Remains safe in hospital  Outcome: Progressing Towards Goal

## 2022-08-16 NOTE — BH NOTES
PSYCHIATRIC PROGRESS NOTE       Patient: Pavan Woods MRN: [id number]  SSN: [id number]    YOB: 1965  Age: 61 y.o. Sex: female      Admit Date: 8/14/2022    LOS: 2 days       Chief Complaint:  I have a confession to make. Interval History:  Pavan Woods is doing poorly. Delusions and paranoia noted. Says she is not the persons whom she says she is. \"I am bad person, a liar, cheater. \" \"I feel you all are out to hurt me, I am afraid to close my eyes. \" Very depressed and anxious, \"I am tired of being me. \" Making references about the bible, \" they lied. \" After a long pause, she shares she is fearful of dying, but also endorses thoughts of suicide, \"but I don't want to die. \" She says she had to prove something to [employer] witness so she overdosed. She agreed to trial prozac and to restart zyprexa.     Past Medical History:  Past Medical History:   Diagnosis Date    Anxiety and depression     Bipolar 1 disorder with moderate robyn ([address] 3/17/2014    Chronic back pain     Hyperlipidemia 8/22/2016    Hyponatremia     Psychotic disorder ([facility] [address]     Scoliosis          ALLERGIES:(reviewed/updated 8/16/2022)  Allergies   Allergen Reactions    Other Food Hives     Artifical sweetners    Claritin-D 12 Hour [Loratadine-Pseudoephedrine] Palpitations     palpatations and ringing in the ear    Other Medication Anaphylaxis     Artificial sweeteners cause anaphylaxis    Pcn [Penicillins] Anaphylaxis    Sunscreen Contact Dermatitis     Burns and opens the skin    Ultram [Tramadol] Hives and Other (comments)     Hives and ringing of the ears    Benzoyl Peroxide Hives    Cephalexin Itching    Divalproex Itching    Maltodextrin-Glycerin Shortness of Breath       Laboratory report:  Lab Results   Component Value Date/Time    WBC 6.8 08/12/2022 05:34 AM    HGB 12.2 08/12/2022 05:34 AM    Hematocrit (POC) 34 (L) 07/17/2018 03:33 AM    HCT 37.6 08/12/2022 05:34 AM    PLATELET 828 (L) 22/13/5455 05:34 AM    MCV 94.0 08/12/2022 05:34 AM      Lab Results   Component Value Date/Time    Sodium 144 08/14/2022 03:45 AM    Potassium 3.5 08/14/2022 03:45 AM    Chloride 116 (H) 08/14/2022 03:45 AM    CO2 26 08/14/2022 03:45 AM    Anion gap 2 (L) 08/14/2022 03:45 AM    Glucose 102 (H) 08/14/2022 03:45 AM    Glucose 107 (H) 02/03/2020 05:40 AM    BUN 3 (L) 08/14/2022 03:45 AM    Creatinine 0.74 08/14/2022 03:45 AM    BUN/Creatinine ratio 4 (L) 08/14/2022 03:45 AM    GFR est AA >60 08/14/2022 03:45 AM    GFR est non-AA >60 08/14/2022 03:45 AM    Calcium 9.1 08/14/2022 03:45 AM    Bilirubin, total 0.2 08/12/2022 05:34 AM    Alk.  phosphatase 39 (L) 08/12/2022 05:34 AM    Protein, total 7.2 08/12/2022 05:34 AM    Albumin 3.4 (L) 08/12/2022 05:34 AM    Globulin 3.8 08/12/2022 05:34 AM    A-G Ratio 0.9 (L) 08/12/2022 05:34 AM    ALT (SGPT) 19 08/12/2022 05:34 AM      Vitals:    08/15/22 0818 08/15/22 0905 08/15/22 1518 08/16/22 0806   BP: (!) 161/89 106/77 (!) 150/83 133/80   Pulse: 87 83 80 82   Resp: 16 16 16 16   Temp: 98.8 °F (37.1 °C) 98 °F (36.7 °C) 98.4 °F (36.9 °C) 99.4 °F (37.4 °C)   SpO2: 98% 99% 100% 97%       Lab Results   Component Value Date/Time    Carbamazepine 14.1 (H) 07/26/2020 04:19 AM     Lab Results   Component Value Date/Time    Lithium level 0.79 08/14/2022 03:45 AM       Vital Signs  Patient Vitals for the past 24 hrs:   Temp Pulse Resp BP SpO2   08/16/22 0806 99.4 °F (37.4 °C) 82 16 133/80 97 %   08/15/22 1518 98.4 °F (36.9 °C) 80 16 (!) 150/83 100 %     Wt Readings from Last 3 Encounters:   08/11/22 72.6 kg (160 lb)   08/07/22 71.5 kg (157 lb 9.6 oz)   08/06/22 72.6 kg (160 lb)     Temp Readings from Last 3 Encounters:   08/16/22 99.4 °F (37.4 °C)   08/14/22 98.4 °F (36.9 °C)   08/10/22 98.4 °F (36.9 °C)     BP Readings from Last 3 Encounters:   08/16/22 133/80   08/14/22 (!) 142/77   08/10/22 111/75     Pulse Readings from Last 3 Encounters:   08/16/22 82   08/14/22 92   08/10/22 78       Radiology (reviewed/updated 8/16/2022)  No results found. Current Facility-Administered Medications   Medication Dose Route Frequency Provider Last Rate Last Admin    hydrOXYzine HCL (ATARAX) tablet 50 mg  50 mg Oral Q4H PRN Alannah Aquinoanie A, NP   50 mg at 08/16/22 0926    OLANZapine (ZyPREXA) tablet 5 mg  5 mg Oral Q6H PRN Kenia, Stephanie A, NP   5 mg at 08/16/22 0926    haloperidol lactate (HALDOL) injection 5 mg  5 mg IntraMUSCular Q6H PRN Teresa Aquinoe A, NP        benztropine (COGENTIN) tablet 1 mg  1 mg Oral BID PRN Teresa Aquinoe A, NP        diphenhydrAMINE (BENADRYL) injection 50 mg  50 mg IntraMUSCular BID PRN Teresa Aquinoe A, NP        traZODone (DESYREL) tablet 50 mg  50 mg Oral QHS PRN Kenia, Stephanie A, NP   50 mg at 08/15/22 0015    acetaminophen (TYLENOL) tablet 650 mg  650 mg Oral Q4H PRN Stephanie Aquino A, NP   650 mg at 08/15/22 0511    magnesium hydroxide (MILK OF MAGNESIA) 400 mg/5 mL oral suspension 30 mL  30 mL Oral DAILY PRN Stephanie Aquino, NP           Side Effects: (reviewed/updated 8/16/2022)  None reported or admitted to. Review of Systems: (reviewed/updated 8/16/2022)  Appetite: good  Sleep: good   All other Review of Systems: negative    Mental Status Exam:  Eye contact: Limited eye contact  Psychomotor activity: mildly decreased  Speech is spontaneous  Thought process: Logical and goal directed   Mood is \"depressed\"  Affect: Blunted  Perception: No avh  Thought content: +paranoid +delusions  Suicidal ideation: + si  Homicidal ideation: No hi  Insight/judgment: Poor  Cognition is grossly intact      Physical Exam:  Musculoskeletal system: steady gait  Tremor not present  Cog wheeling not present      Assessment and Plan:  Chelsie Morales meets criteria for a diagnosis of Bipolar 1 disorder current episode depressed with psychotic features. ROSA MARIA. Rule out schizoaffective disorder. Start prozac 20 mg daily. Zyprexa 5 mg bid. Continue rest of medications as prescribed.   We will closely monitor for safety. We will encourage reality orientation. Disposition planning to continue. I certify that this patients inpatient psychiatric hospital services furnished since the previous certification were, and continue to be, required for treatment that could reasonably be expected to improve the patient's condition, or for diagnostic study, and that the patient continues to need, on a daily basis, active treatment furnished directly by or requiring the supervision of inpatient psychiatric facility personnel. In addition, the hospital records show that services furnished were intensive treatment services, admission or related services, or equivalent services.       Signed:  Valencia Clarke NP  8/16/2022

## 2022-08-16 NOTE — INTERDISCIPLINARY ROUNDS
Behavioral Health Interdisciplinary Rounds     Patient Name: Marcela Evans  Age: 61 y.o. Room/Bed:  728/02  Primary Diagnosis: <principal problem not specified>   Admission Status: Voluntary     Readmission within 30 days: yes  Power of  in place: no  Patient requires a blocked bed: no          Reason for blocked bed:       Flu Vaccine :    Consults:        Labs/Testing due today? Have they refused labs?:   Sleep hours:  7      Participation in Care/Groups:    Medication Compliant?: Med holiday  PRNS (last 24 hours): Antipsychotic (PO), Antianxiety, and Sleep Aid    Restraints (last 24 hours):       CIWA (range last 24 hours):     COWS (range last 24 hours):      Alcohol screening (AUDIT) completed -         If applicable, date SBIRT discussed in treatment team AND documented:   AUDIT Screen Score:        Document Brief Intervention (corresponds directly with the 5 A's, Ask, Advise, Assess, Assist, and Arrange): At- Risk Patients (Score 7-15 for women; 8-15 for men)  Discuss concern patient is drinking at unhealthy levels known to increase risk of alcohol-related health problems. Is Patient ready to commit to change? If No:  Encourage reflection  Discuss short term and long term health risks of consuming alcohol  Barriers to change  Reaffirm willingness to help / Educational materials provided  If Yes:  Set goal  Plan  Educational materials provided    Harmful use or Dependence (Score 16 or greater)  Discuss short term and long term health risks of consuming alcohol  Recommendations  Negotiate drinking goal  Recommend addiction specialist/center  Arrange follow-up appointments.     Tobacco - patient is a smoker: Have You Used Tobacco in the Past 30 Days: No  Illegal Drugs use: Have You Used Any Illegal Substances Over the Past 12 Months: No    24 hour chart check complete: yes ____________________________________________________________________________________________________________    Patient goal(s) for today: Communicate needs to staff, medication compliance. Interact out on unit. Treatment team focus/goals: Assess pt, manage medications, discharge planning   Progress note Pts mood is anxious and speech is spontaneous. Pt endorses SI and AVH. Pt stated \" I have a confession, I am not a person\" Pt states that she is \"not doing well\" and is confused. Pt inquired about whe she can leave. SW will talk will arrange family session with pt and spouse.     LOS:  2 Expected LOS: 8/19/22    Financial concerns/prescription coverage:    Family contact:  Donte Peters, [phone]     Family requesting physician contact today:    Discharge plan: Pt will discharge home   Access to weapons :  no        Outpatient provider(s): [facility], NP Vinayak Covarrubias   Patient's preferred phone number for follow up call :   Patient's preferred e-mail address :  Participating treatment team members: Amber Moore, Jennifer Carrillo, MERCED, Hanna Denise RN, Graham Kitchen, DIONNE

## 2022-08-17 LAB
BASOPHILS # BLD: 0 K/UL (ref 0–0.1)
BASOPHILS NFR BLD: 0 % (ref 0–1)
DIFFERENTIAL METHOD BLD: ABNORMAL
EOSINOPHIL # BLD: 0.1 K/UL (ref 0–0.4)
EOSINOPHIL NFR BLD: 1 % (ref 0–7)
ERYTHROCYTE [DISTWIDTH] IN BLOOD BY AUTOMATED COUNT: 12.9 % (ref 11.5–14.5)
HCT VFR BLD AUTO: 31.3 % (ref 35–47)
HGB BLD-MCNC: 10.7 G/DL (ref 11.5–16)
IMM GRANULOCYTES # BLD AUTO: 0 K/UL (ref 0–0.04)
IMM GRANULOCYTES NFR BLD AUTO: 0 % (ref 0–0.5)
LYMPHOCYTES # BLD: 2 K/UL (ref 0.8–3.5)
LYMPHOCYTES NFR BLD: 30 % (ref 12–49)
MCH RBC QN AUTO: 31.3 PG (ref 26–34)
MCHC RBC AUTO-ENTMCNC: 34.2 G/DL (ref 30–36.5)
MCV RBC AUTO: 91.5 FL (ref 80–99)
MONOCYTES # BLD: 0.5 K/UL (ref 0–1)
MONOCYTES NFR BLD: 8 % (ref 5–13)
NEUTS SEG # BLD: 4.2 K/UL (ref 1.8–8)
NEUTS SEG NFR BLD: 61 % (ref 32–75)
NRBC # BLD: 0 K/UL (ref 0–0.01)
NRBC BLD-RTO: 0 PER 100 WBC
PLATELET # BLD AUTO: 206 K/UL (ref 150–400)
PMV BLD AUTO: 9.5 FL (ref 8.9–12.9)
RBC # BLD AUTO: 3.42 M/UL (ref 3.8–5.2)
WBC # BLD AUTO: 6.9 K/UL (ref 3.6–11)

## 2022-08-17 PROCEDURE — 36415 COLL VENOUS BLD VENIPUNCTURE: CPT

## 2022-08-17 PROCEDURE — 74011250637 HC RX REV CODE- 250/637: Performed by: NURSE PRACTITIONER

## 2022-08-17 PROCEDURE — 65220000003 HC RM SEMIPRIVATE PSYCH

## 2022-08-17 PROCEDURE — 74011250637 HC RX REV CODE- 250/637: Performed by: PSYCHIATRY & NEUROLOGY

## 2022-08-17 PROCEDURE — 85025 COMPLETE CBC W/AUTO DIFF WBC: CPT

## 2022-08-17 RX ORDER — CLONAZEPAM 1 MG/1
1 TABLET ORAL 2 TIMES DAILY
Status: DISPENSED | OUTPATIENT
Start: 2022-08-17 | End: 2022-08-22

## 2022-08-17 RX ADMIN — CLONAZEPAM 1 MG: 1 TABLET ORAL at 12:01

## 2022-08-17 RX ADMIN — OLANZAPINE 5 MG: 5 TABLET, FILM COATED ORAL at 20:45

## 2022-08-17 RX ADMIN — FLUOXETINE 20 MG: 20 CAPSULE ORAL at 08:23

## 2022-08-17 RX ADMIN — OLANZAPINE 5 MG: 5 TABLET, FILM COATED ORAL at 08:22

## 2022-08-17 RX ADMIN — CLONAZEPAM 1 MG: 1 TABLET ORAL at 17:33

## 2022-08-17 NOTE — PROGRESS NOTES
Pt. quietly pleasant and observant during shift RN/Team greeting rounds. Anxious and resting in bed. Engages politely when spoken to. Staff will continue to monitor and RN will address anxiety.        Problem: Depressed Mood (Adult/Pediatric)  Goal: *STG: Participates in treatment plan  Outcome: Progressing Towards Goal     Problem: Depressed Mood (Adult/Pediatric)  Goal: *STG: Remains safe in hospital  Outcome: Progressing Towards Goal     Problem: Depressed Mood (Adult/Pediatric)  Goal: *STG: Complies with medication therapy  Outcome: Progressing Towards Goal     Problem: Depressed Mood (Adult/Pediatric)  Goal: Interventions  Outcome: Progressing Towards Goal     Problem: Psychosis  Goal: *STG: Remains safe in hospital  Outcome: Progressing Towards Goal     Problem: Psychosis  Goal: *STG: Remains safe in hospital  Outcome: Progressing Towards Goal     Problem: Psychosis  Goal: *STG: Develop safety plan for times when triggered in stressful situations  Outcome: Progressing Towards Goal     Problem: Psychosis  Goal: *STG/LTG: Complies with medication therapy  Outcome: Progressing Towards Goal     Problem: Discharge Planning  Goal: *Knowledge of medication management  Outcome: Progressing Towards Goal

## 2022-08-17 NOTE — PROGRESS NOTES
Patient is calm, cooperative, anxious demeanor during RN/Team new shift greeting rounds, as she laid in bed. Problem: Falls - Risk of  Goal: *Absence of Falls  Description: Document Luis Mcnulty Fall Risk and appropriate interventions in the flowsheet.   8/17/2022 1817 by Andi Severe, RN  Outcome: Progressing Towards Goal  Note: Fall Risk Interventions:            Medication Interventions: Teach patient to arise slowly    Elimination Interventions: Call light in reach           8/17/2022 1806 by Andi Severe, RN  Outcome: Progressing Towards Goal  Note: Fall Risk Interventions:            Medication Interventions: Teach patient to arise slowly    Elimination Interventions: Call light in reach              Problem: Discharge Planning  Goal: *Knowledge of medication management  8/17/2022 1817 by Andi Severe, RN  Outcome: Progressing Towards Goal  8/17/2022 1806 by Andi Severe, RN  Outcome: Progressing Towards Goal     Problem: Depressed Mood (Adult/Pediatric)  Goal: *STG: Remains safe in hospital  8/17/2022 1817 by Andi Severe, RN  Outcome: Progressing Towards Goal  8/17/2022 1806 by Andi Severe, RN  Outcome: Progressing Towards Goal     Problem: Depressed Mood (Adult/Pediatric)  Goal: *STG: Complies with medication therapy  8/17/2022 1817 by Andi Severe, RN  Outcome: Progressing Towards Goal  8/17/2022 1806 by Andi Severe, RN  Outcome: Progressing Towards Goal

## 2022-08-17 NOTE — BH NOTES
GROUP THERAPY PROGRESS NOTE    Patient did not participate in [facility] Living and Wellness group.     Minal Yuan, MSW, QMHP-A

## 2022-08-17 NOTE — PROGRESS NOTES
Problem: Falls - Risk of  Goal: *Absence of Falls  Description: Document [place] Flow Fall Risk and appropriate interventions in the flowsheet. Outcome: Progressing Towards Goal  Note: Fall Risk Interventions:            Medication Interventions: Teach patient to arise slowly    Elimination Interventions: Toilet paper/wipes in reach     At present:patient resting with eyes closed ,resp even regular and unlabored. No distress noted.

## 2022-08-17 NOTE — PROGRESS NOTES
Problem: Depressed Mood (Adult/Pediatric)  Goal: *STG: Remains safe in hospital  Outcome: Progressing Towards Goal  Pt denies any homicidal thoughts. Contracts for safety. Remains on q 15 min safety checks. SELF REPORTS THOUGHTS OF SUICIDE BUT STATED I WILL NOT DO IT HERE. Goal: *STG: Complies with medication therapy  Outcome: Progressing Towards Goal  HAS BEEN MEDICATION COMPLIANT DENIES SIDE EFFECTS     Problem: *Psychosis: Discharge Outcome  Goal: *Absent or Controlled Active Psychotic Symptoms  Outcome: Not Progressing Towards Goal   SELF REPORTS PARANOIA AND AH.

## 2022-08-17 NOTE — BH NOTES
Pt met with Spouse via zoom for brief visit. Pts mood had improved since Tx team. Spouse provided encouragement for pt. SW spoke with spouse about course of tx and scheduling a family meeting. SW will reach out to spouse again tomorrow.

## 2022-08-17 NOTE — INTERDISCIPLINARY ROUNDS
Behavioral Health Interdisciplinary Rounds     Patient Name: Shadi Yu  Age: 61 y.o. Room/Bed:  728/  Primary Diagnosis: <principal problem not specified>   Admission Status: Voluntary     Readmission within 30 days: yes  Power of  in place: no  Patient requires a blocked bed: no          Reason for blocked bed:   Sleep hours: 7       Participation in Care/Groups:  yes  Medication Compliant?: Yes  PRNS (last 24 hours):  Antipsychotic (PO), Antianxiety, and Sleep Aid    Restraints (last 24 hours):  no     Alcohol screening (AUDIT) completed -     If applicable, date SBIRT discussed in treatment team AND documented:    Tobacco - patient is a smoker: Have You Used Tobacco in the Past 30 Days: No  Illegal Drugs use: Have You Used Any Illegal Substances Over the Past 12 Months: No    24 hour chart check complete: yes     _______________________________________________    Patient goal(s) for today:   Treatment team focus/goals:   Progress note:         Financial concerns/prescription coverage:    Family contact:                        Family requesting physician contact today:    Discharge plan:   Access to weapons :                                                              Outpatient provider(s):   Patient's preferred phone number for follow up call :   Patient's preferred e-mail address :  Participating treatment team members:    LOS:  3  Expected LOS:     Participating treatment team members: Shadi Gigi, * (assigned SW),

## 2022-08-17 NOTE — BH NOTES
PSYCHIATRIC PROGRESS NOTE       Patient: Manpreet Allen MRN: [id number]  SSN: [id number]    YOB: 1965  Age: 61 y.o. Sex: female      Admit Date: 8/14/2022    LOS: 3 days       Chief Complaint:  I am about the same. Interval History:  Manpreet Allen is doing poorly. She continues to express paranoia that she will be killed while in the hospital. She is sleeping poorly, has little energy and motivation, and feels hopeless about her \"life\". She gets agitated and puts her hands over her ears while talking. She continues to have SI which can be intrusive and describes it as a 10/10. She would not answer when I asked her about a plan. At the present time the patient Manpreet Allen remains compliant with taking medications.  Denies any adverse events from taking them and feels they have been beneficial.       Past Medical History:  Past Medical History:   Diagnosis Date    Anxiety and depression     Bipolar 1 disorder with moderate robyn ([address] 3/17/2014    Chronic back pain     Hyperlipidemia 8/22/2016    Hyponatremia     Psychotic disorder ([facility] [address]     Scoliosis          ALLERGIES:(reviewed/updated 8/17/2022)  Allergies   Allergen Reactions    Other Food Hives     Artifical sweetners    Claritin-D 12 Hour [Loratadine-Pseudoephedrine] Palpitations     palpatations and ringing in the ear    Other Medication Anaphylaxis     Artificial sweeteners cause anaphylaxis    Pcn [Penicillins] Anaphylaxis    Sunscreen Contact Dermatitis     Burns and opens the skin    Ultram [Tramadol] Hives and Other (comments)     Hives and ringing of the ears    Benzoyl Peroxide Hives    Cephalexin Itching    Divalproex Itching    Maltodextrin-Glycerin Shortness of Breath       Laboratory report:  Lab Results   Component Value Date/Time    WBC 6.9 08/17/2022 06:31 AM    HGB 10.7 (L) 08/17/2022 06:31 AM    Hematocrit (POC) 34 (L) 07/17/2018 03:33 AM    HCT 31.3 (L) 08/17/2022 06:31 AM    PLATELET 961 15/29/5262 06:31 AM    MCV 91.5 08/17/2022 06:31 AM      Lab Results   Component Value Date/Time    Sodium 144 08/14/2022 03:45 AM    Potassium 3.5 08/14/2022 03:45 AM    Chloride 116 (H) 08/14/2022 03:45 AM    CO2 26 08/14/2022 03:45 AM    Anion gap 2 (L) 08/14/2022 03:45 AM    Glucose 102 (H) 08/14/2022 03:45 AM    Glucose 107 (H) 02/03/2020 05:40 AM    BUN 3 (L) 08/14/2022 03:45 AM    Creatinine 0.74 08/14/2022 03:45 AM    BUN/Creatinine ratio 4 (L) 08/14/2022 03:45 AM    GFR est AA >60 08/14/2022 03:45 AM    GFR est non-AA >60 08/14/2022 03:45 AM    Calcium 9.1 08/14/2022 03:45 AM    Bilirubin, total 0.2 08/12/2022 05:34 AM    Alk.  phosphatase 39 (L) 08/12/2022 05:34 AM    Protein, total 7.2 08/12/2022 05:34 AM    Albumin 3.4 (L) 08/12/2022 05:34 AM    Globulin 3.8 08/12/2022 05:34 AM    A-G Ratio 0.9 (L) 08/12/2022 05:34 AM    ALT (SGPT) 19 08/12/2022 05:34 AM      Vitals:    08/16/22 1132 08/16/22 1507 08/16/22 1823 08/17/22 1056   BP:  (!) 148/84 (!) 159/90    Pulse:  86 95    Resp:  16 16 18   Temp:  98.7 °F (37.1 °C) 98.2 °F (36.8 °C)    SpO2:  99% 99%    Weight: 74.2 kg (163 lb 8 oz)      Height: 5' 1\" (1.549 m)          Lab Results   Component Value Date/Time    Carbamazepine 14.1 (H) 07/26/2020 04:19 AM     Lab Results   Component Value Date/Time    Lithium level 0.79 08/14/2022 03:45 AM       Vital Signs  Patient Vitals for the past 24 hrs:   Temp Pulse Resp BP SpO2   08/17/22 1056 -- -- 18 -- --   08/16/22 1823 98.2 °F (36.8 °C) 95 16 (!) 159/90 99 %   08/16/22 1507 98.7 °F (37.1 °C) 86 16 (!) 148/84 99 %       Wt Readings from Last 3 Encounters:   08/16/22 74.2 kg (163 lb 8 oz)   08/11/22 72.6 kg (160 lb)   08/07/22 71.5 kg (157 lb 9.6 oz)     Temp Readings from Last 3 Encounters:   08/16/22 98.2 °F (36.8 °C)   08/14/22 98.4 °F (36.9 °C)   08/10/22 98.4 °F (36.9 °C)     BP Readings from Last 3 Encounters:   08/16/22 (!) 159/90   08/14/22 (!) 142/77   08/10/22 111/75     Pulse Readings from Last 3 Encounters:   08/16/22 95   08/14/22 92   08/10/22 78       Radiology (reviewed/updated 8/17/2022)  No results found. Current Facility-Administered Medications   Medication Dose Route Frequency Provider Last Rate Last Admin    OLANZapine (ZyPREXA) tablet 5 mg  5 mg Oral BID Teresa Aquinoe SAURABH, NP   5 mg at 08/17/22 3685    FLUoxetine (PROzac) capsule 20 mg  20 mg Oral DAILY Alannah Aquinoanie A, NP   20 mg at 08/17/22 0780    hydrOXYzine HCL (ATARAX) tablet 50 mg  50 mg Oral Q4H PRN Alannah Aquinoanie A, NP   50 mg at 08/16/22 1626    OLANZapine (ZyPREXA) tablet 5 mg  5 mg Oral Q6H PRN Alannah Aquinoanie A, NP   5 mg at 08/16/22 1626    haloperidol lactate (HALDOL) injection 5 mg  5 mg IntraMUSCular Q6H PRN Stephanie Aquino A, NP        benztropine (COGENTIN) tablet 1 mg  1 mg Oral BID PRN Stephanie Aquino, NP        diphenhydrAMINE (BENADRYL) injection 50 mg  50 mg IntraMUSCular BID PRN Stephanie Aquino, NP        traZODone (DESYREL) tablet 50 mg  50 mg Oral QHS PRN Stephanie Aquino A, NP   50 mg at 08/16/22 2021    acetaminophen (TYLENOL) tablet 650 mg  650 mg Oral Q4H PRN Teresa Aquinoe A, NP   650 mg at 08/15/22 0511    magnesium hydroxide (MILK OF MAGNESIA) 400 mg/5 mL oral suspension 30 mL  30 mL Oral DAILY PRN Stephanie Aquino NP           Side Effects: (reviewed/updated 8/17/2022)  None reported or admitted to.     Review of Systems: (reviewed/updated 8/17/2022)  Appetite: good  Sleep: good   All other Review of Systems: negative    Mental Status Exam:  Eye contact: Limited eye contact  Psychomotor activity: mildly decreased  Speech is spontaneous  Thought process: Logical and goal directed   Mood is \"depressed\"  Affect: Blunted  Perception: No avh  Thought content: +paranoid +delusions  Suicidal ideation: + si  Homicidal ideation: No hi  Insight/judgment: Poor  Cognition is grossly intact      Physical Exam:  Musculoskeletal system: steady gait  Tremor not present  Cog wheeling not present      Assessment and Plan:  Yang Redd meets criteria for a diagnosis of Bipolar 1 disorder current episode depressed with psychotic features. ROSA MARIA. Rule out schizoaffective disorder. Klonopin 1mg BID X 5 days. Continue rest of medications as prescribed. We will closely monitor for safety. We will encourage reality orientation. Disposition planning to continue. I certify that this patients inpatient psychiatric hospital services furnished since the previous certification were, and continue to be, required for treatment that could reasonably be expected to improve the patient's condition, or for diagnostic study, and that the patient continues to need, on a daily basis, active treatment furnished directly by or requiring the supervision of inpatient psychiatric facility personnel. In addition, the hospital records show that services furnished were intensive treatment services, admission or related services, or equivalent services.       Signed:  Charles Gardiner MD  8/17/2022

## 2022-08-18 PROCEDURE — 74011250637 HC RX REV CODE- 250/637: Performed by: PSYCHIATRY & NEUROLOGY

## 2022-08-18 PROCEDURE — 65220000003 HC RM SEMIPRIVATE PSYCH

## 2022-08-18 PROCEDURE — 74011250637 HC RX REV CODE- 250/637: Performed by: NURSE PRACTITIONER

## 2022-08-18 RX ORDER — FLUOXETINE HYDROCHLORIDE 20 MG/1
40 CAPSULE ORAL DAILY
Status: DISCONTINUED | OUTPATIENT
Start: 2022-08-19 | End: 2022-08-20

## 2022-08-18 RX ORDER — OLANZAPINE 5 MG/1
10 TABLET ORAL 2 TIMES DAILY
Status: DISCONTINUED | OUTPATIENT
Start: 2022-08-18 | End: 2022-08-21

## 2022-08-18 RX ADMIN — OLANZAPINE 5 MG: 5 TABLET, FILM COATED ORAL at 09:07

## 2022-08-18 RX ADMIN — CLONAZEPAM 1 MG: 1 TABLET ORAL at 09:07

## 2022-08-18 RX ADMIN — FLUOXETINE 20 MG: 20 CAPSULE ORAL at 09:08

## 2022-08-18 RX ADMIN — OLANZAPINE 10 MG: 5 TABLET, FILM COATED ORAL at 20:12

## 2022-08-18 RX ADMIN — CLONAZEPAM 1 MG: 1 TABLET ORAL at 17:30

## 2022-08-18 NOTE — PROGRESS NOTES
Problem: Depressed Mood (Adult/Pediatric)  Goal: *STG: Participates in treatment plan  Outcome: Progressing Towards Goal  Goal: *STG: Attends activities and groups  Outcome: Progressing Towards Goal  Goal: *STG: Remains safe in hospital  Outcome: Progressing Towards Goal  Goal: *STG: Complies with medication therapy  Outcome: Progressing Towards Goal  Goal: Interventions  Outcome: Progressing Towards Goal   Pt is visible on unit  No voiced complaints or acute distress at present

## 2022-08-18 NOTE — BH NOTES
PSYCHIATRIC PROGRESS NOTE       Patient: Yair Catherine MRN: [id number]  SSN: [id number]    YOB: 1965  Age: 61 y.o. Sex: female      Admit Date: 8/14/2022    LOS: 4 days       Chief Complaint:  I am about the same. Interval History:  Yair Catherine continues to do poorly. Says klonopin is working well, anxiety is much improved. She says she is very depressed that she cannot fix her hair. Sleeping and eating fairly ok. She continues to express negative self thinking, \"I still believe am bad person, a liar, cheater. \" Says she is fearful of hurting herself again and thoughts of suicide are ongoing, lingering, and intrusive. Decline to elaborate what her plans are. She is jaciel for safety. She had a zoom meeting with her  and did ok. She shares that her  did not manage her meds when she left here last week. Psychomotor retardation is prominent. At the present time the patient Yair Catherine remains compliant with taking medications. She is complaining of drowsiness.        Past Medical History:  Past Medical History:   Diagnosis Date    Anxiety and depression     Bipolar 1 disorder with moderate robyn ([address] 3/17/2014    Chronic back pain     Hyperlipidemia 8/22/2016    Hyponatremia     Psychotic disorder ([facility] [address]     Scoliosis          ALLERGIES:(reviewed/updated 8/18/2022)  Allergies   Allergen Reactions    Other Food Hives     Artifical sweetners    Claritin-D 12 Hour [Loratadine-Pseudoephedrine] Palpitations     palpatations and ringing in the ear    Other Medication Anaphylaxis     Artificial sweeteners cause anaphylaxis    Pcn [Penicillins] Anaphylaxis    Sunscreen Contact Dermatitis     Burns and opens the skin    Ultram [Tramadol] Hives and Other (comments)     Hives and ringing of the ears    Benzoyl Peroxide Hives    Cephalexin Itching    Divalproex Itching    Maltodextrin-Glycerin Shortness of Breath       Laboratory report:  Lab Results   Component Value Date/Time    WBC 6.9 08/17/2022 06:31 AM    HGB 10.7 (L) 08/17/2022 06:31 AM    Hematocrit (POC) 34 (L) 07/17/2018 03:33 AM    HCT 31.3 (L) 08/17/2022 06:31 AM    PLATELET 360 52/66/1068 06:31 AM    MCV 91.5 08/17/2022 06:31 AM      Lab Results   Component Value Date/Time    Sodium 144 08/14/2022 03:45 AM    Potassium 3.5 08/14/2022 03:45 AM    Chloride 116 (H) 08/14/2022 03:45 AM    CO2 26 08/14/2022 03:45 AM    Anion gap 2 (L) 08/14/2022 03:45 AM    Glucose 102 (H) 08/14/2022 03:45 AM    Glucose 107 (H) 02/03/2020 05:40 AM    BUN 3 (L) 08/14/2022 03:45 AM    Creatinine 0.74 08/14/2022 03:45 AM    BUN/Creatinine ratio 4 (L) 08/14/2022 03:45 AM    GFR est AA >60 08/14/2022 03:45 AM    GFR est non-AA >60 08/14/2022 03:45 AM    Calcium 9.1 08/14/2022 03:45 AM    Bilirubin, total 0.2 08/12/2022 05:34 AM    Alk.  phosphatase 39 (L) 08/12/2022 05:34 AM    Protein, total 7.2 08/12/2022 05:34 AM    Albumin 3.4 (L) 08/12/2022 05:34 AM    Globulin 3.8 08/12/2022 05:34 AM    A-G Ratio 0.9 (L) 08/12/2022 05:34 AM    ALT (SGPT) 19 08/12/2022 05:34 AM      Vitals:    08/17/22 1610 08/17/22 2034 08/18/22 0916 08/18/22 0952   BP: (!) 152/83 133/86     Pulse: 96 100     Resp: 16 16 14 16   Temp: 98.6 °F (37 °C) 98.1 °F (36.7 °C)     SpO2: 100% 99%     Weight:       Height:           Lab Results   Component Value Date/Time    Carbamazepine 14.1 (H) 07/26/2020 04:19 AM     Lab Results   Component Value Date/Time    Lithium level 0.79 08/14/2022 03:45 AM       Vital Signs  Patient Vitals for the past 24 hrs:   Temp Pulse Resp BP SpO2   08/18/22 0952 -- -- 16 -- --   08/18/22 0916 -- -- 14 -- --   08/17/22 2034 98.1 °F (36.7 °C) 100 16 133/86 99 %   08/17/22 1610 98.6 °F (37 °C) 96 16 (!) 152/83 100 %   08/17/22 1056 -- -- 18 -- --       Wt Readings from Last 3 Encounters:   08/16/22 74.2 kg (163 lb 8 oz)   08/11/22 72.6 kg (160 lb)   08/07/22 71.5 kg (157 lb 9.6 oz)     Temp Readings from Last 3 Encounters:   08/17/22 98.1 °F (36.7 °C)   08/14/22 98.4 °F (36.9 °C)   08/10/22 98.4 °F (36.9 °C)     BP Readings from Last 3 Encounters:   08/17/22 133/86   08/14/22 (!) 142/77   08/10/22 111/75     Pulse Readings from Last 3 Encounters:   08/17/22 100   08/14/22 92   08/10/22 78       Radiology (reviewed/updated 8/18/2022)  No results found. Current Facility-Administered Medications   Medication Dose Route Frequency Provider Last Rate Last Admin    clonazePAM (KlonoPIN) tablet 1 mg  1 mg Oral BID Mili Briones MD   1 mg at 08/18/22 0907    OLANZapine (ZyPREXA) tablet 5 mg  5 mg Oral BID Marley WILEY NP   5 mg at 08/18/22 0907    FLUoxetine (PROzac) capsule 20 mg  20 mg Oral DAILY Stephanie Aquino, NP   20 mg at 08/18/22 0908    hydrOXYzine HCL (ATARAX) tablet 50 mg  50 mg Oral Q4H PRN Stephanie Aquino, NP   50 mg at 08/16/22 1626    OLANZapine (ZyPREXA) tablet 5 mg  5 mg Oral Q6H PRN Stephanie Aquino A, NP   5 mg at 08/16/22 1626    haloperidol lactate (HALDOL) injection 5 mg  5 mg IntraMUSCular Q6H PRN Stephanie Aquino, NP        benztropine (COGENTIN) tablet 1 mg  1 mg Oral BID PRN Stephanie Aquino, NP        diphenhydrAMINE (BENADRYL) injection 50 mg  50 mg IntraMUSCular BID PRN Stephanie Aquino, NP        traZODone (DESYREL) tablet 50 mg  50 mg Oral QHS PRN Stephanie Aquino A, NP   50 mg at 08/16/22 2021    acetaminophen (TYLENOL) tablet 650 mg  650 mg Oral Q4H PRN Stephanie Aquino NP   650 mg at 08/15/22 0511    magnesium hydroxide (MILK OF MAGNESIA) 400 mg/5 mL oral suspension 30 mL  30 mL Oral DAILY PRN Stephanie Aquino NP           Side Effects: (reviewed/updated 8/18/2022)  None reported or admitted to.     Review of Systems: (reviewed/updated 8/18/2022)  Appetite: good  Sleep: good   All other Review of Systems: depression    Mental Status Exam:  Eye contact: Limited eye contact  Psychomotor activity: decreased  Speech is spontaneous  Thought process: Logical and goal directed   Mood is \"depressed\"  Affect: flat  Perception: No avh  Thought content: +paranoid +delusions  Suicidal ideation: + si  Homicidal ideation: No hi  Insight/judgment: Poor  Cognition is grossly intact      Physical Exam:  Musculoskeletal system: steady gait  Tremor not present  Cog wheeling not present      Assessment and Plan:  Chelsie Morales meets criteria for a diagnosis of Bipolar 1 disorder current episode depressed with psychotic features. ROSA MARIA. Rule out schizoaffective disorder. Increase prozac to 40 mg with a now dose of 20 mg. Change zyprexa 10 mg to all at bedtime, it is ok for patient to have extra 5 mg tonight. Continue rest of medications as prescribed. We will closely monitor for safety. We will encourage reality orientation. Disposition planning to continue. I certify that this patients inpatient psychiatric hospital services furnished since the previous certification were, and continue to be, required for treatment that could reasonably be expected to improve the patient's condition, or for diagnostic study, and that the patient continues to need, on a daily basis, active treatment furnished directly by or requiring the supervision of inpatient psychiatric facility personnel. In addition, the hospital records show that services furnished were intensive treatment services, admission or related services, or equivalent services.       Signed:  Lalito Quezada NP  8/18/2022

## 2022-08-18 NOTE — PROGRESS NOTES
Problem: Depressed Mood (Adult/Pediatric)  Goal: *STG: Attends activities and groups  Outcome: Not Progressing Towards Goal  TENDS TO ISOLATE WITH MINIMAL VERBALIZATION  Goal: *STG: Remains safe in hospital  Outcome: Progressing Towards Goal  Pt denies any suicidal or homicidal thoughts. Contracts for safety. Remains on q 15 min safety checks. Goal: *STG: Complies with medication therapy  Outcome: Progressing Towards Goal  HAS BEEN MEDICATION COMPLIANT DENIES SIDE EFFECTS. Problem: Psychosis  Goal: *STG: Develop safety plan for times when triggered in stressful situations  Outcome: Not Progressing Towards Goal  RELUCTANT TO DISCUSS THIS ISSUE OR WORK ON PLAN.

## 2022-08-18 NOTE — INTERDISCIPLINARY ROUNDS
Behavioral Health Interdisciplinary Rounds     Patient Name: Marcela Evans  Age: 61 y.o. Room/Bed:  728/02  Primary Diagnosis: <principal problem not specified>   Admission Status: Voluntary     Readmission within 30 days: yes  Power of  in place: no  Patient requires a blocked bed: no            Consults: none         Labs/Testing due today? no    Sleep hours: 8       Participation in Care/Groups:  no  Medication Compliant?: Yes  PRNS (last 24 hours): None    Restraints (last 24 hours):  no     CIWA (range last 24 hours):     COWS (range last 24 hours):      Alcohol screening (AUDIT) completed -         If applicable, date SBIRT discussed in treatment team AND documented:   AUDIT Screen Score:        Document Brief Intervention (corresponds directly with the 5 A's, Ask, Advise, Assess, Assist, and Arrange): At- Risk Patients (Score 7-15 for women; 8-15 for men)  Discuss concern patient is drinking at unhealthy levels known to increase risk of alcohol-related health problems. Is Patient ready to commit to change? If No:  Encourage reflection  Discuss short term and long term health risks of consuming alcohol  Barriers to change  Reaffirm willingness to help / Educational materials provided  If Yes:  Set goal  Plan  Educational materials provided    Harmful use or Dependence (Score 16 or greater)  Discuss short term and long term health risks of consuming alcohol  Recommendations  Negotiate drinking goal  Recommend addiction specialist/center  Arrange follow-up appointments.     Tobacco - patient is a smoker: Have You Used Tobacco in the Past 30 Days: No  Illegal Drugs use: Have You Used Any Illegal Substances Over the Past 12 Months: No    24 hour chart check complete: yes   ____________________________________________________________________________________________________________    Patient goal(s) for today: Communicate needs to staff   Treatment team focus/goals: Assess pt, manage medications, discharge planning   Progress note Pt still endorses SI, depression. Pt denies anxiety at this time. Pt is sad and displaying delayed processing. Pt reports eating and sleeping better. Provider considering ECT is no progress observed over next few days.      LOS:  4 Expected LOS: 8/22/22    Financial concerns/prescription coverage:    Family contact:  Eddi Ba, [phone]     Family requesting physician contact today:    Discharge plan: Pt will discharge home   Access to weapons :  no       Outpatient provider(s): [facility]  Patient's preferred phone number for follow up call :   Patient's preferred e-mail address :  Participating treatment team members: MERCED Vicente, Luh Bermudez RN, Delphine Rangel NP

## 2022-08-19 PROCEDURE — 74011250637 HC RX REV CODE- 250/637: Performed by: PSYCHIATRY & NEUROLOGY

## 2022-08-19 PROCEDURE — 74011250637 HC RX REV CODE- 250/637: Performed by: NURSE PRACTITIONER

## 2022-08-19 PROCEDURE — 65220000003 HC RM SEMIPRIVATE PSYCH

## 2022-08-19 RX ADMIN — CLONAZEPAM 1 MG: 1 TABLET ORAL at 17:45

## 2022-08-19 RX ADMIN — CLONAZEPAM 1 MG: 1 TABLET ORAL at 08:06

## 2022-08-19 RX ADMIN — FLUOXETINE 40 MG: 20 CAPSULE ORAL at 08:07

## 2022-08-19 RX ADMIN — OLANZAPINE 10 MG: 5 TABLET, FILM COATED ORAL at 20:40

## 2022-08-19 RX ADMIN — OLANZAPINE 10 MG: 5 TABLET, FILM COATED ORAL at 08:07

## 2022-08-19 NOTE — BH NOTES
GROUP THERAPY PROGRESS NOTE    Patient did not participate in psychotherapy group.     Reji Haynes MSW, [facility]

## 2022-08-19 NOTE — BH NOTES
PSYCHIATRIC PROGRESS NOTE       Patient: Shadi Yu MRN: [id number]  SSN: [id number]    YOB: 1965  Age: 61 y.o. Sex: female      Admit Date: 8/14/2022    LOS: 5 days       Chief Complaint:  I am better, but I am lying. Interval History:  Shadi Yu is not doing well. \"My mind keeps telling me to commit suicide. \" She wants the staff to kill her. She remains depressed and anxious. Says thoughts of suicide are lingering and intrusive. Denies hi or avh. Less paranoid today. Sleeping well, eating fairly ok. Psychomotor retardation is prominent. At the present time the patient Shadi Yu remains compliant with taking medications.       Past Medical History:  Past Medical History:   Diagnosis Date    Anxiety and depression     Bipolar 1 disorder with moderate robyn ([address] 3/17/2014    Chronic back pain     Hyperlipidemia 8/22/2016    Hyponatremia     Psychotic disorder ([address]     Scoliosis          ALLERGIES:(reviewed/updated 8/19/2022)  Allergies   Allergen Reactions    Other Food Hives     Artifical sweetners    Claritin-D 12 Hour [Loratadine-Pseudoephedrine] Palpitations     palpatations and ringing in the ear    Other Medication Anaphylaxis     Artificial sweeteners cause anaphylaxis    Pcn [Penicillins] Anaphylaxis    Sunscreen Contact Dermatitis     Burns and opens the skin    Ultram [Tramadol] Hives and Other (comments)     Hives and ringing of the ears    Benzoyl Peroxide Hives    Cephalexin Itching    Divalproex Itching    Maltodextrin-Glycerin Shortness of Breath       Laboratory report:  Lab Results   Component Value Date/Time    WBC 6.9 08/17/2022 06:31 AM    HGB 10.7 (L) 08/17/2022 06:31 AM    Hematocrit (POC) 34 (L) 07/17/2018 03:33 AM    HCT 31.3 (L) 08/17/2022 06:31 AM    PLATELET 778 45/79/5998 06:31 AM    MCV 91.5 08/17/2022 06:31 AM      Lab Results   Component Value Date/Time    Sodium 144 08/14/2022 03:45 AM    Potassium 3.5 08/14/2022 03:45 AM    Chloride 116 (H) 08/14/2022 03:45 AM    CO2 26 08/14/2022 03:45 AM    Anion gap 2 (L) 08/14/2022 03:45 AM    Glucose 102 (H) 08/14/2022 03:45 AM    Glucose 107 (H) 02/03/2020 05:40 AM    BUN 3 (L) 08/14/2022 03:45 AM    Creatinine 0.74 08/14/2022 03:45 AM    BUN/Creatinine ratio 4 (L) 08/14/2022 03:45 AM    GFR est AA >60 08/14/2022 03:45 AM    GFR est non-AA >60 08/14/2022 03:45 AM    Calcium 9.1 08/14/2022 03:45 AM    Bilirubin, total 0.2 08/12/2022 05:34 AM    Alk. phosphatase 39 (L) 08/12/2022 05:34 AM    Protein, total 7.2 08/12/2022 05:34 AM    Albumin 3.4 (L) 08/12/2022 05:34 AM    Globulin 3.8 08/12/2022 05:34 AM    A-G Ratio 0.9 (L) 08/12/2022 05:34 AM    ALT (SGPT) 19 08/12/2022 05:34 AM      Vitals:    08/18/22 0916 08/18/22 0952 08/18/22 1536 08/19/22 0747   BP:   (!) 147/86 134/82   Pulse:   67 97   Resp: 14 16 16 14   Temp:   97.5 °F (36.4 °C) 98.3 °F (36.8 °C)   SpO2:   98% 98%   Weight:       Height:           Lab Results   Component Value Date/Time    Carbamazepine 14.1 (H) 07/26/2020 04:19 AM     Lab Results   Component Value Date/Time    Lithium level 0.79 08/14/2022 03:45 AM       Vital Signs  Patient Vitals for the past 24 hrs:   Temp Pulse Resp BP SpO2   08/19/22 0747 98.3 °F (36.8 °C) 97 14 134/82 98 %   08/18/22 1536 97.5 °F (36.4 °C) 67 16 (!) 147/86 98 %       Wt Readings from Last 3 Encounters:   08/16/22 74.2 kg (163 lb 8 oz)   08/11/22 72.6 kg (160 lb)   08/07/22 71.5 kg (157 lb 9.6 oz)     Temp Readings from Last 3 Encounters:   08/19/22 98.3 °F (36.8 °C)   08/14/22 98.4 °F (36.9 °C)   08/10/22 98.4 °F (36.9 °C)     BP Readings from Last 3 Encounters:   08/19/22 134/82   08/14/22 (!) 142/77   08/10/22 111/75     Pulse Readings from Last 3 Encounters:   08/19/22 97   08/14/22 92   08/10/22 78       Radiology (reviewed/updated 8/19/2022)  No results found.     Current Facility-Administered Medications   Medication Dose Route Frequency Provider Last Rate Last Admin    FLUoxetine (PROzac) capsule 40 mg  40 mg Oral DAILY Yesenia Bunde A, NP   40 mg at 08/19/22 0807    OLANZapine (ZyPREXA) tablet 10 mg  10 mg Oral BID Yesenia Bunde A, NP   10 mg at 08/19/22 0807    clonazePAM (KlonoPIN) tablet 1 mg  1 mg Oral BID Nirmala Ruiz MD   1 mg at 08/19/22 2771    hydrOXYzine HCL (ATARAX) tablet 50 mg  50 mg Oral Q4H PRN Alannah Aquinoanie A, NP   50 mg at 08/16/22 1626    OLANZapine (ZyPREXA) tablet 5 mg  5 mg Oral Q6H PRN Kenia, Stephanie A, NP   5 mg at 08/16/22 1626    haloperidol lactate (HALDOL) injection 5 mg  5 mg IntraMUSCular Q6H PRN Alannah Aquinoanie A, NP        benztropine (COGENTIN) tablet 1 mg  1 mg Oral BID PRN Teresa Aquinoe A, NP        diphenhydrAMINE (BENADRYL) injection 50 mg  50 mg IntraMUSCular BID PRN Stephanie Aquino A, NP        traZODone (DESYREL) tablet 50 mg  50 mg Oral QHS PRN Alannah Aquinoanie A, NP   50 mg at 08/16/22 2021    acetaminophen (TYLENOL) tablet 650 mg  650 mg Oral Q4H PRN Teresa Aquinoe A, NP   650 mg at 08/15/22 0511    magnesium hydroxide (MILK OF MAGNESIA) 400 mg/5 mL oral suspension 30 mL  30 mL Oral DAILY PRN Teresa Aquinoe A, NP           Side Effects: (reviewed/updated 8/19/2022)  None reported or admitted to. Review of Systems: (reviewed/updated 8/19/2022)  Appetite: good  Sleep: good   All other Review of Systems: depression    Mental Status Exam:  Eye contact: Limited eye contact  Psychomotor activity: decreased  Speech is spontaneous  Thought process: Logical and goal directed   Mood is \"depressed\"  Affect: flat  Perception: No avh  Thought content: +paranoid +delusions  Suicidal ideation: + si  Homicidal ideation: No hi  Insight/judgment: Poor  Cognition is grossly intact      Physical Exam:  Musculoskeletal system: steady gait  Tremor not present  Cog wheeling not present      Assessment and Plan:  Valente Cronin meets criteria for a diagnosis of Bipolar 1 disorder current episode depressed with psychotic features. ROSA MARIA.  Rule out schizoaffective disorder. Increase prozac to 60 mg Sunday am.  Continue rest of medications as prescribed. We will closely monitor for safety. We will encourage reality orientation. Disposition planning to continue. I certify that this patients inpatient psychiatric hospital services furnished since the previous certification were, and continue to be, required for treatment that could reasonably be expected to improve the patient's condition, or for diagnostic study, and that the patient continues to need, on a daily basis, active treatment furnished directly by or requiring the supervision of inpatient psychiatric facility personnel. In addition, the hospital records show that services furnished were intensive treatment services, admission or related services, or equivalent services.       Signed:  Lyly Nicholson NP  8/19/2022

## 2022-08-19 NOTE — PROGRESS NOTES
Problem: Depressed Mood (Adult/Pediatric)  Goal: *STG: Participates in treatment plan  Outcome: Progressing Towards Goal  Goal: *STG: Attends activities and groups  Outcome: Progressing Towards Goal  Variance Patient slowly responding  Goal: *STG: Complies with medication therapy  Outcome: Progressing Towards Goal    Patient is mood depressed, affect is flat. Endorses si thoughts \"in my mind\"\"what I really want is for the staff to kill me, but I know they won't. \" Feels it is important to \"save my  from unnecessary sadness\". Medication compliant. Does not attend groups, takes meals with others, does not engage.

## 2022-08-19 NOTE — PROGRESS NOTES
Patient received resting quietly in bed. No signs of distress. Even and unlabored breathing. Staff will continue to monitor safety q15 and provide support. Problem: Falls - Risk of  Goal: *Absence of Falls  Description: Document [place] Fall Risk and appropriate interventions in the flowsheet. Outcome: Progressing Towards Goal  Note: Fall Risk Interventions:  Mobility Interventions: Assess mobility with egress test    Medication Interventions: Teach patient to arise slowly    Elimination Interventions:  Toilet paper/wipes in reach    History of Falls Interventions: Door open when patient unattended

## 2022-08-19 NOTE — INTERDISCIPLINARY ROUNDS
Behavioral Health Interdisciplinary Rounds     Patient Name: Carol Moreira  Age: 61 y.o. Room/Bed:  728/02  Primary Diagnosis: <principal problem not specified>   Admission Status: Voluntary     Readmission within 30 days: no  Power of  in place: no  Patient requires a blocked bed: no            Sleep hours: 9+       Participation in Care/Groups:  yes  Medication Compliant?: Yes  PRNS (last 24 hours): None    Restraints (last 24 hours):  no     Alcohol screening (AUDIT) completed -     If applicable, date SBIRT discussed in treatment team AND documented:    Tobacco - patient is a smoker: Have You Used Tobacco in the Past 30 Days: No  Illegal Drugs use: Have You Used Any Illegal Substances Over the Past 12 Months: No    24 hour chart check complete: yes     _______________________________________________    Patient goal(s) for today: Communicate needs to staff, engage with peers on unit. Treatment team focus/goals: Assess pt, manage medications, discharge planning   Progress note: Pt reports feeling better. Pts sleep I better, eating is better, less intrusive thoughts. Pt still endorse SI and stated that \"I want the staff to kill me\" Pt still dsplays paranoid behaviors. Pt is medication compliant. Financial concerns/prescription coverage:    Family contact:      Chelo Zamarripa, [phone]                  Family requesting physician contact today:    Discharge plan: Pt will discharge home. Access to weapons no :                                                               Outpatient provider(s): Milagros Denson NP  Patient's preferred phone number for follow up call :   Patient's preferred e-mail address :  Participating treatment team members:    LOS:  5  Expected LOS: 8/25/22    Participating treatment team members: Carol Moreira, MERCED Serra, Nia Greenwood RN, Amber Vázquez NP

## 2022-08-20 PROCEDURE — 65220000003 HC RM SEMIPRIVATE PSYCH

## 2022-08-20 PROCEDURE — 74011250637 HC RX REV CODE- 250/637: Performed by: PSYCHIATRY & NEUROLOGY

## 2022-08-20 PROCEDURE — 74011250637 HC RX REV CODE- 250/637: Performed by: NURSE PRACTITIONER

## 2022-08-20 RX ORDER — FLUOXETINE HYDROCHLORIDE 20 MG/1
60 CAPSULE ORAL DAILY
Status: DISCONTINUED | OUTPATIENT
Start: 2022-08-21 | End: 2022-09-20 | Stop reason: HOSPADM

## 2022-08-20 RX ADMIN — OLANZAPINE 10 MG: 5 TABLET, FILM COATED ORAL at 09:13

## 2022-08-20 RX ADMIN — OLANZAPINE 10 MG: 5 TABLET, FILM COATED ORAL at 20:15

## 2022-08-20 RX ADMIN — CLONAZEPAM 1 MG: 1 TABLET ORAL at 09:13

## 2022-08-20 RX ADMIN — FLUOXETINE 40 MG: 20 CAPSULE ORAL at 09:13

## 2022-08-20 NOTE — PROGRESS NOTES
Problem: Falls - Risk of  Goal: *Absence of Falls  Description: Document Alin Bhatt Fall Risk and appropriate interventions in the flowsheet. Outcome: Progressing Towards Goal  Note: Fall Risk Interventions:  Mobility Interventions: Assess mobility with egress test         Medication Interventions: Teach patient to arise slowly    Elimination Interventions: Toilet paper/wipes in reach    History of Falls Interventions: Door open when patient unattended       At present:patient resting with eyes closed ,resp even regular and unlabored.

## 2022-08-20 NOTE — PROGRESS NOTES
Problem: Depressed Mood (Adult/Pediatric)  Goal: *STG: Participates in treatment plan  Outcome: Progressing Towards Goal     Problem: Depressed Mood (Adult/Pediatric)  Goal: *STG: Attends activities and groups  Outcome: Progressing Towards Goal

## 2022-08-20 NOTE — PROGRESS NOTES
Problem: Depressed Mood (Adult/Pediatric)  Goal: *STG: Participates in treatment plan  Outcome: Progressing Towards Goal  Goal: *STG: Remains safe in hospital  Outcome: Progressing Towards Goal  Goal: *STG: Complies with medication therapy  Outcome: Progressing Towards Goal     0715: received patient awake in bed.    1500: out on unit for meals and sitting quietly. Flat/depressed affect. Med compliant, appropriate with staff and peers, and cooperative.

## 2022-08-20 NOTE — BH NOTES
PSYCHIATRIC PROGRESS NOTE       Patient: London Mahan MRN: [id number]  SSN: [id number]    YOB: 1965  Age: 61 y.o. Sex: female      Admit Date: 8/14/2022    LOS: 6 days     Chief Complaint:  \"I'm OK\"    Interval History:  London Mahan states she is OK, but tired. She endorses AH as well as SI without plan/intent. She states she is sleeping and eating OK. Psychomotor retardation is prominent. At the present time the patient London Mahan remains compliant with taking medications.     Past Medical History:  Past Medical History:   Diagnosis Date    Anxiety and depression     Bipolar 1 disorder with moderate robyn ([facility] [address] 3/17/2014    Chronic back pain     Hyperlipidemia 8/22/2016    Hyponatremia     [facility]otic disorder [facility]     Scoliosis          ALLERGIES:(reviewed/updated 8/20/2022)  Allergies   Allergen Reactions    Other Food Hives     Artifical sweetners    Claritin-D 12 Hour [Loratadine-Pseudoephedrine] Palpitations     palpatations and ringing in the ear    Other Medication Anaphylaxis     Artificial sweeteners cause anaphylaxis    Pcn [Penicillins] Anaphylaxis    Sunscreen Contact Dermatitis     Burns and opens the skin    Ultram [Tramadol] Hives and Other (comments)     Hives and ringing of the ears    Benzoyl Peroxide Hives    Cephalexin Itching    Divalproex Itching    Maltodextrin-Glycerin Shortness of Breath     Laboratory report:  Lab Results   Component Value Date/Time    WBC 6.9 08/17/2022 06:31 AM    HGB 10.7 (L) 08/17/2022 06:31 AM    Hematocrit (POC) 34 (L) 07/17/2018 03:33 AM    HCT 31.3 (L) 08/17/2022 06:31 AM    PLATELET 152 26/99/4908 06:31 AM    MCV 91.5 08/17/2022 06:31 AM      Lab Results   Component Value Date/Time    Sodium 144 08/14/2022 03:45 AM    Potassium 3.5 08/14/2022 03:45 AM    Chloride 116 (H) 08/14/2022 03:45 AM    CO2 26 08/14/2022 03:45 AM    Anion gap 2 (L) 08/14/2022 03:45 AM    Glucose 102 (H) 08/14/2022 03:45 AM    Glucose 107 (H) 02/03/2020 05:40 AM    BUN 3 (L) 08/14/2022 03:45 AM    Creatinine 0.74 08/14/2022 03:45 AM    BUN/Creatinine ratio 4 (L) 08/14/2022 03:45 AM    GFR est AA >60 08/14/2022 03:45 AM    GFR est non-AA >60 08/14/2022 03:45 AM    Calcium 9.1 08/14/2022 03:45 AM    Bilirubin, total 0.2 08/12/2022 05:34 AM    Alk. phosphatase 39 (L) 08/12/2022 05:34 AM    Protein, total 7.2 08/12/2022 05:34 AM    Albumin 3.4 (L) 08/12/2022 05:34 AM    Globulin 3.8 08/12/2022 05:34 AM    A-G Ratio 0.9 (L) 08/12/2022 05:34 AM    ALT (SGPT) 19 08/12/2022 05:34 AM      Vitals:    08/18/22 1536 08/19/22 0747 08/19/22 1621 08/19/22 2021   BP: (!) 147/86 134/82 112/80 (!) 142/74   Pulse: 67 97 84 90   Resp: 16 14 18 18   Temp: 97.5 °F (36.4 °C) 98.3 °F (36.8 °C) 97.6 °F (36.4 °C) 97.8 °F (36.6 °C)   SpO2: 98% 98%  100%   Weight:       Height:           Lab Results   Component Value Date/Time    Carbamazepine 14.1 (H) 07/26/2020 04:19 AM     Lab Results   Component Value Date/Time    Lithium level 0.79 08/14/2022 03:45 AM       Vital Signs  Patient Vitals for the past 24 hrs:   Temp Pulse Resp BP SpO2   08/19/22 2021 97.8 °F (36.6 °C) 90 18 (!) 142/74 100 %   08/19/22 1621 97.6 °F (36.4 °C) 84 18 112/80 --       Wt Readings from Last 3 Encounters:   08/16/22 74.2 kg (163 lb 8 oz)   08/11/22 72.6 kg (160 lb)   08/07/22 71.5 kg (157 lb 9.6 oz)     Temp Readings from Last 3 Encounters:   08/19/22 97.8 °F (36.6 °C)   08/14/22 98.4 °F (36.9 °C)   08/10/22 98.4 °F (36.9 °C)     BP Readings from Last 3 Encounters:   08/19/22 (!) 142/74   08/14/22 (!) 142/77   08/10/22 111/75     Pulse Readings from Last 3 Encounters:   08/19/22 90   08/14/22 92   08/10/22 78       Radiology (reviewed/updated 8/20/2022)  No results found.     Current Facility-Administered Medications   Medication Dose Route Frequency Provider Last Rate Last Admin    FLUoxetine (PROzac) capsule 40 mg  40 mg Oral DAILY Stephanie Aquino NP   40 mg at 08/20/22 0913    OLANZapine (ZyPREXA) tablet 10 mg  10 mg Oral BID Marybel WILEY, NP   10 mg at 08/20/22 0913    clonazePAM (KlonoPIN) tablet 1 mg  1 mg Oral BID [place] MD Faustino   1 mg at 08/20/22 0913    hydrOXYzine HCL (ATARAX) tablet 50 mg  50 mg Oral Q4H PRN Marybel WILEY, NP   50 mg at 08/16/22 1626    OLANZapine (ZyPREXA) tablet 5 mg  5 mg Oral Q6H PRN Stephanie Aquino NP   5 mg at 08/16/22 1626    haloperidol lactate (HALDOL) injection 5 mg  5 mg IntraMUSCular Q6H PRN Stephanie Aquino, NP        benztropine (COGENTIN) tablet 1 mg  1 mg Oral BID PRN Stephanie Aquino NP        diphenhydrAMINE (BENADRYL) injection 50 mg  50 mg IntraMUSCular BID PRN Stephanie Aquino, NP        traZODone (DESYREL) tablet 50 mg  50 mg Oral QHS PRN Stephanie Aquino NP   50 mg at 08/16/22 2021    acetaminophen (TYLENOL) tablet 650 mg  650 mg Oral Q4H PRN Stephanie Aquino NP   650 mg at 08/15/22 0511    magnesium hydroxide (MILK OF MAGNESIA) 400 mg/5 mL oral suspension 30 mL  30 mL Oral DAILY PRN Stephanie Aquino NP           Side Effects: (reviewed/updated 8/20/2022)  None reported or admitted to. Review of Systems: (reviewed/updated 8/20/2022)  Appetite: good  Sleep: good   All other Review of Systems: depression    Mental Status Exam:  Eye contact: Limited eye contact  Psychomotor activity: decreased  Speech is spontaneous  Thought process: Logical and goal directed   Mood is \"OK\"  Affect: flat  Perception: No avh  Thought content: +paranoid +delusions  Suicidal ideation: + si  Homicidal ideation: No hi  Insight/judgment: Poor  Cognition is grossly intact    Physical Exam:  Musculoskeletal system: steady gait  Tremor not present  Cog wheeling not present    Assessment and Plan:  Tamela Dale meets criteria for a diagnosis of Bipolar 1 disorder current episode depressed with psychotic features. ROSA MARIA. Rule out schizoaffective disorder. Increased prozac to 60 mg Sunday am per plan  Continue rest of medications as prescribed.   We will closely monitor for safety. We will encourage reality orientation. Disposition planning to continue. I certify that this patients inpatient psychiatric hospital services furnished since the previous certification were, and continue to be, required for treatment that could reasonably be expected to improve the patient's condition, or for diagnostic study, and that the patient continues to need, on a daily basis, active treatment furnished directly by or requiring the supervision of inpatient psychiatric facility personnel. In addition, the hospital records show that services furnished were intensive treatment services, admission or related services, or equivalent services.       Signed:  Marimar Chang NP  8/20/2022

## 2022-08-21 PROCEDURE — 74011250637 HC RX REV CODE- 250/637: Performed by: NURSE PRACTITIONER

## 2022-08-21 PROCEDURE — 74011250637 HC RX REV CODE- 250/637: Performed by: PSYCHIATRY & NEUROLOGY

## 2022-08-21 PROCEDURE — 65220000003 HC RM SEMIPRIVATE PSYCH

## 2022-08-21 RX ORDER — OLANZAPINE 5 MG/1
5 TABLET ORAL 2 TIMES DAILY
Status: DISCONTINUED | OUTPATIENT
Start: 2022-08-22 | End: 2022-08-24

## 2022-08-21 RX ORDER — OLANZAPINE 5 MG/1
10 TABLET ORAL
Status: DISCONTINUED | OUTPATIENT
Start: 2022-08-22 | End: 2022-08-29

## 2022-08-21 RX ADMIN — TRAZODONE HYDROCHLORIDE 50 MG: 50 TABLET ORAL at 20:27

## 2022-08-21 RX ADMIN — CLONAZEPAM 1 MG: 1 TABLET ORAL at 09:22

## 2022-08-21 RX ADMIN — CLONAZEPAM 1 MG: 1 TABLET ORAL at 17:20

## 2022-08-21 RX ADMIN — OLANZAPINE 10 MG: 5 TABLET, FILM COATED ORAL at 09:22

## 2022-08-21 RX ADMIN — FLUOXETINE HYDROCHLORIDE 60 MG: 20 CAPSULE ORAL at 09:22

## 2022-08-21 NOTE — BH NOTES
PSYCHIATRIC PROGRESS NOTE       Patient: David Peña MRN: [id number]  SSN: [id number]    YOB: 1965  Age: 61 y.o. Sex: female      Admit Date: 8/14/2022    LOS: 7 days     Chief Complaint:  \"I'm the same\"    Interval History:  David Peña states she is OK. She has moved over to the general unit. She reports feeling slow and groggy during the day. She states she doesn't feel she is doing well mentally. She continues to endorse SI without plan/intent. No thoughts of harming anyone else. Endorses AH of \"music in my head. \" Psychomotor retardation is prominent. At the present time the patient David Peña remains compliant with taking medications.     Past Medical History:  Past Medical History:   Diagnosis Date    Anxiety and depression     Bipolar 1 disorder with moderate robyn ([address] 3/17/2014    Chronic back pain     Hyperlipidemia 8/22/2016    Hyponatremia     Psychotic disorder ([address]     Scoliosis          ALLERGIES:(reviewed/updated 8/21/2022)  Allergies   Allergen Reactions    Other Food Hives     Artifical sweetners    Claritin-D 12 Hour [Loratadine-Pseudoephedrine] Palpitations     palpatations and ringing in the ear    Other Medication Anaphylaxis     Artificial sweeteners cause anaphylaxis    Pcn [Penicillins] Anaphylaxis    Sunscreen Contact Dermatitis     Burns and opens the skin    Ultram [Tramadol] Hives and Other (comments)     Hives and ringing of the ears    Benzoyl Peroxide Hives    Cephalexin Itching    Divalproex Itching    Maltodextrin-Glycerin Shortness of Breath     Laboratory report:  Lab Results   Component Value Date/Time    WBC 6.9 08/17/2022 06:31 AM    HGB 10.7 (L) 08/17/2022 06:31 AM    Hematocrit (POC) 34 (L) 07/17/2018 03:33 AM    HCT 31.3 (L) 08/17/2022 06:31 AM    PLATELET 354 00/45/5743 06:31 AM    MCV 91.5 08/17/2022 06:31 AM      Lab Results   Component Value Date/Time    Sodium 144 08/14/2022 03:45 AM    Potassium 3.5 08/14/2022 03:45 AM    Chloride 116 (H) 08/14/2022 03:45 AM    CO2 26 08/14/2022 03:45 AM    Anion gap 2 (L) 08/14/2022 03:45 AM    Glucose 102 (H) 08/14/2022 03:45 AM    Glucose 107 (H) 02/03/2020 05:40 AM    BUN 3 (L) 08/14/2022 03:45 AM    Creatinine 0.74 08/14/2022 03:45 AM    BUN/Creatinine ratio 4 (L) 08/14/2022 03:45 AM    GFR est AA >60 08/14/2022 03:45 AM    GFR est non-AA >60 08/14/2022 03:45 AM    Calcium 9.1 08/14/2022 03:45 AM    Bilirubin, total 0.2 08/12/2022 05:34 AM    Alk. phosphatase 39 (L) 08/12/2022 05:34 AM    Protein, total 7.2 08/12/2022 05:34 AM    Albumin 3.4 (L) 08/12/2022 05:34 AM    Globulin 3.8 08/12/2022 05:34 AM    A-G Ratio 0.9 (L) 08/12/2022 05:34 AM    ALT (SGPT) 19 08/12/2022 05:34 AM      Vitals:    08/19/22 1621 08/19/22 2021 08/20/22 1505 08/21/22 0946   BP: 112/80 (!) 142/74 102/71 103/74   Pulse: 84 90 94 88   Resp: 18 18 16 16   Temp: 97.6 °F (36.4 °C) 97.8 °F (36.6 °C) 98.2 °F (36.8 °C) 97.5 °F (36.4 °C)   SpO2:  100% 100% 98%   Weight:       Height:           Lab Results   Component Value Date/Time    Carbamazepine 14.1 (H) 07/26/2020 04:19 AM     Lab Results   Component Value Date/Time    Lithium level 0.79 08/14/2022 03:45 AM       Vital Signs  Patient Vitals for the past 24 hrs:   Temp Pulse Resp BP SpO2   08/21/22 0946 97.5 °F (36.4 °C) 88 16 103/74 98 %   08/20/22 1505 98.2 °F (36.8 °C) 94 16 102/71 100 %       Wt Readings from Last 3 Encounters:   08/16/22 74.2 kg (163 lb 8 oz)   08/11/22 72.6 kg (160 lb)   08/07/22 71.5 kg (157 lb 9.6 oz)     Temp Readings from Last 3 Encounters:   08/21/22 97.5 °F (36.4 °C)   08/14/22 98.4 °F (36.9 °C)   08/10/22 98.4 °F (36.9 °C)     BP Readings from Last 3 Encounters:   08/21/22 103/74   08/14/22 (!) 142/77   08/10/22 111/75     Pulse Readings from Last 3 Encounters:   08/21/22 88   08/14/22 92   08/10/22 78       Radiology (reviewed/updated 8/21/2022)  No results found.     Current Facility-Administered Medications   Medication Dose Route Frequency Provider Last Rate Last Admin    FLUoxetine (PROzac) capsule 60 mg  60 mg Oral DAILY Marilyn Muñiz NP   60 mg at 08/21/22 9084    OLANZapine (ZyPREXA) tablet 10 mg  10 mg Oral BID Prentis Ing A, NP   10 mg at 08/21/22 7326    clonazePAM (KlonoPIN) tablet 1 mg  1 mg Oral BID Niraj Robert MD   1 mg at 08/21/22 7886    hydrOXYzine HCL (ATARAX) tablet 50 mg  50 mg Oral Q4H PRN Kaylin Rojas A, NP   50 mg at 08/16/22 1626    OLANZapine (ZyPREXA) tablet 5 mg  5 mg Oral Q6H PRN Teresa Auqinoe A, NP   5 mg at 08/16/22 1626    haloperidol lactate (HALDOL) injection 5 mg  5 mg IntraMUSCular Q6H PRN Stephanie Aquino, NP        benztropine (COGENTIN) tablet 1 mg  1 mg Oral BID PRN Stephanie Aquino, NP        diphenhydrAMINE (BENADRYL) injection 50 mg  50 mg IntraMUSCular BID PRN Stephanie Aquino, NP        traZODone (DESYREL) tablet 50 mg  50 mg Oral QHS PRN Stephanie Aquino A, NP   50 mg at 08/16/22 2021    acetaminophen (TYLENOL) tablet 650 mg  650 mg Oral Q4H PRN Stephanie Aquino, NP   650 mg at 08/15/22 0511    magnesium hydroxide (MILK OF MAGNESIA) 400 mg/5 mL oral suspension 30 mL  30 mL Oral DAILY PRN Stephanie Aquino NP           Side Effects: (reviewed/updated 8/21/2022)  None reported or admitted to.     Review of Systems: (reviewed/updated 8/21/2022)  Appetite: good  Sleep: good   All other Review of Systems: depression    Mental Status Exam:  Eye contact: Limited eye contact  Psychomotor activity: decreased  Speech is spontaneous  Thought process: Logical and goal directed   Mood is \"OK\"  Affect: flat  Perception: No avh  Thought content: +paranoid +delusions  Suicidal ideation: + si  Homicidal ideation: No hi  Insight/judgment: Poor  Cognition is grossly intact    Physical Exam:  Musculoskeletal system: steady gait  Tremor not present  Cog wheeling not present    Assessment and Plan:  Nisreen Arenas meets criteria for a diagnosis of Bipolar 1 disorder current episode depressed with psychotic features. ROSA MARIA. Rule out schizoaffective disorder. Increased prozac to 60 mg Sunday am per plan  Changed Zyprexa from 10mg BID to 5mg QAM + 15mg QHS to address daytime sedation  Continue rest of medications as prescribed. We will closely monitor for safety. We will encourage reality orientation. Disposition planning to continue. I certify that this patients inpatient psychiatric hospital services furnished since the previous certification were, and continue to be, required for treatment that could reasonably be expected to improve the patient's condition, or for diagnostic study, and that the patient continues to need, on a daily basis, active treatment furnished directly by or requiring the supervision of inpatient psychiatric facility personnel. In addition, the hospital records show that services furnished were intensive treatment services, admission or related services, or equivalent services.       Signed:  Ryan Echevarria NP  8/21/2022

## 2022-08-21 NOTE — PROGRESS NOTES
Problem: Depressed Mood (Adult/Pediatric)  Goal: *STG: Remains safe in hospital  Outcome: Progressing Towards Goal  Goal: Interventions  Outcome: Progressing Towards Goal

## 2022-08-21 NOTE — PROGRESS NOTES
Problem: Depressed Mood (Adult/Pediatric)  Goal: *STG: Attends activities and groups  Outcome: Progressing Towards Goal  Goal: *STG: Remains safe in hospital  Outcome: Progressing Towards Goal  Goal: *STG: Complies with medication therapy  Outcome: Progressing Towards Goal     Patient is resting quietly in bed with eyes closed. Appears to be asleep. No respiratory distress noted.

## 2022-08-21 NOTE — BH NOTES
Patient's  dropped off copy of her advance medical directive stating no blood products to be given.  Placed in patient's chart

## 2022-08-21 NOTE — PROGRESS NOTES
Problem: Depressed Mood (Adult/Pediatric)  Goal: *STG: Participates in treatment plan  Outcome: Progressing Towards Goal    12:30: Pt reported her mood as, \"the same\" and \"I'm sleepy, I'm very sleepy. I think it's the medications. \" Pt endorses SI, denies plan/intent. Pt denies HI.  Compliant with meals and medications

## 2022-08-22 PROCEDURE — 74011250637 HC RX REV CODE- 250/637: Performed by: NURSE PRACTITIONER

## 2022-08-22 PROCEDURE — 65220000003 HC RM SEMIPRIVATE PSYCH

## 2022-08-22 RX ADMIN — FLUOXETINE HYDROCHLORIDE 60 MG: 20 CAPSULE ORAL at 08:41

## 2022-08-22 RX ADMIN — OLANZAPINE 10 MG: 5 TABLET, FILM COATED ORAL at 21:24

## 2022-08-22 RX ADMIN — OLANZAPINE 5 MG: 5 TABLET, FILM COATED ORAL at 21:25

## 2022-08-22 RX ADMIN — OLANZAPINE 5 MG: 5 TABLET, FILM COATED ORAL at 08:41

## 2022-08-22 NOTE — PROGRESS NOTES
Problem: Depressed Mood (Adult/Pediatric)  Goal: *STG: Participates in treatment plan  Outcome: Progressing Towards Goal  Note: Out on unit passively engaged, mood and affect blunted, slow to process and respond. Notable decrease in paranoid thinking. Medication compliant. Daily goal is to discuss medications and d/c plans. Verbalized understanding. Staff focus is on offering support and reassurance.    Goal: *STG: Verbalizes anger, guilt, and other feelings in a constructive manor  Outcome: Progressing Towards Goal  Goal: *STG: Attends activities and groups  Outcome: Progressing Towards Goal  Goal: *STG: Demonstrates reduction in symptoms and increase in insight into coping skills/future focused  Outcome: Progressing Towards Goal

## 2022-08-22 NOTE — BH NOTES
PRN Medication Documentation    Specific patient behavior that led to need for PRN medication: Insomnia  Staff interventions attempted prior to PRN being given: decreased stimuli  PRN medication given: trazodone  Patient response/effectiveness of PRN medication: effective

## 2022-08-22 NOTE — INTERDISCIPLINARY ROUNDS
Behavioral Health Interdisciplinary Rounds     Patient Name: Carol Moreira  Age: 61 y.o. Room/Bed:  746/02  Primary Diagnosis: <principal problem not specified>   Admission Status: Voluntary     Readmission within 30 days: no  Power of  in place: no  Patient requires a blocked bed: no          Reason for blocked bed:     Sleep hours:        Participation in Care/Groups:    Medication Compliant?: Yes  PRNS (last 24 hours): Sleep Aid    Restraints (last 24 hours):  no  Substance Abuse:      Alcohol screening (AUDIT) completed -     If applicable, date SBIRT discussed in treatment team AND documented:   Tobacco -   24 hour chart check complete: yes   ______________________________________    Patient goal(s) for today: Communicate needs to staff, Attend group. Treatment team focus/goals: Assess pt, manage medications, discharge planning   Progress note: Pt denies SI, paranoia, anxiety, HI, AVH but then states that \"I am a Liar\". Pt reports that she still elieves she is a bad person. Pt displayed thought blocking in tx team. Pts medication was adjusted. SW will set up family meeting this week. Financial concerns/prescription coverage:    Family contact:    Corrinne Leatherwood [phone]                    Family requesting physician contact today:    Discharge plan: Pt will discharge home   Access to weapons no :                                                               Outpatient provider(s): Milagros BARRETT  Patient's preferred phone number for follow up call :   Patient's preferred e-mail address :  Participating treatment team members:    LOS:  8 Expected LOS: 8/26/22    Participating treatment team members: Aletamolly Moreira, MERCED Serra, Juan Fraga RN, Amber Vázquez, DIONNE

## 2022-08-22 NOTE — BH NOTES
PSYCHIATRIC PROGRESS NOTE       Patient: Anu Payne MRN: [id number]  SSN: [id number]    YOB: 1965  Age: 61 y.o. Sex: female      Admit Date: 8/14/2022    LOS: 8 days     Chief Complaint: Im not as sleepy. Interval History:  Anu Payne is about the same-  depressed, hopeless, helpless, paranoid, and anxious. Psychomotor retardation is prominent. Ongoing thoughts of si - lingering and intrusive. Denies any intent or plan. Denies hi or avh. She continues to express negative self thinking, \"I still believe am bad person. \" Sleeping and eating ok. At the present time the patient Anu Payne remains compliant with taking medications. Denies any adverse events from taking them.     Past Medical History:  Past Medical History:   Diagnosis Date    Anxiety and depression     Bipolar 1 disorder with moderate robyn ([address] 3/17/2014    Chronic back pain     Hyperlipidemia 8/22/2016    Hyponatremia     Psychotic disorder ([facility] [address]     Scoliosis          ALLERGIES:(reviewed/updated 8/22/2022)  Allergies   Allergen Reactions    Other Food Hives     Artifical sweetners    Claritin-D 12 Hour [Loratadine-Pseudoephedrine] Palpitations     palpatations and ringing in the ear    Other Medication Anaphylaxis     Artificial sweeteners cause anaphylaxis    Pcn [Penicillins] Anaphylaxis    Sunscreen Contact Dermatitis     Burns and opens the skin    Ultram [Tramadol] Hives and Other (comments)     Hives and ringing of the ears    Benzoyl Peroxide Hives    Cephalexin Itching    Divalproex Itching    Maltodextrin-Glycerin Shortness of Breath     Laboratory report:  Lab Results   Component Value Date/Time    WBC 6.9 08/17/2022 06:31 AM    HGB 10.7 (L) 08/17/2022 06:31 AM    Hematocrit (POC) 34 (L) 07/17/2018 03:33 AM    HCT 31.3 (L) 08/17/2022 06:31 AM    PLATELET 876 59/40/4314 06:31 AM    MCV 91.5 08/17/2022 06:31 AM      Lab Results   Component Value Date/Time    Sodium 144 08/14/2022 03:45 AM    Potassium 3.5 08/14/2022 03:45 AM    Chloride 116 (H) 08/14/2022 03:45 AM    CO2 26 08/14/2022 03:45 AM    Anion gap 2 (L) 08/14/2022 03:45 AM    Glucose 102 (H) 08/14/2022 03:45 AM    Glucose 107 (H) 02/03/2020 05:40 AM    BUN 3 (L) 08/14/2022 03:45 AM    Creatinine 0.74 08/14/2022 03:45 AM    BUN/Creatinine ratio 4 (L) 08/14/2022 03:45 AM    GFR est AA >60 08/14/2022 03:45 AM    GFR est non-AA >60 08/14/2022 03:45 AM    Calcium 9.1 08/14/2022 03:45 AM    Bilirubin, total 0.2 08/12/2022 05:34 AM    Alk. phosphatase 39 (L) 08/12/2022 05:34 AM    Protein, total 7.2 08/12/2022 05:34 AM    Albumin 3.4 (L) 08/12/2022 05:34 AM    Globulin 3.8 08/12/2022 05:34 AM    A-G Ratio 0.9 (L) 08/12/2022 05:34 AM    ALT (SGPT) 19 08/12/2022 05:34 AM      Vitals:    08/21/22 1120 08/21/22 1721 08/21/22 1948 08/22/22 1131   BP:  115/77 114/71 112/75   Pulse:  96 94 91   Resp:  16 16 16   Temp:  97.7 °F (36.5 °C) 98.7 °F (37.1 °C) 98.6 °F (37 °C)   SpO2:   98% 99%   Weight: 73.9 kg (163 lb)      Height:           Lab Results   Component Value Date/Time    Carbamazepine 14.1 (H) 07/26/2020 04:19 AM     Lab Results   Component Value Date/Time    Lithium level 0.79 08/14/2022 03:45 AM       Vital Signs  Patient Vitals for the past 24 hrs:   Temp Pulse Resp BP SpO2   08/22/22 1131 98.6 °F (37 °C) 91 16 112/75 99 %   08/21/22 1948 98.7 °F (37.1 °C) 94 16 114/71 98 %   08/21/22 1721 97.7 °F (36.5 °C) 96 16 115/77 --       Wt Readings from Last 3 Encounters:   08/21/22 73.9 kg (163 lb)   08/11/22 72.6 kg (160 lb)   08/07/22 71.5 kg (157 lb 9.6 oz)     Temp Readings from Last 3 Encounters:   08/22/22 98.6 °F (37 °C)   08/14/22 98.4 °F (36.9 °C)   08/10/22 98.4 °F (36.9 °C)     BP Readings from Last 3 Encounters:   08/22/22 112/75   08/14/22 (!) 142/77   08/10/22 111/75     Pulse Readings from Last 3 Encounters:   08/22/22 91   08/14/22 92   08/10/22 78       Radiology (reviewed/updated 8/22/2022)  No results found.     Current Facility-Administered Medications   Medication Dose Route Frequency Provider Last Rate Last Admin    OLANZapine (ZyPREXA) tablet 5 mg  5 mg Oral BID Maryella Side, NP   5 mg at 08/22/22 0841    OLANZapine (ZyPREXA) tablet 10 mg  10 mg Oral QHS Maryella Side, NP        FLUoxetine (PROzac) capsule 60 mg  60 mg Oral DAILY Maryella Side, NP   60 mg at 08/22/22 0841    hydrOXYzine HCL (ATARAX) tablet 50 mg  50 mg Oral Q4H PRN Teresa Aquinoe A, NP   50 mg at 08/16/22 1626    OLANZapine (ZyPREXA) tablet 5 mg  5 mg Oral Q6H PRN Alannah Aquinoanie A, NP   5 mg at 08/16/22 1626    haloperidol lactate (HALDOL) injection 5 mg  5 mg IntraMUSCular Q6H PRN Stephanie Aquino, NP        benztropine (COGENTIN) tablet 1 mg  1 mg Oral BID PRN Stephanie Aquino, NP        diphenhydrAMINE (BENADRYL) injection 50 mg  50 mg IntraMUSCular BID PRN Stephanie Aquino, NP        traZODone (DESYREL) tablet 50 mg  50 mg Oral QHS PRN Teresa Aquinoe A, NP   50 mg at 08/21/22 2027    acetaminophen (TYLENOL) tablet 650 mg  650 mg Oral Q4H PRN Stephanie Aquino, NP   650 mg at 08/15/22 0511    magnesium hydroxide (MILK OF MAGNESIA) 400 mg/5 mL oral suspension 30 mL  30 mL Oral DAILY PRN Stephanie Aquino, NP           Side Effects: (reviewed/updated 8/22/2022)  None reported or admitted to.     Review of Systems: (reviewed/updated 8/22/2022)  Appetite: good  Sleep: good   All other Review of Systems: depression    Mental Status Exam:  Eye contact: Limited eye contact  Psychomotor activity: decreased  Speech is spontaneous  Thought process: Logical and goal directed   Mood is \"OK\"  Affect: flat  Perception: No avh  Thought content: +paranoid +delusions  Suicidal ideation: + si  Homicidal ideation: No hi  Insight/judgment: Poor  Cognition is grossly intact    Physical Exam:  Musculoskeletal system: steady gait  Tremor not present  Cog wheeling not present    Assessment and Plan:  London Mahan meets criteria for a diagnosis of Bipolar 1 disorder current episode depressed with psychotic features. ROSA MARIA. Rule out schizoaffective disorder. Changed Zyprexa from 10mg BID to 5mg QAM + 15mg QHS to address daytime sedation  Continue rest of medications as prescribed. We will closely monitor for safety. We will encourage reality orientation. Disposition planning to continue. I certify that this patients inpatient psychiatric hospital services furnished since the previous certification were, and continue to be, required for treatment that could reasonably be expected to improve the patient's condition, or for diagnostic study, and that the patient continues to need, on a daily basis, active treatment furnished directly by or requiring the supervision of inpatient psychiatric facility personnel. In addition, the hospital records show that services furnished were intensive treatment services, admission or related services, or equivalent services.       Signed:  Roderick Graves NP  8/22/2022

## 2022-08-22 NOTE — PROGRESS NOTES
Problem: Depressed Mood (Adult/Pediatric)  Goal: *STG: Participates in treatment plan  Outcome: Progressing Towards Goal  Goal: *STG: Attends activities and groups  Outcome: Progressing Towards Goal  Goal: *STG: Remains safe in hospital  Outcome: Progressing Towards Goal  Goal: *STG: Complies with medication therapy  Outcome: Progressing Towards Goal     Patient is resting quietly in bed with eyes closed. Appears to be asleep. No respiratory distress noted.

## 2022-08-22 NOTE — PROGRESS NOTES
Problem: Falls - Risk of  Goal: *Absence of Falls  Description: Document El Garcia Fall Risk and appropriate interventions in the flowsheet. Outcome: Progressing Towards Goal  Note: Fall Risk Interventions:  Mobility Interventions: Assess mobility with egress test         Medication Interventions: Teach patient to arise slowly    Elimination Interventions: Toilet paper/wipes in reach    History of Falls Interventions: Door open when patient unattended         Problem: Depressed Mood (Adult/Pediatric)  Goal: *STG: Verbalizes anger, guilt, and other feelings in a constructive manor  Outcome: Progressing Towards Goal  Goal: *STG: Demonstrates reduction in symptoms and increase in insight into coping skills/future focused  Outcome: Progressing Towards Goal     Patient received resting in bed. Denies SI/HI/AVH. Patient remains medication and meal compliant. Will continue to monitor q15 minutes for safety.

## 2022-08-23 PROCEDURE — 65220000003 HC RM SEMIPRIVATE PSYCH

## 2022-08-23 PROCEDURE — 74011250637 HC RX REV CODE- 250/637: Performed by: NURSE PRACTITIONER

## 2022-08-23 RX ORDER — BUPROPION HYDROCHLORIDE 100 MG/1
100 TABLET, EXTENDED RELEASE ORAL DAILY
Status: DISCONTINUED | OUTPATIENT
Start: 2022-08-23 | End: 2022-08-25

## 2022-08-23 RX ADMIN — OLANZAPINE 5 MG: 5 TABLET, FILM COATED ORAL at 08:23

## 2022-08-23 RX ADMIN — FLUOXETINE HYDROCHLORIDE 60 MG: 20 CAPSULE ORAL at 08:23

## 2022-08-23 RX ADMIN — OLANZAPINE 5 MG: 5 TABLET, FILM COATED ORAL at 21:03

## 2022-08-23 RX ADMIN — OLANZAPINE 10 MG: 5 TABLET, FILM COATED ORAL at 21:03

## 2022-08-23 RX ADMIN — BUPROPION HYDROCHLORIDE 100 MG: 100 TABLET, FILM COATED, EXTENDED RELEASE ORAL at 11:55

## 2022-08-23 NOTE — PROGRESS NOTES
Problem: Falls - Risk of  Goal: *Absence of Falls  Description: Document Chucky Montoya Fall Risk and appropriate interventions in the flowsheet. Outcome: Progressing Towards Goal  Note: Fall Risk Interventions:  Mobility Interventions: Assess mobility with egress test  - Medication Interventions: Teach patient to arise slowly  - Elimination Interventions: Toilet paper/wipes in reach  - History of Falls Interventions: Door open when patient unattended    Patient received resting quietly in bed. No signs of distress. Even and unlabored breathing. Staff will continue to monitor safety Q15 and provide support. No falls reported or witnessed on this shift.

## 2022-08-23 NOTE — BH NOTES
PSYCHIATRIC PROGRESS NOTE       Patient: Sherrie Borges MRN: [id number]  SSN: [id number]    YOB: 1965  Age: 61 y.o. Sex: female      Admit Date: 8/14/2022    LOS: 9 days     Chief Complaint:  I am very depressed. Interval History:  Sherrie Borges slept a little better. She is worried about going home, does not feel safe returning home. Denies that someone is out to hurt her at home. Thought process is slow but logical. She remains very depressed, limited eye contact. Says however that her thoughts of suicide are decreasing. Her motivation and energy are low. She agreed to try wellbutrin for anhedonia. We will monitor any worsening anxiety. At the present time the patient Sherrie Borges remains compliant with taking medications. Denies any adverse events from taking them and feels they have been beneficial. She denies being drowsy.         Past Medical History:  Past Medical History:   Diagnosis Date    Anxiety and depression     Bipolar 1 disorder with moderate robyn ([address] 3/17/2014    Chronic back pain     Hyperlipidemia 8/22/2016    Hyponatremia     Psychotic disorder ([address]     Scoliosis          ALLERGIES:(reviewed/updated 8/23/2022)  Allergies   Allergen Reactions    Other Food Hives     Artifical sweetners    Claritin-D 12 Hour [Loratadine-Pseudoephedrine] Palpitations     palpatations and ringing in the ear    Other Medication Anaphylaxis     Artificial sweeteners cause anaphylaxis    Pcn [Penicillins] Anaphylaxis    Sunscreen Contact Dermatitis     Burns and opens the skin    Ultram [Tramadol] Hives and Other (comments)     Hives and ringing of the ears    Benzoyl Peroxide Hives    Cephalexin Itching    Divalproex Itching    Maltodextrin-Glycerin Shortness of Breath     Laboratory report:  Lab Results   Component Value Date/Time    WBC 6.9 08/17/2022 06:31 AM    HGB 10.7 (L) 08/17/2022 06:31 AM    Hematocrit (POC) 34 (L) 07/17/2018 03:33 AM    HCT 31.3 (L) 08/17/2022 06:31 AM    PLATELET 586 08/17/2022 06:31 AM    MCV 91.5 08/17/2022 06:31 AM      Lab Results   Component Value Date/Time    Sodium 144 08/14/2022 03:45 AM    Potassium 3.5 08/14/2022 03:45 AM    Chloride 116 (H) 08/14/2022 03:45 AM    CO2 26 08/14/2022 03:45 AM    Anion gap 2 (L) 08/14/2022 03:45 AM    Glucose 102 (H) 08/14/2022 03:45 AM    Glucose 107 (H) 02/03/2020 05:40 AM    BUN 3 (L) 08/14/2022 03:45 AM    Creatinine 0.74 08/14/2022 03:45 AM    BUN/Creatinine ratio 4 (L) 08/14/2022 03:45 AM    GFR est AA >60 08/14/2022 03:45 AM    GFR est non-AA >60 08/14/2022 03:45 AM    Calcium 9.1 08/14/2022 03:45 AM    Bilirubin, total 0.2 08/12/2022 05:34 AM    Alk.  phosphatase 39 (L) 08/12/2022 05:34 AM    Protein, total 7.2 08/12/2022 05:34 AM    Albumin 3.4 (L) 08/12/2022 05:34 AM    Globulin 3.8 08/12/2022 05:34 AM    A-G Ratio 0.9 (L) 08/12/2022 05:34 AM    ALT (SGPT) 19 08/12/2022 05:34 AM      Vitals:    08/21/22 1948 08/22/22 1131 08/22/22 1741 08/22/22 2159   BP: 114/71 112/75 118/82 132/71   Pulse: 94 91 99 97   Resp: 16 16 16 20   Temp: 98.7 °F (37.1 °C) 98.6 °F (37 °C) 98.1 °F (36.7 °C) 98.2 °F (36.8 °C)   SpO2: 98% 99% 99% 96%   Weight:       Height:           Lab Results   Component Value Date/Time    Carbamazepine 14.1 (H) 07/26/2020 04:19 AM     Lab Results   Component Value Date/Time    Lithium level 0.79 08/14/2022 03:45 AM       Vital Signs  Patient Vitals for the past 24 hrs:   Temp Pulse Resp BP SpO2   08/22/22 2159 98.2 °F (36.8 °C) 97 20 132/71 96 %   08/22/22 1741 98.1 °F (36.7 °C) 99 16 118/82 99 %   08/22/22 1131 98.6 °F (37 °C) 91 16 112/75 99 %       Wt Readings from Last 3 Encounters:   08/21/22 73.9 kg (163 lb)   08/11/22 72.6 kg (160 lb)   08/07/22 71.5 kg (157 lb 9.6 oz)     Temp Readings from Last 3 Encounters:   08/22/22 98.2 °F (36.8 °C)   08/14/22 98.4 °F (36.9 °C)   08/10/22 98.4 °F (36.9 °C)     BP Readings from Last 3 Encounters:   08/22/22 132/71   08/14/22 (!) 142/77   08/10/22 111/75     Pulse Readings from Last 3 Encounters:   08/22/22 97   08/14/22 92   08/10/22 78       Radiology (reviewed/updated 8/23/2022)  No results found. Current Facility-Administered Medications   Medication Dose Route Frequency Provider Last Rate Last Admin    OLANZapine (ZyPREXA) tablet 5 mg  5 mg Oral BID Norleen Primes, NP   5 mg at 08/23/22 0823    OLANZapine (ZyPREXA) tablet 10 mg  10 mg Oral QHS Norleen Primes, NP   10 mg at 08/22/22 2124    FLUoxetine (PROzac) capsule 60 mg  60 mg Oral DAILY Norleen Primes, NP   60 mg at 08/23/22 3458    hydrOXYzine HCL (ATARAX) tablet 50 mg  50 mg Oral Q4H PRN Eileen Avila A, NP   50 mg at 08/16/22 1626    OLANZapine (ZyPREXA) tablet 5 mg  5 mg Oral Q6H PRN Teresa Aquinoe A, NP   5 mg at 08/16/22 1626    haloperidol lactate (HALDOL) injection 5 mg  5 mg IntraMUSCular Q6H PRN Stephanie Aquino A, NP        benztropine (COGENTIN) tablet 1 mg  1 mg Oral BID PRN Teresa Aquinoe A, NP        diphenhydrAMINE (BENADRYL) injection 50 mg  50 mg IntraMUSCular BID PRN Teresa Aquinoe A, NP        traZODone (DESYREL) tablet 50 mg  50 mg Oral QHS PRN Alannah Aquinoanie A, NP   50 mg at 08/21/22 2027    acetaminophen (TYLENOL) tablet 650 mg  650 mg Oral Q4H PRN Stephanie Aquino A, NP   650 mg at 08/15/22 0511    magnesium hydroxide (MILK OF MAGNESIA) 400 mg/5 mL oral suspension 30 mL  30 mL Oral DAILY PRN Teresa Aquinoe A, NP           Side Effects: (reviewed/updated 8/23/2022)  None reported or admitted to.     Review of Systems: (reviewed/updated 8/23/2022)  Appetite: good  Sleep: good   All other Review of Systems: depression    Mental Status Exam:  Eye contact: Limited eye contact  Psychomotor activity: decreased  Speech is spontaneous  Thought process: slow but logical  Mood is \"depressed\"  Affect: flat  Perception: No avh  Thought content: +paranoid +delusions  Suicidal ideation: + si  Homicidal ideation: No hi  Insight/judgment: Poor  Cognition is grossly intact    Physical Exam:  Musculoskeletal system: steady gait  Tremor not present  Cog wheeling not present    Assessment and Plan:  Shadi Yu meets criteria for a diagnosis of Bipolar 1 disorder current episode depressed with psychotic features. ROSA MARIA. Rule out schizoaffective disorder. Start wellbutrin 100 mg today. Continue rest of medications as prescribed. We will closely monitor for safety. We will encourage reality orientation. Disposition planning to continue. I certify that this patients inpatient psychiatric hospital services furnished since the previous certification were, and continue to be, required for treatment that could reasonably be expected to improve the patient's condition, or for diagnostic study, and that the patient continues to need, on a daily basis, active treatment furnished directly by or requiring the supervision of inpatient psychiatric facility personnel. In addition, the hospital records show that services furnished were intensive treatment services, admission or related services, or equivalent services.       Signed:  Eddy Echols NP  8/23/2022

## 2022-08-23 NOTE — INTERDISCIPLINARY ROUNDS
Behavioral Health Interdisciplinary Rounds     Patient Name: Shabana Starkey  Age: 61 y.o. Room/Bed:  746/  Primary Diagnosis: <principal problem not specified>   Admission Status: Voluntary     Readmission within 30 days: no  Power of  in place: no  Patient requires a blocked bed: no          Reason for blocked bed:   Sleep hours:        Participation in Care/Groups:  no  Medication Compliant?: Yes  PRNS (last 24 hours): None    Restraints (last 24 hours):  no     Alcohol screening (AUDIT) completed -     If applicable, date SBIRT discussed in treatment team AND documented:    Tobacco - patient is a smoker: Have You Used Tobacco in the Past 30 Days: No  Illegal Drugs use: Have You Used Any Illegal Substances Over the Past 12 Months: No    24 hour chart check complete: yes     _______________________________________________    Patient goal(s) for today: Communicate needs to staff, interact with peers on unit. Treatment team focus/goals: Assess pt, manage medications, discharge planning   Progress note: Pt is showing improvement. Pts thought process is focused. Pt met with  and charge nurse to discuss discharge and safety concerns. Pt denies SI, HI, AVH. Pt reports sleeping better. Pt denies paranoia. Pt displays depreddive symptoms, low energy. Financial concerns/prescription coverage:    Family contact:     Val Aguiar [phone]                   Family requesting physician contact today:    Discharge plan: Pt will discharge home. Access to weapons no :                                                               Outpatient provider(s): Milagros BARRETT  Patient's preferred phone number for follow up call :   Patient's preferred e-mail address :  Participating treatment team members:    LOS:  9 Expected LOS: 8/26/22    Participating treatment team members: Shabana Jaky, MERCED Saunders, Rochelle Gold RN, Deepak Llamas NP

## 2022-08-23 NOTE — PROGRESS NOTES
Problem: Depressed Mood (Adult/Pediatric)  Goal: *STG: Participates in treatment plan  Outcome: Progressing Towards Goal  Note: Out on unit passively engaged. Mood depressed/anxious and fearful at times, affect dull. Pyschomotor retardation. Describes being preoccupied with her safety when she gets home. Insight in her difficulty to verbalizing her thoughts. When asked if her thoughts are slow she states \"yes\", when asked if she feels hopeless she states \"yes\". Daily goal is to discuss what to do at home when feeling unsafe. Staff focus is on offering support and reassurance. 1115: visit w :  confirmed locked box for medications have been purchased. Verbalized understanding that all medications prescribed and over the counter will be locked up. States he has confirmed with family friend on a biweekly schedule for patient during the week while he is at work. Patient states with increase in daily structure and planning she is feeling safe. States she thinks this plan will be the help she will need for home.  Tx team made aware   Goal: *STG: Verbalizes anger, guilt, and other feelings in a constructive manor  Outcome: Progressing Towards Goal  Goal: *STG: Attends activities and groups  Outcome: Progressing Towards Goal  Goal: *STG: Demonstrates reduction in symptoms and increase in insight into coping skills/future focused  Outcome: Progressing Towards Goal

## 2022-08-23 NOTE — PROGRESS NOTES
Problem: Depressed Mood (Adult/Pediatric)  Goal: *STG: Participates in treatment plan  Outcome: Progressing Towards Goal  Goal: *STG: Verbalizes anger, guilt, and other feelings in a constructive manor  Outcome: Progressing Towards Goal  Goal: *STG: Attends activities and groups  Outcome: Progressing Towards Goal  Goal: *STG: Demonstrates reduction in symptoms and increase in insight into coping skills/future focused  Outcome: Progressing Towards Goal   Pt is visible on unit  No voiced complaints or  acute distress at present  Pt appears more organized with thoughts

## 2022-08-23 NOTE — PROGRESS NOTES
Spiritual Care Assessment/Progress Note  [facility]      NAME: Merlin Espy      MRN: [id number]  AGE: 61 y.o.  SEX: female  Lutheran Affiliation: [place] witness   Language: English     8/23/2022     Total Time (in minutes): 10     Spiritual Assessment begun in [address] through conversation with:         [x]Patient        [] Family    [] Friend(s)        Reason for Consult: Initial/Spiritual assessment, patient floor     Spiritual beliefs: (Please include comment if needed)     [x] Identifies with a gillian tradition:    Yazidi     [] Supported by a gillian community:            [] Claims no spiritual orientation:           [] Seeking spiritual identity:                [] Adheres to an individual form of spirituality:           [] Not able to assess:                           Identified resources for coping:      [] Prayer                               [] Music                  [] Guided Imagery     [] Family/friends                 [] Pet visits     [] Devotional reading                         [] Unknown     [] Other:                                               Interventions offered during this visit: (See comments for more details)    Patient Interventions: Coping skills reviewed/reinforced, Iconic (affirming the presence of God/Higher Power)           Plan of Care:     [] Support spiritual and/or cultural needs    [] Support AMD and/or advance care planning process      [] Support grieving process   [] Coordinate Rites and/or Rituals    [] Coordination with community clergy   [] No spiritual needs identified at this time   [] Detailed Plan of Care below (See Comments)  [] Make referral to Music Therapy  [] Make referral to Pet Therapy     [] Make referral to Addiction services  [] Make referral to [facility]  [] Make referral to Spiritual Care Partner  [] No future visits requested        [x] Contact Spiritual Care for further referrals     Comments:  visit for initial spiritual assessment. Met patient in dayroom, provided spiritual presence, listening and support. Spoke briefly of her health saying she is beginning to feel a little better. Provided words of comfort and hope. Informed patient of availability of  and pastoral care services. Invited patient to attend spirituality group but she decided not to attend. Patient appeared comforted and encouraged as a result of this visit and expressed gratitude for this visit. Please contact spiritual care for further referral or consult. Rev.  Wendel Aase, MDiv, [facility], [facility]   paging service: [phone])

## 2022-08-24 PROCEDURE — 74011250637 HC RX REV CODE- 250/637: Performed by: NURSE PRACTITIONER

## 2022-08-24 PROCEDURE — 65220000003 HC RM SEMIPRIVATE PSYCH

## 2022-08-24 RX ORDER — OLANZAPINE 5 MG/1
5 TABLET ORAL 2 TIMES DAILY
Status: DISCONTINUED | OUTPATIENT
Start: 2022-08-24 | End: 2022-09-08

## 2022-08-24 RX ADMIN — BUPROPION HYDROCHLORIDE 100 MG: 100 TABLET, FILM COATED, EXTENDED RELEASE ORAL at 08:28

## 2022-08-24 RX ADMIN — FLUOXETINE HYDROCHLORIDE 60 MG: 20 CAPSULE ORAL at 08:27

## 2022-08-24 RX ADMIN — OLANZAPINE 5 MG: 5 TABLET, FILM COATED ORAL at 08:28

## 2022-08-24 RX ADMIN — OLANZAPINE 10 MG: 5 TABLET, FILM COATED ORAL at 21:02

## 2022-08-24 RX ADMIN — OLANZAPINE 5 MG: 5 TABLET, FILM COATED ORAL at 16:13

## 2022-08-24 NOTE — BH NOTES
PSYCHIATRIC PROGRESS NOTE       Patient: Neelima Estevez MRN: [id number]  SSN: [id number]    YOB: 1965  Age: 61 y.o. Sex: female      Admit Date: 8/14/2022    LOS: 10 days     Chief Complaint:  I am getting better. Interval History:  Neelima Estevez is tolerating initiation of wellbutrin. Anxiety is 4 out of 10, says it is better. Her  came to visit her yesterday and it went ok. She denies si, \"I want to live. \" She believes she is a bad person, a chest, and a liar. \"There are times that I pretended. \" Did not sleep great last night. She is tolerating initiation of wellbutrin, no adverse effects, but premature to assess response. At the present time the patient Neelima Estevez remains compliant with taking medications. Denies any adverse events from taking them and feels they have been beneficial. She denies being drowsy.         Past Medical History:  Past Medical History:   Diagnosis Date    Anxiety and depression     Bipolar 1 disorder with moderate robyn ([address] 3/17/2014    Chronic back pain     Hyperlipidemia 8/22/2016    Hyponatremia     Psychotic disorder ([facility] [address]     Scoliosis          ALLERGIES:(reviewed/updated 8/24/2022)  Allergies   Allergen Reactions    Other Food Hives     Artifical sweetners    Claritin-D 12 Hour [Loratadine-Pseudoephedrine] Palpitations     palpatations and ringing in the ear    Other Medication Anaphylaxis     Artificial sweeteners cause anaphylaxis    Pcn [Penicillins] Anaphylaxis    Sunscreen Contact Dermatitis     Burns and opens the skin    Ultram [Tramadol] Hives and Other (comments)     Hives and ringing of the ears    Benzoyl Peroxide Hives    Cephalexin Itching    Divalproex Itching    Maltodextrin-Glycerin Shortness of Breath     Laboratory report:  Lab Results   Component Value Date/Time    WBC 6.9 08/17/2022 06:31 AM    HGB 10.7 (L) 08/17/2022 06:31 AM    Hematocrit (POC) 34 (L) 07/17/2018 03:33 AM    HCT 31.3 (L) 08/17/2022 06:31 AM    PLATELET 177 08/17/2022 06:31 AM    MCV 91.5 08/17/2022 06:31 AM      Lab Results   Component Value Date/Time    Sodium 144 08/14/2022 03:45 AM    Potassium 3.5 08/14/2022 03:45 AM    Chloride 116 (H) 08/14/2022 03:45 AM    CO2 26 08/14/2022 03:45 AM    Anion gap 2 (L) 08/14/2022 03:45 AM    Glucose 102 (H) 08/14/2022 03:45 AM    Glucose 107 (H) 02/03/2020 05:40 AM    BUN 3 (L) 08/14/2022 03:45 AM    Creatinine 0.74 08/14/2022 03:45 AM    BUN/Creatinine ratio 4 (L) 08/14/2022 03:45 AM    GFR est AA >60 08/14/2022 03:45 AM    GFR est non-AA >60 08/14/2022 03:45 AM    Calcium 9.1 08/14/2022 03:45 AM    Bilirubin, total 0.2 08/12/2022 05:34 AM    Alk.  phosphatase 39 (L) 08/12/2022 05:34 AM    Protein, total 7.2 08/12/2022 05:34 AM    Albumin 3.4 (L) 08/12/2022 05:34 AM    Globulin 3.8 08/12/2022 05:34 AM    A-G Ratio 0.9 (L) 08/12/2022 05:34 AM    ALT (SGPT) 19 08/12/2022 05:34 AM      Vitals:    08/22/22 2159 08/23/22 0831 08/23/22 1650 08/24/22 0839   BP: 132/71 108/78 118/74 105/68   Pulse: 97 92  (!) 101   Resp: 20 18  18   Temp: 98.2 °F (36.8 °C) 98.4 °F (36.9 °C) 98.6 °F (37 °C) 98.6 °F (37 °C)   SpO2: 96% 97%     Weight:       Height:           Lab Results   Component Value Date/Time    Carbamazepine 14.1 (H) 07/26/2020 04:19 AM     Lab Results   Component Value Date/Time    Lithium level 0.79 08/14/2022 03:45 AM       Vital Signs  Patient Vitals for the past 24 hrs:   Temp Pulse Resp BP   08/24/22 0839 98.6 °F (37 °C) (!) 101 18 105/68   08/23/22 1650 98.6 °F (37 °C) -- -- 118/74       Wt Readings from Last 3 Encounters:   08/21/22 73.9 kg (163 lb)   08/11/22 72.6 kg (160 lb)   08/07/22 71.5 kg (157 lb 9.6 oz)     Temp Readings from Last 3 Encounters:   08/24/22 98.6 °F (37 °C)   08/14/22 98.4 °F (36.9 °C)   08/10/22 98.4 °F (36.9 °C)     BP Readings from Last 3 Encounters:   08/24/22 105/68   08/14/22 (!) 142/77   08/10/22 111/75     Pulse Readings from Last 3 Encounters:   08/24/22 (!) 101   08/14/22 92   08/10/22 78 Radiology (reviewed/updated 8/24/2022)  No results found. Current Facility-Administered Medications   Medication Dose Route Frequency Provider Last Rate Last Admin    buPROPion SR [facility] SR) tablet 100 mg  100 mg Oral DAILY Stephanie Aquino, NP   100 mg at 08/24/22 0828    OLANZapine (ZyPREXA) tablet 5 mg  5 mg Oral BID Lesley Romance, NP   5 mg at 08/24/22 0828    OLANZapine (ZyPREXA) tablet 10 mg  10 mg Oral QHS Lesley Romance, NP   10 mg at 08/23/22 2103    FLUoxetine (PROzac) capsule 60 mg  60 mg Oral DAILY Lesley Romance, NP   60 mg at 08/24/22 0827    hydrOXYzine HCL (ATARAX) tablet 50 mg  50 mg Oral Q4H PRN Teresa Aquinoe A, NP   50 mg at 08/16/22 1626    OLANZapine (ZyPREXA) tablet 5 mg  5 mg Oral Q6H PRN Teresa Aquinoe SAURABH, NP   5 mg at 08/16/22 1626    haloperidol lactate (HALDOL) injection 5 mg  5 mg IntraMUSCular Q6H PRN Stephanie Aquino, NP        benztropine (COGENTIN) tablet 1 mg  1 mg Oral BID PRN Stephanie Aquino, NP        diphenhydrAMINE (BENADRYL) injection 50 mg  50 mg IntraMUSCular BID PRN Stephanie Aquino, NP        traZODone (DESYREL) tablet 50 mg  50 mg Oral QHS PRN Stephanie Aquino A, NP   50 mg at 08/21/22 2027    acetaminophen (TYLENOL) tablet 650 mg  650 mg Oral Q4H PRN Teresa Aquinoe A, NP   650 mg at 08/15/22 0511    magnesium hydroxide (MILK OF MAGNESIA) 400 mg/5 mL oral suspension 30 mL  30 mL Oral DAILY PRN Stephanie Aquino, NP           Side Effects: (reviewed/updated 8/24/2022)  None reported or admitted to.     Review of Systems: (reviewed/updated 8/24/2022)  Appetite: good  Sleep: good   All other Review of Systems: depression    Mental Status Exam:  Eye contact: Limited eye contact  Psychomotor activity: decreased  Speech is spontaneous  Thought process: slow but logical  Mood is \"depressed\"  Affect: flat  Perception: No avh  Thought content: +paranoid +delusions  Suicidal ideation: + si  Homicidal ideation: No hi  Insight/judgment: Poor  Cognition is grossly intact    Physical Exam:  Musculoskeletal system: steady gait  Tremor not present  Cog wheeling not present    Assessment and Plan:  Felix Monterroso meets criteria for a diagnosis of Bipolar 1 disorder current episode depressed with psychotic features. ROSA MARIA. Rule out schizoaffective disorder. Increase wellbutrin to 100 mg bid tomorrow. Continue rest of medications as prescribed. We will closely monitor for safety. We will encourage reality orientation. Disposition planning to continue. I certify that this patients inpatient psychiatric hospital services furnished since the previous certification were, and continue to be, required for treatment that could reasonably be expected to improve the patient's condition, or for diagnostic study, and that the patient continues to need, on a daily basis, active treatment furnished directly by or requiring the supervision of inpatient psychiatric facility personnel. In addition, the hospital records show that services furnished were intensive treatment services, admission or related services, or equivalent services.       Signed:  Landry Stern NP  8/24/2022

## 2022-08-24 NOTE — PROGRESS NOTES
Problem: Discharge Planning  Goal: *Knowledge of medication management  Outcome: Progressing Towards Goal     Problem: Depressed Mood (Adult/Pediatric)  Goal: *STG: Participates in treatment plan  Outcome: Progressing Towards Goal  Goal: *STG: Attends activities and groups  Outcome: Progressing Towards Goal     Patient is resting quietly in bed with eyes closed. Appears to be asleep. No respiratory distress noted. 15 minutes safety monitoring continues.

## 2022-08-24 NOTE — INTERDISCIPLINARY ROUNDS
Behavioral Health Interdisciplinary Rounds     Patient Name: Jason Jain  Age: 61 y.o. Room/Bed:  6/  Primary Diagnosis: <principal problem not specified>   Admission Status: Voluntary     Readmission within 30 days: no  Power of  in place:   Patient requires a blocked bed: no          Reason for blocked bed:     Sleep hours:        Participation in Care/Groups:    Medication Compliant?: Yes  PRNS (last 24 hours): None    Restraints (last 24 hours):  no  Substance Abuse:  no    Alcohol screening (AUDIT) completed -     If applicable, date SBIRT discussed in treatment team AND documented:   Tobacco -   24 hour chart check complete: yes   ______________________________________    Patient goal(s) for today: Communicate needs to staff   Treatment team focus/goals: Assess pt, manage medications, discharge planning   Progress note: Pt shows improvement. Pts thought processes are more coherent and organized. Pt participated in group and is out on unit. Pt denies SI, HI, AVH. Financial concerns/prescription coverage:    Family contact:  Nicholas Merlos, [phone]                      Family requesting physician contact today:    Discharge plan: Pt will discharge home   Access to weapons none:                                                               Outpatient provider(s): Milagros BARRETT   Patient's preferred phone number for follow up call :   Patient's preferred e-mail address :  Participating treatment team members:    LOS:  10 Expected LOS: 8/29/22    Participating treatment team members: Jason Jain, MERCED Boswell, Juan Freedman RN, Pratima Fagan NP

## 2022-08-24 NOTE — BH NOTES
GROUP THERAPY PROGRESS NOTE    Patient is participating in psychotherapy group. Group time: 60 minutes    Personal goal for participation: to develop an understanding of cognitive distortions and how to alter our thinking. Goal orientation: Personal    Group therapy participation:  Active    Therapeutic interventions reviewed and discussed:  Group members were guided through learning about cognitive distortions. Members gained an understanding of how these types of distortions effect perception, relationships, and overall mental health. Members were guided through learning about methods that can be used for changing negative thought patterns. Members were able to identify distortions and engage in discussion about past experiences. Handout provided. Impression of participation:  Pt was present and engaged ingroup discussion. Pt added insight to group topic.  Pt interacted with peers and MERCED Elena, HP-A

## 2022-08-24 NOTE — PROGRESS NOTES
Problem: Patient Education: Go to Patient Education Activity  Goal: Patient/Family Education  Outcome: Progressing Towards Goal     Problem: Falls - Risk of  Goal: *Absence of Falls  Description: Document Becky Blood Fall Risk and appropriate interventions in the flowsheet.   Outcome: Progressing Towards Goal  Note: Fall Risk Interventions:  Mobility Interventions: Assess mobility with egress test         Medication Interventions: Teach patient to arise slowly    Elimination Interventions: Stay With Me (per policy), Patient to call for help with toileting needs    History of Falls Interventions: Door open when patient unattended

## 2022-08-24 NOTE — PROGRESS NOTES
Problem: Depressed Mood (Adult/Pediatric)  Goal: *STG: Participates in treatment plan  Outcome: Progressing Towards Goal      Pt remains with alert and oriented on unit with dull/flat affect. Pt remains medication cooperative and medication compliant. Pt reports she slept well. Pt denies thoughts and feelings of SI/HI. Pt reports an improvement in her anxiety and depression. Staff has discussed safety planning with  who reports he has bought a locked container for medications. Plan is to discharge pt 8/29. Goal: *STG: Verbalizes anger, guilt, and other feelings in a constructive manor  Outcome: Progressing Towards Goal  Goal: *STG: Attends activities and groups  Outcome: Progressing Towards Goal     Problem: Falls - Risk of  Goal: *Absence of Falls  Description: Document Clara Fall Risk and appropriate interventions in the flowsheet. Outcome: Progressing Towards Goal    Goal: encourage pt to verbalize feelings of anxiety so that staff is able to provide support.

## 2022-08-24 NOTE — PROGRESS NOTES
Problem: Falls - Risk of  Goal: *Absence of Falls  Description: Document Stratton Reason Fall Risk and appropriate interventions in the flowsheet.   Outcome: Progressing Towards Goal  Note: Fall Risk Interventions:  Mobility Interventions: Assess mobility with egress test         Medication Interventions: Teach patient to arise slowly    Elimination Interventions: Stay With Me (per policy), Patient to call for help with toileting needs    History of Falls Interventions: Door open when patient unattended

## 2022-08-25 PROCEDURE — 74011250637 HC RX REV CODE- 250/637: Performed by: NURSE PRACTITIONER

## 2022-08-25 PROCEDURE — 65220000003 HC RM SEMIPRIVATE PSYCH

## 2022-08-25 RX ORDER — BUPROPION HYDROCHLORIDE 150 MG/1
150 TABLET, EXTENDED RELEASE ORAL DAILY
Status: DISCONTINUED | OUTPATIENT
Start: 2022-08-25 | End: 2022-09-20 | Stop reason: HOSPADM

## 2022-08-25 RX ORDER — MIRTAZAPINE 15 MG/1
15 TABLET, FILM COATED ORAL
Status: DISCONTINUED | OUTPATIENT
Start: 2022-08-25 | End: 2022-08-26

## 2022-08-25 RX ADMIN — MIRTAZAPINE 15 MG: 15 TABLET, FILM COATED ORAL at 21:13

## 2022-08-25 RX ADMIN — OLANZAPINE 5 MG: 5 TABLET, FILM COATED ORAL at 08:51

## 2022-08-25 RX ADMIN — OLANZAPINE 10 MG: 5 TABLET, FILM COATED ORAL at 21:13

## 2022-08-25 RX ADMIN — FLUOXETINE HYDROCHLORIDE 60 MG: 20 CAPSULE ORAL at 08:51

## 2022-08-25 RX ADMIN — OLANZAPINE 5 MG: 5 TABLET, FILM COATED ORAL at 16:31

## 2022-08-25 RX ADMIN — BUPROPION HYDROCHLORIDE 150 MG: 150 TABLET, EXTENDED RELEASE ORAL at 08:51

## 2022-08-25 NOTE — BH NOTES
PSYCHIATRIC PROGRESS NOTE       Patient: Anu Payne MRN: [id number]  SSN: [id number]    YOB: 1965  Age: 61 y.o. Sex: female      Admit Date: 8/14/2022    LOS: 11 days     Chief Complaint:  I am getting better. Interval History:  Anu Payne is sleeping poorly , she is asking for a sleep aid. Notes that she is slowly getting better, she now denies si. \"I want to live and get well. \" She remains depressed, blunted affect, psychomotor activity mildly decreased. Denies hi or avh. She does not feel people are to get her anymore. At the present time the patient Anu Payne remains compliant with taking medications.  Denies any adverse events from taking them and feels they have been beneficial.         Past Medical History:  Past Medical History:   Diagnosis Date    Anxiety and depression     Bipolar 1 disorder with moderate robyn ([facility]) 3/17/2014    Chronic back pain     Hyperlipidemia 8/22/2016    Hyponatremia     Psychotic disorder ([facility] [address]     Scoliosis          ALLERGIES:(reviewed/updated 8/25/2022)  Allergies   Allergen Reactions    Other Food Hives     Artifical sweetners    Claritin-D 12 Hour [Loratadine-Pseudoephedrine] Palpitations     palpatations and ringing in the ear    Other Medication Anaphylaxis     Artificial sweeteners cause anaphylaxis    Pcn [Penicillins] Anaphylaxis    Sunscreen Contact Dermatitis     Burns and opens the skin    Ultram [Tramadol] Hives and Other (comments)     Hives and ringing of the ears    Benzoyl Peroxide Hives    Cephalexin Itching    Divalproex Itching    Maltodextrin-Glycerin Shortness of Breath     Laboratory report:  Lab Results   Component Value Date/Time    WBC 6.9 08/17/2022 06:31 AM    HGB 10.7 (L) 08/17/2022 06:31 AM    Hematocrit (POC) 34 (L) 07/17/2018 03:33 AM    HCT 31.3 (L) 08/17/2022 06:31 AM    PLATELET 283 23/82/4681 06:31 AM    MCV 91.5 08/17/2022 06:31 AM      Lab Results   Component Value Date/Time    Sodium 144 08/14/2022 03:45 AM Potassium 3.5 08/14/2022 03:45 AM    Chloride 116 (H) 08/14/2022 03:45 AM    CO2 26 08/14/2022 03:45 AM    Anion gap 2 (L) 08/14/2022 03:45 AM    Glucose 102 (H) 08/14/2022 03:45 AM    Glucose 107 (H) 02/03/2020 05:40 AM    BUN 3 (L) 08/14/2022 03:45 AM    Creatinine 0.74 08/14/2022 03:45 AM    BUN/Creatinine ratio 4 (L) 08/14/2022 03:45 AM    GFR est AA >60 08/14/2022 03:45 AM    GFR est non-AA >60 08/14/2022 03:45 AM    Calcium 9.1 08/14/2022 03:45 AM    Bilirubin, total 0.2 08/12/2022 05:34 AM    Alk. phosphatase 39 (L) 08/12/2022 05:34 AM    Protein, total 7.2 08/12/2022 05:34 AM    Albumin 3.4 (L) 08/12/2022 05:34 AM    Globulin 3.8 08/12/2022 05:34 AM    A-G Ratio 0.9 (L) 08/12/2022 05:34 AM    ALT (SGPT) 19 08/12/2022 05:34 AM      Vitals:    08/23/22 0831 08/23/22 1650 08/24/22 0839 08/24/22 1551   BP: 108/78 118/74 105/68 132/87   Pulse: 92  (!) 101 99   Resp: 18  18 16   Temp: 98.4 °F (36.9 °C) 98.6 °F (37 °C) 98.6 °F (37 °C) 98.6 °F (37 °C)   SpO2: 97%   99%   Weight:       Height:           Lab Results   Component Value Date/Time    Carbamazepine 14.1 (H) 07/26/2020 04:19 AM     Lab Results   Component Value Date/Time    Lithium level 0.79 08/14/2022 03:45 AM       Vital Signs  Patient Vitals for the past 24 hrs:   Temp Pulse Resp BP SpO2   08/24/22 1551 98.6 °F (37 °C) 99 16 132/87 99 %   08/24/22 0839 98.6 °F (37 °C) (!) 101 18 105/68 --       Wt Readings from Last 3 Encounters:   08/21/22 73.9 kg (163 lb)   08/11/22 72.6 kg (160 lb)   08/07/22 71.5 kg (157 lb 9.6 oz)     Temp Readings from Last 3 Encounters:   08/24/22 98.6 °F (37 °C)   08/14/22 98.4 °F (36.9 °C)   08/10/22 98.4 °F (36.9 °C)     BP Readings from Last 3 Encounters:   08/24/22 132/87   08/14/22 (!) 142/77   08/10/22 111/75     Pulse Readings from Last 3 Encounters:   08/24/22 99   08/14/22 92   08/10/22 78       Radiology (reviewed/updated 8/25/2022)  No results found.     Current Facility-Administered Medications   Medication Dose Route Frequency Provider Last Rate Last Admin    mirtazapine (REMERON) tablet 15 mg  15 mg Oral QHS Stephanie Aquino NP        buPROPion SR (WELLBUTRIN SR) tablet 150 mg  150 mg Oral DAILY Stephanie Aquino NP        OLANZapine (ZyPREXA) tablet 5 mg  5 mg Oral BID Stephanie Aquino NP   5 mg at 08/24/22 1613    OLANZapine (ZyPREXA) tablet 10 mg  10 mg Oral QHS Ed Pill, NP   10 mg at 08/24/22 2102    FLUoxetine (PROzac) capsule 60 mg  60 mg Oral DAILY Ed Pill, NP   60 mg at 08/24/22 0827    hydrOXYzine HCL (ATARAX) tablet 50 mg  50 mg Oral Q4H PRN Stephanie Aquino NP   50 mg at 08/16/22 1626    OLANZapine (ZyPREXA) tablet 5 mg  5 mg Oral Q6H PRN Stephanie Aquino NP   5 mg at 08/16/22 1626    haloperidol lactate (HALDOL) injection 5 mg  5 mg IntraMUSCular Q6H PRN Stephanie Aquino NP        benztropine (COGENTIN) tablet 1 mg  1 mg Oral BID PRN Stephanie Aquino NP        diphenhydrAMINE (BENADRYL) injection 50 mg  50 mg IntraMUSCular BID PRN Stephanie Aquino NP        traZODone (DESYREL) tablet 50 mg  50 mg Oral QHS PRN Stephanie Aquino, NP   50 mg at 08/21/22 2027    acetaminophen (TYLENOL) tablet 650 mg  650 mg Oral Q4H PRN Stephanie Aquino NP   650 mg at 08/15/22 0511    magnesium hydroxide (MILK OF MAGNESIA) 400 mg/5 mL oral suspension 30 mL  30 mL Oral DAILY PRN Stephanie Aquino NP           Side Effects: (reviewed/updated 8/25/2022)  None reported or admitted to.     Review of Systems: (reviewed/updated 8/25/2022)  Appetite: good  Sleep: poor  All other Review of Systems: depression    Mental Status Exam:  Eye contact: good eye contact  Psychomotor activity: decreased  Speech is spontaneous  Thought process: logical  Mood is \"ok\"  Affect: blunt  Perception: No avh  Thought content: no paranoid  or delusions  Suicidal ideation: no si  Homicidal ideation: No hi  Insight/judgment: Partial  Cognition is grossly intact    Physical Exam:  Musculoskeletal system: steady gait  Tremor not present  Cog wheeling not present    Assessment and Plan:  Felix Monterroso meets criteria for a diagnosis of Bipolar 1 disorder current episode depressed with psychotic features. ROSA MARIA. Rule out schizoaffective disorder. Increase wellbutrin to 150 mg daily. Start remeron 15 mg qhs. Continue rest of medications as prescribed. We will closely monitor for safety. We will encourage reality orientation. Disposition planning to continue. I certify that this patients inpatient psychiatric hospital services furnished since the previous certification were, and continue to be, required for treatment that could reasonably be expected to improve the patient's condition, or for diagnostic study, and that the patient continues to need, on a daily basis, active treatment furnished directly by or requiring the supervision of inpatient psychiatric facility personnel. In addition, the hospital records show that services furnished were intensive treatment services, admission or related services, or equivalent services.       Signed:  Landry Stern NP  8/25/2022

## 2022-08-25 NOTE — PROGRESS NOTES
Problem: Depressed Mood (Adult/Pediatric)  Goal: *STG: Participates in treatment plan  Outcome: Progressing Towards Goal  Goal: *STG: Verbalizes anger, guilt, and other feelings in a constructive manor  Outcome: Progressing Towards Goal  Goal: *STG: Attends activities and groups  Outcome: Progressing Towards Goal  Goal: *STG: Demonstrates reduction in symptoms and increase in insight into coping skills/future focused  Outcome: Progressing Towards Goal     Pt is alert and oriented on unit. Pt is calm, cooperative, and pleasant. Pt socialization has increased within the last few days. Pt denies thoughts of SI/HI. Pt is interactive with other patients ad pt mood seems be more cheerful. Pt continues to be treatment compliant and medication compliant.

## 2022-08-25 NOTE — BH NOTES
GROUP THERAPY PROGRESS NOTE    Patient is participating in recreational therapy group. Group time: 30 minutes    Personal goal for participation: To develop an understanding and practice of stress relief through art therapy    Goal orientation: Personal    Group therapy participation: active     Therapeutic interventions reviewed and discussed:  Group members were given the opportunity to engage in conversation about their mental health challenges and strengths while coloring/painting. Group members gained an understanding of how stress relief through artistic practice can be beneficial to their mental health. Impression of participation: Pt was present and engaged in group discussion and activity. Pt interacted with peers/sw. Sw encouraged pts to express themselves through their art. Pt was calm, cooperative.       EMRCED Hawkins, QMHP-A

## 2022-08-25 NOTE — BH NOTES
Due to pts previous OD and psych hx, pt will not discharge until Monday. Pt requires more observation and medication management.

## 2022-08-25 NOTE — PROGRESS NOTES
Problem: Falls - Risk of  Goal: *Absence of Falls  Description: Document [place] Apolonia Fall Risk and appropriate interventions in the flowsheet. Outcome: Progressing Towards Goal  Note: Fall Risk Interventions:  Mobility Interventions: Assess mobility with egress test    Medication Interventions: Teach patient to arise slowly    Elimination Interventions: Stay With Me (per policy), Patient to call for help with toileting needs    History of Falls Interventions: Door open when patient unattended    Patient is resting quietly in bed with eyes closed. Appears to be asleep. No acute or respiratory distress noted. Will continue to monitor q15 min rounds.

## 2022-08-25 NOTE — PROGRESS NOTES
Patient visible on unit. Calm and cooperative. Says she had a \"good day and she feels ok\". Medication and meal compliant. Problem: Falls - Risk of  Goal: *Absence of Falls  Description: Document Ale Nixon Fall Risk and appropriate interventions in the flowsheet.   Outcome: Progressing Towards Goal  Note: Fall Risk Interventions:  Mobility Interventions: Assess mobility with egress test         Medication Interventions: Teach patient to arise slowly, Evaluate medications/consider consulting pharmacy    Elimination Interventions: Stay With Me (per policy), Patient to call for help with toileting needs    History of Falls Interventions: Door open when patient unattended         Problem: Depressed Mood (Adult/Pediatric)  Goal: *STG: Verbalizes anger, guilt, and other feelings in a constructive manor  Outcome: Progressing Towards Goal  Goal: *STG: Demonstrates reduction in symptoms and increase in insight into coping skills/future focused  Outcome: Progressing Towards Goal

## 2022-08-25 NOTE — INTERDISCIPLINARY ROUNDS
Behavioral Health Interdisciplinary Rounds     Patient Name: Jackie Vinson  Age: 61 y.o. Room/Bed:  746/  Primary Diagnosis: <principal problem not specified>   Admission Status: Voluntary     Readmission within 30 days: yes  Power of  in place: no  Patient requires a blocked bed: no            Sleep hours: 7+        Participation in Care/Groups:  yes  Medication Compliant?: Yes  PRNS (last 24 hours): None    Restraints (last 24 hours):  no     Alcohol screening (AUDIT) completed -     If applicable, date SBIRT discussed in treatment team AND documented:    Tobacco - patient is a smoker: Have You Used Tobacco in the Past 30 Days: No  Illegal Drugs use: Have You Used Any Illegal Substances Over the Past 12 Months: No    24 hour chart check complete: yes     _______________________________________________    Patient goal(s) for today: Communicate needs to staff. Treatment team focus/goals: Assess pt, manage medications, discharge planning   Progress note: Pt is making progress. NP and SW want to continue to observe pts mood and medication. Pt denies SI, HI, AVH. Pt has been talking with family. Souse has spoken to sw about discharge plans and follow up . Pt denies paranoia and is more future focused.          Financial concerns/prescription coverage:    Family contact:    Fidel Watson [phone]                    Family requesting physician contact today:    Discharge plan: Pt will discharge home   Access to weapons no weapons                                                             Outpatient provider(s): Milagros Neely [facility]   Patient's preferred phone number for follow up call :   Patient's preferred e-mail address :  Participating treatment team members:    LOS:  11 Expected LOS: 8/29/22    Participating treatment team members: Jackie Vinson, MERCED Hogan, Dariusz Morse RN, Paula Alexander NP

## 2022-08-26 PROCEDURE — 74011250637 HC RX REV CODE- 250/637: Performed by: NURSE PRACTITIONER

## 2022-08-26 PROCEDURE — 65220000003 HC RM SEMIPRIVATE PSYCH

## 2022-08-26 RX ORDER — MIRTAZAPINE 15 MG/1
30 TABLET, FILM COATED ORAL
Status: DISCONTINUED | OUTPATIENT
Start: 2022-08-26 | End: 2022-08-26

## 2022-08-26 RX ORDER — MIRTAZAPINE 15 MG/1
15 TABLET, FILM COATED ORAL
Status: DISCONTINUED | OUTPATIENT
Start: 2022-08-26 | End: 2022-08-29

## 2022-08-26 RX ORDER — HYDROXYZINE 50 MG/1
100 TABLET, FILM COATED ORAL
Status: DISCONTINUED | OUTPATIENT
Start: 2022-08-26 | End: 2022-09-20 | Stop reason: HOSPADM

## 2022-08-26 RX ADMIN — OLANZAPINE 5 MG: 5 TABLET, FILM COATED ORAL at 08:04

## 2022-08-26 RX ADMIN — MIRTAZAPINE 15 MG: 15 TABLET, FILM COATED ORAL at 21:04

## 2022-08-26 RX ADMIN — OLANZAPINE 10 MG: 5 TABLET, FILM COATED ORAL at 21:04

## 2022-08-26 RX ADMIN — FLUOXETINE HYDROCHLORIDE 60 MG: 20 CAPSULE ORAL at 08:04

## 2022-08-26 RX ADMIN — ACETAMINOPHEN 650 MG: 325 TABLET ORAL at 19:14

## 2022-08-26 RX ADMIN — HYDROXYZINE HYDROCHLORIDE 100 MG: 50 TABLET, FILM COATED ORAL at 21:04

## 2022-08-26 RX ADMIN — HYDROXYZINE HYDROCHLORIDE 50 MG: 50 TABLET, FILM COATED ORAL at 00:57

## 2022-08-26 RX ADMIN — OLANZAPINE 5 MG: 5 TABLET, FILM COATED ORAL at 16:18

## 2022-08-26 RX ADMIN — TRAZODONE HYDROCHLORIDE 50 MG: 50 TABLET ORAL at 21:06

## 2022-08-26 RX ADMIN — BUPROPION HYDROCHLORIDE 150 MG: 150 TABLET, EXTENDED RELEASE ORAL at 08:03

## 2022-08-26 NOTE — BH NOTES
PRN Medication Documentation  0059  Specific patient behavior that led to need for PRN medication: anxiety  Staff interventions attempted prior to PRN being given: education  PRN medication given: Atarax    Patient response/effectiveness of PRN medication: will continue to monitor

## 2022-08-26 NOTE — BH NOTES
PSYCHIATRIC PROGRESS NOTE       Patient: Anu Payne MRN: [id number]  SSN: [id number]    YOB: 1965  Age: 61 y.o. Sex: female      Admit Date: 8/14/2022    LOS: 12 days     Chief Complaint:  I am sleepy and tired. Interval History:  Anu Payne continues to sleep poorly. Says her thoughts are racing at night, gets pretty anxious. Says atarax 50 mg prn does not make her drowsy, she agreed to try atarax to 100 mg at bedtime. Overall, she continues to make progress. She is hopeful, wants to live. Has not had si in the last few days. No paranoia or delusions. No negative self talk. She has been out in the unit. Appetite is fair. At the present time the patient Anu Payne remains compliant with taking medications. Denies any adverse events from taking them and feels they have been beneficial. Plan for dc Monday if weekend goes well.         Past Medical History:  Past Medical History:   Diagnosis Date    Anxiety and depression     Bipolar 1 disorder with moderate robyn ([facility] [address] 3/17/2014    Chronic back pain     Hyperlipidemia 8/22/2016    Hyponatremia     Psychotic disorder (HCC)     Scoliosis          ALLERGIES:(reviewed/updated 8/26/2022)  Allergies   Allergen Reactions    Other Medication Anaphylaxis     Artificial sweeteners cause anaphylaxis    Pcn [Penicillins] Anaphylaxis    Sunscreen Contact Dermatitis     Burns and opens the skin    Ultram [Tramadol] Hives and Other (comments)     Hives and ringing of the ears    Benzoyl Peroxide Hives    Cephalexin Itching    Divalproex Itching    Maltodextrin-Glycerin Shortness of Breath    Claritin-D 12 Hour [Loratadine-Pseudoephedrine] Palpitations     palpatations and ringing in the ear     Laboratory report:  Lab Results   Component Value Date/Time    WBC 6.9 08/17/2022 06:31 AM    HGB 10.7 (L) 08/17/2022 06:31 AM    Hematocrit (POC) 34 (L) 07/17/2018 03:33 AM    HCT 31.3 (L) 08/17/2022 06:31 AM    PLATELET 417 84/48/1101 06:31 AM    MCV 91.5 08/17/2022 06:31 AM      Lab Results   Component Value Date/Time    Sodium 144 08/14/2022 03:45 AM    Potassium 3.5 08/14/2022 03:45 AM    Chloride 116 (H) 08/14/2022 03:45 AM    CO2 26 08/14/2022 03:45 AM    Anion gap 2 (L) 08/14/2022 03:45 AM    Glucose 102 (H) 08/14/2022 03:45 AM    Glucose 107 (H) 02/03/2020 05:40 AM    BUN 3 (L) 08/14/2022 03:45 AM    Creatinine 0.74 08/14/2022 03:45 AM    BUN/Creatinine ratio 4 (L) 08/14/2022 03:45 AM    GFR est AA >60 08/14/2022 03:45 AM    GFR est non-AA >60 08/14/2022 03:45 AM    Calcium 9.1 08/14/2022 03:45 AM    Bilirubin, total 0.2 08/12/2022 05:34 AM    Alk.  phosphatase 39 (L) 08/12/2022 05:34 AM    Protein, total 7.2 08/12/2022 05:34 AM    Albumin 3.4 (L) 08/12/2022 05:34 AM    Globulin 3.8 08/12/2022 05:34 AM    A-G Ratio 0.9 (L) 08/12/2022 05:34 AM    ALT (SGPT) 19 08/12/2022 05:34 AM      Vitals:    08/25/22 0732 08/25/22 1621 08/25/22 2051 08/26/22 0731   BP: 124/73 137/85 (!) 136/91 133/71   Pulse: (!) 104 100 97 (!) 101   Resp: 20 16 16 16   Temp: 98.7 °F (37.1 °C) 98.6 °F (37 °C) 97.6 °F (36.4 °C) 98.7 °F (37.1 °C)   SpO2:   99% 94%   Weight:       Height:           Lab Results   Component Value Date/Time    Carbamazepine 14.1 (H) 07/26/2020 04:19 AM     Lab Results   Component Value Date/Time    Lithium level 0.79 08/14/2022 03:45 AM       Vital Signs  Patient Vitals for the past 24 hrs:   Temp Pulse Resp BP SpO2   08/26/22 0731 98.7 °F (37.1 °C) (!) 101 16 133/71 94 %   08/25/22 2051 97.6 °F (36.4 °C) 97 16 (!) 136/91 99 %   08/25/22 1621 98.6 °F (37 °C) 100 16 137/85 --       Wt Readings from Last 3 Encounters:   08/21/22 73.9 kg (163 lb)   08/11/22 72.6 kg (160 lb)   08/07/22 71.5 kg (157 lb 9.6 oz)     Temp Readings from Last 3 Encounters:   08/26/22 98.7 °F (37.1 °C)   08/14/22 98.4 °F (36.9 °C)   08/10/22 98.4 °F (36.9 °C)     BP Readings from Last 3 Encounters:   08/26/22 133/71   08/14/22 (!) 142/77   08/10/22 111/75     Pulse Readings from Last 3 Encounters:   08/26/22 (!) 101   08/14/22 92   08/10/22 78       Radiology (reviewed/updated 8/26/2022)  No results found. Current Facility-Administered Medications   Medication Dose Route Frequency Provider Last Rate Last Admin    mirtazapine (REMERON) tablet 15 mg  15 mg Oral QHS Stephanie Aquino, NP   15 mg at 08/25/22 2113    buPROPion SR (WELLBUTRIN SR) tablet 150 mg  150 mg Oral DAILY Stephanie Aquino, NP   150 mg at 08/26/22 0803    OLANZapine (ZyPREXA) tablet 5 mg  5 mg Oral BID Stephanie Aquino, NP   5 mg at 08/26/22 0804    OLANZapine (ZyPREXA) tablet 10 mg  10 mg Oral QHS Leelee Araujo, NP   10 mg at 08/25/22 2113    FLUoxetine (PROzac) capsule 60 mg  60 mg Oral DAILY Leelee Araujo, NP   60 mg at 08/26/22 9287    hydrOXYzine HCL (ATARAX) tablet 50 mg  50 mg Oral Q4H PRN Alver Adelaide A, NP   50 mg at 08/26/22 0057    OLANZapine (ZyPREXA) tablet 5 mg  5 mg Oral Q6H PRN Alver Adelaide A, NP   5 mg at 08/16/22 1626    haloperidol lactate (HALDOL) injection 5 mg  5 mg IntraMUSCular Q6H PRN Stephanie Aquino A, NP        benztropine (COGENTIN) tablet 1 mg  1 mg Oral BID PRN Stephanie Aquino, NP        diphenhydrAMINE (BENADRYL) injection 50 mg  50 mg IntraMUSCular BID PRN Stephanie Aquino, NP        traZODone (DESYREL) tablet 50 mg  50 mg Oral QHS PRN Teresa Aquinoe A, NP   50 mg at 08/21/22 2027    acetaminophen (TYLENOL) tablet 650 mg  650 mg Oral Q4H PRN Stephanie Aquino, NP   650 mg at 08/15/22 0511    magnesium hydroxide (MILK OF MAGNESIA) 400 mg/5 mL oral suspension 30 mL  30 mL Oral DAILY PRN Stephanie Aquino, NP           Side Effects: (reviewed/updated 8/26/2022)  None reported or admitted to.     Review of Systems: (reviewed/updated 8/26/2022)  Appetite: good  Sleep: poor  All other Review of Systems: depression    Mental Status Exam:  Eye contact: good eye contact  Psychomotor activity: decreased  Speech is spontaneous  Thought process: logical  Mood is \"ok\"  Affect: blunt  Perception: No avh  Thought content: no paranoid  or delusions  Suicidal ideation: no si  Homicidal ideation: No hi  Insight/judgment: Partial  Cognition is grossly intact    Physical Exam:  Musculoskeletal system: steady gait  Tremor not present  Cog wheeling not present    Assessment and Plan:  Sherrie Borges meets criteria for a diagnosis of Bipolar 1 disorder current episode depressed with psychotic features. ROSA MARIA. Rule out schizoaffective disorder. Atarax 100 mg qhs. Continue rest of medications as prescribed. We will closely monitor for safety. We will encourage reality orientation. Plan for dc Monday if weekend goes well. I certify that this patients inpatient psychiatric hospital services furnished since the previous certification were, and continue to be, required for treatment that could reasonably be expected to improve the patient's condition, or for diagnostic study, and that the patient continues to need, on a daily basis, active treatment furnished directly by or requiring the supervision of inpatient psychiatric facility personnel. In addition, the hospital records show that services furnished were intensive treatment services, admission or related services, or equivalent services.       Signed:  Shauna Solis NP  8/26/2022

## 2022-08-26 NOTE — PROGRESS NOTES
Problem: Depressed Mood (Adult/Pediatric)  Goal: *STG: Participates in treatment plan  Outcome: Progressing Towards Goal  Note: Out on unit passively engaged. Mood and affect slightly brighter, thoughts organized, journal entries logical and appropriate to group content, describes racing thoughts have decreased. Denies SI , no self harming behaviors.  Staff focus is on offering support and medication education  Goal: *STG: Verbalizes anger, guilt, and other feelings in a constructive manor  Outcome: Progressing Towards Goal  Goal: *STG: Attends activities and groups  Outcome: Progressing Towards Goal  Goal: *STG: Demonstrates reduction in symptoms and increase in insight into coping skills/future focused  Outcome: Progressing Towards Goal

## 2022-08-26 NOTE — INTERDISCIPLINARY ROUNDS
Behavioral Health Interdisciplinary Rounds     Patient Name: Jackie Vinson  Age: 61 y.o. Room/Bed:  746/  Primary Diagnosis: <principal problem not specified>   Admission Status: Voluntary     Readmission within 30 days: no  Power of  in place: no  Patient requires a blocked bed: no          Reason for blocked bed:     Sleep hours:        Participation in Care/Groups:    Medication Compliant?: Yes  PRNS (last 24 hours): None    Restraints (last 24 hours):  no  Substance Abuse:      Alcohol screening (AUDIT) completed -     If applicable, date SBIRT discussed in treatment team AND documented:   Tobacco -   24 hour chart check complete: yes   ______________________________________    Patient goal(s) for today: Communicate needs to staff. Treatment team focus/goals: Assess pt, manage medications, discharge planning   Progress note: Pt is stable. Pt denies SI, HI ,AVH. Pt is making progress. Pts thought processes and speech are organized. Pt is med compliant. Financial concerns/prescription coverage:    Family contact:    Criselda Bridget, [phone]                    Family requesting physician contact today:    Discharge plan: Pt will discharge home   Access to weapons no :                                                               Outpatient provider(s): Joie Klein, [address] [place] [address]  Patient's preferred phone number for follow up call :   Patient's preferred e-mail address :  Participating treatment team members:    LOS:  12 Expected LOS: 8/29/22    Participating treatment team members: Jackie Vinson, MERCED Hogan, Leatha García RN, Paula Alexander NP

## 2022-08-26 NOTE — PROGRESS NOTES
Problem: Depressed Mood (Adult/Pediatric)  Goal: *STG: Participates in treatment plan  Outcome: Progressing Towards Goal     Problem: Depressed Mood (Adult/Pediatric)  Goal: *STG: Verbalizes anger, guilt, and other feelings in a constructive manor  Outcome: Progressing Towards Goal     Problem: Depressed Mood (Adult/Pediatric)  Goal: *STG: Attends activities and groups  Outcome: Progressing Towards Goal     Problem: Patient Education: Go to Patient Education Activity  Goal: Patient/Family Education  Outcome: Progressing Towards Goal  Note: Received pt resting in bed. Respirations are regular and unlabored. Bright affect. Engaged. Logical thought; goal oriented.

## 2022-08-27 PROCEDURE — 65220000003 HC RM SEMIPRIVATE PSYCH

## 2022-08-27 PROCEDURE — 74011250637 HC RX REV CODE- 250/637: Performed by: NURSE PRACTITIONER

## 2022-08-27 PROCEDURE — 74011250637 HC RX REV CODE- 250/637: Performed by: PSYCHIATRY & NEUROLOGY

## 2022-08-27 RX ORDER — POLYETHYLENE GLYCOL 3350 17 G/17G
17 POWDER, FOR SOLUTION ORAL 2 TIMES DAILY
Status: DISCONTINUED | OUTPATIENT
Start: 2022-08-27 | End: 2022-09-08

## 2022-08-27 RX ADMIN — OLANZAPINE 10 MG: 5 TABLET, FILM COATED ORAL at 21:03

## 2022-08-27 RX ADMIN — HYDROXYZINE HYDROCHLORIDE 100 MG: 50 TABLET, FILM COATED ORAL at 21:03

## 2022-08-27 RX ADMIN — OLANZAPINE 5 MG: 5 TABLET, FILM COATED ORAL at 08:47

## 2022-08-27 RX ADMIN — FLUOXETINE HYDROCHLORIDE 60 MG: 20 CAPSULE ORAL at 08:47

## 2022-08-27 RX ADMIN — OLANZAPINE 5 MG: 5 TABLET, FILM COATED ORAL at 16:51

## 2022-08-27 RX ADMIN — POLYETHYLENE GLYCOL 3350 17 G: 17 POWDER, FOR SOLUTION ORAL at 13:53

## 2022-08-27 RX ADMIN — BUPROPION HYDROCHLORIDE 150 MG: 150 TABLET, EXTENDED RELEASE ORAL at 08:47

## 2022-08-27 RX ADMIN — MIRTAZAPINE 15 MG: 15 TABLET, FILM COATED ORAL at 21:33

## 2022-08-27 RX ADMIN — POLYETHYLENE GLYCOL 3350 17 G: 17 POWDER, FOR SOLUTION ORAL at 17:00

## 2022-08-27 NOTE — PROGRESS NOTES
Problem: Depressed Mood (Adult/Pediatric)  Goal: *STG: Participates in treatment plan  Outcome: Progressing Towards Goal  Goal: *STG: Verbalizes anger, guilt, and other feelings in a constructive manor  Outcome: Progressing Towards Goal  Goal: *STG: Attends activities and groups  Outcome: Progressing Towards Goal  Goal: *STG: Demonstrates reduction in symptoms and increase in insight into coping skills/future focused  Outcome: Progressing Towards Goal   Pt is visible on unit  No voiced complaints or acute distress at present

## 2022-08-27 NOTE — PROGRESS NOTES
Problem: Depressed Mood (Adult/Pediatric)  Goal: *STG: Participates in treatment plan  Outcome: Progressing Towards Goal  Note: Pt participated in treatment team. Out on the unit and interacting with select peers. Ate meals and taking medication as scheduled. Feels the medication is working. Thought process is logical and organized. Denies any thoughts of self harm.    Goal: *STG: Demonstrates reduction in symptoms and increase in insight into coping skills/future focused  Outcome: Progressing Towards Goal     Problem: Patient Education: Go to Patient Education Activity  Goal: Patient/Family Education  Outcome: Progressing Towards Goal

## 2022-08-27 NOTE — PROGRESS NOTES
Problem: Depressed Mood (Adult/Pediatric)  Goal: *STG: Participates in treatment plan  Outcome: Progressing Towards Goal  Goal: *STG: Attends activities and groups  Outcome: Progressing Towards Goal   Pt resting with eyes closed at this time. Appears to be sleeping. No distress noted. Every 15 minute monitoring for Pt safety. Will continue to monitor.

## 2022-08-27 NOTE — BH NOTES
PSYCHIATRIC PROGRESS NOTE       Patient: Chelsie Morales MRN: [id number]  SSN: [id number]    YOB: 1965  Age: 61 y.o. Sex: female      Admit Date: 8/14/2022    LOS: 13 days     Chief Complaint:  \"The medicine is working\"    Interval History:  Chelsie Morales says that she finally got some rest, even though she tells me that she doesn't recall sleeping. Tolerated med changes and feels hopeful. Reports that she feels that she is becoming more and more ready to go home day by day. Complained of constipation and saying that miralax worked for her before.     Past Medical History:  Past Medical History:   Diagnosis Date    Anxiety and depression     Bipolar 1 disorder with moderate robyn ([facility] [address] 3/17/2014    Chronic back pain     Hyperlipidemia 8/22/2016    Hyponatremia     Psychotic disorder ([facility]     Scoliosis          ALLERGIES:(reviewed/updated 8/27/2022)  Allergies   Allergen Reactions    Other Medication Anaphylaxis     Artificial sweeteners cause anaphylaxis    Pcn [Penicillins] Anaphylaxis    Sunscreen Contact Dermatitis     Burns and opens the skin    Ultram [Tramadol] Hives and Other (comments)     Hives and ringing of the ears    Benzoyl Peroxide Hives    Cephalexin Itching    Divalproex Itching    Maltodextrin-Glycerin Shortness of Breath    Claritin-D 12 Hour [Loratadine-Pseudoephedrine] Palpitations     palpatations and ringing in the ear     Laboratory report:  Lab Results   Component Value Date/Time    WBC 6.9 08/17/2022 06:31 AM    HGB 10.7 (L) 08/17/2022 06:31 AM    Hematocrit (POC) 34 (L) 07/17/2018 03:33 AM    HCT 31.3 (L) 08/17/2022 06:31 AM    PLATELET 401 54/82/7047 06:31 AM    MCV 91.5 08/17/2022 06:31 AM      Lab Results   Component Value Date/Time    Sodium 144 08/14/2022 03:45 AM    Potassium 3.5 08/14/2022 03:45 AM    Chloride 116 (H) 08/14/2022 03:45 AM    CO2 26 08/14/2022 03:45 AM    Anion gap 2 (L) 08/14/2022 03:45 AM    Glucose 102 (H) 08/14/2022 03:45 AM    Glucose 107 (H) 02/03/2020 05:40 AM    BUN 3 (L) 08/14/2022 03:45 AM    Creatinine 0.74 08/14/2022 03:45 AM    BUN/Creatinine ratio 4 (L) 08/14/2022 03:45 AM    GFR est AA >60 08/14/2022 03:45 AM    GFR est non-AA >60 08/14/2022 03:45 AM    Calcium 9.1 08/14/2022 03:45 AM    Bilirubin, total 0.2 08/12/2022 05:34 AM    Alk. phosphatase 39 (L) 08/12/2022 05:34 AM    Protein, total 7.2 08/12/2022 05:34 AM    Albumin 3.4 (L) 08/12/2022 05:34 AM    Globulin 3.8 08/12/2022 05:34 AM    A-G Ratio 0.9 (L) 08/12/2022 05:34 AM    ALT (SGPT) 19 08/12/2022 05:34 AM      Vitals:    08/25/22 2051 08/26/22 0731 08/26/22 1628 08/27/22 0728   BP: (!) 136/91 133/71 133/81 111/73   Pulse: 97 (!) 101 95 94   Resp: 16 16 16 16   Temp: 97.6 °F (36.4 °C) 98.7 °F (37.1 °C) 98.6 °F (37 °C) 97.6 °F (36.4 °C)   SpO2: 99% 94% 99% 96%   Weight:       Height:           Lab Results   Component Value Date/Time    Carbamazepine 14.1 (H) 07/26/2020 04:19 AM     Lab Results   Component Value Date/Time    Lithium level 0.79 08/14/2022 03:45 AM       Vital Signs  Patient Vitals for the past 24 hrs:   Temp Pulse Resp BP SpO2   08/27/22 0728 97.6 °F (36.4 °C) 94 16 111/73 96 %   08/26/22 1628 98.6 °F (37 °C) 95 16 133/81 99 %       Wt Readings from Last 3 Encounters:   08/21/22 73.9 kg (163 lb)   08/11/22 72.6 kg (160 lb)   08/07/22 71.5 kg (157 lb 9.6 oz)     Temp Readings from Last 3 Encounters:   08/27/22 97.6 °F (36.4 °C)   08/14/22 98.4 °F (36.9 °C)   08/10/22 98.4 °F (36.9 °C)     BP Readings from Last 3 Encounters:   08/27/22 111/73   08/14/22 (!) 142/77   08/10/22 111/75     Pulse Readings from Last 3 Encounters:   08/27/22 94   08/14/22 92   08/10/22 78       Radiology (reviewed/updated 8/27/2022)  No results found.     Current Facility-Administered Medications   Medication Dose Route Frequency Provider Last Rate Last Admin    hydrOXYzine HCL (ATARAX) tablet 100 mg  100 mg Oral QHS Stephanie Aquino NP   100 mg at 08/26/22 2104    mirtazapine (REMERON) tablet 15 mg  15 mg Oral QHS Stephanie Aquino, NP   15 mg at 08/26/22 2104    buPROPion SR (WELLBUTRIN SR) tablet 150 mg  150 mg Oral DAILY Stephanie Aquino, NP   150 mg at 08/27/22 0847    OLANZapine (ZyPREXA) tablet 5 mg  5 mg Oral BID Stephanie Aquino, NP   5 mg at 08/27/22 0847    OLANZapine (ZyPREXA) tablet 10 mg  10 mg Oral QHS Shivani Rascon, NP   10 mg at 08/26/22 2104    FLUoxetine (PROzac) capsule 60 mg  60 mg Oral DAILY Shivaniclover Rascon, NP   60 mg at 08/27/22 0847    hydrOXYzine HCL (ATARAX) tablet 50 mg  50 mg Oral Q4H PRN Oli Cloud A, NP   50 mg at 08/26/22 0057    OLANZapine (ZyPREXA) tablet 5 mg  5 mg Oral Q6H PRN Oli Cloud A, NP   5 mg at 08/16/22 1626    haloperidol lactate (HALDOL) injection 5 mg  5 mg IntraMUSCular Q6H PRN Stephanie Aquino, NP        benztropine (COGENTIN) tablet 1 mg  1 mg Oral BID PRN Stephanie Aquino, NP        diphenhydrAMINE (BENADRYL) injection 50 mg  50 mg IntraMUSCular BID PRN Stephanie Aquino, NP        traZODone (DESYREL) tablet 50 mg  50 mg Oral QHS PRN Stephanie Aquino, NP   50 mg at 08/26/22 2106    acetaminophen (TYLENOL) tablet 650 mg  650 mg Oral Q4H PRN Stephanie Aquino, NP   650 mg at 08/26/22 1914    magnesium hydroxide (MILK OF MAGNESIA) 400 mg/5 mL oral suspension 30 mL  30 mL Oral DAILY PRN Stephanie Aquino NP           Side Effects: (reviewed/updated 8/27/2022)  None reported or admitted to. Review of Systems: (reviewed/updated 8/27/2022)  Appetite: good  Sleep: poor  All other Review of Systems: depression    Mental Status Exam:  56yo AAF w/short hair is in good grooming. Maintains good eye contact throughout.   Speech is spontaneous  Thought process: logical  Mood is \"ok\"  Affect: euthymic, but flat  Perception: No avh  Thought content: no paranoid  or delusions  Suicidal ideation: no si  Homicidal ideation: No hi  Insight/judgment: Partial  Cognition is grossly intact    Physical Exam:  Musculoskeletal system: steady gait  Tremor not present  Cog wheeling not present    Assessment and Plan:  Juju Law meets criteria for a diagnosis of Bipolar 1 disorder current episode depressed with psychotic features. ROSA MARIA. Rule out schizoaffective disorder. Miralax prn constipation. Continue rest of medications as prescribed. We will closely monitor for safety. We will encourage reality orientation. Plan for dc Monday if weekend goes well. I certify that this patients inpatient psychiatric hospital services furnished since the previous certification were, and continue to be, required for treatment that could reasonably be expected to improve the patient's condition, or for diagnostic study, and that the patient continues to need, on a daily basis, active treatment furnished directly by or requiring the supervision of inpatient psychiatric facility personnel. In addition, the hospital records show that services furnished were intensive treatment services, admission or related services, or equivalent services.       Signed:  Danielle Larson MD  8/27/2022

## 2022-08-28 PROCEDURE — 74011250637 HC RX REV CODE- 250/637: Performed by: NURSE PRACTITIONER

## 2022-08-28 PROCEDURE — 65220000003 HC RM SEMIPRIVATE PSYCH

## 2022-08-28 PROCEDURE — 74011250637 HC RX REV CODE- 250/637: Performed by: PSYCHIATRY & NEUROLOGY

## 2022-08-28 RX ADMIN — BUPROPION HYDROCHLORIDE 150 MG: 150 TABLET, EXTENDED RELEASE ORAL at 09:37

## 2022-08-28 RX ADMIN — OLANZAPINE 5 MG: 5 TABLET, FILM COATED ORAL at 09:37

## 2022-08-28 RX ADMIN — HYDROXYZINE HYDROCHLORIDE 50 MG: 50 TABLET, FILM COATED ORAL at 17:21

## 2022-08-28 RX ADMIN — HYDROXYZINE HYDROCHLORIDE 100 MG: 50 TABLET, FILM COATED ORAL at 21:00

## 2022-08-28 RX ADMIN — MIRTAZAPINE 15 MG: 15 TABLET, FILM COATED ORAL at 21:30

## 2022-08-28 RX ADMIN — FLUOXETINE HYDROCHLORIDE 60 MG: 20 CAPSULE ORAL at 09:37

## 2022-08-28 RX ADMIN — OLANZAPINE 10 MG: 5 TABLET, FILM COATED ORAL at 21:00

## 2022-08-28 RX ADMIN — POLYETHYLENE GLYCOL 3350 17 G: 17 POWDER, FOR SOLUTION ORAL at 09:37

## 2022-08-28 RX ADMIN — POLYETHYLENE GLYCOL 3350 17 G: 17 POWDER, FOR SOLUTION ORAL at 17:18

## 2022-08-28 RX ADMIN — OLANZAPINE 5 MG: 5 TABLET, FILM COATED ORAL at 17:18

## 2022-08-28 NOTE — BH NOTES
PSYCHIATRIC PROGRESS NOTE       Patient: Blanche Rojas MRN: [id number]  SSN: [id number]    YOB: 1965  Age: 61 y.o. Sex: female      Admit Date: 8/14/2022    LOS: 14 days     Chief Complaint:  \"Sleep isn't great\"    Interval History:  Blanche Rojas says that overall the most bothersome thing is that her sleep is poor. Had a bowel movement. Periods stopped early 50's. States that she feels that she may have hypothyroidism. She is always cold in hands, feet, at times may have numbness and tingling there. She hadn't checked her TSH for 3 years now.     Past Medical History:  Past Medical History:   Diagnosis Date    Anxiety and depression     Bipolar 1 disorder with moderate robyn ([address] 3/17/2014    Chronic back pain     Hyperlipidemia 8/22/2016    Hyponatremia     Psychotic disorder ([facility] [address]     Scoliosis          ALLERGIES:(reviewed/updated 8/28/2022)  Allergies   Allergen Reactions    Other Medication Anaphylaxis     Artificial sweeteners cause anaphylaxis    Pcn [Penicillins] Anaphylaxis    Sunscreen Contact Dermatitis     Burns and opens the skin    Ultram [Tramadol] Hives and Other (comments)     Hives and ringing of the ears    Benzoyl Peroxide Hives    Cephalexin Itching    Divalproex Itching    Maltodextrin-Glycerin Shortness of Breath    Claritin-D 12 Hour [Loratadine-Pseudoephedrine] Palpitations     palpatations and ringing in the ear     Laboratory report:  Lab Results   Component Value Date/Time    WBC 6.9 08/17/2022 06:31 AM    HGB 10.7 (L) 08/17/2022 06:31 AM    Hematocrit (POC) 34 (L) 07/17/2018 03:33 AM    HCT 31.3 (L) 08/17/2022 06:31 AM    PLATELET 442 56/02/2529 06:31 AM    MCV 91.5 08/17/2022 06:31 AM      Lab Results   Component Value Date/Time    Sodium 144 08/14/2022 03:45 AM    Potassium 3.5 08/14/2022 03:45 AM    Chloride 116 (H) 08/14/2022 03:45 AM    CO2 26 08/14/2022 03:45 AM    Anion gap 2 (L) 08/14/2022 03:45 AM    Glucose 102 (H) 08/14/2022 03:45 AM    Glucose 107 (H) 02/03/2020 05:40 AM    BUN 3 (L) 08/14/2022 03:45 AM    Creatinine 0.74 08/14/2022 03:45 AM    BUN/Creatinine ratio 4 (L) 08/14/2022 03:45 AM    GFR est AA >60 08/14/2022 03:45 AM    GFR est non-AA >60 08/14/2022 03:45 AM    Calcium 9.1 08/14/2022 03:45 AM    Bilirubin, total 0.2 08/12/2022 05:34 AM    Alk. phosphatase 39 (L) 08/12/2022 05:34 AM    Protein, total 7.2 08/12/2022 05:34 AM    Albumin 3.4 (L) 08/12/2022 05:34 AM    Globulin 3.8 08/12/2022 05:34 AM    A-G Ratio 0.9 (L) 08/12/2022 05:34 AM    ALT (SGPT) 19 08/12/2022 05:34 AM      Vitals:    08/27/22 1147 08/27/22 1623 08/28/22 0916 08/28/22 0955   BP: 126/88 137/88 133/88    Pulse: 77 89 97    Resp: 16 16 16    Temp: 97.9 °F (36.6 °C) 98.9 °F (37.2 °C) 99.1 °F (37.3 °C)    SpO2: 95% 96% 98%    Weight:    71.2 kg (157 lb)   Height:           Lab Results   Component Value Date/Time    Carbamazepine 14.1 (H) 07/26/2020 04:19 AM     Lab Results   Component Value Date/Time    Lithium level 0.79 08/14/2022 03:45 AM       Vital Signs  Patient Vitals for the past 24 hrs:   Temp Pulse Resp BP SpO2   08/28/22 0916 99.1 °F (37.3 °C) 97 16 133/88 98 %   08/27/22 1623 98.9 °F (37.2 °C) 89 16 137/88 96 %       Wt Readings from Last 3 Encounters:   08/28/22 71.2 kg (157 lb)   08/11/22 72.6 kg (160 lb)   08/07/22 71.5 kg (157 lb 9.6 oz)     Temp Readings from Last 3 Encounters:   08/28/22 99.1 °F (37.3 °C)   08/14/22 98.4 °F (36.9 °C)   08/10/22 98.4 °F (36.9 °C)     BP Readings from Last 3 Encounters:   08/28/22 133/88   08/14/22 (!) 142/77   08/10/22 111/75     Pulse Readings from Last 3 Encounters:   08/28/22 97   08/14/22 92   08/10/22 78       Radiology (reviewed/updated 8/28/2022)  No results found.     Current Facility-Administered Medications   Medication Dose Route Frequency Provider Last Rate Last Admin    polyethylene glycol (MIRALAX) packet 17 g  17 g Oral BID Felecia Bueno MD   17 g at 08/28/22 0937    hydrOXYzine HCL (ATARAX) tablet 100 mg  100 mg Oral QHS Stephanie Aquino A, NP   100 mg at 08/27/22 2103    mirtazapine (REMERON) tablet 15 mg  15 mg Oral QHS Stephanie Aquino A, NP   15 mg at 08/27/22 2133    buPROPion SR (WELLBUTRIN SR) tablet 150 mg  150 mg Oral DAILY Stephanie Aquino, NP   150 mg at 08/28/22 0937    OLANZapine (ZyPREXA) tablet 5 mg  5 mg Oral BID Stephanie Aquino, NP   5 mg at 08/28/22 0937    OLANZapine (ZyPREXA) tablet 10 mg  10 mg Oral QHS [facility]   10 mg at 08/27/22 2103    FLUoxetine (PROzac) capsule 60 mg  60 mg Oral DAILY [facility], NP   60 mg at 08/28/22 8754    hydrOXYzine HCL (ATARAX) tablet 50 mg  50 mg Oral Q4H PRN Lisa Bump A, NP   50 mg at 08/26/22 0057    OLANZapine (ZyPREXA) tablet 5 mg  5 mg Oral Q6H PRN Lisa Bump A, NP   5 mg at 08/16/22 1626    haloperidol lactate (HALDOL) injection 5 mg  5 mg IntraMUSCular Q6H PRN Stephanie Aquino, NP        benztropine (COGENTIN) tablet 1 mg  1 mg Oral BID PRN Stephanie Aquino, NP        diphenhydrAMINE (BENADRYL) injection 50 mg  50 mg IntraMUSCular BID PRN Teresa Aquinoe A, NP        traZODone (DESYREL) tablet 50 mg  50 mg Oral QHS PRN Stephanie Aquino, NP   50 mg at 08/26/22 2106    acetaminophen (TYLENOL) tablet 650 mg  650 mg Oral Q4H PRN Stephanie Aquino A, NP   650 mg at 08/26/22 1914    magnesium hydroxide (MILK OF MAGNESIA) 400 mg/5 mL oral suspension 30 mL  30 mL Oral DAILY PRN Stephanie Aquino, NP           Side Effects: (reviewed/updated 8/28/2022)  None reported or admitted to. Review of Systems: (reviewed/updated 8/28/2022)  Appetite: good  Sleep: poor  All other Review of Systems: depression    Mental Status Exam:  56yo AAF w/short hair is in good grooming. Maintains good eye contact throughout.   Speech is spontaneous  Thought process: logical  Mood is \"ok\"  Affect: euthymic, but flat  Perception: No avh  Thought content: no paranoid  or delusions  Suicidal ideation: no si  Homicidal ideation: No hi  Insight/judgment: Partial  Cognition is grossly intact    Physical Exam:  Musculoskeletal system: steady gait  Tremor not present  Cog wheeling not present    Assessment and Plan:  Vonda Suárez meets criteria for a diagnosis of Bipolar 1 disorder current episode depressed with psychotic features. ROSA MARIA. Rule out schizoaffective disorder. Continue rest of medications as prescribed. We will closely monitor for safety. We will encourage reality orientation. Plan for dc Monday if weekend goes well. I certify that this patients inpatient psychiatric hospital services furnished since the previous certification were, and continue to be, required for treatment that could reasonably be expected to improve the patient's condition, or for diagnostic study, and that the patient continues to need, on a daily basis, active treatment furnished directly by or requiring the supervision of inpatient psychiatric facility personnel. In addition, the hospital records show that services furnished were intensive treatment services, admission or related services, or equivalent services.       Signed:  Drea López MD  8/28/2022

## 2022-08-28 NOTE — PROGRESS NOTES
[facility]  Master Treatment Plan for Yair Catherine    Date Treatment Plan Initiated: 8/28/22    Treatment Plan Modalities:  Type of Modality Amount  (x minutes) Frequency (x/week) Duration (x days) Name of Responsible Staff   Community & wrap-up meetings to encourage peer interactions 15 7 1 BRANDON Pitts     Group psychotherapy to assist in building coping skills and internal controls 60 7 1 Scott Baker MSW   Therapeutic activity groups to build coping skills 61 7 1 Scott Baker MS   Psychoeducation in group setting to address:   Medication education   [facility]   [facility] Arlette Forbes   Relaxation techniques         Symptom management         Discharge planning   61 2 [facility], [place]   Spirituality    61 2 1 Chaplain FANG   61 1 1 [facility]   Recovery/AA/NA         Physician medication management   15 7 1    Family meeting/discharge planning   15 2 1 Nena Garcia                                   Problem: Depressed Mood (Adult/Pediatric)  Goal: *STG: Participates in treatment plan  8/28/2022 1346 by Sukumar Garza RN  Outcome: Progressing Towards Goal  Note: Pt participated in treatment team. Out on the unit for small periods of time. Encouraged pt to participate. 8/28/2022 1328 by Sukumar Garza RN  Outcome: Progressing Towards Goal  Goal: *STG: Verbalizes anger, guilt, and other feelings in a constructive manor  8/28/2022 1346 by Sukumar Garza RN  Outcome: Progressing Towards Goal  Note: Pt able to verbalize feelings in a constructive manor. Less interactive today with peers. Feels her thoughts are clearer. Denies any thoughts of self harm. 8/28/2022 1328 by Sukumar Garza RN  Outcome: Progressing Towards Goal  Goal: *STG: Attends activities and groups  8/28/2022 1346 by Sukumar Garza RN  Outcome: Progressing Towards Goal  Note: Encouraged to participate on the unit.    8/28/2022 1328 by Poonam Paniagua RN  Outcome: Progressing Towards Goal  Goal: *STG: Demonstrates reduction in symptoms and increase in insight into coping skills/future focused  8/28/2022 1346 by Poonam Paniagua RN  Outcome: Progressing Towards Goal  Note: Pt focused on the future. 8/28/2022 1328 by Poonam Paniagua RN  Outcome: Progressing Towards Goal     Problem: Patient Education: Go to Patient Education Activity  Goal: Patient/Family Education  8/28/2022 1346 by Poonam Paniagua RN  Outcome: Progressing Towards Goal  8/28/2022 1328 by Poonam Paniagua RN  Outcome: Progressing Towards Goal     Problem: Falls - Risk of  Goal: *Absence of Falls  Description: Document Tyrrell Flow Fall Risk and appropriate interventions in the flowsheet.   Outcome: Progressing Towards Goal  Note: Fall Risk Interventions:  Mobility Interventions: Assess mobility with egress test         Medication Interventions: Teach patient to arise slowly    Elimination Interventions: Stay With Me (per policy), Patient to call for help with toileting needs    History of Falls Interventions: Door open when patient unattended         Problem: Patient Education: Go to Patient Education Activity  Goal: Patient/Family Education  Outcome: Progressing Towards Goal

## 2022-08-28 NOTE — BH NOTES
PRN Medication Documentation    Specific patient behavior that led to need for PRN medication: anxiety  Staff interventions attempted prior to PRN being given: coping skills  PRN medication given: artarax  Patient response/effectiveness of PRN medication: tl aware

## 2022-08-28 NOTE — PROGRESS NOTES
Problem: Depressed Mood (Adult/Pediatric)  Goal: *STG: Participates in treatment plan  Outcome: Progressing Towards Goal  Goal: *STG: Verbalizes anger, guilt, and other feelings in a constructive manor  Outcome: Progressing Towards Goal  Goal: *STG: Attends activities and groups  Outcome: Progressing Towards Goal  Goal: *STG: Demonstrates reduction in symptoms and increase in insight into coping skills/future focused  Outcome: Progressing Towards Goal   Pt is visible on unit   No voiced complaints or acute distress at present  Slight anxiety present regarding up coming discharge per pt  Thoughts appear more organized today

## 2022-08-29 PROCEDURE — 65220000003 HC RM SEMIPRIVATE PSYCH

## 2022-08-29 PROCEDURE — 74011250637 HC RX REV CODE- 250/637: Performed by: NURSE PRACTITIONER

## 2022-08-29 PROCEDURE — 74011250637 HC RX REV CODE- 250/637: Performed by: PSYCHIATRY & NEUROLOGY

## 2022-08-29 RX ORDER — OLANZAPINE 5 MG/1
15 TABLET ORAL
Status: DISCONTINUED | OUTPATIENT
Start: 2022-08-29 | End: 2022-09-12

## 2022-08-29 RX ORDER — MIRTAZAPINE 15 MG/1
30 TABLET, FILM COATED ORAL
Status: DISCONTINUED | OUTPATIENT
Start: 2022-08-29 | End: 2022-09-01

## 2022-08-29 RX ADMIN — POLYETHYLENE GLYCOL 3350 17 G: 17 POWDER, FOR SOLUTION ORAL at 09:13

## 2022-08-29 RX ADMIN — OLANZAPINE 15 MG: 5 TABLET, FILM COATED ORAL at 21:18

## 2022-08-29 RX ADMIN — OLANZAPINE 5 MG: 5 TABLET, FILM COATED ORAL at 17:03

## 2022-08-29 RX ADMIN — FLUOXETINE HYDROCHLORIDE 60 MG: 20 CAPSULE ORAL at 09:13

## 2022-08-29 RX ADMIN — BUPROPION HYDROCHLORIDE 150 MG: 150 TABLET, EXTENDED RELEASE ORAL at 09:13

## 2022-08-29 RX ADMIN — OLANZAPINE 5 MG: 5 TABLET, FILM COATED ORAL at 09:13

## 2022-08-29 NOTE — PROGRESS NOTES
Patient received resting quietly in bed. No signs of distress. Even and unlabored breathing. Staff will continue to monitor safety q15 and provide support. Problem: Falls - Risk of  Goal: *Absence of Falls  Description: Document Beronica Witt Fall Risk and appropriate interventions in the flowsheet. Outcome: Progressing Towards Goal    Note: Fall Risk Interventions:  Mobility Interventions: Assess mobility with egress test  Medication Interventions: Teach patient to arise slowly  Elimination Interventions:  Toilet paper/wipes in reach  History of Falls Interventions: Door open when patient unattended

## 2022-08-29 NOTE — INTERDISCIPLINARY ROUNDS
Behavioral Health Interdisciplinary Rounds     Patient Name: Mahesh Rodríguez  Age: 61 y.o. Room/Bed:  746/  Primary Diagnosis: <principal problem not specified>   Admission Status: Voluntary     Readmission within 30 days: no  Power of  in place: no  Patient requires a blocked bed: no            Sleep hours: 8+       Participation in Care/Groups:  yes  Medication Compliant?: Yes  PRNS (last 24 hours): Antianxiety    Restraints (last 24 hours):  no     Alcohol screening (AUDIT) completed -     If applicable, date SBIRT discussed in treatment team AND documented:    Tobacco - patient is a smoker: Have You Used Tobacco in the Past 30 Days: No  Illegal Drugs use: Have You Used Any Illegal Substances Over the Past 12 Months: No    24 hour chart check complete: yes     _______________________________________________    Patient goal(s) for today: Communicate needs to staff   Treatment team focus/goals: Assess pt, manage medications, discharge planning   Progress note: Pt is decompensating. Pt reports wanting to \"go home and shut down\". Pt reports not sleeping. Pt is visibly anxious. Pts thoughts/statements are disorganized. Pt is med compliant. SW will reach out to update spouse.          Financial concerns/prescription coverage:    Family contact:  Amber Man, [phone]                      Family requesting physician contact today:    Discharge plan: Pt will discharge home   Access to weapons: NO                                                             Outpatient provider(s): Joselito Sotomayor ,[facility]  Patient's preferred phone number for follow up call :   Patient's preferred e-mail address :  Participating treatment team members:    LOS:  15 Expected LOS: 8/31/22    Participating treatment team members: MERCED Sood, Mendoza Henderson Rn, Sweetie Dasilva NP

## 2022-08-29 NOTE — PROGRESS NOTES
Problem: Depressed Mood (Adult/Pediatric)  Goal: *STG: Participates in treatment plan  Outcome: Progressing Towards Goal  Note: Out on unit reading and writing in journal frequently. Appears blunted however when talking to her pt is anxious, preoccupied and paranoid. Tangential when discussing journal entries. Staff focus is on offering support, redirection and much reassurance.    Goal: *STG: Verbalizes anger, guilt, and other feelings in a constructive manor  Outcome: Progressing Towards Goal  Goal: *STG: Attends activities and groups  Outcome: Progressing Towards Goal  Goal: *STG: Demonstrates reduction in symptoms and increase in insight into coping skills/future focused  Outcome: Progressing Towards Goal

## 2022-08-29 NOTE — BH NOTES
SW spoke with Soham Mistry CM @ [facility] about pts current care and follow up plans. ALTHEA reports that Pt meets criteria for higher level care and will recommend pt for PACT.

## 2022-08-29 NOTE — BH NOTES
PSYCHIATRIC PROGRESS NOTE       Patient: Merlin Espy MRN: [id number]  SSN: [id number]    YOB: 1965  Age: 61 y.o. Sex: female      Admit Date: 8/14/2022    LOS: 15 days     Chief Complaint:  I need to go home to get shunned. Interval History:  Merlin Espy has looked worse today. Has had a return of psychotic symptoms with prominent delusions. Appears visibly anxious and restless. No eye contact. States she is not sleeping but staff report otherwise. She did not answer if she has thoughts of hurting herself. She said a few times that she needs to go home to get shunned. She then walked out in the middle of treatment team meeting. We will have to defer discharge at this time. She is compliant with her meds.       Past Medical History:  Past Medical History:   Diagnosis Date    Anxiety and depression     Bipolar 1 disorder with moderate robyn ([facility] [address] 3/17/2014    Chronic back pain     Hyperlipidemia 8/22/2016    Hyponatremia     Psychotic disorder (HCC)     Scoliosis          ALLERGIES:(reviewed/updated 8/29/2022)  Allergies   Allergen Reactions    Other Medication Anaphylaxis     Artificial sweeteners cause anaphylaxis    Pcn [Penicillins] Anaphylaxis    Sunscreen Contact Dermatitis     Burns and opens the skin    Ultram [Tramadol] Hives and Other (comments)     Hives and ringing of the ears    Benzoyl Peroxide Hives    Cephalexin Itching    Divalproex Itching    Maltodextrin-Glycerin Shortness of Breath    Claritin-D 12 Hour [Loratadine-Pseudoephedrine] Palpitations     palpatations and ringing in the ear     Laboratory report:  Lab Results   Component Value Date/Time    WBC 6.9 08/17/2022 06:31 AM    HGB 10.7 (L) 08/17/2022 06:31 AM    Hematocrit (POC) 34 (L) 07/17/2018 03:33 AM    HCT 31.3 (L) 08/17/2022 06:31 AM    PLATELET 062 84/54/2375 06:31 AM    MCV 91.5 08/17/2022 06:31 AM      Lab Results   Component Value Date/Time    Sodium 144 08/14/2022 03:45 AM    Potassium 3.5 08/14/2022 03:45 AM Chloride 116 (H) 08/14/2022 03:45 AM    CO2 26 08/14/2022 03:45 AM    Anion gap 2 (L) 08/14/2022 03:45 AM    Glucose 102 (H) 08/14/2022 03:45 AM    Glucose 107 (H) 02/03/2020 05:40 AM    BUN 3 (L) 08/14/2022 03:45 AM    Creatinine 0.74 08/14/2022 03:45 AM    BUN/Creatinine ratio 4 (L) 08/14/2022 03:45 AM    GFR est AA >60 08/14/2022 03:45 AM    GFR est non-AA >60 08/14/2022 03:45 AM    Calcium 9.1 08/14/2022 03:45 AM    Bilirubin, total 0.2 08/12/2022 05:34 AM    Alk. phosphatase 39 (L) 08/12/2022 05:34 AM    Protein, total 7.2 08/12/2022 05:34 AM    Albumin 3.4 (L) 08/12/2022 05:34 AM    Globulin 3.8 08/12/2022 05:34 AM    A-G Ratio 0.9 (L) 08/12/2022 05:34 AM    ALT (SGPT) 19 08/12/2022 05:34 AM      Vitals:    08/28/22 0955 08/28/22 1719 08/28/22 1953 08/29/22 0858   BP:  (!) 170/94 (!) 158/86 (!) 155/92   Pulse:  98 91 96   Resp:  18 16 16   Temp:  98 °F (36.7 °C) 98.6 °F (37 °C) 98.6 °F (37 °C)   SpO2:  98% 99% 98%   Weight: 71.2 kg (157 lb)      Height:           Lab Results   Component Value Date/Time    Carbamazepine 14.1 (H) 07/26/2020 04:19 AM     Lab Results   Component Value Date/Time    Lithium level 0.79 08/14/2022 03:45 AM       Vital Signs  Patient Vitals for the past 24 hrs:   Temp Pulse Resp BP SpO2   08/29/22 0858 98.6 °F (37 °C) 96 16 (!) 155/92 98 %   08/28/22 1953 98.6 °F (37 °C) 91 16 (!) 158/86 99 %   08/28/22 1719 98 °F (36.7 °C) 98 18 (!) 170/94 98 %       Wt Readings from Last 3 Encounters:   08/28/22 71.2 kg (157 lb)   08/11/22 72.6 kg (160 lb)   08/07/22 71.5 kg (157 lb 9.6 oz)     Temp Readings from Last 3 Encounters:   08/29/22 98.6 °F (37 °C)   08/14/22 98.4 °F (36.9 °C)   08/10/22 98.4 °F (36.9 °C)     BP Readings from Last 3 Encounters:   08/29/22 (!) 155/92   08/14/22 (!) 142/77   08/10/22 111/75     Pulse Readings from Last 3 Encounters:   08/29/22 96   08/14/22 92   08/10/22 78       Radiology (reviewed/updated 8/29/2022)  No results found.     Current Facility-Administered Medications   Medication Dose Route Frequency Provider Last Rate Last Admin    mirtazapine (REMERON) tablet 30 mg  30 mg Oral QHS Stephanie Aquino NP        OLANZapine (ZyPREXA) tablet 15 mg  15 mg Oral QHS Stephanie Aquion NP        polyethylene glycol (MIRALAX) packet 17 g  17 g Oral BID Felecia Bueno MD   17 g at 08/29/22 0913    hydrOXYzine HCL (ATARAX) tablet 100 mg  100 mg Oral QHS Stephanie Aquino NP   100 mg at 08/28/22 2100    buPROPion SR (WELLBUTRIN SR) tablet 150 mg  150 mg Oral DAILY Stephanie Aquino NP   150 mg at 08/29/22 0913    OLANZapine (ZyPREXA) tablet 5 mg  5 mg Oral BID Stephanie Aquino NP   5 mg at 08/29/22 0913    FLUoxetine (PROzac) capsule 60 mg  60 mg Oral DAILY Leelee Araujo NP   60 mg at 08/29/22 0913    hydrOXYzine HCL (ATARAX) tablet 50 mg  50 mg Oral Q4H PRN Stephanie Aquino NP   50 mg at 08/28/22 1721    OLANZapine (ZyPREXA) tablet 5 mg  5 mg Oral Q6H PRN Stephanie Aquino NP   5 mg at 08/16/22 1626    haloperidol lactate (HALDOL) injection 5 mg  5 mg IntraMUSCular Q6H PRN Stephanie Aquino NP        benztropine (COGENTIN) tablet 1 mg  1 mg Oral BID PRN Stephanie Aquino NP        diphenhydrAMINE (BENADRYL) injection 50 mg  50 mg IntraMUSCular BID PRN Stephanie Aquino NP        traZODone (DESYREL) tablet 50 mg  50 mg Oral QHS PRN Stephanie Aquino NP   50 mg at 08/26/22 2106    acetaminophen (TYLENOL) tablet 650 mg  650 mg Oral Q4H PRN Stephanie Aquino NP   650 mg at 08/26/22 1914    magnesium hydroxide (MILK OF MAGNESIA) 400 mg/5 mL oral suspension 30 mL  30 mL Oral DAILY PRN Stephanie Aquino NP           Side Effects: (reviewed/updated 8/29/2022)  None reported or admitted to.     Review of Systems: (reviewed/updated 8/29/2022)  Appetite: good  Sleep: poor  All other Review of Systems: depression    Mental Status Exam:  Eye contact: no eye contact  Psychomotor activity: decreased  Speech is spontaneous  Thought process: logical  Mood is \"ok\"  Affect: blunt  Perception: No avh  Thought content: +delusions  Suicidal ideation: refused to answer  Homicidal ideation: refused to answer  Insight/judgment: Poor  Cognition is grossly intact       Physical Exam:  Musculoskeletal system: steady gait  Tremor not present  Cog wheeling not present    Assessment and Plan:  Sherrie Borges meets criteria for a diagnosis of Bipolar 1 disorder current episode depressed with psychotic features. ROSA MARIA. Rule out schizoaffective disorder. Increase hs dose of zyprexa to 10 mg.  TSH, B12, vit d in am.  Continue rest of medications as prescribed. We will closely monitor for safety. We will encourage reality orientation. Cancel discharge today. I certify that this patients inpatient psychiatric hospital services furnished since the previous certification were, and continue to be, required for treatment that could reasonably be expected to improve the patient's condition, or for diagnostic study, and that the patient continues to need, on a daily basis, active treatment furnished directly by or requiring the supervision of inpatient psychiatric facility personnel. In addition, the hospital records show that services furnished were intensive treatment services, admission or related services, or equivalent services.       Signed:  Shauna Solis NP  8/29/2022

## 2022-08-30 PROCEDURE — 74011250637 HC RX REV CODE- 250/637

## 2022-08-30 PROCEDURE — 74011250637 HC RX REV CODE- 250/637: Performed by: PSYCHIATRY & NEUROLOGY

## 2022-08-30 PROCEDURE — 74011250637 HC RX REV CODE- 250/637: Performed by: NURSE PRACTITIONER

## 2022-08-30 PROCEDURE — 74011250636 HC RX REV CODE- 250/636: Performed by: NURSE PRACTITIONER

## 2022-08-30 PROCEDURE — 65220000003 HC RM SEMIPRIVATE PSYCH

## 2022-08-30 RX ORDER — CLONIDINE HYDROCHLORIDE 0.1 MG/1
0.1 TABLET ORAL
Status: DISCONTINUED | OUTPATIENT
Start: 2022-08-30 | End: 2022-09-20 | Stop reason: HOSPADM

## 2022-08-30 RX ADMIN — CLONIDINE HYDROCHLORIDE 0.1 MG: 0.1 TABLET ORAL at 22:14

## 2022-08-30 RX ADMIN — OLANZAPINE 5 MG: 5 TABLET, FILM COATED ORAL at 11:34

## 2022-08-30 RX ADMIN — HALOPERIDOL LACTATE 5 MG: 5 INJECTION, SOLUTION INTRAMUSCULAR at 20:33

## 2022-08-30 RX ADMIN — OLANZAPINE 5 MG: 5 TABLET, FILM COATED ORAL at 17:24

## 2022-08-30 RX ADMIN — BUPROPION HYDROCHLORIDE 150 MG: 150 TABLET, EXTENDED RELEASE ORAL at 11:34

## 2022-08-30 RX ADMIN — POLYETHYLENE GLYCOL 3350 17 G: 17 POWDER, FOR SOLUTION ORAL at 17:24

## 2022-08-30 RX ADMIN — FLUOXETINE HYDROCHLORIDE 60 MG: 20 CAPSULE ORAL at 11:35

## 2022-08-30 RX ADMIN — DIPHENHYDRAMINE HYDROCHLORIDE 50 MG: 50 INJECTION, SOLUTION INTRAMUSCULAR; INTRAVENOUS at 20:33

## 2022-08-30 NOTE — PROGRESS NOTES
Problem: Depressed Mood (Adult/Pediatric)  Goal: *STG: Participates in treatment plan  Outcome: Progressing Towards Goal  Goal: *STG: Verbalizes anger, guilt, and other feelings in a constructive manor  Outcome: Progressing Towards Goal  Goal: *STG: Attends activities and groups  Outcome: Progressing Towards Goal  Goal: *STG: Demonstrates reduction in symptoms and increase in insight into coping skills/future focused  Outcome: Progressing Towards Goal   Pt is visible on unit  No voiced complaints or acute distress at present. Pt pacing hallway . Appears irritable with disorganized thoughts. Increasing in agitation. tl aware

## 2022-08-30 NOTE — PROGRESS NOTES
Problem: Depressed Mood (Adult/Pediatric)  Goal: *STG: Participates in treatment plan  Outcome: Progressing Towards Goal  Note: Out on unit sitting quietly observing peers. Writing in journal, resistant to share w staff entries. Refusing medications, presents guarded, slow to process and respond and paranoid. Slow physical movements appearing psychomotor retardation. Declines to answer staff when asked about mood and thought content. Will continue to offer support and rassurance. 1345: up in hallway pacing appearing anxious with a constricted affect, difficulty completing thoughts, noted slow response and processing. Shares paranoid thoughts that medications are effective her in negatively. During this conversation pt looking around and scanning hallway and asking safety questions regarding peers and staff. Encouraged pt to return to room. Medications offered and denied. Tx team updated. Room will be blocked per order once roommate is discharged.      Goal: *STG: Verbalizes anger, guilt, and other feelings in a constructive manor  Outcome: Progressing Towards Goal  Goal: *STG: Attends activities and groups  Outcome: Progressing Towards Goal  Goal: *STG: Demonstrates reduction in symptoms and increase in insight into coping skills/future focused  Outcome: Progressing Towards Goal

## 2022-08-30 NOTE — PROGRESS NOTES
Problem: Falls - Risk of  Goal: *Absence of Falls  Description: Document Green Salvia Fall Risk and appropriate interventions in the flowsheet. Outcome: Progressing Towards Goal  Note: Fall Risk Interventions:  Mobility Interventions: Assess mobility with egress test         Medication Interventions: Teach patient to arise slowly    Elimination Interventions: Toilet paper/wipes in reach    History of Falls Interventions: Door open when patient unattended         Problem: Depressed Mood (Adult/Pediatric)  Goal: *STG: Verbalizes anger, guilt, and other feelings in a constructive manor  Outcome: Progressing Towards Goal  Goal: *STG: Attends activities and groups  Outcome: Progressing Towards Goal  Goal: *STG: Demonstrates reduction in symptoms and increase in insight into coping skills/future focused  Outcome: Progressing Towards Goal     Patient received in hallway with staff. Denies SI/HI/AVH. Remains withdrawn and guarded but pleasant when communicating with staff. Medication and meal compliant. Will continue to monitor every 15 minutes for safety.

## 2022-08-30 NOTE — BH NOTES
GROUP THERAPY PROGRESS NOTE    Patient is participating in Healthy living, coping and wellness education group. Group time: 45 minutes    Personal goal for participation: to gain an understanding of negative thinking traps and how to adjust to healthy thinking patterns    Goal orientation: Personal    Group therapy participation: Active     Therapeutic interventions reviewed and discussed:  Group members were guided through learning about negative thinking traps and healthy thinking patterns. Members gained an understanding of how the type of thinking patterns effect their perception, how they relate to others, and overall mental health. Members were able to identify and engage in discussion about past experiences. Handout provided. Impression of participation:  :  Pt was present for group. Pt engaged in discussion with SW and peers. Pt added insight to topic.         MERCED Zaman, QMHP-A

## 2022-08-30 NOTE — INTERDISCIPLINARY ROUNDS
Behavioral Health Interdisciplinary Rounds     Patient Name: Heriberto Abarca  Age: 61 y.o.   Room/Bed:  6/  Primary Diagnosis: <principal problem not specified>   Admission Status: Voluntary     Readmission within 30 days: no  Power of  in place: no  Patient requires a blocked bed: no          Reason for blocked bed:   Sleep hours:        Participation in Care/Groups:  no  Medication Compliant?: Selective  PRNS (last 24 hours): None    Restraints (last 24 hours):  no    Pt refused labs this morning     Alcohol screening (AUDIT) completed -     If applicable, date SBIRT discussed in treatment team AND documented:    Tobacco - patient is a smoker: Have You Used Tobacco in the Past 30 Days: No  Illegal Drugs use: Have You Used Any Illegal Substances Over the Past 12 Months: No    24 hour chart check complete: yes     _______________________________________________    Patient goal(s) for today:   Treatment team focus/goals:   Progress note:         Financial concerns/prescription coverage:    Family contact:                        Family requesting physician contact today:    Discharge plan:   Access to weapons :                                                              Outpatient provider(s):   Patient's preferred phone number for follow up call :   Patient's preferred e-mail address :  Participating treatment team members:    LOS:  16  Expected LOS:     Participating treatment team members: Heriberto Sellersmargo, * (assigned SW),

## 2022-08-30 NOTE — BH NOTES
PSYCHIATRIC PROGRESS NOTE       Patient: Neelima Estevez MRN: [id number]  SSN: [id number]    YOB: 1965  Age: 61 y.o. Sex: female      Admit Date: 8/14/2022    LOS: 16 days     Chief Complaint:  I dont know what I am.     Interval History:  Neelima Estevez is not doing well. She had resurgence of psychosis- delusions prominently. She appears scared, suspicious, depressed, and anxious. She has an intense eye stare. She appears bizarre and thought blocking is noteworthy. She says she does not want to stay another day in the hospital. If she pressures for discharge, we will ask [facility] for tdo eval. Refused to answer if she has si or hi. Taking her meds so far. Labs pending. I discussed ect with her but she refused it.       Past Medical History:  Past Medical History:   Diagnosis Date    Anxiety and depression     Bipolar 1 disorder with moderate robyn ([facility] [address] 3/17/2014    Chronic back pain     Hyperlipidemia 8/22/2016    Hyponatremia     Psychotic disorder (HCC)     Scoliosis          ALLERGIES:(reviewed/updated 8/30/2022)  Allergies   Allergen Reactions    Other Medication Anaphylaxis     Artificial sweeteners cause anaphylaxis    Pcn [Penicillins] Anaphylaxis    Sunscreen Contact Dermatitis     Burns and opens the skin    Ultram [Tramadol] Hives and Other (comments)     Hives and ringing of the ears    Benzoyl Peroxide Hives    Cephalexin Itching    Divalproex Itching    Maltodextrin-Glycerin Shortness of Breath    Claritin-D 12 Hour [Loratadine-Pseudoephedrine] Palpitations     palpatations and ringing in the ear     Laboratory report:  Lab Results   Component Value Date/Time    WBC 6.9 08/17/2022 06:31 AM    HGB 10.7 (L) 08/17/2022 06:31 AM    Hematocrit (POC) 34 (L) 07/17/2018 03:33 AM    HCT 31.3 (L) 08/17/2022 06:31 AM    PLATELET 749 60/11/7804 06:31 AM    MCV 91.5 08/17/2022 06:31 AM      Lab Results   Component Value Date/Time    Sodium 144 08/14/2022 03:45 AM    Potassium 3.5 08/14/2022 03:45 AM Chloride 116 (H) 08/14/2022 03:45 AM    CO2 26 08/14/2022 03:45 AM    Anion gap 2 (L) 08/14/2022 03:45 AM    Glucose 102 (H) 08/14/2022 03:45 AM    Glucose 107 (H) 02/03/2020 05:40 AM    BUN 3 (L) 08/14/2022 03:45 AM    Creatinine 0.74 08/14/2022 03:45 AM    BUN/Creatinine ratio 4 (L) 08/14/2022 03:45 AM    GFR est AA >60 08/14/2022 03:45 AM    GFR est non-AA >60 08/14/2022 03:45 AM    Calcium 9.1 08/14/2022 03:45 AM    Bilirubin, total 0.2 08/12/2022 05:34 AM    Alk. phosphatase 39 (L) 08/12/2022 05:34 AM    Protein, total 7.2 08/12/2022 05:34 AM    Albumin 3.4 (L) 08/12/2022 05:34 AM    Globulin 3.8 08/12/2022 05:34 AM    A-G Ratio 0.9 (L) 08/12/2022 05:34 AM    ALT (SGPT) 19 08/12/2022 05:34 AM      Vitals:    08/28/22 1719 08/28/22 1953 08/29/22 0858 08/29/22 1510   BP: (!) 170/94 (!) 158/86 (!) 155/92 (!) 167/87   Pulse: 98 91 96 96   Resp: 18 16 16 14   Temp: 98 °F (36.7 °C) 98.6 °F (37 °C) 98.6 °F (37 °C) 98.7 °F (37.1 °C)   SpO2: 98% 99% 98% 99%   Weight:       Height:           Lab Results   Component Value Date/Time    Carbamazepine 14.1 (H) 07/26/2020 04:19 AM     Lab Results   Component Value Date/Time    Lithium level 0.79 08/14/2022 03:45 AM       Vital Signs  Patient Vitals for the past 24 hrs:   Temp Pulse Resp BP SpO2   08/29/22 1510 98.7 °F (37.1 °C) 96 14 (!) 167/87 99 %       Wt Readings from Last 3 Encounters:   08/28/22 71.2 kg (157 lb)   08/11/22 72.6 kg (160 lb)   08/07/22 71.5 kg (157 lb 9.6 oz)     Temp Readings from Last 3 Encounters:   08/29/22 98.7 °F (37.1 °C)   08/14/22 98.4 °F (36.9 °C)   08/10/22 98.4 °F (36.9 °C)     BP Readings from Last 3 Encounters:   08/29/22 (!) 167/87   08/14/22 (!) 142/77   08/10/22 111/75     Pulse Readings from Last 3 Encounters:   08/29/22 96   08/14/22 92   08/10/22 78       Radiology (reviewed/updated 8/30/2022)  No results found.     Current Facility-Administered Medications   Medication Dose Route Frequency Provider Last Rate Last Admin    mirtazapine (REMERON) tablet 30 mg  30 mg Oral QHS Stephanie Aquino NP        OLANZapine (ZyPREXA) tablet 15 mg  15 mg Oral QHS Stephanie Aquino NP   15 mg at 08/29/22 2118    polyethylene glycol (MIRALAX) packet 17 g  17 g Oral BID Felecia Bueno MD   17 g at 08/29/22 0913    hydrOXYzine HCL (ATARAX) tablet 100 mg  100 mg Oral QHS Stephanie Aquino NP   100 mg at 08/28/22 2100    buPROPion SR (WELLBUTRIN SR) tablet 150 mg  150 mg Oral DAILY Stephanie Aquino NP   150 mg at 08/29/22 0913    OLANZapine (ZyPREXA) tablet 5 mg  5 mg Oral BID Stephanie Aquino NP   5 mg at 08/29/22 1703    FLUoxetine (PROzac) capsule 60 mg  60 mg Oral DAILY Young Duane, NP   60 mg at 08/29/22 0913    hydrOXYzine HCL (ATARAX) tablet 50 mg  50 mg Oral Q4H PRN Stephanie Aquino NP   50 mg at 08/28/22 1721    OLANZapine (ZyPREXA) tablet 5 mg  5 mg Oral Q6H PRN Stephanie Aquino NP   5 mg at 08/16/22 1626    haloperidol lactate (HALDOL) injection 5 mg  5 mg IntraMUSCular Q6H PRN Stephanie Aquino NP        benztropine (COGENTIN) tablet 1 mg  1 mg Oral BID PRN Stephanie Aquino NP        diphenhydrAMINE (BENADRYL) injection 50 mg  50 mg IntraMUSCular BID PRN Stephanie Aquino NP        traZODone (DESYREL) tablet 50 mg  50 mg Oral QHS PRN Stephanie Aquino NP   50 mg at 08/26/22 2106    acetaminophen (TYLENOL) tablet 650 mg  650 mg Oral Q4H PRN Stephanie Aquino NP   650 mg at 08/26/22 1914    magnesium hydroxide (MILK OF MAGNESIA) 400 mg/5 mL oral suspension 30 mL  30 mL Oral DAILY PRN Stephanie Aquino NP           Side Effects: (reviewed/updated 8/30/2022)  None reported or admitted to.     Review of Systems: (reviewed/updated 8/30/2022)  Appetite: good  Sleep: poor  All other Review of Systems: depression    Mental Status Exam:  Eye contact: no eye contact  Psychomotor activity: decreased  Speech is spontaneous  Thought process: logical  Mood is \"ok\"  Affect: blunt  Perception: No avh  Thought content: +delusions  Suicidal ideation: refused to answer  Homicidal ideation: refused to answer  Insight/judgment: Poor  Cognition is grossly intact       Physical Exam:  Musculoskeletal system: steady gait  Tremor not present  Cog wheeling not present    Assessment and Plan:  Merlin Espy meets criteria for a diagnosis of Bipolar 1 disorder current episode depressed with psychotic features. ROSA MARIA. Rule out schizoaffective disorder. Increase hs dose of zyprexa to 10 mg.  TSH, B12, vit d in am.  Patient most likely will need ect. [facility] for tdo eval.  Continue rest of medications as prescribed. We will closely monitor for safety. We will encourage reality orientation. Cancel discharge. I certify that this patients inpatient psychiatric hospital services furnished since the previous certification were, and continue to be, required for treatment that could reasonably be expected to improve the patient's condition, or for diagnostic study, and that the patient continues to need, on a daily basis, active treatment furnished directly by or requiring the supervision of inpatient psychiatric facility personnel. In addition, the hospital records show that services furnished were intensive treatment services, admission or related services, or equivalent services.       Signed:  Blas Clark NP  8/30/2022

## 2022-08-30 NOTE — PROGRESS NOTES
Problem: Falls - Risk of  Goal: *Absence of Falls  Description: Document Aleksandar Fung Fall Risk and appropriate interventions in the flowsheet. 8/30/2022 0000 by Idalia Sykes  Outcome: Progressing Towards Goal  Note: Fall Risk Interventions:  Mobility Interventions: Assess mobility with egress test         Medication Interventions: Teach patient to arise slowly    Elimination Interventions: Toilet paper/wipes in reach    History of Falls Interventions: Door open when patient unattended      8/30/2022 0000 by Idalia Sykes  Outcome: Progressing Towards Goal  Note: Fall Risk Interventions:  Mobility Interventions: Assess mobility with egress test         Medication Interventions: Teach patient to arise slowly    Elimination Interventions: Toilet paper/wipes in reach    History of Falls Interventions: Door open when patient unattended    Patient received, appears to be asleep, eyes closed, breathing even and unlabored. No signs of distress noted. Will continue to monitor for safety.

## 2022-08-31 PROCEDURE — 65220000003 HC RM SEMIPRIVATE PSYCH

## 2022-08-31 PROCEDURE — 74011250637 HC RX REV CODE- 250/637

## 2022-08-31 PROCEDURE — 74011250637 HC RX REV CODE- 250/637: Performed by: PSYCHIATRY & NEUROLOGY

## 2022-08-31 PROCEDURE — 74011250637 HC RX REV CODE- 250/637: Performed by: NURSE PRACTITIONER

## 2022-08-31 RX ADMIN — OLANZAPINE 15 MG: 5 TABLET, FILM COATED ORAL at 20:52

## 2022-08-31 RX ADMIN — HYDROXYZINE HYDROCHLORIDE 50 MG: 50 TABLET, FILM COATED ORAL at 17:09

## 2022-08-31 RX ADMIN — POLYETHYLENE GLYCOL 3350 17 G: 17 POWDER, FOR SOLUTION ORAL at 17:11

## 2022-08-31 RX ADMIN — OLANZAPINE 5 MG: 5 TABLET, FILM COATED ORAL at 16:25

## 2022-08-31 RX ADMIN — MIRTAZAPINE 30 MG: 15 TABLET, FILM COATED ORAL at 20:52

## 2022-08-31 RX ADMIN — HYDROXYZINE HYDROCHLORIDE 100 MG: 50 TABLET, FILM COATED ORAL at 20:52

## 2022-08-31 RX ADMIN — HYDROXYZINE HYDROCHLORIDE 50 MG: 50 TABLET, FILM COATED ORAL at 02:54

## 2022-08-31 RX ADMIN — CLONIDINE HYDROCHLORIDE 0.1 MG: 0.1 TABLET ORAL at 02:54

## 2022-08-31 NOTE — BH NOTES
4171: [facility] observes pt pacing in room stating \"the job isn't finished\" and \"I'm going to be here awhile\". Pt continued to make disorganized comments. 0430: Writer approaches pt to recheck BP (121/81) and pt continues to make remarks about needing cell phone but will not answer why. Pt states she has not talked to her  in a few days.

## 2022-08-31 NOTE — BH NOTES
PT was assessed by [facility]. It was recommended for pt to stay. Pt has decided to stay voluntarily.

## 2022-08-31 NOTE — PROGRESS NOTES
Problem: Falls - Risk of  Goal: *Absence of Falls  Description: Document Willard Huertas Fall Risk and appropriate interventions in the flowsheet. Outcome: Progressing Towards Goal  Note: Fall Risk Interventions:  Mobility Interventions: Assess mobility with egress test         Medication Interventions: Teach patient to arise slowly    Elimination Interventions: Toilet paper/wipes in reach    History of Falls Interventions: Door open when patient unattended       Patient received, appears to be asleep, eyes closed, breathing even and unlabored. No signs of distress noted. Will continue to monitor for safety.

## 2022-08-31 NOTE — BH NOTES
PRN Medication Documentation    Specific patient behavior that led to need for PRN medication: Pt verbalized feeling anxious. Staff interventions attempted prior to PRN being given: Education and therapeutic communication. PRN medication given: Atarax 50mg @1709. Patient response/effectiveness of PRN medication: Pt remains alert and oriented on unit.

## 2022-08-31 NOTE — BH NOTES
PRN Medication Documentation    Specific patient behavior that led to need for PRN medication: anxious, disorganized speech  Staff interventions attempted prior to PRN being given: therapeutic communication  PRN medication given: Atarax 50mg  Patient response/effectiveness of PRN medication: will continue to monitor q15 min rounds      PRN Medication Documentation    Specific patient behavior that led to need for PRN medication: Elevated /107  PRN medication given: Clonidine 0.1mg  Patient response/effectiveness of PRN medication: will recheck BP in 1 hr      Blocked Bed Documentation:  Room number: 746  Type: Behavior  Rationale: Bizarre/psychotic, posturing towards staff, Poor Sleep Pattern  Anticipated duration: 2 days

## 2022-08-31 NOTE — BH NOTES
PSYCHIATRIC PROGRESS NOTE       Patient: Manpreet Allen MRN: [id number]  SSN: [id number]    YOB: 1965  Age: 61 y.o. Sex: female      Admit Date: 8/14/2022    LOS: 17 days     Chief Complaint:  I dont know what I am.     Interval History:  Manpreet Allen continues to decline. She has been pacing, more bizarre and paranoid. She's making bizarre comments, \"the job isn't finished. \" She is mumbling to herself, \"I've been through this before. \" She had a challenging night, staff report she was agitated and psychotic that she required IM meds. No eye contact, staring at her journal. Poverty of thought and speech persist. Did not answer if she has si or hi. We will transfer her to [facility] and we will have [facility] assess her for tdo. She will benefit from ect. She refused her meds yesterday.       Past Medical History:  Past Medical History:   Diagnosis Date    Anxiety and depression     Bipolar 1 disorder with moderate robyn ([address] 3/17/2014    Chronic back pain     Hyperlipidemia 8/22/2016    Hyponatremia     Psychotic disorder ([facility] [address]     Scoliosis          ALLERGIES:(reviewed/updated 8/31/2022)  Allergies   Allergen Reactions    Other Medication Anaphylaxis     Artificial sweeteners cause anaphylaxis    Pcn [Penicillins] Anaphylaxis    Sunscreen Contact Dermatitis     Burns and opens the skin    Ultram [Tramadol] Hives and Other (comments)     Hives and ringing of the ears    Benzoyl Peroxide Hives    Cephalexin Itching    Divalproex Itching    Maltodextrin-Glycerin Shortness of Breath    Claritin-D 12 Hour [Loratadine-Pseudoephedrine] Palpitations     palpatations and ringing in the ear     Laboratory report:  Lab Results   Component Value Date/Time    WBC 6.9 08/17/2022 06:31 AM    HGB 10.7 (L) 08/17/2022 06:31 AM    Hematocrit (POC) 34 (L) 07/17/2018 03:33 AM    HCT 31.3 (L) 08/17/2022 06:31 AM    PLATELET 314 97/51/7708 06:31 AM    MCV 91.5 08/17/2022 06:31 AM      Lab Results   Component Value Date/Time    Sodium 144 08/14/2022 03:45 AM    Potassium 3.5 08/14/2022 03:45 AM    Chloride 116 (H) 08/14/2022 03:45 AM    CO2 26 08/14/2022 03:45 AM    Anion gap 2 (L) 08/14/2022 03:45 AM    Glucose 102 (H) 08/14/2022 03:45 AM    Glucose 107 (H) 02/03/2020 05:40 AM    BUN 3 (L) 08/14/2022 03:45 AM    Creatinine 0.74 08/14/2022 03:45 AM    BUN/Creatinine ratio 4 (L) 08/14/2022 03:45 AM    GFR est AA >60 08/14/2022 03:45 AM    GFR est non-AA >60 08/14/2022 03:45 AM    Calcium 9.1 08/14/2022 03:45 AM    Bilirubin, total 0.2 08/12/2022 05:34 AM    Alk.  phosphatase 39 (L) 08/12/2022 05:34 AM    Protein, total 7.2 08/12/2022 05:34 AM    Albumin 3.4 (L) 08/12/2022 05:34 AM    Globulin 3.8 08/12/2022 05:34 AM    A-G Ratio 0.9 (L) 08/12/2022 05:34 AM    ALT (SGPT) 19 08/12/2022 05:34 AM      Vitals:    08/30/22 2145 08/31/22 0241 08/31/22 0431 08/31/22 0824   BP: (!) 170/105 (!) 168/107 121/81 (!) 141/86   Pulse:  (!) 106 93 95   Resp:    16   Temp:    97.6 °F (36.4 °C)   SpO2:    99%   Weight:       Height:           Lab Results   Component Value Date/Time    Carbamazepine 14.1 (H) 07/26/2020 04:19 AM     Lab Results   Component Value Date/Time    Lithium level 0.79 08/14/2022 03:45 AM       Vital Signs  Patient Vitals for the past 24 hrs:   Temp Pulse Resp BP SpO2   08/31/22 0824 97.6 °F (36.4 °C) 95 16 (!) 141/86 99 %   08/31/22 0431 -- 93 -- 121/81 --   08/31/22 0241 -- (!) 106 -- (!) 168/107 --   08/30/22 2145 -- -- -- (!) 170/105 --   08/30/22 2124 98.5 °F (36.9 °C) 94 17 (!) 169/82 96 %   08/30/22 1635 97.8 °F (36.6 °C) 93 16 (!) 165/92 97 %       Wt Readings from Last 3 Encounters:   08/28/22 71.2 kg (157 lb)   08/11/22 72.6 kg (160 lb)   08/07/22 71.5 kg (157 lb 9.6 oz)     Temp Readings from Last 3 Encounters:   08/31/22 97.6 °F (36.4 °C)   08/14/22 98.4 °F (36.9 °C)   08/10/22 98.4 °F (36.9 °C)     BP Readings from Last 3 Encounters:   08/31/22 (!) 141/86   08/14/22 (!) 142/77   08/10/22 111/75     Pulse Readings from Last 3 Encounters:   08/31/22 95   08/14/22 92   08/10/22 78       Radiology (reviewed/updated 8/31/2022)  No results found.     Current Facility-Administered Medications   Medication Dose Route Frequency Provider Last Rate Last Admin    cloNIDine HCL (CATAPRES) tablet 0.1 mg  0.1 mg Oral Q2H PRN Merle Flatness, NP   0.1 mg at 08/31/22 0254    mirtazapine (REMERON) tablet 30 mg  30 mg Oral QHS Stephanie Aquino NP        OLANZapine (ZyPREXA) tablet 15 mg  15 mg Oral QHS Teresa Aquinoe A, NP   15 mg at 08/29/22 2118    polyethylene glycol (MIRALAX) packet 17 g  17 g Oral BID Felecia Bueno MD   17 g at 08/30/22 1724    hydrOXYzine HCL (ATARAX) tablet 100 mg  100 mg Oral QHS Stephanie Aquino, NP   100 mg at 08/28/22 2100    buPROPion SR (WELLBUTRIN SR) tablet 150 mg  150 mg Oral DAILY Stephanie Aquino, NP   150 mg at 08/30/22 1134    OLANZapine (ZyPREXA) tablet 5 mg  5 mg Oral BID Stephanie Aquino, NP   5 mg at 08/30/22 1724    FLUoxetine (PROzac) capsule 60 mg  60 mg Oral DAILY Rome Willis NP   60 mg at 08/30/22 1135    hydrOXYzine HCL (ATARAX) tablet 50 mg  50 mg Oral Q4H PRN Stephanie Aquino, NP   50 mg at 08/31/22 0254    OLANZapine (ZyPREXA) tablet 5 mg  5 mg Oral Q6H PRN Teresa Aquinoe A, NP   5 mg at 08/16/22 1626    haloperidol lactate (HALDOL) injection 5 mg  5 mg IntraMUSCular Q6H PRN Stephanie Aquino A, NP   5 mg at 08/30/22 2033    benztropine (COGENTIN) tablet 1 mg  1 mg Oral BID PRN Stephanie Aquino, NP        diphenhydrAMINE (BENADRYL) injection 50 mg  50 mg IntraMUSCular BID PRN Stephanie Aquino, NP   50 mg at 08/30/22 2033    traZODone (DESYREL) tablet 50 mg  50 mg Oral QHS PRN Stephanie Aquino NP   50 mg at 08/26/22 2106    acetaminophen (TYLENOL) tablet 650 mg  650 mg Oral Q4H PRN Stephanie Aquino NP   650 mg at 08/26/22 1914    magnesium hydroxide (MILK OF MAGNESIA) 400 mg/5 mL oral suspension 30 mL  30 mL Oral DAILY PRN Nikhil WILEY NP Side Effects: (reviewed/updated 8/31/2022)  None reported or admitted to. Review of Systems: (reviewed/updated 8/31/2022)  Appetite: good  Sleep: poor  All other Review of Systems: depression    Mental Status Exam:  Eye contact: no eye contact  Psychomotor activity: decreased  Speech is spontaneous  Thought process: logical  Mood is \"ok\"  Affect: blunt  Perception: No avh  Thought content: +delusions  Suicidal ideation: refused to answer  Homicidal ideation: refused to answer  Insight/judgment: Poor  Cognition is grossly intact       Physical Exam:  Musculoskeletal system: steady gait  Tremor not present  Cog wheeling not present    Assessment and Plan:  Tamela Dale meets criteria for a diagnosis of Bipolar 1 disorder current episode depressed with psychotic features. ROSA MARIA. Rule out schizoaffective disorder. TSH, B12, vit d pending. Patient most likely will need ect. [address] [place] [address] for tdo eval.  Continue rest of medications as prescribed. We will closely monitor for safety. We will encourage reality orientation. Cancel discharge. I certify that this patients inpatient psychiatric hospital services furnished since the previous certification were, and continue to be, required for treatment that could reasonably be expected to improve the patient's condition, or for diagnostic study, and that the patient continues to need, on a daily basis, active treatment furnished directly by or requiring the supervision of inpatient psychiatric facility personnel. In addition, the hospital records show that services furnished were intensive treatment services, admission or related services, or equivalent services.       Signed:  Sonia Meehan NP  8/31/2022

## 2022-08-31 NOTE — BH NOTES
2000: Pt is observed in hallway, pacing, appears anxious, guarded, and paranoid of staff. Pt refuses to answer questions verbally. Pt refused to take PO medications. Pt agreed to take IM Benadryl and Haldol. Pt has been resting quietly in room since. Pt's BP is extremely elevated, no history of hypertension. On call provider was called, PRN Clonidine 0.1mg q2hrs ordered for up to two doses in 4 hrs. Hospitalist will be contacted if BP continues to be elevated. Will continue to monitor pt closely.

## 2022-08-31 NOTE — BH NOTES
PRN Medication Documentation    Specific patient behavior that led to need for PRN medication: psychotic,bizarre type behav,hallucinations,disorganized and racing thoughts. agitation. Staff interventions attempted prior to PRN being given: redirection,coping skills  PRN medication given: haldol im, benadryl im Patient response/effectiveness of PRN medication: tl aware,instructed to give.   Pt agrees

## 2022-08-31 NOTE — BH NOTES
1222: spoke with David at [facility] TDO assessment regarding evaluation    1310: faxed requested documents to [facility]

## 2022-08-31 NOTE — PROGRESS NOTES
Problem: Depressed Mood (Adult/Pediatric)  Goal: *STG: Participates in treatment plan  8/31/2022 1645 by Megan Junior RN  Outcome: Progressing Towards Goal    Goal: *STG: Attends activities and groups  8/31/2022 1645 by Megan Junior RN  Outcome: Progressing Towards Goal    Goal: *STG: Demonstrates reduction in symptoms and increase in insight into coping skills/future focused  8/31/2022 1645 by Megan Junior RN  Outcome: Progressing Towards Goal    Pt alert and oriented in dining room coloring. Pt is cooperative but withdrawn. Pt is meal and med compliant. Staff will continue to monitor pt q15 for safety.

## 2022-08-31 NOTE — PROGRESS NOTES
Problem: Depressed Mood (Adult/Pediatric)  Goal: *STG: Participates in treatment plan  Outcome: Not Progressing Towards Goal  Goal: *STG: Verbalizes anger, guilt, and other feelings in a constructive manor  Outcome: Not Progressing Towards Goal  Goal: *STG: Attends activities and groups  Outcome: Not Progressing Towards Goal    0835: Patient is minimally participatory in treatment team. Patient exhibiting signs of thought blocking, selectively mute, and displays a disorganized thought process. Patient postured at staff last night, refusing medication and required IM medication. Plans are to transfer to acute due to acuity and assess for TDO. Mood and affect; dull, flat, depressed, selectively mute and disorganized. Denies SI/HI. Denies AH/VH. Selectively medication and meal complaint. Will continue to monitor q15 minutes for safety checks.     Blocked Bed Documentation:    Room number: 746  Type: Behavior  Rationale: Poor Self-Control  Anticipated duration: TBD qshift    Additional comments: Disorganized thought process, selectively mute, labile

## 2022-08-31 NOTE — GROUP NOTE
DANIEL  GROUP DOCUMENTATION INDIVIDUAL                                                                          Group Therapy Note    Date: 8/31/2022    Group Start Time: 1330  Group End Time: 6251  Group Topic: Process Group - Inpatient    [facility] 7W GEN BEHAV Beata Funes    IP [address] GROUP DOCUMENTATION GROUP    Group Therapy Note    Attendees: Writer asked pts to identify an animal that they believe represents them as a check in question. Pts were then walked through the trauma  of the brain. Pts discussed survival reactions, coping skills and negative impulsive behavious that can result due to past trauma. Pts shared their own experiences.  Pts were encouraged to provide positive feedback to one another before reflecting on group       Attendance: Did not attend    Additional Notes:  pt was encouraged to attend but chose not to do so     [employer]

## 2022-08-31 NOTE — PROGRESS NOTES
Problem: Depressed Mood (Adult/Pediatric)  Goal: *STG: Participates in treatment plan  Outcome: Not Progressing Towards Goal  Goal: *STG: Verbalizes anger, guilt, and other feelings in a constructive manor  Outcome: Not Progressing Towards Goal  Goal: *STG: Demonstrates reduction in symptoms and increase in insight into coping skills/future focused  Outcome: Not Progressing Towards Goal

## 2022-08-31 NOTE — INTERDISCIPLINARY ROUNDS
Behavioral Health Interdisciplinary Rounds     Patient Name: Radha Petit  Age: 61 y.o. Room/Bed:  746/02  Primary Diagnosis: <principal problem not specified>   Admission Status: Voluntary     Readmission within 30 days: yes  Power of  in place: no  Patient requires a blocked bed: yes          Reason for blocked bed: psychotic, posturing towards staff, bizarre behavior   Sleep hours: 5 hrs         Participation in Care/Groups:  no  Medication Compliant?: Selective  PRNS (last 24 hours): Antipsychotic (IM), Antianxiety, and Anticholinergic    Restraints (last 24 hours):  no     Alcohol screening (AUDIT) completed -     If applicable, date SBIRT discussed in treatment team AND documented:    Tobacco - patient is a smoker: Have You Used Tobacco in the Past 30 Days: No  Illegal Drugs use: Have You Used Any Illegal Substances Over the Past 12 Months: No    24 hour chart check complete: yes     _______________________________________________    Patient goal(s) for today: Communicate needs to staff   Treatment team focus/goals: Assess pt, manage medication, discharge planning  Progress note: Pt is decompensating. Pt displays paranoia, delusional, disorganized thinking. Pt denies SI, HI. Pt will be assessed by [facility] for TDO        Financial concerns/prescription coverage:    Family contact:    Herve Leal, [phone]                    Family requesting physician contact today:    Discharge plan: Pt will discharge home   Access to weapons no :                                                               Outpatient provider(s): [facility], [phone]  Patient's preferred phone number for follow up call :   Patient's preferred e-mail address :  Participating treatment team members:    LOS:  17 Expected LOS: 9/6/22    Participating treatment team members: Radha Damaso, MERCED Gavin, Gordo Durham RN, Geremias Abraham NP

## 2022-09-01 PROCEDURE — 74011250637 HC RX REV CODE- 250/637: Performed by: NURSE PRACTITIONER

## 2022-09-01 PROCEDURE — 65220000003 HC RM SEMIPRIVATE PSYCH

## 2022-09-01 PROCEDURE — 74011250637 HC RX REV CODE- 250/637: Performed by: PSYCHIATRY & NEUROLOGY

## 2022-09-01 RX ORDER — MIRTAZAPINE 15 MG/1
45 TABLET, FILM COATED ORAL
Status: DISCONTINUED | OUTPATIENT
Start: 2022-09-01 | End: 2022-09-20 | Stop reason: HOSPADM

## 2022-09-01 RX ADMIN — FLUOXETINE HYDROCHLORIDE 60 MG: 20 CAPSULE ORAL at 08:46

## 2022-09-01 RX ADMIN — OLANZAPINE 15 MG: 5 TABLET, FILM COATED ORAL at 20:07

## 2022-09-01 RX ADMIN — OLANZAPINE 5 MG: 5 TABLET, FILM COATED ORAL at 17:06

## 2022-09-01 RX ADMIN — OLANZAPINE 5 MG: 5 TABLET, FILM COATED ORAL at 08:46

## 2022-09-01 RX ADMIN — POLYETHYLENE GLYCOL 3350 17 G: 17 POWDER, FOR SOLUTION ORAL at 17:06

## 2022-09-01 RX ADMIN — HYDROXYZINE HYDROCHLORIDE 100 MG: 50 TABLET, FILM COATED ORAL at 20:08

## 2022-09-01 RX ADMIN — BUPROPION HYDROCHLORIDE 150 MG: 150 TABLET, EXTENDED RELEASE ORAL at 08:46

## 2022-09-01 RX ADMIN — MIRTAZAPINE 45 MG: 15 TABLET, FILM COATED ORAL at 21:15

## 2022-09-01 NOTE — PROGRESS NOTES
Problem: Falls - Risk of  Goal: *Absence of Falls  Description: Document Chucky Montoya Fall Risk and appropriate interventions in the flowsheet. Outcome: Progressing Towards Goal  Note: Fall Risk Interventions:  Mobility Interventions: Assess mobility with egress test         Medication Interventions: Teach patient to arise slowly    Elimination Interventions: Toilet paper/wipes in reach    History of Falls Interventions: Door open when patient unattended    At present:patient resting with eyes closed ,resp even regular and unlabored.

## 2022-09-01 NOTE — PROGRESS NOTES
visit for routine follow up. Patient resting at the time of this visit, door closed for privacy. Please contact spiritual care for further referral or consult. Rev.  Bertin Martin MDiv, [facility], [facility]   paging service: [phone])

## 2022-09-01 NOTE — BH NOTES
GROUP THERAPY PROGRESS NOTE     Patient is participating in Yoga Therapy Group     Group time: 30 minutes     Personal goal for participation: Reduce anxiety, develop coping skills to manage stress     Goal orientation: Personal   Group therapy participation: Did Not Participate     Therapeutic interventions reviewed and discussed:  Group engaged in therapeutic physical exercises, deep breathing exercises, guided deep relaxation, and chanting to help improve body/mind awareness and activate the parasympathetic nervous system and create calm. Impression of participation: Patient refused group.

## 2022-09-01 NOTE — BH NOTES
PSYCHIATRIC PROGRESS NOTE       Patient: Manpreet Allen MRN: [id number]  SSN: [id number]    YOB: 1965  Age: 61 y.o. Sex: female      Admit Date: 8/14/2022    LOS: 18 days     Chief Complaint:  I cannot control the episodes of bipolar. Interval History:  Manpreet Allen was assessed by [facility] yesterday but stayed voluntarily. She's been refusing her meds since yesterday. She thought her meds \"were not real meds. \" She continues to decline, no eye contact, flat affect, internally preoccupied, scared. \"I can't shut down. \" Thought blocking noteworthy. Says she had si 3 days ago but denies it currently. She reports ongoing struggle with sleep. Complaining of dry eyes.     Past Medical History:  Past Medical History:   Diagnosis Date    Anxiety and depression     Bipolar 1 disorder with moderate robyn ([address] 3/17/2014    Chronic back pain     Hyperlipidemia 8/22/2016    Hyponatremia     Psychotic disorder [address] [address]     Scoliosis          ALLERGIES:(reviewed/updated 9/1/2022)  Allergies   Allergen Reactions    Other Medication Anaphylaxis     Artificial sweeteners cause anaphylaxis    Pcn [Penicillins] Anaphylaxis    Sunscreen Contact Dermatitis     Burns and opens the skin    Ultram [Tramadol] Hives and Other (comments)     Hives and ringing of the ears    Benzoyl Peroxide Hives    Cephalexin Itching    Divalproex Itching    Maltodextrin-Glycerin Shortness of Breath    Claritin-D 12 Hour [Loratadine-Pseudoephedrine] Palpitations     palpatations and ringing in the ear     Laboratory report:  Lab Results   Component Value Date/Time    WBC 6.9 08/17/2022 06:31 AM    HGB 10.7 (L) 08/17/2022 06:31 AM    Hematocrit (POC) 34 (L) 07/17/2018 03:33 AM    HCT 31.3 (L) 08/17/2022 06:31 AM    PLATELET 669 94/78/1925 06:31 AM    MCV 91.5 08/17/2022 06:31 AM      Lab Results   Component Value Date/Time    Sodium 144 08/14/2022 03:45 AM    Potassium 3.5 08/14/2022 03:45 AM    Chloride 116 (H) 08/14/2022 03:45 AM    CO2 26 08/14/2022 03:45 AM    Anion gap 2 (L) 08/14/2022 03:45 AM    Glucose 102 (H) 08/14/2022 03:45 AM    Glucose 107 (H) 02/03/2020 05:40 AM    BUN 3 (L) 08/14/2022 03:45 AM    Creatinine 0.74 08/14/2022 03:45 AM    BUN/Creatinine ratio 4 (L) 08/14/2022 03:45 AM    GFR est AA >60 08/14/2022 03:45 AM    GFR est non-AA >60 08/14/2022 03:45 AM    Calcium 9.1 08/14/2022 03:45 AM    Bilirubin, total 0.2 08/12/2022 05:34 AM    Alk. phosphatase 39 (L) 08/12/2022 05:34 AM    Protein, total 7.2 08/12/2022 05:34 AM    Albumin 3.4 (L) 08/12/2022 05:34 AM    Globulin 3.8 08/12/2022 05:34 AM    A-G Ratio 0.9 (L) 08/12/2022 05:34 AM    ALT (SGPT) 19 08/12/2022 05:34 AM      Vitals:    08/31/22 0431 08/31/22 0824 08/31/22 1514 09/01/22 0800   BP: 121/81 (!) 141/86 139/87 (!) 142/85   Pulse: 93 95 81 88   Resp:  16 16 16   Temp:  97.6 °F (36.4 °C) 98.3 °F (36.8 °C) 97.4 °F (36.3 °C)   SpO2:  99% 99% 100%   Weight:       Height:           Lab Results   Component Value Date/Time    Carbamazepine 14.1 (H) 07/26/2020 04:19 AM     Lab Results   Component Value Date/Time    Lithium level 0.79 08/14/2022 03:45 AM       Vital Signs  Patient Vitals for the past 24 hrs:   Temp Pulse Resp BP SpO2   09/01/22 0800 97.4 °F (36.3 °C) 88 16 (!) 142/85 100 %   08/31/22 1514 98.3 °F (36.8 °C) 81 16 139/87 99 %       Wt Readings from Last 3 Encounters:   08/28/22 71.2 kg (157 lb)   08/11/22 72.6 kg (160 lb)   08/07/22 71.5 kg (157 lb 9.6 oz)     Temp Readings from Last 3 Encounters:   09/01/22 97.4 °F (36.3 °C)   08/14/22 98.4 °F (36.9 °C)   08/10/22 98.4 °F (36.9 °C)     BP Readings from Last 3 Encounters:   09/01/22 (!) 142/85   08/14/22 (!) 142/77   08/10/22 111/75     Pulse Readings from Last 3 Encounters:   09/01/22 88   08/14/22 92   08/10/22 78       Radiology (reviewed/updated 9/1/2022)  No results found.     Current Facility-Administered Medications   Medication Dose Route Frequency Provider Last Rate Last Admin    cloNIDine HCL (CATAPRES) tablet 0.1 mg  0.1 mg Oral Q2H PRN Tosin Mani, NP   0.1 mg at 08/31/22 0254    mirtazapine (REMERON) tablet 30 mg  30 mg Oral QHS Teresa Aquinoe A, NP   30 mg at 08/31/22 2052    OLANZapine (ZyPREXA) tablet 15 mg  15 mg Oral QHS Alannah Aquinoanie A, NP   15 mg at 08/31/22 2052    polyethylene glycol (MIRALAX) packet 17 g  17 g Oral BID Felecia Bueno MD   17 g at 08/31/22 1711    hydrOXYzine HCL (ATARAX) tablet 100 mg  100 mg Oral QHS Teresa Aquinoe A, NP   100 mg at 08/31/22 2052    buPROPion SR (WELLBUTRIN SR) tablet 150 mg  150 mg Oral DAILY Alannah Aquinoanie A, NP   150 mg at 08/30/22 1134    OLANZapine (ZyPREXA) tablet 5 mg  5 mg Oral BID Teresa Aquinoe A, NP   5 mg at 08/31/22 1625    FLUoxetine (PROzac) capsule 60 mg  60 mg Oral DAILY Redell Fogo, NP   60 mg at 08/30/22 1135    hydrOXYzine HCL (ATARAX) tablet 50 mg  50 mg Oral Q4H PRN Teresa Aquinoe A, NP   50 mg at 08/31/22 1709    OLANZapine (ZyPREXA) tablet 5 mg  5 mg Oral Q6H PRN Alannah Aquinoanie A, NP   5 mg at 08/16/22 1626    haloperidol lactate (HALDOL) injection 5 mg  5 mg IntraMUSCular Q6H PRN Teresa Aquinoe A, NP   5 mg at 08/30/22 2033    benztropine (COGENTIN) tablet 1 mg  1 mg Oral BID PRN Teresa Aquinoe A, NP        diphenhydrAMINE (BENADRYL) injection 50 mg  50 mg IntraMUSCular BID PRN Alannah Aquinoanie A, NP   50 mg at 08/30/22 2033    traZODone (DESYREL) tablet 50 mg  50 mg Oral QHS PRN Alannah Aquinoanie A, NP   50 mg at 08/26/22 2106    acetaminophen (TYLENOL) tablet 650 mg  650 mg Oral Q4H PRN Stephanie Aquino NP   650 mg at 08/26/22 1914    magnesium hydroxide (MILK OF MAGNESIA) 400 mg/5 mL oral suspension 30 mL  30 mL Oral DAILY PRN Stephanie Aquino NP           Side Effects: (reviewed/updated 9/1/2022)  None reported or admitted to.     Review of Systems: (reviewed/updated 9/1/2022)  Appetite: good  Sleep: poor  All other Review of Systems: depression    Mental Status Exam:  Eye contact: no eye contact  Psychomotor activity: decreased  Speech is spontaneous  Thought process: thought blocking  Mood is \"depressed\"  Affect: flat  Perception: No avh  Thought content: +delusions  Suicidal ideation: denies si  Homicidal ideation: denies hi  Insight/judgment: Poor  Cognition is grossly intact       Physical Exam:  Musculoskeletal system: steady gait  Tremor not present  Cog wheeling not present    Assessment and Plan:  Carol Moreira meets criteria for a diagnosis of Bipolar 1 disorder current episode depressed with psychotic features. ROSA MARIA. Rule out schizoaffective disorder. Artificial drops. TSH, B12, vit d pending. Patient most likely will need ect. [facility] for tdo eval.  Continue rest of medications as prescribed. We will closely monitor for safety. We will encourage reality orientation. Cancel discharge. I certify that this patients inpatient psychiatric hospital services furnished since the previous certification were, and continue to be, required for treatment that could reasonably be expected to improve the patient's condition, or for diagnostic study, and that the patient continues to need, on a daily basis, active treatment furnished directly by or requiring the supervision of inpatient psychiatric facility personnel. In addition, the hospital records show that services furnished were intensive treatment services, admission or related services, or equivalent services.       Signed:  Tatianna Patterson NP  9/1/2022

## 2022-09-01 NOTE — PROGRESS NOTES
Behavioral Health Interdisciplinary Rounds     Patient Name: Amber Moore  Age: 61 y.o. Room/Bed:  736/  Primary Diagnosis: <principal problem not specified>   Admission Status: Voluntary     Readmission within 30 days: yes  Power of  in place: no  Patient requires a blocked bed: yes          Reason for blocked bed: psychotic   Sleep hours:  8      Participation in Care/Groups:  no  Medication Compliant?: Yes  PRNS (last 24 hours): None    Restraints (last 24 hours):  no     Alcohol screening (AUDIT) completed -     If applicable, date SBIRT discussed in treatment team AND documented:    Tobacco - patient is a smoker: Have You Used Tobacco in the Past 30 Days: No  Illegal Drugs use: Have You Used Any Illegal Substances Over the Past 12 Months: No    24 hour chart check complete: yes     _______________________________________________    Patient goal(s) for today: Communicate needs to staff. Treatment team focus/goals: Assess pt, manage medications, discharge planning   Progress note: Pt is deompensating. Pt is refusing tx. And labs. Move forward with TDO.          Financial concerns/prescription coverage:    Family contact:  Donte Peters, [phone]                      Family requesting physician contact today:    Discharge plan:   Access to weapons :                                                              Outpatient provider(s):   Patient's preferred phone number for follow up call :   Patient's preferred e-mail address :  Participating treatment team members:    LOS:  18 Expected LOS: TBD    Participating treatment team members: Amber Moore, MERCED Cruz, Courtney briseno RN, Graham Kitchen, NP

## 2022-09-01 NOTE — PROGRESS NOTES
0715 Rec'd patient this am resting in her room. She is calm, cooperative, quiet. Pt is visible on the unit. She appears, to this nurse, to have improved significantly in response to questions, eye contact and agreeing to take her medications versus working w/ the patient yesterday. Her affect is dull/flat. She does not appear to be responding to internal stimuli, although some staff have expressed they believe she is still thought blocking. She is med/meal compliant.       Problem: Depressed Mood (Adult/Pediatric)  Goal: *STG: Participates in treatment plan  Outcome: Not Progressing Towards Goal  Goal: *STG: Verbalizes anger, guilt, and other feelings in a constructive manor  Outcome: Not Progressing Towards Goal  Goal: *STG: Attends activities and groups  Outcome: Not Progressing Towards Goal

## 2022-09-02 LAB
APPEARANCE UR: CLEAR
BACTERIA URNS QL MICRO: NEGATIVE /HPF
BILIRUB UR QL: NEGATIVE
COLOR UR: ABNORMAL
EPITH CASTS URNS QL MICRO: ABNORMAL /LPF
GLUCOSE UR STRIP.AUTO-MCNC: NEGATIVE MG/DL
HGB UR QL STRIP: NEGATIVE
KETONES UR QL STRIP.AUTO: NEGATIVE MG/DL
LEUKOCYTE ESTERASE UR QL STRIP.AUTO: ABNORMAL
NITRITE UR QL STRIP.AUTO: NEGATIVE
PH UR STRIP: 6 [PH] (ref 5–8)
PROT UR STRIP-MCNC: NEGATIVE MG/DL
RBC #/AREA URNS HPF: ABNORMAL /HPF (ref 0–5)
SP GR UR REFRACTOMETRY: 1.01 (ref 1–1.03)
UR CULT HOLD, URHOLD: NORMAL
UROBILINOGEN UR QL STRIP.AUTO: 0.2 EU/DL (ref 0.2–1)
WBC URNS QL MICRO: ABNORMAL /HPF (ref 0–4)

## 2022-09-02 PROCEDURE — 74011250637 HC RX REV CODE- 250/637: Performed by: PSYCHIATRY & NEUROLOGY

## 2022-09-02 PROCEDURE — 65220000003 HC RM SEMIPRIVATE PSYCH

## 2022-09-02 PROCEDURE — 74011250636 HC RX REV CODE- 250/636: Performed by: PSYCHIATRY & NEUROLOGY

## 2022-09-02 PROCEDURE — 81001 URINALYSIS AUTO W/SCOPE: CPT

## 2022-09-02 PROCEDURE — 74011250637 HC RX REV CODE- 250/637: Performed by: NURSE PRACTITIONER

## 2022-09-02 RX ORDER — DIAZEPAM 10 MG/2ML
5 INJECTION INTRAMUSCULAR ONCE
Status: COMPLETED | OUTPATIENT
Start: 2022-09-02 | End: 2022-09-02

## 2022-09-02 RX ADMIN — HYDROXYZINE HYDROCHLORIDE 100 MG: 50 TABLET, FILM COATED ORAL at 21:17

## 2022-09-02 RX ADMIN — OLANZAPINE 15 MG: 5 TABLET, FILM COATED ORAL at 21:17

## 2022-09-02 RX ADMIN — FLUOXETINE HYDROCHLORIDE 60 MG: 20 CAPSULE ORAL at 09:27

## 2022-09-02 RX ADMIN — BUPROPION HYDROCHLORIDE 150 MG: 150 TABLET, EXTENDED RELEASE ORAL at 09:28

## 2022-09-02 RX ADMIN — POLYETHYLENE GLYCOL 3350 17 G: 17 POWDER, FOR SOLUTION ORAL at 18:35

## 2022-09-02 RX ADMIN — OLANZAPINE 5 MG: 5 TABLET, FILM COATED ORAL at 09:27

## 2022-09-02 RX ADMIN — POLYETHYLENE GLYCOL 3350 17 G: 17 POWDER, FOR SOLUTION ORAL at 09:26

## 2022-09-02 RX ADMIN — OLANZAPINE 5 MG: 5 TABLET, FILM COATED ORAL at 16:33

## 2022-09-02 RX ADMIN — MIRTAZAPINE 45 MG: 15 TABLET, FILM COATED ORAL at 21:16

## 2022-09-02 RX ADMIN — DIAZEPAM 5 MG: 5 INJECTION, SOLUTION INTRAMUSCULAR; INTRAVENOUS at 12:48

## 2022-09-02 NOTE — PROGRESS NOTES
Problem: Depressed Mood (Adult/Pediatric)  Goal: *STG: Verbalizes anger, guilt, and other feelings in a constructive manor  Outcome: Not Progressing Towards Goal  Patient appears to have thought blocking with hesitation and is selectively mute in conversation. Problem: Falls - Risk of  Goal: *Absence of Falls  Description: Document Paradise Toribio Fall Risk and appropriate interventions in the flowsheet. Outcome: Progressing Towards Goal  Note: Fall Risk Interventions:  - Mobility Interventions: Assess mobility with egress test  - Medication Interventions: Teach patient to arise slowly  - Elimination Interventions: Toilet paper/wipes in reach  - History of Falls Interventions: Door open when patient unattended    Patient received resting quietly in bed. No signs of distress. Even and unlabored breathing. Staff will continue to monitor safety Q15 and provide support. No falls reported or witnessed during this shift.

## 2022-09-02 NOTE — BH NOTES
PSYCHIATRIC PROGRESS NOTE       Patient: Jason Jain MRN: [id number]  SSN: [id number]    YOB: 1965  Age: 61 y.o. Sex: female      Admit Date: 8/14/2022    LOS: 19 days     Chief Complaint:  \"Let me do this. \"    Interval History:  Jason Jain appears to have more psychomotor retardation today. Nursing report that patient only slept 3 hours. She is observed with significant thought blocking. Upon my evaluation, patient appears to be significantly internally pre-occupied. She starts to answer my questions but goes off on many different tangents.     Past Medical History:  Past Medical History:   Diagnosis Date    Anxiety and depression     Bipolar 1 disorder with moderate robyn ([facility] [address] 3/17/2014    Chronic back pain     Hyperlipidemia 8/22/2016    Hyponatremia     Psychotic disorder (HCC)     Scoliosis          ALLERGIES:(reviewed/updated 9/2/2022)  Allergies   Allergen Reactions    Other Medication Anaphylaxis     Artificial sweeteners cause anaphylaxis    Pcn [Penicillins] Anaphylaxis    Sunscreen Contact Dermatitis     Burns and opens the skin    Ultram [Tramadol] Hives and Other (comments)     Hives and ringing of the ears    Benzoyl Peroxide Hives    Cephalexin Itching    Divalproex Itching    Maltodextrin-Glycerin Shortness of Breath    Claritin-D 12 Hour [Loratadine-Pseudoephedrine] Palpitations     palpatations and ringing in the ear     Laboratory report:  Lab Results   Component Value Date/Time    WBC 6.9 08/17/2022 06:31 AM    HGB 10.7 (L) 08/17/2022 06:31 AM    Hematocrit (POC) 34 (L) 07/17/2018 03:33 AM    HCT 31.3 (L) 08/17/2022 06:31 AM    PLATELET 192 47/39/3274 06:31 AM    MCV 91.5 08/17/2022 06:31 AM      Lab Results   Component Value Date/Time    Sodium 144 08/14/2022 03:45 AM    Potassium 3.5 08/14/2022 03:45 AM    Chloride 116 (H) 08/14/2022 03:45 AM    CO2 26 08/14/2022 03:45 AM    Anion gap 2 (L) 08/14/2022 03:45 AM    Glucose 102 (H) 08/14/2022 03:45 AM    Glucose 107 (H) 02/03/2020 05:40 AM    BUN 3 (L) 08/14/2022 03:45 AM    Creatinine 0.74 08/14/2022 03:45 AM    BUN/Creatinine ratio 4 (L) 08/14/2022 03:45 AM    GFR est AA >60 08/14/2022 03:45 AM    GFR est non-AA >60 08/14/2022 03:45 AM    Calcium 9.1 08/14/2022 03:45 AM    Bilirubin, total 0.2 08/12/2022 05:34 AM    Alk. phosphatase 39 (L) 08/12/2022 05:34 AM    Protein, total 7.2 08/12/2022 05:34 AM    Albumin 3.4 (L) 08/12/2022 05:34 AM    Globulin 3.8 08/12/2022 05:34 AM    A-G Ratio 0.9 (L) 08/12/2022 05:34 AM    ALT (SGPT) 19 08/12/2022 05:34 AM      Vitals:    09/01/22 0800 09/01/22 1633 09/01/22 1958 09/02/22 0902   BP: (!) 142/85 (!) 157/87 (!) 142/85 133/84   Pulse: 88 (!) 101 98 96   Resp: 16 16 16 14   Temp: 97.4 °F (36.3 °C) 98.2 °F (36.8 °C) 98.8 °F (37.1 °C) 98.5 °F (36.9 °C)   SpO2: 100% 99% 98% 97%   Weight:       Height:           Lab Results   Component Value Date/Time    Carbamazepine 14.1 (H) 07/26/2020 04:19 AM     Lab Results   Component Value Date/Time    Lithium level 0.79 08/14/2022 03:45 AM       Vital Signs  Patient Vitals for the past 24 hrs:   Temp Pulse Resp BP SpO2   09/02/22 0902 98.5 °F (36.9 °C) 96 14 133/84 97 %   09/01/22 1958 98.8 °F (37.1 °C) 98 16 (!) 142/85 98 %   09/01/22 1633 98.2 °F (36.8 °C) (!) 101 16 (!) 157/87 99 %       Wt Readings from Last 3 Encounters:   08/28/22 71.2 kg (157 lb)   08/11/22 72.6 kg (160 lb)   08/07/22 71.5 kg (157 lb 9.6 oz)     Temp Readings from Last 3 Encounters:   09/02/22 98.5 °F (36.9 °C)   08/14/22 98.4 °F (36.9 °C)   08/10/22 98.4 °F (36.9 °C)     BP Readings from Last 3 Encounters:   09/02/22 133/84   08/14/22 (!) 142/77   08/10/22 111/75     Pulse Readings from Last 3 Encounters:   09/02/22 96   08/14/22 92   08/10/22 78       Radiology (reviewed/updated 9/2/2022)  No results found.     Current Facility-Administered Medications   Medication Dose Route Frequency Provider Last Rate Last Admin    mirtazapine (REMERON) tablet 45 mg  45 mg Oral QHS Kenia Stephanie WILEY NP   45 mg at 09/01/22 2115    hydroxypropyl methylcellulose (ISOPTO TEARS) 0.5 % ophthalmic solution 1 Drop  1 Drop Both Eyes PRN Stephanie Auqino NP        cloNIDine HCL (CATAPRES) tablet 0.1 mg  0.1 mg Oral Q2H PRN Augustina Rack, NP   0.1 mg at 08/31/22 0254    OLANZapine (ZyPREXA) tablet 15 mg  15 mg Oral QHS Stephanie Aquino NP   15 mg at 09/01/22 2007    polyethylene glycol (MIRALAX) packet 17 g  17 g Oral BID Felecia Bueno MD   17 g at 09/02/22 0926    hydrOXYzine HCL (ATARAX) tablet 100 mg  100 mg Oral QHS Stephanie Aquino NP   100 mg at 09/01/22 2008    buPROPion SR (WELLBUTRIN SR) tablet 150 mg  150 mg Oral DAILY Stephanie Aquino NP   150 mg at 09/02/22 0928    OLANZapine (ZyPREXA) tablet 5 mg  5 mg Oral BID Stephanie Aquino NP   5 mg at 09/02/22 2006    FLUoxetine (PROzac) capsule 60 mg  60 mg Oral DAILY Maryhoney Side, NP   60 mg at 09/02/22 3050    hydrOXYzine HCL (ATARAX) tablet 50 mg  50 mg Oral Q4H PRN Stephanie Aquino, NP   50 mg at 08/31/22 1709    OLANZapine (ZyPREXA) tablet 5 mg  5 mg Oral Q6H PRN Stephanie Aquino NP   5 mg at 08/16/22 1626    haloperidol lactate (HALDOL) injection 5 mg  5 mg IntraMUSCular Q6H PRN Stephanie Aquino, NP   5 mg at 08/30/22 2033    benztropine (COGENTIN) tablet 1 mg  1 mg Oral BID PRN Stephanie Aquino, NP        diphenhydrAMINE (BENADRYL) injection 50 mg  50 mg IntraMUSCular BID PRN Teresa Aquinoe SAURABH, NP   50 mg at 08/30/22 2033    traZODone (DESYREL) tablet 50 mg  50 mg Oral QHS PRN Stephanie Aquino, NP   50 mg at 08/26/22 2106    acetaminophen (TYLENOL) tablet 650 mg  650 mg Oral Q4H PRN Stephanie Aquino NP   650 mg at 08/26/22 1914    magnesium hydroxide (MILK OF MAGNESIA) 400 mg/5 mL oral suspension 30 mL  30 mL Oral DAILY PRN Stephanie Aquino NP           Side Effects: (reviewed/updated 9/2/2022)  None reported or admitted to.     Review of Systems: (reviewed/updated 9/2/2022)  Appetite: good  Sleep: poor  All other Review of Systems: depression    Mental Status Exam:  60yo AAF is in poor grooming appears disheveled. She has poor eye contact throughout. She is significantly internally pre-occupied. Patient is noted to have loosening of associations  Speech is spontaneous, but is word salad. Thought process: loosening of associations is noted  Mood is \"not sure\"  Affect: flat  No hallucinations voiced. Perception: no paranoia or delusions noted  No suicidally voiced. No homicidality voiced. Insight/judgment: Poor  Cognition is grossly intact       Physical Exam:  Musculoskeletal system: steady gait  Tremor not present  Cog wheeling not present    Assessment and Plan:  Kiet Gonzalez meets criteria for a diagnosis of Bipolar 1 disorder current episode depressed with psychotic features. ROSA MARIA. Rule out schizoaffective disorder. Patient appears catatonic will order:CMP, CBC, Urinalysis  Will give test dose of valium 5mg im  Pre screen TDO    09/01/2022  Artificial drops. TSH, B12, vit d pending. Patient most likely will need ect. [facility] for tdo eval, pt will be voluntary after being evaluated. Continue rest of medications as prescribed. We will closely monitor for safety. We will encourage reality orientation. Cancel discharge. I certify that this patients inpatient psychiatric hospital services furnished since the previous certification were, and continue to be, required for treatment that could reasonably be expected to improve the patient's condition, or for diagnostic study, and that the patient continues to need, on a daily basis, active treatment furnished directly by or requiring the supervision of inpatient psychiatric facility personnel. In addition, the hospital records show that services furnished were intensive treatment services, admission or related services, or equivalent services.       Signed:  Harpal Herrera MD  9/2/2022

## 2022-09-02 NOTE — INTERDISCIPLINARY ROUNDS
Behavioral Health Interdisciplinary Rounds     Patient Name: Chelsie Morales  Age: 61 y.o. Room/Bed:  736/02  Primary Diagnosis: <principal problem not specified>   Admission Status: Voluntary     Readmission within 30 days: no  Power of  in place: no  Patient requires a blocked bed: no          Reason for blocked bed:     Sleep hours: 3        Participation in Care/Groups:  no  Medication Compliant?: Selective  PRNS (last 24 hours): None    Restraints (last 24 hours):  no     Alcohol screening (AUDIT) completed -     If applicable, date SBIRT discussed in treatment team AND documented:    Tobacco - patient is a smoker: Have You Used Tobacco in the Past 30 Days: No  Illegal Drugs use: Have You Used Any Illegal Substances Over the Past 12 Months: No    24 hour chart check complete: yes     _______________________________________________    Patient goal(s) for today: Communicate needs to staff  Treatment team focus/goals: Assess pt, manage medications, discharge planning   Progress note: Pt has decompensated. Pts mood is anxious and congruent with appearance. Pt is disoriented and confused. Pt is thought blocking. Pt is responding to internal stimuli. Pt was unable to answer questions. Pt is med compliant but will need TDO. Due to mental state, pt cannot cnsent to medication changes. Financial concerns/prescription coverage:    Family contact:    Rossy Rodriges [phone]                    Family requesting physician contact today:    Discharge plan: pt will d/c home   Access to weapons no :                                                               Outpatient provider(s): Harper Price, [facility]   Patient's preferred phone number for follow up call :   Patient's preferred e-mail address :  Participating treatment team members:    LOS:  19 Expected LOS: 9/6/22    Participating treatment team members: Morrell Boast, MSW, Dr. Rochelle Villalobos

## 2022-09-02 NOTE — PROGRESS NOTES
Problem: Depressed Mood (Adult/Pediatric)  Goal: *STG: Participates in treatment plan  Outcome: Progressing Towards Goal  Goal: *STG: Verbalizes anger, guilt, and other feelings in a constructive manor  Outcome: Progressing Towards Goal  Goal: *STG: Attends activities and groups  Outcome: Progressing Towards Goal  Goal: *STG: Demonstrates reduction in symptoms and increase in insight into coping skills/future focused  Outcome: Progressing Towards Goal

## 2022-09-02 NOTE — PROGRESS NOTES
Behavioral Services                                              Medicare Re-Certification    I certify that the inpatient psychiatric hospital services furnished since the previous certification/re-certification were, and continue to be, medically necessary for;    [x] (1) Treatment which could reasonably be expected to improve the patient's condition,    [] (2) Or for diagnostic study. Estimated length of stay/service 5-7    Plan for post-hospital care home    This patient continues to need, on a daily basis, active treatment furnished directly by or requiring the supervision of inpatient psychiatric personnel.     Electronically signed by Betzaida Elliott MD on 9/2/2022 at 10:50 AM

## 2022-09-03 PROCEDURE — 74011250637 HC RX REV CODE- 250/637: Performed by: NURSE PRACTITIONER

## 2022-09-03 PROCEDURE — 74011250637 HC RX REV CODE- 250/637

## 2022-09-03 PROCEDURE — 74011250637 HC RX REV CODE- 250/637: Performed by: PSYCHIATRY & NEUROLOGY

## 2022-09-03 PROCEDURE — 65220000003 HC RM SEMIPRIVATE PSYCH

## 2022-09-03 RX ADMIN — HYDROXYZINE HYDROCHLORIDE 100 MG: 50 TABLET, FILM COATED ORAL at 20:44

## 2022-09-03 RX ADMIN — MIRTAZAPINE 45 MG: 15 TABLET, FILM COATED ORAL at 20:44

## 2022-09-03 RX ADMIN — CLONIDINE HYDROCHLORIDE 0.1 MG: 0.1 TABLET ORAL at 15:55

## 2022-09-03 RX ADMIN — FLUOXETINE HYDROCHLORIDE 60 MG: 20 CAPSULE ORAL at 08:56

## 2022-09-03 RX ADMIN — POLYETHYLENE GLYCOL 3350 17 G: 17 POWDER, FOR SOLUTION ORAL at 08:58

## 2022-09-03 RX ADMIN — CLONIDINE HYDROCHLORIDE 0.1 MG: 0.1 TABLET ORAL at 08:56

## 2022-09-03 RX ADMIN — OLANZAPINE 5 MG: 5 TABLET, FILM COATED ORAL at 00:48

## 2022-09-03 RX ADMIN — HYDROXYZINE HYDROCHLORIDE 50 MG: 50 TABLET, FILM COATED ORAL at 08:56

## 2022-09-03 RX ADMIN — OLANZAPINE 5 MG: 5 TABLET, FILM COATED ORAL at 15:55

## 2022-09-03 RX ADMIN — HYDROXYZINE HYDROCHLORIDE 50 MG: 50 TABLET, FILM COATED ORAL at 15:55

## 2022-09-03 RX ADMIN — TRAZODONE HYDROCHLORIDE 50 MG: 50 TABLET ORAL at 00:48

## 2022-09-03 RX ADMIN — POLYVINYL ALCOHOL, POVIDONE 1 DROP: .5; .6 LIQUID OPHTHALMIC at 17:08

## 2022-09-03 RX ADMIN — OLANZAPINE 5 MG: 5 TABLET, FILM COATED ORAL at 08:56

## 2022-09-03 RX ADMIN — BUPROPION HYDROCHLORIDE 150 MG: 150 TABLET, EXTENDED RELEASE ORAL at 08:56

## 2022-09-03 RX ADMIN — OLANZAPINE 15 MG: 5 TABLET, FILM COATED ORAL at 20:44

## 2022-09-03 NOTE — BH NOTES
PRN Medication Documentation    Specific patient behavior that led to need for PRN medication:pacing in dayroom, agitated when peer spoke to her, required verbal prompts for task like eating and taking medication  Staff interventions attempted prior to PRN being given:verbal support  PRN medication given:atarax  Patient response/effectiveness of PRN medication:sitting up in bed, starring at the wall

## 2022-09-03 NOTE — PROGRESS NOTES
Problem: Altered Thought Process (Adult/Pediatric)  Goal: *STG: Remains safe in hospital  Outcome: Progressing Towards Goal  Goal: *STG: Complies with medication therapy  Outcome: Progressing Towards Goal  Goal: *STG: Demonstrates ability to understand and use improved judgment in daily activities and relationships  Outcome: Not Progressing Towards Goal  Goal: Interventions  Outcome: Progressing Towards Goal  Note: Received pt awake sitting on bed with the light on in the room. Significant disorganized thoughts and thought blocking observed. Per reports pt did not sleep last night; sitting on the edge of the bed throughout the night. Simple step by step instructions required for ADLs. Approximately, 30 minutes required for pt to taker her morning medications. 7722- prn po 50 mg Atarax for anxiety and po 0.1 mg Catapres for elevated BP administered with education provided. Pt is visible on the unit eating breakfast.     One urinary incontinent episode after breakfast.   1010- pt showered. [address] provided to pt per pt request. During pt consumption of drink pt states \"Ginger ale. This is ginger ale like the wall. I am dead. \" Education and therapeutic communication provided. Pt is periodically confused disoriented to self, place, time, and situation. Second urinary incontinence episode prior to lunch. Pt is agreeable to wearing adult briefs at this time. Pt c/o HI, AH, and not sleeping. Room blocked per NP order. TDO hearing is scheduled for Tuesday 9/6/2022 at 0730.    1609- Mr. Anastacia Michele is made aware of scheduled TDO hearing. Elevated  BP, increased thought blocking, increased paranoia. Eduction and therapeutic communication provided. Po Atarax and clonidine administered. Pt is alert visible on the unit on the phone with her . 1800- Writer made aware that Pt shares with medication nurse \"I am glad I didn't have to hurt you. \"

## 2022-09-03 NOTE — PROGRESS NOTES
Patient frequently observed thought-blocking during shift whereby she is fixated at staring at a piece of blue sticky paper, perseverating as evidence of asking the same question long after getting an answer which was incomprehensible with scattered, repeated words in illogical order. Patient was able to be re-directed to her room for a moment and has returned to the nursing station a number of times. The Med RN provided a PRN medication. Patient appeared to be interrupting the sleep of roommate and has be temporary moved to Psych Room 2. Staff will continue to monitor for safety and to provide safety as needed. Problem: Falls - Risk of  Goal: *Absence of Falls  Description: Document [place] Fall Risk and appropriate interventions in the flowsheet. Outcome: Progressing Towards Goal  Note: Fall Risk Interventions:  Mobility Interventions: Assess mobility with egress test         Medication Interventions: Teach patient to arise slowly    Elimination Interventions:  Toilet paper/wipes in reach    History of Falls Interventions: Door open when patient unattended         Problem: Depressed Mood (Adult/Pediatric)  Goal: *STG: Participates in treatment plan  Outcome: Progressing Towards Goal     Problem: Depressed Mood (Adult/Pediatric)  Goal: *STG: Verbalizes anger, guilt, and other feelings in a constructive manor  Outcome: Progressing Towards Goal     Problem: Depressed Mood (Adult/Pediatric)  Goal: *STG: Attends activities and groups  Outcome: Progressing Towards Goal     Problem: Depressed Mood (Adult/Pediatric)  Goal: *STG: Demonstrates reduction in symptoms and increase in insight into coping skills/future focused  Outcome: Progressing Towards Goal     Problem: Depressed Mood (Adult/Pediatric)  Goal: *STG: Participates in treatment plan  Outcome: Progressing Towards Goal     Problem: Depressed Mood (Adult/Pediatric)  Goal: *STG: Demonstrates reduction in symptoms and increase in insight into coping skills/future focused  Outcome: Progressing Towards Goal

## 2022-09-03 NOTE — BH NOTES
PSYCHIATRIC PROGRESS NOTE       Patient: Manpreet Allen MRN: [id number]  SSN: [id number]    YOB: 1965  Age: 61 y.o. Sex: female      Admit Date: 8/14/2022    LOS: 20 days     Chief Complaint:  \"I'm in the dark. \"     Interval History:  Manpreet Allen is currently under a TDO. She is significantly disorganized and psychotic. She has expressed twice today that she is dead. She had two episodes of urinary incontinence. She remains disorganized and confused. Psychomotor retardation observed. Pt observed staring off into space during assessment, froze in place with hands up, did not move. She is observed with significant thought blocking.      Past Medical History:  Past Medical History:   Diagnosis Date    Anxiety and depression     Bipolar 1 disorder with moderate robyn ([facility] [address] 3/17/2014    Chronic back pain     Hyperlipidemia 8/22/2016    Hyponatremia     Psychotic disorder (HCC)     Scoliosis        ALLERGIES:(reviewed/updated 9/3/2022)  Allergies   Allergen Reactions    Other Medication Anaphylaxis     Artificial sweeteners cause anaphylaxis    Pcn [Penicillins] Anaphylaxis    Sunscreen Contact Dermatitis     Burns and opens the skin    Ultram [Tramadol] Hives and Other (comments)     Hives and ringing of the ears    Benzoyl Peroxide Hives    Cephalexin Itching    Divalproex Itching    Maltodextrin-Glycerin Shortness of Breath    Claritin-D 12 Hour [Loratadine-Pseudoephedrine] Palpitations     palpatations and ringing in the ear     Laboratory report:  Lab Results   Component Value Date/Time    WBC 6.9 08/17/2022 06:31 AM    HGB 10.7 (L) 08/17/2022 06:31 AM    Hematocrit (POC) 34 (L) 07/17/2018 03:33 AM    HCT 31.3 (L) 08/17/2022 06:31 AM    PLATELET 948 49/28/5379 06:31 AM    MCV 91.5 08/17/2022 06:31 AM      Lab Results   Component Value Date/Time    Sodium 144 08/14/2022 03:45 AM    Potassium 3.5 08/14/2022 03:45 AM    Chloride 116 (H) 08/14/2022 03:45 AM    CO2 26 08/14/2022 03:45 AM    Anion gap 2 (L) 08/14/2022 03:45 AM    Glucose 102 (H) 08/14/2022 03:45 AM    Glucose 107 (H) 02/03/2020 05:40 AM    BUN 3 (L) 08/14/2022 03:45 AM    Creatinine 0.74 08/14/2022 03:45 AM    BUN/Creatinine ratio 4 (L) 08/14/2022 03:45 AM    GFR est AA >60 08/14/2022 03:45 AM    GFR est non-AA >60 08/14/2022 03:45 AM    Calcium 9.1 08/14/2022 03:45 AM    Bilirubin, total 0.2 08/12/2022 05:34 AM    Alk. phosphatase 39 (L) 08/12/2022 05:34 AM    Protein, total 7.2 08/12/2022 05:34 AM    Albumin 3.4 (L) 08/12/2022 05:34 AM    Globulin 3.8 08/12/2022 05:34 AM    A-G Ratio 0.9 (L) 08/12/2022 05:34 AM    ALT (SGPT) 19 08/12/2022 05:34 AM      Vitals:    09/02/22 0902 09/02/22 1613 09/02/22 2017 09/03/22 0810   BP: 133/84 (!) 165/63 (!) 148/85 (!) 163/84   Pulse: 96 92 98 95   Resp: 14 16 16 16   Temp: 98.5 °F (36.9 °C) 98.4 °F (36.9 °C) 97.6 °F (36.4 °C) 98.2 °F (36.8 °C)   SpO2: 97% 99% 99% 100%   Weight:       Height:           Lab Results   Component Value Date/Time    Carbamazepine 14.1 (H) 07/26/2020 04:19 AM     Lab Results   Component Value Date/Time    Lithium level 0.79 08/14/2022 03:45 AM       Vital Signs  Patient Vitals for the past 24 hrs:   Temp Pulse Resp BP SpO2   09/03/22 0810 98.2 °F (36.8 °C) 95 16 (!) 163/84 100 %   09/02/22 2017 97.6 °F (36.4 °C) 98 16 (!) 148/85 99 %   09/02/22 1613 98.4 °F (36.9 °C) 92 16 (!) 165/63 99 %       Wt Readings from Last 3 Encounters:   08/28/22 71.2 kg (157 lb)   08/11/22 72.6 kg (160 lb)   08/07/22 71.5 kg (157 lb 9.6 oz)     Temp Readings from Last 3 Encounters:   09/03/22 98.2 °F (36.8 °C)   08/14/22 98.4 °F (36.9 °C)   08/10/22 98.4 °F (36.9 °C)     BP Readings from Last 3 Encounters:   09/03/22 (!) 163/84   08/14/22 (!) 142/77   08/10/22 111/75     Pulse Readings from Last 3 Encounters:   09/03/22 95   08/14/22 92   08/10/22 78       Radiology (reviewed/updated 9/3/2022)  No results found.     Current Facility-Administered Medications   Medication Dose Route Frequency Provider Last Rate Last Admin    mirtazapine (REMERON) tablet 45 mg  45 mg Oral QHS Stephanie Aquino, NP   45 mg at 09/02/22 2116    hydroxypropyl methylcellulose (ISOPTO TEARS) 0.5 % ophthalmic solution 1 Drop  1 Drop Both Eyes PRN Stephanie Aquino, NP        cloNIDine HCL (CATAPRES) tablet 0.1 mg  0.1 mg Oral Q2H PRN Lita Power NP   0.1 mg at 09/03/22 0856    OLANZapine (ZyPREXA) tablet 15 mg  15 mg Oral QHS Stephanie Aquino, NP   15 mg at 09/02/22 2117    polyethylene glycol (MIRALAX) packet 17 g  17 g Oral BID Felecia Bueno MD   17 g at 09/03/22 0858    hydrOXYzine HCL (ATARAX) tablet 100 mg  100 mg Oral QHS Stephanie Aquino, NP   100 mg at 09/02/22 2117    buPROPion SR (WELLBUTRIN SR) tablet 150 mg  150 mg Oral DAILY Stephanie Aquino, NP   150 mg at 09/03/22 0856    OLANZapine (ZyPREXA) tablet 5 mg  5 mg Oral BID Stephanie Aquino, NP   5 mg at 09/03/22 0856    FLUoxetine (PROzac) capsule 60 mg  60 mg Oral DAILY Ximena Bursarah, NP   60 mg at 09/03/22 0856    hydrOXYzine HCL (ATARAX) tablet 50 mg  50 mg Oral Q4H PRN Stephanie Aquino, NP   50 mg at 09/03/22 0856    OLANZapine (ZyPREXA) tablet 5 mg  5 mg Oral Q6H PRN Stephanie Aquino, NP   5 mg at 09/03/22 0048    haloperidol lactate (HALDOL) injection 5 mg  5 mg IntraMUSCular Q6H PRN Stephanie Aquino, NP   5 mg at 08/30/22 2033    benztropine (COGENTIN) tablet 1 mg  1 mg Oral BID PRN Teresa Aquinoe A, NP        diphenhydrAMINE (BENADRYL) injection 50 mg  50 mg IntraMUSCular BID PRN Stephanie Aquino, NP   50 mg at 08/30/22 2033    traZODone (DESYREL) tablet 50 mg  50 mg Oral QHS PRN Stephanie Aquino NP   50 mg at 09/03/22 0048    acetaminophen (TYLENOL) tablet 650 mg  650 mg Oral Q4H PRN Stephanie Aquino NP   650 mg at 08/26/22 1914    magnesium hydroxide (MILK OF MAGNESIA) 400 mg/5 mL oral suspension 30 mL  30 mL Oral DAILY PRN Stephanie Aquino NP         Side Effects: (reviewed/updated 9/3/2022)  None reported or admitted to. Review of Systems: (reviewed/updated 9/3/2022)  Appetite: good  Sleep: poor  All other Review of Systems: depression    Mental Status Exam:  62yo AAF is in poor grooming appears disheveled. She has poor eye contact throughout. She is significantly internally pre-occupied. Patient is noted to have loosening of associations  Speech is spontaneous, but is word salad. Thought process: loosening of associations is noted  Mood is \"I'm in the dark\"  Affect: flat  No hallucinations voiced. Perception: no paranoia or delusions noted  No suicidally voiced. No homicidality voiced. Insight/judgment: Poor  Cognition is grossly intact     Physical Exam:  Musculoskeletal system: steady gait  Tremor not present  Cog wheeling not present    Assessment and Plan:  Yang Redd meets criteria for a diagnosis of Bipolar 1 disorder current episode depressed with psychotic features. ROSA MARIA. Rule out schizoaffective disorder. Patient appears catatonic will order:CMP, CBC, Urinalysis  Will give test dose of valium 5mg im  Pre screen TDO    09/03/2022  Continue the medication regimen as prescribed  Pt under a TDO, hearing 7:30am Tuesday 09/01/2022  Artificial drops. TSH, B12, vit d pending. Patient most likely will need ect. [facility] for tdo eval, pt will be voluntary after being evaluated. Continue rest of medications as prescribed. We will closely monitor for safety. We will encourage reality orientation. Cancel discharge. I certify that this patients inpatient psychiatric hospital services furnished since the previous certification were, and continue to be, required for treatment that could reasonably be expected to improve the patient's condition, or for diagnostic study, and that the patient continues to need, on a daily basis, active treatment furnished directly by or requiring the supervision of inpatient psychiatric facility personnel.  In addition, the hospital records show that services furnished were intensive treatment services, admission or related services, or equivalent services.       Signed:  Reena Stringer NP  9/3/2022

## 2022-09-03 NOTE — BH NOTES
PRN Medication Documentation    Specific patient behavior that led to need for PRN medication: Insomnia as well as patient becoming non-redirectable and loud while roommate trying to sleep  Staff interventions attempted prior to PRN being given: Therapeutic communication and medication administration   PRN medication given: Trazodone 50mg and Zyprexa 5mg  Patient response/effectiveness of PRN medication: Awaiting results

## 2022-09-04 PROCEDURE — 74011250637 HC RX REV CODE- 250/637: Performed by: NURSE PRACTITIONER

## 2022-09-04 PROCEDURE — 65220000003 HC RM SEMIPRIVATE PSYCH

## 2022-09-04 PROCEDURE — 74011250637 HC RX REV CODE- 250/637: Performed by: PSYCHIATRY & NEUROLOGY

## 2022-09-04 RX ADMIN — FLUOXETINE HYDROCHLORIDE 60 MG: 20 CAPSULE ORAL at 09:01

## 2022-09-04 RX ADMIN — HYDROXYZINE HYDROCHLORIDE 50 MG: 50 TABLET, FILM COATED ORAL at 16:33

## 2022-09-04 RX ADMIN — BUPROPION HYDROCHLORIDE 150 MG: 150 TABLET, EXTENDED RELEASE ORAL at 11:55

## 2022-09-04 RX ADMIN — OLANZAPINE 5 MG: 5 TABLET, FILM COATED ORAL at 16:33

## 2022-09-04 RX ADMIN — POLYETHYLENE GLYCOL 3350 17 G: 17 POWDER, FOR SOLUTION ORAL at 17:16

## 2022-09-04 RX ADMIN — HYDROXYZINE HYDROCHLORIDE 100 MG: 50 TABLET, FILM COATED ORAL at 21:25

## 2022-09-04 RX ADMIN — OLANZAPINE 15 MG: 5 TABLET, FILM COATED ORAL at 21:25

## 2022-09-04 RX ADMIN — MIRTAZAPINE 45 MG: 15 TABLET, FILM COATED ORAL at 21:25

## 2022-09-04 RX ADMIN — OLANZAPINE 5 MG: 5 TABLET, FILM COATED ORAL at 09:00

## 2022-09-04 RX ADMIN — OLANZAPINE 5 MG: 5 TABLET, FILM COATED ORAL at 11:55

## 2022-09-04 RX ADMIN — HYDROXYZINE HYDROCHLORIDE 50 MG: 50 TABLET, FILM COATED ORAL at 09:01

## 2022-09-04 RX ADMIN — POLYETHYLENE GLYCOL 3350 17 G: 17 POWDER, FOR SOLUTION ORAL at 09:01

## 2022-09-04 NOTE — BH NOTES
PSYCHIATRIC PROGRESS NOTE       Patient: Yang Redd MRN: [id number]  SSN: [id number]    YOB: 1965  Age: 61 y.o. Sex: female      Admit Date: 8/14/2022    LOS: 21 days     Chief Complaint:  \"I'm OK\"     Interval History:  Yang Redd is currently under a TDO. She remains disorganized and psychotic. She has had further episodes of urinary incontinence. Significant psychomotor retardation observed. Pt unable to respond to questions about SI/HI/AVH.      Past Medical History:  Past Medical History:   Diagnosis Date    Anxiety and depression     Bipolar 1 disorder with moderate robyn ([facility] [address] 3/17/2014    Chronic back pain     Hyperlipidemia 8/22/2016    Hyponatremia     Psychotic disorder (HCC)     Scoliosis        ALLERGIES:(reviewed/updated 9/4/2022)  Allergies   Allergen Reactions    Other Medication Anaphylaxis     Artificial sweeteners cause anaphylaxis    Pcn [Penicillins] Anaphylaxis    Sunscreen Contact Dermatitis     Burns and opens the skin    Ultram [Tramadol] Hives and Other (comments)     Hives and ringing of the ears    Benzoyl Peroxide Hives    Cephalexin Itching    Divalproex Itching    Maltodextrin-Glycerin Shortness of Breath    Claritin-D 12 Hour [Loratadine-Pseudoephedrine] Palpitations     palpatations and ringing in the ear     Laboratory report:  Lab Results   Component Value Date/Time    WBC 6.9 08/17/2022 06:31 AM    HGB 10.7 (L) 08/17/2022 06:31 AM    Hematocrit (POC) 34 (L) 07/17/2018 03:33 AM    HCT 31.3 (L) 08/17/2022 06:31 AM    PLATELET 824 46/36/3694 06:31 AM    MCV 91.5 08/17/2022 06:31 AM      Lab Results   Component Value Date/Time    Sodium 144 08/14/2022 03:45 AM    Potassium 3.5 08/14/2022 03:45 AM    Chloride 116 (H) 08/14/2022 03:45 AM    CO2 26 08/14/2022 03:45 AM    Anion gap 2 (L) 08/14/2022 03:45 AM    Glucose 102 (H) 08/14/2022 03:45 AM    Glucose 107 (H) 02/03/2020 05:40 AM    BUN 3 (L) 08/14/2022 03:45 AM    Creatinine 0.74 08/14/2022 03:45 AM BUN/Creatinine ratio 4 (L) 08/14/2022 03:45 AM    GFR est AA >60 08/14/2022 03:45 AM    GFR est non-AA >60 08/14/2022 03:45 AM    Calcium 9.1 08/14/2022 03:45 AM    Bilirubin, total 0.2 08/12/2022 05:34 AM    Alk. phosphatase 39 (L) 08/12/2022 05:34 AM    Protein, total 7.2 08/12/2022 05:34 AM    Albumin 3.4 (L) 08/12/2022 05:34 AM    Globulin 3.8 08/12/2022 05:34 AM    A-G Ratio 0.9 (L) 08/12/2022 05:34 AM    ALT (SGPT) 19 08/12/2022 05:34 AM      Vitals:    09/03/22 1100 09/03/22 1539 09/04/22 0814 09/04/22 1104   BP: 135/83 (!) 163/97 (!) 142/84 133/88   Pulse: 96 98 98 98   Resp: 16 18 18 16   Temp: 97.9 °F (36.6 °C) 98 °F (36.7 °C) 98.6 °F (37 °C) 97.4 °F (36.3 °C)   SpO2:  100% 98%    Weight:       Height:           Lab Results   Component Value Date/Time    Carbamazepine 14.1 (H) 07/26/2020 04:19 AM     Lab Results   Component Value Date/Time    Lithium level 0.79 08/14/2022 03:45 AM       Vital Signs  Patient Vitals for the past 24 hrs:   Temp Pulse Resp BP SpO2   09/04/22 1104 97.4 °F (36.3 °C) 98 16 133/88 --   09/04/22 0814 98.6 °F (37 °C) 98 18 (!) 142/84 98 %   09/03/22 1539 98 °F (36.7 °C) 98 18 (!) 163/97 100 %       Wt Readings from Last 3 Encounters:   08/28/22 71.2 kg (157 lb)   08/11/22 72.6 kg (160 lb)   08/07/22 71.5 kg (157 lb 9.6 oz)     Temp Readings from Last 3 Encounters:   09/04/22 97.4 °F (36.3 °C)   08/14/22 98.4 °F (36.9 °C)   08/10/22 98.4 °F (36.9 °C)     BP Readings from Last 3 Encounters:   09/04/22 133/88   08/14/22 (!) 142/77   08/10/22 111/75     Pulse Readings from Last 3 Encounters:   09/04/22 98   08/14/22 92   08/10/22 78       Radiology (reviewed/updated 9/4/2022)  No results found.     Current Facility-Administered Medications   Medication Dose Route Frequency Provider Last Rate Last Admin    polyvinyl alcohol-povidone (NATURAL TEARS) 0.5-0.6 % ophthalmic solution 1 Drop  1 Drop Both Eyes PRN Miguelangel Dowling MD   1 Drop at 09/03/22 4081    mirtazapine (REMERON) tablet 45 mg 45 mg Oral QHS Teresa Aquinoe A, NP   45 mg at 09/03/22 2044    cloNIDine HCL (CATAPRES) tablet 0.1 mg  0.1 mg Oral Q2H PRN Virgilio Dial NP   0.1 mg at 09/03/22 1555    OLANZapine (ZyPREXA) tablet 15 mg  15 mg Oral QHS Teresa Aquinoe A, NP   15 mg at 09/03/22 2044    polyethylene glycol (MIRALAX) packet 17 g  17 g Oral BID Felecia Bueno MD   17 g at 09/04/22 0901    hydrOXYzine HCL (ATARAX) tablet 100 mg  100 mg Oral QHS Teresa Aquinoe A, NP   100 mg at 09/03/22 2044    buPROPion SR (WELLBUTRIN SR) tablet 150 mg  150 mg Oral DAILY Teresa Aquinoe A, NP   150 mg at 09/04/22 1155    OLANZapine (ZyPREXA) tablet 5 mg  5 mg Oral BID Stephanie Aquino, NP   5 mg at 09/04/22 0900    FLUoxetine (PROzac) capsule 60 mg  60 mg Oral DAILY Mela Barrera NP   60 mg at 09/04/22 0901    hydrOXYzine HCL (ATARAX) tablet 50 mg  50 mg Oral Q4H PRN Teresa Aquinoe A, NP   50 mg at 09/04/22 0901    OLANZapine (ZyPREXA) tablet 5 mg  5 mg Oral Q6H PRN Teresa Aquinoe A, NP   5 mg at 09/04/22 1155    haloperidol lactate (HALDOL) injection 5 mg  5 mg IntraMUSCular Q6H PRN Teresa Aquinoe A, NP   5 mg at 08/30/22 2033    benztropine (COGENTIN) tablet 1 mg  1 mg Oral BID PRN Teresa Aquinoe A, NP        diphenhydrAMINE (BENADRYL) injection 50 mg  50 mg IntraMUSCular BID PRN Teresa Aquinoe A, NP   50 mg at 08/30/22 2033    traZODone (DESYREL) tablet 50 mg  50 mg Oral QHS PRN Teresa Aquinoe A, NP   50 mg at 09/03/22 0048    acetaminophen (TYLENOL) tablet 650 mg  650 mg Oral Q4H PRN Stephanie Aquino A, NP   650 mg at 08/26/22 1914    magnesium hydroxide (MILK OF MAGNESIA) 400 mg/5 mL oral suspension 30 mL  30 mL Oral DAILY PRN Stephanie Aquino NP         Side Effects: (reviewed/updated 9/4/2022)  None reported or admitted to.     Review of Systems: (reviewed/updated 9/4/2022)  Appetite: good  Sleep: poor  All other Review of Systems: depression    Mental Status Exam:  60yo AAF is in poor grooming appears disheveled. She has poor eye contact throughout. She is significantly internally pre-occupied. Patient is noted to have loosening of associations  Speech is spontaneous, but is word salad. Thought process: loosening of associations is noted  Mood is \"I'm in the dark\"  Affect: flat  No hallucinations voiced. Perception: no paranoia or delusions noted  No suicidally voiced. No homicidality voiced. Insight/judgment: Poor  Cognition is grossly intact     Physical Exam:  Musculoskeletal system: steady gait  Tremor not present  Cog wheeling not present    Assessment and Plan:  Yesenia Barriga meets criteria for a diagnosis of Bipolar 1 disorder current episode depressed with psychotic features. ROSA MARIA. Rule out schizoaffective disorder. Patient appears catatonic will order:CMP, CBC, Urinalysis  Will give test dose of valium 5mg im  Pre screen TDO    09/04/2022  Continue the medication regimen as prescribed    09/03/2022  Continue the medication regimen as prescribed  Pt under a TDO, hearing 7:30am Tuesday 09/01/2022  Artificial drops. TSH, B12, vit d pending. Patient most likely will need ect. [address] [place] [address] for tdo eval, pt will be voluntary after being evaluated. Continue rest of medications as prescribed. We will closely monitor for safety. We will encourage reality orientation. Cancel discharge. I certify that this patients inpatient psychiatric hospital services furnished since the previous certification were, and continue to be, required for treatment that could reasonably be expected to improve the patient's condition, or for diagnostic study, and that the patient continues to need, on a daily basis, active treatment furnished directly by or requiring the supervision of inpatient psychiatric facility personnel. In addition, the hospital records show that services furnished were intensive treatment services, admission or related services, or equivalent services.       Signed:  Zenaida Corrales NP  9/4/2022

## 2022-09-04 NOTE — BH NOTES
[facility] MEDICINE PROGRESS NOTE   Patient: Carlos LACKEY  Today's Date: 2/25/2022    YOB: 1980  Admission Date: 2/23/2022    MRN: [id number]  Inpatient LOS: 2    Room: 304/01  Hospital Day: Hospital Day: 3    Subjective   HISTORY AND SUBJECTIVE COMPLAINTS     Chief Complaint:   Headache, neck pain/stiffness and low back pain    Interval History / Subjective:   Patient reports her headache resolved but still having 6/10 neck pain and 3/10 low back pain.  She also complains neck stiffness but reports improvement in range of motion of the neck.  She complained muscle spasm and muscle twitches.  .  Patient also reports a sensation of up and down movement when she closes her eyes.  \"Like I am driving on a hilly road.\" She has no fever, chills, sweating, visual changes, nausea or vomiting.  No saddle numbness, urinary or bowel complaints    Hospital Course:  Carlos LACKEY is a 41 year old female who presented on 2/23/2022 with complaints of Back Pain and Neck Pain    ROS:  Pertinent systems negative except as above.    Objective   PHYSICAL EXAMINATION     Vital 24 Hour Range Most Recent Value   Temperature Temp  Min: 97.5 °F (36.4 °C)  Max: 97.7 °F (36.5 °C) 97.5 °F (36.4 °C)   Pulse Pulse  Min: 79  Max: 89 79   Respiratory Resp  Min: 16  Max: 19 19   Blood Pressure BP  Min: 128/78  Max: 131/61 128/78   Pulse Oximetry SpO2  Min: 98 %  Max: 100 % 98 %   Arterial BP No data recorded     O2 No data recorded       Recorded Intake and Output:    Intake/Output Summary (Last 24 hours) at 2/25/2022 0843  Last data filed at 2/25/2022 0352  Gross per 24 hour   Intake 3114 ml   Output --   Net 3114 ml      Recorded Last Stool Occurrence:       Vital Most Recent Value First Value   Weight  (patient refuse) Weight: 76 kg (167 lb 8 oz)   Height 5' 1\" (154.9 cm) Height: 5' 1\" (154.9 cm)   BMI 31.62 N/A     General: Looks uncomfortable specially when she moves her neck.  Mild neck stiffness.  PRN Medication Documentation    Specific patient behavior that led to need for PRN medication:pacing, thought blocking, requiring prompts to follow directives  Staff interventions attempted prior to PRN being given:verbal support  PRN medication given:atarax  Patient response/effectiveness of PRN medication: effective   CV: regular rate and rhythm  Resp: clear to auscultation bilaterally  Abd: soft, nontender, nondistended and bowel sounds  present  Ext: no clubbing, cyanosis, or ischemia and edema absent   Skin: no rashes, lesions, or ulcers noted  Neuro: CN 2-12 grossly intact, normal sensation  and no focal deficits noted  Psych: oriented to time, place and person and normal mood and affect    TEST RESULTS     Labs: The Laboratory values listed below have been reviewed and pertinent findings discussed in the Assessment and Plan.    Laboratory values:   Recent Labs   Lab 02/24/22  0525 02/23/22  1257   WBC 11.3* 11.2*   HGB 12.0 13.7   HCT 37.6 42.3    354       Recent Labs   Lab 02/23/22  1257   SODIUM 142   POTASSIUM 4.0   CHLORIDE 104   CO2 24   CALCIUM 9.0   GLUCOSE 86   BUN 12   CREATININE 0.53        No results found    Invalid input(s): CAPTH, ALKPHOS, NH#  Recent Labs   Lab 02/23/22  1257   CRP 1.6*     No results available in last 24 hours      No results found    Lab Results   Component Value Date    VITD25 84.7 02/18/2022    TSH 1.879 11/26/2021        Lab Results   Component Value Date    CHOLESTEROL 206 (H) 11/26/2021    HDL 64 11/26/2021    CALCLDL 123 11/26/2021        No results found for: UOSM, BROCK, USPG, UPROT, UWBC, URBC, 5UNITR, UPH, UBACTRA          Radiology: Imaging studies have been reviewed and pertinent findings discussed in the Assessment and Plan.  Results for orders placed or performed during the hospital encounter of 02/23/22 (from the past 48 hour(s))   MRI CERVICAL SPINE WO CONTRAST    Impression    IMPRESSION:    1. No visible fracture. No visible ligamentous disruption.    3. However, abnormal fluid with T1 shortening ventral to the C1-2 vertebra  concerning for prevertebral space hemorrhage. There is edema and  irregularity along the undersurface of the left longus coli muscle  superiorly concerning for muscular injury. Despite lack of visible  fracture, consider cervical spine CT to  evaluate for MRI occult fracture  (this could be evaluated on CT angiogram).    3. Suspected dissection of the mid/distal left internal carotid artery  extending for a 3 cm length. Moderate luminal narrowing. Recommend CT  angiogram.    4. C5-6 left central disc protrusion which is age-indeterminate.    The imaging findings were discussed by telephone with Dr. Leo Mejia,  February 23, 2022 at 1511.   MRI LUMBAR SPINE WO CONTRAST    Impression    IMPRESSION:  Mild posterior bulging of the L5-S1 disc is noted which is not  appear to be causing any significant encroachment upon the thecal sac nor  either developing S1 nerve rootlet.     CTA HEAD AND NECK    Impression    IMPRESSION:    CTA neck:    1.  Diffuse irregularity of the cervical left ICA with mild ribbonlike  narrowing in the mid cervical segment. Findings are suspicious for vessel  injury such as dissection, potentially with thrombosed false lumen. No  high-grade vessel narrowing or occlusion.  2.  Otherwise patent extracranial vasculature without additional signs of  vessel injury.  3.  No evidence for cervical spine fracture.    CTA head:    1.  No evidence of intracranial large vessel occlusion.    2.  No significant intracranial stenosis, aneurysm, or vascular  malformation.    CT head:  1.  No acute intracranial findings.      [facility]     [facility] HEAD WO CONTRAST    Impression    IMPRESSION:    CTA neck:    1.  Diffuse irregularity of the cervical left ICA with mild ribbonlike  narrowing in the mid cervical segment. Findings are suspicious for vessel  injury such as dissection, potentially with thrombosed false lumen. No  high-grade vessel narrowing or occlusion.  2.  Otherwise patent extracranial vasculature without additional signs of  vessel injury.  3.  No evidence for cervical spine fracture.    CTA head:    1.  No evidence of intracranial large vessel occlusion.    2.  No significant intracranial stenosis, aneurysm, or  vascular  malformation.    CT head:  1.  No acute intracranial findings.      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  The plan is to repeat CT head in 3 months.   Patient complains right low back pain.  Spine imaging showed mild bulging disc at L5-S1.  Neurology recommended outpatient PT.   Neuro-IR also aware of patient and recommended to continue medical management  Patient recommended to avoid strenuous activity and heavy lifting  Pain control with Flexeril, IV Dilaudid and Percocet p.r.n. gabapentin added for neck and low back pain.  Neurology also started her on Medrol Dosepak  Patient to be discharged home once pain controlled     Migraine headache on rizatriptan PTA  Currently denies headache    Psoriasis on Tremfya PTA    Acne on spironolactone PTA     Class 1 obesity BMI 30-35    Smoking status: former smoker    Nutrition status: appropriate  Body mass index is 31.62 kg/m². - Obese (BMI 30-39 without a comorbid condition or 30-34 with a comorbid condition)  DVT Prophylaxis: Coumadin         DISCHARGE PLANNING     The patient's treatment plans were discussed with patient and family, RN,  and consultant(s).     Recommendations for Discharge   SW Home   PT     OT     SLP        Anticipated discharge destination: Home  Expected Discharge Date: 2/26/2022 if  pain controlled   Barriers to Discharge: Patient is not medically ready and needs to remain in the hospital today due to her uncontrolled pain         Gael Zhang MD  Hospitalist  2/25/2022  8:43 AM

## 2022-09-04 NOTE — PROGRESS NOTES
Problem: Altered Thought Process (Adult/Pediatric)  Goal: *STG: Remains safe in hospital  Outcome: Progressing Towards Goal  Goal: *STG: Complies with medication therapy  Outcome: Progressing Towards Goal  Goal: *STG: Demonstrates ability to understand and use improved judgment in daily activities and relationships  Outcome: Not Progressing Towards Goal  Goal: *LTG: Returns to baseline functioning  Outcome: Not Progressing Towards Goal  Goal: Interventions  Outcome: Progressing Towards Goal  Note: pt refuses lab work. Pt is alert confused with disorganized thought process and thought blocking. Pt restless and anxious. Elevated BP. Difficulty following simple instructions. PRN 50 mg Atarax administered with scheduled morning medications. Pt requires significant encouragement and extended time to comply with medication to due poor concentration and thought blocking. Pt was unable to take morning dose of bupropion  mg. Will attempt again later this morning. Pt is compliant with meals, eating in the dining room. Extended time and simple instructions required for all ADLs. Pt refuse to shower or change her clothes at this time. AH. Per report pt slept 6.5 hours over night after receiving prn trazodone and Zyprexa. 1105- pt refuses po medication. Pt is increasingly more anxious, AVH, responding to internal stimuli, disorganized thoughts, and restlessness. 1140- Pt packs her person belonging carrying them to the day room. No evidence of learning with education provided. Pt paces the unit with all of her personal belongings in hand. 1155- pt is compliant with scheduled bupropion  mg and prn po 5 mg Zyprexa. 1200- pt is visible in the dining room with peers eating lunch. Distracted. 1230- Urine soiled pants. Pt is in the shower. 1635-significant thought blocking noted with increased anxiety. Pt stands in her room for 2 hours. Pt tolerates redirection poorly.  Prn po 50 mg Atarax administered. Pt is encouraged eat dinner in the dining room with peers.

## 2022-09-04 NOTE — BH NOTES
PRN Medication Documentation    Specific patient behavior that led to need for PRN medication: agitated over urine sample that she states she messed up and now they can not get her dna  Staff interventions attempted prior to PRN being given: verbal support  PRN medication given: Zyprexa  Patient response/effectiveness of PRN medication:came out on the unit to eat lunch

## 2022-09-04 NOTE — PROGRESS NOTES
Patient received resting in bed with eyes closed. NAD and no complaints noted. Safety measures in place. Will continue to monitor with q15min rounds. Problem: Falls - Risk of  Goal: *Absence of Falls  Description: Document Jeff Mar Fall Risk and appropriate interventions in the flowsheet. Outcome: Progressing Towards Goal  Note: Fall Risk Interventions:  Mobility Interventions: Assess mobility with egress test    Mentation Interventions: Reorient patient    Medication Interventions: Teach patient to arise slowly    Elimination Interventions:  Toilet paper/wipes in reach, Patient to call for help with toileting needs    History of Falls Interventions: Door open when patient unattended

## 2022-09-05 PROCEDURE — 74011250637 HC RX REV CODE- 250/637: Performed by: NURSE PRACTITIONER

## 2022-09-05 PROCEDURE — 65220000003 HC RM SEMIPRIVATE PSYCH

## 2022-09-05 PROCEDURE — 74011250637 HC RX REV CODE- 250/637: Performed by: PSYCHIATRY & NEUROLOGY

## 2022-09-05 RX ORDER — LORAZEPAM 0.5 MG/1
0.5 TABLET ORAL 3 TIMES DAILY
Status: DISCONTINUED | OUTPATIENT
Start: 2022-09-05 | End: 2022-09-16

## 2022-09-05 RX ADMIN — FLUOXETINE HYDROCHLORIDE 60 MG: 20 CAPSULE ORAL at 09:16

## 2022-09-05 RX ADMIN — POLYETHYLENE GLYCOL 3350 17 G: 17 POWDER, FOR SOLUTION ORAL at 16:35

## 2022-09-05 RX ADMIN — POLYETHYLENE GLYCOL 3350 17 G: 17 POWDER, FOR SOLUTION ORAL at 09:19

## 2022-09-05 RX ADMIN — BUPROPION HYDROCHLORIDE 150 MG: 150 TABLET, EXTENDED RELEASE ORAL at 09:13

## 2022-09-05 RX ADMIN — OLANZAPINE 5 MG: 5 TABLET, FILM COATED ORAL at 16:36

## 2022-09-05 RX ADMIN — OLANZAPINE 5 MG: 5 TABLET, FILM COATED ORAL at 09:14

## 2022-09-05 NOTE — PROGRESS NOTES
Patient received resting in bed with eyes closed. NAD and no complaints noted. Safety measures in place. Will continue to monitor with q15min rounds. Problem: Falls - Risk of  Goal: *Absence of Falls  Description: Document Clydene Bone Fall Risk and appropriate interventions in the flowsheet. Outcome: Progressing Towards Goal  Note: Fall Risk Interventions:  Mobility Interventions: Assess mobility with egress test    Mentation Interventions: Door open when patient unattended, Reorient patient    Medication Interventions: Teach patient to arise slowly    Elimination Interventions:  Toileting schedule/hourly rounds, Patient to call for help with toileting needs    History of Falls Interventions: Door open when patient unattended

## 2022-09-05 NOTE — PROGRESS NOTES
Problem: Depressed Mood (Adult/Pediatric)  Goal: *STG: Participates in treatment plan  Outcome: Not Progressing Towards Goal  Goal: *STG: Verbalizes anger, guilt, and other feelings in a constructive manor  Outcome: Not Progressing Towards Goal  Goal: *STG: Demonstrates reduction in symptoms and increase in insight into coping skills/future focused  Outcome: Not Progressing Towards Goal     Problem: Altered Thought Process (Adult/Pediatric)  Goal: Interventions  Outcome: Progressing Towards Goal  Note: Pt is alert standing for hours in her room. Per report, pt did not sleep any amount last night and pt refused (or was unable to comply with po) prn HS medications. Pt is encouraged to eat breakfast. However, at this time she is unable to due to thought blocking. Pt is compliant with scheduled po medication; requires significant encouragement. Extended time for each activity. 1120- pt showering. 12- Mr. Ximena Rodriguez () called for an update on pt's progress. Mr. Ximena Rodriguez will call tomorrow to schedule an Zoom meeting with pt.  1330- pt dresses herself and is able to verbalize her needs more easily at the current time. Pt's current focus in on hygiene.      [address] [facility] [address]  Master Treatment Plan for [facility]    Date Treatment Plan Initiated: 9/5/2022    Treatment Plan Modalities:  Type of Modality Amount  (x minutes) Frequency (x/week) Duration (x days) Name of Responsible Staff   [address] [facility] meetings to encourage peer interactions 15 7 1   [facility]   Group psychotherapy to assist in building coping skills and internal controls 60 7 101 [facility]   Therapeutic activity groups to build coping skills 60 7 1 Corita Rubinstein [facility]   Psychoeducation in group setting to address:   Medication education   15 7 [address] skills   [address]   Relaxation techniques         Symptom management         Discharge planning   60 2 1 Mirtha Kaufman   Spirituality    60 2 1 martha FANG   61 1 1 volunteer   Recovery/AA/NA      volunteer   Physician medication management   15 7 1 Dr Michelle Bae NP   Family meeting/discharge planning   15 2 1 Michael Bryant

## 2022-09-05 NOTE — INTERDISCIPLINARY ROUNDS
Behavioral Health Interdisciplinary Rounds     Patient Name: Radha Petit  Age: 61 y.o. Room/Bed:  734/02  Primary Diagnosis: <principal problem not specified>   Admission Status: TDO     Readmission within 30 days: no  Power of  in place: no  Patient requires a blocked bed: yes          Reason for blocked bed: Bizarre, Suspicious, Paranoid Behavior  Sleep hours:  0      Participation in Care/Groups:  no  Medication Compliant?: Yes  PRNS (last 24 hours):  Antipsychotic (PO)    Restraints (last 24 hours):  no     Alcohol screening (AUDIT) completed -     If applicable, date SBIRT discussed in treatment team AND documented:    Tobacco - patient is a smoker: Have You Used Tobacco in the Past 30 Days: No  Illegal Drugs use: Have You Used Any Illegal Substances Over the Past 12 Months: No    24 hour chart check complete: yes     _______________________________________________    Patient goal(s) for today:   Treatment team focus/goals:   Progress note:         Financial concerns/prescription coverage:    Family contact:                        Family requesting physician contact today:    Discharge plan:   Access to weapons :                                                              Outpatient provider(s):   Patient's preferred phone number for follow up call :   Patient's preferred e-mail address :  Participating treatment team members:    LOS:  22  Expected LOS:     Participating treatment team members: Radha Damaso, * (assigned SW),

## 2022-09-06 PROCEDURE — 74011250637 HC RX REV CODE- 250/637: Performed by: NURSE PRACTITIONER

## 2022-09-06 PROCEDURE — 65220000003 HC RM SEMIPRIVATE PSYCH

## 2022-09-06 RX ADMIN — MIRTAZAPINE 45 MG: 15 TABLET, FILM COATED ORAL at 01:32

## 2022-09-06 RX ADMIN — OLANZAPINE 15 MG: 5 TABLET, FILM COATED ORAL at 01:31

## 2022-09-06 RX ADMIN — HYDROXYZINE HYDROCHLORIDE 100 MG: 50 TABLET, FILM COATED ORAL at 01:31

## 2022-09-06 RX ADMIN — HYDROXYZINE HYDROCHLORIDE 100 MG: 50 TABLET, FILM COATED ORAL at 20:33

## 2022-09-06 RX ADMIN — BUPROPION HYDROCHLORIDE 150 MG: 150 TABLET, EXTENDED RELEASE ORAL at 09:47

## 2022-09-06 RX ADMIN — OLANZAPINE 15 MG: 5 TABLET, FILM COATED ORAL at 20:33

## 2022-09-06 RX ADMIN — LORAZEPAM 0.5 MG: 0.5 TABLET ORAL at 01:29

## 2022-09-06 RX ADMIN — LORAZEPAM 0.5 MG: 0.5 TABLET ORAL at 20:34

## 2022-09-06 RX ADMIN — OLANZAPINE 5 MG: 5 TABLET, FILM COATED ORAL at 09:47

## 2022-09-06 RX ADMIN — LORAZEPAM 0.5 MG: 0.5 TABLET ORAL at 16:45

## 2022-09-06 RX ADMIN — MIRTAZAPINE 45 MG: 15 TABLET, FILM COATED ORAL at 20:33

## 2022-09-06 RX ADMIN — FLUOXETINE HYDROCHLORIDE 60 MG: 20 CAPSULE ORAL at 09:47

## 2022-09-06 RX ADMIN — LORAZEPAM 0.5 MG: 0.5 TABLET ORAL at 09:47

## 2022-09-06 RX ADMIN — OLANZAPINE 5 MG: 5 TABLET, FILM COATED ORAL at 16:45

## 2022-09-06 NOTE — BH NOTES
PSYCHIATRIC PROGRESS NOTE       Patient: Yang Redd MRN: [id number]  SSN: [id number]    YOB: 1965  Age: 61 y.o. Sex: female      Admit Date: 8/14/2022    LOS: 22 days     Chief Complaint:  A little uneasy. Interval History:  Yang Redd continues to do poorly. Prominent thought blocking, psychomotor retardation noteworthy. Per nurse's note, patient was observed standing four hours in her room. Affect is flat, limited eye contact. She tells me she slept last but staff report no sleep at all last night. Did not answer if she has si or hi. At the present time the patient Yang Redd remains compliant with taking medications. Denies any adverse events from taking them. Patient is now a tdo.     Past Medical History:  Past Medical History:   Diagnosis Date    Anxiety and depression     Bipolar 1 disorder with moderate robyn ([address] 3/17/2014    Chronic back pain     Hyperlipidemia 8/22/2016    Hyponatremia     Psychotic disorder ([facility] [address]     Scoliosis        ALLERGIES:(reviewed/updated 9/5/2022)  Allergies   Allergen Reactions    Other Medication Anaphylaxis     Artificial sweeteners cause anaphylaxis    Pcn [Penicillins] Anaphylaxis    Sunscreen Contact Dermatitis     Burns and opens the skin    Ultram [Tramadol] Hives and Other (comments)     Hives and ringing of the ears    Benzoyl Peroxide Hives    Cephalexin Itching    Divalproex Itching    Maltodextrin-Glycerin Shortness of Breath    Claritin-D 12 Hour [Loratadine-Pseudoephedrine] Palpitations     palpatations and ringing in the ear     Laboratory report:  Lab Results   Component Value Date/Time    WBC 6.9 08/17/2022 06:31 AM    HGB 10.7 (L) 08/17/2022 06:31 AM    Hematocrit (POC) 34 (L) 07/17/2018 03:33 AM    HCT 31.3 (L) 08/17/2022 06:31 AM    PLATELET 393 02/90/9320 06:31 AM    MCV 91.5 08/17/2022 06:31 AM      Lab Results   Component Value Date/Time    Sodium 144 08/14/2022 03:45 AM    Potassium 3.5 08/14/2022 03:45 AM    Chloride 116 (H) 08/14/2022 03:45 AM    CO2 26 08/14/2022 03:45 AM    Anion gap 2 (L) 08/14/2022 03:45 AM    Glucose 102 (H) 08/14/2022 03:45 AM    Glucose 107 (H) 02/03/2020 05:40 AM    BUN 3 (L) 08/14/2022 03:45 AM    Creatinine 0.74 08/14/2022 03:45 AM    BUN/Creatinine ratio 4 (L) 08/14/2022 03:45 AM    GFR est AA >60 08/14/2022 03:45 AM    GFR est non-AA >60 08/14/2022 03:45 AM    Calcium 9.1 08/14/2022 03:45 AM    Bilirubin, total 0.2 08/12/2022 05:34 AM    Alk. phosphatase 39 (L) 08/12/2022 05:34 AM    Protein, total 7.2 08/12/2022 05:34 AM    Albumin 3.4 (L) 08/12/2022 05:34 AM    Globulin 3.8 08/12/2022 05:34 AM    A-G Ratio 0.9 (L) 08/12/2022 05:34 AM    ALT (SGPT) 19 08/12/2022 05:34 AM      Vitals:    09/04/22 1104 09/04/22 1629 09/05/22 0808 09/05/22 1545   BP: 133/88 128/83 122/76 134/79   Pulse: 98 97 90 (!) 112   Resp: 16 18 16 16   Temp: 97.4 °F (36.3 °C) 98.7 °F (37.1 °C) 98.4 °F (36.9 °C) 98.6 °F (37 °C)   SpO2:   99% 98%   Weight:       Height:           Lab Results   Component Value Date/Time    Carbamazepine 14.1 (H) 07/26/2020 04:19 AM     Lab Results   Component Value Date/Time    Lithium level 0.79 08/14/2022 03:45 AM       Vital Signs  Patient Vitals for the past 24 hrs:   Temp Pulse Resp BP SpO2   09/05/22 1545 98.6 °F (37 °C) (!) 112 16 134/79 98 %   09/05/22 0808 98.4 °F (36.9 °C) 90 16 122/76 99 %       Wt Readings from Last 3 Encounters:   08/28/22 71.2 kg (157 lb)   08/11/22 72.6 kg (160 lb)   08/07/22 71.5 kg (157 lb 9.6 oz)     Temp Readings from Last 3 Encounters:   09/05/22 98.6 °F (37 °C)   08/14/22 98.4 °F (36.9 °C)   08/10/22 98.4 °F (36.9 °C)     BP Readings from Last 3 Encounters:   09/05/22 134/79   08/14/22 (!) 142/77   08/10/22 111/75     Pulse Readings from Last 3 Encounters:   09/05/22 (!) 112   08/14/22 92   08/10/22 78       Radiology (reviewed/updated 9/5/2022)  No results found.     Current Facility-Administered Medications   Medication Dose Route Frequency Provider Last Rate Last Admin    LORazepam (ATIVAN) tablet 0.5 mg  0.5 mg Oral TID Stephanie Aquino NP        polyvinyl alcohol-povidone (NATURAL TEARS) 0.5-0.6 % ophthalmic solution 1 Drop  1 Drop Both Eyes PRN Kiet Olmos MD   1 Drop at 09/03/22 1708    mirtazapine (REMERON) tablet 45 mg  45 mg Oral QHS Stephanie Aquino NP   45 mg at 09/04/22 2125    cloNIDine HCL (CATAPRES) tablet 0.1 mg  0.1 mg Oral Q2H PRN Erna Chance NP   0.1 mg at 09/03/22 1555    OLANZapine (ZyPREXA) tablet 15 mg  15 mg Oral QHS Stephanie Aquino NP   15 mg at 09/04/22 2125    polyethylene glycol (MIRALAX) packet 17 g  17 g Oral BID Felecia Bueno MD   17 g at 09/05/22 1635    hydrOXYzine HCL (ATARAX) tablet 100 mg  100 mg Oral QHS Stephanie Aquino NP   100 mg at 09/04/22 2125    buPROPion SR (WELLBUTRIN SR) tablet 150 mg  150 mg Oral DAILY Stephanie Aquino NP   150 mg at 09/05/22 0913    OLANZapine (ZyPREXA) tablet 5 mg  5 mg Oral BID Stephanie Aquino NP   5 mg at 09/05/22 1636    FLUoxetine (PROzac) capsule 60 mg  60 mg Oral DAILY Dwight Duane, NP   60 mg at 09/05/22 0916    hydrOXYzine HCL (ATARAX) tablet 50 mg  50 mg Oral Q4H PRN Stephanie Aquino NP   50 mg at 09/04/22 1633    OLANZapine (ZyPREXA) tablet 5 mg  5 mg Oral Q6H PRN Stephanie Aquino NP   5 mg at 09/04/22 1155    haloperidol lactate (HALDOL) injection 5 mg  5 mg IntraMUSCular Q6H PRN Stephanie Aquino NP   5 mg at 08/30/22 2033    benztropine (COGENTIN) tablet 1 mg  1 mg Oral BID PRN Stephanie Aquino NP        diphenhydrAMINE (BENADRYL) injection 50 mg  50 mg IntraMUSCular BID PRN Teresa Aquinoe A, NP   50 mg at 08/30/22 2033    traZODone (DESYREL) tablet 50 mg  50 mg Oral QHS PRN Stephanie Aquino, NP   50 mg at 09/03/22 0048    acetaminophen (TYLENOL) tablet 650 mg  650 mg Oral Q4H PRN Teresa Aquinoe A, NP   650 mg at 08/26/22 1914    magnesium hydroxide (MILK OF MAGNESIA) 400 mg/5 mL oral suspension 30 mL  30 mL Oral DAILY PRN Eileen WILEY NP         Side Effects: (reviewed/updated 9/5/2022)  None reported or admitted to. Review of Systems: (reviewed/updated 9/5/2022)  Appetite: good  Sleep: poor  All other Review of Systems: depression    Mental Status Exam:  Eye contact: limited eye contact  Psychomotor activity: markedly decreased  Speech: poverty of speech  Thought process: thought blocking  Mood is \"depressed\"  Affect: flat  Perception: No avh  Thought content: +delusions  Suicidal ideation: refused to answer  Homicidal ideation: refused to answer. Insight/judgment: Poor  Cognition is grossly intact     Physical Exam:  Musculoskeletal system: steady gait  Tremor not present  Cog wheeling not present    Assessment and Plan:  Jason Jain meets criteria for a diagnosis of Bipolar 1 disorder current episode depressed with psychotic features. ROSA MARIA. Rule out schizoaffective disorder. Ativan 0.5 mg tid. TDO hearing in am.  TSH, B12, vit d pending. Patient most likely will need ect. Continue rest of medications as prescribed. We will closely monitor for safety. We will encourage reality orientation. Disposition planning. I certify that this patients inpatient psychiatric hospital services furnished since the previous certification were, and continue to be, required for treatment that could reasonably be expected to improve the patient's condition, or for diagnostic study, and that the patient continues to need, on a daily basis, active treatment furnished directly by or requiring the supervision of inpatient psychiatric facility personnel. In addition, the hospital records show that services furnished were intensive treatment services, admission or related services, or equivalent services.       Signed:  Renate Patton NP  9/5/2022

## 2022-09-06 NOTE — INTERDISCIPLINARY ROUNDS
Behavioral Health Interdisciplinary Rounds     Patient Name: Shadi Yu  Age: 61 y.o. Room/Bed:  734/02  Primary Diagnosis: <principal problem not specified>   Admission Status: TDO     Readmission within 30 days: no  Power of  in place: no  Patient requires a blocked bed: yes        Reason for blocked bed:  behavior  Sleep hours:  3      Participation in Care/Groups:  yes  Medication Compliant?: Selective  PRNS (last 24 hours): None    Restraints (last 24 hours):  no     Alcohol screening (AUDIT) completed -     If applicable, date SBIRT discussed in treatment team AND documented:    Tobacco - patient is a smoker: Have You Used Tobacco in the Past 30 Days: No  Illegal Drugs use: Have You Used Any Illegal Substances Over the Past 12 Months: No    24 hour chart check complete: yes     _______________________________________________    Patient goal(s) for today: Communicate needs to staff, attend groups   Treatment team focus/goals: Assess pt, manage medications, discharge planning   Progress note: No remarkable changes. Pt displays thought blocking, delusion and disorganized thinking. Pts mood is low and affect is flat. Pt has been involuntarily committed. Financial concerns/prescription coverage:    Family contact:    Rafa Kaur, [phone]                Family requesting physician contact today:    Discharge plan: TBD   Access to weapons no :                                                               Outpatient provider(s): Ronnie Tena, [facility]  Patient's preferred phone number for follow up call :   Patient's preferred e-mail address :  Participating treatment team members:    LOS:  23 Expected LOS: 9/9/22    Participating treatment team members: Shadi Yu, MERCED Cabral, Opal Mercedes, RN, Dr. Adan Montez

## 2022-09-06 NOTE — BH NOTES
PSYCHIATRIC PROGRESS NOTE       Patient: Viri Santos MRN: [id number]  SSN: [id number]    YOB: 1965  Age: 61 y.o. Sex: female      Admit Date: 8/14/2022    LOS: 23 days     Chief Complaint:  A little uneasy. Interval History:  Viri Santos continues to do poorly. Prominent thought blocking, psychomotor retardation noteworthy. Per nurse's note, patient was observed standing four hours in her room. Affect is flat, limited eye contact. She tells me she slept last but staff report no sleep at all last night. Did not answer if she has si or hi. At the present time the patient Viri Santos remains compliant with taking medications. Denies any adverse events from taking them. Patient is now a tdo. 9/6 Vicenta Jones isolates to room with poor eye contact per staff. Reports feeling down and moods are depressed. Thoughts that she was a bad person for not telling the truth. Notes having some apprehension and fear earlier. Vague tangential responses with thought blocking. Passing gas throughout the interview with blunted affect. Denies SI/HI/VH. Voices telling her to stay here a couple of days. Disorganized. No aggression or violence. Appears to be deep in thought. Passively Interactive and aware. Not taking medications consistently. Eating and sleeping poorly (3hrs).       Past Medical History:  Past Medical History:   Diagnosis Date    Anxiety and depression     Bipolar 1 disorder with moderate robyn ([facility] [address] 3/17/2014    Chronic back pain     Hyperlipidemia 8/22/2016    Hyponatremia     Psychotic disorder ([facility])     Scoliosis        ALLERGIES:(reviewed/updated 9/6/2022)  Allergies   Allergen Reactions    Other Medication Anaphylaxis     Artificial sweeteners cause anaphylaxis    Pcn [Penicillins] Anaphylaxis    Sunscreen Contact Dermatitis     Burns and opens the skin    Ultram [Tramadol] Hives and Other (comments)     Hives and ringing of the ears    Benzoyl Peroxide Hives    Cephalexin Itching    Divalproex Itching    Maltodextrin-Glycerin Shortness of Breath    Claritin-D 12 Hour [Loratadine-Pseudoephedrine] Palpitations     palpatations and ringing in the ear     Laboratory report:  Lab Results   Component Value Date/Time    WBC 6.9 08/17/2022 06:31 AM    HGB 10.7 (L) 08/17/2022 06:31 AM    Hematocrit (POC) 34 (L) 07/17/2018 03:33 AM    HCT 31.3 (L) 08/17/2022 06:31 AM    PLATELET 245 38/57/0222 06:31 AM    MCV 91.5 08/17/2022 06:31 AM      Lab Results   Component Value Date/Time    Sodium 144 08/14/2022 03:45 AM    Potassium 3.5 08/14/2022 03:45 AM    Chloride 116 (H) 08/14/2022 03:45 AM    CO2 26 08/14/2022 03:45 AM    Anion gap 2 (L) 08/14/2022 03:45 AM    Glucose 102 (H) 08/14/2022 03:45 AM    Glucose 107 (H) 02/03/2020 05:40 AM    BUN 3 (L) 08/14/2022 03:45 AM    Creatinine 0.74 08/14/2022 03:45 AM    BUN/Creatinine ratio 4 (L) 08/14/2022 03:45 AM    GFR est AA >60 08/14/2022 03:45 AM    GFR est non-AA >60 08/14/2022 03:45 AM    Calcium 9.1 08/14/2022 03:45 AM    Bilirubin, total 0.2 08/12/2022 05:34 AM    Alk.  phosphatase 39 (L) 08/12/2022 05:34 AM    Protein, total 7.2 08/12/2022 05:34 AM    Albumin 3.4 (L) 08/12/2022 05:34 AM    Globulin 3.8 08/12/2022 05:34 AM    A-G Ratio 0.9 (L) 08/12/2022 05:34 AM    ALT (SGPT) 19 08/12/2022 05:34 AM      Vitals:    09/04/22 1629 09/05/22 0808 09/05/22 1545 09/06/22 0735   BP: 128/83 122/76 134/79 137/86   Pulse: 97 90 (!) 112 98   Resp: 18 16 16 18   Temp: 98.7 °F (37.1 °C) 98.4 °F (36.9 °C) 98.6 °F (37 °C) 98.5 °F (36.9 °C)   SpO2:  99% 98%    Weight:       Height:           Lab Results   Component Value Date/Time    Carbamazepine 14.1 (H) 07/26/2020 04:19 AM     Lab Results   Component Value Date/Time    Lithium level 0.79 08/14/2022 03:45 AM       Vital Signs  Patient Vitals for the past 24 hrs:   Temp Pulse Resp BP SpO2   09/06/22 0735 98.5 °F (36.9 °C) 98 18 137/86 --   09/05/22 1545 98.6 °F (37 °C) (!) 112 16 134/79 98 %       Wt Readings from Last 3 Encounters:   08/28/22 71.2 kg (157 lb)   08/11/22 72.6 kg (160 lb)   08/07/22 71.5 kg (157 lb 9.6 oz)     Temp Readings from Last 3 Encounters:   09/06/22 98.5 °F (36.9 °C)   08/14/22 98.4 °F (36.9 °C)   08/10/22 98.4 °F (36.9 °C)     BP Readings from Last 3 Encounters:   09/06/22 137/86   08/14/22 (!) 142/77   08/10/22 111/75     Pulse Readings from Last 3 Encounters:   09/06/22 98   08/14/22 92   08/10/22 78       Radiology (reviewed/updated 9/6/2022)  No results found.     Current Facility-Administered Medications   Medication Dose Route Frequency Provider Last Rate Last Admin    LORazepam (ATIVAN) tablet 0.5 mg  0.5 mg Oral TID Stephanie Aquino NP   0.5 mg at 09/06/22 0129    polyvinyl alcohol-povidone (NATURAL TEARS) 0.5-0.6 % ophthalmic solution 1 Drop  1 Drop Both Eyes PRN Blake Moran MD   1 Drop at 09/03/22 1708    mirtazapine (REMERON) tablet 45 mg  45 mg Oral QHS Stephanie Aquino NP   45 mg at 09/06/22 0132    cloNIDine HCL (CATAPRES) tablet 0.1 mg  0.1 mg Oral Q2H PRN Becca Lomas NP   0.1 mg at 09/03/22 1555    OLANZapine (ZyPREXA) tablet 15 mg  15 mg Oral QHS Stephanie Aquino NP   15 mg at 09/06/22 0131    polyethylene glycol (MIRALAX) packet 17 g  17 g Oral BID Felecia Bueno MD   17 g at 09/05/22 1635    hydrOXYzine HCL (ATARAX) tablet 100 mg  100 mg Oral QHS Stephanie Aquino NP   100 mg at 09/06/22 0131    buPROPion SR (WELLBUTRIN SR) tablet 150 mg  150 mg Oral DAILY Stephanie Aquino NP   150 mg at 09/05/22 0913    OLANZapine (ZyPREXA) tablet 5 mg  5 mg Oral BID Stephanie Aquino, NP   5 mg at 09/05/22 1636    FLUoxetine (PROzac) capsule 60 mg  60 mg Oral DAILY Clarke So, NP   60 mg at 09/05/22 0916    hydrOXYzine HCL (ATARAX) tablet 50 mg  50 mg Oral Q4H PRN Stephanie Aquino NP   50 mg at 09/04/22 1633    OLANZapine (ZyPREXA) tablet 5 mg  5 mg Oral Q6H PRN Stephanie Aquino NP   5 mg at 09/04/22 1155    haloperidol lactate (HALDOL) injection 5 mg  5 mg IntraMUSCular Q6H PRN Alannah Aquinoanie A, NP   5 mg at 08/30/22 2033    benztropine (COGENTIN) tablet 1 mg  1 mg Oral BID PRN Teresa Aquinoe A, NP        diphenhydrAMINE (BENADRYL) injection 50 mg  50 mg IntraMUSCular BID PRN Alannah Aquinoanie A, NP   50 mg at 08/30/22 2033    traZODone (DESYREL) tablet 50 mg  50 mg Oral QHS PRN Teresa Aquinoe A, NP   50 mg at 09/03/22 0048    acetaminophen (TYLENOL) tablet 650 mg  650 mg Oral Q4H PRN Alannah Aquinoanie A, NP   650 mg at 08/26/22 1914    magnesium hydroxide (MILK OF MAGNESIA) 400 mg/5 mL oral suspension 30 mL  30 mL Oral DAILY PRN Stephanie Aquino A, NP         Side Effects: (reviewed/updated 9/6/2022)  None reported or admitted to. Review of Systems: (reviewed/updated 9/6/2022)  Appetite: good  Sleep: poor  All other Review of Systems: depression    Mental Status Exam:  Eye contact: limited eye contact  Psychomotor activity: markedly decreased  Speech: poverty of speech  Thought process: thought blocking  Mood is \"depressed\"  Affect: flat  Perception: No avh  Thought content: +delusions  Suicidal ideation: refused to answer  Homicidal ideation: refused to answer. Insight/judgment: Poor  Cognition is grossly intact     Physical Exam:  Musculoskeletal system: steady gait  Tremor not present  Cog wheeling not present    Assessment and Plan:  Rodriguez Godoy meets criteria for a diagnosis of Bipolar 1 disorder current episode depressed with psychotic features. ROSA MARIA. Rule out schizoaffective disorder. Ativan 0.5 mg tid. TDO hearing pending. TSH, B12, vit d pending. Increase Zyprexa 20 mg PO Qhs  Patient most likely will need ect. Continue current care. We will closely monitor for safety. We will encourage reality orientation. Disposition planning.     I certify that this patients inpatient psychiatric hospital services furnished since the previous certification were, and continue to be, required for treatment that could reasonably be expected to improve the patient's condition, or for diagnostic study, and that the patient continues to need, on a daily basis, active treatment furnished directly by or requiring the supervision of inpatient psychiatric facility personnel. In addition, the hospital records show that services furnished were intensive treatment services, admission or related services, or equivalent services.       Signed:  Micah Madrid MD  9/6/2022

## 2022-09-06 NOTE — PROGRESS NOTES
Problem: Depressed Mood (Adult/Pediatric)  Goal: *STG: Participates in treatment plan  Outcome: Progressing Towards Goal     Problem: Depressed Mood (Adult/Pediatric)  Goal: *STG: Verbalizes anger, guilt, and other feelings in a constructive manor  Outcome: Not Progressing Towards Goal  Goal: *STG: Demonstrates reduction in symptoms and increase in insight into coping skills/future focused  Outcome: Not Progressing Towards Goal    Patient is quiet, depressed, thought blocking, poor insight.  Patient does not answer when asked if she has thoughts of si/hi

## 2022-09-06 NOTE — PROGRESS NOTES
Problem: Falls - Risk of  Goal: *Absence of Falls  Description: Document Neal Junior Fall Risk and appropriate interventions in the flowsheet. Outcome: Progressing Towards Goal  Note: Fall Risk Interventions:  Mobility Interventions: Assess mobility with egress test    Mentation Interventions: Reorient patient, Door open when patient unattended    Medication Interventions: Teach patient to arise slowly    Elimination Interventions: Toilet paper/wipes in reach    History of Falls Interventions: Door open when patient unattended    Problem: Depressed Mood (Adult/Pediatric)  Goal: *STG: Verbalizes anger, guilt, and other feelings in a constructive manor  Outcome: Progressing Towards Goal  Goal: *STG: Demonstrates reduction in symptoms and increase in insight into coping skills/future focused  Outcome: Progressing Towards Goal     Problem: Altered Thought Process (Adult/Pediatric)  Goal: *STG: Remains safe in hospital  Outcome: Progressing Towards Goal  Goal: *STG: Complies with medication therapy  Outcome: Progressing Towards Goal     Patient received awake in bed. Passively engaged with peers. Occasionally needs redirection from staff. Denies SI/HI. Will continue to monitor q15 minutes for safety.

## 2022-09-06 NOTE — PROGRESS NOTES
Problem: Falls - Risk of  Goal: *Absence of Falls  Description: Document Green Salvia Fall Risk and appropriate interventions in the flowsheet. Outcome: Progressing Towards Goal  Note: Fall Risk Interventions:  Mobility Interventions: Assess mobility with egress test    Mentation Interventions: Door open when patient unattended, Reorient patient    Medication Interventions: Teach patient to arise slowly    Elimination Interventions: Toilet paper/wipes in reach    History of Falls Interventions: Door open when patient unattended    Patient received awake in bed. No signs of distress. Even and unlabored breathing. Staff will continue to monitor safety q15 and provide support. 3717: Patient sitting awake in bed with the light on, exhibiting bizarre behavior. Additionally pt is still refusing medication, and not sleeping. On call provider called and notified of patient status, orders given to block room until the AM for new reassessment.

## 2022-09-07 PROCEDURE — 65220000003 HC RM SEMIPRIVATE PSYCH

## 2022-09-07 PROCEDURE — 74011250637 HC RX REV CODE- 250/637: Performed by: NURSE PRACTITIONER

## 2022-09-07 PROCEDURE — 74011250637 HC RX REV CODE- 250/637: Performed by: PSYCHIATRY & NEUROLOGY

## 2022-09-07 PROCEDURE — 74011250637 HC RX REV CODE- 250/637

## 2022-09-07 RX ADMIN — OLANZAPINE 5 MG: 5 TABLET, FILM COATED ORAL at 16:46

## 2022-09-07 RX ADMIN — FLUOXETINE HYDROCHLORIDE 60 MG: 20 CAPSULE ORAL at 09:10

## 2022-09-07 RX ADMIN — OLANZAPINE 5 MG: 5 TABLET, FILM COATED ORAL at 09:10

## 2022-09-07 RX ADMIN — LORAZEPAM 0.5 MG: 0.5 TABLET ORAL at 21:10

## 2022-09-07 RX ADMIN — POLYETHYLENE GLYCOL 3350 17 G: 17 POWDER, FOR SOLUTION ORAL at 09:16

## 2022-09-07 RX ADMIN — CLONIDINE HYDROCHLORIDE 0.1 MG: 0.1 TABLET ORAL at 21:07

## 2022-09-07 RX ADMIN — OLANZAPINE 15 MG: 5 TABLET, FILM COATED ORAL at 21:20

## 2022-09-07 RX ADMIN — HYDROXYZINE HYDROCHLORIDE 100 MG: 50 TABLET, FILM COATED ORAL at 21:11

## 2022-09-07 RX ADMIN — MIRTAZAPINE 45 MG: 15 TABLET, FILM COATED ORAL at 21:16

## 2022-09-07 RX ADMIN — LORAZEPAM 0.5 MG: 0.5 TABLET ORAL at 16:47

## 2022-09-07 RX ADMIN — LORAZEPAM 0.5 MG: 0.5 TABLET ORAL at 09:10

## 2022-09-07 RX ADMIN — BUPROPION HYDROCHLORIDE 150 MG: 150 TABLET, EXTENDED RELEASE ORAL at 09:10

## 2022-09-07 NOTE — PROGRESS NOTES
Problem: Falls - Risk of  Goal: *Absence of Falls  Description: Document Suhas Ornelas Fall Risk and appropriate interventions in the flowsheet. Outcome: Progressing Towards Goal  Note: Fall Risk Interventions:  Mobility Interventions: Assess mobility with egress test    Mentation Interventions: Adequate sleep, hydration, pain control, Evaluate medications/consider consulting pharmacy    Medication Interventions: Teach patient to arise slowly, Evaluate medications/consider consulting pharmacy    Elimination Interventions: Toilet paper/wipes in reach    History of Falls Interventions: Door open when patient unattended    Patient received resting in bed. Patient had to be awakened in bed for morning medications. Voice soft, although patient does not say very much. Thought blocking present, patient moves very slowly. Patient compliant with medications, though slow in taking meds and patient had to be frequently encouraged. Poor eye contact, flat affect.  Isolative to room

## 2022-09-07 NOTE — BH NOTES
1100: participated in tx team but thought blocking and was not able to verbalize thoughts or communicate with team.

## 2022-09-07 NOTE — PROGRESS NOTES
Problem: Falls - Risk of  Goal: *Absence of Falls  Description: Document Leafy Brady Fall Risk and appropriate interventions in the flowsheet. Outcome: Progressing Towards Goal  Note: Fall Risk Interventions:  Mobility Interventions: Assess mobility with egress test    Mentation Interventions: Adequate sleep, hydration, pain control, Reorient patient    Medication Interventions: Teach patient to arise slowly    Elimination Interventions: Toilet paper/wipes in reach    History of Falls Interventions: Door open when patient unattended    Problem: Depressed Mood (Adult/Pediatric)  Goal: *STG: Demonstrates reduction in symptoms and increase in insight into coping skills/future focused  Outcome: Progressing Towards Goal     Problem: Altered Thought Process (Adult/Pediatric)  Goal: *STG: Remains safe in hospital  Outcome: Progressing Towards Goal  Goal: *STG: Complies with medication therapy  Outcome: Progressing Towards Goal  Goal: *STG: Demonstrates ability to understand and use improved judgment in daily activities and relationships  Outcome: Progressing Towards Goal     Patient received awake in bed. Passively engaged with peers on unit. Has thought blocking present and requires encouragement when taking medications. Denies SI/HI. Will continue to monitor q15 minutes for safety.

## 2022-09-07 NOTE — INTERDISCIPLINARY ROUNDS
Behavioral Health Interdisciplinary Rounds     Patient Name: Kiet Gonzalez  Age: 61 y.o.   Room/Bed:  734/  Primary Diagnosis: <principal problem not specified>   Admission Status: Involuntary Commitment     Readmission within 30 days: yes  Power of  in place: no  Patient requires a blocked bed: yes          Reason for blocked bed: Paranoia  Sleep hours: 7       Participation in Care/Groups:  yes  Medication Compliant?: Yes  PRNS (last 24 hours): None    Restraints (last 24 hours):  no     Alcohol screening (AUDIT) completed -     If applicable, date SBIRT discussed in treatment team AND documented:    Tobacco - patient is a smoker: Have You Used Tobacco in the Past 30 Days: No  Illegal Drugs use: Have You Used Any Illegal Substances Over the Past 12 Months: No    24 hour chart check complete: yes     _______________________________________________    Patient goal(s) for today:   Treatment team focus/goals:   Progress note:         Financial concerns/prescription coverage:    Family contact:                        Family requesting physician contact today:    Discharge plan:   Access to weapons :                                                              Outpatient provider(s):   Patient's preferred phone number for follow up call :   Patient's preferred e-mail address :  Participating treatment team members:    LOS:  24  Expected LOS:     Participating treatment team members: Gaurangjaneth [place], * (assigned SW),

## 2022-09-07 NOTE — PROGRESS NOTES
Patient received resting in bed with eyes closed. NAD and no complaints noted. Safety measures in place. Will continue to monitor with q15min rounds. Problem: Falls - Risk of  Goal: *Absence of Falls  Description: Document Jeff Mar Fall Risk and appropriate interventions in the flowsheet. Outcome: Progressing Towards Goal  Note: Fall Risk Interventions:  Mobility Interventions: Assess mobility with egress test    Mentation Interventions: Reorient patient, Door open when patient unattended    Medication Interventions: Teach patient to arise slowly    Elimination Interventions:  Toilet paper/wipes in reach    History of Falls Interventions: Door open when patient unattended

## 2022-09-08 LAB
ATRIAL RATE: 92 BPM
CALCULATED P AXIS, ECG09: 77 DEGREES
CALCULATED R AXIS, ECG10: 71 DEGREES
CALCULATED T AXIS, ECG11: 38 DEGREES
DIAGNOSIS, 93000: NORMAL
P-R INTERVAL, ECG05: 138 MS
Q-T INTERVAL, ECG07: 346 MS
QRS DURATION, ECG06: 68 MS
QTC CALCULATION (BEZET), ECG08: 427 MS
VENTRICULAR RATE, ECG03: 92 BPM

## 2022-09-08 PROCEDURE — 93005 ELECTROCARDIOGRAM TRACING: CPT

## 2022-09-08 PROCEDURE — 74011250637 HC RX REV CODE- 250/637: Performed by: NURSE PRACTITIONER

## 2022-09-08 PROCEDURE — 65220000003 HC RM SEMIPRIVATE PSYCH

## 2022-09-08 RX ORDER — PERPHENAZINE 2 MG/1
2 TABLET, FILM COATED ORAL 2 TIMES DAILY
Status: DISCONTINUED | OUTPATIENT
Start: 2022-09-08 | End: 2022-09-12

## 2022-09-08 RX ORDER — POLYETHYLENE GLYCOL 3350 17 G/17G
17 POWDER, FOR SOLUTION ORAL
Status: DISCONTINUED | OUTPATIENT
Start: 2022-09-08 | End: 2022-09-20 | Stop reason: HOSPADM

## 2022-09-08 RX ADMIN — HYDROXYZINE HYDROCHLORIDE 100 MG: 50 TABLET, FILM COATED ORAL at 23:00

## 2022-09-08 RX ADMIN — MIRTAZAPINE 45 MG: 15 TABLET, FILM COATED ORAL at 22:59

## 2022-09-08 RX ADMIN — PERPHENAZINE 2 MG: 2 TABLET, FILM COATED ORAL at 09:34

## 2022-09-08 RX ADMIN — LORAZEPAM 0.5 MG: 0.5 TABLET ORAL at 09:33

## 2022-09-08 RX ADMIN — OLANZAPINE 15 MG: 5 TABLET, FILM COATED ORAL at 22:59

## 2022-09-08 RX ADMIN — FLUOXETINE HYDROCHLORIDE 60 MG: 20 CAPSULE ORAL at 09:33

## 2022-09-08 RX ADMIN — LORAZEPAM 0.5 MG: 0.5 TABLET ORAL at 22:59

## 2022-09-08 RX ADMIN — PERPHENAZINE 2 MG: 2 TABLET, FILM COATED ORAL at 20:32

## 2022-09-08 RX ADMIN — LORAZEPAM 0.5 MG: 0.5 TABLET ORAL at 16:32

## 2022-09-08 RX ADMIN — BUPROPION HYDROCHLORIDE 150 MG: 150 TABLET, EXTENDED RELEASE ORAL at 09:33

## 2022-09-08 NOTE — PROGRESS NOTES
Problem: Depressed Mood (Adult/Pediatric)  Goal: *STG: Participates in treatment plan  Outcome: Progressing Towards Goal  ATTENDED BUT MINIMAL INTERACTION. TOPIC OF ECT WAS DICUSSED. patient APPEARED AMBIVALENT. Goal: *STG: Verbalizes anger, guilt, and other feelings in a constructive manor  Outcome: Not Progressing Towards Goal  POOR EYE CONTACT TENDS TO ANSWER QUESTIONS WITH YES/NO REPONCE  Goal: *STG: Attends activities and groups  Outcome: Not Progressing Towards Goal  ISOLATES  Goal: *STG: Demonstrates reduction in symptoms and increase in insight into coping skills/future focused  Outcome: Not Progressing Towards Goal     PATIENT STATED \"MY  TRICKED ME.TO GET ME TO COME HERE. \"HAS BEEN  RELIGIOUSLY FOCUSED RELATED TO [place].

## 2022-09-08 NOTE — BH NOTES
PSYCHIATRIC PROGRESS NOTE       Patient: Manpreet Allen MRN: [id number]  SSN: [id number]    YOB: 1965  Age: 61 y.o. Sex: female      Admit Date: 8/14/2022    LOS: 25 days     Chief Complaint:  My  tricked me. Interval History:  Manpreet Allen is unchanged. She continues to do poorly. Paranoia persist, she is convinced that her  tricked her. Prominent thought blocking. Affect is flat, no eye contact. She refused labs today. Psychomotor retardation noteworthy. I discussed with her about ECT, she agreed, but I am not certain if she understood what she was agreeing to. I will discuss ect plan with Dr. Joe Irvin. She does not appear to be benfiting from zyprexa, will add trilafon to augment it. Denies si or hi. She is now involuntary committed.            Past Medical History:  Past Medical History:   Diagnosis Date    Anxiety and depression     Bipolar 1 disorder with moderate robyn ([address] 3/17/2014    Chronic back pain     Hyperlipidemia 8/22/2016    Hyponatremia     Psychotic disorder ([facility] [address]     Scoliosis        ALLERGIES:(reviewed/updated 9/8/2022)  Allergies   Allergen Reactions    Other Medication Anaphylaxis     Artificial sweeteners cause anaphylaxis    Pcn [Penicillins] Anaphylaxis    Sunscreen Contact Dermatitis     Burns and opens the skin    Ultram [Tramadol] Hives and Other (comments)     Hives and ringing of the ears    Benzoyl Peroxide Hives    Cephalexin Itching    Divalproex Itching    Maltodextrin-Glycerin Shortness of Breath    Claritin-D 12 Hour [Loratadine-Pseudoephedrine] Palpitations     palpatations and ringing in the ear     Laboratory report:  Lab Results   Component Value Date/Time    WBC 6.9 08/17/2022 06:31 AM    HGB 10.7 (L) 08/17/2022 06:31 AM    Hematocrit (POC) 34 (L) 07/17/2018 03:33 AM    HCT 31.3 (L) 08/17/2022 06:31 AM    PLATELET 571 71/30/0841 06:31 AM    MCV 91.5 08/17/2022 06:31 AM      Lab Results   Component Value Date/Time    Sodium 144 08/14/2022 03:45 AM Potassium 3.5 08/14/2022 03:45 AM    Chloride 116 (H) 08/14/2022 03:45 AM    CO2 26 08/14/2022 03:45 AM    Anion gap 2 (L) 08/14/2022 03:45 AM    Glucose 102 (H) 08/14/2022 03:45 AM    Glucose 107 (H) 02/03/2020 05:40 AM    BUN 3 (L) 08/14/2022 03:45 AM    Creatinine 0.74 08/14/2022 03:45 AM    BUN/Creatinine ratio 4 (L) 08/14/2022 03:45 AM    GFR est AA >60 08/14/2022 03:45 AM    GFR est non-AA >60 08/14/2022 03:45 AM    Calcium 9.1 08/14/2022 03:45 AM    Bilirubin, total 0.2 08/12/2022 05:34 AM    Alk. phosphatase 39 (L) 08/12/2022 05:34 AM    Protein, total 7.2 08/12/2022 05:34 AM    Albumin 3.4 (L) 08/12/2022 05:34 AM    Globulin 3.8 08/12/2022 05:34 AM    A-G Ratio 0.9 (L) 08/12/2022 05:34 AM    ALT (SGPT) 19 08/12/2022 05:34 AM      Vitals:    09/06/22 1853 09/07/22 1645 09/07/22 2101 09/07/22 2216   BP: 120/75 (!) 145/86 (!) 156/88 102/72   Pulse: (!) 106 95 97    Resp: 16 18 16    Temp: 98.6 °F (37 °C) 98.4 °F (36.9 °C) 98.9 °F (37.2 °C)    SpO2:   99%    Weight:       Height:           Lab Results   Component Value Date/Time    Carbamazepine 14.1 (H) 07/26/2020 04:19 AM     Lab Results   Component Value Date/Time    Lithium level 0.79 08/14/2022 03:45 AM       Vital Signs  Patient Vitals for the past 24 hrs:   Temp Pulse Resp BP SpO2   09/07/22 2216 -- -- -- 102/72 --   09/07/22 2101 98.9 °F (37.2 °C) 97 16 (!) 156/88 99 %   09/07/22 1645 98.4 °F (36.9 °C) 95 18 (!) 145/86 --       Wt Readings from Last 3 Encounters:   08/28/22 71.2 kg (157 lb)   08/11/22 72.6 kg (160 lb)   08/07/22 71.5 kg (157 lb 9.6 oz)     Temp Readings from Last 3 Encounters:   09/07/22 98.9 °F (37.2 °C)   08/14/22 98.4 °F (36.9 °C)   08/10/22 98.4 °F (36.9 °C)     BP Readings from Last 3 Encounters:   09/07/22 102/72   08/14/22 (!) 142/77   08/10/22 111/75     Pulse Readings from Last 3 Encounters:   09/07/22 97   08/14/22 92   08/10/22 78       Radiology (reviewed/updated 9/8/2022)  No results found.     Current Facility-Administered Medications   Medication Dose Route Frequency Provider Last Rate Last Admin    LORazepam (ATIVAN) tablet 0.5 mg  0.5 mg Oral TID Stephanie Aquino NP   0.5 mg at 09/07/22 2110    polyvinyl alcohol-povidone (NATURAL TEARS) 0.5-0.6 % ophthalmic solution 1 Drop  1 Drop Both Eyes PRN Soham Allred MD   1 Drop at 09/03/22 1708    mirtazapine (REMERON) tablet 45 mg  45 mg Oral QHS Stephanie Aquino NP   45 mg at 09/07/22 2116    cloNIDine HCL (CATAPRES) tablet 0.1 mg  0.1 mg Oral Q2H PRN Niki Hou NP   0.1 mg at 09/07/22 2107    OLANZapine (ZyPREXA) tablet 15 mg  15 mg Oral QHS Stephanie Aquino, NP   15 mg at 09/07/22 2120    polyethylene glycol (MIRALAX) packet 17 g  17 g Oral BID Felecia Bueno MD   17 g at 09/07/22 0916    hydrOXYzine HCL (ATARAX) tablet 100 mg  100 mg Oral QHS Stephanie Aquino NP   100 mg at 09/07/22 2111    buPROPion SR (WELLBUTRIN SR) tablet 150 mg  150 mg Oral DAILY Stephanie Aquino NP   150 mg at 09/07/22 0910    OLANZapine (ZyPREXA) tablet 5 mg  5 mg Oral BID Stephanie Aquino NP   5 mg at 09/07/22 1646    FLUoxetine (PROzac) capsule 60 mg  60 mg Oral DAILY Rome Willis NP   60 mg at 09/07/22 0910    hydrOXYzine HCL (ATARAX) tablet 50 mg  50 mg Oral Q4H PRN Stephanie Aquino, NP   50 mg at 09/04/22 1633    OLANZapine (ZyPREXA) tablet 5 mg  5 mg Oral Q6H PRN Stephanie Aquino, NP   5 mg at 09/04/22 1155    haloperidol lactate (HALDOL) injection 5 mg  5 mg IntraMUSCular Q6H PRN Stephanie Aquino, NP   5 mg at 08/30/22 2033    benztropine (COGENTIN) tablet 1 mg  1 mg Oral BID PRN Stephanie Aquino, NP        diphenhydrAMINE (BENADRYL) injection 50 mg  50 mg IntraMUSCular BID PRN Stephanie Aquino NP   50 mg at 08/30/22 2033    traZODone (DESYREL) tablet 50 mg  50 mg Oral QHS PRN Stephanie Aquino, NP   50 mg at 09/03/22 0048    acetaminophen (TYLENOL) tablet 650 mg  650 mg Oral Q4H PRN Stephanie Aquino NP   650 mg at 08/26/22 1914    magnesium hydroxide (MILK OF MAGNESIA) 400 mg/5 mL oral suspension 30 mL  30 mL Oral DAILY PRN Stephanie Aquino NP         Side Effects: (reviewed/updated 9/8/2022)  None reported or admitted to. Review of Systems: (reviewed/updated 9/8/2022)  Appetite: good  Sleep: poor  All other Review of Systems: depression    Mental Status Exam:  Eye contact: limited eye contact  Psychomotor activity: markedly decreased  Speech: poverty of speech  Thought process: thought blocking  Mood is \"depressed\"  Affect: flat  Perception: No avh  Thought content: +delusions  Suicidal ideation: refused to answer  Homicidal ideation: refused to answer. Insight/judgment: Poor  Cognition is grossly intact     Physical Exam:  Musculoskeletal system: steady gait  Tremor not present  Cog wheeling not present    Assessment and Plan:  Carol Moreira meets criteria for a diagnosis of Bipolar 1 disorder current episode depressed with psychotic features. ROSA MARIA. Rule out schizoaffective disorder. Dc daytime zyprexa. Start trilafon 2 mg bid. Ekg for qtc monitoring. Patient is a good candidate for ect. Continue current care. We will closely monitor for safety. We will encourage reality orientation. Disposition planning. I certify that this patients inpatient psychiatric hospital services furnished since the previous certification were, and continue to be, required for treatment that could reasonably be expected to improve the patient's condition, or for diagnostic study, and that the patient continues to need, on a daily basis, active treatment furnished directly by or requiring the supervision of inpatient psychiatric facility personnel. In addition, the hospital records show that services furnished were intensive treatment services, admission or related services, or equivalent services.       Signed:  Tatianna Patterson NP  9/8/2022

## 2022-09-08 NOTE — PROGRESS NOTES
Patient received resting in bed with eyes closed. NAD and no complaints noted. Safety measures in place. Will continue to monitor with q15min rounds. Problem: Falls - Risk of  Goal: *Absence of Falls  Description: Document Jeff Mar Fall Risk and appropriate interventions in the flowsheet. Outcome: Progressing Towards Goal  Note: Fall Risk Interventions:  Mobility Interventions: Assess mobility with egress test    Mentation Interventions: Adequate sleep, hydration, pain control, Reorient patient    Medication Interventions: Teach patient to arise slowly    Elimination Interventions:  Toilet paper/wipes in reach    History of Falls Interventions: Door open when patient unattended

## 2022-09-08 NOTE — PROGRESS NOTES
Patient participated in [address] [place] [address] on 9/8/2022. Rev.  Kojo James MDiv, [facility], [facility] paging service: [phone])

## 2022-09-08 NOTE — INTERDISCIPLINARY ROUNDS
Behavioral Health Interdisciplinary Rounds     Patient Name: Jason Jain  Age: 61 y.o.   Room/Bed:  4/  Primary Diagnosis: <principal problem not specified>   Admission Status: Involuntary Commitment     Readmission within 30 days: no  Power of  in place: no  Patient requires a blocked bed: no         Sleep hours: 7       Participation in Care/Groups:  yes  Medication Compliant?: Yes  PRNS (last 24 hours):  Clonidine     Restraints (last 24 hours):  no     Alcohol screening (AUDIT) completed -     If applicable, date SBIRT discussed in treatment team AND documented:    Tobacco - patient is a smoker: Have You Used Tobacco in the Past 30 Days: No  Illegal Drugs use: Have You Used Any Illegal Substances Over the Past 12 Months: No    24 hour chart check complete: yes     _______________________________________________    Patient goal(s) for today:   Treatment team focus/goals:   Progress note:         Financial concerns/prescription coverage:    Family contact:                        Family requesting physician contact today:    Discharge plan:   Access to weapons :                                                              Outpatient provider(s):   Patient's preferred phone number for follow up call :   Patient's preferred e-mail address :  Participating treatment team members:    LOS:  25  Expected LOS:     Participating treatment team members: Jason Jain, * (assigned SW),

## 2022-09-09 PROCEDURE — 65220000003 HC RM SEMIPRIVATE PSYCH

## 2022-09-09 PROCEDURE — 74011250637 HC RX REV CODE- 250/637: Performed by: NURSE PRACTITIONER

## 2022-09-09 RX ADMIN — HYDROXYZINE HYDROCHLORIDE 100 MG: 50 TABLET, FILM COATED ORAL at 21:34

## 2022-09-09 RX ADMIN — FLUOXETINE HYDROCHLORIDE 60 MG: 20 CAPSULE ORAL at 09:50

## 2022-09-09 RX ADMIN — PERPHENAZINE 2 MG: 2 TABLET, FILM COATED ORAL at 09:50

## 2022-09-09 RX ADMIN — TRAZODONE HYDROCHLORIDE 50 MG: 50 TABLET ORAL at 21:33

## 2022-09-09 RX ADMIN — LORAZEPAM 0.5 MG: 0.5 TABLET ORAL at 21:34

## 2022-09-09 RX ADMIN — MIRTAZAPINE 45 MG: 15 TABLET, FILM COATED ORAL at 21:32

## 2022-09-09 RX ADMIN — BUPROPION HYDROCHLORIDE 150 MG: 150 TABLET, EXTENDED RELEASE ORAL at 09:50

## 2022-09-09 RX ADMIN — LORAZEPAM 0.5 MG: 0.5 TABLET ORAL at 09:50

## 2022-09-09 RX ADMIN — OLANZAPINE 15 MG: 5 TABLET, FILM COATED ORAL at 21:31

## 2022-09-09 RX ADMIN — PERPHENAZINE 2 MG: 2 TABLET, FILM COATED ORAL at 21:33

## 2022-09-09 NOTE — PROGRESS NOTES
Problem: Depressed Mood (Adult/Pediatric)  Goal: *STG: Participates in treatment plan  Outcome: Progressing Towards Goal  Goal: *STG: Verbalizes anger, guilt, and other feelings in a constructive manor  Outcome: Progressing Towards Goal   Problem: Falls - Risk of  Goal: *Absence of Falls  Description: Document Clara Fall Risk and appropriate interventions in the flowsheet. Outcome: Progressing Towards Goal  Note: Fall Risk Interventions:  Mobility Interventions: Assess mobility with egress test  Mentation Interventions: Reorient patient  Medication Interventions: Teach patient to arise slowly  Elimination Interventions:  Toilet paper/wipes in reach  History of Falls Interventions: Door open when patient unattended  Problem: Altered Thought Process (Adult/Pediatric)  Goal: *STG: Remains safe in hospital  Outcome: Progressing Towards Goal  Goal: *STG: Complies with medication therapy  Outcome: Progressing Towards Goal

## 2022-09-09 NOTE — BH NOTES
PSYCHIATRIC PROGRESS NOTE       Patient: Manpreet Allen MRN: [id number]  SSN: [id number]    YOB: 1965  Age: 61 y.o. Sex: female      Admit Date: 8/14/2022    LOS: 26 days     Chief Complaint:  My  tricked me. Interval History:  Manpreet Allen is unchanged. Unable to verbalize even simple responses to inquiry. Flat affect. Ongoing delusional thought processing with persecutory content. We will continue to encourage reality orientation. Behaviorally not posing any challenges on the unit. Prominent thought blocking. Tolerating initiation of trilafon. At the present time the patient Manpreet Allen remains compliant with taking medications. Denies any adverse events from taking them. Qtc 427.         Past Medical History:  Past Medical History:   Diagnosis Date    Anxiety and depression     Bipolar 1 disorder with moderate robyn ([address] 3/17/2014    Chronic back pain     Hyperlipidemia 8/22/2016    Hyponatremia     Psychotic disorder ([facility] [address]     Scoliosis        ALLERGIES:(reviewed/updated 9/9/2022)  Allergies   Allergen Reactions    Other Medication Anaphylaxis     Artificial sweeteners cause anaphylaxis    Pcn [Penicillins] Anaphylaxis    Sunscreen Contact Dermatitis     Burns and opens the skin    Ultram [Tramadol] Hives and Other (comments)     Hives and ringing of the ears    Benzoyl Peroxide Hives    Cephalexin Itching    Divalproex Itching    Maltodextrin-Glycerin Shortness of Breath    Claritin-D 12 Hour [Loratadine-Pseudoephedrine] Palpitations     palpatations and ringing in the ear     Laboratory report:  Lab Results   Component Value Date/Time    WBC 6.9 08/17/2022 06:31 AM    HGB 10.7 (L) 08/17/2022 06:31 AM    Hematocrit (POC) 34 (L) 07/17/2018 03:33 AM    HCT 31.3 (L) 08/17/2022 06:31 AM    PLATELET 362 91/00/0241 06:31 AM    MCV 91.5 08/17/2022 06:31 AM      Lab Results   Component Value Date/Time    Sodium 144 08/14/2022 03:45 AM    Potassium 3.5 08/14/2022 03:45 AM    Chloride 116 (H) 08/14/2022 03:45 AM    CO2 26 08/14/2022 03:45 AM    Anion gap 2 (L) 08/14/2022 03:45 AM    Glucose 102 (H) 08/14/2022 03:45 AM    Glucose 107 (H) 02/03/2020 05:40 AM    BUN 3 (L) 08/14/2022 03:45 AM    Creatinine 0.74 08/14/2022 03:45 AM    BUN/Creatinine ratio 4 (L) 08/14/2022 03:45 AM    GFR est AA >60 08/14/2022 03:45 AM    GFR est non-AA >60 08/14/2022 03:45 AM    Calcium 9.1 08/14/2022 03:45 AM    Bilirubin, total 0.2 08/12/2022 05:34 AM    Alk. phosphatase 39 (L) 08/12/2022 05:34 AM    Protein, total 7.2 08/12/2022 05:34 AM    Albumin 3.4 (L) 08/12/2022 05:34 AM    Globulin 3.8 08/12/2022 05:34 AM    A-G Ratio 0.9 (L) 08/12/2022 05:34 AM    ALT (SGPT) 19 08/12/2022 05:34 AM      Vitals:    09/08/22 0900 09/08/22 1651 09/09/22 1024 09/09/22 1040   BP: 119/81 122/80  99/62   Pulse: 92 (!) 101  99   Resp: 16 16 17 16   Temp: 98.4 °F (36.9 °C) 98 °F (36.7 °C)  98.8 °F (37.1 °C)   SpO2: 100% 97%  98%   Weight:       Height:           Lab Results   Component Value Date/Time    Carbamazepine 14.1 (H) 07/26/2020 04:19 AM     Lab Results   Component Value Date/Time    Lithium level 0.79 08/14/2022 03:45 AM       Vital Signs  Patient Vitals for the past 24 hrs:   Temp Pulse Resp BP SpO2   09/09/22 1040 98.8 °F (37.1 °C) 99 16 99/62 98 %   09/09/22 1024 -- -- 17 -- --   09/08/22 1651 98 °F (36.7 °C) (!) 101 16 122/80 97 %       Wt Readings from Last 3 Encounters:   08/28/22 71.2 kg (157 lb)   08/11/22 72.6 kg (160 lb)   08/07/22 71.5 kg (157 lb 9.6 oz)     Temp Readings from Last 3 Encounters:   09/09/22 98.8 °F (37.1 °C)   08/14/22 98.4 °F (36.9 °C)   08/10/22 98.4 °F (36.9 °C)     BP Readings from Last 3 Encounters:   09/09/22 99/62   08/14/22 (!) 142/77   08/10/22 111/75     Pulse Readings from Last 3 Encounters:   09/09/22 99   08/14/22 92   08/10/22 78       Radiology (reviewed/updated 9/9/2022)  No results found.     Current Facility-Administered Medications   Medication Dose Route Frequency Provider Last Rate Last Admin    perphenazine (TRILAFON) tablet tab 2 mg  2 mg Oral BID Stephanie Aquino, NP   2 mg at 09/09/22 0950    polyethylene glycol (MIRALAX) packet 17 g  17 g Oral BID PRN Stephanie Aquino NP        LORazepam (ATIVAN) tablet 0.5 mg  0.5 mg Oral TID Stephanie Aquino, NP   0.5 mg at 09/09/22 0950    polyvinyl alcohol-povidone (NATURAL TEARS) 0.5-0.6 % ophthalmic solution 1 Drop  1 Drop Both Eyes PRN Harriet Dillard MD   1 Drop at 09/03/22 1708    mirtazapine (REMERON) tablet 45 mg  45 mg Oral QHS Stephanie Aquino, NP   45 mg at 09/08/22 2259    cloNIDine HCL (CATAPRES) tablet 0.1 mg  0.1 mg Oral Q2H PRN Ephraim Bland, NP   0.1 mg at 09/07/22 2107    OLANZapine (ZyPREXA) tablet 15 mg  15 mg Oral QHS Stephanie Aquino, NP   15 mg at 09/08/22 2259    hydrOXYzine HCL (ATARAX) tablet 100 mg  100 mg Oral QHS Stephanie Aquino, NP   100 mg at 09/08/22 2300    buPROPion SR (WELLBUTRIN SR) tablet 150 mg  150 mg Oral DAILY Teresa Aquinoe A, NP   150 mg at 09/09/22 0950    FLUoxetine (PROzac) capsule 60 mg  60 mg Oral DAILY Anne Noe, NP   60 mg at 09/09/22 0950    hydrOXYzine HCL (ATARAX) tablet 50 mg  50 mg Oral Q4H PRN Teresa Aquinoe A, NP   50 mg at 09/04/22 1633    OLANZapine (ZyPREXA) tablet 5 mg  5 mg Oral Q6H PRN Stephanie Aquino, NP   5 mg at 09/04/22 1155    haloperidol lactate (HALDOL) injection 5 mg  5 mg IntraMUSCular Q6H PRN Stephanie Aquino, NP   5 mg at 08/30/22 2033    benztropine (COGENTIN) tablet 1 mg  1 mg Oral BID PRN Stephanie Aquino NP        diphenhydrAMINE (BENADRYL) injection 50 mg  50 mg IntraMUSCular BID PRN Stephanie Aquino A, NP   50 mg at 08/30/22 2033    traZODone (DESYREL) tablet 50 mg  50 mg Oral QHS PRN Stephanie Aquino, NP   50 mg at 09/03/22 0048    acetaminophen (TYLENOL) tablet 650 mg  650 mg Oral Q4H PRN Stephanie Aquino A, NP   650 mg at 08/26/22 1914    magnesium hydroxide (MILK OF MAGNESIA) 400 mg/5 mL oral suspension 30 mL  30 mL Oral DAILY PRN Leyla WILEY NP         Side Effects: (reviewed/updated 9/9/2022)  None reported or admitted to. Review of Systems: (reviewed/updated 9/9/2022)  Appetite: good  Sleep: poor  All other Review of Systems: depression    Mental Status Exam:  Eye contact: limited eye contact  Psychomotor activity: markedly decreased  Speech: poverty of speech  Thought process: thought blocking  Mood is \"depressed\"  Affect: flat  Perception: No avh  Thought content: +delusions  Suicidal ideation: refused to answer  Homicidal ideation: refused to answer. Insight/judgment: Poor  Cognition is grossly intact     Physical Exam:  Musculoskeletal system: steady gait  Tremor not present  Cog wheeling not present    Assessment and Plan:  Vonda Suárez meets criteria for a diagnosis of Bipolar 1 disorder current episode depressed with psychotic features. ROSA MARIA. Rule out schizoaffective disorder. Continue trilafon 2 mg bid. Patient is a good candidate for ect. Continue current care. We will closely monitor for safety. We will encourage reality orientation. Disposition planning. I certify that this patients inpatient psychiatric hospital services furnished since the previous certification were, and continue to be, required for treatment that could reasonably be expected to improve the patient's condition, or for diagnostic study, and that the patient continues to need, on a daily basis, active treatment furnished directly by or requiring the supervision of inpatient psychiatric facility personnel. In addition, the hospital records show that services furnished were intensive treatment services, admission or related services, or equivalent services.       Signed:  Danii Newby NP  9/9/2022

## 2022-09-09 NOTE — INTERDISCIPLINARY ROUNDS
Behavioral Health Interdisciplinary Rounds     Patient Name: Yesenia Barriga  Age: 61 y.o. Room/Bed:  734/02  Primary Diagnosis: <principal problem not specified>   Admission Status: Involuntary Commitment     Readmission within 30 days: no  Power of  in place: no  Patient requires a blocked bed: no            Sleep hours: 7+    Participation in Care/Groups:  no  Medication Compliant?: Yes  PRNS (last 24 hours): None    Restraints (last 24 hours):       Alcohol screening (AUDIT) completed -     If applicable, date SBIRT discussed in treatment team AND documented:    Tobacco - patient is a smoker: Have You Used Tobacco in the Past 30 Days: No  Illegal Drugs use: Have You Used Any Illegal Substances Over the Past 12 Months: No    24 hour chart check complete: yes     _______________________________________________    Patient goal(s) for today: Attend groups, communicate needs to staff, contact support system  Treatment team focus/goals: Discuss medication regiment  Progress note: Pt met with treatment team in her room and appeared with a flat and blunted affect. Pt is denying SI/HI and [facility] but is unable to appropriately engage with her surroundings. Pt is removed and is slow to respond, guarded and is demonstrating thought blocking. Pt started on Trilafon and is tolerating well. Possible plan to start ECT.      Financial concerns/prescription coverage:    Family contact:  Armen Banegas, [phone]                        Family requesting physician contact today:    Discharge plan:   Access to weapons :                                                              Outpatient provider(s):   Patient's preferred phone number for follow up call :   Patient's preferred e-mail address :    LOS:  26  Expected LOS: TBD    Participating treatment team members: Johnson Mora RN, Meghan Cameron MSW, Josias Peters NP

## 2022-09-09 NOTE — PROGRESS NOTES
Patient received resting quietly in bed. No signs of distress. Even and unlabored breathing. Staff will continue to monitor safety q15 and provide support. Problem: Falls - Risk of  Goal: *Absence of Falls  Description: Document Danne Lobe Fall Risk and appropriate interventions in the flowsheet. Outcome: Progressing Towards Goal  Note: Fall Risk Interventions:  Mobility Interventions: Assess mobility with egress test    Mentation Interventions: Reorient patient    Medication Interventions: Teach patient to arise slowly    Elimination Interventions:  Toilet paper/wipes in reach    History of Falls Interventions: Door open when patient unattended

## 2022-09-10 PROCEDURE — 74011250637 HC RX REV CODE- 250/637: Performed by: NURSE PRACTITIONER

## 2022-09-10 PROCEDURE — 65220000003 HC RM SEMIPRIVATE PSYCH

## 2022-09-10 RX ADMIN — HYDROXYZINE HYDROCHLORIDE 100 MG: 50 TABLET, FILM COATED ORAL at 21:10

## 2022-09-10 RX ADMIN — OLANZAPINE 15 MG: 5 TABLET, FILM COATED ORAL at 22:00

## 2022-09-10 RX ADMIN — LORAZEPAM 0.5 MG: 0.5 TABLET ORAL at 16:36

## 2022-09-10 RX ADMIN — LORAZEPAM 0.5 MG: 0.5 TABLET ORAL at 10:12

## 2022-09-10 RX ADMIN — PERPHENAZINE 2 MG: 2 TABLET, FILM COATED ORAL at 21:12

## 2022-09-10 RX ADMIN — BUPROPION HYDROCHLORIDE 150 MG: 150 TABLET, EXTENDED RELEASE ORAL at 10:11

## 2022-09-10 RX ADMIN — FLUOXETINE HYDROCHLORIDE 60 MG: 20 CAPSULE ORAL at 10:12

## 2022-09-10 RX ADMIN — LORAZEPAM 0.5 MG: 0.5 TABLET ORAL at 20:31

## 2022-09-10 RX ADMIN — PERPHENAZINE 2 MG: 2 TABLET, FILM COATED ORAL at 10:12

## 2022-09-10 RX ADMIN — MIRTAZAPINE 45 MG: 15 TABLET, FILM COATED ORAL at 21:11

## 2022-09-10 NOTE — PROGRESS NOTES
Problem: Altered Thought Process (Adult/Pediatric)  Goal: *STG: Remains safe in hospital  Outcome: Progressing Towards Goal  Goal: *STG: Complies with medication therapy  Outcome: Progressing Towards Goal  Goal: *STG: Demonstrates ability to understand and use improved judgment in daily activities and relationships  Outcome: Not Progressing Towards Goal  Goal: Interventions  Outcome: Progressing Towards Goal  Note: Pt is alert visible on the unit. Significant thought blocking noted. Disorganized and paranoid thoughts expressed. Pt spends over one hour sitting at the dinning table with peers and staff: informal group format. Pt is medication and meal compliant. Showered. 1520- pt is alert, calm, and confused while up in her room. Reorientation provided. Pt tolerates well.

## 2022-09-10 NOTE — BH NOTES
PSYCHIATRIC PROGRESS NOTE       Patient: Allen Brown MRN: [id number]  SSN: [id number]    YOB: 1965  Age: 61 y.o. Sex: female      Admit Date: 8/14/2022    LOS: 27 days     Chief Complaint:  \"I'm OK\"    Interval History:  Allen Brown is little gradually improving. Nursing reports she is talking more. During assessment, affect was flat and significant thought blocking observed. Denies SI/plan/intent and HI/plan/intent. Denies AH. States she slept well last night for the first time in a while. No acute behavioral incidents. At the present time the patient Allen Brown remains compliant with taking medications. Denies any adverse events from taking them. Qtc 427.     Past Medical History:  Past Medical History:   Diagnosis Date    Anxiety and depression     Bipolar 1 disorder with moderate robyn ([facility] [address] 3/17/2014    Chronic back pain     Hyperlipidemia 8/22/2016    Hyponatremia     Psychotic disorder (HCC)     Scoliosis        ALLERGIES:(reviewed/updated 9/10/2022)  Allergies   Allergen Reactions    Other Medication Anaphylaxis     Artificial sweeteners cause anaphylaxis    Pcn [Penicillins] Anaphylaxis    Sunscreen Contact Dermatitis     Burns and opens the skin    Ultram [Tramadol] Hives and Other (comments)     Hives and ringing of the ears    Benzoyl Peroxide Hives    Cephalexin Itching    Divalproex Itching    Maltodextrin-Glycerin Shortness of Breath    Claritin-D 12 Hour [Loratadine-Pseudoephedrine] Palpitations     palpatations and ringing in the ear     Laboratory report:  Lab Results   Component Value Date/Time    WBC 6.9 08/17/2022 06:31 AM    HGB 10.7 (L) 08/17/2022 06:31 AM    Hematocrit (POC) 34 (L) 07/17/2018 03:33 AM    HCT 31.3 (L) 08/17/2022 06:31 AM    PLATELET 688 03/22/5238 06:31 AM    MCV 91.5 08/17/2022 06:31 AM      Lab Results   Component Value Date/Time    Sodium 144 08/14/2022 03:45 AM    Potassium 3.5 08/14/2022 03:45 AM    Chloride 116 (H) 08/14/2022 03:45 AM    CO2 26 08/14/2022 03:45 AM    Anion gap 2 (L) 08/14/2022 03:45 AM    Glucose 102 (H) 08/14/2022 03:45 AM    Glucose 107 (H) 02/03/2020 05:40 AM    BUN 3 (L) 08/14/2022 03:45 AM    Creatinine 0.74 08/14/2022 03:45 AM    BUN/Creatinine ratio 4 (L) 08/14/2022 03:45 AM    GFR est AA >60 08/14/2022 03:45 AM    GFR est non-AA >60 08/14/2022 03:45 AM    Calcium 9.1 08/14/2022 03:45 AM    Bilirubin, total 0.2 08/12/2022 05:34 AM    Alk. phosphatase 39 (L) 08/12/2022 05:34 AM    Protein, total 7.2 08/12/2022 05:34 AM    Albumin 3.4 (L) 08/12/2022 05:34 AM    Globulin 3.8 08/12/2022 05:34 AM    A-G Ratio 0.9 (L) 08/12/2022 05:34 AM    ALT (SGPT) 19 08/12/2022 05:34 AM      Vitals:    09/09/22 1024 09/09/22 1040 09/09/22 1635 09/10/22 0718   BP:  99/62 107/60 111/72   Pulse:  99 60 (!) 106   Resp: 17 16 16 16   Temp:  98.8 °F (37.1 °C) 98.7 °F (37.1 °C) 98.8 °F (37.1 °C)   SpO2:  98% 99% 97%   Weight:       Height:           Lab Results   Component Value Date/Time    Carbamazepine 14.1 (H) 07/26/2020 04:19 AM     Lab Results   Component Value Date/Time    Lithium level 0.79 08/14/2022 03:45 AM       Vital Signs  Patient Vitals for the past 24 hrs:   Temp Pulse Resp BP SpO2   09/10/22 0718 98.8 °F (37.1 °C) (!) 106 16 111/72 97 %   09/09/22 1635 98.7 °F (37.1 °C) 60 16 107/60 99 %   09/09/22 1040 98.8 °F (37.1 °C) 99 16 99/62 98 %   09/09/22 1024 -- -- 17 -- --       Wt Readings from Last 3 Encounters:   08/28/22 71.2 kg (157 lb)   08/11/22 72.6 kg (160 lb)   08/07/22 71.5 kg (157 lb 9.6 oz)     Temp Readings from Last 3 Encounters:   09/10/22 98.8 °F (37.1 °C)   08/14/22 98.4 °F (36.9 °C)   08/10/22 98.4 °F (36.9 °C)     BP Readings from Last 3 Encounters:   09/10/22 111/72   08/14/22 (!) 142/77   08/10/22 111/75     Pulse Readings from Last 3 Encounters:   09/10/22 (!) 106   08/14/22 92   08/10/22 78       Radiology (reviewed/updated 9/10/2022)  No results found.     Current Facility-Administered Medications   Medication Dose Route Frequency Provider Last Rate Last Admin    perphenazine (TRILAFON) tablet tab 2 mg  2 mg Oral BID Stephanie Aquino, NP   2 mg at 09/10/22 1012    polyethylene glycol (MIRALAX) packet 17 g  17 g Oral BID PRN Stephanie Aquino NP        LORazepam (ATIVAN) tablet 0.5 mg  0.5 mg Oral TID Stephanie Aquino, NP   0.5 mg at 09/10/22 1012    polyvinyl alcohol-povidone (NATURAL TEARS) 0.5-0.6 % ophthalmic solution 1 Drop  1 Drop Both Eyes PRN Jamir MD Faustino   1 Drop at 09/03/22 1708    mirtazapine (REMERON) tablet 45 mg  45 mg Oral QHS Stephanie Aquino NP   45 mg at 09/09/22 2132    cloNIDine HCL (CATAPRES) tablet 0.1 mg  0.1 mg Oral Q2H PRN Halie Loza NP   0.1 mg at 09/07/22 2107    OLANZapine (ZyPREXA) tablet 15 mg  15 mg Oral QHS Stephanie Aquino, NP   15 mg at 09/09/22 2131    hydrOXYzine HCL (ATARAX) tablet 100 mg  100 mg Oral QHS Stephanie Aquino NP   100 mg at 09/09/22 2134    buPROPion SR (WELLBUTRIN SR) tablet 150 mg  150 mg Oral DAILY Stephanie Aquino, NP   150 mg at 09/10/22 1011    FLUoxetine (PROzac) capsule 60 mg  60 mg Oral DAILY Makayla Berger NP   60 mg at 09/10/22 1012    hydrOXYzine HCL (ATARAX) tablet 50 mg  50 mg Oral Q4H PRN Stephanie Aquino, NP   50 mg at 09/04/22 1633    OLANZapine (ZyPREXA) tablet 5 mg  5 mg Oral Q6H PRN Stephanie Aquino, NP   5 mg at 09/04/22 1155    haloperidol lactate (HALDOL) injection 5 mg  5 mg IntraMUSCular Q6H PRN Stephanie Aquino, NP   5 mg at 08/30/22 2033    benztropine (COGENTIN) tablet 1 mg  1 mg Oral BID PRN Stephanie Aquino NP        diphenhydrAMINE (BENADRYL) injection 50 mg  50 mg IntraMUSCular BID PRN Kenia, Stephanie A, NP   50 mg at 08/30/22 2033    traZODone (DESYREL) tablet 50 mg  50 mg Oral QHS PRN Teresa Aquinoe A, NP   50 mg at 09/09/22 2133    acetaminophen (TYLENOL) tablet 650 mg  650 mg Oral Q4H PRN Teresa Aquinoe A, NP   650 mg at 08/26/22 1914    magnesium hydroxide (MILK OF MAGNESIA) 400 mg/5 mL oral suspension 30 mL  30 mL Oral DAILY PRN Stephanie Aquino NP         Side Effects: (reviewed/updated 9/10/2022)  None reported or admitted to. Review of Systems: (reviewed/updated 9/10/2022)  Appetite: good  Sleep: poor  All other Review of Systems: depression    Mental Status Exam:  Eye contact: limited eye contact  Psychomotor activity: markedly decreased  Speech: poverty of speech  Thought process: thought blocking  Mood is \"OK\"  Affect: flat  Perception: No avh  Thought content: +delusions  Suicidal ideation: denies  Homicidal ideation: denies  Insight/judgment: Poor  Cognition is grossly intact     Physical Exam:  Musculoskeletal system: steady gait  Tremor not present  Cog wheeling not present    Assessment and Plan:  Nisreen Arenas meets criteria for a diagnosis of Bipolar 1 disorder current episode depressed with psychotic features. ROSA MARIA. Rule out schizoaffective disorder. Continue trilafon 2 mg bid. Patient is a good candidate for ect. Continue current care. We will closely monitor for safety. We will encourage reality orientation. Disposition planning. I certify that this patients inpatient psychiatric hospital services furnished since the previous certification were, and continue to be, required for treatment that could reasonably be expected to improve the patient's condition, or for diagnostic study, and that the patient continues to need, on a daily basis, active treatment furnished directly by or requiring the supervision of inpatient psychiatric facility personnel. In addition, the hospital records show that services furnished were intensive treatment services, admission or related services, or equivalent services.       Signed:  Maryjane Campbell NP  9/10/2022

## 2022-09-10 NOTE — BH NOTES
1300: tx tm: Denies SI/HI/AVH. Stated she is feeling a little better and \"I'm good. \" Med/meal compliant. Showered today. Has been out on unit talking more with staff. Affect brighter than yesterday.

## 2022-09-10 NOTE — PROGRESS NOTES
Problem: Altered Thought Process (Adult/Pediatric)  Goal: *STG: Remains safe in hospital  Outcome: Progressing Towards Goal   Pt resting with eyes closed. Appears to be sleeping. No distress noted. Every 15 minute monitoring for Pt safety. Will continue to monitor.

## 2022-09-11 PROCEDURE — 65220000003 HC RM SEMIPRIVATE PSYCH

## 2022-09-11 PROCEDURE — 74011250637 HC RX REV CODE- 250/637: Performed by: NURSE PRACTITIONER

## 2022-09-11 RX ADMIN — LORAZEPAM 0.5 MG: 0.5 TABLET ORAL at 20:47

## 2022-09-11 RX ADMIN — FLUOXETINE HYDROCHLORIDE 60 MG: 20 CAPSULE ORAL at 08:57

## 2022-09-11 RX ADMIN — PERPHENAZINE 2 MG: 2 TABLET, FILM COATED ORAL at 08:57

## 2022-09-11 RX ADMIN — MIRTAZAPINE 45 MG: 15 TABLET, FILM COATED ORAL at 20:47

## 2022-09-11 RX ADMIN — HYDROXYZINE HYDROCHLORIDE 100 MG: 50 TABLET, FILM COATED ORAL at 20:47

## 2022-09-11 RX ADMIN — LORAZEPAM 0.5 MG: 0.5 TABLET ORAL at 08:57

## 2022-09-11 RX ADMIN — BUPROPION HYDROCHLORIDE 150 MG: 150 TABLET, EXTENDED RELEASE ORAL at 08:57

## 2022-09-11 RX ADMIN — PERPHENAZINE 2 MG: 2 TABLET, FILM COATED ORAL at 20:47

## 2022-09-11 RX ADMIN — OLANZAPINE 15 MG: 5 TABLET, FILM COATED ORAL at 20:47

## 2022-09-11 RX ADMIN — LORAZEPAM 0.5 MG: 0.5 TABLET ORAL at 16:35

## 2022-09-11 NOTE — PROGRESS NOTES
Problem: Depressed Mood (Adult/Pediatric)  Goal: *STG: Participates in treatment plan  Outcome: Progressing Towards Goal  Goal: *STG: Verbalizes anger, guilt, and other feelings in a constructive manor  Outcome: Progressing Towards Goal  Goal: *STG: Attends activities and groups  Outcome: Progressing Towards Goal     Patient is resting quietly in bed with eyes closed. Appears to be asleep. No respiratory distress noted. 15 minutes safety monitoring continues.

## 2022-09-11 NOTE — PROGRESS NOTES
Problem: Altered Thought Process (Adult/Pediatric)  Goal: *STG: Remains safe in hospital  Outcome: Progressing Towards Goal  Goal: *STG: Complies with medication therapy  Outcome: Progressing Towards Goal  Goal: *LTG: Returns to baseline functioning  Outcome: Not Progressing Towards Goal  Goal: Interventions  Outcome: Progressing Towards Goal  Note: Received pt resting in bed. Respirations are regular and unlabored. 8093- pt is alert visible on the unit. Pt reports urinary incontinence overnight however pt slept well. Pt is visible on the unit eating breakfast.   Bed linen changed. Pt is showering. Significant thought blocking present. Pt is medication compliant with encouragement. 5320- pt is resting in her room. NAD.

## 2022-09-11 NOTE — BH NOTES
PSYCHIATRIC PROGRESS NOTE       Patient: Elma Shine MRN: [id number]  SSN: [id number]    YOB: 1965  Age: 61 y.o. Sex: female      Admit Date: 8/14/2022    LOS: 28 days     Chief Complaint:  \"I'm OK\"    Interval History:  Elma Shine is gradually improving. She slept 8 hours last night. She takes medications with much encouragement. During assessment, affect was flat and significant thought blocking observed. Denies SI/plan/intent and HI/plan/intent. When asked about hallucinations, response was \"that's a tough one. \" No acute behavioral incidents. Appetite remains low. When  At the present time the patient Elma Shine remains compliant with taking medications. Denies any adverse events from taking them. Qtc 427.     Past Medical History:  Past Medical History:   Diagnosis Date    Anxiety and depression     Bipolar 1 disorder with moderate robyn ([address] 3/17/2014    Chronic back pain     Hyperlipidemia 8/22/2016    Hyponatremia     Psychotic disorder ([facility] [address]     Scoliosis        ALLERGIES:(reviewed/updated 9/11/2022)  Allergies   Allergen Reactions    Other Medication Anaphylaxis     Artificial sweeteners cause anaphylaxis    Pcn [Penicillins] Anaphylaxis    Sunscreen Contact Dermatitis     Burns and opens the skin    Ultram [Tramadol] Hives and Other (comments)     Hives and ringing of the ears    Benzoyl Peroxide Hives    Cephalexin Itching    Divalproex Itching    Maltodextrin-Glycerin Shortness of Breath    Claritin-D 12 Hour [Loratadine-Pseudoephedrine] Palpitations     palpatations and ringing in the ear     Laboratory report:  Lab Results   Component Value Date/Time    WBC 6.9 08/17/2022 06:31 AM    HGB 10.7 (L) 08/17/2022 06:31 AM    Hematocrit (POC) 34 (L) 07/17/2018 03:33 AM    HCT 31.3 (L) 08/17/2022 06:31 AM    PLATELET 930 46/21/5368 06:31 AM    MCV 91.5 08/17/2022 06:31 AM      Lab Results   Component Value Date/Time    Sodium 144 08/14/2022 03:45 AM    Potassium 3.5 08/14/2022 03:45 AM Chloride 116 (H) 08/14/2022 03:45 AM    CO2 26 08/14/2022 03:45 AM    Anion gap 2 (L) 08/14/2022 03:45 AM    Glucose 102 (H) 08/14/2022 03:45 AM    Glucose 107 (H) 02/03/2020 05:40 AM    BUN 3 (L) 08/14/2022 03:45 AM    Creatinine 0.74 08/14/2022 03:45 AM    BUN/Creatinine ratio 4 (L) 08/14/2022 03:45 AM    GFR est AA >60 08/14/2022 03:45 AM    GFR est non-AA >60 08/14/2022 03:45 AM    Calcium 9.1 08/14/2022 03:45 AM    Bilirubin, total 0.2 08/12/2022 05:34 AM    Alk. phosphatase 39 (L) 08/12/2022 05:34 AM    Protein, total 7.2 08/12/2022 05:34 AM    Albumin 3.4 (L) 08/12/2022 05:34 AM    Globulin 3.8 08/12/2022 05:34 AM    A-G Ratio 0.9 (L) 08/12/2022 05:34 AM    ALT (SGPT) 19 08/12/2022 05:34 AM      Vitals:    09/10/22 1619 09/10/22 2045 09/11/22 0831 09/11/22 1129   BP:  137/83 114/76    Pulse:  95 99    Resp: 16 14 14    Temp:  98.7 °F (37.1 °C) 97.6 °F (36.4 °C)    SpO2:  98% 98%    Weight:    72.9 kg (160 lb 11.2 oz)   Height:           Lab Results   Component Value Date/Time    Carbamazepine 14.1 (H) 07/26/2020 04:19 AM     Lab Results   Component Value Date/Time    Lithium level 0.79 08/14/2022 03:45 AM       Vital Signs  Patient Vitals for the past 24 hrs:   Temp Pulse Resp BP SpO2   09/11/22 0831 97.6 °F (36.4 °C) 99 14 114/76 98 %   09/10/22 2045 98.7 °F (37.1 °C) 95 14 137/83 98 %   09/10/22 1619 -- -- 16 -- --       Wt Readings from Last 3 Encounters:   09/11/22 72.9 kg (160 lb 11.2 oz)   08/11/22 72.6 kg (160 lb)   08/07/22 71.5 kg (157 lb 9.6 oz)     Temp Readings from Last 3 Encounters:   09/11/22 97.6 °F (36.4 °C)   08/14/22 98.4 °F (36.9 °C)   08/10/22 98.4 °F (36.9 °C)     BP Readings from Last 3 Encounters:   09/11/22 114/76   08/14/22 (!) 142/77   08/10/22 111/75     Pulse Readings from Last 3 Encounters:   09/11/22 99   08/14/22 92   08/10/22 78       Radiology (reviewed/updated 9/11/2022)  No results found.     Current Facility-Administered Medications   Medication Dose Route Frequency Provider Last Rate Last Admin    perphenazine (TRILAFON) tablet tab 2 mg  2 mg Oral BID Teresa Aquinoe SAURABH, NP   2 mg at 09/11/22 0857    polyethylene glycol (MIRALAX) packet 17 g  17 g Oral BID PRN Stephanie Aquino NP        LORazepam (ATIVAN) tablet 0.5 mg  0.5 mg Oral TID Stephanie Aquino, NP   0.5 mg at 09/11/22 0857    polyvinyl alcohol-povidone (NATURAL TEARS) 0.5-0.6 % ophthalmic solution 1 Drop  1 Drop Both Eyes PRN Annie Healy MD   1 Drop at 09/03/22 1708    mirtazapine (REMERON) tablet 45 mg  45 mg Oral QHS Stephanie Aquino, NP   45 mg at 09/10/22 2111    cloNIDine HCL (CATAPRES) tablet 0.1 mg  0.1 mg Oral Q2H PRN Nicolas Holguin NP   0.1 mg at 09/07/22 2107    OLANZapine (ZyPREXA) tablet 15 mg  15 mg Oral QHS Stephanie Aquino, NP   15 mg at 09/10/22 2200    hydrOXYzine HCL (ATARAX) tablet 100 mg  100 mg Oral QHS Stephanie Aquino, NP   100 mg at 09/10/22 2110    buPROPion SR (WELLBUTRIN SR) tablet 150 mg  150 mg Oral DAILY Teresa Aquinoe SAURABH, NP   150 mg at 09/11/22 0857    FLUoxetine (PROzac) capsule 60 mg  60 mg Oral DAILY Orestes Gordon, NP   60 mg at 09/11/22 0857    hydrOXYzine HCL (ATARAX) tablet 50 mg  50 mg Oral Q4H PRN Stephanie Aquino, NP   50 mg at 09/04/22 1633    OLANZapine (ZyPREXA) tablet 5 mg  5 mg Oral Q6H PRN Teresa Aquinoe SAURABH, NP   5 mg at 09/04/22 1155    haloperidol lactate (HALDOL) injection 5 mg  5 mg IntraMUSCular Q6H PRN Stephanie Aquino, NP   5 mg at 08/30/22 2033    benztropine (COGENTIN) tablet 1 mg  1 mg Oral BID PRN Stephanie Aquino NP        diphenhydrAMINE (BENADRYL) injection 50 mg  50 mg IntraMUSCular BID PRN Stephanie Aquino A, NP   50 mg at 08/30/22 2033    traZODone (DESYREL) tablet 50 mg  50 mg Oral QHS PRN Stephanie Aquino A, NP   50 mg at 09/09/22 2133    acetaminophen (TYLENOL) tablet 650 mg  650 mg Oral Q4H PRN Stephanie Aquino, NP   650 mg at 08/26/22 1914    magnesium hydroxide (MILK OF MAGNESIA) 400 mg/5 mL oral suspension 30 mL  30 mL Oral DAILY PRN Stephanie Aquino NP         Side Effects: (reviewed/updated 9/11/2022)  None reported or admitted to. Review of Systems: (reviewed/updated 9/11/2022)  Appetite: good  Sleep: poor  All other Review of Systems: depression    Mental Status Exam:  Eye contact: limited eye contact  Psychomotor activity: markedly decreased  Speech: poverty of speech  Thought process: thought blocking  Mood is \"OK\"  Affect: flat  Perception: No avh  Thought content: +delusions  Suicidal ideation: denies  Homicidal ideation: denies  Insight/judgment: Poor  Cognition is grossly intact     Physical Exam:  Musculoskeletal system: steady gait  Tremor not present  Cog wheeling not present    Assessment and Plan:  Pavan Woods meets criteria for a diagnosis of Bipolar 1 disorder current episode depressed with psychotic features. ROSA MARIA. Rule out schizoaffective disorder. Continue trilafon 2 mg bid. Patient is a good candidate for ect. Continue current care. We will closely monitor for safety. We will encourage reality orientation. Disposition planning. I certify that this patients inpatient psychiatric hospital services furnished since the previous certification were, and continue to be, required for treatment that could reasonably be expected to improve the patient's condition, or for diagnostic study, and that the patient continues to need, on a daily basis, active treatment furnished directly by or requiring the supervision of inpatient psychiatric facility personnel. In addition, the hospital records show that services furnished were intensive treatment services, admission or related services, or equivalent services.       Signed:  Blaze Gallegos NP  9/11/2022

## 2022-09-11 NOTE — INTERDISCIPLINARY ROUNDS
Behavioral Health Interdisciplinary Rounds     Patient Name: Jackie Vinson  Age: 61 y.o.   Room/Bed:  734/  Primary Diagnosis: <principal problem not specified>   Admission Status: Involuntary Commitment     Readmission within 30 days: no  Power of  in place: no  Patient requires a blocked bed: yes          Reason for blocked bed:     Sleep hours:        Participation in Care/Groups:    Medication Compliant?: Yes  PRNS (last 24 hours): None    Restraints (last 24 hours):  no  Substance Abuse:  no    Alcohol screening (AUDIT) completed -     If applicable, date SBIRT discussed in treatment team AND documented:   Tobacco -   24 hour chart check complete: yes   ______________________________________    Patient goal(s) for today:   Treatment team focus/goals:   Progress note:         Financial concerns/prescription coverage:    Family contact:                        Family requesting physician contact today:    Discharge plan:   Access to weapons :                                                              Outpatient provider(s):   Patient's preferred phone number for follow up call :   Patient's preferred e-mail address :  Participating treatment team members:    LOS:  28  Expected LOS:     Participating treatment team members: Jackie Vinson, * (assigned SW),

## 2022-09-12 PROCEDURE — 74011250637 HC RX REV CODE- 250/637: Performed by: NURSE PRACTITIONER

## 2022-09-12 PROCEDURE — 65220000003 HC RM SEMIPRIVATE PSYCH

## 2022-09-12 RX ORDER — PERPHENAZINE 4 MG/1
4 TABLET, FILM COATED ORAL 2 TIMES DAILY
Status: DISCONTINUED | OUTPATIENT
Start: 2022-09-12 | End: 2022-09-15

## 2022-09-12 RX ORDER — OLANZAPINE 5 MG/1
10 TABLET ORAL
Status: DISCONTINUED | OUTPATIENT
Start: 2022-09-12 | End: 2022-09-20 | Stop reason: HOSPADM

## 2022-09-12 RX ADMIN — LORAZEPAM 0.5 MG: 0.5 TABLET ORAL at 21:07

## 2022-09-12 RX ADMIN — BUPROPION HYDROCHLORIDE 150 MG: 150 TABLET, EXTENDED RELEASE ORAL at 09:33

## 2022-09-12 RX ADMIN — FLUOXETINE HYDROCHLORIDE 60 MG: 20 CAPSULE ORAL at 09:33

## 2022-09-12 RX ADMIN — OLANZAPINE 10 MG: 5 TABLET, FILM COATED ORAL at 21:07

## 2022-09-12 RX ADMIN — PERPHENAZINE 4 MG: 4 TABLET, FILM COATED ORAL at 21:07

## 2022-09-12 RX ADMIN — LORAZEPAM 0.5 MG: 0.5 TABLET ORAL at 09:33

## 2022-09-12 RX ADMIN — HYDROXYZINE HYDROCHLORIDE 100 MG: 50 TABLET, FILM COATED ORAL at 21:07

## 2022-09-12 RX ADMIN — MIRTAZAPINE 45 MG: 15 TABLET, FILM COATED ORAL at 21:07

## 2022-09-12 RX ADMIN — LORAZEPAM 0.5 MG: 0.5 TABLET ORAL at 17:35

## 2022-09-12 RX ADMIN — PERPHENAZINE 2 MG: 2 TABLET, FILM COATED ORAL at 09:35

## 2022-09-12 NOTE — PROGRESS NOTES
Problem: Falls - Risk of  Goal: *Absence of Falls  Description: Document Abbe Beckford Fall Risk and appropriate interventions in the flowsheet. Outcome: Progressing Towards Goal  Note: Fall Risk Interventions:  Mobility Interventions: Assess mobility with egress test    Mentation Interventions: Reorient patient    Medication Interventions: Teach patient to arise slowly    Elimination Interventions: Toilet paper/wipes in reach    History of Falls Interventions: Door open when patient unattended    Problem: Depressed Mood (Adult/Pediatric)  Goal: *STG: Verbalizes anger, guilt, and other feelings in a constructive manor  Outcome: Progressing Towards Goal  Goal: *STG: Demonstrates reduction in symptoms and increase in insight into coping skills/future focused  Outcome: Progressing Towards Goal     Problem: Altered Thought Process (Adult/Pediatric)  Goal: *STG: Remains safe in hospital  Outcome: Progressing Towards Goal  Goal: *STG: Complies with medication therapy  Outcome: Progressing Towards Goal  Goal: *STG: Demonstrates ability to understand and use improved judgment in daily activities and relationships  Outcome: Progressing Towards Goal  Goal: Interventions  Outcome: Progressing Towards Goal     Patient received awake in bed. Thought blocking present. Pt isolative to self. Denies SI/HI. Will continue to monitor q15 minutes for safety.

## 2022-09-12 NOTE — BH NOTES
PSYCHIATRIC PROGRESS NOTE       Patient: Jason Jain MRN: [id number]  SSN: [id number]    YOB: 1965  Age: 61 y.o. Sex: female      Admit Date: 8/14/2022    LOS: 29 days     Chief Complaint:  \"I'm OK\"    Interval History:  Jason Jain is not doing well. She repeatedly said that she has made bad decisions but unable to elaborate. There is no change from Friday. Negative self talk more prominent today, she remains psychotic depressed. Thought blocking persist. Psychomotor retardation still present. Did not answer if she has si or hi. Lengthy discussion with her about ect, but she is unable to process any information. I will call her  to discuss ect. But given that patient does not have capacity to consent for ect, we will pursue court ordered ect. Staff report she is not well. She is compliant with her meds, no side effects noted.     Past Medical History:  Past Medical History:   Diagnosis Date    Anxiety and depression     Bipolar 1 disorder with moderate robyn ([address] 3/17/2014    Chronic back pain     Hyperlipidemia 8/22/2016    Hyponatremia     Psychotic disorder ([facility] [address]     Scoliosis        ALLERGIES:(reviewed/updated 9/12/2022)  Allergies   Allergen Reactions    Other Medication Anaphylaxis     Artificial sweeteners cause anaphylaxis    Pcn [Penicillins] Anaphylaxis    Sunscreen Contact Dermatitis     Burns and opens the skin    Ultram [Tramadol] Hives and Other (comments)     Hives and ringing of the ears    Benzoyl Peroxide Hives    Cephalexin Itching    Divalproex Itching    Maltodextrin-Glycerin Shortness of Breath    Claritin-D 12 Hour [Loratadine-Pseudoephedrine] Palpitations     palpatations and ringing in the ear     Laboratory report:  Lab Results   Component Value Date/Time    WBC 6.9 08/17/2022 06:31 AM    HGB 10.7 (L) 08/17/2022 06:31 AM    Hematocrit (POC) 34 (L) 07/17/2018 03:33 AM    HCT 31.3 (L) 08/17/2022 06:31 AM    PLATELET 506 03/96/7527 06:31 AM    MCV 91.5 08/17/2022 06:31 AM      Lab Results   Component Value Date/Time    Sodium 144 08/14/2022 03:45 AM    Potassium 3.5 08/14/2022 03:45 AM    Chloride 116 (H) 08/14/2022 03:45 AM    CO2 26 08/14/2022 03:45 AM    Anion gap 2 (L) 08/14/2022 03:45 AM    Glucose 102 (H) 08/14/2022 03:45 AM    Glucose 107 (H) 02/03/2020 05:40 AM    BUN 3 (L) 08/14/2022 03:45 AM    Creatinine 0.74 08/14/2022 03:45 AM    BUN/Creatinine ratio 4 (L) 08/14/2022 03:45 AM    GFR est AA >60 08/14/2022 03:45 AM    GFR est non-AA >60 08/14/2022 03:45 AM    Calcium 9.1 08/14/2022 03:45 AM    Bilirubin, total 0.2 08/12/2022 05:34 AM    Alk.  phosphatase 39 (L) 08/12/2022 05:34 AM    Protein, total 7.2 08/12/2022 05:34 AM    Albumin 3.4 (L) 08/12/2022 05:34 AM    Globulin 3.8 08/12/2022 05:34 AM    A-G Ratio 0.9 (L) 08/12/2022 05:34 AM    ALT (SGPT) 19 08/12/2022 05:34 AM      Vitals:    09/10/22 2045 09/11/22 0831 09/11/22 1129 09/11/22 1624   BP: 137/83 114/76  135/70   Pulse: 95 99  (!) 102   Resp: 14 14  16   Temp: 98.7 °F (37.1 °C) 97.6 °F (36.4 °C)  98 °F (36.7 °C)   SpO2: 98% 98%  98%   Weight:   72.9 kg (160 lb 11.2 oz)    Height:           Lab Results   Component Value Date/Time    Carbamazepine 14.1 (H) 07/26/2020 04:19 AM     Lab Results   Component Value Date/Time    Lithium level 0.79 08/14/2022 03:45 AM       Vital Signs  Patient Vitals for the past 24 hrs:   Temp Pulse Resp BP SpO2   09/11/22 1624 98 °F (36.7 °C) (!) 102 16 135/70 98 %       Wt Readings from Last 3 Encounters:   09/11/22 72.9 kg (160 lb 11.2 oz)   08/11/22 72.6 kg (160 lb)   08/07/22 71.5 kg (157 lb 9.6 oz)     Temp Readings from Last 3 Encounters:   09/11/22 98 °F (36.7 °C)   08/14/22 98.4 °F (36.9 °C)   08/10/22 98.4 °F (36.9 °C)     BP Readings from Last 3 Encounters:   09/11/22 135/70   08/14/22 (!) 142/77   08/10/22 111/75     Pulse Readings from Last 3 Encounters:   09/11/22 (!) 102   08/14/22 92   08/10/22 78       Radiology (reviewed/updated 9/12/2022)  No results found.    Current Facility-Administered Medications   Medication Dose Route Frequency Provider Last Rate Last Admin    perphenazine (TRILAFON) tablet tab 2 mg  2 mg Oral BID Stephanie Aquino, NP   2 mg at 09/12/22 0935    polyethylene glycol (MIRALAX) packet 17 g  17 g Oral BID PRN Stephanie Aquino, NP        LORazepam (ATIVAN) tablet 0.5 mg  0.5 mg Oral TID Stephanie Aquino, NP   0.5 mg at 09/12/22 0933    polyvinyl alcohol-povidone (NATURAL TEARS) 0.5-0.6 % ophthalmic solution 1 Drop  1 Drop Both Eyes PRN Karen Loja MD   1 Drop at 09/03/22 1708    mirtazapine (REMERON) tablet 45 mg  45 mg Oral QHS Stephanie Aquino, NP   45 mg at 09/11/22 2047    cloNIDine HCL (CATAPRES) tablet 0.1 mg  0.1 mg Oral Q2H PRN Beltran Eid NP   0.1 mg at 09/07/22 2107    OLANZapine (ZyPREXA) tablet 15 mg  15 mg Oral QHS Stephanie Aquino, NP   15 mg at 09/11/22 2047    hydrOXYzine HCL (ATARAX) tablet 100 mg  100 mg Oral QHS Stephanie Aquino, NP   100 mg at 09/11/22 2047    buPROPion SR (WELLBUTRIN SR) tablet 150 mg  150 mg Oral DAILY Stephanie Aquino, NP   150 mg at 09/12/22 0933    FLUoxetine (PROzac) capsule 60 mg  60 mg Oral DAILY Brant Axkindra, NP   60 mg at 09/12/22 0933    hydrOXYzine HCL (ATARAX) tablet 50 mg  50 mg Oral Q4H PRN Stephanie Aquino, NP   50 mg at 09/04/22 1633    OLANZapine (ZyPREXA) tablet 5 mg  5 mg Oral Q6H PRN Stephanie Aquino, NP   5 mg at 09/04/22 1155    haloperidol lactate (HALDOL) injection 5 mg  5 mg IntraMUSCular Q6H PRN Stephanie Aquino, NP   5 mg at 08/30/22 2033    benztropine (COGENTIN) tablet 1 mg  1 mg Oral BID PRN Stephanie Aquino NP        diphenhydrAMINE (BENADRYL) injection 50 mg  50 mg IntraMUSCular BID PRN Stephanie Aquino NP   50 mg at 08/30/22 2033    traZODone (DESYREL) tablet 50 mg  50 mg Oral QHS PRN Stephanie Aquino NP   50 mg at 09/09/22 2133    acetaminophen (TYLENOL) tablet 650 mg  650 mg Oral Q4H PRN Stephanie Aquino, NP   650 mg at 08/26/22 1914    magnesium hydroxide (MILK OF MAGNESIA) 400 mg/5 mL oral suspension 30 mL  30 mL Oral DAILY PRN Stephanie Aquino NP         Side Effects: (reviewed/updated 9/12/2022)  None reported or admitted to. Review of Systems: (reviewed/updated 9/12/2022)  Appetite: good  Sleep: poor  All other Review of Systems: depression    Mental Status Exam:  Eye contact: limited eye contact  Psychomotor activity: markedly decreased  Speech: poverty of speech  Thought process: thought blocking  Mood is \"OK\"  Affect: flat  Perception: No avh  Thought content: +delusions  Suicidal ideation:   Homicidal ideation:   Insight/judgment: Poor  Cognition is grossly intact     Physical Exam:  Musculoskeletal system: steady gait  Tremor not present  Cog wheeling not present    Assessment and Plan:  London Mahan meets criteria for a diagnosis of Schizoaffective disorder depressed type. Increase trilafon to 4 mg bid. Decrease zyprexa to 10 mg qhs. We will pursue court ordered ect. Continue current care. We will closely monitor for safety. We will encourage reality orientation. Disposition planning. I certify that this patients inpatient psychiatric hospital services furnished since the previous certification were, and continue to be, required for treatment that could reasonably be expected to improve the patient's condition, or for diagnostic study, and that the patient continues to need, on a daily basis, active treatment furnished directly by or requiring the supervision of inpatient psychiatric facility personnel. In addition, the hospital records show that services furnished were intensive treatment services, admission or related services, or equivalent services.       Signed:  Bacilio Grier NP  9/12/2022

## 2022-09-12 NOTE — PROGRESS NOTES
Problem: Depressed Mood (Adult/Pediatric)  Goal: *STG: Participates in treatment plan  Outcome: Progressing Towards Goal     Problem: Depressed Mood (Adult/Pediatric)  Goal: *STG: Verbalizes anger, guilt, and other feelings in a constructive manor  Outcome: Not Progressing Towards Goal  Goal: *STG: Demonstrates reduction in symptoms and increase in insight into coping skills/future focused  Outcome: Not Progressing Towards Goal     Patient is medication compliant. Thought blocking. Denies si/hi. Isolative to room.

## 2022-09-12 NOTE — BH NOTES
GROUP THERAPY PROGRESS NOTE     Patient is participating in Leisure Education Group     Group time: 45 minutes     Personal goal for participation: Interact and socialize with peers while playing trivia game     Goal orientation: Personal     Group therapy participation: passive    Therapeutic interventions reviewed and discussed: Group members were asked holiday-based trivia questions and were encouraged to communicate with one another to answer the question. Teamwork and communication skills were emphasized throughout the group. Impression of participation: minimal and passive, patient sat throughout the group and while she was asked questions, she opted to just observe and listen.

## 2022-09-12 NOTE — PROGRESS NOTES
Problem: Depressed Mood (Adult/Pediatric)  Goal: *STG: Participates in treatment plan  Outcome: Progressing Towards Goal  Goal: *STG: Attends activities and groups  Outcome: Progressing Towards Goal     Problem: Altered Thought Process (Adult/Pediatric)  Goal: *STG: Remains safe in hospital  Outcome: Progressing Towards Goal  Goal: *STG: Complies with medication therapy  Outcome: Progressing Towards Goal     Patient is resting quietly in bed  with eyes closed. Appears to be asleep. No respiratory distress noted.

## 2022-09-12 NOTE — INTERDISCIPLINARY ROUNDS
Behavioral Health Interdisciplinary Rounds     Patient Name: Heriberto Abarca  Age: 61 y.o. Room/Bed:  734/02  Primary Diagnosis: <principal problem not specified>   Admission Status: Involuntary Commitment     Readmission within 30 days: no  Power of  in place: no  Patient requires a blocked bed: no          Reason for blocked bed:     Sleep hours:        Participation in Care/Groups:    Medication Compliant?: Yes  PRNS (last 24 hours): None    Restraints (last 24 hours):  no  Substance Abuse:  no    Alcohol screening (AUDIT) completed -     If applicable, date SBIRT discussed in treatment team AND documented:   Tobacco -   24 hour chart check complete: yes   ______________________________________    Patient goal(s) for today: Complete ADL's, engage in group, contact support system, take medications as scheduled  Treatment team focus/goals: Discuss treatment plan  Progress note: Pt met with treatment team and appears guarded and withdrawn, pt is slow to respond and is minimally engaged. Pt continues to demonstrate thought blocking. Pt reports fair sleep and poor appetite. Plan to have hearing regarding court order ECT on Wednesday. SW spoke with pt  and he is agreeable to moving forward with ECT.  reports that he feels some progress has been made but is concerned that she has been hospitalized for 29 days.  was informed that pt would move to a different facility for treatment and he is agreeable to these terms.  reports that he is hopeful the ECT will quicken the recovery process so she can return home soon. Pt denies SI/HI and WOODS AT [facility] at this time.      Financial concerns/prescription coverage: BLUE CROSS [facility]ICARE [facility]   Family contact:  Hillary Ramirez, [phone]                                Family requesting physician contact today:  Yes  Discharge plan: Pt will discharge home after ECT  Access to weapons :  no Outpatient provider(s): DIONNE Kaiser   Patient's preferred phone number for follow up call :   Patient's preferred e-mail address :    LOS:  29  Expected LOS:     Participating treatment team members: Heriberto Abarca, MERCED Phillips, Gerri FATIMA, RN, Aníbal Matamoros, NP

## 2022-09-13 PROCEDURE — 65220000003 HC RM SEMIPRIVATE PSYCH

## 2022-09-13 PROCEDURE — 74011250637 HC RX REV CODE- 250/637: Performed by: NURSE PRACTITIONER

## 2022-09-13 RX ADMIN — FLUOXETINE HYDROCHLORIDE 60 MG: 20 CAPSULE ORAL at 10:32

## 2022-09-13 RX ADMIN — LORAZEPAM 0.5 MG: 0.5 TABLET ORAL at 16:47

## 2022-09-13 RX ADMIN — LORAZEPAM 0.5 MG: 0.5 TABLET ORAL at 10:32

## 2022-09-13 RX ADMIN — LORAZEPAM 0.5 MG: 0.5 TABLET ORAL at 20:28

## 2022-09-13 RX ADMIN — BUPROPION HYDROCHLORIDE 150 MG: 150 TABLET, EXTENDED RELEASE ORAL at 10:32

## 2022-09-13 RX ADMIN — MIRTAZAPINE 45 MG: 15 TABLET, FILM COATED ORAL at 20:27

## 2022-09-13 RX ADMIN — PERPHENAZINE 4 MG: 4 TABLET, FILM COATED ORAL at 10:32

## 2022-09-13 RX ADMIN — HYDROXYZINE HYDROCHLORIDE 100 MG: 50 TABLET, FILM COATED ORAL at 20:27

## 2022-09-13 RX ADMIN — OLANZAPINE 10 MG: 5 TABLET, FILM COATED ORAL at 20:27

## 2022-09-13 RX ADMIN — PERPHENAZINE 4 MG: 4 TABLET, FILM COATED ORAL at 20:28

## 2022-09-13 NOTE — INTERDISCIPLINARY ROUNDS
Behavioral Health Interdisciplinary Rounds     Patient Name: London Mahan  Age: 61 y.o. Room/Bed:  734/02  Primary Diagnosis: <principal problem not specified>   Admission Status: Involuntary Commitment     Readmission within 30 days: no  Power of  in place: no  Patient requires a blocked bed: no            Sleep hours: 7+       Participation in Care/Groups:  no  Medication Compliant?: Yes  PRNS (last 24 hours): None    Restraints (last 24 hours):  no     Alcohol screening (AUDIT) completed -     If applicable, date SBIRT discussed in treatment team AND documented:    Tobacco - patient is a smoker: Have You Used Tobacco in the Past 30 Days: No  Illegal Drugs use: Have You Used Any Illegal Substances Over the Past 12 Months: No    24 hour chart check complete:      _______________________________________________    Patient goal(s) for today: Engage in group discussions, attend groups, complete ADL's, contact support system  Treatment team focus/goals: Discuss treatment plan  Progress note: Pt met with treatment team and appears with a flat and calm affect. Pt continues to demonstrate thought blocking and is slow to respond when answering questions. Pt is unable to demonstrate insight into need for care at this time. Pt denies SI/HI and WOODS AT [facility] and denies adverse side effects from medication. Pt was informed that she will be meeting with the courts tomorrow to discuss court ordered ECT and was informed that she will be moving to [address] [place] [address] closer to when she can start treatment. MD to obtain second opinion from another provider. MD to contact  as well to discuss ECT, SW made contact yesterday via husbands cellphone.      Financial concerns/prescription coverage: [facility] CROSS MEDICARE [facility] [address] [facility]   Family contact:  Bishop Elmore, [phone]                                Family requesting physician contact today:  Yes  Discharge plan: Pt will discharge home after ECT  Access to weapons :  no Outpatient provider(s): DIONNE Kaiser   Patient's preferred phone number for follow up call :   Patient's preferred e-mail address :    LOS:  30  Expected LOS:     Participating treatment team members: Shadi Santoro, MERCED Webb, Gerri FATIMA RN, Montana Dash, DIONNE

## 2022-09-13 NOTE — BH NOTES
PSYCHIATRIC PROGRESS NOTE       Patient: Amber Moore MRN: [id number]  SSN: [id number]    YOB: 1965  Age: 61 y.o. Sex: female      Admit Date: 8/14/2022    LOS: 30 days     Chief Complaint:  \"I'm OK\"    Interval History:  Amber Moore is the same. There is no change in light of her worsening psychiatric condition. Discuss with her once again about ect, but she continues to have marked difficulties with focusing and maintaining train of thought. I informed her that we are pursuing court ordered ect/medications and she will be meeting the hearing team once again tomorrow. I also called her  yesterday but I was not able to speak to him. She is compliant with her meds, no side effects noted.     Past Medical History:  Past Medical History:   Diagnosis Date    Anxiety and depression     Bipolar 1 disorder with moderate robyn ([facility] [address] 3/17/2014    Chronic back pain     Hyperlipidemia 8/22/2016    Hyponatremia     Psychotic disorder ([facility])     Scoliosis        ALLERGIES:(reviewed/updated 9/13/2022)  Allergies   Allergen Reactions    Other Medication Anaphylaxis     Artificial sweeteners cause anaphylaxis    Pcn [Penicillins] Anaphylaxis    Sunscreen Contact Dermatitis     Burns and opens the skin    Ultram [Tramadol] Hives and Other (comments)     Hives and ringing of the ears    Benzoyl Peroxide Hives    Cephalexin Itching    Divalproex Itching    Maltodextrin-Glycerin Shortness of Breath    Claritin-D 12 Hour [Loratadine-Pseudoephedrine] Palpitations     palpatations and ringing in the ear     Laboratory report:  Lab Results   Component Value Date/Time    WBC 6.9 08/17/2022 06:31 AM    HGB 10.7 (L) 08/17/2022 06:31 AM    Hematocrit (POC) 34 (L) 07/17/2018 03:33 AM    HCT 31.3 (L) 08/17/2022 06:31 AM    PLATELET 375 69/11/9040 06:31 AM    MCV 91.5 08/17/2022 06:31 AM      Lab Results   Component Value Date/Time    Sodium 144 08/14/2022 03:45 AM    Potassium 3.5 08/14/2022 03:45 AM    Chloride 116 (H) 08/14/2022 03:45 AM    CO2 26 08/14/2022 03:45 AM    Anion gap 2 (L) 08/14/2022 03:45 AM    Glucose 102 (H) 08/14/2022 03:45 AM    Glucose 107 (H) 02/03/2020 05:40 AM    BUN 3 (L) 08/14/2022 03:45 AM    Creatinine 0.74 08/14/2022 03:45 AM    BUN/Creatinine ratio 4 (L) 08/14/2022 03:45 AM    GFR est AA >60 08/14/2022 03:45 AM    GFR est non-AA >60 08/14/2022 03:45 AM    Calcium 9.1 08/14/2022 03:45 AM    Bilirubin, total 0.2 08/12/2022 05:34 AM    Alk.  phosphatase 39 (L) 08/12/2022 05:34 AM    Protein, total 7.2 08/12/2022 05:34 AM    Albumin 3.4 (L) 08/12/2022 05:34 AM    Globulin 3.8 08/12/2022 05:34 AM    A-G Ratio 0.9 (L) 08/12/2022 05:34 AM    ALT (SGPT) 19 08/12/2022 05:34 AM      Vitals:    09/12/22 1427 09/12/22 1553 09/13/22 1048 09/13/22 1148   BP: 126/77 (!) (P) 151/81  114/78   Pulse: (!) 105 (P) 92  90   Resp: 16 (P) 16 14 16   Temp: 97.7 °F (36.5 °C) (P) 97.9 °F (36.6 °C)  98.9 °F (37.2 °C)   SpO2: 100% (P) 98%  100%   Weight:       Height:           Lab Results   Component Value Date/Time    Carbamazepine 14.1 (H) 07/26/2020 04:19 AM     Lab Results   Component Value Date/Time    Lithium level 0.79 08/14/2022 03:45 AM       Vital Signs  Patient Vitals for the past 24 hrs:   Temp Pulse Resp BP SpO2   09/13/22 1148 98.9 °F (37.2 °C) 90 16 114/78 100 %   09/13/22 1048 -- -- 14 -- --   09/12/22 1553 (P) 97.9 °F (36.6 °C) (P) 92 (P) 16 (!) (P) 151/81 (P) 98 %   09/12/22 1427 97.7 °F (36.5 °C) (!) 105 16 126/77 100 %       Wt Readings from Last 3 Encounters:   09/11/22 72.9 kg (160 lb 11.2 oz)   08/11/22 72.6 kg (160 lb)   08/07/22 71.5 kg (157 lb 9.6 oz)     Temp Readings from Last 3 Encounters:   09/13/22 98.9 °F (37.2 °C)   08/14/22 98.4 °F (36.9 °C)   08/10/22 98.4 °F (36.9 °C)     BP Readings from Last 3 Encounters:   09/13/22 114/78   08/14/22 (!) 142/77   08/10/22 111/75     Pulse Readings from Last 3 Encounters:   09/13/22 90   08/14/22 92   08/10/22 78       Radiology (reviewed/updated 9/13/2022)  No results found.     Current Facility-Administered Medications   Medication Dose Route Frequency Provider Last Rate Last Admin    perphenazine (TRILAFON) tablet tab 4 mg  4 mg Oral BID Stephanie Aquino NP   4 mg at 09/13/22 1032    OLANZapine (ZyPREXA) tablet 10 mg  10 mg Oral QHS Stephanie Aquino NP   10 mg at 09/12/22 2107    polyethylene glycol (MIRALAX) packet 17 g  17 g Oral BID PRN Stephanie Aquino NP        LORazepam (ATIVAN) tablet 0.5 mg  0.5 mg Oral TID Stephanie Aquino NP   0.5 mg at 09/13/22 1032    polyvinyl alcohol-povidone (NATURAL TEARS) 0.5-0.6 % ophthalmic solution 1 Drop  1 Drop Both Eyes PRN Nirmala Ruiz MD   1 Drop at 09/03/22 1708    mirtazapine (REMERON) tablet 45 mg  45 mg Oral QHS Stephanie Aquino NP   45 mg at 09/12/22 2107    cloNIDine HCL (CATAPRES) tablet 0.1 mg  0.1 mg Oral Q2H PRN Larisa Cabrera NP   0.1 mg at 09/07/22 2107    hydrOXYzine HCL (ATARAX) tablet 100 mg  100 mg Oral QHS Stephanie Aquino NP   100 mg at 09/12/22 2107    buPROPion SR (WELLBUTRIN SR) tablet 150 mg  150 mg Oral DAILY Stephanie Aquino NP   150 mg at 09/13/22 1032    FLUoxetine (PROzac) capsule 60 mg  60 mg Oral DAILY Héctor Antunez NP   60 mg at 09/13/22 1032    hydrOXYzine HCL (ATARAX) tablet 50 mg  50 mg Oral Q4H PRN Stephanie Aquino NP   50 mg at 09/04/22 1633    OLANZapine (ZyPREXA) tablet 5 mg  5 mg Oral Q6H PRN Stephanie Aquino, NP   5 mg at 09/04/22 1155    haloperidol lactate (HALDOL) injection 5 mg  5 mg IntraMUSCular Q6H PRN Stephanie Aquino NP   5 mg at 08/30/22 2033    benztropine (COGENTIN) tablet 1 mg  1 mg Oral BID PRN Stephanie Aquino, NP        diphenhydrAMINE (BENADRYL) injection 50 mg  50 mg IntraMUSCular BID PRN Stephanie Aquino NP   50 mg at 08/30/22 2033    traZODone (DESYREL) tablet 50 mg  50 mg Oral QHS PRN Stephanie Aquino, NP   50 mg at 09/09/22 2133    acetaminophen (TYLENOL) tablet 650 mg  650 mg Oral Q4H PRN Frances WILEY NP   650 mg at 08/26/22 1914    magnesium hydroxide (MILK OF MAGNESIA) 400 mg/5 mL oral suspension 30 mL  30 mL Oral DAILY PRN Stephanie Aquino NP         Side Effects: (reviewed/updated 9/13/2022)  None reported or admitted to. Review of Systems: (reviewed/updated 9/13/2022)  Appetite: good  Sleep: poor  All other Review of Systems: depression    Mental Status Exam:  Eye contact: limited eye contact  Psychomotor activity: markedly decreased  Speech: poverty of speech  Thought process: thought blocking  Mood is \"OK\"  Affect: flat  Perception: No avh  Thought content: +delusions  Suicidal ideation:   Homicidal ideation:   Insight/judgment: Poor  Cognition is grossly intact     Physical Exam:  Musculoskeletal system: steady gait  Tremor not present  Cog wheeling not present    Assessment and Plan:  Felix Monterroso meets criteria for a diagnosis of Schizoaffective disorder depressed type. Continue trilafon to 4 mg bid. Continue zyprexa to 10 mg qhs. Court ordered ect in am.  Continue current care. We will closely monitor for safety. We will encourage reality orientation. Disposition planning. I certify that this patients inpatient psychiatric hospital services furnished since the previous certification were, and continue to be, required for treatment that could reasonably be expected to improve the patient's condition, or for diagnostic study, and that the patient continues to need, on a daily basis, active treatment furnished directly by or requiring the supervision of inpatient psychiatric facility personnel. In addition, the hospital records show that services furnished were intensive treatment services, admission or related services, or equivalent services.       Signed:  Landry Stern NP  9/13/2022

## 2022-09-13 NOTE — PROGRESS NOTES
Patient received resting quietly in bed. No signs of distress. Even and unlabored breathing. Staff will continue to monitor safety q15 and provide support. Problem: Falls - Risk of  Goal: *Absence of Falls  Description: Document Leafy Brady Fall Risk and appropriate interventions in the flowsheet. Outcome: Progressing Towards Goal  Note: Fall Risk Interventions:  Mobility Interventions: Assess mobility with egress test    Mentation Interventions: Reorient patient    Medication Interventions: Teach patient to arise slowly    Elimination Interventions:  Toilet paper/wipes in reach    History of Falls Interventions: Door open when patient unattended

## 2022-09-13 NOTE — BH NOTES
Second Opinion (regarding): Capacity to refuse medications or ECT    Reason for Admission:  Viri Santos was admitted for worsening of depressive symptoms to the extent that she was unable to function or care for herself. Diagnosis: Schizoaffective disorder depressed type. The patient does not recognize the severity of her depressive symptoms and is unable to cognitively assess advantages, side-effects, or choose alternative treatment options. Upon my evaluation patient doesn't possess the capacity to make an informed decision with regards to her treatment options, including further medication changes or ECT. Determination: Based on my independent evaluation of the patient on 9/13/2022, the patient does not have the capacity to refuse the psychiatric medications or ECT currently recommended for treatment of her severe depressive symptoms. Recommendation: Referral to [facility] for Treatment Over Objection order.      Signed By:   Neha Gentile MD  9/13/2022

## 2022-09-13 NOTE — PROGRESS NOTES
Problem: Depressed Mood (Adult/Pediatric)  Goal: *STG: Attends activities and groups  Outcome: Progressing Towards Goal  Variance Patient slowly responding     Problem: Depressed Mood (Adult/Pediatric)  Goal: *STG: Verbalizes anger, guilt, and other feelings in a constructive manor  Outcome: Not Progressing Towards Goal  Goal: *STG: Demonstrates reduction in symptoms and increase in insight into coping skills/future focused  Outcome: Not Progressing Towards Goal    Patient exhibits thought blocking, denies si/hi. Medication compliant. Exhibits psychomotor retardation.  Mood depressed, affect flat

## 2022-09-14 PROCEDURE — 74011250637 HC RX REV CODE- 250/637: Performed by: NURSE PRACTITIONER

## 2022-09-14 PROCEDURE — 65220000003 HC RM SEMIPRIVATE PSYCH

## 2022-09-14 RX ADMIN — PERPHENAZINE 4 MG: 4 TABLET, FILM COATED ORAL at 08:27

## 2022-09-14 RX ADMIN — PERPHENAZINE 4 MG: 4 TABLET, FILM COATED ORAL at 20:00

## 2022-09-14 RX ADMIN — LORAZEPAM 0.5 MG: 0.5 TABLET ORAL at 16:46

## 2022-09-14 RX ADMIN — OLANZAPINE 10 MG: 5 TABLET, FILM COATED ORAL at 20:00

## 2022-09-14 RX ADMIN — MIRTAZAPINE 45 MG: 15 TABLET, FILM COATED ORAL at 20:00

## 2022-09-14 RX ADMIN — FLUOXETINE HYDROCHLORIDE 60 MG: 20 CAPSULE ORAL at 08:27

## 2022-09-14 RX ADMIN — LORAZEPAM 0.5 MG: 0.5 TABLET ORAL at 20:00

## 2022-09-14 RX ADMIN — HYDROXYZINE HYDROCHLORIDE 100 MG: 50 TABLET, FILM COATED ORAL at 20:00

## 2022-09-14 RX ADMIN — LORAZEPAM 0.5 MG: 0.5 TABLET ORAL at 08:27

## 2022-09-14 RX ADMIN — BUPROPION HYDROCHLORIDE 150 MG: 150 TABLET, EXTENDED RELEASE ORAL at 08:27

## 2022-09-14 NOTE — PROGRESS NOTES
[facility] visited patient in her room. Pt is agreeable to ECT. She will be transferred to [facility] [place] to begin ECT on Friday or Monday. Pt will need an EKG. 0700 Rec'd pt this am resting in bed. She seems to have a brighter affect and appears to not to be as delayed in her response time. She has a dull/flat affect, visible on the unit, med and meal compliant. Problem: Falls - Risk of  Goal: *Absence of Falls  Description: Document Alin Bhatt Fall Risk and appropriate interventions in the flowsheet. Outcome: Progressing Towards Goal  Note: Fall Risk Interventions:  Mobility Interventions: Assess mobility with egress test  Mentation Interventions: Reorient patient  Medication Interventions: Teach patient to arise slowly  Elimination Interventions:  Toilet paper/wipes in reach  History of Falls Interventions: Door open when patient unattended  Problem: Patient Education: Go to Patient Education Activity  Goal: Patient/Family Education  Outcome: Progressing Towards Goal  Problem: Altered Thought Process (Adult/Pediatric)  Goal: *STG: Remains safe in hospital  Outcome: Progressing Towards Goal  Goal: *STG: Complies with medication therapy  Outcome: Progressing Towards Goal

## 2022-09-14 NOTE — PROGRESS NOTES
Problem: Depressed Mood (Adult/Pediatric)  Goal: *STG: Participates in treatment plan  Outcome: Progressing Towards Goal     Pt is alert and oriented on unit. Pt has interaction has improved today as patient is communicating with full sentences. Pt is meal and medication complaint. Staff will continue to monitor pt q15 for safety.      Goal: *STG: Verbalizes anger, guilt, and other feelings in a constructive manor  Outcome: Progressing Towards Goal  Goal: *STG: Attends activities and groups  Outcome: Progressing Towards Goal  Goal: *STG: Demonstrates reduction in symptoms and increase in insight into coping skills/future focused  Outcome: Progressing Towards Goal

## 2022-09-14 NOTE — INTERDISCIPLINARY ROUNDS
Behavioral Health Interdisciplinary Rounds     Patient Name: Aldo Curiel  Age: 61 y.o. Room/Bed:  734/02  Primary Diagnosis: <principal problem not specified>   Admission Status: Involuntary Commitment     Readmission within 30 days: no  Power of  in place: no  Patient requires a blocked bed: no          Reason for blocked bed:     Sleep hours: 7       Participation in Care/Groups:  yes  Medication Compliant?: Yes  PRNS (last 24 hours): None    Restraints (last 24 hours):  no  Substance Abuse:  no    Alcohol screening (AUDIT) completed -     If applicable, date SBIRT discussed in treatment team AND documented:   Tobacco -   24 hour chart check complete:   ______________________________________    Patient goal(s) for today: Engage in group discussions, attend groups, complete ADL's, contact support system  Treatment team focus/goals: Discuss treatment plan  Progress note: Pt met with treatment team and discussed ECT as a treatment moving forward. Pt asked additional questions and MD provided education. Pt is now agreeable to ECT and is willing to transfer to [facility] for the procedure. SW to contact family to inform of pt agreeing to treatment. Pt is denying SI/HI and WOODS AT [facility] but is still demonstrating a flat affect and some thought blocking. Pt is compliant with medications and is denying adverse side effects to sleep and appetite. Pt is pleasant when engaging with staff and peers but is isolative most of the day. Plan to start ECT work-up tomorrow and transfer to [facility] once procedures can start.      Financial concerns/prescription coverage: [facility] CR[facility]IC[facility]   Family contact:  Andres Ludwig, [phone]                                Family requesting physician contact today:  Yes  Discharge plan: Pt will discharge home after ECT  Access to weapons :  no                                                        Outpatient provider(s): Mil[facility] [address], DIONNE [place]   Patient's preferred phone number for follow up call :   Patient's preferred e-mail address :     LOS:  31                     Expected LOS: 33     Participating treatment team members: Merlin Espy, MERCED Castañeda, Gerri FATIMA RN, Evelyn Raphael NP

## 2022-09-14 NOTE — PROGRESS NOTES
Problem: Depressed Mood (Adult/Pediatric)  Goal: *STG: Participates in treatment plan  Outcome: Progressing Towards Goal  Goal: *STG: Attends activities and groups  Outcome: Progressing Towards Goal     Problem: Altered Thought Process (Adult/Pediatric)  Goal: *STG: Remains safe in hospital  Outcome: Progressing Towards Goal  Goal: *STG: Complies with medication therapy  Outcome: Progressing Towards Goal     Patient is resting quietly in bed with eyes closed. Appears to be asleep. No respiratory distress noted. 15 minutes safety monitoring continues.

## 2022-09-15 PROCEDURE — 74011250637 HC RX REV CODE- 250/637: Performed by: NURSE PRACTITIONER

## 2022-09-15 PROCEDURE — 65220000003 HC RM SEMIPRIVATE PSYCH

## 2022-09-15 RX ORDER — PERPHENAZINE 4 MG/1
8 TABLET, FILM COATED ORAL 2 TIMES DAILY
Status: DISCONTINUED | OUTPATIENT
Start: 2022-09-15 | End: 2022-09-20 | Stop reason: HOSPADM

## 2022-09-15 RX ADMIN — LORAZEPAM 0.5 MG: 0.5 TABLET ORAL at 20:10

## 2022-09-15 RX ADMIN — MAGNESIUM HYDROXIDE 30 ML: 400 SUSPENSION ORAL at 18:37

## 2022-09-15 RX ADMIN — MIRTAZAPINE 45 MG: 15 TABLET, FILM COATED ORAL at 20:10

## 2022-09-15 RX ADMIN — LORAZEPAM 0.5 MG: 0.5 TABLET ORAL at 16:04

## 2022-09-15 RX ADMIN — LORAZEPAM 0.5 MG: 0.5 TABLET ORAL at 09:54

## 2022-09-15 RX ADMIN — PERPHENAZINE 4 MG: 4 TABLET, FILM COATED ORAL at 09:54

## 2022-09-15 RX ADMIN — FLUOXETINE HYDROCHLORIDE 60 MG: 20 CAPSULE ORAL at 09:54

## 2022-09-15 RX ADMIN — HYDROXYZINE HYDROCHLORIDE 100 MG: 50 TABLET, FILM COATED ORAL at 20:10

## 2022-09-15 RX ADMIN — PERPHENAZINE 8 MG: 4 TABLET, FILM COATED ORAL at 20:11

## 2022-09-15 RX ADMIN — BUPROPION HYDROCHLORIDE 150 MG: 150 TABLET, EXTENDED RELEASE ORAL at 09:54

## 2022-09-15 RX ADMIN — OLANZAPINE 10 MG: 5 TABLET, FILM COATED ORAL at 20:10

## 2022-09-15 NOTE — BH NOTES
Behavioral Health Treatment Team Note       Progress note: Patient me with treatment team. Patient was alert and oriented x 4. Patient presents with a linear thought process and is more communicative with treatment team today. Patient reports that she is willing to do ECT voluntarily, but [facility] is not able to accommodate transfer at this time. Attending NP continues to provide education and reality testing in regards to patient's mental health presentation and the need for additional ECT tx. SW will continue to use supportive counseling techniques to assist patient with treatment and discharge planning. Attending NP discussed outpatient ECT options and encouraged patient to speak with patient's  about getting her to treatments.      LOS:  32  Expected LOS: TBD    Insurance info/prescription coverage:    [address] CROSS MEDICARE PPO    MERCED Storey

## 2022-09-15 NOTE — PROGRESS NOTES
Problem: Depressed Mood (Adult/Pediatric)  Goal: *STG: Participates in treatment plan  Outcome: Progressing Towards Goal  Goal: *STG: Attends activities and groups  Outcome: Progressing Towards Goal     Problem: Patient Education: Go to Patient Education Activity  Goal: Patient/Family Education  Outcome: Progressing Towards Goal

## 2022-09-15 NOTE — BH NOTES
PSYCHIATRIC PROGRESS NOTE       Patient: Shadi Yu MRN: [id number]  SSN: [id number]    YOB: 1965  Age: 61 y.o. Sex: female      Admit Date: 8/14/2022    LOS: 32 days     Chief Complaint:  \"I'm OK\"    Interval History:  Shadi Yu is calm and pleasant. She says she remembered when she first saw me, I told her that she was a liar. When I informed her that I did not, she asked if it was a dream. She endorses ah, hears music. She says she is  Roman Catholic person, and felt  down after I called her a liar and so she thought her  tricked her. 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 Says she misses her . Negative self talk persist, paranoia. In her book she wrote, she is a bad person, she let people down. She remembered agreeing to ect yesterday but is ambivalent today. Risks of ect were discussed including but not limited to risks of being on general anesthesia, retrograde amnesia, headaches but unsure if she understood it. \"I am a lost child. \" At present, ect is still the best recommendation for the patient. At the present time the patient Shadi Yu remains compliant with taking medications. Denies any adverse events from taking them.       Past Medical History:  Past Medical History:   Diagnosis Date    Anxiety and depression     Bipolar 1 disorder with moderate robyn ([address] 3/17/2014    Chronic back pain     Hyperlipidemia 8/22/2016    Hyponatremia     Psychotic disorder [facility] [facility]     Scoliosis        ALLERGIES:(reviewed/updated 9/15/2022)  Allergies   Allergen Reactions    Other Medication Anaphylaxis     Artificial sweeteners cause anaphylaxis    Pcn [Penicillins] Anaphylaxis    Sunscreen Contact Dermatitis     Burns and opens the skin    Ultram [Tramadol] Hives and Other (comments)     Hives and ringing of the ears    Benzoyl Peroxide Hives    Cephalexin Itching    Divalproex Itching    Maltodextrin-Glycerin Shortness of Breath Claritin-D 12 Hour [Loratadine-Pseudoephedrine] Palpitations     palpatations and ringing in the ear     Laboratory report:  Lab Results   Component Value Date/Time    WBC 6.9 08/17/2022 06:31 AM    HGB 10.7 (L) 08/17/2022 06:31 AM    Hematocrit (POC) 34 (L) 07/17/2018 03:33 AM    HCT 31.3 (L) 08/17/2022 06:31 AM    PLATELET 744 77/09/0008 06:31 AM    MCV 91.5 08/17/2022 06:31 AM      Lab Results   Component Value Date/Time    Sodium 144 08/14/2022 03:45 AM    Potassium 3.5 08/14/2022 03:45 AM    Chloride 116 (H) 08/14/2022 03:45 AM    CO2 26 08/14/2022 03:45 AM    Anion gap 2 (L) 08/14/2022 03:45 AM    Glucose 102 (H) 08/14/2022 03:45 AM    Glucose 107 (H) 02/03/2020 05:40 AM    BUN 3 (L) 08/14/2022 03:45 AM    Creatinine 0.74 08/14/2022 03:45 AM    BUN/Creatinine ratio 4 (L) 08/14/2022 03:45 AM    GFR est AA >60 08/14/2022 03:45 AM    GFR est non-AA >60 08/14/2022 03:45 AM    Calcium 9.1 08/14/2022 03:45 AM    Bilirubin, total 0.2 08/12/2022 05:34 AM    Alk.  phosphatase 39 (L) 08/12/2022 05:34 AM    Protein, total 7.2 08/12/2022 05:34 AM    Albumin 3.4 (L) 08/12/2022 05:34 AM    Globulin 3.8 08/12/2022 05:34 AM    A-G Ratio 0.9 (L) 08/12/2022 05:34 AM    ALT (SGPT) 19 08/12/2022 05:34 AM      Vitals:    09/13/22 1539 09/14/22 0746 09/14/22 1551 09/15/22 0758   BP: 119/80 109/75 132/79 112/75   Pulse: 95 (!) 103 94 78   Resp: 16 16 16 16   Temp: 98.4 °F (36.9 °C) 99.3 °F (37.4 °C) 98.4 °F (36.9 °C) 99 °F (37.2 °C)   SpO2: 98% 96% 100% 97%   Weight:       Height:           Lab Results   Component Value Date/Time    Carbamazepine 14.1 (H) 07/26/2020 04:19 AM     Lab Results   Component Value Date/Time    Lithium level 0.79 08/14/2022 03:45 AM       Vital Signs  Patient Vitals for the past 24 hrs:   Temp Pulse Resp BP SpO2   09/15/22 0758 99 °F (37.2 °C) 78 16 112/75 97 %   09/14/22 1551 98.4 °F (36.9 °C) 94 16 132/79 100 %       Wt Readings from Last 3 Encounters:   09/11/22 72.9 kg (160 lb 11.2 oz)   08/11/22 72.6 kg (160 lb)   08/07/22 71.5 kg (157 lb 9.6 oz)     Temp Readings from Last 3 Encounters:   09/15/22 99 °F (37.2 °C)   08/14/22 98.4 °F (36.9 °C)   08/10/22 98.4 °F (36.9 °C)     BP Readings from Last 3 Encounters:   09/15/22 112/75   08/14/22 (!) 142/77   08/10/22 111/75     Pulse Readings from Last 3 Encounters:   09/15/22 78   08/14/22 92   08/10/22 78       Radiology (reviewed/updated 9/15/2022)  No results found.     Current Facility-Administered Medications   Medication Dose Route Frequency Provider Last Rate Last Admin    perphenazine (TRILAFON) tablet tab 4 mg  4 mg Oral BID Stephanie Aquino NP   4 mg at 09/14/22 2000    OLANZapine (ZyPREXA) tablet 10 mg  10 mg Oral QHS Stephanie Aquino NP   10 mg at 09/14/22 2000    polyethylene glycol (MIRALAX) packet 17 g  17 g Oral BID PRN Stephanie Aquino NP        LORazepam (ATIVAN) tablet 0.5 mg  0.5 mg Oral TID Stephanie Aquino NP   0.5 mg at 09/14/22 2000    polyvinyl alcohol-povidone (NATURAL TEARS) 0.5-0.6 % ophthalmic solution 1 Drop  1 Drop Both Eyes PRN María Merino MD   1 Drop at 09/03/22 1708    mirtazapine (REMERON) tablet 45 mg  45 mg Oral QHS Stephanie Aquino NP   45 mg at 09/14/22 2000    cloNIDine HCL (CATAPRES) tablet 0.1 mg  0.1 mg Oral Q2H PRN Brigido Goldberg, NP   0.1 mg at 09/07/22 2107    hydrOXYzine HCL (ATARAX) tablet 100 mg  100 mg Oral QHS Stephanie Aquino NP   100 mg at 09/14/22 2000    buPROPion SR (WELLBUTRIN SR) tablet 150 mg  150 mg Oral DAILY Stephanie Aquino NP   150 mg at 09/14/22 0827    FLUoxetine (PROzac) capsule 60 mg  60 mg Oral DAILY Janice Rosario NP   60 mg at 09/14/22 0827    hydrOXYzine HCL (ATARAX) tablet 50 mg  50 mg Oral Q4H PRN Stephanie Aquino NP   50 mg at 09/04/22 1633    OLANZapine (ZyPREXA) tablet 5 mg  5 mg Oral Q6H PRN Stephanie Aquino NP   5 mg at 09/04/22 1155    haloperidol lactate (HALDOL) injection 5 mg  5 mg IntraMUSCular Q6H PRN Stephanie Aquino NP   5 mg at 08/30/22 2033    benztropine (COGENTIN) tablet 1 mg  1 mg Oral BID PRN Stephanie Aquino NP        diphenhydrAMINE (BENADRYL) injection 50 mg  50 mg IntraMUSCular BID PRN Stephanie Aquino A, NP   50 mg at 08/30/22 2033    traZODone (DESYREL) tablet 50 mg  50 mg Oral QHS PRN Teresa Aquinoe SAURABH, NP   50 mg at 09/09/22 2133    acetaminophen (TYLENOL) tablet 650 mg  650 mg Oral Q4H PRN Teresa Aquinoe SAURABH, NP   650 mg at 08/26/22 1914    magnesium hydroxide (MILK OF MAGNESIA) 400 mg/5 mL oral suspension 30 mL  30 mL Oral DAILY PRN Stephanie Aquino NP         Side Effects: (reviewed/updated 9/15/2022)  None reported or admitted to. Review of Systems: (reviewed/updated 9/15/2022)  Appetite: good  Sleep: poor  All other Review of Systems: depression    Mental Status Exam:  Eye contact: limited eye contact  Psychomotor activity: decreased  Speech: poverty of speech  Thought process: thought blocking  Mood is \"OK\"  Affect: flat  Perception: No avh  Thought content: +delusions  Suicidal ideation: denies si  Homicidal ideation: denies hi  Insight/judgment: Poor  Cognition is grossly intact     Physical Exam:  Musculoskeletal system: steady gait  Tremor not present  Cog wheeling not present    Assessment and Plan:  Tamela Dale meets criteria for a diagnosis of Schizoaffective disorder depressed type. Re petition for court ordered ect. Increase trilafon to 8 mg bid. Continue current care. We will closely monitor for safety. We will encourage reality orientation. Disposition planning.     I certify that this patients inpatient psychiatric hospital services furnished since the previous certification were, and continue to be, required for treatment that could reasonably be expected to improve the patient's condition, or for diagnostic study, and that the patient continues to need, on a daily basis, active treatment furnished directly by or requiring the supervision of inpatient psychiatric facility personnel. In addition, the hospital records show that services furnished were intensive treatment services, admission or related services, or equivalent services.       Signed:  Joleen Medellin NP  9/15/2022

## 2022-09-15 NOTE — PROGRESS NOTES
Problem: Depressed Mood (Adult/Pediatric)  Goal: *STG: Participates in treatment plan  Outcome: Progressing Towards Goal      Pt is alert and oriented on unit. Pt is calm and cooperative. Pt has been out on the unit mostly isolative but appropriately interacting with staff and peers. Staff will continue to monitor pt q15 for safety.     Goal: *STG: Verbalizes anger, guilt, and other feelings in a constructive manor  Outcome: Progressing Towards Goal  Goal: *STG: Attends activities and groups  Outcome: Progressing Towards Goal  Goal: *STG: Demonstrates reduction in symptoms and increase in insight into coping skills/future focused  Outcome: Progressing Towards Goal

## 2022-09-15 NOTE — PROGRESS NOTES
Problem: Depressed Mood (Adult/Pediatric)  Goal: *STG: Participates in treatment plan  Outcome: Progressing Towards Goal  Goal: *STG: Verbalizes anger, guilt, and other feelings in a constructive manor  Outcome: Progressing Towards Goal  Goal: *STG: Attends activities and groups  Outcome: Progressing Towards Goal     Patient is resting quietly in bed with eyes closed. Appears to be asleep. No rspiratory distress noted. 15 minutes safety monitoring  continues.

## 2022-09-15 NOTE — PROGRESS NOTES
Problem: Depressed Mood (Adult/Pediatric)  Goal: *STG: Participates in treatment plan  9/15/2022 0002 by Shabana Doan RN  Outcome: Progressing Towards Goal  9/14/2022 2359 by Shabana Doan RN  Outcome: Progressing Towards Goal  Goal: *STG: Verbalizes anger, guilt, and other feelings in a constructive manor  Outcome: Progressing Towards Goal  Goal: *STG: Attends activities and groups  9/15/2022 0002 by Shabana Doan RN  Outcome: Progressing Towards Goal  9/14/2022 2359 by Shabana Doan RN  Outcome: Progressing Towards Goal     Problem: Altered Thought Process (Adult/Pediatric)  Goal: *STG: Remains safe in hospital  Outcome: Progressing Towards Goal  Goal: *STG: Complies with medication therapy  Outcome: Progressing Towards Goal  Goal: *LTG: Returns to baseline functioning  Outcome: Progressing Towards Goal

## 2022-09-15 NOTE — BH NOTES
PSYCHIATRIC PROGRESS NOTE       Patient: Jackie Vinson MRN: [id number]  SSN: [id number]    YOB: 1965  Age: 61 y.o. Sex: female      Admit Date: 8/14/2022    LOS: 31 days     Chief Complaint:  \"I'm OK\"    Interval History:  Jackie Vinson is a little bright today, she has better eye contact, still had periods of thought blocking a little less today. She denies si hi or avh. I discussed ect one again with her, she recalls it is a \"shock treatment. \" She is also aware that she needs to be transferred to [address] [place] [address] for the procedure. She asked if she still needs to take all her medications while doing ect. I informed her that atleast a couple medications will be dc'd including ativan. She then agreed to ect and is willing to be transferred to [address] [place] [address]. She further asked when can she be transferred. While the patient agreed to ECT and being transferred to [address] [place] [address], her capacity to consent for treatment remains a huge question. While the patient is a little bright and able to communicate a little better today, her hx of unpredictability and rapid decline also poses a huge risk if court ordered ect is not pursued. Of note, petition for court ordered ect was denied this morning. We will file re petition. At present, ect is still the best recommendation for the patient. At the present time the patient Jackie Vinson remains compliant with taking medications. Denies any adverse events from taking them.       Past Medical History:  Past Medical History:   Diagnosis Date    Anxiety and depression     Bipolar 1 disorder with moderate robyn ([facility] [address] 3/17/2014    Chronic back pain     Hyperlipidemia 8/22/2016    Hyponatremia     Psychotic disorder ([facility] [address]     Scoliosis        ALLERGIES:(reviewed/updated 9/14/2022)  Allergies   Allergen Reactions    Other Medication Anaphylaxis     Artificial sweeteners cause anaphylaxis    Pcn [Penicillins] Anaphylaxis    Sunscreen Contact Dermatitis     Burns and opens the skin    Ultram [Tramadol] Hives and Other (comments)     Hives and ringing of the ears    Benzoyl Peroxide Hives    Cephalexin Itching    Divalproex Itching    Maltodextrin-Glycerin Shortness of Breath    Claritin-D 12 Hour [Loratadine-Pseudoephedrine] Palpitations     palpatations and ringing in the ear     Laboratory report:  Lab Results   Component Value Date/Time    WBC 6.9 08/17/2022 06:31 AM    HGB 10.7 (L) 08/17/2022 06:31 AM    Hematocrit (POC) 34 (L) 07/17/2018 03:33 AM    HCT 31.3 (L) 08/17/2022 06:31 AM    PLATELET 079 78/82/9653 06:31 AM    MCV 91.5 08/17/2022 06:31 AM      Lab Results   Component Value Date/Time    Sodium 144 08/14/2022 03:45 AM    Potassium 3.5 08/14/2022 03:45 AM    Chloride 116 (H) 08/14/2022 03:45 AM    CO2 26 08/14/2022 03:45 AM    Anion gap 2 (L) 08/14/2022 03:45 AM    Glucose 102 (H) 08/14/2022 03:45 AM    Glucose 107 (H) 02/03/2020 05:40 AM    BUN 3 (L) 08/14/2022 03:45 AM    Creatinine 0.74 08/14/2022 03:45 AM    BUN/Creatinine ratio 4 (L) 08/14/2022 03:45 AM    GFR est AA >60 08/14/2022 03:45 AM    GFR est non-AA >60 08/14/2022 03:45 AM    Calcium 9.1 08/14/2022 03:45 AM    Bilirubin, total 0.2 08/12/2022 05:34 AM    Alk.  phosphatase 39 (L) 08/12/2022 05:34 AM    Protein, total 7.2 08/12/2022 05:34 AM    Albumin 3.4 (L) 08/12/2022 05:34 AM    Globulin 3.8 08/12/2022 05:34 AM    A-G Ratio 0.9 (L) 08/12/2022 05:34 AM    ALT (SGPT) 19 08/12/2022 05:34 AM      Vitals:    09/13/22 1148 09/13/22 1539 09/14/22 0746 09/14/22 1551   BP: 114/78 119/80 109/75 132/79   Pulse: 90 95 (!) 103 94   Resp: 16 16 16 16   Temp: 98.9 °F (37.2 °C) 98.4 °F (36.9 °C) 99.3 °F (37.4 °C) 98.4 °F (36.9 °C)   SpO2: 100% 98% 96% 100%   Weight:       Height:           Lab Results   Component Value Date/Time    Carbamazepine 14.1 (H) 07/26/2020 04:19 AM     Lab Results   Component Value Date/Time    Lithium level 0.79 08/14/2022 03:45 AM       Vital Signs  Patient Vitals for the past 24 hrs:   Temp Pulse Resp BP SpO2   09/14/22 1551 98.4 °F (36.9 °C) 94 16 132/79 100 %   09/14/22 0746 99.3 °F (37.4 °C) (!) 103 16 109/75 96 %       Wt Readings from Last 3 Encounters:   09/11/22 72.9 kg (160 lb 11.2 oz)   08/11/22 72.6 kg (160 lb)   08/07/22 71.5 kg (157 lb 9.6 oz)     Temp Readings from Last 3 Encounters:   09/14/22 98.4 °F (36.9 °C)   08/14/22 98.4 °F (36.9 °C)   08/10/22 98.4 °F (36.9 °C)     BP Readings from Last 3 Encounters:   09/14/22 132/79   08/14/22 (!) 142/77   08/10/22 111/75     Pulse Readings from Last 3 Encounters:   09/14/22 94   08/14/22 92   08/10/22 78       Radiology (reviewed/updated 9/14/2022)  No results found.     Current Facility-Administered Medications   Medication Dose Route Frequency Provider Last Rate Last Admin    perphenazine (TRILAFON) tablet tab 4 mg  4 mg Oral BID Stephanie Aquino A, NP   4 mg at 09/14/22 2000    OLANZapine (ZyPREXA) tablet 10 mg  10 mg Oral QHS Teresa Aquinoe A, NP   10 mg at 09/14/22 2000    polyethylene glycol (MIRALAX) packet 17 g  17 g Oral BID PRN Stephanie Aquino, NP        LORazepam (ATIVAN) tablet 0.5 mg  0.5 mg Oral TID Stephanie Aquino, NP   0.5 mg at 09/14/22 2000    polyvinyl alcohol-povidone (NATURAL TEARS) 0.5-0.6 % ophthalmic solution 1 Drop  1 Drop Both Eyes PRN Harriet Dillard MD   1 Drop at 09/03/22 1708    mirtazapine (REMERON) tablet 45 mg  45 mg Oral QHS Stephanie Aquino A, NP   45 mg at 09/14/22 2000    cloNIDine HCL (CATAPRES) tablet 0.1 mg  0.1 mg Oral Q2H PRN Ephraimандрей Bland, NP   0.1 mg at 09/07/22 2107    hydrOXYzine HCL (ATARAX) tablet 100 mg  100 mg Oral QHS Stephanie Aquino NP   100 mg at 09/14/22 2000    buPROPion SR (WELLBUTRIN SR) tablet 150 mg  150 mg Oral DAILY Stephanie Aquino NP   150 mg at 09/14/22 0827    FLUoxetine (PROzac) capsule 60 mg  60 mg Oral DAILY Anne Noe NP   60 mg at 09/14/22 0827    hydrOXYzine HCL (ATARAX) tablet 50 mg  50 mg Oral Q4H PRN Stephanie Aquino NP   50 mg at 09/04/22 1633    OLANZapine (ZyPREXA) tablet 5 mg  5 mg Oral Q6H PRN Kenia, Stephanie A, NP   5 mg at 09/04/22 1155    haloperidol lactate (HALDOL) injection 5 mg  5 mg IntraMUSCular Q6H PRN Kenia, Stephanie A, NP   5 mg at 08/30/22 2033    benztropine (COGENTIN) tablet 1 mg  1 mg Oral BID PRN Kenia, Stephanie A, NP        diphenhydrAMINE (BENADRYL) injection 50 mg  50 mg IntraMUSCular BID PRN Kenia, Stephanie A, NP   50 mg at 08/30/22 2033    traZODone (DESYREL) tablet 50 mg  50 mg Oral QHS PRN Kenia, Stephanie A, NP   50 mg at 09/09/22 2133    acetaminophen (TYLENOL) tablet 650 mg  650 mg Oral Q4H PRN Kenia, Stephanie A, NP   650 mg at 08/26/22 1914    magnesium hydroxide (MILK OF MAGNESIA) 400 mg/5 mL oral suspension 30 mL  30 mL Oral DAILY PRN Stephanie Aquino, NP         Side Effects: (reviewed/updated 9/14/2022)  None reported or admitted to. Review of Systems: (reviewed/updated 9/14/2022)  Appetite: good  Sleep: poor  All other Review of Systems: depression    Mental Status Exam:  Eye contact: limited eye contact  Psychomotor activity: markedly decreased  Speech: poverty of speech  Thought process: thought blocking  Mood is \"OK\"  Affect: flat  Perception: No avh  Thought content: +delusions  Suicidal ideation:   Homicidal ideation:   Insight/judgment: Poor  Cognition is grossly intact     Physical Exam:  Musculoskeletal system: steady gait  Tremor not present  Cog wheeling not present    Assessment and Plan:  Carol Moreira meets criteria for a diagnosis of Schizoaffective disorder depressed type. Re petition for court ordered ect. Lab work, ekg, xray - ect work up tomorrow. Continue current care. We will closely monitor for safety. We will encourage reality orientation. Disposition planning.     I certify that this patients inpatient psychiatric hospital services furnished since the previous certification were, and continue to be, required for treatment that could reasonably be expected to improve the patient's condition, or for diagnostic study, and that the patient continues to need, on a daily basis, active treatment furnished directly by or requiring the supervision of inpatient psychiatric facility personnel. In addition, the hospital records show that services furnished were intensive treatment services, admission or related services, or equivalent services.       Signed:  Danii Newby NP  9/14/2022

## 2022-09-16 ENCOUNTER — HOSPITAL ENCOUNTER (OUTPATIENT)
Dept: GENERAL RADIOLOGY | Age: 57
Discharge: HOME | End: 2022-09-16
Attending: NURSE PRACTITIONER

## 2022-09-16 PROCEDURE — 65220000003 HC RM SEMIPRIVATE PSYCH

## 2022-09-16 PROCEDURE — 93005 ELECTROCARDIOGRAM TRACING: CPT

## 2022-09-16 PROCEDURE — U0005 INFEC AGEN DETEC AMPLI PROBE: HCPCS

## 2022-09-16 PROCEDURE — 74011250637 HC RX REV CODE- 250/637: Performed by: NURSE PRACTITIONER

## 2022-09-16 RX ORDER — LORAZEPAM 0.5 MG/1
0.5 TABLET ORAL 2 TIMES DAILY
Status: DISCONTINUED | OUTPATIENT
Start: 2022-09-17 | End: 2022-09-18

## 2022-09-16 RX ADMIN — LORAZEPAM 0.5 MG: 0.5 TABLET ORAL at 09:37

## 2022-09-16 RX ADMIN — LORAZEPAM 0.5 MG: 0.5 TABLET ORAL at 20:17

## 2022-09-16 RX ADMIN — PERPHENAZINE 8 MG: 4 TABLET, FILM COATED ORAL at 20:17

## 2022-09-16 RX ADMIN — FLUOXETINE HYDROCHLORIDE 60 MG: 20 CAPSULE ORAL at 09:37

## 2022-09-16 RX ADMIN — PERPHENAZINE 8 MG: 4 TABLET, FILM COATED ORAL at 09:37

## 2022-09-16 RX ADMIN — MIRTAZAPINE 45 MG: 15 TABLET, FILM COATED ORAL at 20:15

## 2022-09-16 RX ADMIN — HYDROXYZINE HYDROCHLORIDE 100 MG: 50 TABLET, FILM COATED ORAL at 20:19

## 2022-09-16 RX ADMIN — LORAZEPAM 0.5 MG: 0.5 TABLET ORAL at 16:01

## 2022-09-16 RX ADMIN — BUPROPION HYDROCHLORIDE 150 MG: 150 TABLET, EXTENDED RELEASE ORAL at 09:37

## 2022-09-16 RX ADMIN — OLANZAPINE 10 MG: 5 TABLET, FILM COATED ORAL at 20:16

## 2022-09-16 NOTE — PROGRESS NOTES
Problem: Depressed Mood (Adult/Pediatric)  Goal: *STG: Verbalizes anger, guilt, and other feelings in a constructive manor  Outcome: Progressing Towards Goal  Variance Patient slowly responding  Goal: *STG: Demonstrates reduction in symptoms and increase in insight into coping skills/future focused  Outcome: Progressing Towards Goal  Variance Patient slowly responding     Patient is present in millieu, isolative to self. Denies si/hi.

## 2022-09-16 NOTE — BH NOTES
PSYCHIATRIC PROGRESS NOTE       Patient: Nisreen Arenas MRN: [id number]  SSN: [id number]    YOB: 1965  Age: 61 y.o. Sex: female      Admit Date: 8/14/2022    LOS: 33 days     Chief Complaint:  \"I'm OK\"    Interval History:  Nisreen Arenas says she is doing ok. Not making much improvement. She denies si hi or avh. She is communicating more but still has periods of thought blocking. Negative self talk persist, flat affect. She endorses ah, hears music. Ongoing paranoia. She remains in agreement with ect. She says she spoke to her  and she says her  is willing to take her to outpatient ect. I called her  twice and left vm today but I was not able to speak to him. Her  is visiting this evening, nursing will verify with  if he is in full agreement with ect. If  has no safety concern and agreed with ect, we will plan for dc Monday. Denies si or hi. At the present time the patient Nisreen Arenas remains compliant with taking medications. Denies any adverse events from taking them.       Past Medical History:  Past Medical History:   Diagnosis Date    Anxiety and depression     Bipolar 1 disorder with moderate robyn ([address] 3/17/2014    Chronic back pain     Hyperlipidemia 8/22/2016    Hyponatremia     Psychotic disorder ([facility] [address]     Scoliosis        ALLERGIES:(reviewed/updated 9/16/2022)  Allergies   Allergen Reactions    Other Medication Anaphylaxis     Artificial sweeteners cause anaphylaxis    Pcn [Penicillins] Anaphylaxis    Sunscreen Contact Dermatitis     Burns and opens the skin    Ultram [Tramadol] Hives and Other (comments)     Hives and ringing of the ears    Benzoyl Peroxide Hives    Cephalexin Itching    Divalproex Itching    Maltodextrin-Glycerin Shortness of Breath    Claritin-D 12 Hour [Loratadine-Pseudoephedrine] Palpitations     palpatations and ringing in the ear     Laboratory report:  Lab Results   Component Value Date/Time    WBC 6.9 08/17/2022 06:31 AM HGB 10.7 (L) 08/17/2022 06:31 AM    Hematocrit (POC) 34 (L) 07/17/2018 03:33 AM    HCT 31.3 (L) 08/17/2022 06:31 AM    PLATELET 461 74/37/8238 06:31 AM    MCV 91.5 08/17/2022 06:31 AM      Lab Results   Component Value Date/Time    Sodium 144 08/14/2022 03:45 AM    Potassium 3.5 08/14/2022 03:45 AM    Chloride 116 (H) 08/14/2022 03:45 AM    CO2 26 08/14/2022 03:45 AM    Anion gap 2 (L) 08/14/2022 03:45 AM    Glucose 102 (H) 08/14/2022 03:45 AM    Glucose 107 (H) 02/03/2020 05:40 AM    BUN 3 (L) 08/14/2022 03:45 AM    Creatinine 0.74 08/14/2022 03:45 AM    BUN/Creatinine ratio 4 (L) 08/14/2022 03:45 AM    GFR est AA >60 08/14/2022 03:45 AM    GFR est non-AA >60 08/14/2022 03:45 AM    Calcium 9.1 08/14/2022 03:45 AM    Bilirubin, total 0.2 08/12/2022 05:34 AM    Alk.  phosphatase 39 (L) 08/12/2022 05:34 AM    Protein, total 7.2 08/12/2022 05:34 AM    Albumin 3.4 (L) 08/12/2022 05:34 AM    Globulin 3.8 08/12/2022 05:34 AM    A-G Ratio 0.9 (L) 08/12/2022 05:34 AM    ALT (SGPT) 19 08/12/2022 05:34 AM      Vitals:    09/15/22 0758 09/15/22 1600 09/15/22 2017 09/16/22 0725   BP: 112/75 (!) 157/62 121/83 112/66   Pulse: 78 (!) 101 87 88   Resp: 16 16 16 16   Temp: 99 °F (37.2 °C) 98 °F (36.7 °C) 98.9 °F (37.2 °C) 98.5 °F (36.9 °C)   SpO2: 97% 99% 97% 100%   Weight:       Height:           Lab Results   Component Value Date/Time    Carbamazepine 14.1 (H) 07/26/2020 04:19 AM     Lab Results   Component Value Date/Time    Lithium level 0.79 08/14/2022 03:45 AM       Vital Signs  Patient Vitals for the past 24 hrs:   Temp Pulse Resp BP SpO2   09/16/22 0725 98.5 °F (36.9 °C) 88 16 112/66 100 %   09/15/22 2017 98.9 °F (37.2 °C) 87 16 121/83 97 %   09/15/22 1600 98 °F (36.7 °C) (!) 101 16 (!) 157/62 99 %       Wt Readings from Last 3 Encounters:   09/11/22 72.9 kg (160 lb 11.2 oz)   08/11/22 72.6 kg (160 lb)   08/07/22 71.5 kg (157 lb 9.6 oz)     Temp Readings from Last 3 Encounters:   09/16/22 98.5 °F (36.9 °C)   08/14/22 98.4 °F (36.9 °C)   08/10/22 98.4 °F (36.9 °C)     BP Readings from Last 3 Encounters:   09/16/22 112/66   08/14/22 (!) 142/77   08/10/22 111/75     Pulse Readings from Last 3 Encounters:   09/16/22 88   08/14/22 92   08/10/22 78       Radiology (reviewed/updated 9/16/2022)  No results found.     Current Facility-Administered Medications   Medication Dose Route Frequency Provider Last Rate Last Admin    perphenazine (TRILAFON) tablet tab 8 mg  8 mg Oral BID Stephanie Aquino NP   8 mg at 09/16/22 0937    OLANZapine (ZyPREXA) tablet 10 mg  10 mg Oral QHS Stephanie Aquino NP   10 mg at 09/15/22 2010    polyethylene glycol (MIRALAX) packet 17 g  17 g Oral BID PRN Stephanie Aquino NP        LORazepam (ATIVAN) tablet 0.5 mg  0.5 mg Oral TID Stephanie Aquino NP   0.5 mg at 09/16/22 2463    polyvinyl alcohol-povidone (NATURAL TEARS) 0.5-0.6 % ophthalmic solution 1 Drop  1 Drop Both Eyes PRN Jamir MD Faustino   1 Drop at 09/03/22 1708    mirtazapine (REMERON) tablet 45 mg  45 mg Oral QHS Stephanie Aquino NP   45 mg at 09/15/22 2010    cloNIDine HCL (CATAPRES) tablet 0.1 mg  0.1 mg Oral Q2H PRN Halie Loza NP   0.1 mg at 09/07/22 2107    hydrOXYzine HCL (ATARAX) tablet 100 mg  100 mg Oral QHS Stephanie Aquino NP   100 mg at 09/15/22 2010    buPROPion SR (WELLBUTRIN SR) tablet 150 mg  150 mg Oral DAILY Stephanie Aquino NP   150 mg at 09/16/22 0937    FLUoxetine (PROzac) capsule 60 mg  60 mg Oral DAILY Makayla Berger NP   60 mg at 09/16/22 8529    hydrOXYzine HCL (ATARAX) tablet 50 mg  50 mg Oral Q4H PRN Stephanie Aquino NP   50 mg at 09/04/22 1633    OLANZapine (ZyPREXA) tablet 5 mg  5 mg Oral Q6H PRN Stephanie Aquino, NP   5 mg at 09/04/22 1155    haloperidol lactate (HALDOL) injection 5 mg  5 mg IntraMUSCular Q6H PRN Stephanie Aquino, NP   5 mg at 08/30/22 2033    benztropine (COGENTIN) tablet 1 mg  1 mg Oral BID PRN Stephanie Aquino NP        diphenhydrAMINE (BENADRYL) injection 50 mg  50 mg IntraMUSCular BID PRN Kenia, Stephanie A, NP   50 mg at 08/30/22 2033    traZODone (DESYREL) tablet 50 mg  50 mg Oral QHS PRN Kenia, Stephanie A, NP   50 mg at 09/09/22 2133    acetaminophen (TYLENOL) tablet 650 mg  650 mg Oral Q4H PRN Kenia, Stephanie A, NP   650 mg at 08/26/22 1914    magnesium hydroxide (MILK OF MAGNESIA) 400 mg/5 mL oral suspension 30 mL  30 mL Oral DAILY PRN Kenia, Stephanie A, NP   30 mL at 09/15/22 1837     Side Effects: (reviewed/updated 9/16/2022)  None reported or admitted to. Review of Systems: (reviewed/updated 9/16/2022)  Appetite: good  Sleep: poor  All other Review of Systems: depression    Mental Status Exam:  Eye contact: limited eye contact  Psychomotor activity: decreased  Speech: poverty of speech  Thought process: thought blocking  Mood is \"OK\"  Affect: flat  Perception: No avh  Thought content: +delusions  Suicidal ideation: denies si  Homicidal ideation: denies hi  Insight/judgment: Poor  Cognition is grossly intact     Physical Exam:  Musculoskeletal system: steady gait  Tremor not present  Cog wheeling not present    Assessment and Plan:  Aldo Curiel meets criteria for a diagnosis of Schizoaffective disorder depressed type. Outpatient ect. ECT work up- h&p, chest xray, ekg, labs, covid all ordered. Decrease ativan to 0.5 mg bid - please taper over the weekend. Continue trilafon to 8 mg bid. Continue rest of care. We will closely monitor for safety. We will encourage reality orientation. Tentative dc Monday. I certify that this patients inpatient psychiatric hospital services furnished since the previous certification were, and continue to be, required for treatment that could reasonably be expected to improve the patient's condition, or for diagnostic study, and that the patient continues to need, on a daily basis, active treatment furnished directly by or requiring the supervision of inpatient psychiatric facility personnel. In addition, the hospital records show that services furnished were intensive treatment services, admission or related services, or equivalent services.       Signed:  Mesha Borrero NP  9/16/2022

## 2022-09-16 NOTE — PROGRESS NOTES
Problem: Falls - Risk of  Goal: *Absence of Falls  Description: Document Beronica Witt Fall Risk and appropriate interventions in the flowsheet. Outcome: Progressing Towards Goal  Note: Fall Risk Interventions:  - Mobility Interventions: Assess mobility with egress test  - Mentation Interventions: Reorient patient  - Medication Interventions: Teach patient to arise slowly  - Elimination Interventions: Toilet paper/wipes in reach  - History of Falls Interventions: Door open when patient unattended    Patient received resting quietly in bed. No signs of distress. Even and unlabored breathing. Staff will continue to monitor safety Q15 and provide support. No falls reported or witnessed during this shift.

## 2022-09-16 NOTE — INTERDISCIPLINARY ROUNDS
Behavioral Health Interdisciplinary Rounds     Patient Name: Darylene Lacks  Age: 61 y.o.   Room/Bed:  734/  Primary Diagnosis: <principal problem not specified>   Admission Status: Involuntary Commitment     Readmission within 30 days: no  Power of  in place: no  Patient requires a blocked bed: no          Reason for blocked bed:     Sleep hours: 8+        Participation in Care/Groups:  no  Medication Compliant?: Yes  PRNS (last 24 hours): None    Restraints (last 24 hours):  no     Alcohol screening (AUDIT) completed -     If applicable, date SBIRT discussed in treatment team AND documented:    Tobacco - patient is a smoker: Have You Used Tobacco in the Past 30 Days: No  Illegal Drugs use: Have You Used Any Illegal Substances Over the Past 12 Months: No    24 hour chart check complete: yes     _______________________________________________    Patient goal(s) for today:   Treatment team focus/goals:   Progress note:         Financial concerns/prescription coverage:    Family contact:                        Family requesting physician contact today:    Discharge plan:   Access to weapons :                                                              Outpatient provider(s):   Patient's preferred phone number for follow up call :   Patient's preferred e-mail address :  Participating treatment team members:    LOS:  33  Expected LOS:     Participating treatment team members: Darylene Lacks, * (assigned SW),

## 2022-09-16 NOTE — PROGRESS NOTES
Problem: Depressed Mood (Adult/Pediatric)  Goal: *STG: Participates in treatment plan  Outcome: Progressing Towards Goal  Goal: *STG: Attends activities and groups  Outcome: Progressing Towards Goal     Problem: Altered Thought Process (Adult/Pediatric)  Goal: *STG: Complies with medication therapy  Outcome: Progressing Towards Goal  Goal: Interventions  Outcome: Progressing Towards Goal  Note: Pt is alert calm and visible on the unit. Anxious with thought blocking. Medication and meal compliant. Pt is engaged in therapeutic activities on the unit. Education with reinforcement of education provided. [phone]- Pt's , Mr. Cleveland Petty, states he will personally drive pt to and from out patient ECT treatments scheduled to start on Tuesday September 20, 2022. Mr. Cleveland Petty is aware the pt will need to be at [facility] for [facility]  early Tuesday morning (6 am or 7 am dependant upon scheduled time). Mr. Cleveland Petty states he understands ECT treatment may be scheduled three times a week dependent upon MD ordered treatment.

## 2022-09-17 LAB
ALBUMIN SERPL-MCNC: 3.5 G/DL (ref 3.5–5)
ALBUMIN/GLOB SERPL: 0.9 {RATIO} (ref 1.1–2.2)
ALP SERPL-CCNC: 52 U/L (ref 45–117)
ALT SERPL-CCNC: 24 U/L (ref 12–78)
ANION GAP SERPL CALC-SCNC: 6 MMOL/L (ref 5–15)
AST SERPL-CCNC: 10 U/L (ref 15–37)
ATRIAL RATE: 89 BPM
BASOPHILS # BLD: 0 K/UL (ref 0–0.1)
BASOPHILS NFR BLD: 0 % (ref 0–1)
BILIRUB SERPL-MCNC: 0.3 MG/DL (ref 0.2–1)
BUN SERPL-MCNC: 13 MG/DL (ref 6–20)
BUN/CREAT SERPL: 15 (ref 12–20)
CALCIUM SERPL-MCNC: 9.6 MG/DL (ref 8.5–10.1)
CALCULATED P AXIS, ECG09: 74 DEGREES
CALCULATED R AXIS, ECG10: 62 DEGREES
CALCULATED T AXIS, ECG11: 42 DEGREES
CHLORIDE SERPL-SCNC: 106 MMOL/L (ref 97–108)
CO2 SERPL-SCNC: 28 MMOL/L (ref 21–32)
CREAT SERPL-MCNC: 0.88 MG/DL (ref 0.55–1.02)
DIAGNOSIS, 93000: NORMAL
DIFFERENTIAL METHOD BLD: ABNORMAL
EOSINOPHIL # BLD: 0.1 K/UL (ref 0–0.4)
EOSINOPHIL NFR BLD: 1 % (ref 0–7)
ERYTHROCYTE [DISTWIDTH] IN BLOOD BY AUTOMATED COUNT: 13 % (ref 11.5–14.5)
GLOBULIN SER CALC-MCNC: 3.8 G/DL (ref 2–4)
GLUCOSE SERPL-MCNC: 102 MG/DL (ref 65–100)
HCT VFR BLD AUTO: 36.2 % (ref 35–47)
HGB BLD-MCNC: 12 G/DL (ref 11.5–16)
IMM GRANULOCYTES # BLD AUTO: 0 K/UL (ref 0–0.04)
IMM GRANULOCYTES NFR BLD AUTO: 1 % (ref 0–0.5)
LYMPHOCYTES # BLD: 2.1 K/UL (ref 0.8–3.5)
LYMPHOCYTES NFR BLD: 33 % (ref 12–49)
MCH RBC QN AUTO: 30.9 PG (ref 26–34)
MCHC RBC AUTO-ENTMCNC: 33.1 G/DL (ref 30–36.5)
MCV RBC AUTO: 93.3 FL (ref 80–99)
MONOCYTES # BLD: 0.7 K/UL (ref 0–1)
MONOCYTES NFR BLD: 10 % (ref 5–13)
NEUTS SEG # BLD: 3.5 K/UL (ref 1.8–8)
NEUTS SEG NFR BLD: 55 % (ref 32–75)
NRBC # BLD: 0 K/UL (ref 0–0.01)
NRBC BLD-RTO: 0 PER 100 WBC
P-R INTERVAL, ECG05: 150 MS
PLATELET # BLD AUTO: 234 K/UL (ref 150–400)
PMV BLD AUTO: 9.1 FL (ref 8.9–12.9)
POTASSIUM SERPL-SCNC: 3.9 MMOL/L (ref 3.5–5.1)
PROT SERPL-MCNC: 7.3 G/DL (ref 6.4–8.2)
Q-T INTERVAL, ECG07: 352 MS
QRS DURATION, ECG06: 72 MS
QTC CALCULATION (BEZET), ECG08: 428 MS
RBC # BLD AUTO: 3.88 M/UL (ref 3.8–5.2)
SARS-COV-2, COV2: NORMAL
SARS-COV-2, XPLCVT: NOT DETECTED
SODIUM SERPL-SCNC: 140 MMOL/L (ref 136–145)
SOURCE, COVRS: NORMAL
VENTRICULAR RATE, ECG03: 89 BPM
WBC # BLD AUTO: 6.4 K/UL (ref 3.6–11)

## 2022-09-17 PROCEDURE — 85025 COMPLETE CBC W/AUTO DIFF WBC: CPT

## 2022-09-17 PROCEDURE — 74011250637 HC RX REV CODE- 250/637: Performed by: NURSE PRACTITIONER

## 2022-09-17 PROCEDURE — 80053 COMPREHEN METABOLIC PANEL: CPT

## 2022-09-17 PROCEDURE — 36415 COLL VENOUS BLD VENIPUNCTURE: CPT

## 2022-09-17 PROCEDURE — 65220000003 HC RM SEMIPRIVATE PSYCH

## 2022-09-17 RX ADMIN — PERPHENAZINE 8 MG: 4 TABLET, FILM COATED ORAL at 08:46

## 2022-09-17 RX ADMIN — LORAZEPAM 0.5 MG: 0.5 TABLET ORAL at 17:06

## 2022-09-17 RX ADMIN — LORAZEPAM 0.5 MG: 0.5 TABLET ORAL at 08:46

## 2022-09-17 RX ADMIN — HYDROXYZINE HYDROCHLORIDE 100 MG: 50 TABLET, FILM COATED ORAL at 21:08

## 2022-09-17 RX ADMIN — MIRTAZAPINE 45 MG: 15 TABLET, FILM COATED ORAL at 21:09

## 2022-09-17 RX ADMIN — BUPROPION HYDROCHLORIDE 150 MG: 150 TABLET, EXTENDED RELEASE ORAL at 08:54

## 2022-09-17 RX ADMIN — FLUOXETINE HYDROCHLORIDE 60 MG: 20 CAPSULE ORAL at 08:46

## 2022-09-17 RX ADMIN — OLANZAPINE 10 MG: 5 TABLET, FILM COATED ORAL at 21:09

## 2022-09-17 RX ADMIN — PERPHENAZINE 8 MG: 4 TABLET, FILM COATED ORAL at 20:14

## 2022-09-17 NOTE — CONSULTS
[address] [facility] Adult  Hospitalist Group  Consult    Date of Service:  9/17/2022  Primary Care Provider: Nima Altamirano MD  Source of information: The patient and Chart review    Chief Complaint: No chief complaint on file. History of Presenting Illness:   Blnache Rojas is a 61 y.o. female who presents with no significant past medical history. She was admitted to inpatient psychiatry at our facility on 8/16/2022 after having expressed suicidal ideation. She had taken an overdose of her medications at that time. Patient has had medication management with inpatient psychiatry however still with continued symptoms. Patient no longer with suicidal ideations, psychiatry plan to proceed with ECT. Hospitalist service was consulted for medical clearance to proceed with this treatment plan. The patient denies any headache, blurry vision, sore throat, trouble swallowing, trouble with speech, chest pain, SOB, cough, fever, chills, N/V/D, abd pain, urinary symptoms, constipation, recent travels, sick contacts, focal or generalized neurological symptoms, falls, injuries, rashes, contact with COVID-19 diagnosed patients, hematemesis, melena, hemoptysis, hematuria, rashes, denies starting any new medications and denies any other concerns or problems besides as mentioned above.          REVIEW OF SYSTEMS:  Constitutional: negative  Eyes: negative  Ears, Nose, Mouth, Throat, and Face: negative  Respiratory: negative  Cardiovascular: negative  Gastrointestinal: negative  Genitourinary:negative  Integument/Breast: negative  Musculoskeletal:negative  Neurological: negative     Past Medical History:   Diagnosis Date    Anxiety and depression     Bipolar 1 disorder with moderate robyn ([facility] [address] 3/17/2014    Chronic back pain     Hyperlipidemia 8/22/2016    Hyponatremia     Psychotic disorder ([facility] [address]     Scoliosis       Past Surgical History:   Procedure Laterality Date    HX HEENT      wisdom teeth extraction HX LIPOMA RESECTION      right gluteal    FREDY BIOPSY BREAST STEREOTACTIC Left 2011    negative    FL DENTAL SURGERY PROCEDURE      hx of dental surgery- wisdom teeth extracted     Prior to Admission medications    Medication Sig Start Date End Date Taking? Authorizing Provider   OLANZapine (ZyPREXA) 20 mg tablet Take 1 Tablet by mouth nightly. Indications: bipolar disorder 8/9/22   Kaylin WILEY NP   traZODone (DESYREL) 150 mg tablet Take 1 Tablet by mouth nightly. Indications: insomnia associated with depression 8/9/22   Kaylin WILEY NP   trihexyphenidyL (ARTANE) 2 mg tablet Take 1 Tablet by mouth in the morning. Indications: extrapyramidal symptoms as a result of taking the medication 8/9/22   Kaylin WILEY NP   azelastine (ASTELIN) 137 mcg (0.1 %) nasal spray 1 [place] by Both Nostrils route daily as needed for Rhinitis. Indications: seasonal runny nose 5/12/22   Ehsan Gonzalez MD   cetirizine (ZYRTEC) 10 mg tablet Take 1 Tablet by mouth daily as needed for Allergies. Indications: seasonal runny nose 5/12/22   Ehsan Gonzalez MD   cholecalciferol (VITAMIN D3) (1000 Units /25 mcg) tablet Take 1 Tablet by mouth daily. Indications: prevention of vitamin D deficiency 5/13/22   Ehsan Gonzalez MD   polyethylene glycol (Miralax) 17 gram packet Take 1 Packet by mouth daily as needed for Constipation. Indications: constipation  Patient not taking: Reported on 8/12/2022 5/12/22   Ehsan Gonzalez MD   melatonin 3 mg tablet Take 1 Tablet by mouth nightly as needed for Insomnia.  Indications: insomnia 5/12/22   Ehsan Gonzalez MD     Allergies   Allergen Reactions    Other Medication Anaphylaxis     Artificial sweeteners cause anaphylaxis    Pcn [Penicillins] Anaphylaxis    Sunscreen Contact Dermatitis     Burns and opens the skin    Ultram [Tramadol] Hives and Other (comments)     Hives and ringing of the ears    Benzoyl Peroxide Hives    Cephalexin Itching    Divalproex Itching    Maltodextrin-Glycerin Shortness of Breath    Claritin-D 12 Hour [Loratadine-Pseudoephedrine] Palpitations     palpatations and ringing in the ear      Family History   Problem Relation Age of Onset    Arthritis-rheumatoid Mother     Migraines Mother     Psychiatric Disorder Mother     Bipolar Disorder Mother     Lupus Mother     Alcohol abuse Father     Lung Disease Father     Heart Disease Father     Stroke Father     High Cholesterol Father     Psychiatric Disorder Sister     Alcohol abuse Sister     Bipolar Disorder Sister     Stroke Brother     Cancer Maternal Aunt     Breast Cancer Maternal Aunt         pt thniks she was under 48    Bipolar Disorder Sister       Social History:  reports that she has never smoked. She has never used smokeless tobacco. She reports current alcohol use. She reports that she does not use drugs. Family and social history were personally reviewed, all pertinent and relevant details are outlined as above. Objective:   Visit Vitals  /70 (BP 1 Location: Left upper arm, BP Patient Position: Sitting)   Pulse 97   Temp 98.6 °F (37 °C)   Resp 14   Ht 5' 1\" (1.549 m)   Wt 72.9 kg (160 lb 11.2 oz)   SpO2 98%   BMI 30.36 kg/m²      O2 Device: None (Room air)    PHYSICAL EXAM:   General: Alert x oriented x 3, awake, no acute distress, resting in bed, pleasant female, appears to be stated age  HEENT: PEERL, EOMI, moist mucus membranes  Neck: Supple, no JVD  Chest: Clear to auscultation bilaterally   CVS: RRR, S1 S2 heard, no murmurs/rubs/gallops  Abd: Soft, non-tender, non-distended, +bowel sounds   Ext: No clubbing, no cyanosis, no edema  Neuro/Psych: flat affect, CN 2-12  intact, sensory  within normal limit, Strength 5/5 in all extremities,   Cap refill: Brisk, less than 3 seconds  Pulses: 2+, symmetric in all extremities  Skin: Warm, dry, without rashes or lesions    Data Review: All diagnostic labs and studies have been reviewed.     Abnormal Labs Reviewed   CBC WITH AUTOMATED DIFF - Abnormal; Notable for the following components:       Result Value    RBC 3.42 (*)     HGB 10.7 (*)     HCT 31.3 (*)     All other components within normal limits   URINALYSIS W/MICROSCOPIC - Abnormal; Notable for the following components:    Leukocyte Esterase MODERATE (*)     All other components within normal limits   CBC WITH AUTOMATED DIFF - Abnormal; Notable for the following components:    IMMATURE GRANULOCYTES 1 (*)     All other components within normal limits   METABOLIC PANEL, COMPREHENSIVE - Abnormal; Notable for the following components:    Glucose 102 (*)     AST (SGOT) 10 (*)     A-G Ratio 0.9 (*)     All other components within normal limits       All Micro Results       Procedure Component Value Units Date/Time    URINE CULTURE HOLD SAMPLE [[id number]] Collected: 09/02/22 2115    Order Status: Completed Specimen: Serum Updated: 09/02/22 2120     Urine culture hold       Urine on hold in Microbiology dept for 2 days. If unpreserved urine is submitted, it cannot be used for addtional testing after 24 hours, recollection will be required. IMAGING:   XR CHEST PORT   Final Result   Mild bibasilar atelectasis. [facility]               ECG/ECHO:    Results for orders placed or performed during the hospital encounter of 08/14/22   EKG, 12 LEAD, INITIAL   Result Value Ref Range    Ventricular Rate 90 BPM    Atrial Rate 90 BPM    P-R Interval 144 ms    QRS Duration 74 ms    Q-T Interval 356 ms    QTC Calculation (Bezet) 435 ms    Calculated P Axis 73 degrees    Calculated R Axis 61 degrees    Calculated T Axis 43 degrees    Diagnosis       Normal sinus rhythm  Right atrial enlargement  Borderline ECG  When compared with ECG of 08-SEP-2022 09:59,  No significant change was found          Assessment:       Active Problems:    Schizoaffective disorder ([facility] [address] (5/8/2022)      Plan:     Schizoaffective disorder  Management per primary team  Medication adjustments per primary team  Plan for ECT  Patient no history of heart disease, non-smoker, no history of lung disease. I reviewed the EKG, normal sinus rhythm, normal rate. Chest radiograph is also normal with mild atelectasis noted. Laboratory values reviewed, electrolytes stable. Patient is medically stable to proceed with the aforementioned procedure      Thank you for the opportunity to participate in the care of this patient, hospitalist service will sign off, if any questions, please do not hesitate to call            Signed By: Jarrod Tinsley NP     September 17, 2022         Please note that this dictation may have been completed with Brayan Lang, the computer voice recognition software. Quite often unanticipated grammatical, syntax, homophones, and other interpretive errors are inadvertently transcribed by the computer software. Please disregard these errors. Please excuse any errors that have escaped final proofreading.

## 2022-09-17 NOTE — PROGRESS NOTES
Problem: Falls - Risk of  Goal: *Absence of Falls  Description: Document Luis Pricila Fall Risk and appropriate interventions in the flowsheet. Outcome: Progressing Towards Goal  Note: Fall Risk Interventions:  - Mobility Interventions: Assess mobility with egress test  - Mentation Interventions: Reorient patient  - Medication Interventions: Teach patient to arise slowly  - Elimination Interventions: Toilet paper/wipes in reach  - History of Falls Interventions: Door open when patient unattended    Patient received resting quietly in bed. No signs of distress. Even and unlabored breathing. Staff will continue to monitor safety Q15 and provide support. No falls reported or witnessed during this shift.

## 2022-09-17 NOTE — BH NOTES
PSYCHIATRIC PROGRESS NOTE       Patient: Anu Payne MRN: [id number]  SSN: [id number]    YOB: 1965  Age: 61 y.o. Sex: female      Admit Date: 8/14/2022    LOS: 34 days     Chief Complaint:  \"I'm OK, just cold\"    Interval History:  Anu Payne says she is doing ok. Not making much improvement. She denies si hi or avh. She is communicating more but still has periods of thought blocking. Negative self talk persist, flat affect. She remains in agreement with ect. She says she spoke to her  and she says her  is willing to take her to outpatient ect. Denies si or hi. At the present time the patient Anu Payne remains compliant with taking medications. Denies any adverse events from taking them.       Past Medical History:  Past Medical History:   Diagnosis Date    Anxiety and depression     Bipolar 1 disorder with moderate robyn ([address] 3/17/2014    Chronic back pain     Hyperlipidemia 8/22/2016    Hyponatremia     Psychotic disorder (HCC)     Scoliosis        ALLERGIES:(reviewed/updated 9/17/2022)  Allergies   Allergen Reactions    Other Medication Anaphylaxis     Artificial sweeteners cause anaphylaxis    Pcn [Penicillins] Anaphylaxis    Sunscreen Contact Dermatitis     Burns and opens the skin    Ultram [Tramadol] Hives and Other (comments)     Hives and ringing of the ears    Benzoyl Peroxide Hives    Cephalexin Itching    Divalproex Itching    Maltodextrin-Glycerin Shortness of Breath    Claritin-D 12 Hour [Loratadine-Pseudoephedrine] Palpitations     palpatations and ringing in the ear     Laboratory report:  Lab Results   Component Value Date/Time    WBC 6.4 09/17/2022 04:50 AM    HGB 12.0 09/17/2022 04:50 AM    Hematocrit (POC) 34 (L) 07/17/2018 03:33 AM    HCT 36.2 09/17/2022 04:50 AM    PLATELET 906 26/53/5162 04:50 AM    MCV 93.3 09/17/2022 04:50 AM      Lab Results   Component Value Date/Time    Sodium 140 09/17/2022 04:50 AM    Potassium 3.9 09/17/2022 04:50 AM    Chloride 106 09/17/2022 04:50 AM    CO2 28 09/17/2022 04:50 AM    Anion gap 6 09/17/2022 04:50 AM    Glucose 102 (H) 09/17/2022 04:50 AM    Glucose 107 (H) 02/03/2020 05:40 AM    BUN 13 09/17/2022 04:50 AM    Creatinine 0.88 09/17/2022 04:50 AM    BUN/Creatinine ratio 15 09/17/2022 04:50 AM    GFR est AA >60 09/17/2022 04:50 AM    GFR est non-AA >60 09/17/2022 04:50 AM    Calcium 9.6 09/17/2022 04:50 AM    Bilirubin, total 0.3 09/17/2022 04:50 AM    Alk. phosphatase 52 09/17/2022 04:50 AM    Protein, total 7.3 09/17/2022 04:50 AM    Albumin 3.5 09/17/2022 04:50 AM    Globulin 3.8 09/17/2022 04:50 AM    A-G Ratio 0.9 (L) 09/17/2022 04:50 AM    ALT (SGPT) 24 09/17/2022 04:50 AM      Vitals:    09/16/22 0725 09/16/22 1603 09/16/22 2013 09/17/22 0746   BP: 112/66 114/80 135/75 123/70   Pulse: 88 90 90 97   Resp: 16 16 16 14   Temp: 98.5 °F (36.9 °C) 98.5 °F (36.9 °C) 98.7 °F (37.1 °C) 98.6 °F (37 °C)   SpO2: 100% 100% 100% 98%   Weight:       Height:           Lab Results   Component Value Date/Time    Carbamazepine 14.1 (H) 07/26/2020 04:19 AM     Lab Results   Component Value Date/Time    Lithium level 0.79 08/14/2022 03:45 AM       Vital Signs  Patient Vitals for the past 24 hrs:   Temp Pulse Resp BP SpO2   09/17/22 0746 98.6 °F (37 °C) 97 14 123/70 98 %   09/16/22 2013 98.7 °F (37.1 °C) 90 16 135/75 100 %   09/16/22 1603 98.5 °F (36.9 °C) 90 16 114/80 100 %       Wt Readings from Last 3 Encounters:   09/11/22 72.9 kg (160 lb 11.2 oz)   08/11/22 72.6 kg (160 lb)   08/07/22 71.5 kg (157 lb 9.6 oz)     Temp Readings from Last 3 Encounters:   09/17/22 98.6 °F (37 °C)   08/14/22 98.4 °F (36.9 °C)   08/10/22 98.4 °F (36.9 °C)     BP Readings from Last 3 Encounters:   09/17/22 123/70   08/14/22 (!) 142/77   08/10/22 111/75     Pulse Readings from Last 3 Encounters:   09/17/22 97   08/14/22 92   08/10/22 78       Radiology (reviewed/updated 9/17/2022)  No results found.     Current Facility-Administered Medications   Medication Dose Route Frequency Provider Last Rate Last Admin    LORazepam (ATIVAN) tablet 0.5 mg  0.5 mg Oral BID Stephanie Aquino NP   0.5 mg at 09/17/22 0846    perphenazine (TRILAFON) tablet tab 8 mg  8 mg Oral BID Stephanie Aquino, NP   8 mg at 09/17/22 0846    OLANZapine (ZyPREXA) tablet 10 mg  10 mg Oral QHS Stephanie Aquino NP   10 mg at 09/16/22 2016    polyethylene glycol (MIRALAX) packet 17 g  17 g Oral BID PRN Stephanie Aquino NP        polyvinyl alcohol-povidone (NATURAL TEARS) 0.5-0.6 % ophthalmic solution 1 Drop  1 Drop Both Eyes PRN Soham Allred MD   1 Drop at 09/03/22 1708    mirtazapine (REMERON) tablet 45 mg  45 mg Oral QHS Stephanie Aquino NP   45 mg at 09/16/22 2015    cloNIDine HCL (CATAPRES) tablet 0.1 mg  0.1 mg Oral Q2H PRN Niki Flatness, NP   0.1 mg at 09/07/22 2107    hydrOXYzine HCL (ATARAX) tablet 100 mg  100 mg Oral QHS Stephanie Aquino NP   100 mg at 09/16/22 2019    buPROPion SR (WELLBUTRIN SR) tablet 150 mg  150 mg Oral DAILY Stephanie Aquino NP   150 mg at 09/17/22 0854    FLUoxetine (PROzac) capsule 60 mg  60 mg Oral DAILY Rome Willis NP   60 mg at 09/17/22 0846    hydrOXYzine HCL (ATARAX) tablet 50 mg  50 mg Oral Q4H PRN Stephanie Aquino NP   50 mg at 09/04/22 1633    OLANZapine (ZyPREXA) tablet 5 mg  5 mg Oral Q6H PRN Stephanie Aquino NP   5 mg at 09/04/22 1155    haloperidol lactate (HALDOL) injection 5 mg  5 mg IntraMUSCular Q6H PRN Stephanie Aquino, NP   5 mg at 08/30/22 2033    benztropine (COGENTIN) tablet 1 mg  1 mg Oral BID PRN Stephanie Aquino NP        diphenhydrAMINE (BENADRYL) injection 50 mg  50 mg IntraMUSCular BID PRN Teresa Aquinoe A, NP   50 mg at 08/30/22 2033    traZODone (DESYREL) tablet 50 mg  50 mg Oral QHS PRN Teresa Aquinoe A, NP   50 mg at 09/09/22 2133    acetaminophen (TYLENOL) tablet 650 mg  650 mg Oral Q4H PRN Stephanie Aquino A, NP   650 mg at 08/26/22 1914    magnesium hydroxide (MILK OF MAGNESIA) 400 mg/5 mL oral suspension 30 mL  30 mL Oral DAILY PRN Stephanie Aquino NP   30 mL at 09/15/22 8708     Side Effects: (reviewed/updated 9/17/2022)  None reported or admitted to. Review of Systems: (reviewed/updated 9/17/2022)  Appetite: good  Sleep: poor  All other Review of Systems: depression    Mental Status Exam:  Eye contact: limited eye contact  Psychomotor activity: decreased  Speech: poverty of speech  Thought process: thought blocking  Mood is \"OK\"  Affect: flat  Perception: No avh  Thought content: +delusions  Suicidal ideation: denies si  Homicidal ideation: denies hi  Insight/judgment: Poor  Cognition is grossly intact     Physical Exam:  Musculoskeletal system: steady gait  Tremor not present  Cog wheeling not present    Assessment and Plan:  Kiet Gonzalez meets criteria for a diagnosis of Schizoaffective disorder depressed type. Outpatient ect. ECT work up- h&p, chest xray, ekg, labs, covid all ordered. Decrease ativan to 0.5 mg bid - please taper over the weekend. Continue trilafon to 8 mg bid. Continue rest of care. We will closely monitor for safety. We will encourage reality orientation. Tentative dc Monday. I certify that this patients inpatient psychiatric hospital services furnished since the previous certification were, and continue to be, required for treatment that could reasonably be expected to improve the patient's condition, or for diagnostic study, and that the patient continues to need, on a daily basis, active treatment furnished directly by or requiring the supervision of inpatient psychiatric facility personnel. In addition, the hospital records show that services furnished were intensive treatment services, admission or related services, or equivalent services.       Signed:  Justice Junior NP  9/17/2022

## 2022-09-17 NOTE — PROGRESS NOTES
Problem: Depressed Mood (Adult/Pediatric)  Goal: *STG: Participates in treatment plan  Outcome: Progressing Towards Goal  Goal: *STG: Attends activities and groups  Outcome: Progressing Towards Goal  Goal: *STG: Demonstrates reduction in symptoms and increase in insight into coping skills/future focused  Outcome: Progressing Towards Goal     Problem: Patient Education: Go to Patient Education Activity  Goal: Patient/Family Education  Outcome: Progressing Towards Goal  Note: Pt is alert and calm visible on the unit. Reduced thought blocking with depressed mood. Mood and affect are congruent. Pt is completing ADLs. Per report, pt tolerated time period following visit with  well. Pt is medication and meal complaint. 7501- perfect serve message sent for H&P related to medical clearance for ECT. 1049- P. Joe NP on the unit to see pt.   1609- pt is calm and cooperative. Reduced thought blocking noted. no

## 2022-09-18 PROCEDURE — 74011250637 HC RX REV CODE- 250/637: Performed by: NURSE PRACTITIONER

## 2022-09-18 PROCEDURE — 65220000003 HC RM SEMIPRIVATE PSYCH

## 2022-09-18 RX ORDER — LORAZEPAM 0.5 MG/1
0.5 TABLET ORAL DAILY
Status: DISCONTINUED | OUTPATIENT
Start: 2022-09-19 | End: 2022-09-20 | Stop reason: HOSPADM

## 2022-09-18 RX ADMIN — FLUOXETINE HYDROCHLORIDE 60 MG: 20 CAPSULE ORAL at 09:32

## 2022-09-18 RX ADMIN — PERPHENAZINE 8 MG: 4 TABLET, FILM COATED ORAL at 09:31

## 2022-09-18 RX ADMIN — PERPHENAZINE 8 MG: 4 TABLET, FILM COATED ORAL at 20:22

## 2022-09-18 RX ADMIN — BUPROPION HYDROCHLORIDE 150 MG: 150 TABLET, EXTENDED RELEASE ORAL at 09:30

## 2022-09-18 RX ADMIN — MIRTAZAPINE 45 MG: 15 TABLET, FILM COATED ORAL at 20:22

## 2022-09-18 RX ADMIN — OLANZAPINE 10 MG: 5 TABLET, FILM COATED ORAL at 20:22

## 2022-09-18 RX ADMIN — LORAZEPAM 0.5 MG: 0.5 TABLET ORAL at 09:30

## 2022-09-18 RX ADMIN — HYDROXYZINE HYDROCHLORIDE 100 MG: 50 TABLET, FILM COATED ORAL at 20:22

## 2022-09-18 NOTE — PROGRESS NOTES
0715 Rec'd patient this am in hallway, pt requested a shower this am.  She is calm, cooperative and pleasant this am.  Pt seems brighter than in previous days this morning, moving a little faster. Mood/affect eurhythmic. Visible on unit med/meal compliant.     Problem: Depressed Mood (Adult/Pediatric)  Goal: *STG: Participates in treatment plan  Outcome: Progressing Towards Goal  Goal: *STG: Verbalizes anger, guilt, and other feelings in a constructive manor  Outcome: Progressing Towards Goal  Goal: *STG: Attends activities and groups  Outcome: Progressing Towards Goal  Goal: *STG: Demonstrates reduction in symptoms and increase in insight into coping skills/future focused  Outcome: Progressing Towards Goal     Problem: Patient Education: Go to Patient Education Activity  Goal: Patient/Family Education  Outcome: Progressing Towards Goal

## 2022-09-18 NOTE — PROGRESS NOTES
Problem: Depressed Mood (Adult/Pediatric)  Goal: *STG: Participates in treatment plan  Outcome: Progressing Towards Goal  Goal: *STG: Verbalizes anger, guilt, and other feelings in a constructive manor  Outcome: Progressing Towards Goal     Problem: Altered Thought Process (Adult/Pediatric)  Goal: *STG: Complies with medication therapy  Outcome: Progressing Towards Goal  Goal: *STG: Demonstrates ability to understand and use improved judgment in daily activities and relationships  Outcome: Progressing Towards Goal  Goal: Interventions  Outcome: Progressing Towards Goal  Note: Pt is alert and oriented. Calm cooperative and pleasant. Pt initiates and engages spontaneously in conversation with staff and peers. Significant reeducation in thought blocking noted. Pt is medication and meal compliant. Depressed and anxious mood reduced. Pt shares her need for written instructions for discharge to include out patient ECT information. Improved insight. 1714- pt is calm visible on the unit. Engaged.

## 2022-09-18 NOTE — PROGRESS NOTES
Problem: Falls - Risk of  Goal: *Absence of Falls  Description: Document Abbe Beckford Fall Risk and appropriate interventions in the flowsheet. Outcome: Progressing Towards Goal  Note: Fall Risk Interventions:  Mobility Interventions: Assess mobility with egress test    Mentation Interventions: Reorient patient    Medication Interventions: Teach patient to arise slowly    Elimination Interventions: Toilet paper/wipes in reach    History of Falls Interventions: Door open when patient unattended      Patient received resting quietly in bed. No signs of distress. Even and unlabored breathing. Staff will continue to monitor safety q15 and provide support.

## 2022-09-19 PROCEDURE — 65220000003 HC RM SEMIPRIVATE PSYCH

## 2022-09-19 PROCEDURE — 74011250637 HC RX REV CODE- 250/637: Performed by: NURSE PRACTITIONER

## 2022-09-19 RX ORDER — PERPHENAZINE 8 MG/1
8 TABLET, FILM COATED ORAL 2 TIMES DAILY
Qty: 60 TABLET | Refills: 0 | Status: SHIPPED | OUTPATIENT
Start: 2022-09-19 | End: 2022-10-19

## 2022-09-19 RX ORDER — FLUOXETINE HYDROCHLORIDE 20 MG/1
60 CAPSULE ORAL DAILY
Qty: 90 CAPSULE | Refills: 0 | Status: SHIPPED | OUTPATIENT
Start: 2022-09-19 | End: 2022-10-19

## 2022-09-19 RX ORDER — BUPROPION HYDROCHLORIDE 150 MG/1
150 TABLET, EXTENDED RELEASE ORAL DAILY
Qty: 30 TABLET | Refills: 0 | Status: SHIPPED | OUTPATIENT
Start: 2022-09-19

## 2022-09-19 RX ORDER — HYDROXYZINE PAMOATE 50 MG/1
CAPSULE ORAL
Qty: 90 CAPSULE | Refills: 0 | Status: SHIPPED | OUTPATIENT
Start: 2022-09-19

## 2022-09-19 RX ORDER — MIRTAZAPINE 45 MG/1
45 TABLET, FILM COATED ORAL
Qty: 30 TABLET | Refills: 0 | Status: SHIPPED | OUTPATIENT
Start: 2022-09-19 | End: 2022-10-19

## 2022-09-19 RX ORDER — OLANZAPINE 10 MG/1
10 TABLET ORAL
Qty: 30 TABLET | Refills: 0 | Status: SHIPPED | OUTPATIENT
Start: 2022-09-19 | End: 2022-10-19

## 2022-09-19 RX ADMIN — HYDROXYZINE HYDROCHLORIDE 100 MG: 50 TABLET, FILM COATED ORAL at 20:13

## 2022-09-19 RX ADMIN — PERPHENAZINE 8 MG: 4 TABLET, FILM COATED ORAL at 09:29

## 2022-09-19 RX ADMIN — BUPROPION HYDROCHLORIDE 150 MG: 150 TABLET, EXTENDED RELEASE ORAL at 09:31

## 2022-09-19 RX ADMIN — PERPHENAZINE 8 MG: 4 TABLET, FILM COATED ORAL at 20:13

## 2022-09-19 RX ADMIN — LORAZEPAM 0.5 MG: 0.5 TABLET ORAL at 09:31

## 2022-09-19 RX ADMIN — OLANZAPINE 10 MG: 5 TABLET, FILM COATED ORAL at 20:13

## 2022-09-19 RX ADMIN — MIRTAZAPINE 45 MG: 15 TABLET, FILM COATED ORAL at 20:13

## 2022-09-19 RX ADMIN — FLUOXETINE HYDROCHLORIDE 60 MG: 20 CAPSULE ORAL at 09:30

## 2022-09-19 NOTE — INTERDISCIPLINARY ROUNDS
Behavioral Health Interdisciplinary Rounds     Patient Name: Anu Payne  Age: 61 y.o. Room/Bed:  734/02  Primary Diagnosis: <principal problem not specified>   Admission Status: Involuntary Commitment     Readmission within 30 days: no  Power of  in place: no  Patient requires a blocked bed: no          Reason for blocked bed:     Sleep hours: 7       Participation in Care/Groups:  yes  Medication Compliant?: Yes  PRNS (last 24 hours): None    Restraints (last 24 hours):  no  Substance Abuse:  no    Alcohol screening (AUDIT) completed -     If applicable, date SBIRT discussed in treatment team AND documented:   Tobacco -   24 hour chart check complete:   ______________________________________    Patient goal(s) for today: Engage in group discussions, attend groups, complete ADL's, contact support system  Treatment team focus/goals: Discuss treatment plan  Progress note: Pt met with treatment team and appears with a flat affect. Pt denies SI/HI and WOODS AT [facility] at this time and denies adverse side effects from medications. Plan to discharge for outpatient ECT once approved on [facility] schedule.  has been made aware of this plan and is willing to assist with outpatient follow-up. Pt has been pleasant when interacting with staff and peers on the unit. Plan to discharge home with  and with follow-up with [facility].      Financial concerns/prescription coverage: BLUE CROSS MEDICARE - [facility]   Family contact:  Nikko Smallwood, [phone]                                Family requesting physician contact today:  Yes  Discharge plan: Pt will discharge home after ECT  Access to weapons :  no                                                        Outpatient provider(s): Milagros Neely [facility], NP Kourtney Rincon   Patient's preferred phone number for follow up call :   Patient's preferred e-mail address :     LOS:  36                     Expected LOS: 37     Participating treatment team members: Holland Erroll Snellen, MSW, Gerri FATIMA RN, Aníbal Matamoros, NP

## 2022-09-19 NOTE — GROUP NOTE
IP  GROUP DOCUMENTATION INDIVIDUAL                                                                          Group Therapy Note    Date: 9/19/2022    Group Start Time: 2414  Group End Time: 6977  Group Topic: Topic Group    [facility] 7W GEN BEHAV [place]    IP [address] [facility] GROUP DOCUMENTATION GROUP    Group Therapy Note    Attendees:        Attendance: Attended    Patient's Goal:  Pt is excited for discharge today and to make amends with family members. Interventions/techniques: Informed    Follows Directions:  Followed directions    Interactions: Interacted appropriately    Mental Status: Calm and Happy    Behavior/appearance: Attentive    Goals Achieved: Able to engage in interactions, Able to listen to others, and Able to give feedback to another      Additional Notes:      Scott Chamberlain

## 2022-09-19 NOTE — PROGRESS NOTES
Problem: Falls - Risk of  Goal: *Absence of Falls  Description: Document Clydene Bone Fall Risk and appropriate interventions in the flowsheet. Outcome: Progressing Towards Goal  Note: Fall Risk Interventions:  Mobility Interventions: Assess mobility with egress test    Mentation Interventions: Reorient patient    Medication Interventions: Teach patient to arise slowly    Elimination Interventions: Toilet paper/wipes in reach    History of Falls Interventions: Door open when patient unattended    Patient received resting quietly in bed. No signs of distress. Even and unlabored breathing. Staff will continue to monitor safety q15 and provide support.

## 2022-09-19 NOTE — BH NOTES
PSYCHIATRIC PROGRESS NOTE       Patient: Yesenia Barriga MRN: [id number]  SSN: [id number]    YOB: 1965  Age: 61 y.o. Sex: female      Admit Date: 8/14/2022    LOS: 36 days     Chief Complaint:  I am ready to go home. Interval History:  Yesenia Barriga is calm and pleasant. Denies si hi. She still has paranoia but this has been quite reduced. Periods of thought blocking still noted. She has been appropriate in the unit. Sleeping and eating ok. She remains in agreement with ect. Pre ect work up completed. She was supposed to dc today with plans for outpatient ect. Nursing staff have been calling ect coordinator to verify that she is on the books for ect starting tomorrow but we have not heard back. Unfortunately, we will cancel dc today until we hear back from ect coordinator. I am worried that patient may fell off the wagon if a tight dispo has not been in place. We will dc patient as soon as outpatient ect has been sorted and finalized. At the present time the patient Yesenia Barriga remains compliant with taking medications.  Denies any adverse events from taking them and feels they have been beneficial.       Past Medical History:  Past Medical History:   Diagnosis Date    Anxiety and depression     Bipolar 1 disorder with moderate robyn ([facility] [address] 3/17/2014    Chronic back pain     Hyperlipidemia 8/22/2016    Hyponatremia     Psychotic disorder (HCC)     Scoliosis        ALLERGIES:(reviewed/updated 9/19/2022)  Allergies   Allergen Reactions    Other Medication Anaphylaxis     Artificial sweeteners cause anaphylaxis    Pcn [Penicillins] Anaphylaxis    Sunscreen Contact Dermatitis     Burns and opens the skin    Ultram [Tramadol] Hives and Other (comments)     Hives and ringing of the ears    Benzoyl Peroxide Hives    Cephalexin Itching    Divalproex Itching    Maltodextrin-Glycerin Shortness of Breath    Claritin-D 12 Hour [Loratadine-Pseudoephedrine] Palpitations     palpatations and ringing in the ear Laboratory report:  Lab Results   Component Value Date/Time    WBC 6.4 09/17/2022 04:50 AM    HGB 12.0 09/17/2022 04:50 AM    Hematocrit (POC) 34 (L) 07/17/2018 03:33 AM    HCT 36.2 09/17/2022 04:50 AM    PLATELET 789 95/79/1603 04:50 AM    MCV 93.3 09/17/2022 04:50 AM      Lab Results   Component Value Date/Time    Sodium 140 09/17/2022 04:50 AM    Potassium 3.9 09/17/2022 04:50 AM    Chloride 106 09/17/2022 04:50 AM    CO2 28 09/17/2022 04:50 AM    Anion gap 6 09/17/2022 04:50 AM    Glucose 102 (H) 09/17/2022 04:50 AM    Glucose 107 (H) 02/03/2020 05:40 AM    BUN 13 09/17/2022 04:50 AM    Creatinine 0.88 09/17/2022 04:50 AM    BUN/Creatinine ratio 15 09/17/2022 04:50 AM    GFR est AA >60 09/17/2022 04:50 AM    GFR est non-AA >60 09/17/2022 04:50 AM    Calcium 9.6 09/17/2022 04:50 AM    Bilirubin, total 0.3 09/17/2022 04:50 AM    Alk.  phosphatase 52 09/17/2022 04:50 AM    Protein, total 7.3 09/17/2022 04:50 AM    Albumin 3.5 09/17/2022 04:50 AM    Globulin 3.8 09/17/2022 04:50 AM    A-G Ratio 0.9 (L) 09/17/2022 04:50 AM    ALT (SGPT) 24 09/17/2022 04:50 AM      Vitals:    09/18/22 0718 09/18/22 0924 09/18/22 1535 09/19/22 0729   BP: 107/66  117/73 128/85   Pulse: 99  95 97   Resp: 16  16 16   Temp: 99.7 °F (37.6 °C)  97.7 °F (36.5 °C) 98.8 °F (37.1 °C)   SpO2: 98%  100% 97%   Weight:  71.1 kg (156 lb 12.8 oz)     Height:           Lab Results   Component Value Date/Time    Carbamazepine 14.1 (H) 07/26/2020 04:19 AM     Lab Results   Component Value Date/Time    Lithium level 0.79 08/14/2022 03:45 AM       Vital Signs  Patient Vitals for the past 24 hrs:   Temp Pulse Resp BP SpO2   09/19/22 0729 98.8 °F (37.1 °C) 97 16 128/85 97 %       Wt Readings from Last 3 Encounters:   09/18/22 71.1 kg (156 lb 12.8 oz)   08/11/22 72.6 kg (160 lb)   08/07/22 71.5 kg (157 lb 9.6 oz)     Temp Readings from Last 3 Encounters:   09/19/22 98.8 °F (37.1 °C)   08/14/22 98.4 °F (36.9 °C)   08/10/22 98.4 °F (36.9 °C)     BP Readings from Last 3 Encounters:   09/19/22 128/85   08/14/22 (!) 142/77   08/10/22 111/75     Pulse Readings from Last 3 Encounters:   09/19/22 97   08/14/22 92   08/10/22 78       Radiology (reviewed/updated 9/19/2022)  No results found.     Current Facility-Administered Medications   Medication Dose Route Frequency Provider Last Rate Last Admin    LORazepam (ATIVAN) tablet 0.5 mg  0.5 mg Oral DAILY Rosanna CARSON NP   0.5 mg at 09/19/22 0931    perphenazine (TRILAFON) tablet tab 8 mg  8 mg Oral BID Stephanie Aquino NP   8 mg at 09/19/22 0929    OLANZapine (ZyPREXA) tablet 10 mg  10 mg Oral QHS Stephanie Aquino NP   10 mg at 09/18/22 2022    polyethylene glycol (MIRALAX) packet 17 g  17 g Oral BID PRN Stephanie Aquino NP        polyvinyl alcohol-povidone (NATURAL TEARS) 0.5-0.6 % ophthalmic solution 1 Drop  1 Drop Both Eyes PRN Miguelangel Dowling MD   1 Drop at 09/03/22 1708    mirtazapine (REMERON) tablet 45 mg  45 mg Oral QHS Stephanie Aquino NP   45 mg at 09/18/22 2022    cloNIDine HCL (CATAPRES) tablet 0.1 mg  0.1 mg Oral Q2H PRN Claude Tierney NP   0.1 mg at 09/07/22 2107    hydrOXYzine HCL (ATARAX) tablet 100 mg  100 mg Oral QHS Stephanie Aquino NP   100 mg at 09/18/22 2022    buPROPion SR (WELLBUTRIN SR) tablet 150 mg  150 mg Oral DAILY Stephanie Aquino NP   150 mg at 09/19/22 0931    FLUoxetine (PROzac) capsule 60 mg  60 mg Oral DAILY Corina Santos NP   60 mg at 09/19/22 0930    hydrOXYzine HCL (ATARAX) tablet 50 mg  50 mg Oral Q4H PRN Stephanie Aquino NP   50 mg at 09/04/22 1633    OLANZapine (ZyPREXA) tablet 5 mg  5 mg Oral Q6H PRN Stephanie Aquino NP   5 mg at 09/04/22 1155    haloperidol lactate (HALDOL) injection 5 mg  5 mg IntraMUSCular Q6H PRN Stephanie Aquino NP   5 mg at 08/30/22 2033    benztropine (COGENTIN) tablet 1 mg  1 mg Oral BID PRN Stephanie Aquino NP        diphenhydrAMINE (BENADRYL) injection 50 mg  50 mg IntraMUSCular BID PRN Huber Marie SAURABH NP   50 mg at 08/30/22 2033    traZODone (DESYREL) tablet 50 mg  50 mg Oral QHS PRN Cori WILEY NP   50 mg at 09/09/22 2133    acetaminophen (TYLENOL) tablet 650 mg  650 mg Oral Q4H PRN Stephanie Aquino NP   650 mg at 08/26/22 1914    magnesium hydroxide (MILK OF MAGNESIA) 400 mg/5 mL oral suspension 30 mL  30 mL Oral DAILY PRN Stephanie Aquino NP   30 mL at 09/15/22 1837     Side Effects: (reviewed/updated 9/19/2022)  None reported or admitted to. Review of Systems: (reviewed/updated 9/19/2022)  Appetite: good  Sleep: poor  All other Review of Systems: depression    Mental Status Exam:  Eye contact: limited eye contact  Psychomotor activity: decreased  Speech: poverty of speech  Thought process: thought blocking  Mood is \"OK\"  Affect: flat  Perception: No avh  Thought content: +delusions  Suicidal ideation: denies si  Homicidal ideation: denies hi  Insight/judgment: Poor  Cognition is grossly intact     Physical Exam:  Musculoskeletal system: steady gait  Tremor not present  Cog wheeling not present    Assessment and Plan:  Heather Del Angel meets criteria for a diagnosis of Schizoaffective disorder depressed type. Dc ativan. Continue rest of care. We will closely monitor for safety. We will encourage reality orientation. Cancel dc. I certify that this patients inpatient psychiatric hospital services furnished since the previous certification were, and continue to be, required for treatment that could reasonably be expected to improve the patient's condition, or for diagnostic study, and that the patient continues to need, on a daily basis, active treatment furnished directly by or requiring the supervision of inpatient psychiatric facility personnel. In addition, the hospital records show that services furnished were intensive treatment services, admission or related services, or equivalent services.       Signed:  Raad Bedoya NP  9/19/2022

## 2022-09-19 NOTE — PROGRESS NOTES
Problem: Altered Thought Process (Adult/Pediatric)  Goal: *STG: Participates in treatment plan  Outcome: Progressing Towards Goal    Pt is alert and oriented. Pt is very pleasant and cooperative. Pt is d/c focused. Pt is medication and meal compliant. Staff will continue to monitor pt q15 for safety.    Goal: *STG: Seeks staff when feelings of anxiety and fear arise  Outcome: Progressing Towards Goal  Goal: *STG: Complies with medication therapy  Outcome: Progressing Towards Goal  Goal: *STG: Decreased delusional thinking  Outcome: Progressing Towards Goal

## 2022-09-20 VITALS
BODY MASS INDEX: 29.6 KG/M2 | RESPIRATION RATE: 16 BRPM | OXYGEN SATURATION: 98 % | HEART RATE: 78 BPM | SYSTOLIC BLOOD PRESSURE: 141 MMHG | TEMPERATURE: 98.4 F | HEIGHT: 61 IN | WEIGHT: 156.8 LBS | DIASTOLIC BLOOD PRESSURE: 80 MMHG

## 2022-09-20 PROCEDURE — 74011250637 HC RX REV CODE- 250/637: Performed by: NURSE PRACTITIONER

## 2022-09-20 RX ADMIN — PERPHENAZINE 8 MG: 4 TABLET, FILM COATED ORAL at 09:02

## 2022-09-20 RX ADMIN — BUPROPION HYDROCHLORIDE 150 MG: 150 TABLET, EXTENDED RELEASE ORAL at 09:02

## 2022-09-20 RX ADMIN — FLUOXETINE HYDROCHLORIDE 60 MG: 20 CAPSULE ORAL at 09:03

## 2022-09-20 RX ADMIN — LORAZEPAM 0.5 MG: 0.5 TABLET ORAL at 09:04

## 2022-09-20 NOTE — PROGRESS NOTES
Patient up and on unit appearing flat. Patient calm, cooperative, denies SI/HI.  Staff focus is to ensure patient remains safe while in the hospital.   Problem: Altered Thought Process (Adult/Pediatric)  Goal: *STG: Remains safe in hospital  9/20/2022 1627 by Robe Rankin RN  Outcome: Progressing Towards Goal  9/20/2022 1622 by Robe Rankin RN  Outcome: Progressing Towards Goal

## 2022-09-20 NOTE — PROGRESS NOTES
Patient received resting quietly in bed. No signs of distress. Even and unlabored breathing. Staff will continue to monitor safety q15 and provide support. Problem: Falls - Risk of  Goal: *Absence of Falls  Description: Document Green Salvia Fall Risk and appropriate interventions in the flowsheet. Outcome: Progressing Towards Goal  Note: Fall Risk Interventions:  Mobility Interventions: Assess mobility with egress test    Mentation Interventions: Reorient patient    Medication Interventions: Teach patient to arise slowly    Elimination Interventions:  Toilet paper/wipes in reach    History of Falls Interventions: Door open when patient unattended

## 2022-09-20 NOTE — DISCHARGE SUMMARY
GYN Brief Note    Subjective   Jessica Marr is a 47 year old female.    Jessica Marr Declined a chaperone.    Chief Complaint   Patient presents with   • Breast Problem     c/o right breast pain x 3 months     HPI  Bilateral breast pain - focal, right side more severe than left  Sharp with palpation  No change in symptoms over the last 3 months    Past Medical History:   Diagnosis Date   • No known problems      Past Surgical History:   Procedure Laterality Date   • Breast biopsy     • Hysterectomy       Current Outpatient Medications   Medication Sig Dispense Refill   • Na Sulfate-K Sulfate-Mg Sulf 17.5-3.13-1.6 GM/177ML Solution Take 1 kit by mouth.     • Multiple Vitamins-Minerals (MULTIVITAMIN PO)      • Docusate Sodium (DSS) 100 MG Cap Take 100 mg by mouth.     • ondansetron (ZOFRAN) 4 MG tablet Take 4 mg by mouth.       No current facility-administered medications for this visit.      ALLERGIES:  No Known Allergies  Family History   Problem Relation Age of Onset   • Hypertension Mother    • Thyroid Mother    • Cancer, Lung Father    • Thyroid Sister    • Stomach Cancer Maternal Grandfather    • Thyroid Sister    • Cancer, Breast Neg Hx    • Cancer, Colon Neg Hx    • Cancer, Ovarian Neg Hx        Visit Vitals  BP 90/64   Temp 98 °F (36.7 °C)   Wt 61.7 kg (136 lb)   BMI 22.63 kg/m²       Review of Systems   Constitutional: Negative.    Skin:        Bilateral breast pain       Objective   Physical Exam  Constitutional:       Appearance: Normal appearance.   HENT:      Head: Normocephalic and atraumatic.   Eyes:      Conjunctiva/sclera: Conjunctivae normal.   Chest:      Breasts:         Right: Tenderness present. No mass.         Left: Tenderness present. No mass.          Comments: Symmetrically located bilateral breast tenderness to palpation, no masses  Lymphadenopathy:      Upper Body:      Right upper body: No axillary adenopathy.      Left upper body: No axillary adenopathy.   Neurological:      General: No  PSYCHIATRIC DISCHARGE SUMMARY      Patient: Aldo Curiel MRN: [id number]  SSN: [id number]    YOB: 1965  Age: 61 y.o. Sex: female        Date of Admission: 8/14/2022  Date of Discharge:9/20/2022       Type of Discharge:  REGULAR     Admission data:  CHIEF COMPLAINT:  \"I am just confused. \"     HISTORY OF PRESENT ILLNESS:  The patient is a 65-year-old female who is admitted at [address] on a voluntary basis. This patient is well known to this provider and to this facility. She was just discharged under our care 5 days ago after a 4-day inpatient stay. She is a challenging historian. She is disoriented, confused, and not able to provide a meaningful history. During our brief conversation, she states that she is feeling confused. She is not feeling well about \"everything. \"  She also reports being anxious and requests an anxiety medication. When I asked her if she has any suicidal ideation, she went back and forth. Ultimately, she said she had plans of jumping off something. Then she state that it would be easier if she would overdose instead. She presented back in the emergency room a day after being discharged. She intentionally overdosed on several medications including trihexyphenidyl 30 tablets of 2 mg and lithium approximately 23 capsules upto 300 mg. She reported at [facility] that what led to the overdose was \"she hit a panic button and it was the hospital's fault. \"  She also reported that she woke up, could not find her  and she panicked. She also reported that still wishes she was dead when she was at [facility]. After the overdose, she was admitted at [address] [place] [address] for several days and was cleared for medical transfer here at [facility]. Her lithium level was initially 3.34; today it was 0.79. She is admitted to the inpatient psychiatric setting for further evaluation and treatment. PAST MEDICAL HISTORY:  See H and P.           Past Medical History:   Diagnosis Date    Anxiety and depression      Bipolar 1 disorder with moderate robyn (HCC) 3/17/2014    Chronic back pain      Hyperlipidemia 8/22/2016    Hyponatremia      Psychotic disorder ([address]      Scoliosis           Labs: (reviewed/updated 8/16/2022)  Patient Vitals for the past 8 hrs:    BP Temp Pulse Resp SpO2   08/16/22 1823 (!) 159/90 98.2 °F (36.8 °C) 95 16 99 %   08/16/22 1507 (!) 148/84 98.7 °F (37.1 °C) 86 16 99 %      Labs Reviewed - No data to display        Lab Results   Component Value Date/Time     Sodium 144 08/14/2022 03:45 AM     Potassium 3.5 08/14/2022 03:45 AM     Chloride 116 (H) 08/14/2022 03:45 AM     CO2 26 08/14/2022 03:45 AM     Anion gap 2 (L) 08/14/2022 03:45 AM     Glucose 102 (H) 08/14/2022 03:45 AM     Glucose 107 (H) 02/03/2020 05:40 AM     BUN 3 (L) 08/14/2022 03:45 AM     Creatinine 0.74 08/14/2022 03:45 AM     BUN/Creatinine ratio 4 (L) 08/14/2022 03:45 AM     GFR est AA >60 08/14/2022 03:45 AM     GFR est non-AA >60 08/14/2022 03:45 AM     Calcium 9.1 08/14/2022 03:45 AM     Bilirubin, total 0.2 08/12/2022 05:34 AM     Alk. phosphatase 39 (L) 08/12/2022 05:34 AM     Protein, total 7.2 08/12/2022 05:34 AM     Albumin 3.4 (L) 08/12/2022 05:34 AM     Globulin 3.8 08/12/2022 05:34 AM     A-G Ratio 0.9 (L) 08/12/2022 05:34 AM     ALT (SGPT) 19 08/12/2022 05:34 AM               No visits with results within 2 Day(s) from this visit.    Latest known visit with results is:   Admission on 08/11/2022, Discharged on 08/14/2022   Component Date Value Ref Range Status     Ventricular Rate 08/11/2022 89  BPM Final    Atrial Rate 08/11/2022 89  BPM Final    P-R Interval 08/11/2022 146  ms Final    QRS Duration 08/11/2022 70  ms Final    Q-T Interval 08/11/2022 354  ms Final    QTC Calculation (Bezet) 08/11/2022 430  ms Final    Calculated P Axis 08/11/2022 71  degrees Final    Calculated R Axis 08/11/2022 49  degrees Final    Calculated T Axis 08/11/2022 41  degrees Final focal deficit present.      Mental Status: She is alert and oriented to person, place, and time.   Psychiatric:         Mood and Affect: Mood normal.         Behavior: Behavior normal.   Vitals signs reviewed.           Assessment   Bilateral breast pain - discussed potential etiologies, will order bilateral diagnostic mammogram and u/s to evaluate.    RTC for annual exam, or sooner prn.    Nakita Brown MD   Diagnosis 08/11/2022     Final                    Value:Normal sinus rhythm  Biatrial enlargement  Nonspecific ST and T wave abnormality     Confirmed by Rosemary Stevenson ([id number]) on 8/12/2022 8:31:28 AM       AMPHETAMINES 08/11/2022 Negative  NEG   Final    BARBITURATES 08/11/2022 Negative  NEG   Final    BENZODIAZEPINES 08/11/2022 Negative  NEG   Final    COCAINE 08/11/2022 Negative  NEG   Final    METHADONE 08/11/2022 Negative  NEG   Final    OPIATES 08/11/2022 Negative  NEG   Final    PCP(PHENCYCLIDINE) 08/11/2022 Negative  NEG   Final    THC (TH-CANNABINOL) 08/11/2022 Negative  NEG   Final    Drug screen comment 08/11/2022 (NOTE)    Final    ALCOHOL(ETHYL),SERUM 08/11/2022 <10  <10 MG/DL Final    WBC 08/11/2022 7.5  3.6 - 11.0 K/uL Final    RBC 08/11/2022 4.59  3.80 - 5.20 M/uL Final    HGB 08/11/2022 13.8  11.5 - 16.0 g/dL Final    HCT 08/11/2022 42.8  35.0 - 47.0 % Final    MCV 08/11/2022 93.2  80.0 - 99.0 FL Final    MCH 08/11/2022 30.1  26.0 - 34.0 PG Final    MCHC 08/11/2022 32.2  30.0 - 36.5 g/dL Final    RDW 08/11/2022 12.5  11.5 - 14.5 % Final    PLATELET 33/45/4271 105  150 - 400 K/uL Final    MPV 08/11/2022 9.1  8.9 - 12.9 FL Final    NRBC 08/11/2022 0.0  0  WBC Final    ABSOLUTE NRBC 08/11/2022 0.00  0.00 - 0.01 K/uL Final    NEUTROPHILS 08/11/2022 73  32 - 75 % Final    LYMPHOCYTES 08/11/2022 20  12 - 49 % Final    MONOCYTES 08/11/2022 6  5 - 13 % Final    EOSINOPHILS 08/11/2022 0  0 - 7 % Final    BASOPHILS 08/11/2022 1  0 - 1 % Final    IMMATURE GRANULOCYTES 08/11/2022 0  0.0 - 0.5 % Final    ABS. NEUTROPHILS 08/11/2022 5.5  1.8 - 8.0 K/UL Final    ABS. LYMPHOCYTES 08/11/2022 1.5  0.8 - 3.5 K/UL Final    ABS. MONOCYTES 08/11/2022 0.4  0.0 - 1.0 K/UL Final    ABS. EOSINOPHILS 08/11/2022 0.0  0.0 - 0.4 K/UL Final    ABS. BASOPHILS 08/11/2022 0.0  0.0 - 0.1 K/UL Final    ABS. IMM.  GRANS. 08/11/2022 0.0  0.00 - 0.04 K/UL Final    DF 08/11/2022 AUTOMATED    Final    Sodium 08/11/2022 136  136 - 145 mmol/L Final    Potassium 08/11/2022 3.8  3.5 - 5.1 mmol/L Final    Chloride 08/11/2022 103  97 - 108 mmol/L Final    CO2 08/11/2022 31  21 - 32 mmol/L Final    Anion gap 08/11/2022 2 (A) 5 - 15 mmol/L Final    Glucose 08/11/2022 86  65 - 100 mg/dL Final    BUN 08/11/2022 10  6 - 20 MG/DL Final    Creatinine 08/11/2022 1.00  0.55 - 1.02 MG/DL Final    BUN/Creatinine ratio 08/11/2022 10 (A) 12 - 20   Final    GFR est AA 08/11/2022 >60  >60 ml/min/1.73m2 Final    GFR est non-AA 08/11/2022 57 (A) >60 ml/min/1.73m2 Final    Calcium 08/11/2022 10.2 (A) 8.5 - 10.1 MG/DL Final    Bilirubin, total 08/11/2022 0.4  0.2 - 1.0 MG/DL Final    ALT (SGPT) 08/11/2022 21  12 - 78 U/L Final    AST (SGOT) 08/11/2022 15  15 - 37 U/L Final    Alk.  phosphatase 08/11/2022 49  45 - 117 U/L Final    Protein, total 08/11/2022 8.8 (A) 6.4 - 8.2 g/dL Final    Albumin 08/11/2022 4.4  3.5 - 5.0 g/dL Final    Globulin 08/11/2022 4.4 (A) 2.0 - 4.0 g/dL Final    A-G Ratio 08/11/2022 1.0 (A) 1.1 - 2.2   Final    Lithium level 08/11/2022 3.42 (A) 0.60 - 1.20 MMOL/L Final    Reported dose date 08/11/2022 NOT PROVIDED    Final    Reported dose time: 08/11/2022 NOT PROVIDED    Final    Reported dose: 08/11/2022 NOT PROVIDED  UNITS Final    Color 08/11/2022 YELLOW/STRAW    Final    Appearance 08/11/2022 CLEAR  CLEAR   Final    Specific gravity 08/11/2022 1.010  1.003 - 1.030   Final    pH (UA) 08/11/2022 8.5 (A) 5.0 - 8.0   Final    Protein 08/11/2022 Negative  NEG mg/dL Final    Glucose 08/11/2022 Negative  NEG mg/dL Final    Ketone 08/11/2022 Negative  NEG mg/dL Final    Bilirubin 08/11/2022 Negative  NEG   Final    Blood 08/11/2022 Negative  NEG   Final    Urobilinogen 08/11/2022 0.2  0.2 - 1.0 EU/dL Final    Nitrites 08/11/2022 Negative  NEG   Final    Leukocyte Esterase 08/11/2022 TRACE (A) NEG   Final    Pregnancy test,urine (POC) 08/11/2022 Negative  NEG   Final    Acetaminophen level 08/12/2022 <2 (A) 10 - 30 ug/mL Final    Salicylate level 08/12/2022 <1.7 (A) 2.8 - 20.0 MG/DL Final    WBC 08/11/2022 0-4  0 - 4 /hpf Final    RBC 08/11/2022 0-5  0 - 5 /hpf Final    Epithelial cells 08/11/2022 FEW  FEW /lpf Final    Bacteria 08/11/2022 Negative  NEG /hpf Final    Magnesium 08/11/2022 2.5 (A) 1.6 - 2.4 mg/dL Final    Acetaminophen level 08/11/2022 <2 (A) 10 - 30 ug/mL Final    Salicylate level 97/77/1094 <1.7 (A) 2.8 - 20.0 MG/DL Final    SARS-CoV-2 by PCR 08/11/2022 Please find results under separate order    Final    Specimen source 08/11/2022 Nasopharyngeal    Final    COVID-19 rapid test 08/11/2022 Not detected  NOTD   Final    Lithium level 08/11/2022 3.34 (A) 0.60 - 1.20 MMOL/L Final    Reported dose date 08/11/2022 NOT PROVIDED    Final    Reported dose time: 08/11/2022 NOT PROVIDED    Final    Reported dose: 08/11/2022 NOT PROVIDED  UNITS Final    SAMPLES BEING HELD 08/11/2022 PST    Final    COMMENT 08/11/2022 Add-on orders for these samples will be processed based on acceptable specimen integrity and analyte stability, which may vary by analyte.     Final    SARS-CoV-2 by PCR 08/11/2022 Not detected  NOTD   Final    Influenza A by PCR 08/11/2022 Not detected  NOTD   Final    Influenza B by PCR 08/11/2022 Not detected  NOTD   Final    Lithium level 08/12/2022 3.40 (A) 0.60 - 1.20 MMOL/L Final    Reported dose date 08/12/2022 NOT PROVIDED    Final    Reported dose time: 08/12/2022 NOT PROVIDED    Final    Reported dose: 08/12/2022 NOT PROVIDED  UNITS Final    Lithium level 08/12/2022 3.26 (A) 0.60 - 1.20 MMOL/L Final    Reported dose date 08/12/2022 NOT PROVIDED    Final    Reported dose time: 08/12/2022 NOT PROVIDED    Final    Reported dose: 08/12/2022 NOT PROVIDED  UNITS Final    Sodium 08/12/2022 139  136 - 145 mmol/L Final    Potassium 08/12/2022 4.7  3.5 - 5.1 mmol/L Final    Chloride 08/12/2022 109 (A) 97 - 108 mmol/L Final    CO2 08/12/2022 31  21 - 32 mmol/L Final    Anion gap 08/12/2022 NEG 1  5 - 15 mmol/L Final    Glucose 08/12/2022 81 65 - 100 mg/dL Final    BUN 08/12/2022 7  6 - 20 MG/DL Final    Creatinine 08/12/2022 0.91  0.55 - 1.02 MG/DL Final    BUN/Creatinine ratio 08/12/2022 8 (A) 12 - 20   Final    GFR est AA 08/12/2022 >60  >60 ml/min/1.73m2 Final    GFR est non-AA 08/12/2022 >60  >60 ml/min/1.73m2 Final    Calcium 08/12/2022 9.3  8.5 - 10.1 MG/DL Final    Bilirubin, total 08/12/2022 0.2  0.2 - 1.0 MG/DL Final    ALT (SGPT) 08/12/2022 19  12 - 78 U/L Final    AST (SGOT) 08/12/2022 18  15 - 37 U/L Final    Alk. phosphatase 08/12/2022 39 (A) 45 - 117 U/L Final    Protein, total 08/12/2022 7.2  6.4 - 8.2 g/dL Final    Albumin 08/12/2022 3.4 (A) 3.5 - 5.0 g/dL Final    Globulin 08/12/2022 3.8  2.0 - 4.0 g/dL Final    A-G Ratio 08/12/2022 0.9 (A) 1.1 - 2.2   Final    WBC 08/12/2022 6.8  3.6 - 11.0 K/uL Final    RBC 08/12/2022 4.00  3.80 - 5.20 M/uL Final    HGB 08/12/2022 12.2  11.5 - 16.0 g/dL Final    HCT 08/12/2022 37.6  35.0 - 47.0 % Final    MCV 08/12/2022 94.0  80.0 - 99.0 FL Final    MCH 08/12/2022 30.5  26.0 - 34.0 PG Final    MCHC 08/12/2022 32.4  30.0 - 36.5 g/dL Final    RDW 08/12/2022 12.7  11.5 - 14.5 % Final    PLATELET 78/26/4584 676 (A) 150 - 400 K/uL Final    NRBC 08/12/2022 0.0  0  WBC Final    ABSOLUTE NRBC 08/12/2022 0.00  0.00 - 0.01 K/uL Final    NEUTROPHILS 08/12/2022 69  32 - 75 % Final    LYMPHOCYTES 08/12/2022 23  12 - 49 % Final    MONOCYTES 08/12/2022 7  5 - 13 % Final    EOSINOPHILS 08/12/2022 1  0 - 7 % Final    BASOPHILS 08/12/2022 0  0 - 1 % Final    IMMATURE GRANULOCYTES 08/12/2022 0  0.0 - 0.5 % Final    ABS. NEUTROPHILS 08/12/2022 4.6  1.8 - 8.0 K/UL Final    ABS. LYMPHOCYTES 08/12/2022 1.6  0.8 - 3.5 K/UL Final    ABS. MONOCYTES 08/12/2022 0.5  0.0 - 1.0 K/UL Final    ABS. EOSINOPHILS 08/12/2022 0.1  0.0 - 0.4 K/UL Final    ABS. BASOPHILS 08/12/2022 0.0  0.0 - 0.1 K/UL Final    ABS. IMM.  GRANS. 08/12/2022 0.0  0.00 - 0.04 K/UL Final    DF 08/12/2022 AUTOMATED    Final    PLATELET COMMENTS 98/96/9132 CLUMPED PLATELETS    Final    RBC COMMENTS 08/12/2022 NORMOCYTIC, NORMOCHROMIC    Final    Lithium level 08/12/2022 3.20 (A) 0.60 - 1.20 MMOL/L Final    Reported dose date 08/12/2022 NOT PROVIDED    Final    Reported dose time: 08/12/2022 NOT PROVIDED    Final    Reported dose: 08/12/2022 NOT PROVIDED  UNITS Final    Lithium level 08/12/2022 2.68 (A) 0.60 - 1.20 MMOL/L Final    Reported dose date 08/12/2022 PENDING    Incomplete    Reported dose time: 08/12/2022 PENDING    Incomplete    Reported dose: 08/12/2022 PENDING  UNITS Incomplete    Magnesium 08/12/2022 2.6 (A) 1.6 - 2.4 mg/dL Final    Lithium level 08/13/2022 1.61 (A) 0.60 - 1.20 MMOL/L Final    Reported dose date 08/13/2022 NOT PROVIDED    Final    Reported dose time: 08/13/2022 NOT PROVIDED    Final    Reported dose: 08/13/2022 NOT PROVIDED  UNITS Final    Sodium 08/13/2022 142  136 - 145 mmol/L Final    Potassium 08/13/2022 3.8  3.5 - 5.1 mmol/L Final    Chloride 08/13/2022 115 (A) 97 - 108 mmol/L Final    CO2 08/13/2022 21  21 - 32 mmol/L Final    Anion gap 08/13/2022 6  5 - 15 mmol/L Final    Glucose 08/13/2022 96  65 - 100 mg/dL Final    BUN 08/13/2022 7  6 - 20 MG/DL Final    Creatinine 08/13/2022 0.68  0.55 - 1.02 MG/DL Final    BUN/Creatinine ratio 08/13/2022 10 (A) 12 - 20   Final    GFR est AA 08/13/2022 >60  >60 ml/min/1.73m2 Final    GFR est non-AA 08/13/2022 >60  >60 ml/min/1.73m2 Final    Calcium 08/13/2022 8.7  8.5 - 10.1 MG/DL Final    Lithium level 08/14/2022 0.79  0.60 - 1.20 MMOL/L Final    Reported dose date 08/14/2022 NOT PROVIDED    Final    Reported dose time: 08/14/2022 NOT PROVIDED    Final    Reported dose: 08/14/2022 NOT PROVIDED  UNITS Final    Sodium 08/14/2022 144  136 - 145 mmol/L Final    Potassium 08/14/2022 3.5  3.5 - 5.1 mmol/L Final    Chloride 08/14/2022 116 (A) 97 - 108 mmol/L Final    CO2 08/14/2022 26  21 - 32 mmol/L Final    Anion gap 08/14/2022 2 (A) 5 - 15 mmol/L Final    Glucose 08/14/2022 102 (A) 65 - 100 mg/dL Final    BUN 08/14/2022 3 (A) 6 - 20 MG/DL Final    Creatinine 08/14/2022 0.74  0.55 - 1.02 MG/DL Final    BUN/Creatinine ratio 08/14/2022 4 (A) 12 - 20   Final    GFR est AA 08/14/2022 >60  >60 ml/min/1.73m2 Final    GFR est non-AA 08/14/2022 >60  >60 ml/min/1.73m2 Final    Calcium 08/14/2022 9.1  8.5 - 10.1 MG/DL Final             Vitals:     08/16/22 1111 08/16/22 1132 08/16/22 1507 08/16/22 1823   BP: (!) 164/99   (!) 148/84 (!) 159/90   Pulse: 91   86 95   Resp: 16 16 16   Temp: 98.4 °F (36.9 °C)   98.7 °F (37.1 °C) 98.2 °F (36.8 °C)   SpO2:     99% 99%   Weight:   74.2 kg (163 lb 8 oz)       Height:   5' 1\" (1.549 m)          Recent Results   No results found for this or any previous visit (from the past 24 hour(s)). RADIOLOGY REPORTS:  Results from Hospital Encounter encounter on 01/10/20     XR CHEST PORT     Narrative  INDICATION: Admission hyponatremia. COMPARISON: January 7, 2018     FINDINGS: AP portable imaging of the chest performed at 9:41 AM demonstrates a  stable cardiomediastinal silhouette. The lungs are clear bilaterally. No  significant osseous abnormalities are seen. Impression  IMPRESSION: No evidence of acute cardiopulmonary process. No results found. PAST PSYCHIATRIC HISTORY:  She reports a history of bipolar I disorder. Also generalized anxiety disorder. Reportedly, she has a history of multiple inpatient psychiatric admissions, most recently here at [facility] as described above. She was also admitted at [facility] ([facility] from 05/08/2022 to 05/12/2022 following a suicide attempt on, lithium, temazepam and hydroxyzine. She is currently being followed by Ms. Ankur Mcmullen through [address] [place] [address] and Evaristo Hoang is her . When she was discharged here at [facility], she was discharged straight to her outpatient appointment with [address] [place] [address].   Her  was also reached out by Case Management prior to the discharge and  did not have any safety concern. The plan also on the last admission was for the  to manage the medications but seems like it fell through. PSYCHOSOCIAL HISTORY:  She is . She lives with her spouse. She has no children. She is on disability and does not work. MENTAL STATUS EXAM:  She is currently sitting up in bed, dressed in street clothes. She is still confused and disoriented, limited eye contact. Speech, spontaneous. She reports her mood is anxious. Affect is blunted. Speech, slow but logical.  She is endorsing suicidal ideation as described above though she went back and forth. Denies auditory or visual hallucinations. She is confused. Insight is poor. Judgment is poor. DIAGNOSES:  Bipolar I disorder, current episode depressed. Generalized anxiety disorder by history. TREATMENT PLAN:  I will continue her inpatient stay. She will be provided with support and encouraged to attend groups. Her safety will be monitored. Her medications will be modified and assessed. Case Management will work on discharge planning. ASSETS AND STRENGTHS:  She is willing to seek help. She is willing to take medications. ESTIMATED LENGTH OF STAY:  7-10 days. Hospital Course:    Patient was admitted to the Psychiatric services for acute psychiatric stabilization in regards to symptomatology as described in the HPI above and placed on Q15 minute checks and suicide precautions. She was started back on her usual medication regimen as well as PRN medications including zyprexa,trazodone and atarax. She was started on remeron, trilafon, ativan, wellbutrin. Her medications were adjusted. Patient was severely depressed, paranoid, hallucinations, delusional, anxious. She had thought blocking, negative self talk, poor sleep, psychomotor retardation. She responded poorly to her medications. Trilafon was added and zyprexa was decreased.  She did not show much improvement with her medications, she remains depressed, hopeless, helpless, psychotic. Thought blocking persist. Court ordered ect was petitioned but she denied. She and her  then agreed to outpatient ect at [facility]. Her first ect is scheduled October 4th. She was appropriate in her interactions, and cooperative with medications and the unit routine. Please see individual progress notes for more specific details regarding patient's hospitalization course. Patient was discharged as per the plan. No PRN medication for agitation, seclusion or restraints were required during the last 48 hours of her stay. Jackie Vinson was stable enough to be discharged home in care of her .  was on board with discharge, he will manage patient's medications, and will take her to outpatient ect. At this time she did not offer any complaints. Patient denied any SI or HI. Was not considered a danger to self or to others and is safe for discharge. Will FU with her appointments and remains motivated to be in treatment including ect. The patient verbalized understanding of her discharge instructions. Allergies:(reviewed/updated 9/20/2022)  Allergies   Allergen Reactions    Other Medication Anaphylaxis     Artificial sweeteners cause anaphylaxis    Pcn [Penicillins] Anaphylaxis    Sunscreen Contact Dermatitis     Burns and opens the skin    Ultram [Tramadol] Hives and Other (comments)     Hives and ringing of the ears    Benzoyl Peroxide Hives    Cephalexin Itching    Divalproex Itching    Maltodextrin-Glycerin Shortness of Breath    Claritin-D 12 Hour [Loratadine-Pseudoephedrine] Palpitations     palpatations and ringing in the ear       Side Effects: (reviewed/updated 9/20/2022)  None reported or admitted to.     Vital Signs:  Patient Vitals for the past 24 hrs:   Temp Pulse Resp BP SpO2   09/20/22 0732 98.3 °F (36.8 °C) 97 16 130/84 97 %   09/19/22 1633 98.6 °F (37 °C) 97 16 123/81 98 %     Wt Readings from Last 3 Encounters:   09/18/22 71.1 kg (156 lb 12.8 oz)   08/11/22 72.6 kg (160 lb)   08/07/22 71.5 kg (157 lb 9.6 oz)     Temp Readings from Last 3 Encounters:   09/20/22 98.3 °F (36.8 °C)   08/14/22 98.4 °F (36.9 °C)   08/10/22 98.4 °F (36.9 °C)     BP Readings from Last 3 Encounters:   09/20/22 130/84   08/14/22 (!) 142/77   08/10/22 111/75     Pulse Readings from Last 3 Encounters:   09/20/22 97   08/14/22 92   08/10/22 78       Labs: (reviewed/updated 9/20/2022)  No results found for this or any previous visit (from the past 24 hour(s)). Lab Results   Component Value Date/Time    Carbamazepine 14.1 (H) 07/26/2020 04:19 AM     Lab Results   Component Value Date/Time    Lithium level 0.79 08/14/2022 03:45 AM       Radiology (reviewed/updated 9/20/2022)  XR CHEST PORT    Result Date: 9/16/2022  INDICATION:  pre-ECT work up EXAM: Chest single view. COMPARISON: 1/11/2020. FINDINGS: A single frontal view of the chest at 1636 hours shows mild bibasilar atelectasis. .  The heart, mediastinum and pulmonary vasculature are normal . The bony thorax is unremarkable for age. .     Mild bibasilar atelectasis. .  . Mental Status Exam on Discharge:  General appearance:   Tamela Dale is a 61 y.o. BLACK/ female who is appropriately groomed, psychomotor activity is decreased  Eye contact: limited eye contact  Speech: Spontaneous  Affect :blunt  Mood: \"OK\"  Thought Process: Logical, goal directed  Perception: +ah  Thought Content: Denies any SI or Plan  Insight: Poor  Judgement: Poor  Cognition: Intact grossly. Discharge Diagnosis:  Bipolar I disorder, current episode depressed with psychotic features. Generalized anxiety disorder by history. Current Discharge Medication List        START taking these medications    Details   hydrOXYzine pamoate (VISTARIL) 50 mg capsule Take 2 Capsules by mouth nightly. May also take 1 Capsule three (3) times daily as needed for Anxiety.  Indications: anxious  Qty: 90 Capsule, Refills: 0  Start date: 9/19/2022      buPROPion SR (WELLBUTRIN SR) 150 mg SR tablet Take 1 Tablet by mouth daily. Indications: major depressive disorder  Qty: 30 Tablet, Refills: 0  Start date: 9/19/2022      FLUoxetine (PROzac) 20 mg capsule Take 3 Capsules by mouth daily for 30 days. Indications: anxiousness associated with depression  Qty: 90 Capsule, Refills: 0  Start date: 9/19/2022, End date: 10/19/2022      mirtazapine (REMERON) 45 mg tablet Take 1 Tablet by mouth nightly for 30 days. Indications: major depressive disorder  Qty: 30 Tablet, Refills: 0  Start date: 9/19/2022, End date: 10/19/2022      perphenazine (TRILAFON) 8 mg tablet Take 1 Tablet by mouth two (2) times a day for 30 days. Indications: psychosis  Qty: 60 Tablet, Refills: 0  Start date: 9/19/2022, End date: 10/19/2022           CONTINUE these medications which have CHANGED    Details   OLANZapine (ZyPREXA) 10 mg tablet Take 1 Tablet by mouth nightly for 30 days. Indications: psychosis  Qty: 30 Tablet, Refills: 0  Start date: 9/19/2022, End date: 10/19/2022           CONTINUE these medications which have NOT CHANGED    Details   polyethylene glycol (Miralax) 17 gram packet Take 1 Packet by mouth daily as needed for Constipation.  Indications: constipation  Qty: 30 Packet, Refills: 0           STOP taking these medications       hydrOXYzine HCL (ATARAX) 50 mg tablet Comments:   Reason for Stopping:         lithium carbonate 300 mg capsule Comments:   Reason for Stopping:         traZODone (DESYREL) 150 mg tablet Comments:   Reason for Stopping:         trihexyphenidyL (ARTANE) 2 mg tablet Comments:   Reason for Stopping:         azelastine (ASTELIN) 137 mcg (0.1 %) nasal spray Comments:   Reason for Stopping:         cetirizine (ZYRTEC) 10 mg tablet Comments:   Reason for Stopping:         cholecalciferol (VITAMIN D3) (1000 Units /25 mcg) tablet Comments:   Reason for Stopping:         melatonin 3 mg tablet Comments:   Reason for Stopping: Follow-up Information       Follow up With Specialties Details Why MD [facility] Internal Medicine Physician   [address]  [place] [address]  [phone] to Continue to follow up with your  Donita Disla and your outpatient provider [address]  [place], [address]  P: [phone]  F: [phone], ECT  Follow up on 9/20/2022 First outpatient ECT procedure, please follow-up for future scheduled procedures. [address]  P: [phone]          WOUND CARE: none needed. Prognosis:   Good / Agnes Dung based on nature of patient's pathology/ies and treatment compliance issues. Prognosis is greatly dependent upon patient's ability to  follow up on psychiatric/psychotherapy appointments as well as to comply with psychiatric medications as prescribed. I certify that this patients inpatient psychiatric hospital services furnished since the previous certification were, and continue to be, required for treatment that could reasonably be expected to improve the patient's condition, or for diagnostic study, and that the patient continues to need, on a daily basis, active treatment furnished directly by or requiring the supervision of inpatient psychiatric facility personnel. In addition, the hospital records show that services furnished were intensive treatment services, admission or related services, or equivalent services.      Signed:  Anaya Robins NP  9/20/2022

## 2022-09-20 NOTE — PROGRESS NOTES
Problem: Altered Thought Process (Adult/Pediatric)  Goal: Interventions  9/20/2022 0730 by Richard Braun RN  Outcome: Progressing Towards Goal  Note: 9/19 contacted both Dr. Neema Nesbitt, ECT provider and Nurse Manager of PACU at [address] [place] [address] to arrange and schedule ECT outpatient. No response back from the Nurse Manager to confirm appointment time and date. Dr. Neema Nesbitt responded that he was not able to confirm either. Will reach out again to both to arrange ECT schedule. Discharge home has been paused due to not having her outpatient follow up confirmed. Tx team made aware. 9/20   3820 email sent to Suraj Tobin at [address] [place] [address] with updated request. Will call Dr. Neema Nesbitt at [phone] requesting update. Tx team made aware.    9/20/2022 0730 by Richard Braun RN  Reactivated

## 2022-09-20 NOTE — PROGRESS NOTES
Problem: Altered Thought Process (Adult/Pediatric)  Goal: *STG: Complies with medication therapy  Outcome: Progressing Towards Goal  Goal: *STG: Demonstrates ability to understand and use improved judgment in daily activities and relationships  Outcome: Progressing Towards Goal  Variance Patient slowly responding     Problem: Altered Thought Process (Adult/Pediatric)  Goal: *STG: Seeks staff when feelings of anxiety and fear arise  Outcome: Progressing Towards Goal    Patient is medication compliant. Thought blocking, Isolative to self.  Denies si/hi

## 2022-09-20 NOTE — INTERDISCIPLINARY ROUNDS
Behavioral Health Interdisciplinary Rounds     Patient Name: Mahesh Rodríguez  Age: 61 y.o.   Room/Bed:  736/  Primary Diagnosis: <principal problem not specified>   Admission Status: Involuntary Commitment     Readmission within 30 days: no  Power of  in place: no  Patient requires a blocked bed: no          Sleep hours: 8+       Participation in Care/Groups:  yes  Medication Compliant?: Yes  PRNS (last 24 hours): None    Restraints (last 24 hours):  no     Alcohol screening (AUDIT) completed -     If applicable, date SBIRT discussed in treatment team AND documented:    Tobacco - patient is a smoker: Have You Used Tobacco in the Past 30 Days: No  Illegal Drugs use: Have You Used Any Illegal Substances Over the Past 12 Months: No    24 hour chart check complete: yes     _______________________________________________    Patient goal(s) for today:   Treatment team focus/goals:   Progress note:         Financial concerns/prescription coverage:    Family contact:                        Family requesting physician contact today:    Discharge plan:   Access to weapons :                                                              Outpatient provider(s):   Patient's preferred phone number for follow up call :   Patient's preferred e-mail address :  Participating treatment team members:    LOS:  37  Expected LOS:     Participating treatment team members: Mahesh Rodríguez, * (assigned SW),

## 2022-09-20 NOTE — PROGRESS NOTES
Problem: Altered Thought Process (Adult/Pediatric)  Goal: *STG: Remains safe in hospital  Outcome: Progressing Towards Goal  Pt denies any suicidal or homicidal thoughts. Contracts for safety. Remains on q 15 min safety checks.      Goal: *STG: Complies with medication therapy  Outcome: Progressing Towards Goal  MEDICATION COMPLIANT DENIES SIDE EFFECTS  Goal: *STG: Demonstrates ability to understand and use improved judgment in daily activities and relationships  Outcome: Progressing Towards Goal

## 2022-09-21 NOTE — BH NOTES
Behavioral Health Transition Record to Provider    Patient Name: Aldo Curiel  YOB: 1965  Medical Record Number: [id number]  Date of Admission: 8/14/2022  Date of Discharge: 9/20/2022    Attending Provider: No att. providers found  Discharging Provider: Daria Sapp NP  To contact this individual call [phone] and ask the  to page. If unavailable, ask to be transferred to [address] [place] [address] Provider on call. [facility] Provider will be available on call 24/7 and during holidays. Primary Care Provider: Olamide Aj MD    Allergies   Allergen Reactions    Other Medication Anaphylaxis     Artificial sweeteners cause anaphylaxis    Pcn [Penicillins] Anaphylaxis    Sunscreen Contact Dermatitis     Burns and opens the skin    Ultram [Tramadol] Hives and Other (comments)     Hives and ringing of the ears    Benzoyl Peroxide Hives    Cephalexin Itching    Divalproex Itching    Maltodextrin-Glycerin Shortness of Breath    Claritin-D 12 Hour [Loratadine-Pseudoephedrine] Palpitations     palpatations and ringing in the ear       Reason for Admission: CHIEF COMPLAINT:  \"I am just confused. \"     HISTORY OF PRESENT ILLNESS:  The patient is a 50-year-old female who is admitted at [address] on a voluntary basis. This patient is well known to this provider and to this facility. She was just discharged under our care 5 days ago after a 4-day inpatient stay. She is a challenging historian. She is disoriented, confused, and not able to provide a meaningful history. During our brief conversation, she states that she is feeling confused. She is not feeling well about \"everything. \"  She also reports being anxious and requests an anxiety medication. When I asked her if she has any suicidal ideation, she went back and forth. Ultimately, she said she had plans of jumping off something.   Then she state that it would be easier if she would overdose instead. She presented back in the emergency room a day after being discharged. She intentionally overdosed on several medications including trihexyphenidyl 30 tablets of 2 mg and lithium approximately 23 capsules upto 300 mg. She reported at [facility] that what led to the overdose was \"she hit a panic button and it was the hospital's fault. \"  She also reported that she woke up, could not find her  and she panicked. She also reported that still wishes she was dead when she was at [facility]. After the overdose, she was admitted at [address] [place] [address] for several days and was cleared for medical transfer here at [facility]. Her lithium level was initially 3.34; today it was 0.79. She is admitted to the inpatient psychiatric setting for further evaluation and treatment. Admission Diagnosis: Schizoaffective disorder ([facility] [address] [F25.9]    * No surgery found *    Results for orders placed or performed during the hospital encounter of 08/14/22   URINE CULTURE HOLD SAMPLE    Specimen: Serum   Result Value Ref Range    Urine culture hold        Urine on hold in Microbiology dept for 2 days. If unpreserved urine is submitted, it cannot be used for addtional testing after 24 hours, recollection will be required. CBC WITH AUTOMATED DIFF   Result Value Ref Range    WBC 6.9 3.6 - 11.0 K/uL    RBC 3.42 (L) 3.80 - 5.20 M/uL    HGB 10.7 (L) 11.5 - 16.0 g/dL    HCT 31.3 (L) 35.0 - 47.0 %    MCV 91.5 80.0 - 99.0 FL    MCH 31.3 26.0 - 34.0 PG    MCHC 34.2 30.0 - 36.5 g/dL    RDW 12.9 11.5 - 14.5 %    PLATELET 537 123 - 951 K/uL    MPV 9.5 8.9 - 12.9 FL    NRBC 0.0 0  WBC    ABSOLUTE NRBC 0.00 0.00 - 0.01 K/uL    NEUTROPHILS 61 32 - 75 %    LYMPHOCYTES 30 12 - 49 %    MONOCYTES 8 5 - 13 %    EOSINOPHILS 1 0 - 7 %    BASOPHILS 0 0 - 1 %    IMMATURE GRANULOCYTES 0 0.0 - 0.5 %    ABS. NEUTROPHILS 4.2 1.8 - 8.0 K/UL    ABS. LYMPHOCYTES 2.0 0.8 - 3.5 K/UL    ABS. MONOCYTES 0.5 0.0 - 1.0 K/UL    ABS.  EOSINOPHILS 0.1 0.0 - 0.4 K/UL ABS. BASOPHILS 0.0 0.0 - 0.1 K/UL    ABS. IMM. GRANS. 0.0 0.00 - 0.04 K/UL    DF AUTOMATED     URINALYSIS W/MICROSCOPIC   Result Value Ref Range    Color YELLOW/STRAW      Appearance CLEAR CLEAR      Specific gravity 1.006 1.003 - 1.030      pH (UA) 6.0 5.0 - 8.0      Protein Negative NEG mg/dL    Glucose Negative NEG mg/dL    Ketone Negative NEG mg/dL    Bilirubin Negative NEG      Blood Negative NEG      Urobilinogen 0.2 0.2 - 1.0 EU/dL    Nitrites Negative NEG      Leukocyte Esterase MODERATE (A) NEG      WBC 5-10 0 - 4 /hpf    RBC 0-5 0 - 5 /hpf    Epithelial cells FEW FEW /lpf    Bacteria Negative NEG /hpf   SARS-COV-2   Result Value Ref Range    SARS-CoV-2 by PCR Please find results under separate order     CBC WITH AUTOMATED DIFF   Result Value Ref Range    WBC 6.4 3.6 - 11.0 K/uL    RBC 3.88 3.80 - 5.20 M/uL    HGB 12.0 11.5 - 16.0 g/dL    HCT 36.2 35.0 - 47.0 %    MCV 93.3 80.0 - 99.0 FL    MCH 30.9 26.0 - 34.0 PG    MCHC 33.1 30.0 - 36.5 g/dL    RDW 13.0 11.5 - 14.5 %    PLATELET 018 340 - 144 K/uL    MPV 9.1 8.9 - 12.9 FL    NRBC 0.0 0  WBC    ABSOLUTE NRBC 0.00 0.00 - 0.01 K/uL    NEUTROPHILS 55 32 - 75 %    LYMPHOCYTES 33 12 - 49 %    MONOCYTES 10 5 - 13 %    EOSINOPHILS 1 0 - 7 %    BASOPHILS 0 0 - 1 %    IMMATURE GRANULOCYTES 1 (H) 0.0 - 0.5 %    ABS. NEUTROPHILS 3.5 1.8 - 8.0 K/UL    ABS. LYMPHOCYTES 2.1 0.8 - 3.5 K/UL    ABS. MONOCYTES 0.7 0.0 - 1.0 K/UL    ABS. EOSINOPHILS 0.1 0.0 - 0.4 K/UL    ABS. BASOPHILS 0.0 0.0 - 0.1 K/UL    ABS. IMM.  GRANS. 0.0 0.00 - 0.04 K/UL    DF AUTOMATED     METABOLIC PANEL, COMPREHENSIVE   Result Value Ref Range    Sodium 140 136 - 145 mmol/L    Potassium 3.9 3.5 - 5.1 mmol/L    Chloride 106 97 - 108 mmol/L    CO2 28 21 - 32 mmol/L    Anion gap 6 5 - 15 mmol/L    Glucose 102 (H) 65 - 100 mg/dL    BUN 13 6 - 20 MG/DL    Creatinine 0.88 0.55 - 1.02 MG/DL    BUN/Creatinine ratio 15 12 - 20      GFR est AA >60 >60 ml/min/1.73m2    GFR est non-AA >60 >60 ml/min/1.73m2    Calcium 9.6 8.5 - 10.1 MG/DL    Bilirubin, total 0.3 0.2 - 1.0 MG/DL    ALT (SGPT) 24 12 - 78 U/L    AST (SGOT) 10 (L) 15 - 37 U/L    Alk.  phosphatase 52 45 - 117 U/L    Protein, total 7.3 6.4 - 8.2 g/dL    Albumin 3.5 3.5 - 5.0 g/dL    Globulin 3.8 2.0 - 4.0 g/dL    A-G Ratio 0.9 (L) 1.1 - 2.2     SARS-COV-2   Result Value Ref Range    Specimen source Nasopharyngeal      SARS-CoV-2 Not detected NOTD     EKG, 12 LEAD, INITIAL   Result Value Ref Range    Ventricular Rate 92 BPM    Atrial Rate 92 BPM    P-R Interval 138 ms    QRS Duration 68 ms    Q-T Interval 346 ms    QTC Calculation (Bezet) 427 ms    Calculated P Axis 77 degrees    Calculated R Axis 71 degrees    Calculated T Axis 38 degrees    Diagnosis       Normal sinus rhythm  Biatrial enlargement Nonspecific T wave abnormality  Abnormal ECG  When compared with ECG of 11-AUG-2022 13:47,  ST no longer depressed in Anterior leads  Nonspecific T wave abnormality has replaced inverted T waves in Anterior   leads  Confirmed by Jj Riley M.D., Ki Halo ([id number]) on 9/8/2022 10:34:49 AM     EKG, 12 LEAD, INITIAL   Result Value Ref Range    Ventricular Rate 89 BPM    Atrial Rate 89 BPM    P-R Interval 150 ms    QRS Duration 72 ms    Q-T Interval 352 ms    QTC Calculation (Bezet) 428 ms    Calculated P Axis 74 degrees    Calculated R Axis 62 degrees    Calculated T Axis 42 degrees    Diagnosis       Normal sinus rhythm  Right atrial enlargement  Borderline ECG  When compared with ECG of 16-SEP-2022 12:26,  No significant change was found  Confirmed by John Dominguez MD, Ricky Ewing ([id number]) on 9/17/2022 9:06:36 PM         Immunizations administered during this encounter:   Immunization History   Administered Date(s) Administered    COVID-19, PFIZER PURPLE top, DILUTE for use, (age 15 y+), IM, 30mcg/0.3mL 03/10/2021, 04/20/2021, 11/10/2021    Influenza, FLUARIX, FLULAVAL, FLUZONE (age 10 mo+) AND AFLURIA, (age 1 y+), PF, 0.5mL 11/12/2015, 12/30/2019    TDAP Vaccine 07/06/2012    Zoster Recombinant 12/30/2019, 05/13/2020       Screening for Metabolic Disorders for Patients on Antipsychotic Medications  (Data obtained from the EMR)    Estimated Body Mass Index  Estimated body mass index is 29.63 kg/m² as calculated from the following:    Height as of this encounter: 5' 1\" (1.549 m). Weight as of this encounter: 71.1 kg (156 lb 12.8 oz). Vital Signs/Blood Pressure  Visit Vitals  BP (!) 141/80   Pulse 78   Temp 98.4 °F (36.9 °C)   Resp 16   Ht 5' 1\" (1.549 m)   Wt 71.1 kg (156 lb 12.8 oz)   SpO2 98%   BMI 29.63 kg/m²       Blood Glucose/Hemoglobin A1c  Lab Results   Component Value Date/Time    Glucose 102 (H) 09/17/2022 04:50 AM    Glucose 107 (H) 02/03/2020 05:40 AM    Glucose (POC) 136 (H) 01/10/2020 04:22 PM       Lab Results   Component Value Date/Time    Hemoglobin A1c 5.2 01/20/2022 09:49 AM        Lipid Panel  Lab Results   Component Value Date/Time    Cholesterol, total 202 (H) 01/20/2022 09:49 AM    HDL Cholesterol 84 01/20/2022 09:49 AM    LDL, calculated 110.2 (H) 01/20/2022 09:49 AM    Triglyceride 39 01/20/2022 09:49 AM    CHOL/HDL Ratio 2.4 01/20/2022 09:49 AM        Discharge Diagnosis: Schizoaffective disorder depressed type    Discharge Plan: The patient Yesenia Barriga exhibits the ability to control behavior in a less restrictive environment. Patient's level of functioning is improving. No assaultive/destructive behavior has been observed for the past 24 hours. No suicidal/homicidal threat or behavior has been observed for the past 24 hours. There is no evidence of serious medication side effects. Patient has not been in physical or protective restraints for at least the past 24 hours. If weapons involved, how are they secured? None    Is patient aware of and in agreement with discharge plan?  Yes    Arrangements for medication:  Prescriptions sent to pharmacy    Copy of discharge instructions to provider?: Yes    Arrangements for transportation home: Family    Keep all follow up appointments as scheduled, continue to take prescribed medications per physician instructions. Mental health crisis number:  218 or your local mental health crisis line number at [address] [place] [address] at [phone]    Discharge Medication List and Instructions:   Discharge Medication List as of 9/20/2022 12:52 PM        START taking these medications    Details   hydrOXYzine pamoate (VISTARIL) 50 mg capsule Take 2 Capsules by mouth nightly. May also take 1 Capsule three (3) times daily as needed for Anxiety. Indications: anxious, Normal, Disp-90 Capsule, R-0      buPROPion SR (WELLBUTRIN SR) 150 mg SR tablet Take 1 Tablet by mouth daily. Indications: major depressive disorder, Normal, Disp-30 Tablet, R-0      FLUoxetine (PROzac) 20 mg capsule Take 3 Capsules by mouth daily for 30 days. Indications: anxiousness associated with depression, Normal, Disp-90 Capsule, R-0      mirtazapine (REMERON) 45 mg tablet Take 1 Tablet by mouth nightly for 30 days. Indications: major depressive disorder, Normal, Disp-30 Tablet, R-0      perphenazine (TRILAFON) 8 mg tablet Take 1 Tablet by mouth two (2) times a day for 30 days. Indications: psychosis, Normal, Disp-60 Tablet, R-0           CONTINUE these medications which have CHANGED    Details   OLANZapine (ZyPREXA) 10 mg tablet Take 1 Tablet by mouth nightly for 30 days. Indications: psychosis, Normal, Disp-30 Tablet, R-0           CONTINUE these medications which have NOT CHANGED    Details   polyethylene glycol (Miralax) 17 gram packet Take 1 Packet by mouth daily as needed for Constipation.  Indications: constipation, Normal, Disp-30 Packet, R-0           STOP taking these medications       hydrOXYzine HCL (ATARAX) 50 mg tablet Comments:   Reason for Stopping:         lithium carbonate 300 mg capsule Comments:   Reason for Stopping:         traZODone (DESYREL) 150 mg tablet Comments:   Reason for Stopping:         trihexyphenidyL (ARTANE) 2 mg tablet Comments:   Reason for Stopping:         azelastine (ASTELIN) 137 mcg (0.1 %) nasal spray Comments:   Reason for Stopping:         cetirizine (ZYRTEC) 10 mg tablet Comments:   Reason for Stopping:         cholecalciferol (VITAMIN D3) (1000 Units /25 mcg) tablet Comments:   Reason for Stopping:         melatonin 3 mg tablet Comments:   Reason for Stopping:               Unresulted Labs (24h ago, onward)      None          To obtain results of studies pending at discharge, please contact [phone]    Follow-up Information       Follow up With Specialties Details Why Contact Info    Kumar Harmon MD Internal Medicine Physician   [address]  [facility] [address]  [address]      [address] on 9/27/2022 1PM in person appointment with Dr. Lizzy Godoy. Please continue to follow up with your  REED VELAZCO. [address]  [place], [address]  P: [phone]  F: [phone]    [facility] ECT  Follow up on 10/4/2022 First outpatient ECT procedure, please follow-up for future scheduled procedures. [address] [place] [address]  P: [phone]            Advanced Directive:   Does the patient have an appointed surrogate decision maker? No  Does the patient have a Medical Advance Directive? No  Does the patient have a Psychiatric Advance Directive? No  If the patient does not have a surrogate or Medical Advance Directive AND Psychiatric Advance Directive, the patient was offered information on these advance directives Yes and Patient declined to complete    Patient Instructions: Please continue all medications until otherwise directed by physician. Tobacco Cessation Discharge Plan:   Is the patient a smoker and needs referral for smoking cessation? No  Patient referred to the following for smoking cessation with an appointment?  Not applicable     Patient was offered medication to assist with smoking cessation at discharge? Not applicable  Was education for smoking cessation added to the discharge instructions? Yes    Alcohol/Substance Abuse Discharge Plan:   Does the patient have a history of substance/alcohol abuse and requires a referral for treatment? No  Patient referred to the following for substance/alcohol abuse treatment with an appointment? Not applicable  Patient was offered medication to assist with alcohol cessation at discharge? Not applicable  Was education for substance/alcohol abuse added to discharge instructions? Not applicable    Patient discharged to Home; discussed with patient/caregiver and provided to the patient/caregiver either in hard copy or electronically.

## 2022-09-27 ENCOUNTER — TELEPHONE (OUTPATIENT)
Dept: INTERNAL MEDICINE CLINIC | Age: 57
End: 2022-09-27

## 2022-09-27 NOTE — TELEPHONE ENCOUNTER
Patient needed appointment to have blood pressure checked. Could not make the 10 am acute appointment. Scheduled VV for 3:15. She would like to come in prior for a NV to have her BP checked and vitals.     Please advise

## 2022-09-28 NOTE — TELEPHONE ENCOUNTER
Tried calling patient on cell to offer NV for BP check prior to VV and voicemail was full, unable to leave a message

## 2022-09-28 NOTE — TELEPHONE ENCOUNTER
2nd attempt- Tried calling patient on cell to offer NV for BP check prior to VV and voicemail was full, unable to leave a message

## 2022-09-29 ENCOUNTER — VIRTUAL VISIT (OUTPATIENT)
Dept: INTERNAL MEDICINE CLINIC | Age: 57
End: 2022-09-29
Payer: MEDICARE

## 2022-09-29 DIAGNOSIS — R03.0 ELEVATED BLOOD PRESSURE READING: Primary | ICD-10-CM

## 2022-09-29 DIAGNOSIS — F31.9 BIPOLAR 1 DISORDER (HCC): ICD-10-CM

## 2022-09-29 PROCEDURE — G8417 CALC BMI ABV UP PARAM F/U: HCPCS | Performed by: INTERNAL MEDICINE

## 2022-09-29 PROCEDURE — G9899 SCRN MAM PERF RSLTS DOC: HCPCS | Performed by: INTERNAL MEDICINE

## 2022-09-29 PROCEDURE — 1111F DSCHRG MED/CURRENT MED MERGE: CPT | Performed by: INTERNAL MEDICINE

## 2022-09-29 PROCEDURE — G8427 DOCREV CUR MEDS BY ELIG CLIN: HCPCS | Performed by: INTERNAL MEDICINE

## 2022-09-29 PROCEDURE — 3017F COLORECTAL CA SCREEN DOC REV: CPT | Performed by: INTERNAL MEDICINE

## 2022-09-29 PROCEDURE — G9717 DOC PT DX DEP/BP F/U NT REQ: HCPCS | Performed by: INTERNAL MEDICINE

## 2022-09-29 PROCEDURE — 99214 OFFICE O/P EST MOD 30 MIN: CPT | Performed by: INTERNAL MEDICINE

## 2022-09-29 NOTE — PROGRESS NOTES
CC: Medication Evaluation, Medication Refill, Follow-up, Hypertension, and Depression      HPI:    She is a 61 y.o. female presenting for HTN    She has a hx of severe depression -she has been struggling with depression. Possible placement in the past for this. She is seeing a psychiatrist and currently on Remeron perphenazine, olanzapine Wellbutrin and Prozac. She is considering electrical convulsive therapy  She has noted her blood pressure is higher at home in the 140 and 160 range. In the system September 20 she had a blood pressure 140/80. She denies chest pain shortness of breath PND orthopnea headaches. This is an established visit conducted via telemedicine with video. The patient has been instructed that this meets HIPAA criteria and acknowledges and agrees to this method of visitation. Pursuant to the emergency declaration under the [facility] waiver authority and the [employer] Act, this Virtual Visit was conducted, with patient's consent, to reduce the patient's risk of exposure to COVID-19 and provide continuity of care for an established patient. Services were provided through a video synchronous discussion virtually to substitute for in-person clinic visit. ROS:  Constitutional: negative for fevers, chills, anorexia and weight loss  10 systems reviewed and negative other than HPI    Past Medical History:   Diagnosis Date    Anxiety and depression     Bipolar 1 disorder with moderate robyn (HCC) 3/17/2014    Chronic back pain     Hyperlipidemia 8/22/2016    Hyponatremia     Psychotic disorder (HCC)     Scoliosis        Current Outpatient Medications on File Prior to Visit   Medication Sig Dispense Refill    hydrOXYzine pamoate (VISTARIL) 50 mg capsule Take 2 Capsules by mouth nightly. May also take 1 Capsule three (3) times daily as needed for Anxiety.  Indications: anxious 90 Capsule 0    OLANZapine (ZyPREXA) 10 mg tablet Take 1 Tablet by mouth nightly for 30 days. Indications: psychosis 30 Tablet 0    buPROPion SR (WELLBUTRIN SR) 150 mg SR tablet Take 1 Tablet by mouth daily. Indications: major depressive disorder 30 Tablet 0    FLUoxetine (PROzac) 20 mg capsule Take 3 Capsules by mouth daily for 30 days. Indications: anxiousness associated with depression 90 Capsule 0    mirtazapine (REMERON) 45 mg tablet Take 1 Tablet by mouth nightly for 30 days. Indications: major depressive disorder 30 Tablet 0    perphenazine (TRILAFON) 8 mg tablet Take 1 Tablet by mouth two (2) times a day for 30 days. Indications: psychosis 60 Tablet 0    polyethylene glycol (Miralax) 17 gram packet Take 1 Packet by mouth daily as needed for Constipation. Indications: constipation 30 Packet 0     No current facility-administered medications on file prior to visit.        Past Surgical History:   Procedure Laterality Date    HX HEENT      wisdom teeth extraction    HX LIPOMA RESECTION      right gluteal    FREDY BIOPSY BREAST STEREOTACTIC Left 2011    negative    DC DENTAL SURGERY PROCEDURE      hx of dental surgery- wisdom teeth extracted       Family History   Problem Relation Age of Onset    Arthritis-rheumatoid Mother     Migraines Mother     Psychiatric Disorder Mother     Bipolar Disorder Mother     Lupus Mother     Alcohol abuse Father     Lung Disease Father     Heart Disease Father     Stroke Father     High Cholesterol Father     Psychiatric Disorder Sister     Alcohol abuse Sister     Bipolar Disorder Sister     Stroke Brother     Cancer Maternal Aunt     Breast Cancer Maternal Aunt         pt thniks she was under 48    Bipolar Disorder Sister      Reviewed and no changes     Social History     Socioeconomic History    Marital status:      Spouse name: Not on file    Number of children: Not on file    Years of education: Not on file    Highest education level: Not on file   Occupational History Not on file   Tobacco Use    Smoking status: Never    Smokeless tobacco: Never   Vaping Use    Vaping Use: Never used   Substance and Sexual Activity    Alcohol use: Yes     Comment: occasional    Drug use: No    Sexual activity: Yes     Partners: Male   Other Topics Concern     Service Not Asked    Blood Transfusions Not Asked    Caffeine Concern Not Asked    Occupational Exposure Not Asked    Hobby Hazards Not Asked    Sleep Concern Not Asked    Stress Concern Not Asked    Weight Concern Not Asked    Special Diet Not Asked    Back Care Not Asked    Exercise Not Asked    Bike Helmet Not Asked    Seat Belt Not Asked    Self-Exams Not Asked   Social History Narrative    , 0 children, not working at present. On disability for bipolar illness. Social Determinants of Health     Financial Resource Strain: Not on file   Food Insecurity: Not on file   Transportation Needs: Not on file   Physical Activity: Not on file   Stress: Not on file   Social Connections: Not on file   Intimate Partner Violence: Not on file   Housing Stability: Not on file          There were no vitals taken for this visit. Physical Examination:   Gen: well appearing female  HEENT: normal conjunctiva, no audible congestion, patient does not see oral erythema, has MMM  Neck: patient does not feel enlarged or tender LAD or masses  Resp: normal respiratory effort, no audible wheezing. CV: patient does not feel palpitations or heart irregularity  Abd: patient does not feel abdominal tenderness or mass, patient does not notice distension  Extrem: patient does not see swelling in ankles or joints.    Neuro: Alert and oriented, able to answer questions without difficulty, able to move all extremities and walk normally          Lab Results   Component Value Date/Time    WBC 6.4 09/17/2022 04:50 AM    HGB 12.0 09/17/2022 04:50 AM    Hematocrit (POC) 34 (L) 07/17/2018 03:33 AM    HCT 36.2 09/17/2022 04:50 AM    PLATELET 449 60/68/4003 04:50 AM    MCV 93.3 09/17/2022 04:50 AM     Lab Results   Component Value Date/Time    Sodium 140 09/17/2022 04:50 AM    Potassium 3.9 09/17/2022 04:50 AM    Chloride 106 09/17/2022 04:50 AM    CO2 28 09/17/2022 04:50 AM    Anion gap 6 09/17/2022 04:50 AM    Glucose 102 (H) 09/17/2022 04:50 AM    Glucose 107 (H) 02/03/2020 05:40 AM    BUN 13 09/17/2022 04:50 AM    Creatinine 0.88 09/17/2022 04:50 AM    BUN/Creatinine ratio 15 09/17/2022 04:50 AM    GFR est AA >60 09/17/2022 04:50 AM    GFR est non-AA >60 09/17/2022 04:50 AM    Calcium 9.6 09/17/2022 04:50 AM     Lab Results   Component Value Date/Time    Cholesterol, total 202 (H) 01/20/2022 09:49 AM    HDL Cholesterol 84 01/20/2022 09:49 AM    LDL, calculated 110.2 (H) 01/20/2022 09:49 AM    VLDL, calculated 7.8 01/20/2022 09:49 AM    Triglyceride 39 01/20/2022 09:49 AM    CHOL/HDL Ratio 2.4 01/20/2022 09:49 AM     Lab Results   Component Value Date/Time    TSH 1.150 11/30/2020 03:56 PM     No results found for: PSA, PSA2, PSAR1, Aleksander Choe, [id number], [id number]  Lab Results   Component Value Date/Time    Hemoglobin A1c 5.2 01/20/2022 09:49 AM    Hemoglobin A1c (POC) 5.4 04/20/2015 10:00 AM     Lab Results   Component Value Date/Time    Vitamin D 25-Hydroxy 12.7 (L) 11/10/2011 09:25 AM    VITAMIN D, 25-HYDROXY 69.0 11/12/2015 03:52 PM       Lab Results   Component Value Date/Time    ALT (SGPT) 24 09/17/2022 04:50 AM    Alk. phosphatase 52 09/17/2022 04:50 AM    Bilirubin, total 0.3 09/17/2022 04:50 AM           Assessment/Plan:    1. Elevated blood pressure reading  -Discussed with patient to come in tomorrow so we can check her blood pressure in the office. If it is in fact elevated and we will discuss starting a blood pressure medication. 2.  Severe depression/bipolar 1 depression: This is followed by psychiatry-has been difficult to control.   She is considering electroconvulsive therapy with Dr Silvestre Joyce and Dispositions    Return in about 4 weeks (around 10/27/2022) for blood pressure. Gail Ballesteros MD    This is an established visit conducted via real time video and audio telemedicine. The patient has been instructed that this meets HIPAA criteria and acknowledges and agrees to this method of visitation.

## 2022-09-29 NOTE — PROGRESS NOTES
1. \"Have you been to the ER, urgent care clinic since your last visit? Hospitalized since your last visit? \" Yes [facility]    2. \"Have you seen or consulted any other health care providers outside of the [address] [place] [address] since your last visit? \" Yes       3. For patients aged 39-70: Has the patient had a colonoscopy / FIT/ Cologuard? Yes - no Care Gap present      If the patient is female:    4. For patients aged 41-77: Has the patient had a mammogram within the past 2 years? Yes - no Care Gap present      5. For patients aged 21-65: Has the patient had a pap smear?  Yes - no Care Gap present

## 2022-09-30 ENCOUNTER — CLINICAL SUPPORT (OUTPATIENT)
Dept: INTERNAL MEDICINE CLINIC | Age: 57
End: 2022-09-30
Payer: MEDICARE

## 2022-09-30 VITALS
RESPIRATION RATE: 16 BRPM | DIASTOLIC BLOOD PRESSURE: 80 MMHG | HEART RATE: 91 BPM | SYSTOLIC BLOOD PRESSURE: 142 MMHG | OXYGEN SATURATION: 99 % | BODY MASS INDEX: 30.21 KG/M2 | HEIGHT: 61 IN | TEMPERATURE: 97.9 F | WEIGHT: 160 LBS

## 2022-09-30 DIAGNOSIS — I10 ESSENTIAL HYPERTENSION: Primary | ICD-10-CM

## 2022-09-30 PROCEDURE — 99211 OFF/OP EST MAY X REQ PHY/QHP: CPT | Performed by: INTERNAL MEDICINE

## 2022-09-30 NOTE — PROGRESS NOTES
Chief Complaint   Patient presents with    Blood Pressure Check     Visit Vitals  BP (!) 142/80 (BP 1 Location: Left upper arm, BP Patient Position: Sitting, BP Cuff Size: Adult)   Pulse 91   Temp 97.9 °F (36.6 °C) (Temporal)   Resp 16   Ht 5' 1\" (1.549 m)   Wt 160 lb (72.6 kg)   SpO2 99%   BMI 30.23 kg/m²       BP checked times 2, both with machine and manually

## 2022-10-12 ENCOUNTER — TRANSCRIBE ORDER (OUTPATIENT)
Dept: SCHEDULING | Age: 57
End: 2022-10-12

## 2022-10-12 DIAGNOSIS — Z12.31 SCREENING MAMMOGRAM FOR HIGH-RISK PATIENT: Primary | ICD-10-CM

## 2022-10-21 ENCOUNTER — VIRTUAL VISIT (OUTPATIENT)
Dept: INTERNAL MEDICINE CLINIC | Age: 57
End: 2022-10-21
Payer: MEDICARE

## 2022-10-21 DIAGNOSIS — Z09 HOSPITAL DISCHARGE FOLLOW-UP: ICD-10-CM

## 2022-10-21 DIAGNOSIS — F31.9 BIPOLAR 1 DISORDER (HCC): Primary | ICD-10-CM

## 2022-10-21 PROCEDURE — G9717 DOC PT DX DEP/BP F/U NT REQ: HCPCS | Performed by: INTERNAL MEDICINE

## 2022-10-21 PROCEDURE — G9899 SCRN MAM PERF RSLTS DOC: HCPCS | Performed by: INTERNAL MEDICINE

## 2022-10-21 PROCEDURE — 99214 OFFICE O/P EST MOD 30 MIN: CPT | Performed by: INTERNAL MEDICINE

## 2022-10-21 PROCEDURE — 3017F COLORECTAL CA SCREEN DOC REV: CPT | Performed by: INTERNAL MEDICINE

## 2022-10-21 PROCEDURE — G8427 DOCREV CUR MEDS BY ELIG CLIN: HCPCS | Performed by: INTERNAL MEDICINE

## 2022-10-21 PROCEDURE — G8417 CALC BMI ABV UP PARAM F/U: HCPCS | Performed by: INTERNAL MEDICINE

## 2022-10-21 RX ORDER — OLANZAPINE 5 MG/1
TABLET ORAL
COMMUNITY
Start: 2022-10-18

## 2022-10-21 RX ORDER — TEMAZEPAM 15 MG/1
CAPSULE ORAL
COMMUNITY
Start: 2022-10-18

## 2022-10-21 RX ORDER — FLUOXETINE HYDROCHLORIDE 20 MG/1
20 CAPSULE ORAL DAILY
COMMUNITY

## 2022-10-21 RX ORDER — PERPHENAZINE 8 MG/1
8 TABLET, FILM COATED ORAL DAILY
COMMUNITY

## 2022-10-21 RX ORDER — PERPHENAZINE 16 MG/1
TABLET, FILM COATED ORAL
COMMUNITY
Start: 2022-10-18

## 2022-10-21 NOTE — PROGRESS NOTES
1. \"Have you been to the ER, urgent care clinic since your last visit? Hospitalized since your last visit? \" [facility]    2. \"Have you seen or consulted any other health care providers outside of the [address] [place] [address] since your last visit? \" No     3. For patients aged 39-70: Has the patient had a colonoscopy / FIT/ Cologuard? Yes - no Care Gap present      If the patient is female:    4. For patients aged 41-77: Has the patient had a mammogram within the past 2 years? Yes - no Care Gap present      5. For patients aged 21-65: Has the patient had a pap smear?  Yes - no Care Gap present

## 2022-10-21 NOTE — PROGRESS NOTES
CC: Medication Evaluation, Medication Refill, Follow-up, Hypertension, and Depression      HPI:    She is a 61 y.o. female presenting for HTN    She has a hx of severe depression - she had a recent hospitalization for mood - she was depressed and attempted suicide. She had medication adjustments   On prozac 20mg, TRILAFON 8mg, zyprexa, restoril , wellbutrin and vistaril at night  She feels much better since the adjustment and states \" I want to live\" \" I feel much better\"      Recall last month you were concerned with blood pressure but most recent BP is excellent   129/76      Due for mammogram and ordered  Pap smear in 2018 and normal due next year  Coloscopy done 2013 and due next year reminded patient  Reminded to do the flu shot and also tetanus shot   Normal dexa 2014   This is an established visit conducted via telemedicine with video. The patient has been instructed that this meets HIPAA criteria and acknowledges and agrees to this method of visitation. Pursuant to the emergency declaration under the [address] [place] [address], [facility] waiver authority and the [employer] Act, this Virtual Visit was conducted, with patient's consent, to reduce the patient's risk of exposure to COVID-19 and provide continuity of care for an established patient. Services were provided through a video synchronous discussion virtually to substitute for in-person clinic visit.        ROS:  Constitutional: negative for fevers, chills, anorexia and weight loss  10 systems reviewed and negative other than HPI    Past Medical History:   Diagnosis Date    Anxiety and depression     Bipolar 1 disorder with moderate robyn ([facility] [address] 3/17/2014    Chronic back pain     Hyperlipidemia 8/22/2016    Hyponatremia     Psychotic disorder ([facility] [address]     Scoliosis        Current Outpatient Medications on File Prior to Visit   Medication Sig Dispense Refill    hydrOXYzine pamoate (VISTARIL) 50 mg capsule Take 2 Capsules by mouth nightly. May also take 1 Capsule three (3) times daily as needed for Anxiety. Indications: anxious 90 Capsule 0    OLANZapine (ZyPREXA) 10 mg tablet Take 1 Tablet by mouth nightly for 30 days. Indications: psychosis 30 Tablet 0    buPROPion SR (WELLBUTRIN SR) 150 mg SR tablet Take 1 Tablet by mouth daily. Indications: major depressive disorder 30 Tablet 0    FLUoxetine (PROzac) 20 mg capsule Take 3 Capsules by mouth daily for 30 days. Indications: anxiousness associated with depression 90 Capsule 0    mirtazapine (REMERON) 45 mg tablet Take 1 Tablet by mouth nightly for 30 days. Indications: major depressive disorder 30 Tablet 0    perphenazine (TRILAFON) 8 mg tablet Take 1 Tablet by mouth two (2) times a day for 30 days. Indications: psychosis 60 Tablet 0    polyethylene glycol (Miralax) 17 gram packet Take 1 Packet by mouth daily as needed for Constipation. Indications: constipation 30 Packet 0     No current facility-administered medications on file prior to visit.        Past Surgical History:   Procedure Laterality Date    HX HEENT      wisdom teeth extraction    HX LIPOMA RESECTION      right gluteal    FREDY BIOPSY BREAST STEREOTACTIC Left 2011    negative    NE DENTAL SURGERY PROCEDURE      hx of dental surgery- wisdom teeth extracted       Family History   Problem Relation Age of Onset    Arthritis-rheumatoid Mother     Migraines Mother     Psychiatric Disorder Mother     Bipolar Disorder Mother     Lupus Mother     Alcohol abuse Father     Lung Disease Father     Heart Disease Father     Stroke Father     High Cholesterol Father     Psychiatric Disorder Sister     Alcohol abuse Sister     Bipolar Disorder Sister     Stroke Brother     Cancer Maternal Aunt     Breast Cancer Maternal Aunt         pt thniks she was under 48    Bipolar Disorder Sister      Reviewed and no changes     Social History     Socioeconomic History    Marital status:  Spouse name: Not on file    Number of children: Not on file    Years of education: Not on file    Highest education level: Not on file   Occupational History    Not on file   Tobacco Use    Smoking status: Never    Smokeless tobacco: Never   Vaping Use    Vaping Use: Never used   Substance and Sexual Activity    Alcohol use: Yes     Comment: occasional    Drug use: No    Sexual activity: Yes     Partners: Male   Other Topics Concern     Service Not Asked    Blood Transfusions Not Asked    Caffeine Concern Not Asked    Occupational Exposure Not Asked    Hobby Hazards Not Asked    Sleep Concern Not Asked    Stress Concern Not Asked    Weight Concern Not Asked    Special Diet Not Asked    Back Care Not Asked    Exercise Not Asked    Bike Helmet Not Asked    Seat Belt Not Asked    Self-Exams Not Asked   Social History Narrative    , 0 children, not working at present. On disability for bipolar illness. Social Determinants of Health     Financial Resource Strain: Not on file   Food Insecurity: Not on file   Transportation Needs: Not on file   Physical Activity: Not on file   Stress: Not on file   Social Connections: Not on file   Intimate Partner Violence: Not on file   Housing Stability: Not on file          There were no vitals taken for this visit. Physical Examination:   Gen: well appearing female  HEENT: normal conjunctiva, no audible congestion, patient does not see oral erythema, has MMM  Neck: patient does not feel enlarged or tender LAD or masses  Resp: normal respiratory effort, no audible wheezing. CV: patient does not feel palpitations or heart irregularity  Abd: patient does not feel abdominal tenderness or mass, patient does not notice distension  Extrem: patient does not see swelling in ankles or joints.    Neuro: Alert and oriented, able to answer questions without difficulty, able to move all extremities and walk normally          Lab Results   Component Value Date/Time    WBC 6.4 09/17/2022 04:50 AM    HGB 12.0 09/17/2022 04:50 AM    Hematocrit (POC) 34 (L) 07/17/2018 03:33 AM    HCT 36.2 09/17/2022 04:50 AM    PLATELET 048 02/86/1865 04:50 AM    MCV 93.3 09/17/2022 04:50 AM     Lab Results   Component Value Date/Time    Sodium 140 09/17/2022 04:50 AM    Potassium 3.9 09/17/2022 04:50 AM    Chloride 106 09/17/2022 04:50 AM    CO2 28 09/17/2022 04:50 AM    Anion gap 6 09/17/2022 04:50 AM    Glucose 102 (H) 09/17/2022 04:50 AM    Glucose 107 (H) 02/03/2020 05:40 AM    BUN 13 09/17/2022 04:50 AM    Creatinine 0.88 09/17/2022 04:50 AM    BUN/Creatinine ratio 15 09/17/2022 04:50 AM    GFR est AA >60 09/17/2022 04:50 AM    GFR est non-AA >60 09/17/2022 04:50 AM    Calcium 9.6 09/17/2022 04:50 AM     Lab Results   Component Value Date/Time    Cholesterol, total 202 (H) 01/20/2022 09:49 AM    HDL Cholesterol 84 01/20/2022 09:49 AM    LDL, calculated 110.2 (H) 01/20/2022 09:49 AM    VLDL, calculated 7.8 01/20/2022 09:49 AM    Triglyceride 39 01/20/2022 09:49 AM    CHOL/HDL Ratio 2.4 01/20/2022 09:49 AM     Lab Results   Component Value Date/Time    TSH 1.150 11/30/2020 03:56 PM     No results found for: PSA, PSA2, PSAR1, Kathrynn Getting, [id number], [id number]  Lab Results   Component Value Date/Time    Hemoglobin A1c 5.2 01/20/2022 09:49 AM    Hemoglobin A1c (POC) 5.4 04/20/2015 10:00 AM     Lab Results   Component Value Date/Time    Vitamin D 25-Hydroxy 12.7 (L) 11/10/2011 09:25 AM    VITAMIN D, 25-HYDROXY 69.0 11/12/2015 03:52 PM       Lab Results   Component Value Date/Time    ALT (SGPT) 24 09/17/2022 04:50 AM    Alk. phosphatase 52 09/17/2022 04:50 AM    Bilirubin, total 0.3 09/17/2022 04:50 AM           Assessment/Plan:    1. Elevated blood pressure reading  Resolved and blood pressure is normal       2. Severe depression/bipolar 1 depression: This is followed by psychiatry-has been difficult to control. She had a recent hospitalization and medications adjusted, and doing much better. Sees Dr Boo Hardin psychiatrist    Follow up in 3 months for pap smear  David Pritchett MD    This is an established visit conducted via real time video and audio telemedicine. The patient has been instructed that this meets HIPAA criteria and acknowledges and agrees to this method of visitation.

## 2022-10-24 ENCOUNTER — TELEPHONE (OUTPATIENT)
Dept: INTERNAL MEDICINE CLINIC | Age: 57
End: 2022-10-24

## 2022-10-24 NOTE — TELEPHONE ENCOUNTER
----- Message from Heather Gallardo sent at 10/24/2022 10:16 AM EDT -----  Subject: Message to Provider    QUESTIONS  Information for Provider? PT is covid positive today. PT has a high fever,   cough, chills, tired. PT wanted to see if could call something in. Please   review and contact PT back. Thank you!  ---------------------------------------------------------------------------  --------------  Cilf Johnson INFO  [phone]; OK to leave message on voicemail  ---------------------------------------------------------------------------  --------------  SCRIPT ANSWERS  Relationship to Patient?  Self

## 2022-10-24 NOTE — TELEPHONE ENCOUNTER
Called patient and COVID positive, having fevers, cough and body  aches  Normal GFR  Will order paxlovid  Orders Placed This Encounter    nirmatrelvir-ritonavir (Paxlovid, EUA,) 300 mg (150 mg x 2)-100 mg     Sig: Follow package directions     Dispense:  1 Box     Refill:  0     [facility] GFR     Order Specific Question:   Does this patient qualify for COVID-19 antiviral treatment based on criteria for treatment? Answer:    Yes

## 2022-10-28 NOTE — ED NOTES
Assumed care from EMS. Patient has been taking a melatonin gummy and took it last night and started coughing and woke her up from sleep and she started having dry mouth. Patient has dry mouth, and did take her EPI pen and 50 mg of Benadryl. Rinvoq Counseling: I discussed with the patient the risks of Rinvoq therapy including but not limited to upper respiratory tract infections, shingles, cold sores, bronchitis, nausea, cough, fever, acne, and headache. Live vaccines should be avoided.  This medication has been linked to serious infections; higher rate of mortality; malignancy and lymphoproliferative disorders; major adverse cardiovascular events; thrombosis; thrombocytopenia, anemia, and neutropenia; lipid elevations; liver enzyme elevations; and gastrointestinal perforations.

## 2022-11-17 ENCOUNTER — CLINICAL SUPPORT (OUTPATIENT)
Dept: INTERNAL MEDICINE CLINIC | Age: 57
End: 2022-11-17
Payer: MEDICARE

## 2022-11-17 VITALS — DIASTOLIC BLOOD PRESSURE: 65 MMHG | SYSTOLIC BLOOD PRESSURE: 140 MMHG

## 2022-11-17 DIAGNOSIS — Z23 NEEDS FLU SHOT: Primary | ICD-10-CM

## 2022-11-17 DIAGNOSIS — Z23 ENCOUNTER FOR IMMUNIZATION: ICD-10-CM

## 2022-11-17 PROCEDURE — G0008 ADMIN INFLUENZA VIRUS VAC: HCPCS | Performed by: INTERNAL MEDICINE

## 2022-11-17 PROCEDURE — 90715 TDAP VACCINE 7 YRS/> IM: CPT | Performed by: INTERNAL MEDICINE

## 2022-11-17 PROCEDURE — 90471 IMMUNIZATION ADMIN: CPT | Performed by: INTERNAL MEDICINE

## 2022-11-17 PROCEDURE — 90686 IIV4 VACC NO PRSV 0.5 ML IM: CPT | Performed by: INTERNAL MEDICINE

## 2022-11-17 NOTE — PROGRESS NOTES
After obtaining consent, and per orders of Dr. Kizzy Garza injection of flu and Tdap  given by Kinga Mackenzie. Patient instructed to read handout for vaccine given to patient. and to report any adverse reaction to me immediately.

## 2023-01-01 NOTE — DISCHARGE INSTRUCTIONS
DISCHARGE SUMMARY    Sara Mayfield  : 1965  MRN: [id number]    The patient Manpreet Allen exhibits the ability to control behavior in a less restrictive environment. Patient's level of functioning is improving. No assaultive/destructive behavior has been observed for the past 24 hours. No suicidal/homicidal threat or behavior has been observed for the past 24 hours. There is no evidence of serious medication side effects. Patient has not been in physical or protective restraints for at least the past 24 hours. If weapons involved, how are they secured? None    Is patient aware of and in agreement with discharge plan? Yes    Arrangements for medication:  Prescriptions sent to pharmacy    Copy of discharge instructions to provider?: Yes    Arrangements for transportation home: Family    Keep all follow up appointments as scheduled, continue to take prescribed medications per physician instructions. Mental health crisis number:  967 or your local mental health crisis line number at [address] [place] [address] at [place] [address] Emergency WARM LINE      [phone] ([phone])      M-F: 9am to 9pm      Sat & Sun: 2712 [facility] suicide prevention lines:                             4-694-MTGBKBB ([phone])       [phone] ([phone])    Crisis Text Line:  Text HOME to [address] from Nurse    PATIENT INSTRUCTIONS:    After general anesthesia or intravenous sedation, for 24 hours or while taking prescription Narcotics:  Limit your activities  Do not drive and operate hazardous machinery  Do not make important personal or business decisions  Do  not drink alcoholic beverages  If you have not urinated within 8 hours after discharge, please contact your surgeon on call.     Report the following to your surgeon:  Excessive pain, swelling, redness or odor of or around the surgical area  Temperature over 100.5  Nausea and vomiting lasting longer than 4 hours Mom noted soft fluid bump top of head/back of head on Saturday.  Mom feels it is about size of two quarters. Shaped like a t-form has gone down in size a little bit.   No redness or bruising noted.  Can touch and press does not cause pain or bother her.  Mom denies fall or accident noted.      Mom states she does have a slight runny stuffy nose with little bit of cough.  Symptoms started about a week ago.     She has been more fussy, every 2 hours waking up crying hungry.  Mom has not noticed her tugging or swatting at ears.      She is taking bottle okay, maybe a little less.  Still having normal wet diapers.      Active and alert during the day. Fussy more so at night.      Mom has been using humidifier and elevating head of bed.  Nasal aspirator.      Reviewed with Dr Berrios.  Discussed with mother that it sounds like her soft spot and possible fluid shifted to that area.  Will have her monitor but if getting worse or she is super fussy would recommend having or seen.  If cold symptoms are getting worse or not improving will give us a call.  Mom verbalized understanding.    Encounter closed.     or if unable to take medications  Any signs of decreased circulation or nerve impairment to extremity: change in color, persistent  numbness, tingling, coldness or increase pain  Any questions    What to do at Home:  Recommended activity: Activity as tolerated,     If you experience any of the following symptoms thoughts of hurting yourself, hurting someone else, please follow up with 911 or your local mental health crisis line number at [facility] at [phone]  . *  Please give a list of your current medications to your Primary Care Provider. *  Please update this list whenever your medications are discontinued, doses are      changed, or new medications (including over-the-counter products) are added. *  Please carry medication information at all times in case of emergency situations. These are general instructions for a healthy lifestyle:    No smoking/ No tobacco products/ Avoid exposure to second hand smoke  Surgeon General's Warning:  Quitting smoking now greatly reduces serious risk to your health. Obesity, smoking, and sedentary lifestyle greatly increases your risk for illness    A healthy diet, regular physical exercise & weight monitoring are important for maintaining a healthy lifestyle    You may be retaining fluid if you have a history of heart failure or if you experience any of the following symptoms:  Weight gain of 3 pounds or more overnight or 5 pounds in a week, increased swelling in our hands or feet or shortness of breath while lying flat in bed. Please call your doctor as soon as you notice any of these symptoms; do not wait until your next office visit. The discharge information has been reviewed with the patient. The patient verbalized understanding.   Discharge medications reviewed with the patient and appropriate educational materials and side effects teaching were provided.   ___________________________________________________________________________________________________________________________________

## 2023-02-23 ENCOUNTER — TELEPHONE (OUTPATIENT)
Dept: NEUROLOGY | Age: 58
End: 2023-02-23

## 2023-02-28 ENCOUNTER — TRANSCRIBE ORDER (OUTPATIENT)
Dept: SCHEDULING | Age: 58
End: 2023-02-28

## 2023-02-28 DIAGNOSIS — Z12.31 SCREENING MAMMOGRAM FOR HIGH-RISK PATIENT: Primary | ICD-10-CM

## 2023-03-02 ENCOUNTER — OFFICE VISIT (OUTPATIENT)
Dept: INTERNAL MEDICINE CLINIC | Age: 58
End: 2023-03-02

## 2023-03-02 VITALS
HEART RATE: 86 BPM | BODY MASS INDEX: 33.08 KG/M2 | RESPIRATION RATE: 18 BRPM | SYSTOLIC BLOOD PRESSURE: 115 MMHG | OXYGEN SATURATION: 100 % | DIASTOLIC BLOOD PRESSURE: 73 MMHG | HEIGHT: 61 IN | WEIGHT: 175.2 LBS | TEMPERATURE: 97.7 F

## 2023-03-02 DIAGNOSIS — E78.00 HIGH CHOLESTEROL: ICD-10-CM

## 2023-03-02 DIAGNOSIS — Z01.419 ENCOUNTER FOR WELL WOMAN EXAM: Primary | ICD-10-CM

## 2023-03-02 DIAGNOSIS — E55.9 VITAMIN D DEFICIENCY: ICD-10-CM

## 2023-03-02 DIAGNOSIS — F25.0 SCHIZOAFFECTIVE DISORDER, BIPOLAR TYPE (HCC): ICD-10-CM

## 2023-03-02 DIAGNOSIS — H04.123 DRY EYES: ICD-10-CM

## 2023-03-02 DIAGNOSIS — F31.9 BIPOLAR 1 DISORDER (HCC): ICD-10-CM

## 2023-03-02 DIAGNOSIS — I10 ESSENTIAL HYPERTENSION: ICD-10-CM

## 2023-03-02 LAB
25(OH)D3 SERPL-MCNC: 44.9 NG/ML (ref 30–100)
ALBUMIN SERPL-MCNC: 4.3 G/DL (ref 3.5–5)
ALBUMIN/GLOB SERPL: 1.2 (ref 1.1–2.2)
ALP SERPL-CCNC: 61 U/L (ref 45–117)
ALT SERPL-CCNC: 26 U/L (ref 12–78)
ANION GAP SERPL CALC-SCNC: 6 MMOL/L (ref 5–15)
AST SERPL-CCNC: 17 U/L (ref 15–37)
BASOPHILS # BLD: 0 K/UL (ref 0–0.1)
BASOPHILS NFR BLD: 1 % (ref 0–1)
BILIRUB SERPL-MCNC: 0.3 MG/DL (ref 0.2–1)
BUN SERPL-MCNC: 12 MG/DL (ref 6–20)
BUN/CREAT SERPL: 14 (ref 12–20)
CALCIUM SERPL-MCNC: 9.9 MG/DL (ref 8.5–10.1)
CHLORIDE SERPL-SCNC: 103 MMOL/L (ref 97–108)
CHOLEST SERPL-MCNC: 216 MG/DL
CO2 SERPL-SCNC: 31 MMOL/L (ref 21–32)
CREAT SERPL-MCNC: 0.87 MG/DL (ref 0.55–1.02)
DIFFERENTIAL METHOD BLD: NORMAL
EOSINOPHIL # BLD: 0 K/UL (ref 0–0.4)
EOSINOPHIL NFR BLD: 1 % (ref 0–7)
ERYTHROCYTE [DISTWIDTH] IN BLOOD BY AUTOMATED COUNT: 12.2 % (ref 11.5–14.5)
GLOBULIN SER CALC-MCNC: 3.7 G/DL (ref 2–4)
GLUCOSE SERPL-MCNC: 90 MG/DL (ref 65–100)
HCT VFR BLD AUTO: 43.4 % (ref 35–47)
HDLC SERPL-MCNC: 86 MG/DL
HDLC SERPL: 2.5 (ref 0–5)
HGB BLD-MCNC: 13.9 G/DL (ref 11.5–16)
IMM GRANULOCYTES # BLD AUTO: 0 K/UL (ref 0–0.04)
IMM GRANULOCYTES NFR BLD AUTO: 0 % (ref 0–0.5)
LDLC SERPL CALC-MCNC: 121.8 MG/DL (ref 0–100)
LYMPHOCYTES # BLD: 1.4 K/UL (ref 0.8–3.5)
LYMPHOCYTES NFR BLD: 35 % (ref 12–49)
MCH RBC QN AUTO: 30.8 PG (ref 26–34)
MCHC RBC AUTO-ENTMCNC: 32 G/DL (ref 30–36.5)
MCV RBC AUTO: 96 FL (ref 80–99)
MONOCYTES # BLD: 0.3 K/UL (ref 0–1)
MONOCYTES NFR BLD: 7 % (ref 5–13)
NEUTS SEG # BLD: 2.1 K/UL (ref 1.8–8)
NEUTS SEG NFR BLD: 56 % (ref 32–75)
NRBC # BLD: 0 K/UL (ref 0–0.01)
NRBC BLD-RTO: 0 PER 100 WBC
PLATELET # BLD AUTO: 216 K/UL (ref 150–400)
PMV BLD AUTO: 10.4 FL (ref 8.9–12.9)
POTASSIUM SERPL-SCNC: 4.7 MMOL/L (ref 3.5–5.1)
PROT SERPL-MCNC: 8 G/DL (ref 6.4–8.2)
RBC # BLD AUTO: 4.52 M/UL (ref 3.8–5.2)
SODIUM SERPL-SCNC: 140 MMOL/L (ref 136–145)
TRIGL SERPL-MCNC: 41 MG/DL (ref ?–150)
VLDLC SERPL CALC-MCNC: 8.2 MG/DL
WBC # BLD AUTO: 3.8 K/UL (ref 3.6–11)

## 2023-03-02 PROCEDURE — 99214 OFFICE O/P EST MOD 30 MIN: CPT | Performed by: INTERNAL MEDICINE

## 2023-03-02 RX ORDER — MIRTAZAPINE 45 MG/1
TABLET, FILM COATED ORAL
COMMUNITY
Start: 2023-02-28

## 2023-03-02 NOTE — PROGRESS NOTES
Chief Complaint   Patient presents with    Physical          1. \"Have you been to the ER, urgent care clinic since your last visit? Hospitalized since your last visit? yes, [facility], suicidal thoughts. 2. \"Have you seen or consulted any other health care providers outside of the [address] [place] [address] since your last visit? \" No     3. For patients aged 39-70: Has the patient had a colonoscopy / FIT/ Cologuard? No      If the patient is female:    4. For patients aged 41-77: Has the patient had a mammogram within the past 2 years? No      5. For patients aged 21-65: Has the patient had a pap smear?  No

## 2023-03-02 NOTE — PROGRESS NOTES
Ms. John Zavala is presenting to follow up     CC:  Physical  pap     HPI:  CC: Medication Evaluation, Medication Refill, Follow-up, Hypertension, and Depression      HPI:    She is a 61 y.o. female presenting for woman exam    Post menopausal   No kids  No pregnancies  Sexually active   + vaginal dryness       Diagnosed with dry eyes and wondering if she needs to test fo Sjogrens    She has a hx of severe depression - she had a recent hospitalization for mood - she was depressed and attempted suicide.    She had medication adjustments   On prozac 20mg, TRILAFON 8mg, zyprexa, restoril , wellbutrin and vistaril at night  She feels much better since the adjustment and states \" I want to live\" \" I feel much better\"        Pap smear in 2018 and will do today  Mammogram ordered and pending  Coloscopy done 2013 and due next year reminded patient  Reminded to do the flu shot and also tetanus shot   Normal dexa 2014     Review of systems:  Constitutional: negative for fever, chills, weight loss, night sweats     10 systems reviewed and negative other than HPI    Past Medical History:   Diagnosis Date    Anxiety and depression     Bipolar 1 disorder with moderate robyn ([address] 3/17/2014    Chronic back pain     Hyperlipidemia 8/22/2016    Hyponatremia     Psychotic disorder ([address]     Scoliosis         Past Surgical History:   Procedure Laterality Date    HX HEENT      wisdom teeth extraction    HX LIPOMA RESECTION      right gluteal    [facility] BIOPSY BREAST STEREOTACTIC Left 2011    negative    [place] DENTAL SURGERY PROCEDURE      hx of dental surgery- wisdom teeth extracted       Allergies   Allergen Reactions    Other Medication Anaphylaxis     Artificial sweeteners cause anaphylaxis    Pcn [Penicillins] Anaphylaxis    Sunscreen Contact Dermatitis     Burns and opens the skin    Ultram [Tramadol] Hives and Other (comments)     Hives and ringing of the ears    Benzoyl Peroxide Hives    Cephalexin Itching    Divalproex Itching Maltodextrin-Glycerin Shortness of Breath    Claritin-D 12 Hour [Loratadine-Pseudoephedrine] Palpitations     palpatations and ringing in the ear       Current Outpatient Medications on File Prior to Visit   Medication Sig Dispense Refill    mirtazapine (REMERON) 45 mg tablet       nirmatrelvir-ritonavir (Paxlovid, EUA,) 300 mg (150 mg x 2)-100 mg Follow package directions 1 Box 0    FLUoxetine (PROzac) 20 mg capsule Take 20 mg by mouth daily. perphenazine (TRILAFON) 8 mg tablet Take 8 mg by mouth daily. OLANZapine (ZyPREXA) 5 mg tablet       perphenazine (TRILAFON) 16 mg tablet       temazepam (RESTORIL) 15 mg capsule       hydrOXYzine pamoate (VISTARIL) 50 mg capsule Take 2 Capsules by mouth nightly. May also take 1 Capsule three (3) times daily as needed for Anxiety. Indications: anxious 90 Capsule 0    buPROPion SR (WELLBUTRIN SR) 150 mg SR tablet Take 1 Tablet by mouth daily. Indications: major depressive disorder 30 Tablet 0    polyethylene glycol (Miralax) 17 gram packet Take 1 Packet by mouth daily as needed for Constipation. Indications: constipation 30 Packet 0     No current facility-administered medications on file prior to visit. family history includes Alcohol abuse in her father and sister; Arthritis-rheumatoid in her mother; Bipolar Disorder in her mother, sister, and sister; Breast Cancer in her maternal aunt; Cancer in her maternal aunt; Heart Disease in her father; High Cholesterol in her father; Lung Disease in her father; Lupus in her mother; Migraines in her mother; Psychiatric Disorder in her mother and sister; Stroke in her brother and father.     Social History     Socioeconomic History    Marital status:      Spouse name: Not on file    Number of children: Not on file    Years of education: Not on file    Highest education level: Not on file   Occupational History    Not on file   Tobacco Use    Smoking status: Never    Smokeless tobacco: Never   Vaping Use    Vaping Use: Never used   Substance and Sexual Activity    Alcohol use: Yes     Comment: occasional    Drug use: No    Sexual activity: Yes     Partners: Male   Other Topics Concern     Service Not Asked    Blood Transfusions Not Asked    Caffeine Concern Not Asked    Occupational Exposure Not Asked    Hobby Hazards Not Asked    Sleep Concern Not Asked    Stress Concern Not Asked    Weight Concern Not Asked    Special Diet Not Asked    Back Care Not Asked    Exercise Not Asked    Bike Helmet Not Asked    Seat Belt Not Asked    Self-Exams Not Asked   Social History Narrative    , 0 children, not working at present. On disability for bipolar illness. Social Determinants of Health     Financial Resource Strain: Low Risk     Difficulty of Paying Living Expenses: Not hard at all   Food Insecurity: No Food Insecurity    Worried About Running Out of Food in the Last Year: Never true    Ran Out of Food in the Last Year: Never true   Transportation Needs: Not on file   Physical Activity: Not on file   Stress: Not on file   Social Connections: Not on file   Intimate Partner Violence: Not on file   Housing Stability: Not on file       Visit Vitals  /73 (BP 1 Location: Right upper arm, BP Patient Position: Sitting, BP Cuff Size: Large adult)   Pulse 86   Temp 97.7 °F (36.5 °C) (Temporal)   Resp 18   Ht 5' 1\" (1.549 m)   Wt 175 lb 3.2 oz (79.5 kg)   SpO2 100%   BMI 33.10 kg/m²     General:  Well appearing female no acute distress  HEENT:   PERRL,normal conjunctiva. External ear and canals normal, TMs normal.  Hearing normal to voice. Nose without edema or discharge, normal septum. Lips, teeth, gums normal.  Oropharynx: no erythema, no exudates, no lesions, normal tongue. Neck:  Supple. Thyroid normal size, nontender, without nodules. No carotid bruit. No masses or lymphadenopathy  Respiratory: no respiratory distress,  no wheezing, no rhonchi, no rales. No chest wall tenderness.   Cardiovascular:  RRR, normal S1S2, no murmur. Gastrointestinal: normal bowel sounds, soft, nontender, without masses. No hepatosplenomegaly. Extremities +2 pulses, no edema, normal sensation   Musculoskeletal:  Normal gait. Normal digits and nails. Normal strength and tone, no atrophy, and no abnormal movement. Skin:  No rash, no lesions, no ulcers. Skin warm, normal turgor, without induration or nodules. Neuro:  A and OX4, fluent speech, cranial nerves normal 2-12. Sensation normal to light touch.   DTR symmetrical  Psych:  Normal affect      Lab Results   Component Value Date/Time    WBC 6.4 09/17/2022 04:50 AM    HGB 12.0 09/17/2022 04:50 AM    Hematocrit (POC) 34 (L) 07/17/2018 03:33 AM    HCT 36.2 09/17/2022 04:50 AM    PLATELET 035 52/33/8454 04:50 AM    MCV 93.3 09/17/2022 04:50 AM     Lab Results   Component Value Date/Time    Sodium 140 09/17/2022 04:50 AM    Potassium 3.9 09/17/2022 04:50 AM    Chloride 106 09/17/2022 04:50 AM    CO2 28 09/17/2022 04:50 AM    Anion gap 6 09/17/2022 04:50 AM    Glucose 102 (H) 09/17/2022 04:50 AM    Glucose 107 (H) 02/03/2020 05:40 AM    BUN 13 09/17/2022 04:50 AM    Creatinine 0.88 09/17/2022 04:50 AM    BUN/Creatinine ratio 15 09/17/2022 04:50 AM    GFR est AA >60 09/17/2022 04:50 AM    GFR est non-AA >60 09/17/2022 04:50 AM    Calcium 9.6 09/17/2022 04:50 AM     Lab Results   Component Value Date/Time    Cholesterol, total 202 (H) 01/20/2022 09:49 AM    HDL Cholesterol 84 01/20/2022 09:49 AM    LDL, calculated 110.2 (H) 01/20/2022 09:49 AM    VLDL, calculated 7.8 01/20/2022 09:49 AM    Triglyceride 39 01/20/2022 09:49 AM    CHOL/HDL Ratio 2.4 01/20/2022 09:49 AM     Lab Results   Component Value Date/Time    TSH 1.150 11/30/2020 03:56 PM     Lab Results   Component Value Date/Time    Hemoglobin A1c 5.2 01/20/2022 09:49 AM    Hemoglobin A1c (POC) 5.4 04/20/2015 10:00 AM     Lab Results   Component Value Date/Time    Vitamin D 25-Hydroxy 12.7 (L) 11/10/2011 09:25 AM    VITAMIN D, 25-HYDROXY 69.0 11/12/2015 03:52 PM                   Assessment and Plan:   1. Encounter for well woman exam  - PAP IG, APTIMA HPV AND RFX 16/18,45 (035019); Future  - PAP IG, APTIMA HPV AND RFX 16/18,45 ([id number])    2. Dry eyes  - SJOGREN'S ABS, SSA AND SSB; Future  - SJOGREN'S ABS, SSA AND SSB    3. Essential hypertension  Normal today  4. Bipolar 1 disorder ([facility]   controlled on current mood medications   - CBC WITH AUTOMATED DIFF; Future  - METABOLIC PANEL, COMPREHENSIVE; Future  - METABOLIC PANEL, COMPREHENSIVE  - CBC WITH AUTOMATED DIFF    5. High cholesterol  - LIPID PANEL; Future  - LIPID PANEL    6. Schizoaffective disorder, bipolar type ([facility]  Doing well    7.  Vitamin D deficiency  - VITAMIN D, 25 HYDROXY; Future  - VITAMIN D, 25 Raf Trujillo MD

## 2023-03-03 NOTE — PROGRESS NOTES
Vitamin D is excellent continue current dose  Cholesterol LDL is mildly elevated   Cholesterol: Triglycerides are high ( short term fat storage), HDL good cholesterol is at goal,  LDL which is bad cholesterol is mildly elevated. Recommend increasing exercise to 30 minutes daily and increased fiber intake - vegetables, fruits and oats and whole grain. Decreasing fatty food intake. We will repeat cholesterol level in one year.

## 2023-03-04 LAB
ENA SS-A AB SER-ACNC: <0.2 AI (ref 0–0.9)
ENA SS-B AB SER-ACNC: <0.2 AI (ref 0–0.9)

## 2023-03-09 ENCOUNTER — HOSPITAL ENCOUNTER (OUTPATIENT)
Dept: MAMMOGRAPHY | Age: 58
Discharge: HOME | End: 2023-03-09
Attending: INTERNAL MEDICINE
Payer: MEDICARE

## 2023-03-09 DIAGNOSIS — Z12.31 SCREENING MAMMOGRAM FOR HIGH-RISK PATIENT: ICD-10-CM

## 2023-03-09 PROCEDURE — 77063 BREAST TOMOSYNTHESIS BI: CPT

## 2023-03-09 NOTE — PROGRESS NOTES
IMPRESSION  BI-RADS 2: Benign. No mammographic evidence of malignancy.     RECOMMENDATIONS:  Next screening mammogram is recommended in one year.      The patient will be notified of these results.

## 2023-04-21 DIAGNOSIS — Z12.31 SCREENING MAMMOGRAM FOR HIGH-RISK PATIENT: Primary | ICD-10-CM

## 2023-04-22 DIAGNOSIS — Z12.31 SCREENING MAMMOGRAM FOR HIGH-RISK PATIENT: Primary | ICD-10-CM

## 2023-05-10 ENCOUNTER — TELEPHONE (OUTPATIENT)
Age: 58
End: 2023-05-10

## 2023-05-10 DIAGNOSIS — R29.818 SUSPECTED SLEEP APNEA: Primary | ICD-10-CM

## 2023-05-10 NOTE — TELEPHONE ENCOUNTER
Doctor Carley Sloan, [facility] pt needs a sleep study and asks if pcp can put that in    States if visit required , please contact pt to schedule.

## 2023-06-16 ENCOUNTER — TELEPHONE (OUTPATIENT)
Age: 58
End: 2023-06-16

## 2023-09-05 ENCOUNTER — OFFICE VISIT (OUTPATIENT)
Age: 58
End: 2023-09-05
Payer: MEDICARE

## 2023-09-05 VITALS
OXYGEN SATURATION: 100 % | BODY MASS INDEX: 32.06 KG/M2 | HEIGHT: 61 IN | TEMPERATURE: 97.4 F | RESPIRATION RATE: 20 BRPM | SYSTOLIC BLOOD PRESSURE: 99 MMHG | HEART RATE: 76 BPM | DIASTOLIC BLOOD PRESSURE: 66 MMHG | WEIGHT: 169.8 LBS

## 2023-09-05 DIAGNOSIS — Z00.00 MEDICARE ANNUAL WELLNESS VISIT, SUBSEQUENT: Primary | ICD-10-CM

## 2023-09-05 DIAGNOSIS — Z23 NEEDS FLU SHOT: ICD-10-CM

## 2023-09-05 PROCEDURE — G0439 PPPS, SUBSEQ VISIT: HCPCS | Performed by: INTERNAL MEDICINE

## 2023-09-05 PROCEDURE — PBSHW INFLUENZA, FLUCELVAX, (AGE 6 MO+), IM, PF, 0.5 ML: Performed by: INTERNAL MEDICINE

## 2023-09-05 PROCEDURE — G0008 ADMIN INFLUENZA VIRUS VAC: HCPCS | Performed by: INTERNAL MEDICINE

## 2023-09-05 PROCEDURE — 90694 VACC AIIV4 NO PRSRV 0.5ML IM: CPT | Performed by: INTERNAL MEDICINE

## 2023-09-05 PROCEDURE — 90674 CCIIV4 VAC NO PRSV 0.5 ML IM: CPT | Performed by: INTERNAL MEDICINE

## 2023-09-05 SDOH — ECONOMIC STABILITY: FOOD INSECURITY: WITHIN THE PAST 12 MONTHS, THE FOOD YOU BOUGHT JUST DIDN'T LAST AND YOU DIDN'T HAVE MONEY TO GET MORE.: NEVER TRUE

## 2023-09-05 SDOH — ECONOMIC STABILITY: FOOD INSECURITY: WITHIN THE PAST 12 MONTHS, YOU WORRIED THAT YOUR FOOD WOULD RUN OUT BEFORE YOU GOT MONEY TO BUY MORE.: NEVER TRUE

## 2023-09-05 SDOH — ECONOMIC STABILITY: TRANSPORTATION INSECURITY
IN THE PAST 12 MONTHS, HAS LACK OF TRANSPORTATION KEPT YOU FROM MEETINGS, WORK, OR FROM GETTING THINGS NEEDED FOR DAILY LIVING?: NO

## 2023-09-05 SDOH — ECONOMIC STABILITY: TRANSPORTATION INSECURITY
IN THE PAST 12 MONTHS, HAS THE LACK OF TRANSPORTATION KEPT YOU FROM MEDICAL APPOINTMENTS OR FROM GETTING MEDICATIONS?: NO

## 2023-09-05 ASSESSMENT — SOCIAL DETERMINANTS OF HEALTH (SDOH): HOW HARD IS IT FOR YOU TO PAY FOR THE VERY BASICS LIKE FOOD, HOUSING, MEDICAL CARE, AND HEATING?: NOT HARD AT ALL

## 2023-09-05 ASSESSMENT — LIFESTYLE VARIABLES
HOW OFTEN DO YOU HAVE A DRINK CONTAINING ALCOHOL: MONTHLY OR LESS
HOW MANY STANDARD DRINKS CONTAINING ALCOHOL DO YOU HAVE ON A TYPICAL DAY: 1 OR 2

## 2023-09-05 NOTE — PROGRESS NOTES
1. \"Have you been to the ER, urgent care clinic since your last visit? Hospitalized since your last visit? \" No    2. \"Have you seen or consulted any other health care providers outside of the [address] [place] [address] since your last visit? \" No     3. For patients aged 43-73: Has the patient had a colonoscopy / FIT/ Cologuard? Yes - no Care Gap present      If the patient is female:    4. For patients aged 43-66: Has the patient had a mammogram within the past 2 years? Yes - no Care Gap present      5. For patients aged 21-65: Has the patient had a pap smear?  Yes - no Care Gap present
normal       Allergies   Allergen Reactions    Penicillins Anaphylaxis    Tramadol Hives and Other (See Comments)     Hives and ringing of the ears    Benzoyl Peroxide Hives    Cephalexin Itching    Valproic Acid Itching     Prior to Visit Medications    Medication Sig Taking?  Authorizing Provider   buPROPion (WELLBUTRIN SR) 150 MG extended release tablet Take by mouth daily Yes Ar Automatic Reconciliation   mirtazapine (REMERON) 45 MG tablet [facility] the [facility] Outside name: mirtazapine (REMERON) 45 mg tablet Yes Ar Automatic Reconciliation   perphenazine 16 MG tablet ceived the following from [facility]: Outside name: perphenazine (TRILAFON) 16 mg tablet Yes Ar Automatic Reconciliation   perphenazine 8 MG tablet Take by mouth daily Yes Ar Automatic Reconciliation   polyethylene glycol (GLYCOLAX) 17 GM/SCOOP powder Take by mouth daily as needed Yes Ar Automatic Reconciliation   temazepam (RESTORIL) 15 MG capsule ceived the following [facility]: Outside name: temazepam (RESTORIL) 15 mg capsule Yes Ar Automatic Reconciliation   FLUoxetine (PROZAC) 20 MG capsule Take by mouth daily  Patient not taking: Reported on 9/5/2023  Ar Automatic Reconciliation   hydrOXYzine pamoate (VISTARIL) 50 MG capsule Take by mouth  Patient not taking: Reported on 9/5/2023  Ar Automatic Reconciliation   nirmatrelvir/ritonavir 300/100 (PAXLOVID) 20 x 150 MG & 10 x 100MG TBPK Follow package directions  Patient not taking: Reported on 9/5/2023  Ar Automatic Reconciliation   OLANZapine (ZYPREXA) 5 MG tablet ceived the following [facility]: Outside name: OLANZapine (ZyPREXA) 5 mg tablet  Ar Automatic Reconciliation       CareTeam (Including outside providers/suppliers regularly involved in providing care):   Patient Care Team:  Janessa Roche MD as PCP - Kosta Velásquez MD as PCP - Empaneled Provider     Reviewed and updated this

## 2024-01-22 ENCOUNTER — TELEPHONE (OUTPATIENT)
Age: 59
End: 2024-01-22

## 2024-01-22 NOTE — TELEPHONE ENCOUNTER
----- Message from Joe Gibson sent at 1/22/2024 11:27 AM EST -----  Subject: Referral Request    Reason for referral request? Sleep  thats in Pts network  Provider patient wants to be referred to(if known):     Provider Phone Number(if known):    Additional Information for Provider? Pt would prefer somewhere close to   her address and thats in her Network for a Sleep . Please   contact when pt can schedule.   ---------------------------------------------------------------------------  --------------  CALL BACK INFO    [phone]; OK to leave message on voicemail,Do not leave any message,   patient will call back for answer  ---------------------------------------------------------------------------  --------------

## 2024-01-25 ENCOUNTER — TELEPHONE (OUTPATIENT)
Age: 59
End: 2024-01-25

## 2024-01-25 DIAGNOSIS — G47.33 OSA (OBSTRUCTIVE SLEEP APNEA): Primary | ICD-10-CM

## 2024-01-25 NOTE — TELEPHONE ENCOUNTER
Pt states she contacted her insurance    States that they confirmed that Cheri Mccabe is in network.    Please do referral to Dr Mccabe

## 2024-01-26 ENCOUNTER — TELEPHONE (OUTPATIENT)
Age: 59
End: 2024-01-26

## 2024-01-26 DIAGNOSIS — S63.602A SPRAIN OF LEFT THUMB, UNSPECIFIED SITE OF DIGIT, INITIAL ENCOUNTER: Primary | ICD-10-CM

## 2024-01-26 NOTE — TELEPHONE ENCOUNTER
----- Message from Linnette Rivera sent at 1/26/2024  3:28 PM EST -----  Subject: Referral Request    Reason for referral request? PT is requesting physical therapy from a   sprained thumb in November. Please advise  Provider patient wants to be referred to(if known):     Provider Phone Number(if known):    Additional Information for Provider?   ---------------------------------------------------------------------------  --------------  CALL BACK INFO    [phone]; OK to leave message on voicemail  ---------------------------------------------------------------------------  --------------

## 2024-01-29 ENCOUNTER — TELEPHONE (OUTPATIENT)
Age: 59
End: 2024-01-29

## 2024-01-29 NOTE — TELEPHONE ENCOUNTER
Patient states she got Notification to have Colonoscopy done. Patient states she had this done on 1/18/24 at [facility] already. Please call if any questions. Thank you

## 2024-01-30 NOTE — TELEPHONE ENCOUNTER
Spoke to pt and used two pt identifiers.  Patient notified of response from Carla Atkinsno MD    States understanding and was given the phone for 's office for scheduling.       Referral placed.

## 2024-02-09 ENCOUNTER — TRANSCRIBE ORDERS (OUTPATIENT)
Facility: HOSPITAL | Age: 59
End: 2024-02-09

## 2024-02-09 DIAGNOSIS — Z12.31 VISIT FOR SCREENING MAMMOGRAM: Primary | ICD-10-CM

## 2024-03-04 SDOH — ECONOMIC STABILITY: FOOD INSECURITY: WITHIN THE PAST 12 MONTHS, THE FOOD YOU BOUGHT JUST DIDN'T LAST AND YOU DIDN'T HAVE MONEY TO GET MORE.: SOMETIMES TRUE

## 2024-03-04 SDOH — ECONOMIC STABILITY: FOOD INSECURITY: WITHIN THE PAST 12 MONTHS, YOU WORRIED THAT YOUR FOOD WOULD RUN OUT BEFORE YOU GOT MONEY TO BUY MORE.: SOMETIMES TRUE

## 2024-03-04 SDOH — ECONOMIC STABILITY: INCOME INSECURITY: HOW HARD IS IT FOR YOU TO PAY FOR THE VERY BASICS LIKE FOOD, HOUSING, MEDICAL CARE, AND HEATING?: NOT VERY HARD

## 2024-03-04 SDOH — ECONOMIC STABILITY: HOUSING INSECURITY
IN THE LAST 12 MONTHS, WAS THERE A TIME WHEN YOU DID NOT HAVE A STEADY PLACE TO SLEEP OR SLEPT IN A SHELTER (INCLUDING NOW)?: NO

## 2024-03-04 NOTE — BH NOTES
PSYCHIATRIC PROGRESS NOTE       Patient: Radha Petit MRN: [id number]  SSN: [id number]    YOB: 1965  Age: 61 y.o. Sex: female      Admit Date: 8/14/2022    LOS: 24 days     Chief Complaint:  A little uneasy. Interval History:  Radha Petit continues to do poorly. Prominent thought blocking, psychomotor retardation noteworthy. Per nurse's note, patient was observed standing four hours in her room. Affect is flat, limited eye contact. She tells me she slept last but staff report no sleep at all last night. Did not answer if she has si or hi. At the present time the patient Radha Petit remains compliant with taking medications. Denies any adverse events from taking them. Patient is now a tdo. 9/6 Thony Jones isolates to room with poor eye contact per staff. Reports feeling down and moods are depressed. Thoughts that she was a bad person for not telling the truth. Notes having some apprehension and fear earlier. Vague tangential responses with thought blocking. Passing gas throughout the interview with blunted affect. Denies SI/HI/VH. Voices telling her to stay here a couple of days. Disorganized. No aggression or violence. Appears to be deep in thought. Passively Interactive and aware. Not taking medications consistently. Eating and sleeping poorly (3hrs). 9/7 Daiana Alarcon reports feeling fair. Slow responses with large pauses between statements. Wanted to say she was tricked by her . Discussed a video that she did not get the full picture of and her  was sending it to her now. Poor eye contact. Denies SI/HI/AH/VH. No aggression or violence. Interactive and partially aware. Tolerating medications well. Eating and sleeping fairly.       Past Medical History:  Past Medical History:   Diagnosis Date    Anxiety and depression     Bipolar 1 disorder with moderate robyn ([facility] [address] 3/17/2014    Chronic back pain     Hyperlipidemia 8/22/2016    Hyponatremia Take your medication as indicated and prescribed.  Avoid drinking alcohol or drinks that have caffeine it.  Drink plenty of water or fluids like Gatorade to keep yourself hydrated.  You should eat bland foods like bananas, rice, apple sauce and toast / crackers.    PLEASE RETURN TO THE EMERGENCY DEPARTMENT IMMEDIATELY for worsening symptoms, increase in the amount of diarrhea you are having, abdominal pain, blood in your stool, vomiting up blood, persistent nausea and/or vomiting, or if you develop any concerning symptoms such as: high fever not relieved by acetaminophen (Tylenol) and/or ibuprofen (Motrin / Advil), chills, shortness of breath, chest pain, feeling of your heart fluttering or racing, loss of consciousness, numbness, weakness or tingling in the arms or legs or change in color of the extremities, changes in mental status, persistent headache, blurry vision, loss of bladder / bowel control, unable to follow up with your physician, or other any other care or concern.    Psychotic disorder ([facility] [address]     Scoliosis        ALLERGIES:(reviewed/updated 9/7/2022)  Allergies   Allergen Reactions    Other Medication Anaphylaxis     Artificial sweeteners cause anaphylaxis    Pcn [Penicillins] Anaphylaxis    Sunscreen Contact Dermatitis     Burns and opens the skin    Ultram [Tramadol] Hives and Other (comments)     Hives and ringing of the ears    Benzoyl Peroxide Hives    Cephalexin Itching    Divalproex Itching    Maltodextrin-Glycerin Shortness of Breath    Claritin-D 12 Hour [Loratadine-Pseudoephedrine] Palpitations     palpatations and ringing in the ear     Laboratory report:  Lab Results   Component Value Date/Time    WBC 6.9 08/17/2022 06:31 AM    HGB 10.7 (L) 08/17/2022 06:31 AM    Hematocrit (POC) 34 (L) 07/17/2018 03:33 AM    HCT 31.3 (L) 08/17/2022 06:31 AM    PLATELET 057 75/21/4492 06:31 AM    MCV 91.5 08/17/2022 06:31 AM      Lab Results   Component Value Date/Time    Sodium 144 08/14/2022 03:45 AM    Potassium 3.5 08/14/2022 03:45 AM    Chloride 116 (H) 08/14/2022 03:45 AM    CO2 26 08/14/2022 03:45 AM    Anion gap 2 (L) 08/14/2022 03:45 AM    Glucose 102 (H) 08/14/2022 03:45 AM    Glucose 107 (H) 02/03/2020 05:40 AM    BUN 3 (L) 08/14/2022 03:45 AM    Creatinine 0.74 08/14/2022 03:45 AM    BUN/Creatinine ratio 4 (L) 08/14/2022 03:45 AM    GFR est AA >60 08/14/2022 03:45 AM    GFR est non-AA >60 08/14/2022 03:45 AM    Calcium 9.1 08/14/2022 03:45 AM    Bilirubin, total 0.2 08/12/2022 05:34 AM    Alk.  phosphatase 39 (L) 08/12/2022 05:34 AM    Protein, total 7.2 08/12/2022 05:34 AM    Albumin 3.4 (L) 08/12/2022 05:34 AM    Globulin 3.8 08/12/2022 05:34 AM    A-G Ratio 0.9 (L) 08/12/2022 05:34 AM    ALT (SGPT) 19 08/12/2022 05:34 AM      Vitals:    09/05/22 1545 09/06/22 0735 09/06/22 1549 09/06/22 1853   BP: 134/79 137/86 (!) 145/92 120/75   Pulse: (!) 112 98 (!) 104 (!) 106   Resp: 16 18 16 16   Temp: 98.6 °F (37 °C) 98.5 °F (36.9 °C) 97.6 °F (36.4 °C) 98.6 °F (37 °C)   SpO2: 98% Weight:       Height:           Lab Results   Component Value Date/Time    Carbamazepine 14.1 (H) 07/26/2020 04:19 AM     Lab Results   Component Value Date/Time    Lithium level 0.79 08/14/2022 03:45 AM       Vital Signs  Patient Vitals for the past 24 hrs:   Temp Pulse Resp BP   09/06/22 1853 98.6 °F (37 °C) (!) 106 16 120/75   09/06/22 1549 97.6 °F (36.4 °C) (!) 104 16 (!) 145/92       Wt Readings from Last 3 Encounters:   08/28/22 71.2 kg (157 lb)   08/11/22 72.6 kg (160 lb)   08/07/22 71.5 kg (157 lb 9.6 oz)     Temp Readings from Last 3 Encounters:   09/06/22 98.6 °F (37 °C)   08/14/22 98.4 °F (36.9 °C)   08/10/22 98.4 °F (36.9 °C)     BP Readings from Last 3 Encounters:   09/06/22 120/75   08/14/22 (!) 142/77   08/10/22 111/75     Pulse Readings from Last 3 Encounters:   09/06/22 (!) 106   08/14/22 92   08/10/22 78       Radiology (reviewed/updated 9/7/2022)  No results found.     Current Facility-Administered Medications   Medication Dose Route Frequency Provider Last Rate Last Admin    LORazepam (ATIVAN) tablet 0.5 mg  0.5 mg Oral TID Stephanie Aquino NP   0.5 mg at 09/07/22 0910    polyvinyl alcohol-povidone (NATURAL TEARS) 0.5-0.6 % ophthalmic solution 1 Drop  1 Drop Both Eyes PRN Karen Loja MD   1 Drop at 09/03/22 1708    mirtazapine (REMERON) tablet 45 mg  45 mg Oral QHS Stephanie Aquino NP   45 mg at 09/06/22 2033    cloNIDine HCL (CATAPRES) tablet 0.1 mg  0.1 mg Oral Q2H PRN Beltran Eid NP   0.1 mg at 09/03/22 1555    OLANZapine (ZyPREXA) tablet 15 mg  15 mg Oral QHS Stephanie Aquino NP   15 mg at 09/06/22 2033    polyethylene glycol (MIRALAX) packet 17 g  17 g Oral BID Felecia Lobo MD   17 g at 09/07/22 0916    hydrOXYzine HCL (ATARAX) tablet 100 mg  100 mg Oral QHS Stephanie Aquino NP   100 mg at 09/06/22 2033    buPROPion SR (WELLBUTRIN SR) tablet 150 mg  150 mg Oral DAILY Stephanie Aquino NP   150 mg at 09/07/22 0910    OLANZapine (ZyPREXA) tablet 5 mg  5 mg Oral BID Germain Killer A, NP   5 mg at 09/07/22 0910    FLUoxetine (PROzac) capsule 60 mg  60 mg Oral DAILY Clarke So, NP   60 mg at 09/07/22 0910    hydrOXYzine HCL (ATARAX) tablet 50 mg  50 mg Oral Q4H PRN Kenia, Stephanie A, NP   50 mg at 09/04/22 1633    OLANZapine (ZyPREXA) tablet 5 mg  5 mg Oral Q6H PRN Alannah Aquinoanie A, NP   5 mg at 09/04/22 1155    haloperidol lactate (HALDOL) injection 5 mg  5 mg IntraMUSCular Q6H PRN Kenia, Stephanie A, NP   5 mg at 08/30/22 2033    benztropine (COGENTIN) tablet 1 mg  1 mg Oral BID PRN Teresa Aquinoe A, NP        diphenhydrAMINE (BENADRYL) injection 50 mg  50 mg IntraMUSCular BID PRN Kenia, Stephanie A, NP   50 mg at 08/30/22 2033    traZODone (DESYREL) tablet 50 mg  50 mg Oral QHS PRN Teresa Aquinoe A, NP   50 mg at 09/03/22 0048    acetaminophen (TYLENOL) tablet 650 mg  650 mg Oral Q4H PRN Teresa Aquinoe A, NP   650 mg at 08/26/22 1914    magnesium hydroxide (MILK OF MAGNESIA) 400 mg/5 mL oral suspension 30 mL  30 mL Oral DAILY PRN Teresa Aquinoe A, NP         Side Effects: (reviewed/updated 9/7/2022)  None reported or admitted to. Review of Systems: (reviewed/updated 9/7/2022)  Appetite: good  Sleep: poor  All other Review of Systems: depression    Mental Status Exam:  Eye contact: limited eye contact  Psychomotor activity: markedly decreased  Speech: poverty of speech  Thought process: thought blocking  Mood is \"depressed\"  Affect: flat  Perception: No avh  Thought content: +delusions  Suicidal ideation: refused to answer  Homicidal ideation: refused to answer. Insight/judgment: Poor  Cognition is grossly intact     Physical Exam:  Musculoskeletal system: steady gait  Tremor not present  Cog wheeling not present    Assessment and Plan:  [facility] meets criteria for a diagnosis of Bipolar 1 disorder current episode depressed with psychotic features. ROSA MARIA. Rule out schizoaffective disorder. Ativan 0.5 mg tid.   TDO hearing pending. TSH, B12, vit d pending. Increase Zyprexa 20 mg PO Qhs  Patient most likely will need ect. Continue current care. We will closely monitor for safety. We will encourage reality orientation. Disposition planning. I certify that this patients inpatient psychiatric hospital services furnished since the previous certification were, and continue to be, required for treatment that could reasonably be expected to improve the patient's condition, or for diagnostic study, and that the patient continues to need, on a daily basis, active treatment furnished directly by or requiring the supervision of inpatient psychiatric facility personnel. In addition, the hospital records show that services furnished were intensive treatment services, admission or related services, or equivalent services.       Signed:  Meera Cain MD  9/7/2022

## 2024-03-07 ENCOUNTER — TELEPHONE (OUTPATIENT)
Age: 59
End: 2024-03-07

## 2024-03-07 ENCOUNTER — OFFICE VISIT (OUTPATIENT)
Age: 59
End: 2024-03-07
Payer: MEDICARE

## 2024-03-07 VITALS
RESPIRATION RATE: 18 BRPM | OXYGEN SATURATION: 100 % | HEIGHT: 61 IN | HEART RATE: 79 BPM | TEMPERATURE: 97.8 F | SYSTOLIC BLOOD PRESSURE: 103 MMHG | WEIGHT: 172.2 LBS | DIASTOLIC BLOOD PRESSURE: 65 MMHG | BODY MASS INDEX: 32.51 KG/M2

## 2024-03-07 DIAGNOSIS — F22 PARANOIA (HCC): ICD-10-CM

## 2024-03-07 DIAGNOSIS — F25.0 SCHIZOAFFECTIVE DISORDER, BIPOLAR TYPE (HCC): ICD-10-CM

## 2024-03-07 DIAGNOSIS — I10 ESSENTIAL (PRIMARY) HYPERTENSION: ICD-10-CM

## 2024-03-07 DIAGNOSIS — G47.33 OSA (OBSTRUCTIVE SLEEP APNEA): Primary | ICD-10-CM

## 2024-03-07 DIAGNOSIS — E78.00 PURE HYPERCHOLESTEROLEMIA, UNSPECIFIED: ICD-10-CM

## 2024-03-07 DIAGNOSIS — Z13.1 SCREENING FOR DIABETES MELLITUS: ICD-10-CM

## 2024-03-07 DIAGNOSIS — F31.12 BIPOLAR 1 DISORDER WITH MODERATE MANIA (HCC): ICD-10-CM

## 2024-03-07 LAB
ALBUMIN SERPL-MCNC: 3.8 G/DL (ref 3.5–5)
ALBUMIN/GLOB SERPL: 1 (ref 1.1–2.2)
ALP SERPL-CCNC: 64 U/L (ref 45–117)
ALT SERPL-CCNC: 43 U/L (ref 12–78)
BASOPHILS # BLD: 0 K/UL (ref 0–0.1)
BILIRUB SERPL-MCNC: 0.3 MG/DL (ref 0.2–1)
BUN SERPL-MCNC: 12 MG/DL (ref 6–20)
BUN/CREAT SERPL: 15 (ref 12–20)
CALCIUM SERPL-MCNC: 9.3 MG/DL (ref 8.5–10.1)
CHOLEST SERPL-MCNC: 248 MG/DL
CO2 SERPL-SCNC: 31 MMOL/L (ref 21–32)
CREAT SERPL-MCNC: 0.82 MG/DL (ref 0.55–1.02)
DIFFERENTIAL METHOD BLD: NORMAL
EOSINOPHIL # BLD: 0.1 K/UL (ref 0–0.4)
ERYTHROCYTE [DISTWIDTH] IN BLOOD BY AUTOMATED COUNT: 13.1 % (ref 11.5–14.5)
GLOBULIN SER CALC-MCNC: 3.7 G/DL (ref 2–4)
HCT VFR BLD AUTO: 37 % (ref 35–47)
HDLC SERPL-MCNC: 82 MG/DL
HDLC SERPL: 3 (ref 0–5)
HGB BLD-MCNC: 12 G/DL (ref 11.5–16)
IMM GRANULOCYTES # BLD AUTO: 0 K/UL (ref 0–0.04)
IMM GRANULOCYTES NFR BLD AUTO: 0 % (ref 0–0.5)
LDLC SERPL CALC-MCNC: 149.8 MG/DL (ref 0–100)
MCH RBC QN AUTO: 31.1 PG (ref 26–34)
MCHC RBC AUTO-ENTMCNC: 32.4 G/DL (ref 30–36.5)
MCV RBC AUTO: 95.9 FL (ref 80–99)
MONOCYTES # BLD: 0.4 K/UL (ref 0–1)
MONOCYTES NFR BLD: 9 % (ref 5–13)
NEUTS SEG # BLD: 2.1 K/UL (ref 1.8–8)
NEUTS SEG NFR BLD: 49 % (ref 32–75)
NRBC # BLD: 0 K/UL (ref 0–0.01)
PLATELET # BLD AUTO: 255 K/UL (ref 150–400)
PMV BLD AUTO: 10.1 FL (ref 8.9–12.9)
POTASSIUM SERPL-SCNC: 4.5 MMOL/L (ref 3.5–5.1)
PROT SERPL-MCNC: 7.5 G/DL (ref 6.4–8.2)
RBC # BLD AUTO: 3.86 M/UL (ref 3.8–5.2)
SODIUM SERPL-SCNC: 140 MMOL/L (ref 136–145)
TRIGL SERPL-MCNC: 81 MG/DL
TSH SERPL DL<=0.05 MIU/L-ACNC: 0.84 UIU/ML (ref 0.36–3.74)
VLDLC SERPL CALC-MCNC: 16.2 MG/DL
WBC # BLD AUTO: 4.2 K/UL (ref 3.6–11)

## 2024-03-07 PROCEDURE — 3078F DIAST BP <80 MM HG: CPT | Performed by: INTERNAL MEDICINE

## 2024-03-07 PROCEDURE — 99214 OFFICE O/P EST MOD 30 MIN: CPT | Performed by: INTERNAL MEDICINE

## 2024-03-07 PROCEDURE — 3074F SYST BP LT 130 MM HG: CPT | Performed by: INTERNAL MEDICINE

## 2024-03-07 RX ORDER — RISPERIDONE 1 MG/1
0.5 TABLET ORAL DAILY
Qty: 60 TABLET | Refills: 0
Start: 2024-03-07

## 2024-03-07 RX ORDER — DICLOFENAC SODIUM 75 MG/1
75 TABLET, DELAYED RELEASE ORAL 2 TIMES DAILY PRN
COMMUNITY
Start: 2024-02-07

## 2024-03-07 ASSESSMENT — PATIENT HEALTH QUESTIONNAIRE - PHQ9
SUM OF ALL RESPONSES TO PHQ9 QUESTIONS 1 & 2: 0
10. IF YOU CHECKED OFF ANY PROBLEMS, HOW DIFFICULT HAVE THESE PROBLEMS MADE IT FOR YOU TO DO YOUR WORK, TAKE CARE OF THINGS AT HOME, OR GET ALONG WITH OTHER PEOPLE: 0
3. TROUBLE FALLING OR STAYING ASLEEP: 0
8. MOVING OR SPEAKING SO SLOWLY THAT OTHER PEOPLE COULD HAVE NOTICED. OR THE OPPOSITE, BEING SO FIGETY OR RESTLESS THAT YOU HAVE BEEN MOVING AROUND A LOT MORE THAN USUAL: 0
SUM OF ALL RESPONSES TO PHQ QUESTIONS 1-9: 0
5. POOR APPETITE OR OVEREATING: 0
7. TROUBLE CONCENTRATING ON THINGS, SUCH AS READING THE NEWSPAPER OR WATCHING TELEVISION: 0
1. LITTLE INTEREST OR PLEASURE IN DOING THINGS: 0
6. FEELING BAD ABOUT YOURSELF - OR THAT YOU ARE A FAILURE OR HAVE LET YOURSELF OR YOUR FAMILY DOWN: 0
4. FEELING TIRED OR HAVING LITTLE ENERGY: 0
2. FEELING DOWN, DEPRESSED OR HOPELESS: 0
SUM OF ALL RESPONSES TO PHQ QUESTIONS 1-9: 0
9. THOUGHTS THAT YOU WOULD BE BETTER OFF DEAD, OR OF HURTING YOURSELF: 0
SUM OF ALL RESPONSES TO PHQ QUESTIONS 1-9: 0
SUM OF ALL RESPONSES TO PHQ QUESTIONS 1-9: 0

## 2024-03-07 NOTE — PROGRESS NOTES
Chief Complaint   Patient presents with    Follow-up     6 month f/up with labs     \"Have you been to the ER, urgent care clinic since your last visit?  Hospitalized since your last visit?\"    NO    “Have you seen or consulted any other health care providers outside of [facility] since your last visit?”    NO    “Have you had a colorectal cancer screening such as a colonoscopy/FIT/Cologuard?    YES - Type: Colonoscopy - [facility]. Nurse/CMA to request most recent records if not in the chart

## 2024-03-07 NOTE — TELEPHONE ENCOUNTER
Patient states she is calling to Update on Colonoscopy was done on 1/18/24 by Dr. Sohail Hughes//[facility]. Please call if any questions. Thank you

## 2024-03-07 NOTE — PROGRESS NOTES
Ms. Joi Kumar is presenting to follow up     CC:  Follow-up (6 month f/up with labs) and Blood Work       HPI:    Ms. Joi Kumar   is a 59 y.o. female with a hx of bipolar/ schizophrenia presenting to follow up       Her psychiatrist Dr Parish requested that I order labs  Has anxiety, phobias and hears music in her head constantly    Sleep apnea: has appointment with with sleep medicine   Has appointment with  Patient has mammogram scheduled  Has eye exam today for dry eyes   Pap smear normal 2023 repeat in 2028   Coloscopy done 2013 and due this year already scheduled   Normal dexa 2014   Flu shot 2023   Review of systems:  Constitutional: negative for fever, chills, weight loss, night sweats       10 systems reviewed and negative other then HPI     Past Medical History:   Diagnosis Date    Anxiety and depression     Bipolar 1 disorder with moderate ajay (HCC) 3/17/2014    Chronic back pain     Hyperlipidemia 8/22/2016    Hyponatremia     Psychotic disorder (HCC)     Scoliosis         Past Surgical History:   Procedure Laterality Date    DENTAL SURGERY PROCEDURE      hx of dental surgery- wisdom teeth extracted    HEENT      wisdom teeth extraction    LIPOMA RESECTION      right gluteal    CATALINA BIOPSY BREAST STEREOTACTIC Left 2011    negative       Allergies   Allergen Reactions    Aripiprazole Anaphylaxis and Other (See Comments)    Penicillins Anaphylaxis    Tramadol Hives and Other (See Comments)     Hives and ringing of the ears    Benzoyl Peroxide Hives    Cephalexin Itching    Valproic Acid Itching       Current Outpatient Medications on File Prior to Visit   Medication Sig Dispense Refill    diclofenac (VOLTAREN) 75 MG EC tablet Take 1 tablet by mouth 2 times daily as needed for Pain      diclofenac sodium (VOLTAREN) 1 % GEL Apply 2 g topically 4 times daily as needed for Pain      buPROPion (WELLBUTRIN SR) 150 MG extended release tablet Take by mouth daily      mirtazapine (REMERON) 45 MG tablet

## 2024-03-12 ENCOUNTER — HOSPITAL ENCOUNTER (OUTPATIENT)
Facility: HOSPITAL | Age: 59
Discharge: HOME | End: 2024-03-15
Payer: MEDICARE

## 2024-03-12 ENCOUNTER — HOSPITAL ENCOUNTER (OUTPATIENT)
Facility: HOSPITAL | Age: 59
Discharge: HOME | End: 2024-03-15
Attending: INTERNAL MEDICINE
Payer: MEDICARE

## 2024-03-12 VITALS — WEIGHT: 172 LBS | BODY MASS INDEX: 32.47 KG/M2 | HEIGHT: 61 IN

## 2024-03-12 DIAGNOSIS — M18.12 PRIMARY OSTEOARTHRITIS OF FIRST CARPOMETACARPAL JOINT OF LEFT HAND: ICD-10-CM

## 2024-03-12 DIAGNOSIS — Z12.31 VISIT FOR SCREENING MAMMOGRAM: ICD-10-CM

## 2024-03-12 DIAGNOSIS — M25.542 PAIN IN JOINT OF LEFT HAND: ICD-10-CM

## 2024-03-12 DIAGNOSIS — S63.642A RUPTURE OF ULNAR COLLATERAL LIGAMENT OF LEFT THUMB, INITIAL ENCOUNTER: ICD-10-CM

## 2024-03-12 PROCEDURE — 73218 MRI UPPER EXTREMITY W/O DYE: CPT

## 2024-03-12 PROCEDURE — 77063 BREAST TOMOSYNTHESIS BI: CPT

## 2024-03-13 ASSESSMENT — SLEEP AND FATIGUE QUESTIONNAIRES
ARE YOU BOTHERED BY WAKING UP TOO EARLY AND NOT BEING ABLE TO GET BACK TO SLEEP: YES
HOW LIKELY ARE YOU TO NOD OFF OR FALL ASLEEP WHILE WATCHING TV: 0
HOW LIKELY ARE YOU TO NOD OFF OR FALL ASLEEP IN A CAR, WHILE STOPPED FOR A FEW MINUTES IN TRAFFIC: 0
DO YOU GET TOO LITTLE SLEEP AT NIGHT: YES
DO YOU HAVE DIFFICULTY OPERATING A MOTOR VEHICLE FOR LONG DISTANCES (GREATER THAN 100 MILES) BECAUSE YOU BECOME SLEEPY: NO
DO YOU HAVE DIFFICULTY BEING AS ACTIVE AS YOU WANT TO BE IN THE MORNING BECAUSE YOU ARE SLEEPY OR TIRED: YES, MODERATE
HAS YOUR MOOD BEEN AFFECTED BECAUSE YOU ARE SLEEPY OR TIRED: YES, MODERATE
NUMBER OF TIMES YOU WAKE PER NIGHT: 2
DO YOU HAVE PROBLEMS WITH FREQUENT AWAKENINGS AT NIGHT: YES
HOW LIKELY ARE YOU TO NOD OFF OR FALL ASLEEP WHILE SITTING INACTIVE IN A PUBLIC PLACE: WOULD NEVER DOZE
FOSQ SCORE: 13
HOW LIKELY ARE YOU TO NOD OFF OR FALL ASLEEP WHILE SITTING AND TALKING TO SOMEONE: WOULD NEVER DOZE
HOW LIKELY ARE YOU TO NOD OFF OR FALL ASLEEP WHILE LYING DOWN TO REST IN THE AFTERNOON WHEN CIRCUMSTANCES PERMIT: WOULD NEVER DOZE
HAS YOUR RELATIONSHIP WITH FAMILY, FRIENDS OR WORK COLLEAGUES BEEN AFFECTED BECAUSE YOU ARE SLEEPY OR TIRED: YES, MODERATE
SELECT ANY OF THE FOLLOWING BEHAVIORS OBSERVED WHILE YOU ARE ASLEEP: LOUD SNORING
SELECT ANY OF THE FOLLOWING BEHAVIORS OBSERVED WHILE YOU ARE ASLEEP: LIGHT SNORING
HOW LIKELY ARE YOU TO NOD OFF OR FALL ASLEEP WHILE SITTING QUIETLY AFTER LUNCH WITHOUT ALCOHOL: 0
DO YOU GENERALLY HAVE DIFFICULTY REMEMBERING THINGS BECAUSE YOU ARE SLEEPY OR TIRED: YES, A LITTLE
DO YOU HAVE DIFFICULTY OPERATING A MOTOR VEHICLE FOR SHORT DISTANCES (LESS THAN 100 MILES) BECAUSE YOU BECOME SLEEPY: NO
AVERAGE NUMBER OF SLEEP HOURS: 5
DO YOU GET TOO LITTLE SLEEP AT NIGHT: DOES
HOW LIKELY ARE YOU TO NOD OFF OR FALL ASLEEP WHILE SITTING QUIETLY AFTER LUNCH WITHOUT ALCOHOL: WOULD NEVER DOZE
HOW LIKELY ARE YOU TO NOD OFF OR FALL ASLEEP WHILE LYING DOWN TO REST IN THE AFTERNOON WHEN CIRCUMSTANCES PERMIT: 0
HOW LIKELY ARE YOU TO NOD OFF OR FALL ASLEEP WHILE WATCHING TV: WOULD NEVER DOZE
DO YOU TAKE NAPS: NO
SELECT ANY OF THE FOLLOWING BEHAVIORS OBSERVED WHILE PATIENT ASLEEP: LOUD SNORING;LIGHT SNORING;GRINDING TEETH
AVERAGE NUMBER OF SLEEP HOURS: 5
DO YOU HAVE DIFFICULTY WATCHING A MOVIE OR VIDEO BECAUSE YOU BECOME SLEEPY OR TIRED: YES, A LITTLE
HOW LIKELY ARE YOU TO NOD OFF OR FALL ASLEEP WHILE SITTING AND READING: 0
HOW LIKELY ARE YOU TO NOD OFF OR FALL ASLEEP WHILE SITTING INACTIVE IN A PUBLIC PLACE: 0
DO YOU WORK SHIFTS: NO
HOW LIKELY ARE YOU TO NOD OFF OR FALL ASLEEP WHILE SITTING AND TALKING TO SOMEONE: 0
DO YOU HAVE DIFFICULTY CONCENTRATING ON THE THINGS YOU DO BECAUSE YOU ARE SLEEPY OR TIRED: YES, MODERATE
HOW LIKELY ARE YOU TO NOD OFF OR FALL ASLEEP WHEN YOU ARE A PASSENGER IN A CAR FOR AN HOUR WITHOUT A BREAK: WOULD NEVER DOZE
ESS TOTAL SCORE: 0
SELECT ANY OF THE FOLLOWING BEHAVIORS OBSERVED WHILE YOU ARE ASLEEP: GRINDING TEETH
DO YOU HAVE DIFFICULTY BEING AS ACTIVE AS YOU WANT TO BE IN THE EVENING BECAUSE YOU ARE SLEEPY OR TIRED: YES, EXTREME
DO YOU HAVE DIFFICULTY VISITING YOUR FAMILY OR FRIENDS IN THEIR HOME BECAUSE YOU BECOME SLEEPY OR TIRED: YES, A LITTLE
HOW LIKELY ARE YOU TO NOD OFF OR FALL ASLEEP WHILE SITTING AND READING: WOULD NEVER DOZE
ARE YOU BOTHERED BY WAKING UP TOO EARLY AND NOT BEING ABLE TO GET BACK TO SLEEP: IS
WHAT TIME DO YOU USUALLY WAKE UP: 20:05
WHAT TIME DO YOU USUALLY GO TO BED: 22:30
HOW LIKELY ARE YOU TO NOD OFF OR FALL ASLEEP IN A CAR, WHILE STOPPED FOR A FEW MINUTES IN TRAFFIC: WOULD NEVER DOZE
HOW LIKELY ARE YOU TO NOD OFF OR FALL ASLEEP WHEN YOU ARE A PASSENGER IN A CAR FOR AN HOUR WITHOUT A BREAK: 0

## 2024-03-14 ENCOUNTER — TELEMEDICINE (OUTPATIENT)
Age: 59
End: 2024-03-14
Payer: MEDICARE

## 2024-03-14 DIAGNOSIS — E66.9 OBESITY (BMI 30-39.9): ICD-10-CM

## 2024-03-14 DIAGNOSIS — G47.33 OBSTRUCTIVE SLEEP APNEA (ADULT) (PEDIATRIC): Primary | ICD-10-CM

## 2024-03-14 PROCEDURE — 99204 OFFICE O/P NEW MOD 45 MIN: CPT | Performed by: INTERNAL MEDICINE

## 2024-03-14 NOTE — PROGRESS NOTES
Joi Kumar is a 59 y.o. female who was seen by synchronous (real-time) audio-video technology on 3/14/2024.      Consent:  She and/or her healthcare decision maker is aware that this patient-initiated Telehealth encounter is a billable service, with coverage as determined by her insurance carrier. She is aware that she may receive a bill and has provided verbal consent to proceed: Yes       The patient was located at Home: [address] [place] [id number].  The provider was located at Home (Appt Dept State): [place].                           [address] [place] [place] [address]  Tel.  [phone]  Fax. [phone] [address]  [place] [address]  Tel.  [phone]  Fax. [phone] [address] [place] [address].  [place] [address]  Tel.  [phone]  Fax. [phone]        Patient called and identity confirmed with 2 patient identifers     Joi Kumar is a 59 y.o. female who was seen by synchronous (real-time) audio-video technology on 3/14/2024.           --Cheri Mccabe MD on 3/14/2024 at 2:01 PM                                 [address] [place] [place] [address]  Tel.  [phone]  Fax. [phone] [address]  [place] [address]  Tel.  [phone]  Fax. [phone] [address] [place] [address].  [place] [address]  Tel.  [phone]  Fax. [phone]         Subjective:             Joi Kumar is an 59 y.o. female self-referred for evaluation for a sleep disorder.       She complains of poor sleep quality and feels she does not get deep sleep associated with snoring.  Symptoms began several years ago, unchanged since that time. She usually can fall asleep in 35-40 minutes.  Family or house members note snoring. She denies falling asleep while driving.  .  Joi Kumar does wake up frequently at night. She is bothered by waking up too early and left unable to get back to sleep. She actually sleeps about 5 hours at night and wakes up about 2 times

## 2024-03-14 NOTE — PATIENT INSTRUCTIONS
be needed to remove enlarged tissues in the throat.  Follow-up care is a key part of your treatment and safety. Be sure to make and go to all appointments, and call your doctor if you are having problems. It's also a good idea to know your test results and keep a list of the medicines you take.  How can you care for yourself at home?  Lose weight, if needed. It may reduce the number of times you stop breathing or have slowed breathing.  Go to bed at the same time every night.  Sleep on your side. It may stop mild apnea. If you tend to roll onto your back, sew a pocket in the back of your pa[employer]oving Planet top. Put a tennis ball into the pocket, and stitch the pocket shut. This will help keep you from sleeping on your back.  Avoid alcohol and medicines such as sleeping pills and sedatives before bed.  Do not smoke. Smoking can make sleep apnea worse. If you need help quitting, talk to your doctor about stop-smoking programs and medicines. These can increase your chances of quitting for good.  Prop up the head of your bed 4 to 6 inches by putting bricks under the legs of the bed.  Treat breathing problems, such as a stuffy nose, caused by a cold or allergies.  Use a continuous positive airway pressure (CPAP) breathing machine if lifestyle changes do not help your apnea and your doctor recommends it. The machine keeps your airway from closing when you sleep.  If CPAP does not help you, ask your doctor whether you should try other breathing machines. A bilevel positive airway pressure machine has two types of air pressureâ€”one for breathing in and one for breathing out. Another device raises or lowers air pressure as needed while you breathe.  If your nose feels dry or bleeds when using one of these machines, talk with your doctor about increasing moisture in the air. A humidifier may help.  If your nose is runny or stuffy from using a breathing machine, talk with your doctor about using decongestants or a corticosteroid nasal

## 2024-04-08 ENCOUNTER — HOSPITAL ENCOUNTER (OUTPATIENT)
Facility: HOSPITAL | Age: 59
Discharge: HOME | End: 2024-04-11
Payer: MEDICARE

## 2024-04-08 VITALS
TEMPERATURE: 96 F | HEART RATE: 103 BPM | WEIGHT: 172 LBS | OXYGEN SATURATION: 97 % | BODY MASS INDEX: 32.47 KG/M2 | HEIGHT: 61 IN | DIASTOLIC BLOOD PRESSURE: 76 MMHG | SYSTOLIC BLOOD PRESSURE: 134 MMHG

## 2024-04-08 DIAGNOSIS — G47.33 OBSTRUCTIVE SLEEP APNEA (ADULT) (PEDIATRIC): ICD-10-CM

## 2024-04-08 PROCEDURE — 95810 POLYSOM 6/> YRS 4/> PARAM: CPT | Performed by: INTERNAL MEDICINE

## 2024-04-09 ENCOUNTER — TELEPHONE (OUTPATIENT)
Facility: HOSPITAL | Age: 59
End: 2024-04-09

## 2024-04-09 DIAGNOSIS — G47.33 OBSTRUCTIVE SLEEP APNEA (ADULT) (PEDIATRIC): Primary | ICD-10-CM

## 2024-04-09 NOTE — PROGRESS NOTES
Disclaimer:  all notes have not been confirmed by the interpreting physician    Acquisition Notes:  Lights off: 2201    Respiratory events: Y  A---Y  H---Y  C---Y  M---N  ECG: SR   Arrhythmias: N  Snoring: Severe  Sleep Stages: 1, 2, 3  REM: Y  PAP titration: N/A   Eliminated events: N/A  Reduced events: N/A  Events at final pressure: N/A  Leak: N/A

## 2024-04-22 ENCOUNTER — CLINICAL DOCUMENTATION (OUTPATIENT)
Age: 59
End: 2024-04-22

## 2024-05-02 ENCOUNTER — CLINICAL DOCUMENTATION (OUTPATIENT)
Age: 59
End: 2024-05-02

## 2024-05-02 ENCOUNTER — TELEPHONE (OUTPATIENT)
Age: 59
End: 2024-05-02

## 2024-05-02 NOTE — TELEPHONE ENCOUNTER
Returned patient's call to inform her that her PAP order was sent to [facility].  Patient asked to schedule adherence upon PAP set up.

## 2024-05-20 ENCOUNTER — TELEPHONE (OUTPATIENT)
Age: 59
End: 2024-05-20

## 2024-06-10 ENCOUNTER — OFFICE VISIT (OUTPATIENT)
Age: 59
End: 2024-06-10
Payer: MEDICARE

## 2024-06-10 VITALS
OXYGEN SATURATION: 98 % | RESPIRATION RATE: 18 BRPM | WEIGHT: 178.8 LBS | DIASTOLIC BLOOD PRESSURE: 74 MMHG | HEART RATE: 75 BPM | SYSTOLIC BLOOD PRESSURE: 112 MMHG | BODY MASS INDEX: 33.76 KG/M2 | HEIGHT: 61 IN | TEMPERATURE: 97.5 F

## 2024-06-10 DIAGNOSIS — F25.0 SCHIZOAFFECTIVE DISORDER, BIPOLAR TYPE (HCC): Primary | ICD-10-CM

## 2024-06-10 DIAGNOSIS — R35.0 FREQUENT URINATION: ICD-10-CM

## 2024-06-10 DIAGNOSIS — G47.33 OSA (OBSTRUCTIVE SLEEP APNEA): ICD-10-CM

## 2024-06-10 PROCEDURE — 99214 OFFICE O/P EST MOD 30 MIN: CPT | Performed by: INTERNAL MEDICINE

## 2024-06-10 NOTE — PROGRESS NOTES
\"Have you been to the ER, urgent care clinic since your last visit?  Hospitalized since your last visit?\"    NO    “Have you seen or consulted any other health care providers outside of [facility] since your last visit?”    NO            Click Here for Release of Records Request

## 2024-06-10 NOTE — PROGRESS NOTES
Joi Kumar (:  1965) is a 59 y.o. female, Established patient, here for evaluation of the following chief complaint(s):  Weight Management (Gaining weight with nutrition issues), Incontinence (Urinary incontinence), and CPAP/BiPAP (May 16th pt got a CPAP machine)         Assessment & Plan  1. Weight gain.  The patient's thyroid was evaluated in 2024 yielding normal results. Her glucose levels were also assessed, both of which yielded normal results. The patient was advised to adhere to a low-calorie diet and engage in daily exercise.  Some of the psych meds can contribute to weight gain but must continue to maintain stable mood     2. Urinary incontinence.  A urine test will be conducted to rule out any infection. Additionally, the patient was provided with exercises to strengthen her pelvic muscles and bladder training.    3. Health maintenance.  The patient underwent a mammogram in 2023 and a colonoscopy in 2024 and up to date on pap smear. The RSV vaccine was recommended.    4. JULIAN: on CPAP     5. Schizoaffective disorder: mood is controlled on current regimen continue with psychiatric care  Her current medication regimen includes Risperdal, mirtazapine, and bupropion. For sleep, she is prescribed temazepam.    Results  Laboratory Studies  Thyroid levels were normal in March. Blood sugar levels were okay.  1. Schizoaffective disorder, bipolar type (HCC)  2. JULIAN (obstructive sleep apnea)  3. Frequent urination  -     Urinalysis with Reflex to Culture; Future    Return in about 4 months (around 10/10/2024) for medicare wellness VISIT .       Subjective   History of Present Illness  The patient is a 59-year-old woman with a history of bipolar and schizophrenia. She is presenting in follow-up. Her last visit was in 2023.    The patient recently acquired an APAP machine for obstructive sleep apnea and reports an increased restful sleep quality. Her psychiatric care is managed by Dr. Linton and

## 2024-06-11 LAB
APPEARANCE UR: CLEAR
BACTERIA #/AREA URNS HPF: NORMAL /[HPF]
BILIRUB UR QL STRIP: NEGATIVE
CASTS URNS QL MICRO: NORMAL /LPF
COLOR UR: YELLOW
EPI CELLS #/AREA URNS HPF: NORMAL /HPF (ref 0–10)
GLUCOSE UR QL STRIP: NEGATIVE
HGB UR QL STRIP: NEGATIVE
KETONES UR QL STRIP: NEGATIVE
LEUKOCYTE ESTERASE UR QL STRIP: NEGATIVE
MICRO URNS: NORMAL
MICRO URNS: NORMAL
NITRITE UR QL STRIP: NEGATIVE
PH UR STRIP: 6 [PH] (ref 5–7.5)
PROT UR QL STRIP: NEGATIVE
RBC #/AREA URNS HPF: NORMAL /HPF (ref 0–2)
SP GR UR STRIP: 1.01 (ref 1–1.03)
URINALYSIS REFLEX: NORMAL
UROBILINOGEN UR STRIP-MCNC: 0.2 MG/DL (ref 0.2–1)
WBC #/AREA URNS HPF: NORMAL /HPF (ref 0–5)

## 2024-06-17 ASSESSMENT — SLEEP AND FATIGUE QUESTIONNAIRES
HAS YOUR RELATIONSHIP WITH FAMILY, FRIENDS OR WORK COLLEAGUES BEEN AFFECTED BECAUSE YOU ARE SLEEPY OR TIRED: YES, A LITTLE
DO YOU HAVE DIFFICULTY BEING AS ACTIVE AS YOU WANT TO BE IN THE EVENING BECAUSE YOU ARE SLEEPY OR TIRED: YES, MODERATE
ESS TOTAL SCORE: 5
HOW LIKELY ARE YOU TO NOD OFF OR FALL ASLEEP WHILE SITTING INACTIVE IN A PUBLIC PLACE: WOULD NEVER DOZE
HOW LIKELY ARE YOU TO NOD OFF OR FALL ASLEEP WHILE SITTING QUIETLY AFTER LUNCH WITHOUT ALCOHOL: WOULD NEVER DOZE
HOW LIKELY ARE YOU TO NOD OFF OR FALL ASLEEP IN A CAR, WHILE STOPPED FOR A FEW MINUTES IN TRAFFIC: WOULD NEVER DOZE
DO YOU HAVE DIFFICULTY OPERATING A MOTOR VEHICLE FOR LONG DISTANCES (GREATER THAN 100 MILES) BECAUSE YOU BECOME SLEEPY: NO
HOW LIKELY ARE YOU TO NOD OFF OR FALL ASLEEP WHILE SITTING AND READING: WOULD NEVER DOZE
HOW LIKELY ARE YOU TO NOD OFF OR FALL ASLEEP WHILE SITTING AND TALKING TO SOMEONE: WOULD NEVER DOZE
DO YOU HAVE DIFFICULTY OPERATING A MOTOR VEHICLE FOR SHORT DISTANCES (LESS THAN 100 MILES) BECAUSE YOU BECOME SLEEPY: NO
HAS YOUR MOOD BEEN AFFECTED BECAUSE YOU ARE SLEEPY OR TIRED: YES, LITTLE
HOW LIKELY ARE YOU TO NOD OFF OR FALL ASLEEP WHILE SITTING AND TALKING TO SOMEONE: SLIGHT CHANCE OF DOZING
HOW LIKELY ARE YOU TO NOD OFF OR FALL ASLEEP WHILE LYING DOWN TO REST IN THE AFTERNOON WHEN CIRCUMSTANCES PERMIT: WOULD NEVER DOZE
HOW LIKELY ARE YOU TO NOD OFF OR FALL ASLEEP WHEN YOU ARE A PASSENGER IN A CAR FOR AN HOUR WITHOUT A BREAK: SLIGHT CHANCE OF DOZING
HOW LIKELY ARE YOU TO NOD OFF OR FALL ASLEEP WHILE SITTING AND READING: WOULD NEVER DOZE
DO YOU HAVE DIFFICULTY CONCENTRATING ON THE THINGS YOU DO BECAUSE YOU ARE SLEEPY OR TIRED: YES, MODERATE
HOW LIKELY ARE YOU TO NOD OFF OR FALL ASLEEP WHILE SITTING INACTIVE IN A PUBLIC PLACE: WOULD NEVER DOZE
HOW LIKELY ARE YOU TO NOD OFF OR FALL ASLEEP WHILE SITTING AND READING: SLIGHT CHANCE OF DOZING
ESS TOTAL SCORE: 1
FOSQ SCORE: 15.5
DO YOU HAVE DIFFICULTY BEING AS ACTIVE AS YOU WANT TO BE IN THE MORNING BECAUSE YOU ARE SLEEPY OR TIRED: NO
HOW LIKELY ARE YOU TO NOD OFF OR FALL ASLEEP WHILE WATCHING TV: WOULD NEVER DOZE
HOW LIKELY ARE YOU TO NOD OFF OR FALL ASLEEP IN A CAR, WHILE STOPPED FOR A FEW MINUTES IN TRAFFIC: WOULD NEVER DOZE
HOW LIKELY ARE YOU TO NOD OFF OR FALL ASLEEP WHILE WATCHING TV: WOULD NEVER DOZE
DO YOU GENERALLY HAVE DIFFICULTY REMEMBERING THINGS BECAUSE YOU ARE SLEEPY OR TIRED: YES, MODERATE
HOW LIKELY ARE YOU TO NOD OFF OR FALL ASLEEP WHEN YOU ARE A PASSENGER IN A CAR FOR AN HOUR WITHOUT A BREAK: MODERATE CHANCE OF DOZING
HOW LIKELY ARE YOU TO NOD OFF OR FALL ASLEEP WHEN YOU ARE A PASSENGER IN A CAR FOR AN HOUR WITHOUT A BREAK: SLIGHT CHANCE OF DOZING
HOW LIKELY ARE YOU TO NOD OFF OR FALL ASLEEP WHILE LYING DOWN TO REST IN THE AFTERNOON WHEN CIRCUMSTANCES PERMIT: WOULD NEVER DOZE
HOW LIKELY ARE YOU TO NOD OFF OR FALL ASLEEP WHILE WATCHING TV: SLIGHT CHANCE OF DOZING
HOW LIKELY ARE YOU TO NOD OFF OR FALL ASLEEP IN A CAR, WHILE STOPPED FOR A FEW MINUTES IN TRAFFIC: WOULD NEVER DOZE
DO YOU HAVE DIFFICULTY VISITING YOUR FAMILY OR FRIENDS IN THEIR HOME BECAUSE YOU BECOME SLEEPY OR TIRED: YES, A LITTLE
DO YOU HAVE DIFFICULTY WATCHING A MOVIE OR VIDEO BECAUSE YOU BECOME SLEEPY OR TIRED: NO
HOW LIKELY ARE YOU TO NOD OFF OR FALL ASLEEP WHILE LYING DOWN TO REST IN THE AFTERNOON WHEN CIRCUMSTANCES PERMIT: WOULD NEVER DOZE
HOW LIKELY ARE YOU TO NOD OFF OR FALL ASLEEP WHILE SITTING QUIETLY AFTER LUNCH WITHOUT ALCOHOL: WOULD NEVER DOZE
HOW LIKELY ARE YOU TO NOD OFF OR FALL ASLEEP WHILE SITTING QUIETLY AFTER LUNCH WITHOUT ALCOHOL: WOULD NEVER DOZE
HOW LIKELY ARE YOU TO NOD OFF OR FALL ASLEEP WHILE SITTING INACTIVE IN A PUBLIC PLACE: WOULD NEVER DOZE
HOW LIKELY ARE YOU TO NOD OFF OR FALL ASLEEP WHILE SITTING AND TALKING TO SOMEONE: WOULD NEVER DOZE

## 2024-06-18 ENCOUNTER — CLINICAL DOCUMENTATION (OUTPATIENT)
Age: 59
End: 2024-06-18

## 2024-06-18 ENCOUNTER — TELEMEDICINE (OUTPATIENT)
Age: 59
End: 2024-06-18
Payer: MEDICARE

## 2024-06-18 DIAGNOSIS — E66.9 OBESITY (BMI 30-39.9): ICD-10-CM

## 2024-06-18 DIAGNOSIS — G47.33 OBSTRUCTIVE SLEEP APNEA (ADULT) (PEDIATRIC): Primary | ICD-10-CM

## 2024-06-18 PROCEDURE — 99213 OFFICE O/P EST LOW 20 MIN: CPT | Performed by: NURSE PRACTITIONER

## 2024-06-18 NOTE — PROGRESS NOTES
regarding results and appointments to be left at that number.    Services were provided through a video synchronous discussion virtually to substitute for in-person clinic visit.  I was  at home  while conducting this encounter, patient located at their home or alternate location of their choice.    Joi Kumar, was evaluated through a synchronous (real-time) audio-video encounter. The patient (or guardian if applicable) is aware that this is a billable service, which includes applicable co-pays. This Virtual Visit was conducted with patient's (and/or legal guardian's) consent. Patient identification was verified, and a caregiver was present when appropriate.   The patient was located at Home: [address] [place] [id number]  Provider was located at Home (Appt Dept State): [place]  Confirm you are appropriately licensed, registered, or certified to deliver care in the state where the patient is located as indicated above. If you are not or unsure, please re-schedule the visit: Yes, I confirm.       --GORDON Morton NP on 6/18/2024 at 3:04 PM    An electronic signature was used to authenticate this note.

## 2024-07-05 ENCOUNTER — TELEPHONE (OUTPATIENT)
Age: 59
End: 2024-07-05

## 2024-07-05 NOTE — TELEPHONE ENCOUNTER
Patient states she is calling to advise that she had Vaccination recommended, RSV done on 7/1/24 & also had COVID Vaccination done at same time. Please call if any questions. Thank you

## 2024-09-20 ENCOUNTER — TELEPHONE (OUTPATIENT)
Age: 59
End: 2024-09-20

## 2024-09-23 NOTE — PROGRESS NOTES
Caller: Qiana Altman    Relationship: Self    Best call back number: [phone]     What is the best time to reach you: ANYTIME     Who are you requesting to speak with (clinical staff, provider,  specific staff member): CLINICAL     What was the call regarding: PATIENT IS CALLING IN REGARDS TO metFORMIN ER (GLUCOPHAGE-XR) 500 MG 24 hr tablet , FOR SHE WILL BE OUT OF 09/25/2024, AND NEXT REFILL DATE IS NOT UNTIL 09/29/2024.    Problem: Lorena/Hypomania  Goal: *STG: Sleep about 5 hours or more per night  Outcome: Progressing Towards Goal    Patient slept five hours     Problem: Lorena/Hypomania  Goal: *STG: Be less agitated and distracted  Description: Be less agitated and distracted, e.g. be able to sit quietly and calmly for 6 minutes  Outcome: Progressing Towards Goal  Patient is calm and cooperative. Thought blocking. Problem: Lorena/Hypomania  Goal: *STG: Pay attention to dressing and grooming appropriately  Outcome: Progressing Towards Goal   Patient is up, performs personal hygiene tasks, changes into clean clothes prior to breakfast.  Problem: Falls - Risk of  Goal: *Absence of Falls  Description: Document Suri Fajardos Fall Risk and appropriate interventions in the flowsheet. Outcome: Progressing Towards Goal  Note: Fall Risk Interventions:  Mobility Interventions: Assess mobility with egress test    Medication Interventions: Teach patient to arise slowly    Elimination Interventions:  Toilet paper/wipes in reach

## 2024-10-31 SDOH — HEALTH STABILITY: PHYSICAL HEALTH: ON AVERAGE, HOW MANY DAYS PER WEEK DO YOU ENGAGE IN MODERATE TO STRENUOUS EXERCISE (LIKE A BRISK WALK)?: 1 DAY

## 2024-10-31 SDOH — HEALTH STABILITY: PHYSICAL HEALTH: ON AVERAGE, HOW MANY MINUTES DO YOU ENGAGE IN EXERCISE AT THIS LEVEL?: 20 MIN

## 2024-10-31 ASSESSMENT — LIFESTYLE VARIABLES
HOW OFTEN DO YOU HAVE A DRINK CONTAINING ALCOHOL: 2
HOW MANY STANDARD DRINKS CONTAINING ALCOHOL DO YOU HAVE ON A TYPICAL DAY: 1
HOW OFTEN DO YOU HAVE SIX OR MORE DRINKS ON ONE OCCASION: 1
HOW MANY STANDARD DRINKS CONTAINING ALCOHOL DO YOU HAVE ON A TYPICAL DAY: 1 OR 2
HOW OFTEN DO YOU HAVE A DRINK CONTAINING ALCOHOL: MONTHLY OR LESS

## 2024-10-31 ASSESSMENT — PATIENT HEALTH QUESTIONNAIRE - PHQ9
SUM OF ALL RESPONSES TO PHQ QUESTIONS 1-9: 0
2. FEELING DOWN, DEPRESSED OR HOPELESS: NOT AT ALL
1. LITTLE INTEREST OR PLEASURE IN DOING THINGS: NOT AT ALL
SUM OF ALL RESPONSES TO PHQ QUESTIONS 1-9: 0
SUM OF ALL RESPONSES TO PHQ9 QUESTIONS 1 & 2: 0

## 2024-11-01 ENCOUNTER — OFFICE VISIT (OUTPATIENT)
Age: 59
End: 2024-11-01
Payer: MEDICARE

## 2024-11-01 VITALS
HEIGHT: 61 IN | HEART RATE: 83 BPM | OXYGEN SATURATION: 100 % | DIASTOLIC BLOOD PRESSURE: 73 MMHG | RESPIRATION RATE: 16 BRPM | SYSTOLIC BLOOD PRESSURE: 109 MMHG | WEIGHT: 171.2 LBS | TEMPERATURE: 97.6 F | BODY MASS INDEX: 32.32 KG/M2

## 2024-11-01 DIAGNOSIS — F31.12 BIPOLAR 1 DISORDER WITH MODERATE MANIA (HCC): ICD-10-CM

## 2024-11-01 DIAGNOSIS — Z00.00 MEDICARE ANNUAL WELLNESS VISIT, SUBSEQUENT: Primary | ICD-10-CM

## 2024-11-01 PROCEDURE — 90661 CCIIV3 VAC ABX FR 0.5 ML IM: CPT | Performed by: INTERNAL MEDICINE

## 2024-11-01 RX ORDER — BUPROPION HYDROCHLORIDE 200 MG/1
TABLET, EXTENDED RELEASE ORAL
COMMUNITY
Start: 2024-10-25

## 2024-11-01 RX ORDER — RISPERIDONE 0.5 MG/1
TABLET ORAL
COMMUNITY
Start: 2024-10-25

## 2024-11-01 RX ORDER — TEMAZEPAM 7.5 MG/1
CAPSULE ORAL
COMMUNITY
Start: 2024-10-22

## 2024-11-01 RX ORDER — FLUOXETINE 40 MG/1
CAPSULE ORAL
COMMUNITY
Start: 2024-10-28

## 2024-11-01 RX ORDER — DEUTETRABENAZINE 42 MG/1
TABLET, FILM COATED, EXTENDED RELEASE ORAL
COMMUNITY
Start: 2024-08-22

## 2024-11-01 NOTE — PROGRESS NOTES
Joi Kumar is a 59 y.o. female    Chief Complaint   Patient presents with    Medicare AWV       /73   Pulse 83   Temp 97.6 °F (36.4 °C)   Resp 16   Ht 1.549 m (5' 1\")   Wt 77.7 kg (171 lb 3.2 oz)   SpO2 100%   BMI 32.35 kg/m²         1. Have you been to the ER, urgent care clinic since your last visit?  Hospitalized since your last visit? No    2. Have you seen or consulted any other health care providers outside of the [facility] since your last visit?  Include any pap smears or colon screening. No    Learning Assessment:       No data to display                Fall Risk Assessment:      10/31/2024    10:24 AM 9/5/2023     9:26 AM 3/2/2023     9:00 AM 9/20/2022     3:15 PM 9/20/2022     7:33 AM 9/20/2022     1:42 AM 9/19/2022     7:10 PM   Amb Fall Risk Assessment and TUG Test   Do you feel unsteady or are you worried about falling?  no no        2 or more falls in past year? no no        Fall with injury in past year? no no        Fall in past 12 months?   0       Able to walk?   Yes       Total Score              Information is confidential and restricted. Go to Review Flowsheets to unlock data.       Abuse Screening:       No data to display                ADL Screening:       No data to display

## 2024-11-01 NOTE — PROGRESS NOTES
Medicare Annual Wellness Visit    Joi Kumar is here for Medicare AWV    Assessment & Plan   Medicare annual wellness visit, subsequent  Recommendations for Preventive Services Due: see orders and patient instructions/AVS.  Recommended screening schedule for the next 5-10 years is provided to the patient in written form: see Patient Instructions/AVS.     Return in 4 months (on 3/1/2025) for high cholesterol.     Subjective     Patient presents for medicare wellness visit    Health maintenance.  The patient underwent a mammogram in 2024 and a colonoscopy in 01/2024 and up to date on pap smear. Agrees to flu shot today   She is update on covid 10 vaccine      JULIAN: on CPAP      Schizoaffective disorder: mood is controlled on current regimen continue with psychiatric care  Her current medication regimen includes Risperdal, mirtazapine, and bupropion. For sleep, she is prescribed temazepam.     No complaints today   Patient's complete Health Risk Assessment and screening values have been reviewed and are found in Flowsheets. The following problems were reviewed today and where indicated follow up appointments were made and/or referrals ordered.    Positive Risk Factor Screenings with Interventions:                Inactivity:  On average, how many days per week do you engage in moderate to strenuous exercise (like a brisk walk)?: 1 day (!) Abnormal  On average, how many minutes do you engage in exercise at this level?: 20 min  Interventions:  See AVS for additional education material     Abnormal BMI (obese):  Body mass index is 32.35 kg/m². (!) Abnormal  Interventions:  low carbohydrate diet, exercise for at least 150 minutes/week           Safety:  Do you have any tripping hazards - loose or unsecured carpets or rugs?: (!) Yes  Do you have non-slip mats or non-slip surfaces or shower bars or grab bars in your shower or bathtub?: (!) No  Interventions:  See AVS for additional education material    ADL's:   Patient

## 2024-11-01 NOTE — PROGRESS NOTES
After obtaining consent, and per orders of Dr. MILLER, injection of FLU VACCINE given in Left deltoid by Jade Hairston MA. Patient instructed to remain in clinic for 20 minutes afterwards, and to report any adverse reaction to me immediately.

## 2024-11-01 NOTE — PATIENT INSTRUCTIONS
and call your doctor if you are having problems. It's also a good idea to know your test results and keep a list of the medicines you take.  Current as of: October 24, 2023  Content Version: 14.2  © 2024 [employer].   Care instructions adapted under license by [employer]. If you have questions about a medical condition or this instruction, always ask your healthcare professional. [employer] disclaims any warranty or liability for your use of this information.           Starting a Weight Loss Plan: Care Instructions  Overview     It can be a challenge to lose weight. But your doctor can help you make a weight-loss plan that meets your needs.  You don't have to make a lot of big changes at once. A better idea might be to focus on small changes and stick with them. When those changes become habit, you can add a few more changes.  Some people find it helpful to take an exercise or nutrition class. If you have questions, ask your doctor about seeing a registered dietitian or an exercise specialist. You might also think about joining a weight-loss support group.  If you're not ready to make changes right now, try to pick a date in the future. Then make an appointment with your doctor to talk about when and how you'll get started with a plan.  Follow-up care is a key part of your treatment and safety. Be sure to make and go to all appointments, and call your doctor if you are having problems. It's also a good idea to know your test results and keep a list of the medicines you take.  How can you care for yourself at home?  Set realistic goals. Many people expect to lose much more weight than is likely. A weight loss of 5% to 10% of your body weight may be enough to improve your health.  Get family and friends involved to provide support. Talk to them about why you are trying to lose weight, and ask them to help. They can help by participating in exercise and having meals with you, even if they

## 2024-11-05 ENCOUNTER — TELEPHONE (OUTPATIENT)
Age: 59
End: 2024-11-05

## 2024-11-05 NOTE — TELEPHONE ENCOUNTER
----- Message from Otilio ÁLVAREZ sent at 11/5/2024  3:28 PM EST -----  Regarding: [facility] Appointment Request  [facility] Appointment Request    Patient needs appointment for [facility] Appointment Type: Existing Condition Follow Up.    Patient Requested Dates(s): anytime except Wednesday  Patient Requested Time:In the morning 9 or 9:30 am  Provider Name:Carla Atkinson MD    Reason for Appointment Request: Established Patient - No appointments available during search  --------------------------------------------------------------------------------------------------------------------------    Relationship to Patient: Self     Call Back Information: OK to leave message on voicemail  Preferred Call Back Number: Phone  [phone]

## 2024-11-05 NOTE — TELEPHONE ENCOUNTER
----- Message from Otilio ÁLVAREZ sent at 11/5/2024  3:28 PM EST -----  Regarding: [facility] Appointment Request  [facility] Appointment Request    Patient needs appointment for [facility] Appointment Type: Existing Condition Follow Up.    Patient Requested Dates(s): anytime except Wednesday  Patient Requested Time:In the morning 9 or 9:30 am  Provider Name:Carla Atkinson MD    Reason for Appointment Request: Established Patient - No appointments available during search  --------------------------------------------------------------------------------------------------------------------------    Relationship to Patient: Self     Call Back Information: OK to leave message on voicemail  Preferred Call Back Number: Phone  [phone]

## 2024-11-14 ENCOUNTER — TELEPHONE (OUTPATIENT)
Age: 59
End: 2024-11-14

## 2024-11-14 NOTE — TELEPHONE ENCOUNTER
Left a voicemail for patient to call us back about scheduling, but I noticed that she had an appointment already so was wondering if that was her appointment she wanted or if she wanted another appointment

## 2024-11-14 NOTE — TELEPHONE ENCOUNTER
----- Message from Dayna CHARLTON sent at 11/14/2024  2:22 PM EST -----  Regarding: [facility] Appointment Request  [facility] Appointment Request    Patient needs appointment for [facility] Appointment Type: Existing Condition Follow Up.    Patient Requested Dates(s): Anytime in March except Wednesday    Patient Requested Time: Morning for bloodwork  Provider Name: Carla Atkinson MD      Reason for Appointment Request: Patient has an appointment on 5/20/2025 but need to reschedule it to  sooner date  --------------------------------------------------------------------------------------------------------------------------    Relationship to Patient: Self     Call Back Information: OK to leave message on voicemail  Preferred Call Back Number: Phone [phone]

## 2025-01-23 ENCOUNTER — TELEPHONE (OUTPATIENT)
Age: 60
End: 2025-01-23

## 2025-01-23 NOTE — TELEPHONE ENCOUNTER
Pt called in states had labs done on 01/02/25 at [facility] would like the office to request lab results to be scanned into chart.         [facility] [address]

## 2025-02-10 ENCOUNTER — TELEPHONE (OUTPATIENT)
Age: 60
End: 2025-02-10

## 2025-02-10 NOTE — TELEPHONE ENCOUNTER
Spoke to pt using two pt identifiers.  Pt states her psychiatrist ordered  some blood work and she wanted us to review it.  Pt states she will be faxing the results again.

## 2025-02-10 NOTE — TELEPHONE ENCOUNTER
Pt states she is getting labs done and needs to know if Dr Mcleod wants to see her afterwards after you look at them?    She has a copy and we were to have gotten a copy.  Does doctor still need to see her for lab work?    Please call to advise

## 2025-02-21 ENCOUNTER — TRANSCRIBE ORDERS (OUTPATIENT)
Facility: HOSPITAL | Age: 60
End: 2025-02-21

## 2025-02-21 DIAGNOSIS — Z12.31 VISIT FOR SCREENING MAMMOGRAM: Primary | ICD-10-CM

## 2025-03-19 SDOH — ECONOMIC STABILITY: INCOME INSECURITY: IN THE LAST 12 MONTHS, WAS THERE A TIME WHEN YOU WERE NOT ABLE TO PAY THE MORTGAGE OR RENT ON TIME?: NO

## 2025-03-19 SDOH — ECONOMIC STABILITY: FOOD INSECURITY: WITHIN THE PAST 12 MONTHS, YOU WORRIED THAT YOUR FOOD WOULD RUN OUT BEFORE YOU GOT MONEY TO BUY MORE.: NEVER TRUE

## 2025-03-19 SDOH — ECONOMIC STABILITY: FOOD INSECURITY: WITHIN THE PAST 12 MONTHS, THE FOOD YOU BOUGHT JUST DIDN'T LAST AND YOU DIDN'T HAVE MONEY TO GET MORE.: SOMETIMES TRUE

## 2025-03-19 SDOH — ECONOMIC STABILITY: TRANSPORTATION INSECURITY
IN THE PAST 12 MONTHS, HAS LACK OF TRANSPORTATION KEPT YOU FROM MEETINGS, WORK, OR FROM GETTING THINGS NEEDED FOR DAILY LIVING?: YES

## 2025-03-20 ENCOUNTER — OFFICE VISIT (OUTPATIENT)
Age: 60
End: 2025-03-20
Payer: MEDICARE

## 2025-03-20 DIAGNOSIS — G47.33 OSA (OBSTRUCTIVE SLEEP APNEA): ICD-10-CM

## 2025-03-20 DIAGNOSIS — F25.0 SCHIZOAFFECTIVE DISORDER, BIPOLAR TYPE (HCC): ICD-10-CM

## 2025-03-20 DIAGNOSIS — F31.12 BIPOLAR 1 DISORDER WITH MODERATE MANIA (HCC): Primary | ICD-10-CM

## 2025-03-20 DIAGNOSIS — R09.81 NASAL CONGESTION: ICD-10-CM

## 2025-03-20 PROCEDURE — 99214 OFFICE O/P EST MOD 30 MIN: CPT | Performed by: INTERNAL MEDICINE

## 2025-03-20 RX ORDER — AZELASTINE 1 MG/ML
2 SPRAY, METERED NASAL 2 TIMES DAILY
Qty: 120 ML | Refills: 1 | Status: SHIPPED | OUTPATIENT
Start: 2025-03-20

## 2025-03-20 ASSESSMENT — PATIENT HEALTH QUESTIONNAIRE - PHQ9
9. THOUGHTS THAT YOU WOULD BE BETTER OFF DEAD, OR OF HURTING YOURSELF: NOT AT ALL
SUM OF ALL RESPONSES TO PHQ QUESTIONS 1-9: 1
SUM OF ALL RESPONSES TO PHQ QUESTIONS 1-9: 1
2. FEELING DOWN, DEPRESSED OR HOPELESS: NOT AT ALL
1. LITTLE INTEREST OR PLEASURE IN DOING THINGS: NOT AT ALL
SUM OF ALL RESPONSES TO PHQ QUESTIONS 1-9: 1
5. POOR APPETITE OR OVEREATING: NOT AT ALL
7. TROUBLE CONCENTRATING ON THINGS, SUCH AS READING THE NEWSPAPER OR WATCHING TELEVISION: NOT AT ALL
10. IF YOU CHECKED OFF ANY PROBLEMS, HOW DIFFICULT HAVE THESE PROBLEMS MADE IT FOR YOU TO DO YOUR WORK, TAKE CARE OF THINGS AT HOME, OR GET ALONG WITH OTHER PEOPLE: NOT DIFFICULT AT ALL
SUM OF ALL RESPONSES TO PHQ QUESTIONS 1-9: 1
3. TROUBLE FALLING OR STAYING ASLEEP: SEVERAL DAYS
8. MOVING OR SPEAKING SO SLOWLY THAT OTHER PEOPLE COULD HAVE NOTICED. OR THE OPPOSITE, BEING SO FIGETY OR RESTLESS THAT YOU HAVE BEEN MOVING AROUND A LOT MORE THAN USUAL: NOT AT ALL
4. FEELING TIRED OR HAVING LITTLE ENERGY: NOT AT ALL
6. FEELING BAD ABOUT YOURSELF - OR THAT YOU ARE A FAILURE OR HAVE LET YOURSELF OR YOUR FAMILY DOWN: NOT AT ALL

## 2025-03-20 NOTE — PROGRESS NOTES
\"Have you been to the ER, urgent care clinic since your last visit?  Hospitalized since your last visit?\"    NO    “Have you seen or consulted any other health care providers outside our system since your last visit?”    NO           
Harsha Rivas MD

## 2025-03-28 ENCOUNTER — HOSPITAL ENCOUNTER (OUTPATIENT)
Facility: HOSPITAL | Age: 60
Discharge: HOME | End: 2025-03-31
Attending: INTERNAL MEDICINE
Payer: MEDICARE

## 2025-03-28 DIAGNOSIS — Z12.31 VISIT FOR SCREENING MAMMOGRAM: ICD-10-CM

## 2025-03-28 PROCEDURE — 77063 BREAST TOMOSYNTHESIS BI: CPT

## 2025-03-29 ENCOUNTER — RESULTS FOLLOW-UP (OUTPATIENT)
Age: 60
End: 2025-03-29

## 2025-06-02 ASSESSMENT — SLEEP AND FATIGUE QUESTIONNAIRES
FOSQ SCORE: 15.5
DO YOU GENERALLY HAVE DIFFICULTY REMEMBERING THINGS BECAUSE YOU ARE SLEEPY OR TIRED: YES, A LITTLE
HOW LIKELY ARE YOU TO NOD OFF OR FALL ASLEEP WHILE WATCHING TV: WOULD NEVER DOZE
HOW LIKELY ARE YOU TO NOD OFF OR FALL ASLEEP WHILE LYING DOWN TO REST IN THE AFTERNOON WHEN CIRCUMSTANCES PERMIT: SLIGHT CHANCE OF DOZING
HOW LIKELY ARE YOU TO NOD OFF OR FALL ASLEEP IN A CAR, WHILE STOPPED FOR A FEW MINUTES IN TRAFFIC: WOULD NEVER DOZE
HOW LIKELY ARE YOU TO NOD OFF OR FALL ASLEEP WHILE WATCHING TV: WOULD NEVER DOZE
DO YOU HAVE DIFFICULTY CONCENTRATING ON THE THINGS YOU DO BECAUSE YOU ARE SLEEPY OR TIRED: YES, MODERATE
HOW LIKELY ARE YOU TO NOD OFF OR FALL ASLEEP WHILE LYING DOWN TO REST IN THE AFTERNOON WHEN CIRCUMSTANCES PERMIT: SLIGHT CHANCE OF DOZING
HOW LIKELY ARE YOU TO NOD OFF OR FALL ASLEEP WHILE SITTING AND TALKING TO SOMEONE: WOULD NEVER DOZE
ESS TOTAL SCORE: 1
HOW LIKELY ARE YOU TO NOD OFF OR FALL ASLEEP WHILE SITTING AND READING: WOULD NEVER DOZE
DO YOU HAVE DIFFICULTY OPERATING A MOTOR VEHICLE FOR LONG DISTANCES (GREATER THAN 100 MILES) BECAUSE YOU BECOME SLEEPY: NO
HAS YOUR MOOD BEEN AFFECTED BECAUSE YOU ARE SLEEPY OR TIRED: YES, MODERATE
HOW LIKELY ARE YOU TO NOD OFF OR FALL ASLEEP WHILE SITTING INACTIVE IN A PUBLIC PLACE: WOULD NEVER DOZE
HOW LIKELY ARE YOU TO NOD OFF OR FALL ASLEEP WHILE SITTING AND READING: WOULD NEVER DOZE
HOW LIKELY ARE YOU TO NOD OFF OR FALL ASLEEP WHILE SITTING AND TALKING TO SOMEONE: WOULD NEVER DOZE
DO YOU HAVE DIFFICULTY BEING AS ACTIVE AS YOU WANT TO BE IN THE MORNING BECAUSE YOU ARE SLEEPY OR TIRED: YES, LITTLE
HOW LIKELY ARE YOU TO NOD OFF OR FALL ASLEEP WHILE SITTING QUIETLY AFTER LUNCH WITHOUT ALCOHOL: WOULD NEVER DOZE
HOW LIKELY ARE YOU TO NOD OFF OR FALL ASLEEP IN A CAR, WHILE STOPPED FOR A FEW MINUTES IN TRAFFIC: WOULD NEVER DOZE
HOW LIKELY ARE YOU TO NOD OFF OR FALL ASLEEP WHEN YOU ARE A PASSENGER IN A CAR FOR AN HOUR WITHOUT A BREAK: WOULD NEVER DOZE
DO YOU HAVE DIFFICULTY BEING AS ACTIVE AS YOU WANT TO BE IN THE EVENING BECAUSE YOU ARE SLEEPY OR TIRED: YES, LITTLE
DO YOU HAVE DIFFICULTY VISITING YOUR FAMILY OR FRIENDS IN THEIR HOME BECAUSE YOU BECOME SLEEPY OR TIRED: NO
HOW LIKELY ARE YOU TO NOD OFF OR FALL ASLEEP WHEN YOU ARE A PASSENGER IN A CAR FOR AN HOUR WITHOUT A BREAK: WOULD NEVER DOZE
DO YOU HAVE DIFFICULTY OPERATING A MOTOR VEHICLE FOR SHORT DISTANCES (LESS THAN 100 MILES) BECAUSE YOU BECOME SLEEPY: NO
DO YOU HAVE DIFFICULTY WATCHING A MOVIE OR VIDEO BECAUSE YOU BECOME SLEEPY OR TIRED: NO
HOW LIKELY ARE YOU TO NOD OFF OR FALL ASLEEP WHILE SITTING INACTIVE IN A PUBLIC PLACE: WOULD NEVER DOZE
HAS YOUR RELATIONSHIP WITH FAMILY, FRIENDS OR WORK COLLEAGUES BEEN AFFECTED BECAUSE YOU ARE SLEEPY OR TIRED: YES, MODERATE
HOW LIKELY ARE YOU TO NOD OFF OR FALL ASLEEP WHILE SITTING QUIETLY AFTER LUNCH WITHOUT ALCOHOL: WOULD NEVER DOZE

## 2025-06-04 NOTE — PROGRESS NOTES
[address]   [place] [address]   Tel.  [phone]   Fax. [phone]  [address]   [place] [address]   Tel.  [phone]   Fax. [phone] [address] [place] [address]   Tel.  [phone]   Fax. [phone]     Joi Kumar (: 1965) is a 59 y.o. female, established patient, seen for positive airway pressure follow-up, she was last seen by me on 2024, previously seen by Dr. Mccabe on 3/2024, prior notes reviewed in detail.  In lab sleep test 2024 showed AHI of 19/hr with a lowest SpO2 of 90%. She is seen today for follow up.     ASSESSMENT/PLAN:   Diagnosis Orders   1. Obstructive sleep apnea (adult) (pediatric)  DME Order for (Specify) as OP      2. BMI 32.0-32.9,adult          AHI = 19 ().  On CPAP :  4-8 cmH2O. Set up 2024.     She is adherent with PAP therapy and PAP continues to benefit patient and remains necessary for control of her sleep apnea.     No follow-up provider specified.    Sleep Apnea -  Pressure settings changed to APAP 4-10 cmH2O. Changes made by me in [facility].     Orders Placed This Encounter   Procedures    DME Order for (Specify) as OP     Diagnosis: (G47.33) JULIAN (obstructive sleep apnea)  (primary encounter diagnosis)     Replacement Supplies for Positive Airway Pressure Therapy Device:   Duration of need: 99 months.      Nasal Pillows Combo Mask (Replace) 2 per month.   Nasal Pillows (Replace) 2 per month.    Nasal Cushion (Replace) 2 per month.   Nasal Interface Mask 1 every 3 months.       Headgear 1 every 6 months.   Positive Airway Pressure chinstrap 1 every 6 months.     Tubing with heating element 1 every 3 months.     Filter(s) Disposable 2 per month.   Filter(s) Non-Disposable 1 every 6 months.   .    Water Chamber for Humidifier (Replace) 1 every 6 months.    HESHAM SalehBC NPI: [id number]    Electronically signed. Date:-

## 2025-06-05 ENCOUNTER — TELEMEDICINE (OUTPATIENT)
Age: 60
End: 2025-06-05
Payer: MEDICARE

## 2025-06-05 ENCOUNTER — CLINICAL DOCUMENTATION (OUTPATIENT)
Age: 60
End: 2025-06-05

## 2025-06-05 DIAGNOSIS — G47.33 OBSTRUCTIVE SLEEP APNEA (ADULT) (PEDIATRIC): Primary | ICD-10-CM

## 2025-06-05 PROCEDURE — 99213 OFFICE O/P EST LOW 20 MIN: CPT | Performed by: NURSE PRACTITIONER

## 2025-06-05 NOTE — PATIENT INSTRUCTIONS
[address]  [place] [address]  Tel.  [phone]  Fax. [phone] [address]  [place] [address]  Tel.  [phone]  Fax. [phone] [address] [place] [address]  Tel.  [phone]  Fax. [phone]     Learning About CPAP for Sleep Apnea  What is CPAP?              CPAP is a small machine that you use at home every night while you sleep. It increases air pressure in your throat to keep your airway open. When you have sleep apnea, this can help you sleep better so you feel much better. CPAP stands for \"continuous positive airway pressure.\"  The CPAP machine will have one of the following:  A mask that covers your nose and mouth  Prongs that fit into your nose  A mask that covers your nose only, the most common type. This type is called NCPAP. The N stands for \"nasal.\"  Why is it done?  CPAP is usually the best treatment for obstructive sleep apnea. It is the first treatment choice and the most widely used. Your doctor may suggest CPAP if you have:  Moderate to severe sleep apnea.  Sleep apnea and coronary artery disease (CAD) or heart failure.  How does it help?  CPAP can help you have more normal sleep, so you feel less sleepy and more alert during the daytime.  CPAP may help keep heart failure or other heart problems from getting worse.  NCPAP may help lower your blood pressure.  If you use CPAP, your bed partner may also sleep better because you are not snoring or restless.  What are the side effects?  Some people who use CPAP have:  A dry or stuffy nose and a sore throat.  Irritated skin on the face.  Sore eyes.  Bloating.  If you have any of these problems, work with your doctor to fix them. Here are some things you can try:  Be sure the mask or nasal prongs fit well.  See if your doctor can adjust the pressure of your CPAP.  If your nose is dry, try a humidifier.  If your nose is runny or stuffy, try decongestant medicine or a steroid

## 2025-06-10 ENCOUNTER — TELEPHONE (OUTPATIENT)
Age: 60
End: 2025-06-10

## 2025-07-29 ASSESSMENT — ENCOUNTER SYMPTOMS: SHORTNESS OF BREATH: 0

## 2025-07-29 NOTE — PROGRESS NOTES
HISTORY OF PRESENT ILLNESS  Joi Kumar is a 60 y.o. female.  HPI    Pt of Dr Harsha Rivas presents today for acute care.     BP today is 125/77    Wt today is 166 lbs     Pt reports pain in soles/heels of feet x 2 weeks. She says her feet bother her most in the morning and during the day, she has also been walking differently d/t pain (told her this most likely irritated her back). She hasn't been taking anything for sxs and sxs have stayed the same. Rx'd Diclofenac and advised exercises for suspected plantar fasciitis.      She also notes lower back pain x 1 week. Back pain stays in lower back. Hasn't taken anything. Sxs have stayed the same. Didn't do any heavy lifting or anything strenuous. Hurts when she sits on it, changing position bothers her back.  No leg weakness tingling bladder or bowel incontinence no injury  Patient Active Problem List   Diagnosis    Hyponatremia    Anxiety and depression    Bipolar 1 disorder (HCC)    Bipolar 1 disorder with moderate ajay (HCC)    Scoliosis    Hyperlipidemia    Chronic back pain    Ajay (HCC)    Drug overdose    Schizoaffective disorder (HCC)    Suicide attempt (HCC)    Lithium toxicity     Current Outpatient Medications   Medication Sig Dispense Refill    azelastine (ASTELIN) 0.1 % nasal spray 2 sprays by Nasal route 2 times daily Use in each nostril as directed 120 mL 1    buPROPion (WELLBUTRIN SR) 200 MG extended release tablet       AUSTEDO XR 42 MG TB24       FLUoxetine (PROZAC) 40 MG capsule       risperiDONE (RISPERDAL) 0.5 MG tablet       temazepam (RESTORIL) 7.5 MG capsule Take 1 capsule by mouth nightly as needed.      NONFORMULARY Coconut Oil      diclofenac (VOLTAREN) 75 MG EC tablet Take 1 tablet by mouth 2 times daily as needed for Pain      diclofenac sodium (VOLTAREN) 1 % GEL Apply 2 g topically 4 times daily as needed for Pain      mirtazapine (REMERON) 45 MG tablet ceived the following from [facility]: Outside name: mirtazapine

## 2025-07-30 ENCOUNTER — OFFICE VISIT (OUTPATIENT)
Age: 60
End: 2025-07-30
Payer: MEDICARE

## 2025-07-30 VITALS
OXYGEN SATURATION: 100 % | BODY MASS INDEX: 31.34 KG/M2 | DIASTOLIC BLOOD PRESSURE: 77 MMHG | HEIGHT: 61 IN | SYSTOLIC BLOOD PRESSURE: 125 MMHG | HEART RATE: 74 BPM | TEMPERATURE: 96.9 F | WEIGHT: 166 LBS

## 2025-07-30 DIAGNOSIS — M79.671 PAIN OF BOTH HEELS: ICD-10-CM

## 2025-07-30 DIAGNOSIS — G89.29 CHRONIC BILATERAL LOW BACK PAIN WITHOUT SCIATICA: ICD-10-CM

## 2025-07-30 DIAGNOSIS — M72.2 PLANTAR FASCIITIS, BILATERAL: Primary | ICD-10-CM

## 2025-07-30 DIAGNOSIS — M79.672 PAIN OF BOTH HEELS: ICD-10-CM

## 2025-07-30 DIAGNOSIS — M54.50 CHRONIC BILATERAL LOW BACK PAIN WITHOUT SCIATICA: ICD-10-CM

## 2025-07-30 PROCEDURE — 99213 OFFICE O/P EST LOW 20 MIN: CPT | Performed by: INTERNAL MEDICINE

## 2025-07-30 RX ORDER — DICLOFENAC SODIUM 75 MG/1
75 TABLET, DELAYED RELEASE ORAL 2 TIMES DAILY PRN
Qty: 60 TABLET | Refills: 0 | Status: SHIPPED | OUTPATIENT
Start: 2025-07-30

## 2025-07-30 ASSESSMENT — PATIENT HEALTH QUESTIONNAIRE - PHQ9
2. FEELING DOWN, DEPRESSED OR HOPELESS: NOT AT ALL
SUM OF ALL RESPONSES TO PHQ QUESTIONS 1-9: 0
1. LITTLE INTEREST OR PLEASURE IN DOING THINGS: NOT AT ALL
SUM OF ALL RESPONSES TO PHQ QUESTIONS 1-9: 0

## 2025-08-05 ENCOUNTER — HOSPITAL ENCOUNTER (OUTPATIENT)
Facility: HOSPITAL | Age: 60
Setting detail: RECURRING SERIES
Discharge: HOME | End: 2025-08-08
Payer: MEDICARE

## 2025-08-05 PROCEDURE — 97110 THERAPEUTIC EXERCISES: CPT

## 2025-08-05 PROCEDURE — 97162 PT EVAL MOD COMPLEX 30 MIN: CPT

## 2025-08-05 PROCEDURE — 97535 SELF CARE MNGMENT TRAINING: CPT

## 2025-08-11 ENCOUNTER — HOSPITAL ENCOUNTER (EMERGENCY)
Facility: HOSPITAL | Age: 60
Discharge: PSYCHIATRIC OR SUBSTANCE USE FACILITY | End: 2025-08-12
Attending: EMERGENCY MEDICINE
Payer: MEDICARE

## 2025-08-11 ENCOUNTER — APPOINTMENT (OUTPATIENT)
Facility: HOSPITAL | Age: 60
End: 2025-08-11
Payer: MEDICARE

## 2025-08-11 DIAGNOSIS — F06.1 CATATONIA: Primary | ICD-10-CM

## 2025-08-11 LAB
ALBUMIN SERPL-MCNC: 4.4 G/DL (ref 3.5–5.2)
ALBUMIN/GLOB SERPL: 1.2 (ref 1.1–2.2)
ALP SERPL-CCNC: 56 U/L (ref 35–104)
ALT SERPL-CCNC: 19 U/L (ref 10–35)
AMPHET UR QL SCN: NEGATIVE
ANION GAP SERPL CALC-SCNC: 13 MMOL/L (ref 2–14)
APAP SERPL-MCNC: <5 UG/ML (ref 10–30)
APPEARANCE UR: CLEAR
AST SERPL-CCNC: 32 U/L (ref 10–35)
BACTERIA URNS QL MICRO: NEGATIVE /HPF
BARBITURATES UR QL SCN: NEGATIVE
BASOPHILS # BLD: 0.02 K/UL (ref 0–0.1)
BASOPHILS NFR BLD: 0.3 % (ref 0–1)
BENZODIAZ UR QL: POSITIVE
BILIRUB SERPL-MCNC: 0.4 MG/DL (ref 0–1.2)
BILIRUB UR QL: NEGATIVE
BUN SERPL-MCNC: 10 MG/DL (ref 8–23)
BUN/CREAT SERPL: 14 (ref 12–20)
CALCIUM SERPL-MCNC: 10 MG/DL (ref 8.8–10.2)
CANNABINOIDS UR QL SCN: NEGATIVE
CHLORIDE SERPL-SCNC: 105 MMOL/L (ref 98–107)
CO2 SERPL-SCNC: 24 MMOL/L (ref 20–29)
COCAINE UR QL SCN: NEGATIVE
COLOR UR: ABNORMAL
COMMENT:: NORMAL
CREAT SERPL-MCNC: 0.72 MG/DL (ref 0.6–1)
DIFFERENTIAL METHOD BLD: NORMAL
EOSINOPHIL # BLD: 0.2 K/UL (ref 0–0.4)
EOSINOPHIL NFR BLD: 3.3 % (ref 0–7)
EPITH CASTS URNS QL MICRO: ABNORMAL /LPF
ERYTHROCYTE [DISTWIDTH] IN BLOOD BY AUTOMATED COUNT: 12.5 % (ref 11.5–14.5)
ETHANOL SERPL-MCNC: <11 MG/DL (ref 0–0.08)
FENTANYL: NEGATIVE
GLOBULIN SER CALC-MCNC: 3.7 G/DL (ref 2–4)
GLUCOSE SERPL-MCNC: 109 MG/DL (ref 65–100)
GLUCOSE UR STRIP.AUTO-MCNC: NEGATIVE MG/DL
HCT VFR BLD AUTO: 39.6 % (ref 35–47)
HGB BLD-MCNC: 12.8 G/DL (ref 11.5–16)
HGB UR QL STRIP: ABNORMAL
HYALINE CASTS URNS QL MICRO: ABNORMAL /LPF (ref 0–5)
IMM GRANULOCYTES # BLD AUTO: 0.03 K/UL (ref 0–0.04)
IMM GRANULOCYTES NFR BLD AUTO: 0.5 % (ref 0–0.5)
KETONES UR QL STRIP.AUTO: 15 MG/DL
LEUKOCYTE ESTERASE UR QL STRIP.AUTO: ABNORMAL
LYMPHOCYTES # BLD: 1.19 K/UL (ref 0.8–3.5)
LYMPHOCYTES NFR BLD: 19.5 % (ref 12–49)
Lab: ABNORMAL
MCH RBC QN AUTO: 31 PG (ref 26–34)
MCHC RBC AUTO-ENTMCNC: 32.3 G/DL (ref 30–36.5)
MCV RBC AUTO: 95.9 FL (ref 80–99)
METHADONE UR QL: NEGATIVE
MONOCYTES # BLD: 0.42 K/UL (ref 0–1)
MONOCYTES NFR BLD: 6.9 % (ref 5–13)
NEUTS SEG # BLD: 4.24 K/UL (ref 1.8–8)
NEUTS SEG NFR BLD: 69.5 % (ref 32–75)
NITRITE UR QL STRIP.AUTO: NEGATIVE
NRBC # BLD: 0 K/UL (ref 0–0.01)
NRBC BLD-RTO: 0 PER 100 WBC
OPIATES UR QL: NEGATIVE
PCP UR QL: NEGATIVE
PH UR STRIP: 6 (ref 5–8)
PLATELET # BLD AUTO: 210 K/UL (ref 150–400)
PMV BLD AUTO: 9.7 FL (ref 8.9–12.9)
POTASSIUM SERPL-SCNC: 4.3 MMOL/L (ref 3.5–5.1)
PROT SERPL-MCNC: 8.1 G/DL (ref 6.4–8.3)
PROT UR STRIP-MCNC: NEGATIVE MG/DL
RBC # BLD AUTO: 4.13 M/UL (ref 3.8–5.2)
RBC #/AREA URNS HPF: ABNORMAL /HPF (ref 0–5)
SALICYLATES SERPL-MCNC: <0.5 MG/DL (ref 3–10)
SODIUM SERPL-SCNC: 141 MMOL/L (ref 136–145)
SP GR UR REFRACTOMETRY: 1.01 (ref 1–1.03)
SPECIMEN HOLD: NORMAL
TSH, 3RD GENERATION: 1.15 UIU/ML (ref 0.27–4.2)
URINE CULTURE IF INDICATED: ABNORMAL
UROBILINOGEN UR QL STRIP.AUTO: 0.2 EU/DL (ref 0.2–1)
WBC # BLD AUTO: 6.1 K/UL (ref 3.6–11)
WBC URNS QL MICRO: ABNORMAL /HPF (ref 0–4)

## 2025-08-11 PROCEDURE — 81001 URINALYSIS AUTO W/SCOPE: CPT

## 2025-08-11 PROCEDURE — 6370000000 HC RX 637 (ALT 250 FOR IP)

## 2025-08-11 PROCEDURE — 80143 DRUG ASSAY ACETAMINOPHEN: CPT

## 2025-08-11 PROCEDURE — 84443 ASSAY THYROID STIM HORMONE: CPT

## 2025-08-11 PROCEDURE — 85025 COMPLETE CBC W/AUTO DIFF WBC: CPT

## 2025-08-11 PROCEDURE — 99285 EMERGENCY DEPT VISIT HI MDM: CPT

## 2025-08-11 PROCEDURE — 87086 URINE CULTURE/COLONY COUNT: CPT

## 2025-08-11 PROCEDURE — 82077 ASSAY SPEC XCP UR&BREATH IA: CPT

## 2025-08-11 PROCEDURE — 80179 DRUG ASSAY SALICYLATE: CPT

## 2025-08-11 PROCEDURE — 80053 COMPREHEN METABOLIC PANEL: CPT

## 2025-08-11 RX ORDER — TEMAZEPAM 15 MG/1
15 CAPSULE ORAL NIGHTLY PRN
Status: DISCONTINUED | OUTPATIENT
Start: 2025-08-11 | End: 2025-08-12 | Stop reason: HOSPADM

## 2025-08-11 RX ORDER — TEMAZEPAM 15 MG/1
15 CAPSULE ORAL NIGHTLY PRN
COMMUNITY
Start: 2025-07-15

## 2025-08-11 RX ADMIN — TEMAZEPAM 15 MG: 15 CAPSULE ORAL at 22:11

## 2025-08-11 ASSESSMENT — PAIN - FUNCTIONAL ASSESSMENT: PAIN_FUNCTIONAL_ASSESSMENT: 0-10

## 2025-08-12 PROBLEM — F29 PSYCHOSIS (HCC): Status: ACTIVE | Noted: 2025-08-12

## 2025-08-12 LAB
BACTERIA SPEC CULT: NORMAL
CC UR VC: NORMAL
SERVICE CMNT-IMP: NORMAL

## 2025-08-12 PROCEDURE — 6370000000 HC RX 637 (ALT 250 FOR IP)

## 2025-08-12 PROCEDURE — 90792 PSYCH DIAG EVAL W/MED SRVCS: CPT

## 2025-08-12 RX ORDER — MIRTAZAPINE 15 MG/1
45 TABLET, FILM COATED ORAL NIGHTLY
Status: DISCONTINUED | OUTPATIENT
Start: 2025-08-12 | End: 2025-08-12 | Stop reason: HOSPADM

## 2025-08-12 RX ORDER — BUPROPION HYDROCHLORIDE 100 MG/1
200 TABLET, EXTENDED RELEASE ORAL DAILY
Status: DISCONTINUED | OUTPATIENT
Start: 2025-08-12 | End: 2025-08-12 | Stop reason: HOSPADM

## 2025-08-12 RX ORDER — SULFAMETHOXAZOLE AND TRIMETHOPRIM 800; 160 MG/1; MG/1
1 TABLET ORAL 2 TIMES DAILY
Status: DISCONTINUED | OUTPATIENT
Start: 2025-08-12 | End: 2025-08-12

## 2025-08-12 RX ORDER — SULFAMETHOXAZOLE AND TRIMETHOPRIM 800; 160 MG/1; MG/1
1 TABLET ORAL 2 TIMES DAILY
Status: DISCONTINUED | OUTPATIENT
Start: 2025-08-12 | End: 2025-08-12 | Stop reason: HOSPADM

## 2025-08-12 RX ADMIN — SULFAMETHOXAZOLE AND TRIMETHOPRIM 1 TABLET: 800; 160 TABLET ORAL at 09:28

## 2025-08-12 ASSESSMENT — PAIN SCALES - GENERAL: PAINLEVEL_OUTOF10: 0

## 2025-08-18 ENCOUNTER — APPOINTMENT (OUTPATIENT)
Facility: HOSPITAL | Age: 60
End: 2025-08-18
Payer: MEDICARE

## 2025-08-25 ENCOUNTER — APPOINTMENT (OUTPATIENT)
Facility: HOSPITAL | Age: 60
End: 2025-08-25
Payer: MEDICARE